# Patient Record
Sex: FEMALE | Race: WHITE | Employment: OTHER | URBAN - METROPOLITAN AREA
[De-identification: names, ages, dates, MRNs, and addresses within clinical notes are randomized per-mention and may not be internally consistent; named-entity substitution may affect disease eponyms.]

---

## 2021-01-07 ENCOUNTER — OFFICE VISIT (OUTPATIENT)
Dept: FAMILY MEDICINE CLINIC | Age: 66
End: 2021-01-07
Payer: MEDICARE

## 2021-01-07 VITALS
HEIGHT: 63 IN | HEART RATE: 95 BPM | RESPIRATION RATE: 16 BRPM | WEIGHT: 213 LBS | OXYGEN SATURATION: 96 % | SYSTOLIC BLOOD PRESSURE: 118 MMHG | TEMPERATURE: 96.8 F | DIASTOLIC BLOOD PRESSURE: 86 MMHG | BODY MASS INDEX: 37.74 KG/M2

## 2021-01-07 DIAGNOSIS — Z00.00 MEDICARE ANNUAL WELLNESS VISIT, SUBSEQUENT: Primary | ICD-10-CM

## 2021-01-07 DIAGNOSIS — Z11.59 NEED FOR HEPATITIS C SCREENING TEST: ICD-10-CM

## 2021-01-07 DIAGNOSIS — Z13.1 SCREENING FOR DIABETES MELLITUS: ICD-10-CM

## 2021-01-07 DIAGNOSIS — F41.9 ANXIETY: ICD-10-CM

## 2021-01-07 DIAGNOSIS — Z13.220 SCREENING FOR HYPERLIPIDEMIA: ICD-10-CM

## 2021-01-07 DIAGNOSIS — I10 ESSENTIAL HYPERTENSION: ICD-10-CM

## 2021-01-07 DIAGNOSIS — F32.1 CURRENT MODERATE EPISODE OF MAJOR DEPRESSIVE DISORDER, UNSPECIFIED WHETHER RECURRENT (HCC): ICD-10-CM

## 2021-01-07 DIAGNOSIS — Z71.89 ADVANCED DIRECTIVES, COUNSELING/DISCUSSION: ICD-10-CM

## 2021-01-07 PROCEDURE — G8754 DIAS BP LESS 90: HCPCS | Performed by: FAMILY MEDICINE

## 2021-01-07 PROCEDURE — G8427 DOCREV CUR MEDS BY ELIG CLIN: HCPCS | Performed by: FAMILY MEDICINE

## 2021-01-07 PROCEDURE — G8752 SYS BP LESS 140: HCPCS | Performed by: FAMILY MEDICINE

## 2021-01-07 PROCEDURE — G0439 PPPS, SUBSEQ VISIT: HCPCS | Performed by: FAMILY MEDICINE

## 2021-01-07 PROCEDURE — 99213 OFFICE O/P EST LOW 20 MIN: CPT | Performed by: FAMILY MEDICINE

## 2021-01-07 PROCEDURE — G9395 INI PHQ9 >9 NO REMISS >=5: HCPCS | Performed by: FAMILY MEDICINE

## 2021-01-07 PROCEDURE — G8417 CALC BMI ABV UP PARAM F/U: HCPCS | Performed by: FAMILY MEDICINE

## 2021-01-07 PROCEDURE — 3017F COLORECTAL CA SCREEN DOC REV: CPT | Performed by: FAMILY MEDICINE

## 2021-01-07 PROCEDURE — G8432 DEP SCR NOT DOC, RNG: HCPCS | Performed by: FAMILY MEDICINE

## 2021-01-07 RX ORDER — ARIPIPRAZOLE 2 MG/1
TABLET ORAL
Qty: 90 TAB | Refills: 1 | Status: SHIPPED | OUTPATIENT
Start: 2021-01-07 | End: 2022-04-11

## 2021-01-07 RX ORDER — ALPRAZOLAM 1 MG/1
TABLET ORAL
COMMUNITY
Start: 2020-11-15 | End: 2021-01-07 | Stop reason: ALTCHOICE

## 2021-01-07 RX ORDER — FLUOXETINE HYDROCHLORIDE 40 MG/1
CAPSULE ORAL
COMMUNITY
Start: 2020-11-16 | End: 2021-01-07 | Stop reason: SDUPTHER

## 2021-01-07 RX ORDER — ARIPIPRAZOLE 2 MG/1
TABLET ORAL
COMMUNITY
Start: 2020-12-02 | End: 2021-01-07 | Stop reason: SDUPTHER

## 2021-01-07 RX ORDER — FLUOXETINE HYDROCHLORIDE 40 MG/1
CAPSULE ORAL
Qty: 90 CAP | Refills: 1 | Status: SHIPPED | OUTPATIENT
Start: 2021-01-07 | End: 2021-10-11

## 2021-01-07 RX ORDER — HYDROXYZINE HYDROCHLORIDE 10 MG/1
10 TABLET, FILM COATED ORAL 2 TIMES DAILY
Qty: 120 TAB | Refills: 3 | Status: SHIPPED | OUTPATIENT
Start: 2021-01-07 | End: 2021-03-08

## 2021-01-07 RX ORDER — AMLODIPINE AND BENAZEPRIL HYDROCHLORIDE 5; 20 MG/1; MG/1
CAPSULE ORAL
COMMUNITY
Start: 2020-11-15 | End: 2021-01-07 | Stop reason: SDUPTHER

## 2021-01-07 RX ORDER — AMLODIPINE AND BENAZEPRIL HYDROCHLORIDE 5; 20 MG/1; MG/1
CAPSULE ORAL
Qty: 90 CAP | Refills: 1 | Status: SHIPPED | OUTPATIENT
Start: 2021-01-07 | End: 2021-10-11

## 2021-01-07 NOTE — ACP (ADVANCE CARE PLANNING)
Advance Care Planning     General Advance Care Planning (ACP) Conversation      Date of Conversation: 1/7/2021  Conducted with: Patient with Decision Making Capacity    Healthcare Decision Maker:   Marquita tanner son  Click here to complete [address] including selection of the Healthcare Decision Maker Relationship (ie \"Primary\")  Today we documented Decision Maker(s) consistent with Legal Next of Kin hierarchy. Content/Action Overview:    Has ACP document(s) on file - reflects the patient's care preferences  Reviewed DNR/DNI and patient elects DNR order - referred to ACP Clinical Specialist & placed order  Topics discussed: treatment goals and benefit/burden of treatment options  Additional Comments: none     Length of Voluntary ACP Conversation in minutes:  16 minutes    Abilio Quinn MD

## 2021-01-07 NOTE — PROGRESS NOTES
Chief Complaint   Patient presents with    New Patient    Establish Care    Discuss Medications     she is a 60y.o. year old female who presents for evalution. She is here to establish care and to get her medcare wellness visit done  She also has chronic conditions to address    She took  xanax in the past and two antidepressants , was getting from pcp. Has not see psychiatry recently  She has not been taking the xanax for a month  She says  She has felt nervous at times and she just went to bed  Has done ok without it overall  Needs care , needs a psychiatrist and needs a therapist to help behaviorally      Reviewed PmHx, RxHx, FmHx, SocHx, AllgHx and updated and dated in the chart. Aspirin yes ____   No____ N/A____    There are no active problems to display for this patient. Nurse notes were reviewed and copied and are correct  Review of Systems - negative except as listed above in the HPI    Objective:     Vitals:    01/07/21 1408   BP: 118/86   Pulse: 95   Resp: 16   Temp: 96.8 °F (36 °C)   TempSrc: Skin   SpO2: 96%   Weight: 213 lb (96.6 kg)   Height: 5' 3\" (1.6 m)     Physical Examination: General appearance - alert, well appearing, and in no distress  Mental status - alert, oriented to person, place, and time  Eyes - pupils equal and reactive, extraocular eye movements intact  Neck - supple, no significant adenopathy  Chest - clear to auscultation, no wheezes, rales or rhonchi, symmetric air entry  Heart - normal rate, regular rhythm, normal S1, S2, no murmurs, rubs, clicks or gallops  Abdomen - soft, nontender, nondistended, no masses or organomegaly  Neurological - alert, oriented, normal speech, no focal findings or movement disorder noted  Musculoskeletal - no joint tenderness, deformity or swelling  Extremities - peripheral pulses normal, no pedal edema, no clubbing or cyanosis         Assessment/ Plan:   Diagnoses and all orders for this visit:    1.  Anxiety  -     hydrOXYzine HCL (ATARAX) 10 mg tablet; Take 1 Tab by mouth two (2) times a day for 60 days. Trying a nonbenzo Rx for anxiety  She agrees to try behavioral support to give nonchemical ways to manage anxiety  2. Current moderate episode of major depressive disorder, unspecified whether recurrent (HCC)  -     ARIPiprazole (ABILIFY) 2 mg tablet; TK 1 T PO AT NIGHT TO AUGMENT DEPRESSION TREATMENT  -     FLUoxetine (PROzac) 40 mg capsule; TAKE 1 CAPSULE BY MOUTH EVERY MORNING  -     [place] INI PHQ9 >9 NO REMISS >=5    3. Essential hypertension  -     amLODIPine-benazepril (LOTREL) 5-20 mg per capsule; TAKE 1 CAPSULE BY MOUTH EVERY MORNING  -     METABOLIC PANEL, COMPREHENSIVE; Future  Cont same meds. Check labs today  4. Screening for hyperlipidemia  -     LIPID PANEL; Future    5. Need for hepatitis C screening test  -     HEPATITIS C AB; Future    6. Screening for diabetes mellitus  -     HEMOGLOBIN A1C WITH EAG; Future           ICD-10-CM ICD-9-CM    1. Medicare annual wellness visit, subsequent  Z00.00 V70.0    2. Advanced directives, counseling/discussion  Z71.89 V65.49    3. Anxiety  F41.9 300.00 hydrOXYzine HCL (ATARAX) 10 mg tablet   4. Current moderate episode of major depressive disorder, unspecified whether recurrent (HCC)  F32.1 296.22 ARIPiprazole (ABILIFY) 2 mg tablet      FLUoxetine (PROzac) 40 mg capsule      HI INI PHQ9 >9 NO REMISS >=5   5. Essential hypertension  I10 401.9 amLODIPine-benazepril (LOTREL) 5-20 mg per capsule      METABOLIC PANEL, COMPREHENSIVE      METABOLIC PANEL, COMPREHENSIVE   6. Screening for hyperlipidemia  Z13.220 V77.91 LIPID PANEL      LIPID PANEL   7. Need for hepatitis C screening test  Z11.59 V73.89 HEPATITIS C AB      HEPATITIS C AB   8. Screening for diabetes mellitus  Z13.1 V77.1 HEMOGLOBIN A1C WITH EAG      HEMOGLOBIN A1C WITH EAG       I have discussed the diagnosis with the patient and the intended plan as seen in the above orders.   The patient has received an after-visit summary and questions were answered concerning future plans. Patient Instructions       Medicare Wellness Visit, Female     The best way to live healthy is to have a lifestyle where you eat a well-balanced diet, exercise regularly, limit alcohol use, and quit all forms of tobacco/nicotine, if applicable. Regular preventive services are another way to keep healthy. Preventive services (vaccines, screening tests, monitoring & exams) can help personalize your care plan, which helps you manage your own care. Screening tests can find health problems at the earliest stages, when they are easiest to treat. [facility] follows the current, evidence-based guidelines published by the [facility]adim Kang ([facility]) when recommending preventive services for our patients. Because we follow these guidelines, sometimes recommendations change over time as research supports it. (For example, mammograms used to be recommended annually. Even though Medicare will still pay for an annual mammogram, the newer guidelines recommend a mammogram every two years for women of average risk). Of course, you and your doctor may decide to screen more often for some diseases, based on your risk and your co-morbidities (chronic disease you are already diagnosed with). Preventive services for you include:  - Medicare offers their members a free annual wellness visit, which is time for you and your primary care provider to discuss and plan for your preventive service needs. Take advantage of this benefit every year!  -All adults over the age of 72 should receive the recommended pneumonia vaccines. Current USPSTF guidelines recommend a series of two vaccines for the best pneumonia protection.   -All adults should have a flu vaccine yearly and a tetanus vaccine every 10 years.   -All adults age 48 and older should receive the shingles vaccines (series of two vaccines).       -All adults age 38-68 who are overweight should have a diabetes screening test once every three years.   -All adults born between 80 and 1965 should be screened once for Hepatitis C.  -Other screening tests and preventive services for persons with diabetes include: an eye exam to screen for diabetic retinopathy, a kidney function test, a foot exam, and stricter control over your cholesterol.   -Cardiovascular screening for adults with routine risk involves an electrocardiogram (ECG) at intervals determined by your doctor.   -Colorectal cancer screenings should be done for adults age 54-65 with no increased risk factors for colorectal cancer. There are a number of acceptable methods of screening for this type of cancer. Each test has its own benefits and drawbacks. Discuss with your doctor what is most appropriate for you during your annual wellness visit. The different tests include: colonoscopy (considered the best screening method), a fecal occult blood test, a fecal DNA test, and sigmoidoscopy.    -A bone mass density test is recommended when a woman turns 65 to screen for osteoporosis. This test is only recommended one time, as a screening. Some providers will use this same test as a disease monitoring tool if you already have osteoporosis. -Breast cancer screenings are recommended every other year for women of normal risk, age 54-69.  -Cervical cancer screenings for women over age 72 are only recommended with certain risk factors.      Here is a list of your current Health Maintenance items (your personalized list of preventive services) with a due date:  Health Maintenance Due   Topic Date Due    Hepatitis C Test  1955    Pneumococcal Vaccine (1 of 1 - PPSV23) 01/31/1962    DTaP/Tdap/Td  (1 - Tdap) 01/31/1977    Cholesterol Test   01/31/1996    Shingles Vaccine (1 of 2) 01/31/2006    Colorectal Screening  01/31/2006    Mammogram  01/31/2006    Bone Mineral Density   01/31/2021         Medicare Wellness Visit, Female     The best way to live healthy is to have a lifestyle where you eat a well-balanced diet, exercise regularly, limit alcohol use, and quit all forms of tobacco/nicotine, if applicable. Regular preventive services are another way to keep healthy. Preventive services (vaccines, screening tests, monitoring & exams) can help personalize your care plan, which helps you manage your own care. Screening tests can find health problems at the earliest stages, when they are easiest to treat. [facility] follows the current, evidence-based guidelines published by the [facility] Vadim Kapadia ([facility]) when recommending preventive services for our patients. Because we follow these guidelines, sometimes recommendations change over time as research supports it. (For example, mammograms used to be recommended annually. Even though Medicare will still pay for an annual mammogram, the newer guidelines recommend a mammogram every two years for women of average risk). Of course, you and your doctor may decide to screen more often for some diseases, based on your risk and your co-morbidities (chronic disease you are already diagnosed with). Preventive services for you include:  - Medicare offers their members a free annual wellness visit, which is time for you and your primary care provider to discuss and plan for your preventive service needs. Take advantage of this benefit every year!  -All adults over the age of 72 should receive the recommended pneumonia vaccines. Current USPSTF guidelines recommend a series of two vaccines for the best pneumonia protection.   -All adults should have a flu vaccine yearly and a tetanus vaccine every 10 years.   -All adults age 48 and older should receive the shingles vaccines (series of two vaccines).       -All adults age 38-68 who are overweight should have a diabetes screening test once every three years.   -All adults born between 80 and 1965 should be screened once for Hepatitis C.  -Other screening tests and preventive services for persons with diabetes include: an eye exam to screen for diabetic retinopathy, a kidney function test, a foot exam, and stricter control over your cholesterol.   -Cardiovascular screening for adults with routine risk involves an electrocardiogram (ECG) at intervals determined by your doctor.   -Colorectal cancer screenings should be done for adults age 54-65 with no increased risk factors for colorectal cancer. There are a number of acceptable methods of screening for this type of cancer. Each test has its own benefits and drawbacks. Discuss with your doctor what is most appropriate for you during your annual wellness visit. The different tests include: colonoscopy (considered the best screening method), a fecal occult blood test, a fecal DNA test, and sigmoidoscopy.    -A bone mass density test is recommended when a woman turns 65 to screen for osteoporosis. This test is only recommended one time, as a screening. Some providers will use this same test as a disease monitoring tool if you already have osteoporosis. -Breast cancer screenings are recommended every other year for women of normal risk, age 54-69.  -Cervical cancer screenings for women over age 72 are only recommended with certain risk factors.      Here is a list of your current Health Maintenance items (your personalized list of preventive services) with a due date:  Health Maintenance Due   Topic Date Due    Hepatitis C Test  1955    Pneumococcal Vaccine (1 of 1 - PPSV23) 01/31/1962    DTaP/Tdap/Td  (1 - Tdap) 01/31/1977    Cholesterol Test   01/31/1996    Shingles Vaccine (1 of 2) 01/31/2006    Colorectal Screening  01/31/2006    Mammogram  01/31/2006    Bone Mineral Density   01/31/2021               This is the Subsequent Medicare Annual Wellness Exam, performed 12 months or more after the Initial AWV or the last Subsequent AWV    I have reviewed the patient's medical history in detail and updated the computerized patient record. Depression Risk Factor Screening:     3 most recent PHQ Screens 1/7/2021   Little interest or pleasure in doing things Nearly every day   Feeling down, depressed, irritable, or hopeless Nearly every day   Total Score PHQ 2 6   Trouble falling or staying asleep, or sleeping too much Not at all   Feeling tired or having little energy Several days   Poor appetite, weight loss, or overeating Not at all   Feeling bad about yourself - or that you are a failure or have let yourself or your family down Not at all   Trouble concentrating on things such as school, work, reading, or watching TV More than half the days   Moving or speaking so slowly that other people could have noticed; or the opposite being so fidgety that others notice Not at all   Thoughts of being better off dead, or hurting yourself in some way Not at all   PHQ 9 Score 9   How difficult have these problems made it for you to do your work, take care of your home and get along with others Somewhat difficult       Alcohol Risk Screen    Do you average more than 1 drink per night or more than 7 drinks a week:  No    On any one occasion in the past three months have you have had more than 3 drinks containing alcohol:  No        Functional Ability and Level of Safety:    Hearing: Hearing is good. Activities of Daily Living: The home contains: no safety equipment. Patient does total self care      Ambulation: with no difficulty     Fall Risk:  No flowsheet data found. Abuse Screen:  Patient is not abused       Cognitive Screening    Has your family/caregiver stated any concerns about your memory: no     Cognitive Screening: Normal - Clock Drawing Test    Assessment/Plan   Education and counseling provided:  Are appropriate based on today's review and evaluation  End-of-Life planning (with patient's consent)    Diagnoses and all orders for this visit:    1.  Medicare annual wellness visit, subsequent    2. Advanced directives, counseling/discussion                  Health Maintenance Due     Health Maintenance Due   Topic Date Due    Hepatitis C Screening  1955    Pneumococcal 0-64 years (1 of 1 - PPSV23) 01/31/1962    DTaP/Tdap/Td series (1 - Tdap) 01/31/1977    Lipid Screen  01/31/1996    Shingrix Vaccine Age 50> (1 of 2) 01/31/2006    Colorectal Cancer Screening Combo  01/31/2006    Breast Cancer Screen Mammogram  01/31/2006    Bone Densitometry (Dexa) Screening  01/31/2021       Patient Care Team   Patient Care Team:  Genevieve Zamora MD as PCP - General (Family Medicine)  Genevieve Zamora MD as PCP - [facility]    History   There is no problem list on file for this patient. History reviewed. No pertinent past medical history. History reviewed. No pertinent surgical history. Current Outpatient Medications   Medication Sig Dispense Refill    hydrOXYzine HCL (ATARAX) 10 mg tablet Take 1 Tab by mouth two (2) times a day for 60 days. 120 Tab 3    amLODIPine-benazepril (LOTREL) 5-20 mg per capsule TAKE 1 CAPSULE BY MOUTH EVERY MORNING 90 Cap 1    ARIPiprazole (ABILIFY) 2 mg tablet TK 1 T PO AT NIGHT TO AUGMENT DEPRESSION TREATMENT 90 Tab 1    FLUoxetine (PROzac) 40 mg capsule TAKE 1 CAPSULE BY MOUTH EVERY MORNING 90 Cap 1     No Known Allergies    No family history on file. Social History     Tobacco Use    Smoking status: Current Every Day Smoker     Types: Cigarettes    Smokeless tobacco: Never Used   Substance Use Topics    Alcohol use: Not Currently     This is the Subsequent Medicare Annual Wellness Exam, performed 12 months or more after the Initial AWV or the last Subsequent AWV    I have reviewed the patient's medical history in detail and updated the computerized patient record.      Depression Risk Factor Screening:     3 most recent PHQ Screens 1/7/2021   Little interest or pleasure in doing things Nearly every day   Feeling down, depressed, irritable, or hopeless Nearly every day   Total Score PHQ 2 6   Trouble falling or staying asleep, or sleeping too much Not at all   Feeling tired or having little energy Several days   Poor appetite, weight loss, or overeating Not at all   Feeling bad about yourself - or that you are a failure or have let yourself or your family down Not at all   Trouble concentrating on things such as school, work, reading, or watching TV More than half the days   Moving or speaking so slowly that other people could have noticed; or the opposite being so fidgety that others notice Not at all   Thoughts of being better off dead, or hurting yourself in some way Not at all   PHQ 9 Score 9   How difficult have these problems made it for you to do your work, take care of your home and get along with others Somewhat difficult       Alcohol Risk Screen    Do you average more than 1 drink per night or more than 7 drinks a week:  No    On any one occasion in the past three months have you have had more than 3 drinks containing alcohol:  No        Functional Ability and Level of Safety:    Hearing: Hearing is good. Activities of Daily Living: The home contains: no safety equipment. Patient does total self care      Ambulation: with no difficulty     Fall Risk:  No flowsheet data found. Abuse Screen:  Patient is not abused       Cognitive Screening    Has your family/caregiver stated any concerns about your memory: no     Cognitive Screening: Normal - Clock Drawing Test    Assessment/Plan   Education and counseling provided:  Are appropriate based on today's review and evaluation  End-of-Life planning (with patient's consent)    Diagnoses and all orders for this visit:    1. Medicare annual wellness visit, subsequent    2. Advanced directives, counseling/discussion    3. Anxiety  -     hydrOXYzine HCL (ATARAX) 10 mg tablet; Take 1 Tab by mouth two (2) times a day for 60 days.     4. Current moderate episode of major depressive disorder, unspecified whether recurrent (HCC)  -     ARIPiprazole (ABILIFY) 2 mg tablet; TK 1 T PO AT NIGHT TO AUGMENT DEPRESSION TREATMENT  -     FLUoxetine (PROzac) 40 mg capsule; TAKE 1 CAPSULE BY MOUTH EVERY MORNING  -     [place] INI PHQ9 >9 NO REMISS >=5    5. Essential hypertension  -     amLODIPine-benazepril (LOTREL) 5-20 mg per capsule; TAKE 1 CAPSULE BY MOUTH EVERY MORNING  -     METABOLIC PANEL, COMPREHENSIVE; Future    6. Screening for hyperlipidemia  -     LIPID PANEL; Future    7. Need for hepatitis C screening test  -     HEPATITIS C AB; Future    8. Screening for diabetes mellitus  -     HEMOGLOBIN A1C WITH EAG; Future        Health Maintenance Due     Health Maintenance Due   Topic Date Due    Hepatitis C Screening  1955    Pneumococcal 0-64 years (1 of 1 - PPSV23) 01/31/1962    DTaP/Tdap/Td series (1 - Tdap) 01/31/1977    Lipid Screen  01/31/1996    Shingrix Vaccine Age 50> (1 of 2) 01/31/2006    Colorectal Cancer Screening Combo  01/31/2006    Breast Cancer Screen Mammogram  01/31/2006    Bone Densitometry (Dexa) Screening  01/31/2021       Patient Care Team   Patient Care Team:  Melida Littlejohn MD as PCP - General (Family Medicine)  Melida Littlejohn MD as PCP - [address] [place] [address] Dr Fournier Provider    History   There is no problem list on file for this patient. History reviewed. No pertinent past medical history. History reviewed. No pertinent surgical history. Current Outpatient Medications   Medication Sig Dispense Refill    hydrOXYzine HCL (ATARAX) 10 mg tablet Take 1 Tab by mouth two (2) times a day for 60 days. 120 Tab 3    amLODIPine-benazepril (LOTREL) 5-20 mg per capsule TAKE 1 CAPSULE BY MOUTH EVERY MORNING 90 Cap 1    ARIPiprazole (ABILIFY) 2 mg tablet TK 1 T PO AT NIGHT TO AUGMENT DEPRESSION TREATMENT 90 Tab 1    FLUoxetine (PROzac) 40 mg capsule TAKE 1 CAPSULE BY MOUTH EVERY MORNING 90 Cap 1     No Known Allergies    No family history on file.   Social History     Tobacco Use    Smoking status: Current Every Day Smoker     Types: Cigarettes    Smokeless tobacco: Never Used   Substance Use Topics    Alcohol use: Not Currently

## 2021-01-07 NOTE — PATIENT INSTRUCTIONS
Medicare Wellness Visit, Female The best way to live healthy is to have a lifestyle where you eat a well-balanced diet, exercise regularly, limit alcohol use, and quit all forms of tobacco/nicotine, if applicable. Regular preventive services are another way to keep healthy. Preventive services (vaccines, screening tests, monitoring & exams) can help personalize your care plan, which helps you manage your own care. Screening tests can find health problems at the earliest stages, when they are easiest to treat. [facility] follows the current, evidence-based guidelines published by the [facility] ([facility]) when recommending preventive services for our patients. Because we follow these guidelines, sometimes recommendations change over time as research supports it. (For example, mammograms used to be recommended annually. Even though Medicare will still pay for an annual mammogram, the newer guidelines recommend a mammogram every two years for women of average risk). Of course, you and your doctor may decide to screen more often for some diseases, based on your risk and your co-morbidities (chronic disease you are already diagnosed with). Preventive services for you include: - Medicare offers their members a free annual wellness visit, which is time for you and your primary care provider to discuss and plan for your preventive service needs. Take advantage of this benefit every year! 
-All adults over the age of 72 should receive the recommended pneumonia vaccines. Current USPSTF guidelines recommend a series of two vaccines for the best pneumonia protection.  
-All adults should have a flu vaccine yearly and a tetanus vaccine every 10 years.  
-All adults age 48 and older should receive the shingles vaccines (series of two vaccines). -All adults age 38-68 who are overweight should have a diabetes screening test once every three years. -All adults born between 80 and 1965 should be screened once for Hepatitis C. 
-Other screening tests and preventive services for persons with diabetes include: an eye exam to screen for diabetic retinopathy, a kidney function test, a foot exam, and stricter control over your cholesterol.  
-Cardiovascular screening for adults with routine risk involves an electrocardiogram (ECG) at intervals determined by your doctor.  
-Colorectal cancer screenings should be done for adults age 54-65 with no increased risk factors for colorectal cancer. There are a number of acceptable methods of screening for this type of cancer. Each test has its own benefits and drawbacks. Discuss with your doctor what is most appropriate for you during your annual wellness visit. The different tests include: colonoscopy (considered the best screening method), a fecal occult blood test, a fecal DNA test, and sigmoidoscopy. 
 
-A bone mass density test is recommended when a woman turns 65 to screen for osteoporosis. This test is only recommended one time, as a screening. Some providers will use this same test as a disease monitoring tool if you already have osteoporosis. -Breast cancer screenings are recommended every other year for women of normal risk, age 54-69. 
-Cervical cancer screenings for women over age 72 are only recommended with certain risk factors. Here is a list of your current Health Maintenance items (your personalized list of preventive services) with a due date: 
Health Maintenance Due Topic Date Due  
 Hepatitis C Test  1955  Pneumococcal Vaccine (1 of 1 - PPSV23) 01/31/1962  DTaP/Tdap/Td  (1 - Tdap) 01/31/1977  Cholesterol Test   01/31/1996  Shingles Vaccine (1 of 2) 01/31/2006  Colorectal Screening  01/31/2006  Mammogram  01/31/2006  Bone Mineral Density   01/31/2021 Medicare Wellness Visit, Female The best way to live healthy is to have a lifestyle where you eat a well-balanced diet, exercise regularly, limit alcohol use, and quit all forms of tobacco/nicotine, if applicable. Regular preventive services are another way to keep healthy. Preventive services (vaccines, screening tests, monitoring & exams) can help personalize your care plan, which helps you manage your own care. Screening tests can find health problems at the earliest stages, when they are easiest to treat. [facility] follows the current, evidence-based guidelines published by the [facility] ([facility]) when recommending preventive services for our patients. Because we follow these guidelines, sometimes recommendations change over time as research supports it. (For example, mammograms used to be recommended annually. Even though Medicare will still pay for an annual mammogram, the newer guidelines recommend a mammogram every two years for women of average risk). Of course, you and your doctor may decide to screen more often for some diseases, based on your risk and your co-morbidities (chronic disease you are already diagnosed with). Preventive services for you include: - Medicare offers their members a free annual wellness visit, which is time for you and your primary care provider to discuss and plan for your preventive service needs. Take advantage of this benefit every year! 
-All adults over the age of 72 should receive the recommended pneumonia vaccines. Current USPSTF guidelines recommend a series of two vaccines for the best pneumonia protection.  
-All adults should have a flu vaccine yearly and a tetanus vaccine every 10 years.  
-All adults age 48 and older should receive the shingles vaccines (series of two vaccines). -All adults age 38-68 who are overweight should have a diabetes screening test once every three years. -All adults born between 80 and 1965 should be screened once for Hepatitis C. 
-Other screening tests and preventive services for persons with diabetes include: an eye exam to screen for diabetic retinopathy, a kidney function test, a foot exam, and stricter control over your cholesterol.  
-Cardiovascular screening for adults with routine risk involves an electrocardiogram (ECG) at intervals determined by your doctor.  
-Colorectal cancer screenings should be done for adults age 54-65 with no increased risk factors for colorectal cancer. There are a number of acceptable methods of screening for this type of cancer. Each test has its own benefits and drawbacks. Discuss with your doctor what is most appropriate for you during your annual wellness visit. The different tests include: colonoscopy (considered the best screening method), a fecal occult blood test, a fecal DNA test, and sigmoidoscopy. 
 
-A bone mass density test is recommended when a woman turns 65 to screen for osteoporosis. This test is only recommended one time, as a screening. Some providers will use this same test as a disease monitoring tool if you already have osteoporosis. -Breast cancer screenings are recommended every other year for women of normal risk, age 54-69. 
-Cervical cancer screenings for women over age 72 are only recommended with certain risk factors. Here is a list of your current Health Maintenance items (your personalized list of preventive services) with a due date: 
Health Maintenance Due Topic Date Due  
 Hepatitis C Test  1955  Pneumococcal Vaccine (1 of 1 - PPSV23) 01/31/1962  DTaP/Tdap/Td  (1 - Tdap) 01/31/1977  Cholesterol Test   01/31/1996  Shingles Vaccine (1 of 2) 01/31/2006  Colorectal Screening  01/31/2006  Mammogram  01/31/2006  Bone Mineral Density   01/31/2021

## 2021-01-07 NOTE — PROGRESS NOTES
1. Have you been to the ER, urgent care clinic since your last visit? Hospitalized since your last visit? No    2. Have you seen or consulted any other health care providers outside of the [address] [place] [address] since your last visit? Include any pap smears or colon screening. No     Last PCP: Nidia Pedraza md (Retired)  Phone: [phone]    Chief Complaint   Patient presents with   [address] Patient    Establish Care    Discuss Medications     Visit Vitals  /86 (BP 1 Location: Left arm, BP Patient Position: Sitting)   Pulse 95   Temp 96.8 °F (36 °C) (Skin)   Resp 16   Ht 5' 3\" (1.6 m)   Wt 213 lb (96.6 kg)   SpO2 96%   BMI 37.73 kg/m²           Pharmacy VERIFIED.    [facility] [address] AT [address]    3 most recent [facility] Screens 1/7/2021   Little interest or pleasure in doing things Nearly every day   Feeling down, depressed, irritable, or hopeless Nearly every day   Total Score PHQ 2 6   Trouble falling or staying asleep, or sleeping too much Not at all   Feeling tired or having little energy Several days   Poor appetite, weight loss, or overeating Not at all   Feeling bad about yourself - or that you are a failure or have let yourself or your family down Not at all   Trouble concentrating on things such as school, work, reading, or watching TV More than half the days   Moving or speaking so slowly that other people could have noticed; or the opposite being so fidgety that others notice Not at all   Thoughts of being better off dead, or hurting yourself in some way Not at all   PHQ 9 Score 9   How difficult have these problems made it for you to do your work, take care of your home and get along with others Somewhat difficult     Health Maintenance Due   Topic Date Due    Hepatitis C Screening  1955    DTaP/Tdap/Td series (1 - Tdap) 01/31/1977    PAP AKA CERVICAL CYTOLOGY  01/31/1977    Lipid Screen  01/31/1996    Shingrix Vaccine Age 50> (1 of 2) 01/31/2006    Colorectal Cancer Screening Combo  01/31/2006    Breast Cancer Screen Mammogram  01/31/2006    Flu Vaccine (1) 09/01/2020    Medicare Yearly Exam  01/06/2021    Bone Densitometry (Dexa) Screening  01/31/2021

## 2021-10-10 DIAGNOSIS — F32.1 CURRENT MODERATE EPISODE OF MAJOR DEPRESSIVE DISORDER, UNSPECIFIED WHETHER RECURRENT (HCC): ICD-10-CM

## 2021-10-10 DIAGNOSIS — I10 ESSENTIAL HYPERTENSION: ICD-10-CM

## 2021-10-11 RX ORDER — AMLODIPINE AND BENAZEPRIL HYDROCHLORIDE 5; 20 MG/1; MG/1
CAPSULE ORAL
Qty: 90 CAPSULE | Refills: 1 | Status: SHIPPED | OUTPATIENT
Start: 2021-10-11

## 2021-10-11 RX ORDER — FLUOXETINE HYDROCHLORIDE 40 MG/1
CAPSULE ORAL
Qty: 90 CAPSULE | Refills: 1 | Status: SHIPPED | OUTPATIENT
Start: 2021-10-11

## 2021-11-05 ENCOUNTER — VIRTUAL VISIT (OUTPATIENT)
Dept: FAMILY MEDICINE CLINIC | Age: 66
End: 2021-11-05
Payer: MEDICARE

## 2021-11-05 ENCOUNTER — HOSPITAL ENCOUNTER (OUTPATIENT)
Dept: GENERAL RADIOLOGY | Age: 66
Discharge: HOME | End: 2021-11-05
Payer: MEDICARE

## 2021-11-05 DIAGNOSIS — R73.9 ELEVATED BLOOD SUGAR: ICD-10-CM

## 2021-11-05 DIAGNOSIS — Z13.220 SCREENING FOR HYPERLIPIDEMIA: ICD-10-CM

## 2021-11-05 DIAGNOSIS — M79.674 TOE PAIN, BILATERAL: ICD-10-CM

## 2021-11-05 DIAGNOSIS — M26.629 TMJ SYNDROME: ICD-10-CM

## 2021-11-05 DIAGNOSIS — M25.559 HIP PAIN: ICD-10-CM

## 2021-11-05 DIAGNOSIS — M25.559 HIP PAIN: Primary | ICD-10-CM

## 2021-11-05 DIAGNOSIS — Z11.59 NEED FOR HEPATITIS C SCREENING TEST: ICD-10-CM

## 2021-11-05 DIAGNOSIS — M79.675 TOE PAIN, BILATERAL: ICD-10-CM

## 2021-11-05 LAB
COMMENT, HOLDF: NORMAL
SAMPLES BEING HELD,HOLD: NORMAL
URATE SERPL-MCNC: 5 MG/DL (ref 2.6–6)

## 2021-11-05 PROCEDURE — 1090F PRES/ABSN URINE INCON ASSESS: CPT | Performed by: FAMILY MEDICINE

## 2021-11-05 PROCEDURE — G8756 NO BP MEASURE DOC: HCPCS | Performed by: FAMILY MEDICINE

## 2021-11-05 PROCEDURE — G8510 SCR DEP NEG, NO PLAN REQD: HCPCS | Performed by: FAMILY MEDICINE

## 2021-11-05 PROCEDURE — 73502 X-RAY EXAM HIP UNI 2-3 VIEWS: CPT

## 2021-11-05 PROCEDURE — G8427 DOCREV CUR MEDS BY ELIG CLIN: HCPCS | Performed by: FAMILY MEDICINE

## 2021-11-05 PROCEDURE — G8417 CALC BMI ABV UP PARAM F/U: HCPCS | Performed by: FAMILY MEDICINE

## 2021-11-05 PROCEDURE — G8400 PT W/DXA NO RESULTS DOC: HCPCS | Performed by: FAMILY MEDICINE

## 2021-11-05 PROCEDURE — 1101F PT FALLS ASSESS-DOCD LE1/YR: CPT | Performed by: FAMILY MEDICINE

## 2021-11-05 PROCEDURE — 99214 OFFICE O/P EST MOD 30 MIN: CPT | Performed by: FAMILY MEDICINE

## 2021-11-05 PROCEDURE — 3017F COLORECTAL CA SCREEN DOC REV: CPT | Performed by: FAMILY MEDICINE

## 2021-11-05 PROCEDURE — G8536 NO DOC ELDER MAL SCRN: HCPCS | Performed by: FAMILY MEDICINE

## 2021-11-05 RX ORDER — PREDNISONE 10 MG/1
TABLET ORAL
Qty: 20 TABLET | Refills: 0 | Status: SHIPPED | OUTPATIENT
Start: 2021-11-05 | End: 2022-03-03

## 2021-11-05 NOTE — PROGRESS NOTES
1. Have you been to the ER, urgent care clinic since your last visit? Hospitalized since your last visit? [facility] 8/2021    2. Have you seen or consulted any other health care providers outside of the [address] [place] [address] since your last visit? Include any pap smears or colon screening. No  Health Maintenance Due   Topic Date Due    Hepatitis C Screening  Never done    COVID-19 Vaccine (1) Never done    DTaP/Tdap/Td series (1 - Tdap) Never done    Cervical cancer screen  Never done    Lipid Screen  Never done    Colorectal Cancer Screening Combo  Never done    Shingrix Vaccine Age 50> (1 of 2) Never done    Breast Cancer Screen Mammogram  Never done    Bone Densitometry (Dexa) Screening  Never done    Pneumococcal 65+ years (1 of 1 - PPSV23) Never done    Flu Vaccine (1) 09/01/2021     Chief Complaint   Patient presents with    Toe Pain     Patient state on going for couple months. Patient states otc not helping.      Jaw Pain    Hip Pain     Health Maintenance Due   Topic Date Due    Hepatitis C Screening  Never done    COVID-19 Vaccine (1) Never done    DTaP/Tdap/Td series (1 - Tdap) Never done    Cervical cancer screen  Never done    Lipid Screen  Never done    Colorectal Cancer Screening Combo  Never done    Shingrix Vaccine Age 50> (1 of 2) Never done    Breast Cancer Screen Mammogram  Never done    Bone Densitometry (Dexa) Screening  Never done    Pneumococcal 65+ years (1 of 1 - PPSV23) Never done    Flu Vaccine (1) 09/01/2021     3 most recent PHQ Screens 11/5/2021   Little interest or pleasure in doing things Not at all   Feeling down, depressed, irritable, or hopeless Not at all   Total Score PHQ 2 0   Trouble falling or staying asleep, or sleeping too much -   Feeling tired or having little energy -   Poor appetite, weight loss, or overeating -   Feeling bad about yourself - or that you are a failure or have let yourself or your family down -   Trouble concentrating on things such as school, work, reading, or watching TV -   Moving or speaking so slowly that other people could have noticed; or the opposite being so fidgety that others notice -   Thoughts of being better off dead, or hurting yourself in some way -   PHQ 9 Score -   How difficult have these problems made it for you to do your work, take care of your home and get along with others -     Abuse Screening Questionnaire 11/5/2021   Do you ever feel afraid of your partner? N   Are you in a relationship with someone who physically or mentally threatens you? N   Is it safe for you to go home? Y     Patient-Reported Vitals 11/5/2021   Patient-Reported Weight 200lb      Fall Risk Assessment, last 12 mths 11/5/2021   Able to walk? Yes   Fall in past 12 months? 0   Do you feel unsteady?  0   Are you worried about falling 0

## 2021-11-05 NOTE — PROGRESS NOTES
Ranjit Fink is a 73 y.o. female who was seen by synchronous (real-time) audio-video technology on 11/5/2021 for Toe Pain (Patient state on going for couple months. Patient states otc not helping. ), Jaw Pain, and Hip Pain    She has pain in both 1st toes. Started a few months ago  Sometimes she has trouble walking  She says there is swelling on the toes the left more than right 8/10 right now. Pain is constant  Also has pain on right jaw. She has TMJ syndrome  This has been happening for years  She has a night tooth guard but not using  She also has pain in right hip. This started a few months ago  6/10 right. The pain is mostly when walking    Assessment & Plan:   Diagnoses and all orders for this visit:    1. Hip pain  -     XR HIP LT W OR WO PELV 2-3 VWS; Future  -     XR HIP RT W OR WO PELV 2-3 VWS; Future  -     MAY, DIRECT, W/REFLEX; Future  -     URIC ACID; Future  -     RHEUMATOID FACTOR, QL  -     predniSONE (DELTASONE) 10 mg tablet; Take 4 tab ea day for 2 days, then 3 tab ea day for 2 days , then 2 tab ea day for 2 days, then 1 tab ea day for 2 days    2. Toe pain, bilateral  -     MAY, DIRECT, W/REFLEX; Future  -     URIC ACID; Future  -     RHEUMATOID FACTOR, QL  -     predniSONE (DELTASONE) 10 mg tablet; Take 4 tab ea day for 2 days, then 3 tab ea day for 2 days , then 2 tab ea day for 2 days, then 1 tab ea day for 2 days    3. TMJ syndrome  -     predniSONE (DELTASONE) 10 mg tablet; Take 4 tab ea day for 2 days, then 3 tab ea day for 2 days , then 2 tab ea day for 2 days, then 1 tab ea day for 2 days    4. Elevated blood sugar  -     METABOLIC PANEL, COMPREHENSIVE; Future  -     HEMOGLOBIN A1C WITH EAG; Future    5. Need for hepatitis C screening test  -     HEPATITIS C AB; Future    6. Screening for hyperlipidemia  -     LIPID PANEL; Future    Other orders  -     SAMPLES BEING HELD            Subjective:       Prior to Admission medications    Medication Sig Start Date End Date Taking?  Authorizing Provider   predniSONE (DELTASONE) 10 mg tablet Take 4 tab ea day for 2 days, then 3 tab ea day for 2 days , then 2 tab ea day for 2 days, then 1 tab ea day for 2 days 11/5/21  Yes Ozzie Ellis MD   FLUoxetine (PROzac) 40 mg capsule TAKE 1 CAPSULE BY MOUTH EVERY MORNING 10/11/21  Yes Ozzie Ellis MD   amLODIPine-benazepril (LOTREL) 5-20 mg per capsule TAKE 1 CAPSULE BY MOUTH EVERY MORNING 10/11/21  Yes Ozzie Ellis MD   ARIPiprazole (ABILIFY) 2 mg tablet TK 1 T PO AT NIGHT TO AUGMENT DEPRESSION TREATMENT  Patient not taking: Reported on 11/5/2021 1/7/21   Ozzie Ellis MD     There are no problems to display for this patient.     Current Outpatient Medications   Medication Sig Dispense Refill    predniSONE (DELTASONE) 10 mg tablet Take 4 tab ea day for 2 days, then 3 tab ea day for 2 days , then 2 tab ea day for 2 days, then 1 tab ea day for 2 days 20 Tablet 0    FLUoxetine (PROzac) 40 mg capsule TAKE 1 CAPSULE BY MOUTH EVERY MORNING 90 Capsule 1    amLODIPine-benazepril (LOTREL) 5-20 mg per capsule TAKE 1 CAPSULE BY MOUTH EVERY MORNING 90 Capsule 1    ARIPiprazole (ABILIFY) 2 mg tablet TK 1 T PO AT NIGHT TO AUGMENT DEPRESSION TREATMENT (Patient not taking: Reported on 11/5/2021) 90 Tab 1       ROS    Objective:     Patient-Reported Vitals 11/5/2021   Patient-Reported Weight 200lb        [INSTRUCTIONS:  \"[x]\" Indicates a positive item  \"[]\" Indicates a negative item  -- DELETE ALL ITEMS NOT EXAMINED]    Constitutional: [x] Appears well-developed and well-nourished [x] No apparent distress      [] Abnormal -     Mental status: [x] Alert and awake  [x] Oriented to person/place/time [x] Able to follow commands    [] Abnormal -     Eyes:   EOM    [x]  Normal    [] Abnormal -   Sclera  [x]  Normal    [] Abnormal -          Discharge [x]  None visible   [] Abnormal -     HENT: [x] Normocephalic, atraumatic  [] Abnormal -   [x] Mouth/Throat: Mucous membranes are moist    External Ears [x] Normal  [] Abnormal -    Neck: [x] No visualized mass [] Abnormal -     Pulmonary/Chest: [x] Respiratory effort normal   [x] No visualized signs of difficulty breathing or respiratory distress        [] Abnormal -      Musculoskeletal:   [x] Normal gait with no signs of ataxia         [x] Normal range of motion of neck        [] Abnormal -     Neurological:        [x] No Facial Asymmetry (Cranial nerve 7 motor function) (limited exam due to video visit)          [x] No gaze palsy        [] Abnormal -          Skin:        [x] No significant exanthematous lesions or discoloration noted on facial skin         [] Abnormal -            Psychiatric:       [x] Normal Affect [] Abnormal -        [x] No Hallucinations    Other pertinent observable physical exam findings:-        We discussed the expected course, resolution and complications of the diagnosis(es) in detail. Medication risks, benefits, costs, interactions, and alternatives were discussed as indicated. I advised her to contact the office if her condition worsens, changes or fails to improve as anticipated. She expressed understanding with the diagnosis(es) and plan. Srikanth Camarillo, was evaluated through a synchronous (real-time) audio-video encounter. The patient (or guardian if applicable) is aware that this is a billable service. Verbal consent to proceed has been obtained within the past 12 months. The visit was conducted pursuant to the emergency declaration under the [address] [place] [address] authority and the [employer] General Act. Patient identification was verified, and a caregiver was present when appropriate. The patient was located in a state where the provider was credentialed to provide care.       Sera Duron MD

## 2021-11-06 LAB — RHEUMATOID FACT SERPL-ACNC: <10 IU/ML (ref 0–13.9)

## 2021-11-08 LAB — ANA SER QL: NEGATIVE

## 2021-11-10 ENCOUNTER — TELEPHONE (OUTPATIENT)
Dept: FAMILY MEDICINE CLINIC | Age: 66
End: 2021-11-10

## 2021-11-10 NOTE — TELEPHONE ENCOUNTER
----- Message from Jensen Barcenas sent at 11/9/2021  3:31 PM EST -----  Subject: Message to Provider    QUESTIONS  Information for Provider? pt called wants a callback from nurse or PCP ,   said her pain is back. Had appt with PCP 11/5/21.   ---------------------------------------------------------------------------  --------------  CALL BACK INFO  What is the best way for the office to contact you? OK to leave message on   voicemail  Preferred Call Back Phone Number? [phone]  ---------------------------------------------------------------------------  --------------  SCRIPT ANSWERS  Relationship to Patient?  Self

## 2021-11-12 NOTE — TELEPHONE ENCOUNTER
Called patient. No Two patient Identification confirmed. Was informed patient will call office back. 1st attempt.

## 2022-01-01 ENCOUNTER — HOSPITAL ENCOUNTER (INPATIENT)
Age: 67
LOS: 15 days | DRG: 177 | End: 2022-10-26
Attending: EMERGENCY MEDICINE | Admitting: FAMILY MEDICINE
Payer: MEDICARE

## 2022-01-01 ENCOUNTER — APPOINTMENT (OUTPATIENT)
Dept: GENERAL RADIOLOGY | Age: 67
DRG: 177 | End: 2022-01-01
Attending: INTERNAL MEDICINE
Payer: MEDICARE

## 2022-01-01 ENCOUNTER — APPOINTMENT (OUTPATIENT)
Dept: CT IMAGING | Age: 67
DRG: 177 | End: 2022-01-01
Attending: STUDENT IN AN ORGANIZED HEALTH CARE EDUCATION/TRAINING PROGRAM
Payer: MEDICARE

## 2022-01-01 ENCOUNTER — APPOINTMENT (OUTPATIENT)
Dept: GENERAL RADIOLOGY | Age: 67
DRG: 177 | End: 2022-01-01
Attending: STUDENT IN AN ORGANIZED HEALTH CARE EDUCATION/TRAINING PROGRAM
Payer: MEDICARE

## 2022-01-01 ENCOUNTER — APPOINTMENT (OUTPATIENT)
Dept: NON INVASIVE DIAGNOSTICS | Age: 67
DRG: 177 | End: 2022-01-01
Attending: STUDENT IN AN ORGANIZED HEALTH CARE EDUCATION/TRAINING PROGRAM
Payer: MEDICARE

## 2022-01-01 ENCOUNTER — APPOINTMENT (OUTPATIENT)
Dept: GENERAL RADIOLOGY | Age: 67
DRG: 177 | End: 2022-01-01
Attending: EMERGENCY MEDICINE
Payer: MEDICARE

## 2022-01-01 ENCOUNTER — TELEPHONE (OUTPATIENT)
Dept: PALLATIVE CARE | Age: 67
End: 2022-01-01

## 2022-01-01 ENCOUNTER — HOSPICE ADMISSION (OUTPATIENT)
Dept: HOSPICE | Facility: HOSPICE | Age: 67
End: 2022-01-01

## 2022-01-01 VITALS
HEIGHT: 63 IN | WEIGHT: 184.7 LBS | SYSTOLIC BLOOD PRESSURE: 126 MMHG | OXYGEN SATURATION: 59 % | RESPIRATION RATE: 11 BRPM | TEMPERATURE: 97.5 F | BODY MASS INDEX: 32.73 KG/M2 | DIASTOLIC BLOOD PRESSURE: 69 MMHG | HEART RATE: 82 BPM

## 2022-01-01 DIAGNOSIS — J96.01 ACUTE HYPOXEMIC RESPIRATORY FAILURE DUE TO COVID-19 (HCC): ICD-10-CM

## 2022-01-01 DIAGNOSIS — C85.90 LYMPHOMA, UNSPECIFIED BODY REGION, UNSPECIFIED LYMPHOMA TYPE (HCC): ICD-10-CM

## 2022-01-01 DIAGNOSIS — R53.1 WEAKNESS GENERALIZED: ICD-10-CM

## 2022-01-01 DIAGNOSIS — T45.1X5A CHEMOTHERAPY INDUCED NEUTROPENIA (HCC): ICD-10-CM

## 2022-01-01 DIAGNOSIS — U07.1 ACUTE HYPOXEMIC RESPIRATORY FAILURE DUE TO COVID-19 (HCC): ICD-10-CM

## 2022-01-01 DIAGNOSIS — C83.31 DIFFUSE LARGE B-CELL LYMPHOMA OF LYMPH NODES OF NECK (HCC): ICD-10-CM

## 2022-01-01 DIAGNOSIS — J96.01 ACUTE HYPOXEMIC RESPIRATORY FAILURE (HCC): ICD-10-CM

## 2022-01-01 DIAGNOSIS — R53.81 PHYSICAL DEBILITY: ICD-10-CM

## 2022-01-01 DIAGNOSIS — Z71.89 GOALS OF CARE, COUNSELING/DISCUSSION: ICD-10-CM

## 2022-01-01 DIAGNOSIS — Z51.5 PALLIATIVE CARE BY SPECIALIST: ICD-10-CM

## 2022-01-01 DIAGNOSIS — R19.7 DIARRHEA, UNSPECIFIED TYPE: ICD-10-CM

## 2022-01-01 DIAGNOSIS — Z79.899 HIGH RISK MEDICATION USE: ICD-10-CM

## 2022-01-01 DIAGNOSIS — C79.51 METASTATIC CANCER TO BONE (HCC): ICD-10-CM

## 2022-01-01 DIAGNOSIS — C83.30 DIFFUSE LARGE B-CELL LYMPHOMA, UNSPECIFIED BODY REGION (HCC): ICD-10-CM

## 2022-01-01 DIAGNOSIS — F41.9 ANXIETY: ICD-10-CM

## 2022-01-01 DIAGNOSIS — R06.02 SHORTNESS OF BREATH: ICD-10-CM

## 2022-01-01 DIAGNOSIS — U07.1 PNEUMONIA DUE TO COVID-19 VIRUS: ICD-10-CM

## 2022-01-01 DIAGNOSIS — U07.1 COVID: Primary | ICD-10-CM

## 2022-01-01 DIAGNOSIS — G89.3 CHRONIC PAIN DUE TO NEOPLASM: ICD-10-CM

## 2022-01-01 DIAGNOSIS — B37.0 THRUSH: ICD-10-CM

## 2022-01-01 DIAGNOSIS — R53.83 FATIGUE, UNSPECIFIED TYPE: ICD-10-CM

## 2022-01-01 DIAGNOSIS — D69.6 THROMBOCYTOPENIA (HCC): ICD-10-CM

## 2022-01-01 DIAGNOSIS — D70.1 CHEMOTHERAPY INDUCED NEUTROPENIA (HCC): ICD-10-CM

## 2022-01-01 DIAGNOSIS — D61.818 PANCYTOPENIA (HCC): ICD-10-CM

## 2022-01-01 DIAGNOSIS — R52 GENERALIZED PAIN: ICD-10-CM

## 2022-01-01 DIAGNOSIS — J12.82 PNEUMONIA DUE TO COVID-19 VIRUS: ICD-10-CM

## 2022-01-01 LAB
ALBUMIN SERPL-MCNC: 2.1 G/DL (ref 3.5–5)
ALBUMIN SERPL-MCNC: 2.2 G/DL (ref 3.5–5)
ALBUMIN SERPL-MCNC: 2.3 G/DL (ref 3.5–5)
ALBUMIN SERPL-MCNC: 2.4 G/DL (ref 3.5–5)
ALBUMIN SERPL-MCNC: 2.5 G/DL (ref 3.5–5)
ALBUMIN SERPL-MCNC: 2.5 G/DL (ref 3.5–5)
ALBUMIN SERPL-MCNC: 2.6 G/DL (ref 3.5–5)
ALBUMIN SERPL-MCNC: 2.7 G/DL (ref 3.5–5)
ALBUMIN SERPL-MCNC: 2.8 G/DL (ref 3.5–5)
ALBUMIN/GLOB SERPL: 0.8 {RATIO} (ref 1.1–2.2)
ALBUMIN/GLOB SERPL: 0.8 {RATIO} (ref 1.1–2.2)
ALBUMIN/GLOB SERPL: 0.9 {RATIO} (ref 1.1–2.2)
ALBUMIN/GLOB SERPL: 1 {RATIO} (ref 1.1–2.2)
ALBUMIN/GLOB SERPL: 1.1 {RATIO} (ref 1.1–2.2)
ALBUMIN/GLOB SERPL: 1.2 {RATIO} (ref 1.1–2.2)
ALP SERPL-CCNC: 116 U/L (ref 45–117)
ALP SERPL-CCNC: 120 U/L (ref 45–117)
ALP SERPL-CCNC: 132 U/L (ref 45–117)
ALP SERPL-CCNC: 39 U/L (ref 45–117)
ALP SERPL-CCNC: 40 U/L (ref 45–117)
ALP SERPL-CCNC: 41 U/L (ref 45–117)
ALP SERPL-CCNC: 41 U/L (ref 45–117)
ALP SERPL-CCNC: 44 U/L (ref 45–117)
ALP SERPL-CCNC: 45 U/L (ref 45–117)
ALP SERPL-CCNC: 46 U/L (ref 45–117)
ALP SERPL-CCNC: 56 U/L (ref 45–117)
ALP SERPL-CCNC: 74 U/L (ref 45–117)
ALP SERPL-CCNC: 86 U/L (ref 45–117)
ALP SERPL-CCNC: 91 U/L (ref 45–117)
ALP SERPL-CCNC: 96 U/L (ref 45–117)
ALP SERPL-CCNC: 97 U/L (ref 45–117)
ALT SERPL-CCNC: 122 U/L (ref 12–78)
ALT SERPL-CCNC: 128 U/L (ref 12–78)
ALT SERPL-CCNC: 130 U/L (ref 12–78)
ALT SERPL-CCNC: 141 U/L (ref 12–78)
ALT SERPL-CCNC: 163 U/L (ref 12–78)
ALT SERPL-CCNC: 184 U/L (ref 12–78)
ALT SERPL-CCNC: 202 U/L (ref 12–78)
ALT SERPL-CCNC: 215 U/L (ref 12–78)
ALT SERPL-CCNC: 218 U/L (ref 12–78)
ALT SERPL-CCNC: 228 U/L (ref 12–78)
ALT SERPL-CCNC: 242 U/L (ref 12–78)
ALT SERPL-CCNC: 309 U/L (ref 12–78)
ALT SERPL-CCNC: 312 U/L (ref 12–78)
ALT SERPL-CCNC: 343 U/L (ref 12–78)
ALT SERPL-CCNC: 429 U/L (ref 12–78)
ALT SERPL-CCNC: 577 U/L (ref 12–78)
ANION GAP SERPL CALC-SCNC: 5 MMOL/L (ref 5–15)
ANION GAP SERPL CALC-SCNC: 6 MMOL/L (ref 5–15)
ANION GAP SERPL CALC-SCNC: 7 MMOL/L (ref 5–15)
ANION GAP SERPL CALC-SCNC: 8 MMOL/L (ref 5–15)
ARTERIAL PATENCY WRIST A: YES
AST SERPL-CCNC: 104 U/L (ref 15–37)
AST SERPL-CCNC: 148 U/L (ref 15–37)
AST SERPL-CCNC: 155 U/L (ref 15–37)
AST SERPL-CCNC: 161 U/L (ref 15–37)
AST SERPL-CCNC: 41 U/L (ref 15–37)
AST SERPL-CCNC: 43 U/L (ref 15–37)
AST SERPL-CCNC: 48 U/L (ref 15–37)
AST SERPL-CCNC: 50 U/L (ref 15–37)
AST SERPL-CCNC: 55 U/L (ref 15–37)
AST SERPL-CCNC: 62 U/L (ref 15–37)
AST SERPL-CCNC: 66 U/L (ref 15–37)
AST SERPL-CCNC: 75 U/L (ref 15–37)
AST SERPL-CCNC: 76 U/L (ref 15–37)
AST SERPL-CCNC: 82 U/L (ref 15–37)
AST SERPL-CCNC: 88 U/L (ref 15–37)
AST SERPL-CCNC: 97 U/L (ref 15–37)
BACTERIA SPEC CULT: NORMAL
BASE DEFICIT BLDA-SCNC: 0.7 MMOL/L
BASE DEFICIT BLDV-SCNC: 1.8 MMOL/L
BASE DEFICIT BLDV-SCNC: 2.5 MMOL/L
BASOPHILS # BLD: 0 K/UL (ref 0–0.1)
BASOPHILS NFR BLD: 0 % (ref 0–1)
BASOPHILS NFR BLD: 3 % (ref 0–1)
BASOPHILS NFR BLD: 4 % (ref 0–1)
BDY SITE: ABNORMAL
BILIRUB SERPL-MCNC: 0.3 MG/DL (ref 0.2–1)
BILIRUB SERPL-MCNC: 0.4 MG/DL (ref 0.2–1)
BILIRUB SERPL-MCNC: 0.5 MG/DL (ref 0.2–1)
BILIRUB SERPL-MCNC: 0.5 MG/DL (ref 0.2–1)
BILIRUB SERPL-MCNC: 0.6 MG/DL (ref 0.2–1)
BILIRUB SERPL-MCNC: 0.6 MG/DL (ref 0.2–1)
BILIRUB SERPL-MCNC: 0.7 MG/DL (ref 0.2–1)
BNP SERPL-MCNC: 2422 PG/ML
BUN SERPL-MCNC: 11 MG/DL (ref 6–20)
BUN SERPL-MCNC: 12 MG/DL (ref 6–20)
BUN SERPL-MCNC: 16 MG/DL (ref 6–20)
BUN SERPL-MCNC: 19 MG/DL (ref 6–20)
BUN SERPL-MCNC: 19 MG/DL (ref 6–20)
BUN SERPL-MCNC: 21 MG/DL (ref 6–20)
BUN SERPL-MCNC: 21 MG/DL (ref 6–20)
BUN SERPL-MCNC: 22 MG/DL (ref 6–20)
BUN SERPL-MCNC: 24 MG/DL (ref 6–20)
BUN SERPL-MCNC: 24 MG/DL (ref 6–20)
BUN SERPL-MCNC: 26 MG/DL (ref 6–20)
BUN SERPL-MCNC: 28 MG/DL (ref 6–20)
BUN SERPL-MCNC: 31 MG/DL (ref 6–20)
BUN SERPL-MCNC: 35 MG/DL (ref 6–20)
BUN SERPL-MCNC: 9 MG/DL (ref 6–20)
BUN SERPL-MCNC: 9 MG/DL (ref 6–20)
BUN/CREAT SERPL: 12 (ref 12–20)
BUN/CREAT SERPL: 13 (ref 12–20)
BUN/CREAT SERPL: 17 (ref 12–20)
BUN/CREAT SERPL: 18 (ref 12–20)
BUN/CREAT SERPL: 18 (ref 12–20)
BUN/CREAT SERPL: 19 (ref 12–20)
BUN/CREAT SERPL: 20 (ref 12–20)
BUN/CREAT SERPL: 21 (ref 12–20)
BUN/CREAT SERPL: 21 (ref 12–20)
BUN/CREAT SERPL: 23 (ref 12–20)
BUN/CREAT SERPL: 23 (ref 12–20)
BUN/CREAT SERPL: 24 (ref 12–20)
BUN/CREAT SERPL: 24 (ref 12–20)
BUN/CREAT SERPL: 25 (ref 12–20)
BUN/CREAT SERPL: 28 (ref 12–20)
BUN/CREAT SERPL: 33 (ref 12–20)
C DIFF GDH STL QL: NEGATIVE
C DIFF TOX A+B STL QL IA: NEGATIVE
CALCIUM SERPL-MCNC: 8 MG/DL (ref 8.5–10.1)
CALCIUM SERPL-MCNC: 8 MG/DL (ref 8.5–10.1)
CALCIUM SERPL-MCNC: 8.1 MG/DL (ref 8.5–10.1)
CALCIUM SERPL-MCNC: 8.1 MG/DL (ref 8.5–10.1)
CALCIUM SERPL-MCNC: 8.2 MG/DL (ref 8.5–10.1)
CALCIUM SERPL-MCNC: 8.3 MG/DL (ref 8.5–10.1)
CALCIUM SERPL-MCNC: 8.3 MG/DL (ref 8.5–10.1)
CALCIUM SERPL-MCNC: 8.4 MG/DL (ref 8.5–10.1)
CALCIUM SERPL-MCNC: 8.5 MG/DL (ref 8.5–10.1)
CALCIUM SERPL-MCNC: 8.6 MG/DL (ref 8.5–10.1)
CALCIUM SERPL-MCNC: 8.7 MG/DL (ref 8.5–10.1)
CALCIUM SERPL-MCNC: 8.7 MG/DL (ref 8.5–10.1)
CALCIUM SERPL-MCNC: 8.9 MG/DL (ref 8.5–10.1)
CHLORIDE SERPL-SCNC: 100 MMOL/L (ref 97–108)
CHLORIDE SERPL-SCNC: 101 MMOL/L (ref 97–108)
CHLORIDE SERPL-SCNC: 103 MMOL/L (ref 97–108)
CHLORIDE SERPL-SCNC: 104 MMOL/L (ref 97–108)
CHLORIDE SERPL-SCNC: 105 MMOL/L (ref 97–108)
CHLORIDE SERPL-SCNC: 106 MMOL/L (ref 97–108)
CHLORIDE SERPL-SCNC: 107 MMOL/L (ref 97–108)
CHLORIDE SERPL-SCNC: 108 MMOL/L (ref 97–108)
CHLORIDE SERPL-SCNC: 109 MMOL/L (ref 97–108)
CHLORIDE SERPL-SCNC: 109 MMOL/L (ref 97–108)
CHLORIDE SERPL-SCNC: 110 MMOL/L (ref 97–108)
CHLORIDE SERPL-SCNC: 97 MMOL/L (ref 97–108)
CO2 SERPL-SCNC: 25 MMOL/L (ref 21–32)
CO2 SERPL-SCNC: 26 MMOL/L (ref 21–32)
CO2 SERPL-SCNC: 27 MMOL/L (ref 21–32)
CO2 SERPL-SCNC: 28 MMOL/L (ref 21–32)
CO2 SERPL-SCNC: 31 MMOL/L (ref 21–32)
COMMENT, HOLDF: NORMAL
CREAT SERPL-MCNC: 0.37 MG/DL (ref 0.55–1.02)
CREAT SERPL-MCNC: 0.39 MG/DL (ref 0.55–1.02)
CREAT SERPL-MCNC: 0.44 MG/DL (ref 0.55–1.02)
CREAT SERPL-MCNC: 0.95 MG/DL (ref 0.55–1.02)
CREAT SERPL-MCNC: 1.04 MG/DL (ref 0.55–1.02)
CREAT SERPL-MCNC: 1.05 MG/DL (ref 0.55–1.02)
CREAT SERPL-MCNC: 1.08 MG/DL (ref 0.55–1.02)
CREAT SERPL-MCNC: 1.09 MG/DL (ref 0.55–1.02)
CREAT SERPL-MCNC: 1.11 MG/DL (ref 0.55–1.02)
CREAT SERPL-MCNC: 1.12 MG/DL (ref 0.55–1.02)
CREAT SERPL-MCNC: 1.15 MG/DL (ref 0.55–1.02)
CREAT SERPL-MCNC: 1.15 MG/DL (ref 0.55–1.02)
CREAT SERPL-MCNC: 1.16 MG/DL (ref 0.55–1.02)
CREAT SERPL-MCNC: 1.18 MG/DL (ref 0.55–1.02)
CREAT SERPL-MCNC: 1.19 MG/DL (ref 0.55–1.02)
CREAT SERPL-MCNC: 1.33 MG/DL (ref 0.55–1.02)
CRP SERPL-MCNC: 0.38 MG/DL (ref 0–0.6)
CRP SERPL-MCNC: 0.39 MG/DL (ref 0–0.6)
CRP SERPL-MCNC: 0.4 MG/DL (ref 0–0.6)
CRP SERPL-MCNC: 0.63 MG/DL (ref 0–0.6)
CRP SERPL-MCNC: 1.01 MG/DL (ref 0–0.6)
CRP SERPL-MCNC: 1.9 MG/DL (ref 0–0.6)
CRP SERPL-MCNC: 14 MG/DL (ref 0–0.6)
CRP SERPL-MCNC: 3.07 MG/DL (ref 0–0.6)
CRP SERPL-MCNC: 3.78 MG/DL (ref 0–0.6)
CRP SERPL-MCNC: 3.91 MG/DL (ref 0–0.6)
CRP SERPL-MCNC: 6.36 MG/DL (ref 0–0.6)
CRP SERPL-MCNC: 6.85 MG/DL (ref 0–0.6)
CRP SERPL-MCNC: 7.73 MG/DL (ref 0–0.6)
CRP SERPL-MCNC: 9.86 MG/DL (ref 0–0.6)
CRP SERPL-MCNC: 9.92 MG/DL (ref 0–0.6)
CRP SERPL-MCNC: <0.29 MG/DL (ref 0–0.6)
D DIMER PPP FEU-MCNC: 0.85 MG/L FEU (ref 0–0.65)
D DIMER PPP FEU-MCNC: 1.01 MG/L FEU (ref 0–0.65)
D DIMER PPP FEU-MCNC: 1.01 MG/L FEU (ref 0–0.65)
D DIMER PPP FEU-MCNC: 1.21 MG/L FEU (ref 0–0.65)
D DIMER PPP FEU-MCNC: 1.22 MG/L FEU (ref 0–0.65)
D DIMER PPP FEU-MCNC: 1.24 MG/L FEU (ref 0–0.65)
D DIMER PPP FEU-MCNC: 1.3 MG/L FEU (ref 0–0.65)
D DIMER PPP FEU-MCNC: 1.3 MG/L FEU (ref 0–0.65)
D DIMER PPP FEU-MCNC: 1.31 MG/L FEU (ref 0–0.65)
D DIMER PPP FEU-MCNC: 1.36 MG/L FEU (ref 0–0.65)
D DIMER PPP FEU-MCNC: 1.45 MG/L FEU (ref 0–0.65)
D DIMER PPP FEU-MCNC: 1.47 MG/L FEU (ref 0–0.65)
D DIMER PPP FEU-MCNC: 1.5 MG/L FEU (ref 0–0.65)
D DIMER PPP FEU-MCNC: 1.52 MG/L FEU (ref 0–0.65)
D DIMER PPP FEU-MCNC: 1.85 MG/L FEU (ref 0–0.65)
DIFFERENTIAL METHOD BLD: ABNORMAL
ECHO LV EDV A2C: 82 ML
ECHO LV EDV A4C: 129 ML
ECHO LV EDV BP: 103 ML (ref 56–104)
ECHO LV EDV INDEX A4C: 69 ML/M2
ECHO LV EDV INDEX BP: 55 ML/M2
ECHO LV EDV NDEX A2C: 44 ML/M2
ECHO LV EJECTION FRACTION A2C: 41 %
ECHO LV EJECTION FRACTION A4C: 48 %
ECHO LV EJECTION FRACTION BIPLANE: 45 % (ref 55–100)
ECHO LV ESV A2C: 48 ML
ECHO LV ESV A4C: 67 ML
ECHO LV ESV BP: 57 ML (ref 19–49)
ECHO LV ESV INDEX A2C: 26 ML/M2
ECHO LV ESV INDEX A4C: 36 ML/M2
ECHO LV ESV INDEX BP: 31 ML/M2
ECHO LV FRACTIONAL SHORTENING: 26 % (ref 28–44)
ECHO LV INTERNAL DIMENSION DIASTOLE INDEX: 2.85 CM/M2
ECHO LV INTERNAL DIMENSION DIASTOLIC: 5.3 CM (ref 3.9–5.3)
ECHO LV INTERNAL DIMENSION SYSTOLIC INDEX: 2.1 CM/M2
ECHO LV INTERNAL DIMENSION SYSTOLIC: 3.9 CM
ECHO LV IVSD: 1 CM (ref 0.6–0.9)
ECHO LV MASS 2D: 200.4 G (ref 67–162)
ECHO LV MASS INDEX 2D: 107.7 G/M2 (ref 43–95)
ECHO LV POSTERIOR WALL DIASTOLIC: 1 CM (ref 0.6–0.9)
ECHO LV RELATIVE WALL THICKNESS RATIO: 0.38
ECHO TV REGURGITANT MAX VELOCITY: 1.44 M/S
ECHO TV REGURGITANT PEAK GRADIENT: 8 MMHG
EOSINOPHIL # BLD: 0 K/UL (ref 0–0.4)
EOSINOPHIL NFR BLD: 0 % (ref 0–7)
EOSINOPHIL NFR BLD: 1 % (ref 0–7)
EOSINOPHIL NFR BLD: 4 % (ref 0–7)
EOSINOPHIL NFR BLD: 4 % (ref 0–7)
ERYTHROCYTE [DISTWIDTH] IN BLOOD BY AUTOMATED COUNT: 15.1 % (ref 11.5–14.5)
ERYTHROCYTE [DISTWIDTH] IN BLOOD BY AUTOMATED COUNT: 15.2 % (ref 11.5–14.5)
ERYTHROCYTE [DISTWIDTH] IN BLOOD BY AUTOMATED COUNT: 15.6 % (ref 11.5–14.5)
ERYTHROCYTE [DISTWIDTH] IN BLOOD BY AUTOMATED COUNT: 15.7 % (ref 11.5–14.5)
ERYTHROCYTE [DISTWIDTH] IN BLOOD BY AUTOMATED COUNT: 15.8 % (ref 11.5–14.5)
ERYTHROCYTE [DISTWIDTH] IN BLOOD BY AUTOMATED COUNT: 15.8 % (ref 11.5–14.5)
ERYTHROCYTE [DISTWIDTH] IN BLOOD BY AUTOMATED COUNT: 15.9 % (ref 11.5–14.5)
ERYTHROCYTE [DISTWIDTH] IN BLOOD BY AUTOMATED COUNT: 16 % (ref 11.5–14.5)
ERYTHROCYTE [DISTWIDTH] IN BLOOD BY AUTOMATED COUNT: 16.1 % (ref 11.5–14.5)
ERYTHROCYTE [DISTWIDTH] IN BLOOD BY AUTOMATED COUNT: 16.2 % (ref 11.5–14.5)
ERYTHROCYTE [DISTWIDTH] IN BLOOD BY AUTOMATED COUNT: 16.2 % (ref 11.5–14.5)
ERYTHROCYTE [DISTWIDTH] IN BLOOD BY AUTOMATED COUNT: 16.3 % (ref 11.5–14.5)
FERRITIN SERPL-MCNC: 1050 NG/ML (ref 26–388)
FERRITIN SERPL-MCNC: 1209 NG/ML (ref 26–388)
FERRITIN SERPL-MCNC: 1302 NG/ML (ref 8–252)
FERRITIN SERPL-MCNC: 1303 NG/ML (ref 8–252)
FERRITIN SERPL-MCNC: 1460 NG/ML (ref 26–388)
FERRITIN SERPL-MCNC: 1618 NG/ML (ref 26–388)
FERRITIN SERPL-MCNC: 1798 NG/ML (ref 8–252)
FERRITIN SERPL-MCNC: 2077 NG/ML (ref 26–388)
FERRITIN SERPL-MCNC: 2380 NG/ML (ref 26–388)
FERRITIN SERPL-MCNC: 2591 NG/ML (ref 8–252)
FERRITIN SERPL-MCNC: 612 NG/ML (ref 8–252)
FERRITIN SERPL-MCNC: 788 NG/ML (ref 8–252)
FERRITIN SERPL-MCNC: 834 NG/ML (ref 26–388)
FERRITIN SERPL-MCNC: 926 NG/ML (ref 26–388)
FERRITIN SERPL-MCNC: 976 NG/ML (ref 8–252)
FERRITIN SERPL-MCNC: 992 NG/ML (ref 8–252)
FIO2 ON VENT: 46 %
FIO2 ON VENT: 75 %
GAS FLOW.O2 O2 DELIVERY SYS: 15 L/MIN
GAS FLOW.O2 O2 DELIVERY SYS: 55 L/MIN
GAS FLOW.O2 O2 DELIVERY SYS: 6 L/MIN
GLOBULIN SER CALC-MCNC: 2 G/DL (ref 2–4)
GLOBULIN SER CALC-MCNC: 2.1 G/DL (ref 2–4)
GLOBULIN SER CALC-MCNC: 2.3 G/DL (ref 2–4)
GLOBULIN SER CALC-MCNC: 2.3 G/DL (ref 2–4)
GLOBULIN SER CALC-MCNC: 2.4 G/DL (ref 2–4)
GLOBULIN SER CALC-MCNC: 2.5 G/DL (ref 2–4)
GLOBULIN SER CALC-MCNC: 2.7 G/DL (ref 2–4)
GLOBULIN SER CALC-MCNC: 2.8 G/DL (ref 2–4)
GLUCOSE SERPL-MCNC: 110 MG/DL (ref 65–100)
GLUCOSE SERPL-MCNC: 111 MG/DL (ref 65–100)
GLUCOSE SERPL-MCNC: 122 MG/DL (ref 65–100)
GLUCOSE SERPL-MCNC: 122 MG/DL (ref 65–100)
GLUCOSE SERPL-MCNC: 124 MG/DL (ref 65–100)
GLUCOSE SERPL-MCNC: 127 MG/DL (ref 65–100)
GLUCOSE SERPL-MCNC: 131 MG/DL (ref 65–100)
GLUCOSE SERPL-MCNC: 132 MG/DL (ref 65–100)
GLUCOSE SERPL-MCNC: 134 MG/DL (ref 65–100)
GLUCOSE SERPL-MCNC: 135 MG/DL (ref 65–100)
GLUCOSE SERPL-MCNC: 146 MG/DL (ref 65–100)
GLUCOSE SERPL-MCNC: 161 MG/DL (ref 65–100)
GLUCOSE SERPL-MCNC: 94 MG/DL (ref 65–100)
GLUCOSE SERPL-MCNC: 96 MG/DL (ref 65–100)
HCO3 BLDA-SCNC: 22 MMOL/L (ref 22–26)
HCO3 BLDV-SCNC: 23 MMOL/L (ref 23–28)
HCO3 BLDV-SCNC: 23 MMOL/L (ref 23–28)
HCT VFR BLD AUTO: 23.1 % (ref 35–47)
HCT VFR BLD AUTO: 23.6 % (ref 35–47)
HCT VFR BLD AUTO: 23.8 % (ref 35–47)
HCT VFR BLD AUTO: 23.8 % (ref 35–47)
HCT VFR BLD AUTO: 23.9 % (ref 35–47)
HCT VFR BLD AUTO: 24.3 % (ref 35–47)
HCT VFR BLD AUTO: 24.9 % (ref 35–47)
HCT VFR BLD AUTO: 24.9 % (ref 35–47)
HCT VFR BLD AUTO: 25.2 % (ref 35–47)
HCT VFR BLD AUTO: 25.7 % (ref 35–47)
HCT VFR BLD AUTO: 25.8 % (ref 35–47)
HCT VFR BLD AUTO: 25.8 % (ref 35–47)
HCT VFR BLD AUTO: 26.5 % (ref 35–47)
HCT VFR BLD AUTO: 27.3 % (ref 35–47)
HCT VFR BLD AUTO: 30.3 % (ref 35–47)
HCT VFR BLD AUTO: 30.9 % (ref 35–47)
HCT VFR BLD AUTO: 31.1 % (ref 35–47)
HGB BLD-MCNC: 10.3 G/DL (ref 11.5–16)
HGB BLD-MCNC: 10.4 G/DL (ref 11.5–16)
HGB BLD-MCNC: 10.5 G/DL (ref 11.5–16)
HGB BLD-MCNC: 7.4 G/DL (ref 11.5–16)
HGB BLD-MCNC: 7.7 G/DL (ref 11.5–16)
HGB BLD-MCNC: 7.7 G/DL (ref 11.5–16)
HGB BLD-MCNC: 7.9 G/DL (ref 11.5–16)
HGB BLD-MCNC: 7.9 G/DL (ref 11.5–16)
HGB BLD-MCNC: 8.1 G/DL (ref 11.5–16)
HGB BLD-MCNC: 8.1 G/DL (ref 11.5–16)
HGB BLD-MCNC: 8.2 G/DL (ref 11.5–16)
HGB BLD-MCNC: 8.2 G/DL (ref 11.5–16)
HGB BLD-MCNC: 8.3 G/DL (ref 11.5–16)
HGB BLD-MCNC: 8.3 G/DL (ref 11.5–16)
HGB BLD-MCNC: 8.7 G/DL (ref 11.5–16)
HGB BLD-MCNC: 8.8 G/DL (ref 11.5–16)
HGB BLD-MCNC: 9 G/DL (ref 11.5–16)
IMM GRANULOCYTES # BLD AUTO: 0 K/UL
IMM GRANULOCYTES # BLD AUTO: 0.1 K/UL (ref 0–0.04)
IMM GRANULOCYTES NFR BLD AUTO: 0 %
IMM GRANULOCYTES NFR BLD AUTO: 2 % (ref 0–0.5)
INTERPRETATION: NORMAL
LACTATE SERPL-SCNC: 0.5 MMOL/L (ref 0.4–2)
LDH SERPL L TO P-CCNC: 172 U/L (ref 81–246)
LDH SERPL L TO P-CCNC: 193 U/L (ref 81–246)
LDH SERPL L TO P-CCNC: 201 U/L (ref 81–246)
LDH SERPL L TO P-CCNC: 254 U/L (ref 81–246)
LDH SERPL L TO P-CCNC: 261 U/L (ref 81–246)
LDH SERPL L TO P-CCNC: 281 U/L (ref 81–246)
LDH SERPL L TO P-CCNC: 318 U/L (ref 81–246)
LDH SERPL L TO P-CCNC: 399 U/L (ref 81–246)
LDH SERPL L TO P-CCNC: 477 U/L (ref 81–246)
LDH SERPL L TO P-CCNC: 531 U/L (ref 81–246)
LDH SERPL L TO P-CCNC: 590 U/L (ref 81–246)
LDH SERPL L TO P-CCNC: 623 U/L (ref 81–246)
LDH SERPL L TO P-CCNC: 691 U/L (ref 81–246)
LDH SERPL L TO P-CCNC: 743 U/L (ref 81–246)
LDH SERPL L TO P-CCNC: 778 U/L (ref 81–246)
LDH SERPL L TO P-CCNC: 806 U/L (ref 81–246)
LYMPHOCYTES # BLD: 0 K/UL (ref 0.8–3.5)
LYMPHOCYTES # BLD: 0.1 K/UL (ref 0.8–3.5)
LYMPHOCYTES # BLD: 0.2 K/UL (ref 0.8–3.5)
LYMPHOCYTES # BLD: 0.3 K/UL (ref 0.8–3.5)
LYMPHOCYTES # BLD: 0.5 K/UL (ref 0.8–3.5)
LYMPHOCYTES NFR BLD: 0 % (ref 12–49)
LYMPHOCYTES NFR BLD: 1 % (ref 12–49)
LYMPHOCYTES NFR BLD: 2 % (ref 12–49)
LYMPHOCYTES NFR BLD: 3 % (ref 12–49)
LYMPHOCYTES NFR BLD: 5 % (ref 12–49)
LYMPHOCYTES NFR BLD: 5 % (ref 12–49)
LYMPHOCYTES NFR BLD: 61 % (ref 12–49)
LYMPHOCYTES NFR BLD: 8 % (ref 12–49)
LYMPHOCYTES NFR BLD: 8 % (ref 12–49)
MAGNESIUM SERPL-MCNC: 1.9 MG/DL (ref 1.6–2.4)
MAGNESIUM SERPL-MCNC: 2.1 MG/DL (ref 1.6–2.4)
MAGNESIUM SERPL-MCNC: 2.2 MG/DL (ref 1.6–2.4)
MCH RBC QN AUTO: 29.9 PG (ref 26–34)
MCH RBC QN AUTO: 30.1 PG (ref 26–34)
MCH RBC QN AUTO: 30.2 PG (ref 26–34)
MCH RBC QN AUTO: 30.3 PG (ref 26–34)
MCH RBC QN AUTO: 30.5 PG (ref 26–34)
MCH RBC QN AUTO: 30.5 PG (ref 26–34)
MCH RBC QN AUTO: 30.7 PG (ref 26–34)
MCH RBC QN AUTO: 30.7 PG (ref 26–34)
MCH RBC QN AUTO: 30.8 PG (ref 26–34)
MCH RBC QN AUTO: 30.9 PG (ref 26–34)
MCH RBC QN AUTO: 31.1 PG (ref 26–34)
MCH RBC QN AUTO: 31.4 PG (ref 26–34)
MCH RBC QN AUTO: 31.4 PG (ref 26–34)
MCHC RBC AUTO-ENTMCNC: 31.9 G/DL (ref 30–36.5)
MCHC RBC AUTO-ENTMCNC: 32 G/DL (ref 30–36.5)
MCHC RBC AUTO-ENTMCNC: 32.2 G/DL (ref 30–36.5)
MCHC RBC AUTO-ENTMCNC: 32.4 G/DL (ref 30–36.5)
MCHC RBC AUTO-ENTMCNC: 32.4 G/DL (ref 30–36.5)
MCHC RBC AUTO-ENTMCNC: 32.5 G/DL (ref 30–36.5)
MCHC RBC AUTO-ENTMCNC: 32.8 G/DL (ref 30–36.5)
MCHC RBC AUTO-ENTMCNC: 33 G/DL (ref 30–36.5)
MCHC RBC AUTO-ENTMCNC: 33.3 G/DL (ref 30–36.5)
MCHC RBC AUTO-ENTMCNC: 33.5 G/DL (ref 30–36.5)
MCHC RBC AUTO-ENTMCNC: 33.7 G/DL (ref 30–36.5)
MCHC RBC AUTO-ENTMCNC: 33.8 G/DL (ref 30–36.5)
MCHC RBC AUTO-ENTMCNC: 33.9 G/DL (ref 30–36.5)
MCHC RBC AUTO-ENTMCNC: 34 G/DL (ref 30–36.5)
MCHC RBC AUTO-ENTMCNC: 34.1 G/DL (ref 30–36.5)
MCV RBC AUTO: 90.2 FL (ref 80–99)
MCV RBC AUTO: 91.2 FL (ref 80–99)
MCV RBC AUTO: 91.5 FL (ref 80–99)
MCV RBC AUTO: 91.5 FL (ref 80–99)
MCV RBC AUTO: 91.8 FL (ref 80–99)
MCV RBC AUTO: 92 FL (ref 80–99)
MCV RBC AUTO: 92.5 FL (ref 80–99)
MCV RBC AUTO: 93.1 FL (ref 80–99)
MCV RBC AUTO: 93.3 FL (ref 80–99)
MCV RBC AUTO: 93.5 FL (ref 80–99)
MCV RBC AUTO: 93.7 FL (ref 80–99)
MCV RBC AUTO: 94.3 FL (ref 80–99)
MCV RBC AUTO: 94.6 FL (ref 80–99)
MCV RBC AUTO: 95.1 FL (ref 80–99)
MCV RBC AUTO: 96.3 FL (ref 80–99)
METAMYELOCYTES NFR BLD MANUAL: 1 %
METAMYELOCYTES NFR BLD MANUAL: 2 %
METAMYELOCYTES NFR BLD MANUAL: 3 %
METAMYELOCYTES NFR BLD MANUAL: 4 %
MONOCYTES # BLD: 0 K/UL (ref 0–1)
MONOCYTES # BLD: 0.2 K/UL (ref 0–1)
MONOCYTES # BLD: 0.2 K/UL (ref 0–1)
MONOCYTES # BLD: 0.3 K/UL (ref 0–1)
MONOCYTES # BLD: 0.3 K/UL (ref 0–1)
MONOCYTES # BLD: 0.4 K/UL (ref 0–1)
MONOCYTES # BLD: 0.7 K/UL (ref 0–1)
MONOCYTES # BLD: 1.2 K/UL (ref 0–1)
MONOCYTES NFR BLD: 0 % (ref 5–13)
MONOCYTES NFR BLD: 1 % (ref 5–13)
MONOCYTES NFR BLD: 11 % (ref 5–13)
MONOCYTES NFR BLD: 11 % (ref 5–13)
MONOCYTES NFR BLD: 15 % (ref 5–13)
MONOCYTES NFR BLD: 20 % (ref 5–13)
MONOCYTES NFR BLD: 3 % (ref 5–13)
MONOCYTES NFR BLD: 3 % (ref 5–13)
MONOCYTES NFR BLD: 4 % (ref 5–13)
MONOCYTES NFR BLD: 5 % (ref 5–13)
MONOCYTES NFR BLD: 5 % (ref 5–13)
MONOCYTES NFR BLD: 7 % (ref 5–13)
MYELOCYTES NFR BLD MANUAL: 1 %
MYELOCYTES NFR BLD MANUAL: 2 %
MYELOCYTES NFR BLD MANUAL: 2 %
MYELOCYTES NFR BLD MANUAL: 4 %
MYELOCYTES NFR BLD MANUAL: 4 %
NEUTS BAND NFR BLD MANUAL: 2 % (ref 0–6)
NEUTS BAND NFR BLD MANUAL: 23 % (ref 0–6)
NEUTS BAND NFR BLD MANUAL: 26 % (ref 0–6)
NEUTS BAND NFR BLD MANUAL: 3 % (ref 0–6)
NEUTS BAND NFR BLD MANUAL: 4 % (ref 0–6)
NEUTS BAND NFR BLD MANUAL: 4 % (ref 0–6)
NEUTS BAND NFR BLD MANUAL: 7 % (ref 0–6)
NEUTS SEG # BLD: 0 K/UL (ref 1.8–8)
NEUTS SEG # BLD: 0 K/UL (ref 1.8–8)
NEUTS SEG # BLD: 0.6 K/UL (ref 1.8–8)
NEUTS SEG # BLD: 0.7 K/UL (ref 1.8–8)
NEUTS SEG # BLD: 12.1 K/UL (ref 1.8–8)
NEUTS SEG # BLD: 13.2 K/UL (ref 1.8–8)
NEUTS SEG # BLD: 4.3 K/UL (ref 1.8–8)
NEUTS SEG # BLD: 4.3 K/UL (ref 1.8–8)
NEUTS SEG # BLD: 5.4 K/UL (ref 1.8–8)
NEUTS SEG # BLD: 5.5 K/UL (ref 1.8–8)
NEUTS SEG # BLD: 5.6 K/UL (ref 1.8–8)
NEUTS SEG # BLD: 5.8 K/UL (ref 1.8–8)
NEUTS SEG # BLD: 6.9 K/UL (ref 1.8–8)
NEUTS SEG # BLD: 7.2 K/UL (ref 1.8–8)
NEUTS SEG # BLD: 7.7 K/UL (ref 1.8–8)
NEUTS SEG # BLD: 8.5 K/UL (ref 1.8–8)
NEUTS SEG # BLD: 9.1 K/UL (ref 1.8–8)
NEUTS SEG NFR BLD: 0 % (ref 32–75)
NEUTS SEG NFR BLD: 14 % (ref 32–75)
NEUTS SEG NFR BLD: 42 % (ref 32–75)
NEUTS SEG NFR BLD: 70 % (ref 32–75)
NEUTS SEG NFR BLD: 73 % (ref 32–75)
NEUTS SEG NFR BLD: 77 % (ref 32–75)
NEUTS SEG NFR BLD: 80 % (ref 32–75)
NEUTS SEG NFR BLD: 85 % (ref 32–75)
NEUTS SEG NFR BLD: 85 % (ref 32–75)
NEUTS SEG NFR BLD: 86 % (ref 32–75)
NEUTS SEG NFR BLD: 88 % (ref 32–75)
NEUTS SEG NFR BLD: 93 % (ref 32–75)
NEUTS SEG NFR BLD: 95 % (ref 32–75)
NEUTS SEG NFR BLD: 97 % (ref 32–75)
NEUTS SEG NFR BLD: 97 % (ref 32–75)
NRBC # BLD: 0 K/UL (ref 0–0.01)
NRBC # BLD: 0.02 K/UL (ref 0–0.01)
NRBC # BLD: 0.03 K/UL (ref 0–0.01)
NRBC BLD-RTO: 0 PER 100 WBC
NRBC BLD-RTO: 0.2 PER 100 WBC
NRBC BLD-RTO: 0.3 PER 100 WBC
NRBC BLD-RTO: 0.5 PER 100 WBC
PATH REV BLD -IMP: NORMAL
PCO2 BLDA: 30 MMHG (ref 35–45)
PCO2 BLDV: 38.8 MMHG (ref 41–51)
PCO2 BLDV: 42.6 MMHG (ref 41–51)
PH BLDA: 7.48 [PH] (ref 7.35–7.45)
PH BLDV: 7.35 [PH] (ref 7.32–7.42)
PH BLDV: 7.39 [PH] (ref 7.32–7.42)
PHOSPHATE SERPL-MCNC: 3 MG/DL (ref 2.6–4.7)
PHOSPHATE SERPL-MCNC: 3.5 MG/DL (ref 2.6–4.7)
PHOSPHATE SERPL-MCNC: 3.8 MG/DL (ref 2.6–4.7)
PLATELET # BLD AUTO: 127 K/UL (ref 150–400)
PLATELET # BLD AUTO: 136 K/UL (ref 150–400)
PLATELET # BLD AUTO: 144 K/UL (ref 150–400)
PLATELET # BLD AUTO: 15 K/UL (ref 150–400)
PLATELET # BLD AUTO: 161 K/UL (ref 150–400)
PLATELET # BLD AUTO: 187 K/UL (ref 150–400)
PLATELET # BLD AUTO: 19 K/UL (ref 150–400)
PLATELET # BLD AUTO: 20 K/UL (ref 150–400)
PLATELET # BLD AUTO: 33 K/UL (ref 150–400)
PLATELET # BLD AUTO: 34 K/UL (ref 150–400)
PLATELET # BLD AUTO: 40 K/UL (ref 150–400)
PLATELET # BLD AUTO: 51 K/UL (ref 150–400)
PLATELET # BLD AUTO: 58 K/UL (ref 150–400)
PLATELET # BLD AUTO: 62 K/UL (ref 150–400)
PLATELET # BLD AUTO: 72 K/UL (ref 150–400)
PLATELET # BLD AUTO: 92 K/UL (ref 150–400)
PLATELET # BLD AUTO: 96 K/UL (ref 150–400)
PMV BLD AUTO: 10.9 FL (ref 8.9–12.9)
PMV BLD AUTO: 10.9 FL (ref 8.9–12.9)
PMV BLD AUTO: 11 FL (ref 8.9–12.9)
PMV BLD AUTO: 11.2 FL (ref 8.9–12.9)
PMV BLD AUTO: 11.2 FL (ref 8.9–12.9)
PMV BLD AUTO: 11.4 FL (ref 8.9–12.9)
PMV BLD AUTO: 11.4 FL (ref 8.9–12.9)
PMV BLD AUTO: 11.5 FL (ref 8.9–12.9)
PMV BLD AUTO: 11.7 FL (ref 8.9–12.9)
PMV BLD AUTO: 11.9 FL (ref 8.9–12.9)
PMV BLD AUTO: 12 FL (ref 8.9–12.9)
PMV BLD AUTO: 12.1 FL (ref 8.9–12.9)
PMV BLD AUTO: 12.2 FL (ref 8.9–12.9)
PMV BLD AUTO: 12.6 FL (ref 8.9–12.9)
PMV BLD AUTO: 12.7 FL (ref 8.9–12.9)
PMV BLD AUTO: 12.8 FL (ref 8.9–12.9)
PO2 BLDA: 49 MMHG (ref 80–100)
PO2 BLDV: 33 MMHG (ref 25–40)
PO2 BLDV: 77 MMHG (ref 25–40)
POTASSIUM SERPL-SCNC: 2.9 MMOL/L (ref 3.5–5.1)
POTASSIUM SERPL-SCNC: 3.1 MMOL/L (ref 3.5–5.1)
POTASSIUM SERPL-SCNC: 3.3 MMOL/L (ref 3.5–5.1)
POTASSIUM SERPL-SCNC: 3.5 MMOL/L (ref 3.5–5.1)
POTASSIUM SERPL-SCNC: 3.8 MMOL/L (ref 3.5–5.1)
POTASSIUM SERPL-SCNC: 4 MMOL/L (ref 3.5–5.1)
POTASSIUM SERPL-SCNC: 4.1 MMOL/L (ref 3.5–5.1)
POTASSIUM SERPL-SCNC: 4.1 MMOL/L (ref 3.5–5.1)
POTASSIUM SERPL-SCNC: 4.4 MMOL/L (ref 3.5–5.1)
POTASSIUM SERPL-SCNC: 4.4 MMOL/L (ref 3.5–5.1)
POTASSIUM SERPL-SCNC: 4.7 MMOL/L (ref 3.5–5.1)
PROCALCITONIN SERPL-MCNC: <0.05 NG/ML
PROMYELOCYTES NFR BLD MANUAL: 1 %
PROT SERPL-MCNC: 4.2 G/DL (ref 6.4–8.2)
PROT SERPL-MCNC: 4.3 G/DL (ref 6.4–8.2)
PROT SERPL-MCNC: 4.4 G/DL (ref 6.4–8.2)
PROT SERPL-MCNC: 4.5 G/DL (ref 6.4–8.2)
PROT SERPL-MCNC: 4.5 G/DL (ref 6.4–8.2)
PROT SERPL-MCNC: 4.6 G/DL (ref 6.4–8.2)
PROT SERPL-MCNC: 4.9 G/DL (ref 6.4–8.2)
PROT SERPL-MCNC: 5 G/DL (ref 6.4–8.2)
PROT SERPL-MCNC: 5.2 G/DL (ref 6.4–8.2)
PROT SERPL-MCNC: 5.3 G/DL (ref 6.4–8.2)
PROT SERPL-MCNC: 5.4 G/DL (ref 6.4–8.2)
RBC # BLD AUTO: 2.45 M/UL (ref 3.8–5.2)
RBC # BLD AUTO: 2.54 M/UL (ref 3.8–5.2)
RBC # BLD AUTO: 2.54 M/UL (ref 3.8–5.2)
RBC # BLD AUTO: 2.57 M/UL (ref 3.8–5.2)
RBC # BLD AUTO: 2.62 M/UL (ref 3.8–5.2)
RBC # BLD AUTO: 2.64 M/UL (ref 3.8–5.2)
RBC # BLD AUTO: 2.67 M/UL (ref 3.8–5.2)
RBC # BLD AUTO: 2.67 M/UL (ref 3.8–5.2)
RBC # BLD AUTO: 2.69 M/UL (ref 3.8–5.2)
RBC # BLD AUTO: 2.72 M/UL (ref 3.8–5.2)
RBC # BLD AUTO: 2.76 M/UL (ref 3.8–5.2)
RBC # BLD AUTO: 2.8 M/UL (ref 3.8–5.2)
RBC # BLD AUTO: 2.86 M/UL (ref 3.8–5.2)
RBC # BLD AUTO: 2.87 M/UL (ref 3.8–5.2)
RBC # BLD AUTO: 3.31 M/UL (ref 3.8–5.2)
RBC # BLD AUTO: 3.34 M/UL (ref 3.8–5.2)
RBC # BLD AUTO: 3.34 M/UL (ref 3.8–5.2)
RBC MORPH BLD: ABNORMAL
SAMPLES BEING HELD,HOLD: NORMAL
SAO2 % BLD: 88 % (ref 92–97)
SAO2 % BLDV: 59 % (ref 65–88)
SAO2 % BLDV: 95 % (ref 65–88)
SAO2% DEVICE SAO2% SENSOR NAME: ABNORMAL
SERVICE CMNT-IMP: ABNORMAL
SERVICE CMNT-IMP: ABNORMAL
SERVICE CMNT-IMP: NORMAL
SODIUM SERPL-SCNC: 131 MMOL/L (ref 136–145)
SODIUM SERPL-SCNC: 134 MMOL/L (ref 136–145)
SODIUM SERPL-SCNC: 137 MMOL/L (ref 136–145)
SODIUM SERPL-SCNC: 137 MMOL/L (ref 136–145)
SODIUM SERPL-SCNC: 138 MMOL/L (ref 136–145)
SODIUM SERPL-SCNC: 138 MMOL/L (ref 136–145)
SODIUM SERPL-SCNC: 139 MMOL/L (ref 136–145)
SODIUM SERPL-SCNC: 140 MMOL/L (ref 136–145)
SODIUM SERPL-SCNC: 141 MMOL/L (ref 136–145)
SODIUM SERPL-SCNC: 144 MMOL/L (ref 136–145)
SPECIMEN SITE: ABNORMAL
TOTAL CELLS COUNTED SPEC: 0
TOTAL CELLS COUNTED SPEC: 25
VANCOMYCIN SERPL-MCNC: 17.7 UG/ML
VANCOMYCIN SERPL-MCNC: 21.5 UG/ML
VANCOMYCIN SERPL-MCNC: 23.8 UG/ML
VANCOMYCIN SERPL-MCNC: 27 UG/ML
WBC # BLD AUTO: 0.2 K/UL (ref 3.6–11)
WBC # BLD AUTO: 0.2 K/UL (ref 3.6–11)
WBC # BLD AUTO: 0.6 K/UL (ref 3.6–11)
WBC # BLD AUTO: 1 K/UL (ref 3.6–11)
WBC # BLD AUTO: 10.6 K/UL (ref 3.6–11)
WBC # BLD AUTO: 12.5 K/UL (ref 3.6–11)
WBC # BLD AUTO: 13.3 K/UL (ref 3.6–11)
WBC # BLD AUTO: 4.3 K/UL (ref 3.6–11)
WBC # BLD AUTO: 4.5 K/UL (ref 3.6–11)
WBC # BLD AUTO: 6.2 K/UL (ref 3.6–11)
WBC # BLD AUTO: 6.2 K/UL (ref 3.6–11)
WBC # BLD AUTO: 6.3 K/UL (ref 3.6–11)
WBC # BLD AUTO: 6.7 K/UL (ref 3.6–11)
WBC # BLD AUTO: 7.4 K/UL (ref 3.6–11)
WBC # BLD AUTO: 7.8 K/UL (ref 3.6–11)
WBC # BLD AUTO: 8.5 K/UL (ref 3.6–11)
WBC # BLD AUTO: 9.6 K/UL (ref 3.6–11)
WBC MORPH BLD: ABNORMAL

## 2022-01-01 PROCEDURE — 80053 COMPREHEN METABOLIC PANEL: CPT

## 2022-01-01 PROCEDURE — 87324 CLOSTRIDIUM AG IA: CPT

## 2022-01-01 PROCEDURE — 71275 CT ANGIOGRAPHY CHEST: CPT

## 2022-01-01 PROCEDURE — 85025 COMPLETE CBC W/AUTO DIFF WBC: CPT

## 2022-01-01 PROCEDURE — 74011250637 HC RX REV CODE- 250/637: Performed by: STUDENT IN AN ORGANIZED HEALTH CARE EDUCATION/TRAINING PROGRAM

## 2022-01-01 PROCEDURE — 74011000250 HC RX REV CODE- 250: Performed by: STUDENT IN AN ORGANIZED HEALTH CARE EDUCATION/TRAINING PROGRAM

## 2022-01-01 PROCEDURE — 94640 AIRWAY INHALATION TREATMENT: CPT

## 2022-01-01 PROCEDURE — 99233 SBSQ HOSP IP/OBS HIGH 50: CPT | Performed by: INTERNAL MEDICINE

## 2022-01-01 PROCEDURE — 74011000258 HC RX REV CODE- 258: Performed by: STUDENT IN AN ORGANIZED HEALTH CARE EDUCATION/TRAINING PROGRAM

## 2022-01-01 PROCEDURE — 74011250636 HC RX REV CODE- 250/636: Performed by: FAMILY MEDICINE

## 2022-01-01 PROCEDURE — 99232 SBSQ HOSP IP/OBS MODERATE 35: CPT | Performed by: FAMILY MEDICINE

## 2022-01-01 PROCEDURE — 74011250636 HC RX REV CODE- 250/636: Performed by: NURSE PRACTITIONER

## 2022-01-01 PROCEDURE — 82728 ASSAY OF FERRITIN: CPT

## 2022-01-01 PROCEDURE — 83615 LACTATE (LD) (LDH) ENZYME: CPT

## 2022-01-01 PROCEDURE — 84100 ASSAY OF PHOSPHORUS: CPT

## 2022-01-01 PROCEDURE — 74011250636 HC RX REV CODE- 250/636: Performed by: STUDENT IN AN ORGANIZED HEALTH CARE EDUCATION/TRAINING PROGRAM

## 2022-01-01 PROCEDURE — 97165 OT EVAL LOW COMPLEX 30 MIN: CPT

## 2022-01-01 PROCEDURE — 3E0336Z INTRODUCTION OF NUTRITIONAL SUBSTANCE INTO PERIPHERAL VEIN, PERCUTANEOUS APPROACH: ICD-10-PCS | Performed by: STUDENT IN AN ORGANIZED HEALTH CARE EDUCATION/TRAINING PROGRAM

## 2022-01-01 PROCEDURE — 77010033711 HC HIGH FLOW OXYGEN

## 2022-01-01 PROCEDURE — 86140 C-REACTIVE PROTEIN: CPT

## 2022-01-01 PROCEDURE — 99222 1ST HOSP IP/OBS MODERATE 55: CPT | Performed by: INTERNAL MEDICINE

## 2022-01-01 PROCEDURE — 85379 FIBRIN DEGRADATION QUANT: CPT

## 2022-01-01 PROCEDURE — 93325 DOPPLER ECHO COLOR FLOW MAPG: CPT | Performed by: STUDENT IN AN ORGANIZED HEALTH CARE EDUCATION/TRAINING PROGRAM

## 2022-01-01 PROCEDURE — 65270000046 HC RM TELEMETRY

## 2022-01-01 PROCEDURE — 74011250637 HC RX REV CODE- 250/637

## 2022-01-01 PROCEDURE — 99233 SBSQ HOSP IP/OBS HIGH 50: CPT | Performed by: FAMILY MEDICINE

## 2022-01-01 PROCEDURE — 74011000258 HC RX REV CODE- 258: Performed by: INTERNAL MEDICINE

## 2022-01-01 PROCEDURE — 77010033678 HC OXYGEN DAILY

## 2022-01-01 PROCEDURE — 74011250637 HC RX REV CODE- 250/637: Performed by: NURSE PRACTITIONER

## 2022-01-01 PROCEDURE — 93308 TTE F-UP OR LMTD: CPT | Performed by: STUDENT IN AN ORGANIZED HEALTH CARE EDUCATION/TRAINING PROGRAM

## 2022-01-01 PROCEDURE — 36415 COLL VENOUS BLD VENIPUNCTURE: CPT

## 2022-01-01 PROCEDURE — 80202 ASSAY OF VANCOMYCIN: CPT

## 2022-01-01 PROCEDURE — C9113 INJ PANTOPRAZOLE SODIUM, VIA: HCPCS | Performed by: STUDENT IN AN ORGANIZED HEALTH CARE EDUCATION/TRAINING PROGRAM

## 2022-01-01 PROCEDURE — 93321 DOPPLER ECHO F-UP/LMTD STD: CPT | Performed by: STUDENT IN AN ORGANIZED HEALTH CARE EDUCATION/TRAINING PROGRAM

## 2022-01-01 PROCEDURE — 51798 US URINE CAPACITY MEASURE: CPT

## 2022-01-01 PROCEDURE — 83735 ASSAY OF MAGNESIUM: CPT

## 2022-01-01 PROCEDURE — 93308 TTE F-UP OR LMTD: CPT

## 2022-01-01 PROCEDURE — 77030027138 HC INCENT SPIROMETER -A

## 2022-01-01 PROCEDURE — 94761 N-INVAS EAR/PLS OXIMETRY MLT: CPT

## 2022-01-01 PROCEDURE — 74011250636 HC RX REV CODE- 250/636: Performed by: INTERNAL MEDICINE

## 2022-01-01 PROCEDURE — 74011250637 HC RX REV CODE- 250/637: Performed by: INTERNAL MEDICINE

## 2022-01-01 PROCEDURE — 2709999900 HC NON-CHARGEABLE SUPPLY

## 2022-01-01 PROCEDURE — 99223 1ST HOSP IP/OBS HIGH 75: CPT | Performed by: FAMILY MEDICINE

## 2022-01-01 PROCEDURE — 74011250637 HC RX REV CODE- 250/637: Performed by: PHYSICIAN ASSISTANT

## 2022-01-01 PROCEDURE — 74011000636 HC RX REV CODE- 636: Performed by: FAMILY MEDICINE

## 2022-01-01 PROCEDURE — 74011250636 HC RX REV CODE- 250/636

## 2022-01-01 PROCEDURE — 94762 N-INVAS EAR/PLS OXIMTRY CONT: CPT

## 2022-01-01 PROCEDURE — 5A0955A ASSISTANCE WITH RESPIRATORY VENTILATION, GREATER THAN 96 CONSECUTIVE HOURS, HIGH NASAL FLOW/VELOCITY: ICD-10-PCS | Performed by: INTERNAL MEDICINE

## 2022-01-01 PROCEDURE — 74011000636 HC RX REV CODE- 636: Performed by: RADIOLOGY

## 2022-01-01 PROCEDURE — G0378 HOSPITAL OBSERVATION PER HR: HCPCS

## 2022-01-01 PROCEDURE — 99232 SBSQ HOSP IP/OBS MODERATE 35: CPT | Performed by: INTERNAL MEDICINE

## 2022-01-01 PROCEDURE — 71045 X-RAY EXAM CHEST 1 VIEW: CPT

## 2022-01-01 PROCEDURE — 94664 DEMO&/EVAL PT USE INHALER: CPT

## 2022-01-01 PROCEDURE — 74011250637 HC RX REV CODE- 250/637: Performed by: FAMILY MEDICINE

## 2022-01-01 PROCEDURE — 74011000250 HC RX REV CODE- 250

## 2022-01-01 PROCEDURE — 82803 BLOOD GASES ANY COMBINATION: CPT

## 2022-01-01 PROCEDURE — 77030003560 HC NDL HUBR BARD -A

## 2022-01-01 PROCEDURE — 87040 BLOOD CULTURE FOR BACTERIA: CPT

## 2022-01-01 PROCEDURE — 36600 WITHDRAWAL OF ARTERIAL BLOOD: CPT

## 2022-01-01 PROCEDURE — 97535 SELF CARE MNGMENT TRAINING: CPT

## 2022-01-01 PROCEDURE — 83605 ASSAY OF LACTIC ACID: CPT

## 2022-01-01 PROCEDURE — 97162 PT EVAL MOD COMPLEX 30 MIN: CPT

## 2022-01-01 PROCEDURE — 97530 THERAPEUTIC ACTIVITIES: CPT

## 2022-01-01 PROCEDURE — 94618 PULMONARY STRESS TESTING: CPT

## 2022-01-01 PROCEDURE — 96374 THER/PROPH/DIAG INJ IV PUSH: CPT

## 2022-01-01 PROCEDURE — 96372 THER/PROPH/DIAG INJ SC/IM: CPT

## 2022-01-01 PROCEDURE — 99497 ADVNCD CARE PLAN 30 MIN: CPT | Performed by: INTERNAL MEDICINE

## 2022-01-01 PROCEDURE — 99285 EMERGENCY DEPT VISIT HI MDM: CPT

## 2022-01-01 PROCEDURE — 84145 PROCALCITONIN (PCT): CPT

## 2022-01-01 PROCEDURE — 96375 TX/PRO/DX INJ NEW DRUG ADDON: CPT

## 2022-01-01 PROCEDURE — 74011250636 HC RX REV CODE- 250/636: Performed by: EMERGENCY MEDICINE

## 2022-01-01 PROCEDURE — 83880 ASSAY OF NATRIURETIC PEPTIDE: CPT

## 2022-01-01 PROCEDURE — 96376 TX/PRO/DX INJ SAME DRUG ADON: CPT

## 2022-01-01 RX ORDER — LORAZEPAM 2 MG/ML
1 INJECTION, SOLUTION INTRAMUSCULAR; INTRAVENOUS
Status: DISCONTINUED | OUTPATIENT
Start: 2022-01-01 | End: 2022-01-01

## 2022-01-01 RX ORDER — ONDANSETRON 4 MG/1
8 TABLET, ORALLY DISINTEGRATING ORAL
Status: DISCONTINUED | OUTPATIENT
Start: 2022-01-01 | End: 2022-01-01 | Stop reason: HOSPADM

## 2022-01-01 RX ORDER — AMOXICILLIN AND CLAVULANATE POTASSIUM 875; 125 MG/1; MG/1
1 TABLET, FILM COATED ORAL 2 TIMES DAILY WITH MEALS
Status: DISCONTINUED | OUTPATIENT
Start: 2022-01-01 | End: 2022-01-01

## 2022-01-01 RX ORDER — ACETAMINOPHEN 325 MG/1
650 TABLET ORAL
Status: DISCONTINUED | OUTPATIENT
Start: 2022-01-01 | End: 2022-01-01 | Stop reason: HOSPADM

## 2022-01-01 RX ORDER — FLUOXETINE HYDROCHLORIDE 20 MG/1
40 CAPSULE ORAL DAILY
Status: DISCONTINUED | OUTPATIENT
Start: 2022-01-01 | End: 2022-01-01

## 2022-01-01 RX ORDER — HEPARIN 100 UNIT/ML
500 SYRINGE INTRAVENOUS AS NEEDED
Status: DISCONTINUED | OUTPATIENT
Start: 2022-01-01 | End: 2022-01-01 | Stop reason: HOSPADM

## 2022-01-01 RX ORDER — ACETAMINOPHEN 650 MG/1
650 SUPPOSITORY RECTAL
Status: DISCONTINUED | OUTPATIENT
Start: 2022-01-01 | End: 2022-01-01 | Stop reason: HOSPADM

## 2022-01-01 RX ORDER — LOPERAMIDE HYDROCHLORIDE 2 MG/1
2 CAPSULE ORAL 2 TIMES DAILY
Status: DISCONTINUED | OUTPATIENT
Start: 2022-01-01 | End: 2022-01-01

## 2022-01-01 RX ORDER — MORPHINE SULFATE 2 MG/ML
6 INJECTION, SOLUTION INTRAMUSCULAR; INTRAVENOUS
Status: DISCONTINUED | OUTPATIENT
Start: 2022-01-01 | End: 2022-01-01 | Stop reason: HOSPADM

## 2022-01-01 RX ORDER — ONDANSETRON 2 MG/ML
4 INJECTION INTRAMUSCULAR; INTRAVENOUS ONCE
Status: COMPLETED | OUTPATIENT
Start: 2022-01-01 | End: 2022-01-01

## 2022-01-01 RX ORDER — MIDAZOLAM HYDROCHLORIDE 1 MG/ML
1 INJECTION, SOLUTION INTRAMUSCULAR; INTRAVENOUS
Status: DISCONTINUED | OUTPATIENT
Start: 2022-01-01 | End: 2022-01-01 | Stop reason: HOSPADM

## 2022-01-01 RX ORDER — LOPERAMIDE HYDROCHLORIDE 2 MG/1
2 CAPSULE ORAL EVERY 6 HOURS
Status: DISCONTINUED | OUTPATIENT
Start: 2022-01-01 | End: 2022-01-01

## 2022-01-01 RX ORDER — ONDANSETRON 4 MG/1
4 TABLET, ORALLY DISINTEGRATING ORAL
Status: DISCONTINUED | OUTPATIENT
Start: 2022-01-01 | End: 2022-01-01 | Stop reason: SDUPTHER

## 2022-01-01 RX ORDER — FLUCONAZOLE 100 MG/1
100 TABLET ORAL
Status: DISCONTINUED | OUTPATIENT
Start: 2022-01-01 | End: 2022-01-01

## 2022-01-01 RX ORDER — SULFAMETHOXAZOLE AND TRIMETHOPRIM 800; 160 MG/1; MG/1
1 TABLET ORAL
Status: DISCONTINUED | OUTPATIENT
Start: 2022-01-01 | End: 2022-01-01

## 2022-01-01 RX ORDER — AMLODIPINE BESYLATE 5 MG/1
5 TABLET ORAL DAILY
Refills: 1 | Status: DISCONTINUED | OUTPATIENT
Start: 2022-01-01 | End: 2022-01-01

## 2022-01-01 RX ORDER — MORPHINE SULFATE 2 MG/ML
6 INJECTION, SOLUTION INTRAMUSCULAR; INTRAVENOUS
Status: DISCONTINUED | OUTPATIENT
Start: 2022-01-01 | End: 2022-01-01

## 2022-01-01 RX ORDER — PROCHLORPERAZINE MALEATE 5 MG
10 TABLET ORAL
Status: DISCONTINUED | OUTPATIENT
Start: 2022-01-01 | End: 2022-01-01 | Stop reason: HOSPADM

## 2022-01-01 RX ORDER — LORAZEPAM 2 MG/ML
0.5 INJECTION INTRAMUSCULAR
Status: DISCONTINUED | OUTPATIENT
Start: 2022-01-01 | End: 2022-01-01

## 2022-01-01 RX ORDER — PANTOPRAZOLE SODIUM 40 MG/1
40 TABLET, DELAYED RELEASE ORAL
Status: DISCONTINUED | OUTPATIENT
Start: 2022-01-01 | End: 2022-01-01

## 2022-01-01 RX ORDER — IPRATROPIUM BROMIDE AND ALBUTEROL SULFATE 2.5; .5 MG/3ML; MG/3ML
3 SOLUTION RESPIRATORY (INHALATION)
Status: DISCONTINUED | OUTPATIENT
Start: 2022-01-01 | End: 2022-01-01

## 2022-01-01 RX ORDER — MORPHINE SULFATE 15 MG/1
15 TABLET ORAL
Status: DISCONTINUED | OUTPATIENT
Start: 2022-01-01 | End: 2022-01-01

## 2022-01-01 RX ORDER — IBUPROFEN 200 MG
1 TABLET ORAL DAILY
Status: DISCONTINUED | OUTPATIENT
Start: 2022-01-01 | End: 2022-01-01

## 2022-01-01 RX ORDER — ALPRAZOLAM 0.25 MG/1
0.25 TABLET ORAL 2 TIMES DAILY
Status: DISCONTINUED | OUTPATIENT
Start: 2022-01-01 | End: 2022-01-01

## 2022-01-01 RX ORDER — MORPHINE SULFATE 2 MG/ML
2 INJECTION, SOLUTION INTRAMUSCULAR; INTRAVENOUS EVERY 4 HOURS
Status: DISCONTINUED | OUTPATIENT
Start: 2022-01-01 | End: 2022-01-01

## 2022-01-01 RX ORDER — DEXAMETHASONE SODIUM PHOSPHATE 10 MG/ML
6 INJECTION INTRAMUSCULAR; INTRAVENOUS EVERY 24 HOURS
Status: DISCONTINUED | OUTPATIENT
Start: 2022-01-01 | End: 2022-01-01

## 2022-01-01 RX ORDER — ARFORMOTEROL TARTRATE 15 UG/2ML
15 SOLUTION RESPIRATORY (INHALATION)
Status: DISCONTINUED | OUTPATIENT
Start: 2022-01-01 | End: 2022-01-01

## 2022-01-01 RX ORDER — LORAZEPAM 2 MG/ML
1 INJECTION, SOLUTION INTRAMUSCULAR; INTRAVENOUS EVERY 4 HOURS
Status: DISCONTINUED | OUTPATIENT
Start: 2022-01-01 | End: 2022-01-01

## 2022-01-01 RX ORDER — BUDESONIDE 0.5 MG/2ML
500 INHALANT ORAL
Status: DISCONTINUED | OUTPATIENT
Start: 2022-01-01 | End: 2022-01-01

## 2022-01-01 RX ORDER — SODIUM CHLORIDE 0.9 % (FLUSH) 0.9 %
5-40 SYRINGE (ML) INJECTION AS NEEDED
Status: DISCONTINUED | OUTPATIENT
Start: 2022-01-01 | End: 2022-01-01 | Stop reason: HOSPADM

## 2022-01-01 RX ORDER — FLUOXETINE HYDROCHLORIDE 20 MG/1
60 CAPSULE ORAL DAILY
Status: DISCONTINUED | OUTPATIENT
Start: 2022-01-01 | End: 2022-01-01

## 2022-01-01 RX ORDER — MORPHINE SULFATE 20 MG/ML
5 SOLUTION ORAL EVERY 6 HOURS
Status: DISCONTINUED | OUTPATIENT
Start: 2022-01-01 | End: 2022-01-01

## 2022-01-01 RX ORDER — LORAZEPAM 2 MG/ML
0.5 CONCENTRATE ORAL
Status: DISCONTINUED | OUTPATIENT
Start: 2022-01-01 | End: 2022-01-01

## 2022-01-01 RX ORDER — LANOLIN ALCOHOL/MO/W.PET/CERES
6 CREAM (GRAM) TOPICAL
Status: DISPENSED | OUTPATIENT
Start: 2022-01-01 | End: 2022-01-01

## 2022-01-01 RX ORDER — IBUPROFEN 200 MG
1 TABLET ORAL EVERY 24 HOURS
Status: DISCONTINUED | OUTPATIENT
Start: 2022-01-01 | End: 2022-01-01 | Stop reason: SDUPTHER

## 2022-01-01 RX ORDER — MORPHINE SULFATE 2 MG/ML
2 INJECTION, SOLUTION INTRAMUSCULAR; INTRAVENOUS
Status: DISCONTINUED | OUTPATIENT
Start: 2022-01-01 | End: 2022-01-01

## 2022-01-01 RX ORDER — LOPERAMIDE HYDROCHLORIDE 2 MG/1
2 CAPSULE ORAL
Status: DISCONTINUED | OUTPATIENT
Start: 2022-01-01 | End: 2022-01-01 | Stop reason: HOSPADM

## 2022-01-01 RX ORDER — OXYCODONE HYDROCHLORIDE 5 MG/1
5 TABLET ORAL
Status: DISCONTINUED | OUTPATIENT
Start: 2022-01-01 | End: 2022-01-01

## 2022-01-01 RX ORDER — POTASSIUM CHLORIDE 750 MG/1
20 TABLET, FILM COATED, EXTENDED RELEASE ORAL
Status: DISCONTINUED | OUTPATIENT
Start: 2022-01-01 | End: 2022-01-01

## 2022-01-01 RX ORDER — ALPRAZOLAM 0.5 MG/1
1 TABLET ORAL
Status: DISCONTINUED | OUTPATIENT
Start: 2022-01-01 | End: 2022-01-01

## 2022-01-01 RX ORDER — ONDANSETRON 2 MG/ML
4 INJECTION INTRAMUSCULAR; INTRAVENOUS
Status: DISCONTINUED | OUTPATIENT
Start: 2022-01-01 | End: 2022-01-01 | Stop reason: HOSPADM

## 2022-01-01 RX ORDER — VANCOMYCIN 2 GRAM/500 ML IN 0.9 % SODIUM CHLORIDE INTRAVENOUS
2000
Status: COMPLETED | OUTPATIENT
Start: 2022-01-01 | End: 2022-01-01

## 2022-01-01 RX ORDER — POTASSIUM CHLORIDE 7.45 MG/ML
10 INJECTION INTRAVENOUS
Status: COMPLETED | OUTPATIENT
Start: 2022-01-01 | End: 2022-01-01

## 2022-01-01 RX ORDER — DEXAMETHASONE SODIUM PHOSPHATE 4 MG/ML
6 INJECTION, SOLUTION INTRA-ARTICULAR; INTRALESIONAL; INTRAMUSCULAR; INTRAVENOUS; SOFT TISSUE EVERY 24 HOURS
Status: DISCONTINUED | OUTPATIENT
Start: 2022-01-01 | End: 2022-01-01

## 2022-01-01 RX ORDER — MORPHINE SULFATE 4 MG/ML
4 INJECTION INTRAVENOUS
Status: DISCONTINUED | OUTPATIENT
Start: 2022-01-01 | End: 2022-01-01

## 2022-01-01 RX ORDER — POTASSIUM CHLORIDE 7.45 MG/ML
10 INJECTION INTRAVENOUS
Status: DISPENSED | OUTPATIENT
Start: 2022-01-01 | End: 2022-01-01

## 2022-01-01 RX ORDER — LIDOCAINE AND PRILOCAINE 25; 25 MG/G; MG/G
CREAM TOPICAL AS NEEDED
Status: DISCONTINUED | OUTPATIENT
Start: 2022-01-01 | End: 2022-01-01 | Stop reason: HOSPADM

## 2022-01-01 RX ORDER — NYSTATIN 100000 [USP'U]/ML
500000 SUSPENSION ORAL 4 TIMES DAILY
Status: DISCONTINUED | OUTPATIENT
Start: 2022-01-01 | End: 2022-01-01

## 2022-01-01 RX ORDER — FLUCONAZOLE 100 MG/1
100 TABLET ORAL DAILY
Status: DISCONTINUED | OUTPATIENT
Start: 2022-01-01 | End: 2022-01-01

## 2022-01-01 RX ORDER — LORAZEPAM 0.5 MG/1
0.5 TABLET ORAL
Status: DISCONTINUED | OUTPATIENT
Start: 2022-01-01 | End: 2022-01-01

## 2022-01-01 RX ORDER — MORPHINE SULFATE 4 MG/ML
4 INJECTION INTRAVENOUS EVERY 4 HOURS
Status: DISCONTINUED | OUTPATIENT
Start: 2022-01-01 | End: 2022-01-01

## 2022-01-01 RX ORDER — ENOXAPARIN SODIUM 100 MG/ML
40 INJECTION SUBCUTANEOUS EVERY 24 HOURS
Status: DISCONTINUED | OUTPATIENT
Start: 2022-01-01 | End: 2022-01-01

## 2022-01-01 RX ORDER — SODIUM CHLORIDE 0.9 % (FLUSH) 0.9 %
5-40 SYRINGE (ML) INJECTION EVERY 8 HOURS
Status: DISCONTINUED | OUTPATIENT
Start: 2022-01-01 | End: 2022-01-01

## 2022-01-01 RX ORDER — LORAZEPAM 2 MG/ML
0.5 INJECTION INTRAMUSCULAR EVERY 8 HOURS
Status: DISCONTINUED | OUTPATIENT
Start: 2022-01-01 | End: 2022-01-01

## 2022-01-01 RX ORDER — LISINOPRIL 20 MG/1
20 TABLET ORAL DAILY
Status: DISCONTINUED | OUTPATIENT
Start: 2022-01-01 | End: 2022-01-01

## 2022-01-01 RX ORDER — LOPERAMIDE HYDROCHLORIDE 2 MG/1
4 CAPSULE ORAL ONCE
Status: COMPLETED | OUTPATIENT
Start: 2022-01-01 | End: 2022-01-01

## 2022-01-01 RX ORDER — VANCOMYCIN 1.75 GRAM/500 ML IN 0.9 % SODIUM CHLORIDE INTRAVENOUS
1750 ONCE
Status: COMPLETED | OUTPATIENT
Start: 2022-01-01 | End: 2022-01-01

## 2022-01-01 RX ORDER — LOPERAMIDE HYDROCHLORIDE 2 MG/1
2 CAPSULE ORAL 3 TIMES DAILY
Status: COMPLETED | OUTPATIENT
Start: 2022-01-01 | End: 2022-01-01

## 2022-01-01 RX ADMIN — FLUOXETINE HYDROCHLORIDE 40 MG: 20 CAPSULE ORAL at 09:02

## 2022-01-01 RX ADMIN — IPRATROPIUM BROMIDE AND ALBUTEROL 1 PUFF: 20; 100 SPRAY, METERED RESPIRATORY (INHALATION) at 08:42

## 2022-01-01 RX ADMIN — METHYLPREDNISOLONE SODIUM SUCCINATE 60 MG: 40 INJECTION, POWDER, FOR SOLUTION INTRAMUSCULAR; INTRAVENOUS at 06:22

## 2022-01-01 RX ADMIN — METHYLPREDNISOLONE SODIUM SUCCINATE 60 MG: 40 INJECTION, POWDER, FOR SOLUTION INTRAMUSCULAR; INTRAVENOUS at 06:20

## 2022-01-01 RX ADMIN — ALPRAZOLAM 1 MG: 0.5 TABLET ORAL at 20:30

## 2022-01-01 RX ADMIN — LORAZEPAM 0.5 MG: 2 INJECTION INTRAMUSCULAR; INTRAVENOUS at 21:08

## 2022-01-01 RX ADMIN — Medication 10 ML: at 22:12

## 2022-01-01 RX ADMIN — METHYLPREDNISOLONE SODIUM SUCCINATE 60 MG: 40 INJECTION, POWDER, FOR SOLUTION INTRAMUSCULAR; INTRAVENOUS at 21:02

## 2022-01-01 RX ADMIN — VANCOMYCIN HYDROCHLORIDE 1250 MG: 1.25 INJECTION, POWDER, LYOPHILIZED, FOR SOLUTION INTRAVENOUS at 22:28

## 2022-01-01 RX ADMIN — SODIUM CHLORIDE, PRESERVATIVE FREE 10 ML: 5 INJECTION INTRAVENOUS at 04:42

## 2022-01-01 RX ADMIN — MORPHINE SULFATE 15 MG: 15 TABLET ORAL at 08:13

## 2022-01-01 RX ADMIN — IPRATROPIUM BROMIDE AND ALBUTEROL 1 PUFF: 20; 100 SPRAY, METERED RESPIRATORY (INHALATION) at 20:13

## 2022-01-01 RX ADMIN — VANCOMYCIN HYDROCHLORIDE 1250 MG: 1.25 INJECTION, POWDER, LYOPHILIZED, FOR SOLUTION INTRAVENOUS at 11:05

## 2022-01-01 RX ADMIN — IPRATROPIUM BROMIDE AND ALBUTEROL 1 PUFF: 20; 100 SPRAY, METERED RESPIRATORY (INHALATION) at 23:13

## 2022-01-01 RX ADMIN — VANCOMYCIN HYDROCHLORIDE 1750 MG: 10 INJECTION, POWDER, LYOPHILIZED, FOR SOLUTION INTRAVENOUS at 10:51

## 2022-01-01 RX ADMIN — LORAZEPAM 0.5 MG: 2 INJECTION INTRAMUSCULAR; INTRAVENOUS at 08:54

## 2022-01-01 RX ADMIN — IPRATROPIUM BROMIDE AND ALBUTEROL 1 PUFF: 20; 100 SPRAY, METERED RESPIRATORY (INHALATION) at 13:12

## 2022-01-01 RX ADMIN — MORPHINE SULFATE 15 MG: 15 TABLET ORAL at 01:42

## 2022-01-01 RX ADMIN — MORPHINE SULFATE 15 MG: 15 TABLET ORAL at 11:40

## 2022-01-01 RX ADMIN — Medication 10 ML: at 06:30

## 2022-01-01 RX ADMIN — Medication 10 ML: at 21:35

## 2022-01-01 RX ADMIN — MORPHINE SULFATE 2 MG: 2 INJECTION, SOLUTION INTRAMUSCULAR; INTRAVENOUS at 18:28

## 2022-01-01 RX ADMIN — MOMETASONE FUROATE AND FORMOTEROL FUMARATE DIHYDRATE 2 PUFF: 200; 5 AEROSOL RESPIRATORY (INHALATION) at 21:00

## 2022-01-01 RX ADMIN — IPRATROPIUM BROMIDE AND ALBUTEROL 1 PUFF: 20; 100 SPRAY, METERED RESPIRATORY (INHALATION) at 13:36

## 2022-01-01 RX ADMIN — MORPHINE SULFATE 15 MG: 15 TABLET ORAL at 21:40

## 2022-01-01 RX ADMIN — MORPHINE SULFATE 2 MG: 2 INJECTION, SOLUTION INTRAMUSCULAR; INTRAVENOUS at 20:19

## 2022-01-01 RX ADMIN — LOPERAMIDE HYDROCHLORIDE 2 MG: 2 CAPSULE ORAL at 06:50

## 2022-01-01 RX ADMIN — MORPHINE SULFATE 15 MG: 15 TABLET ORAL at 14:02

## 2022-01-01 RX ADMIN — Medication 10 ML: at 13:22

## 2022-01-01 RX ADMIN — MOMETASONE FUROATE AND FORMOTEROL FUMARATE DIHYDRATE 2 PUFF: 200; 5 AEROSOL RESPIRATORY (INHALATION) at 20:03

## 2022-01-01 RX ADMIN — METHYLPREDNISOLONE SODIUM SUCCINATE 60 MG: 40 INJECTION, POWDER, FOR SOLUTION INTRAMUSCULAR; INTRAVENOUS at 05:41

## 2022-01-01 RX ADMIN — MOMETASONE FUROATE AND FORMOTEROL FUMARATE DIHYDRATE 2 PUFF: 200; 5 AEROSOL RESPIRATORY (INHALATION) at 19:51

## 2022-01-01 RX ADMIN — PIPERACILLIN AND TAZOBACTAM 3.38 G: 3; .375 INJECTION, POWDER, LYOPHILIZED, FOR SOLUTION INTRAVENOUS at 12:00

## 2022-01-01 RX ADMIN — IPRATROPIUM BROMIDE AND ALBUTEROL 1 PUFF: 20; 100 SPRAY, METERED RESPIRATORY (INHALATION) at 14:21

## 2022-01-01 RX ADMIN — IPRATROPIUM BROMIDE AND ALBUTEROL 1 PUFF: 20; 100 SPRAY, METERED RESPIRATORY (INHALATION) at 12:55

## 2022-01-01 RX ADMIN — AMOXICILLIN AND CLAVULANATE POTASSIUM 1 TABLET: 875; 125 TABLET, FILM COATED ORAL at 09:00

## 2022-01-01 RX ADMIN — PANTOPRAZOLE SODIUM 40 MG: 40 TABLET, DELAYED RELEASE ORAL at 06:31

## 2022-01-01 RX ADMIN — PROCHLORPERAZINE MALEATE 10 MG: 5 TABLET ORAL at 22:18

## 2022-01-01 RX ADMIN — MORPHINE SULFATE 2 MG: 2 INJECTION, SOLUTION INTRAMUSCULAR; INTRAVENOUS at 14:24

## 2022-01-01 RX ADMIN — ALPRAZOLAM 1 MG: 0.5 TABLET ORAL at 06:39

## 2022-01-01 RX ADMIN — VANCOMYCIN HYDROCHLORIDE 1250 MG: 1.25 INJECTION, POWDER, LYOPHILIZED, FOR SOLUTION INTRAVENOUS at 22:13

## 2022-01-01 RX ADMIN — MOMETASONE FUROATE AND FORMOTEROL FUMARATE DIHYDRATE 2 PUFF: 200; 5 AEROSOL RESPIRATORY (INHALATION) at 20:05

## 2022-01-01 RX ADMIN — ENOXAPARIN SODIUM 40 MG: 100 INJECTION SUBCUTANEOUS at 09:07

## 2022-01-01 RX ADMIN — MORPHINE SULFATE 15 MG: 15 TABLET ORAL at 09:36

## 2022-01-01 RX ADMIN — IPRATROPIUM BROMIDE AND ALBUTEROL 1 PUFF: 20; 100 SPRAY, METERED RESPIRATORY (INHALATION) at 14:19

## 2022-01-01 RX ADMIN — IPRATROPIUM BROMIDE AND ALBUTEROL 1 PUFF: 20; 100 SPRAY, METERED RESPIRATORY (INHALATION) at 21:38

## 2022-01-01 RX ADMIN — AMLODIPINE BESYLATE 5 MG: 5 TABLET ORAL at 09:02

## 2022-01-01 RX ADMIN — MORPHINE SULFATE 15 MG: 15 TABLET ORAL at 16:07

## 2022-01-01 RX ADMIN — LOPERAMIDE HYDROCHLORIDE 2 MG: 2 CAPSULE ORAL at 22:26

## 2022-01-01 RX ADMIN — Medication 10 ML: at 14:25

## 2022-01-01 RX ADMIN — MORPHINE SULFATE 15 MG: 15 TABLET ORAL at 07:06

## 2022-01-01 RX ADMIN — IPRATROPIUM BROMIDE AND ALBUTEROL 1 PUFF: 20; 100 SPRAY, METERED RESPIRATORY (INHALATION) at 00:22

## 2022-01-01 RX ADMIN — MORPHINE SULFATE 6 MG: 2 INJECTION, SOLUTION INTRAMUSCULAR; INTRAVENOUS at 16:13

## 2022-01-01 RX ADMIN — FLUCONAZOLE 100 MG: 100 TABLET ORAL at 08:13

## 2022-01-01 RX ADMIN — METHYLPREDNISOLONE SODIUM SUCCINATE 60 MG: 40 INJECTION, POWDER, FOR SOLUTION INTRAMUSCULAR; INTRAVENOUS at 22:12

## 2022-01-01 RX ADMIN — METHYLPREDNISOLONE SODIUM SUCCINATE 60 MG: 40 INJECTION, POWDER, FOR SOLUTION INTRAMUSCULAR; INTRAVENOUS at 14:05

## 2022-01-01 RX ADMIN — FLUOXETINE HYDROCHLORIDE 60 MG: 20 CAPSULE ORAL at 09:08

## 2022-01-01 RX ADMIN — MORPHINE SULFATE 2 MG: 2 INJECTION, SOLUTION INTRAMUSCULAR; INTRAVENOUS at 21:45

## 2022-01-01 RX ADMIN — MORPHINE SULFATE 15 MG: 15 TABLET ORAL at 11:56

## 2022-01-01 RX ADMIN — MOMETASONE FUROATE AND FORMOTEROL FUMARATE DIHYDRATE 2 PUFF: 200; 5 AEROSOL RESPIRATORY (INHALATION) at 08:55

## 2022-01-01 RX ADMIN — MOMETASONE FUROATE AND FORMOTEROL FUMARATE DIHYDRATE 2 PUFF: 200; 5 AEROSOL RESPIRATORY (INHALATION) at 21:38

## 2022-01-01 RX ADMIN — PIPERACILLIN AND TAZOBACTAM 3.38 G: 3; .375 INJECTION, POWDER, LYOPHILIZED, FOR SOLUTION INTRAVENOUS at 18:14

## 2022-01-01 RX ADMIN — METHYLPREDNISOLONE SODIUM SUCCINATE 60 MG: 40 INJECTION, POWDER, FOR SOLUTION INTRAMUSCULAR; INTRAVENOUS at 05:37

## 2022-01-01 RX ADMIN — MOMETASONE FUROATE AND FORMOTEROL FUMARATE DIHYDRATE 2 PUFF: 200; 5 AEROSOL RESPIRATORY (INHALATION) at 08:14

## 2022-01-01 RX ADMIN — Medication 1 CAPSULE: at 09:13

## 2022-01-01 RX ADMIN — MORPHINE SULFATE 15 MG: 15 TABLET ORAL at 16:47

## 2022-01-01 RX ADMIN — MORPHINE SULFATE 15 MG: 15 TABLET ORAL at 18:23

## 2022-01-01 RX ADMIN — CEFEPIME 2 G: 20 INJECTION, POWDER, FOR SOLUTION INTRAVENOUS at 21:55

## 2022-01-01 RX ADMIN — IPRATROPIUM BROMIDE AND ALBUTEROL 1 PUFF: 20; 100 SPRAY, METERED RESPIRATORY (INHALATION) at 20:16

## 2022-01-01 RX ADMIN — LORAZEPAM 0.5 MG: 2 INJECTION INTRAMUSCULAR; INTRAVENOUS at 21:12

## 2022-01-01 RX ADMIN — Medication 10 ML: at 13:29

## 2022-01-01 RX ADMIN — AMOXICILLIN AND CLAVULANATE POTASSIUM 1 TABLET: 875; 125 TABLET, FILM COATED ORAL at 17:03

## 2022-01-01 RX ADMIN — MORPHINE SULFATE 15 MG: 15 TABLET ORAL at 20:55

## 2022-01-01 RX ADMIN — METHYLPREDNISOLONE SODIUM SUCCINATE 60 MG: 40 INJECTION, POWDER, FOR SOLUTION INTRAMUSCULAR; INTRAVENOUS at 13:17

## 2022-01-01 RX ADMIN — LISINOPRIL 20 MG: 20 TABLET ORAL at 09:07

## 2022-01-01 RX ADMIN — Medication 10 ML: at 21:21

## 2022-01-01 RX ADMIN — LOPERAMIDE HYDROCHLORIDE 2 MG: 2 CAPSULE ORAL at 18:03

## 2022-01-01 RX ADMIN — CEFEPIME 2 G: 2 INJECTION, POWDER, FOR SOLUTION INTRAVENOUS at 08:34

## 2022-01-01 RX ADMIN — LORAZEPAM 1 MG: 2 INJECTION, SOLUTION INTRAMUSCULAR; INTRAVENOUS at 14:37

## 2022-01-01 RX ADMIN — IPRATROPIUM BROMIDE AND ALBUTEROL 1 PUFF: 20; 100 SPRAY, METERED RESPIRATORY (INHALATION) at 01:19

## 2022-01-01 RX ADMIN — DOXYCYCLINE 100 MG: 100 INJECTION, POWDER, LYOPHILIZED, FOR SOLUTION INTRAVENOUS at 12:16

## 2022-01-01 RX ADMIN — Medication 10 ML: at 12:14

## 2022-01-01 RX ADMIN — MIDAZOLAM 1 MG: 1 INJECTION INTRAMUSCULAR; INTRAVENOUS at 18:40

## 2022-01-01 RX ADMIN — Medication 1 CAPSULE: at 09:36

## 2022-01-01 RX ADMIN — METHYLPREDNISOLONE SODIUM SUCCINATE 60 MG: 40 INJECTION, POWDER, FOR SOLUTION INTRAMUSCULAR; INTRAVENOUS at 23:02

## 2022-01-01 RX ADMIN — AMOXICILLIN AND CLAVULANATE POTASSIUM 1 TABLET: 875; 125 TABLET, FILM COATED ORAL at 09:29

## 2022-01-01 RX ADMIN — IPRATROPIUM BROMIDE AND ALBUTEROL 1 PUFF: 20; 100 SPRAY, METERED RESPIRATORY (INHALATION) at 04:11

## 2022-01-01 RX ADMIN — METHYLPREDNISOLONE SODIUM SUCCINATE 60 MG: 40 INJECTION, POWDER, FOR SOLUTION INTRAMUSCULAR; INTRAVENOUS at 21:08

## 2022-01-01 RX ADMIN — MORPHINE SULFATE 2 MG: 2 INJECTION, SOLUTION INTRAMUSCULAR; INTRAVENOUS at 12:15

## 2022-01-01 RX ADMIN — MORPHINE SULFATE 15 MG: 15 TABLET ORAL at 13:03

## 2022-01-01 RX ADMIN — DEXAMETHASONE SODIUM PHOSPHATE 6 MG: 10 INJECTION, SOLUTION INTRAMUSCULAR; INTRAVENOUS at 17:03

## 2022-01-01 RX ADMIN — LOPERAMIDE HYDROCHLORIDE 2 MG: 2 CAPSULE ORAL at 21:55

## 2022-01-01 RX ADMIN — Medication 10 ML: at 06:20

## 2022-01-01 RX ADMIN — LORAZEPAM 0.5 MG: 0.5 TABLET ORAL at 20:55

## 2022-01-01 RX ADMIN — METHYLPREDNISOLONE SODIUM SUCCINATE 60 MG: 40 INJECTION, POWDER, FOR SOLUTION INTRAMUSCULAR; INTRAVENOUS at 06:45

## 2022-01-01 RX ADMIN — Medication 10 ML: at 14:19

## 2022-01-01 RX ADMIN — MORPHINE SULFATE 15 MG: 15 TABLET ORAL at 03:21

## 2022-01-01 RX ADMIN — Medication 10 ML: at 05:37

## 2022-01-01 RX ADMIN — MORPHINE SULFATE 15 MG: 15 TABLET ORAL at 10:59

## 2022-01-01 RX ADMIN — SULFAMETHOXAZOLE AND TRIMETHOPRIM 1 TABLET: 800; 160 TABLET ORAL at 21:08

## 2022-01-01 RX ADMIN — ENOXAPARIN SODIUM 40 MG: 100 INJECTION SUBCUTANEOUS at 10:31

## 2022-01-01 RX ADMIN — FLUOXETINE HYDROCHLORIDE 40 MG: 20 CAPSULE ORAL at 09:29

## 2022-01-01 RX ADMIN — IPRATROPIUM BROMIDE AND ALBUTEROL 1 PUFF: 20; 100 SPRAY, METERED RESPIRATORY (INHALATION) at 08:36

## 2022-01-01 RX ADMIN — IPRATROPIUM BROMIDE AND ALBUTEROL 1 PUFF: 20; 100 SPRAY, METERED RESPIRATORY (INHALATION) at 08:03

## 2022-01-01 RX ADMIN — SODIUM CHLORIDE, PRESERVATIVE FREE 10 ML: 5 INJECTION INTRAVENOUS at 04:55

## 2022-01-01 RX ADMIN — IPRATROPIUM BROMIDE AND ALBUTEROL 1 PUFF: 20; 100 SPRAY, METERED RESPIRATORY (INHALATION) at 04:27

## 2022-01-01 RX ADMIN — IPRATROPIUM BROMIDE AND ALBUTEROL 1 PUFF: 20; 100 SPRAY, METERED RESPIRATORY (INHALATION) at 07:50

## 2022-01-01 RX ADMIN — ONDANSETRON 4 MG: 2 INJECTION INTRAMUSCULAR; INTRAVENOUS at 22:17

## 2022-01-01 RX ADMIN — FILGRASTIM-AAFI 420 MCG: 480 INJECTION, SOLUTION SUBCUTANEOUS at 17:17

## 2022-01-01 RX ADMIN — MORPHINE SULFATE 5 MG: 100 SOLUTION ORAL at 11:43

## 2022-01-01 RX ADMIN — METHYLPREDNISOLONE SODIUM SUCCINATE 60 MG: 40 INJECTION, POWDER, FOR SOLUTION INTRAMUSCULAR; INTRAVENOUS at 13:13

## 2022-01-01 RX ADMIN — FLUCONAZOLE 100 MG: 100 TABLET ORAL at 08:34

## 2022-01-01 RX ADMIN — LORAZEPAM 0.5 MG: 0.5 TABLET ORAL at 00:01

## 2022-01-01 RX ADMIN — PANTOPRAZOLE SODIUM 40 MG: 40 TABLET, DELAYED RELEASE ORAL at 08:34

## 2022-01-01 RX ADMIN — ONDANSETRON 4 MG: 2 INJECTION INTRAMUSCULAR; INTRAVENOUS at 21:49

## 2022-01-01 RX ADMIN — IPRATROPIUM BROMIDE AND ALBUTEROL 1 PUFF: 20; 100 SPRAY, METERED RESPIRATORY (INHALATION) at 07:59

## 2022-01-01 RX ADMIN — MOMETASONE FUROATE AND FORMOTEROL FUMARATE DIHYDRATE 2 PUFF: 200; 5 AEROSOL RESPIRATORY (INHALATION) at 21:04

## 2022-01-01 RX ADMIN — Medication 10 ML: at 06:13

## 2022-01-01 RX ADMIN — SODIUM CHLORIDE 40 MG: 9 INJECTION INTRAMUSCULAR; INTRAVENOUS; SUBCUTANEOUS at 09:38

## 2022-01-01 RX ADMIN — CEFEPIME 2 G: 20 INJECTION, POWDER, FOR SOLUTION INTRAVENOUS at 10:00

## 2022-01-01 RX ADMIN — Medication 10 ML: at 21:22

## 2022-01-01 RX ADMIN — IPRATROPIUM BROMIDE AND ALBUTEROL 1 PUFF: 20; 100 SPRAY, METERED RESPIRATORY (INHALATION) at 19:51

## 2022-01-01 RX ADMIN — FLUOXETINE HYDROCHLORIDE 40 MG: 20 CAPSULE ORAL at 09:08

## 2022-01-01 RX ADMIN — FLUCONAZOLE 100 MG: 100 TABLET ORAL at 09:19

## 2022-01-01 RX ADMIN — PIPERACILLIN AND TAZOBACTAM 3.38 G: 3; .375 INJECTION, POWDER, LYOPHILIZED, FOR SOLUTION INTRAVENOUS at 01:42

## 2022-01-01 RX ADMIN — FLUCONAZOLE 100 MG: 100 TABLET ORAL at 09:08

## 2022-01-01 RX ADMIN — FILGRASTIM-AAFI 420 MCG: 480 INJECTION, SOLUTION SUBCUTANEOUS at 17:15

## 2022-01-01 RX ADMIN — ALPRAZOLAM 0.25 MG: 0.25 TABLET ORAL at 09:19

## 2022-01-01 RX ADMIN — DEXAMETHASONE SODIUM PHOSPHATE 6 MG: 10 INJECTION, SOLUTION INTRAMUSCULAR; INTRAVENOUS at 17:13

## 2022-01-01 RX ADMIN — ENOXAPARIN SODIUM 40 MG: 100 INJECTION SUBCUTANEOUS at 11:06

## 2022-01-01 RX ADMIN — Medication 1 CAPSULE: at 09:19

## 2022-01-01 RX ADMIN — MORPHINE SULFATE 2 MG: 2 INJECTION, SOLUTION INTRAMUSCULAR; INTRAVENOUS at 19:53

## 2022-01-01 RX ADMIN — LOPERAMIDE HYDROCHLORIDE 2 MG: 2 CAPSULE ORAL at 09:00

## 2022-01-01 RX ADMIN — METHYLPREDNISOLONE SODIUM SUCCINATE 60 MG: 40 INJECTION, POWDER, FOR SOLUTION INTRAMUSCULAR; INTRAVENOUS at 21:12

## 2022-01-01 RX ADMIN — ALPRAZOLAM 1 MG: 0.5 TABLET ORAL at 09:55

## 2022-01-01 RX ADMIN — MORPHINE SULFATE 15 MG: 15 TABLET ORAL at 20:03

## 2022-01-01 RX ADMIN — ONDANSETRON 4 MG: 2 INJECTION INTRAMUSCULAR; INTRAVENOUS at 00:24

## 2022-01-01 RX ADMIN — LOPERAMIDE HYDROCHLORIDE 2 MG: 2 CAPSULE ORAL at 18:37

## 2022-01-01 RX ADMIN — MORPHINE SULFATE 15 MG: 15 TABLET ORAL at 05:18

## 2022-01-01 RX ADMIN — DOXYCYCLINE 100 MG: 100 INJECTION, POWDER, LYOPHILIZED, FOR SOLUTION INTRAVENOUS at 09:31

## 2022-01-01 RX ADMIN — Medication 0.5 MG: at 22:20

## 2022-01-01 RX ADMIN — ONDANSETRON 4 MG: 2 INJECTION INTRAMUSCULAR; INTRAVENOUS at 14:40

## 2022-01-01 RX ADMIN — MORPHINE SULFATE 15 MG: 15 TABLET ORAL at 17:01

## 2022-01-01 RX ADMIN — MORPHINE SULFATE 15 MG: 15 TABLET ORAL at 21:22

## 2022-01-01 RX ADMIN — DOXYCYCLINE 100 MG: 100 INJECTION, POWDER, LYOPHILIZED, FOR SOLUTION INTRAVENOUS at 17:25

## 2022-01-01 RX ADMIN — VANCOMYCIN HYDROCHLORIDE 1250 MG: 1.25 INJECTION, POWDER, LYOPHILIZED, FOR SOLUTION INTRAVENOUS at 13:11

## 2022-01-01 RX ADMIN — Medication 10 ML: at 22:04

## 2022-01-01 RX ADMIN — LORAZEPAM 0.5 MG: 2 INJECTION INTRAMUSCULAR; INTRAVENOUS at 18:29

## 2022-01-01 RX ADMIN — METHYLPREDNISOLONE SODIUM SUCCINATE 60 MG: 40 INJECTION, POWDER, FOR SOLUTION INTRAMUSCULAR; INTRAVENOUS at 21:22

## 2022-01-01 RX ADMIN — ENOXAPARIN SODIUM 40 MG: 100 INJECTION SUBCUTANEOUS at 09:06

## 2022-01-01 RX ADMIN — Medication 10 ML: at 07:07

## 2022-01-01 RX ADMIN — Medication 0.5 MG: at 03:03

## 2022-01-01 RX ADMIN — PANTOPRAZOLE SODIUM 40 MG: 40 TABLET, DELAYED RELEASE ORAL at 06:23

## 2022-01-01 RX ADMIN — SULFAMETHOXAZOLE AND TRIMETHOPRIM 1 TABLET: 800; 160 TABLET ORAL at 20:19

## 2022-01-01 RX ADMIN — IPRATROPIUM BROMIDE AND ALBUTEROL 1 PUFF: 20; 100 SPRAY, METERED RESPIRATORY (INHALATION) at 14:17

## 2022-01-01 RX ADMIN — CEFEPIME 2 G: 20 INJECTION, POWDER, FOR SOLUTION INTRAVENOUS at 09:04

## 2022-01-01 RX ADMIN — SODIUM CHLORIDE 1000 ML: 9 INJECTION, SOLUTION INTRAVENOUS at 14:39

## 2022-01-01 RX ADMIN — ALPRAZOLAM 0.25 MG: 0.25 TABLET ORAL at 09:09

## 2022-01-01 RX ADMIN — IPRATROPIUM BROMIDE AND ALBUTEROL 1 PUFF: 20; 100 SPRAY, METERED RESPIRATORY (INHALATION) at 03:15

## 2022-01-01 RX ADMIN — LOPERAMIDE HYDROCHLORIDE 4 MG: 2 CAPSULE ORAL at 12:15

## 2022-01-01 RX ADMIN — IPRATROPIUM BROMIDE AND ALBUTEROL 1 PUFF: 20; 100 SPRAY, METERED RESPIRATORY (INHALATION) at 11:10

## 2022-01-01 RX ADMIN — LORAZEPAM 0.5 MG: 2 INJECTION INTRAMUSCULAR; INTRAVENOUS at 14:24

## 2022-01-01 RX ADMIN — ALPRAZOLAM 1 MG: 0.5 TABLET ORAL at 21:52

## 2022-01-01 RX ADMIN — MORPHINE SULFATE 15 MG: 15 TABLET ORAL at 23:18

## 2022-01-01 RX ADMIN — LORAZEPAM 0.5 MG: 0.5 TABLET ORAL at 21:08

## 2022-01-01 RX ADMIN — ENOXAPARIN SODIUM 40 MG: 100 INJECTION SUBCUTANEOUS at 10:04

## 2022-01-01 RX ADMIN — ENOXAPARIN SODIUM 40 MG: 100 INJECTION SUBCUTANEOUS at 09:08

## 2022-01-01 RX ADMIN — IPRATROPIUM BROMIDE AND ALBUTEROL 1 PUFF: 20; 100 SPRAY, METERED RESPIRATORY (INHALATION) at 15:54

## 2022-01-01 RX ADMIN — FLUOXETINE HYDROCHLORIDE 40 MG: 20 CAPSULE ORAL at 08:34

## 2022-01-01 RX ADMIN — ONDANSETRON 4 MG: 2 INJECTION INTRAMUSCULAR; INTRAVENOUS at 22:03

## 2022-01-01 RX ADMIN — MOMETASONE FUROATE AND FORMOTEROL FUMARATE DIHYDRATE 2 PUFF: 200; 5 AEROSOL RESPIRATORY (INHALATION) at 08:42

## 2022-01-01 RX ADMIN — FILGRASTIM-AAFI 420 MCG: 480 INJECTION, SOLUTION SUBCUTANEOUS at 17:33

## 2022-01-01 RX ADMIN — VANCOMYCIN HYDROCHLORIDE 2000 MG: 10 INJECTION, POWDER, LYOPHILIZED, FOR SOLUTION INTRAVENOUS at 11:53

## 2022-01-01 RX ADMIN — IPRATROPIUM BROMIDE AND ALBUTEROL 1 PUFF: 20; 100 SPRAY, METERED RESPIRATORY (INHALATION) at 01:49

## 2022-01-01 RX ADMIN — DEXAMETHASONE SODIUM PHOSPHATE 6 MG: 10 INJECTION, SOLUTION INTRAMUSCULAR; INTRAVENOUS at 18:12

## 2022-01-01 RX ADMIN — FLUOXETINE HYDROCHLORIDE 40 MG: 20 CAPSULE ORAL at 09:36

## 2022-01-01 RX ADMIN — IPRATROPIUM BROMIDE AND ALBUTEROL 1 PUFF: 20; 100 SPRAY, METERED RESPIRATORY (INHALATION) at 08:13

## 2022-01-01 RX ADMIN — MORPHINE SULFATE 2 MG: 2 INJECTION, SOLUTION INTRAMUSCULAR; INTRAVENOUS at 01:40

## 2022-01-01 RX ADMIN — VANCOMYCIN HYDROCHLORIDE 1250 MG: 1.25 INJECTION, POWDER, LYOPHILIZED, FOR SOLUTION INTRAVENOUS at 12:52

## 2022-01-01 RX ADMIN — IPRATROPIUM BROMIDE AND ALBUTEROL 1 PUFF: 20; 100 SPRAY, METERED RESPIRATORY (INHALATION) at 14:25

## 2022-01-01 RX ADMIN — ALPRAZOLAM 0.25 MG: 0.25 TABLET ORAL at 17:22

## 2022-01-01 RX ADMIN — FLUOXETINE HYDROCHLORIDE 60 MG: 20 CAPSULE ORAL at 09:18

## 2022-01-01 RX ADMIN — MORPHINE SULFATE 6 MG: 2 INJECTION, SOLUTION INTRAMUSCULAR; INTRAVENOUS at 01:41

## 2022-01-01 RX ADMIN — IPRATROPIUM BROMIDE AND ALBUTEROL 1 PUFF: 20; 100 SPRAY, METERED RESPIRATORY (INHALATION) at 13:21

## 2022-01-01 RX ADMIN — Medication 0.5 MG: at 06:45

## 2022-01-01 RX ADMIN — MORPHINE SULFATE 2 MG: 2 INJECTION, SOLUTION INTRAMUSCULAR; INTRAVENOUS at 09:16

## 2022-01-01 RX ADMIN — IPRATROPIUM BROMIDE AND ALBUTEROL 1 PUFF: 20; 100 SPRAY, METERED RESPIRATORY (INHALATION) at 13:32

## 2022-01-01 RX ADMIN — MORPHINE SULFATE 2 MG: 2 INJECTION, SOLUTION INTRAMUSCULAR; INTRAVENOUS at 01:15

## 2022-01-01 RX ADMIN — IOPAMIDOL 80 ML: 755 INJECTION, SOLUTION INTRAVENOUS at 01:56

## 2022-01-01 RX ADMIN — LOPERAMIDE HYDROCHLORIDE 2 MG: 2 CAPSULE ORAL at 16:54

## 2022-01-01 RX ADMIN — MOMETASONE FUROATE AND FORMOTEROL FUMARATE DIHYDRATE 2 PUFF: 200; 5 AEROSOL RESPIRATORY (INHALATION) at 19:55

## 2022-01-01 RX ADMIN — SULFAMETHOXAZOLE AND TRIMETHOPRIM 1 TABLET: 800; 160 TABLET ORAL at 21:35

## 2022-01-01 RX ADMIN — IPRATROPIUM BROMIDE AND ALBUTEROL 1 PUFF: 20; 100 SPRAY, METERED RESPIRATORY (INHALATION) at 13:54

## 2022-01-01 RX ADMIN — SULFAMETHOXAZOLE AND TRIMETHOPRIM 1 TABLET: 800; 160 TABLET ORAL at 21:52

## 2022-01-01 RX ADMIN — Medication 10 ML: at 21:08

## 2022-01-01 RX ADMIN — MORPHINE SULFATE 6 MG: 2 INJECTION, SOLUTION INTRAMUSCULAR; INTRAVENOUS at 05:45

## 2022-01-01 RX ADMIN — IPRATROPIUM BROMIDE AND ALBUTEROL 1 PUFF: 20; 100 SPRAY, METERED RESPIRATORY (INHALATION) at 11:58

## 2022-01-01 RX ADMIN — FLUCONAZOLE 100 MG: 100 TABLET ORAL at 09:52

## 2022-01-01 RX ADMIN — PIPERACILLIN AND TAZOBACTAM 3.38 G: 3; .375 INJECTION, POWDER, LYOPHILIZED, FOR SOLUTION INTRAVENOUS at 01:22

## 2022-01-01 RX ADMIN — ENOXAPARIN SODIUM 40 MG: 100 INJECTION SUBCUTANEOUS at 09:04

## 2022-01-01 RX ADMIN — LORAZEPAM 0.5 MG: 0.5 TABLET ORAL at 03:45

## 2022-01-01 RX ADMIN — ONDANSETRON 4 MG: 2 INJECTION INTRAMUSCULAR; INTRAVENOUS at 23:49

## 2022-01-01 RX ADMIN — Medication 10 ML: at 13:53

## 2022-01-01 RX ADMIN — Medication 10 ML: at 14:10

## 2022-01-01 RX ADMIN — DOXYCYCLINE 100 MG: 100 INJECTION, POWDER, LYOPHILIZED, FOR SOLUTION INTRAVENOUS at 21:34

## 2022-01-01 RX ADMIN — FLUCONAZOLE 100 MG: 100 TABLET ORAL at 11:56

## 2022-01-01 RX ADMIN — MOMETASONE FUROATE AND FORMOTEROL FUMARATE DIHYDRATE 2 PUFF: 200; 5 AEROSOL RESPIRATORY (INHALATION) at 08:09

## 2022-01-01 RX ADMIN — ALPRAZOLAM 0.25 MG: 0.25 TABLET ORAL at 17:25

## 2022-01-01 RX ADMIN — LORAZEPAM 0.5 MG: 2 INJECTION INTRAMUSCULAR; INTRAVENOUS at 12:15

## 2022-01-01 RX ADMIN — Medication 10 ML: at 05:41

## 2022-01-01 RX ADMIN — MORPHINE SULFATE 4 MG: 4 INJECTION INTRAVENOUS at 12:13

## 2022-01-01 RX ADMIN — LORAZEPAM 0.5 MG: 0.5 TABLET ORAL at 04:58

## 2022-01-01 RX ADMIN — DOXYCYCLINE 100 MG: 100 INJECTION, POWDER, LYOPHILIZED, FOR SOLUTION INTRAVENOUS at 09:35

## 2022-01-01 RX ADMIN — MIDAZOLAM 1 MG: 1 INJECTION INTRAMUSCULAR; INTRAVENOUS at 15:29

## 2022-01-01 RX ADMIN — MORPHINE SULFATE 15 MG: 15 TABLET ORAL at 17:31

## 2022-01-01 RX ADMIN — FLUOXETINE HYDROCHLORIDE 40 MG: 20 CAPSULE ORAL at 09:12

## 2022-01-01 RX ADMIN — Medication 1 CAPSULE: at 08:13

## 2022-01-01 RX ADMIN — VANCOMYCIN HYDROCHLORIDE 1250 MG: 1.25 INJECTION, POWDER, LYOPHILIZED, FOR SOLUTION INTRAVENOUS at 13:29

## 2022-01-01 RX ADMIN — METHYLPREDNISOLONE SODIUM SUCCINATE 60 MG: 40 INJECTION, POWDER, FOR SOLUTION INTRAMUSCULAR; INTRAVENOUS at 14:24

## 2022-01-01 RX ADMIN — ALPRAZOLAM 1 MG: 0.5 TABLET ORAL at 20:03

## 2022-01-01 RX ADMIN — DOXYCYCLINE 100 MG: 100 INJECTION, POWDER, LYOPHILIZED, FOR SOLUTION INTRAVENOUS at 09:45

## 2022-01-01 RX ADMIN — CEFEPIME 2 G: 20 INJECTION, POWDER, FOR SOLUTION INTRAVENOUS at 09:46

## 2022-01-01 RX ADMIN — ALPRAZOLAM 1 MG: 0.5 TABLET ORAL at 12:59

## 2022-01-01 RX ADMIN — MORPHINE SULFATE 6 MG: 2 INJECTION, SOLUTION INTRAMUSCULAR; INTRAVENOUS at 17:12

## 2022-01-01 RX ADMIN — LORAZEPAM 1 MG: 2 INJECTION, SOLUTION INTRAMUSCULAR; INTRAVENOUS at 13:16

## 2022-01-01 RX ADMIN — MOMETASONE FUROATE AND FORMOTEROL FUMARATE DIHYDRATE 2 PUFF: 200; 5 AEROSOL RESPIRATORY (INHALATION) at 20:16

## 2022-01-01 RX ADMIN — POTASSIUM CHLORIDE 10 MEQ: 7.46 INJECTION, SOLUTION INTRAVENOUS at 11:40

## 2022-01-01 RX ADMIN — LISINOPRIL 20 MG: 20 TABLET ORAL at 09:37

## 2022-01-01 RX ADMIN — FILGRASTIM-AAFI 420 MCG: 480 INJECTION, SOLUTION SUBCUTANEOUS at 18:18

## 2022-01-01 RX ADMIN — MOMETASONE FUROATE AND FORMOTEROL FUMARATE DIHYDRATE 2 PUFF: 200; 5 AEROSOL RESPIRATORY (INHALATION) at 08:00

## 2022-01-01 RX ADMIN — LORAZEPAM 0.5 MG: 2 INJECTION INTRAMUSCULAR; INTRAVENOUS at 05:36

## 2022-01-01 RX ADMIN — MOMETASONE FUROATE AND FORMOTEROL FUMARATE DIHYDRATE 2 PUFF: 200; 5 AEROSOL RESPIRATORY (INHALATION) at 07:49

## 2022-01-01 RX ADMIN — MORPHINE SULFATE 6 MG: 2 INJECTION, SOLUTION INTRAMUSCULAR; INTRAVENOUS at 20:14

## 2022-01-01 RX ADMIN — MORPHINE SULFATE 15 MG: 15 TABLET ORAL at 18:19

## 2022-01-01 RX ADMIN — DEXAMETHASONE SODIUM PHOSPHATE 6 MG: 10 INJECTION, SOLUTION INTRAMUSCULAR; INTRAVENOUS at 17:50

## 2022-01-01 RX ADMIN — MOMETASONE FUROATE AND FORMOTEROL FUMARATE DIHYDRATE 2 PUFF: 200; 5 AEROSOL RESPIRATORY (INHALATION) at 21:13

## 2022-01-01 RX ADMIN — FILGRASTIM-AAFI 420 MCG: 480 INJECTION, SOLUTION SUBCUTANEOUS at 20:11

## 2022-01-01 RX ADMIN — METHYLPREDNISOLONE SODIUM SUCCINATE 60 MG: 40 INJECTION, POWDER, FOR SOLUTION INTRAMUSCULAR; INTRAVENOUS at 05:18

## 2022-01-01 RX ADMIN — CEFEPIME 2 G: 2 INJECTION, POWDER, FOR SOLUTION INTRAVENOUS at 21:12

## 2022-01-01 RX ADMIN — LOPERAMIDE HYDROCHLORIDE 2 MG: 2 CAPSULE ORAL at 17:50

## 2022-01-01 RX ADMIN — PIPERACILLIN AND TAZOBACTAM 4.5 G: 4; .5 INJECTION, POWDER, LYOPHILIZED, FOR SOLUTION INTRAVENOUS at 11:02

## 2022-01-01 RX ADMIN — IPRATROPIUM BROMIDE AND ALBUTEROL 1 PUFF: 20; 100 SPRAY, METERED RESPIRATORY (INHALATION) at 13:51

## 2022-01-01 RX ADMIN — NYSTATIN 500000 UNITS: 100000 SUSPENSION ORAL at 13:53

## 2022-01-01 RX ADMIN — Medication 10 ML: at 13:48

## 2022-01-01 RX ADMIN — DOXYCYCLINE 100 MG: 100 INJECTION, POWDER, LYOPHILIZED, FOR SOLUTION INTRAVENOUS at 06:01

## 2022-01-01 RX ADMIN — LISINOPRIL 20 MG: 20 TABLET ORAL at 09:30

## 2022-01-01 RX ADMIN — ALPRAZOLAM 1 MG: 0.5 TABLET ORAL at 21:57

## 2022-01-01 RX ADMIN — MORPHINE SULFATE 6 MG: 2 INJECTION, SOLUTION INTRAMUSCULAR; INTRAVENOUS at 13:16

## 2022-01-01 RX ADMIN — FLUOXETINE HYDROCHLORIDE 40 MG: 20 CAPSULE ORAL at 09:37

## 2022-01-01 RX ADMIN — FLUCONAZOLE 100 MG: 100 TABLET ORAL at 09:29

## 2022-01-01 RX ADMIN — ALPRAZOLAM 1 MG: 0.5 TABLET ORAL at 21:40

## 2022-01-01 RX ADMIN — Medication 10 ML: at 13:51

## 2022-01-01 RX ADMIN — ALUMINUM HYDROXIDE, MAGNESIUM HYDROXIDE, AND SIMETHICONE 40 ML: 200; 200; 20 SUSPENSION ORAL at 13:55

## 2022-01-01 RX ADMIN — Medication 1 CAPSULE: at 09:29

## 2022-01-01 RX ADMIN — ALPRAZOLAM 0.25 MG: 0.25 TABLET ORAL at 09:08

## 2022-01-01 RX ADMIN — LORAZEPAM 0.5 MG: 0.5 TABLET ORAL at 00:23

## 2022-01-01 RX ADMIN — MOMETASONE FUROATE AND FORMOTEROL FUMARATE DIHYDRATE 2 PUFF: 200; 5 AEROSOL RESPIRATORY (INHALATION) at 07:22

## 2022-01-01 RX ADMIN — MOMETASONE FUROATE AND FORMOTEROL FUMARATE DIHYDRATE 2 PUFF: 200; 5 AEROSOL RESPIRATORY (INHALATION) at 08:05

## 2022-01-01 RX ADMIN — IPRATROPIUM BROMIDE AND ALBUTEROL 1 PUFF: 20; 100 SPRAY, METERED RESPIRATORY (INHALATION) at 04:42

## 2022-01-01 RX ADMIN — SODIUM CHLORIDE, PRESERVATIVE FREE 10 ML: 5 INJECTION INTRAVENOUS at 09:37

## 2022-01-01 RX ADMIN — IPRATROPIUM BROMIDE AND ALBUTEROL 1 PUFF: 20; 100 SPRAY, METERED RESPIRATORY (INHALATION) at 20:31

## 2022-01-01 RX ADMIN — ALPRAZOLAM 1 MG: 0.5 TABLET ORAL at 08:13

## 2022-01-01 RX ADMIN — METHYLPREDNISOLONE SODIUM SUCCINATE 60 MG: 40 INJECTION, POWDER, FOR SOLUTION INTRAMUSCULAR; INTRAVENOUS at 13:30

## 2022-01-01 RX ADMIN — Medication 10 ML: at 21:20

## 2022-01-01 RX ADMIN — MOMETASONE FUROATE AND FORMOTEROL FUMARATE DIHYDRATE 2 PUFF: 200; 5 AEROSOL RESPIRATORY (INHALATION) at 20:51

## 2022-01-01 RX ADMIN — MIDAZOLAM 1 MG: 1 INJECTION INTRAMUSCULAR; INTRAVENOUS at 05:45

## 2022-01-01 RX ADMIN — Medication 1 CAPSULE: at 08:34

## 2022-01-01 RX ADMIN — LORAZEPAM 0.5 MG: 2 INJECTION INTRAMUSCULAR; INTRAVENOUS at 14:09

## 2022-01-01 RX ADMIN — DOXYCYCLINE 100 MG: 100 INJECTION, POWDER, LYOPHILIZED, FOR SOLUTION INTRAVENOUS at 21:48

## 2022-01-01 RX ADMIN — FILGRASTIM-AAFI 420 MCG: 480 INJECTION, SOLUTION SUBCUTANEOUS at 18:17

## 2022-01-01 RX ADMIN — Medication 10 ML: at 14:02

## 2022-01-01 RX ADMIN — IPRATROPIUM BROMIDE AND ALBUTEROL 1 PUFF: 20; 100 SPRAY, METERED RESPIRATORY (INHALATION) at 04:38

## 2022-01-01 RX ADMIN — IPRATROPIUM BROMIDE AND ALBUTEROL 1 PUFF: 20; 100 SPRAY, METERED RESPIRATORY (INHALATION) at 02:12

## 2022-01-01 RX ADMIN — POTASSIUM CHLORIDE 10 MEQ: 7.45 INJECTION INTRAVENOUS at 10:57

## 2022-01-01 RX ADMIN — ENOXAPARIN SODIUM 40 MG: 100 INJECTION SUBCUTANEOUS at 10:05

## 2022-01-01 RX ADMIN — IPRATROPIUM BROMIDE AND ALBUTEROL 1 PUFF: 20; 100 SPRAY, METERED RESPIRATORY (INHALATION) at 04:18

## 2022-01-01 RX ADMIN — CEFEPIME 2 G: 20 INJECTION, POWDER, FOR SOLUTION INTRAVENOUS at 10:41

## 2022-01-01 RX ADMIN — ONDANSETRON 8 MG: 4 TABLET, ORALLY DISINTEGRATING ORAL at 15:34

## 2022-01-01 RX ADMIN — IPRATROPIUM BROMIDE AND ALBUTEROL 1 PUFF: 20; 100 SPRAY, METERED RESPIRATORY (INHALATION) at 20:01

## 2022-01-01 RX ADMIN — CEFEPIME 2 G: 20 INJECTION, POWDER, FOR SOLUTION INTRAVENOUS at 22:04

## 2022-01-01 RX ADMIN — IPRATROPIUM BROMIDE AND ALBUTEROL 1 PUFF: 20; 100 SPRAY, METERED RESPIRATORY (INHALATION) at 07:44

## 2022-01-01 RX ADMIN — MORPHINE SULFATE 15 MG: 15 TABLET ORAL at 22:26

## 2022-01-01 RX ADMIN — CEFEPIME 2 G: 20 INJECTION, POWDER, FOR SOLUTION INTRAVENOUS at 09:38

## 2022-01-01 RX ADMIN — MORPHINE SULFATE 15 MG: 15 TABLET ORAL at 13:22

## 2022-01-01 RX ADMIN — LORAZEPAM 0.5 MG: 2 INJECTION INTRAMUSCULAR; INTRAVENOUS at 11:35

## 2022-01-01 RX ADMIN — MOMETASONE FUROATE AND FORMOTEROL FUMARATE DIHYDRATE 2 PUFF: 200; 5 AEROSOL RESPIRATORY (INHALATION) at 20:34

## 2022-01-01 RX ADMIN — ALPRAZOLAM 1 MG: 0.5 TABLET ORAL at 10:56

## 2022-01-01 RX ADMIN — IPRATROPIUM BROMIDE AND ALBUTEROL 1 PUFF: 20; 100 SPRAY, METERED RESPIRATORY (INHALATION) at 07:34

## 2022-01-01 RX ADMIN — ASCORBIC ACID, VITAMIN A PALMITATE, CHOLECALCIFEROL, THIAMINE HYDROCHLORIDE, RIBOFLAVIN-5 PHOSPHATE SODIUM, PYRIDOXINE HYDROCHLORIDE, NIACINAMIDE, DEXPANTHENOL, ALPHA-TOCOPHEROL ACETATE, VITAMIN K1, FOLIC ACID, BIOTIN, CYANOCOBALAMIN: 200; 3300; 200; 6; 3.6; 6; 40; 15; 10; 150; 600; 60; 5 INJECTION, SOLUTION INTRAVENOUS at 17:26

## 2022-01-01 RX ADMIN — Medication 10 ML: at 17:15

## 2022-01-01 RX ADMIN — Medication 10 ML: at 21:02

## 2022-01-01 RX ADMIN — MORPHINE SULFATE 6 MG: 2 INJECTION, SOLUTION INTRAMUSCULAR; INTRAVENOUS at 18:48

## 2022-01-01 RX ADMIN — Medication 30 ML: at 05:18

## 2022-01-01 RX ADMIN — VANCOMYCIN HYDROCHLORIDE 1250 MG: 1.25 INJECTION, POWDER, LYOPHILIZED, FOR SOLUTION INTRAVENOUS at 11:45

## 2022-01-01 RX ADMIN — Medication 10 ML: at 09:03

## 2022-01-01 RX ADMIN — IPRATROPIUM BROMIDE AND ALBUTEROL 1 PUFF: 20; 100 SPRAY, METERED RESPIRATORY (INHALATION) at 21:12

## 2022-01-01 RX ADMIN — LORAZEPAM 0.5 MG: 0.5 TABLET ORAL at 14:19

## 2022-01-01 RX ADMIN — IPRATROPIUM BROMIDE AND ALBUTEROL 1 PUFF: 20; 100 SPRAY, METERED RESPIRATORY (INHALATION) at 22:34

## 2022-01-01 RX ADMIN — IPRATROPIUM BROMIDE AND ALBUTEROL 1 PUFF: 20; 100 SPRAY, METERED RESPIRATORY (INHALATION) at 08:40

## 2022-01-01 RX ADMIN — FLUCONAZOLE 100 MG: 100 TABLET ORAL at 09:13

## 2022-01-01 RX ADMIN — SODIUM CHLORIDE 40 MG: 9 INJECTION INTRAMUSCULAR; INTRAVENOUS; SUBCUTANEOUS at 13:57

## 2022-01-01 RX ADMIN — Medication 1 CAPSULE: at 09:08

## 2022-01-01 RX ADMIN — DOXYCYCLINE 100 MG: 100 INJECTION, POWDER, LYOPHILIZED, FOR SOLUTION INTRAVENOUS at 22:18

## 2022-01-01 RX ADMIN — ALPRAZOLAM 1 MG: 0.5 TABLET ORAL at 22:26

## 2022-01-01 RX ADMIN — LORAZEPAM 0.5 MG: 0.5 TABLET ORAL at 11:16

## 2022-01-01 RX ADMIN — VANCOMYCIN HYDROCHLORIDE 1250 MG: 1.25 INJECTION, POWDER, LYOPHILIZED, FOR SOLUTION INTRAVENOUS at 14:09

## 2022-01-01 RX ADMIN — Medication 10 ML: at 23:03

## 2022-01-01 RX ADMIN — VANCOMYCIN HYDROCHLORIDE 1250 MG: 1.25 INJECTION, POWDER, LYOPHILIZED, FOR SOLUTION INTRAVENOUS at 09:41

## 2022-01-01 RX ADMIN — IPRATROPIUM BROMIDE AND ALBUTEROL 1 PUFF: 20; 100 SPRAY, METERED RESPIRATORY (INHALATION) at 20:56

## 2022-01-01 RX ADMIN — IPRATROPIUM BROMIDE AND ALBUTEROL 1 PUFF: 20; 100 SPRAY, METERED RESPIRATORY (INHALATION) at 08:50

## 2022-01-01 RX ADMIN — MORPHINE SULFATE 2 MG: 2 INJECTION, SOLUTION INTRAMUSCULAR; INTRAVENOUS at 16:25

## 2022-01-01 RX ADMIN — MORPHINE SULFATE 15 MG: 15 TABLET ORAL at 12:59

## 2022-01-01 RX ADMIN — MORPHINE SULFATE 15 MG: 15 TABLET ORAL at 06:20

## 2022-01-01 RX ADMIN — POTASSIUM CHLORIDE 10 MEQ: 7.45 INJECTION INTRAVENOUS at 09:34

## 2022-01-01 RX ADMIN — CEFEPIME 2 G: 20 INJECTION, POWDER, FOR SOLUTION INTRAVENOUS at 22:26

## 2022-01-01 RX ADMIN — LOPERAMIDE HYDROCHLORIDE 2 MG: 2 CAPSULE ORAL at 08:13

## 2022-01-01 RX ADMIN — CEFEPIME 2 G: 2 INJECTION, POWDER, FOR SOLUTION INTRAVENOUS at 20:19

## 2022-01-01 RX ADMIN — IPRATROPIUM BROMIDE AND ALBUTEROL 1 PUFF: 20; 100 SPRAY, METERED RESPIRATORY (INHALATION) at 14:08

## 2022-01-01 RX ADMIN — ALPRAZOLAM 0.25 MG: 0.25 TABLET ORAL at 17:20

## 2022-01-01 RX ADMIN — PANTOPRAZOLE SODIUM 40 MG: 40 TABLET, DELAYED RELEASE ORAL at 06:14

## 2022-01-01 RX ADMIN — LIDOCAINE AND PRILOCAINE: 25; 25 CREAM TOPICAL at 03:10

## 2022-01-01 RX ADMIN — MORPHINE SULFATE 2 MG: 2 INJECTION, SOLUTION INTRAMUSCULAR; INTRAVENOUS at 19:56

## 2022-01-01 RX ADMIN — Medication 1 CAPSULE: at 09:37

## 2022-01-01 RX ADMIN — POTASSIUM CHLORIDE 10 MEQ: 7.46 INJECTION, SOLUTION INTRAVENOUS at 12:40

## 2022-01-01 RX ADMIN — ALPRAZOLAM 1 MG: 0.5 TABLET ORAL at 21:49

## 2022-01-01 RX ADMIN — LORAZEPAM 1 MG: 2 INJECTION, SOLUTION INTRAMUSCULAR; INTRAVENOUS at 10:22

## 2022-01-01 RX ADMIN — ONDANSETRON 4 MG: 2 INJECTION INTRAMUSCULAR; INTRAVENOUS at 09:18

## 2022-01-01 RX ADMIN — Medication 10 ML: at 21:50

## 2022-01-01 RX ADMIN — LORAZEPAM 0.5 MG: 2 INJECTION INTRAMUSCULAR; INTRAVENOUS at 23:58

## 2022-01-01 RX ADMIN — PIPERACILLIN AND TAZOBACTAM 3.38 G: 3; .375 INJECTION, POWDER, LYOPHILIZED, FOR SOLUTION INTRAVENOUS at 17:30

## 2022-01-01 RX ADMIN — MORPHINE SULFATE 2 MG: 2 INJECTION, SOLUTION INTRAMUSCULAR; INTRAVENOUS at 08:55

## 2022-01-01 RX ADMIN — ENOXAPARIN SODIUM 40 MG: 100 INJECTION SUBCUTANEOUS at 09:20

## 2022-01-01 RX ADMIN — Medication 10 ML: at 06:22

## 2022-01-01 RX ADMIN — MOMETASONE FUROATE AND FORMOTEROL FUMARATE DIHYDRATE 2 PUFF: 200; 5 AEROSOL RESPIRATORY (INHALATION) at 07:44

## 2022-01-01 RX ADMIN — MORPHINE SULFATE 15 MG: 15 TABLET ORAL at 09:55

## 2022-01-01 RX ADMIN — IPRATROPIUM BROMIDE AND ALBUTEROL 1 PUFF: 20; 100 SPRAY, METERED RESPIRATORY (INHALATION) at 20:50

## 2022-01-01 RX ADMIN — LORAZEPAM 0.5 MG: 2 INJECTION INTRAMUSCULAR; INTRAVENOUS at 03:08

## 2022-01-01 RX ADMIN — MIDAZOLAM 1 MG: 1 INJECTION INTRAMUSCULAR; INTRAVENOUS at 17:12

## 2022-01-01 RX ADMIN — METHYLPREDNISOLONE SODIUM SUCCINATE 60 MG: 40 INJECTION, POWDER, FOR SOLUTION INTRAMUSCULAR; INTRAVENOUS at 14:19

## 2022-01-01 RX ADMIN — MORPHINE SULFATE 15 MG: 15 TABLET ORAL at 05:38

## 2022-01-01 RX ADMIN — LORAZEPAM 0.5 MG: 2 INJECTION INTRAMUSCULAR; INTRAVENOUS at 03:05

## 2022-01-01 RX ADMIN — FLUCONAZOLE 100 MG: 100 TABLET ORAL at 09:02

## 2022-01-01 RX ADMIN — MOMETASONE FUROATE AND FORMOTEROL FUMARATE DIHYDRATE 2 PUFF: 200; 5 AEROSOL RESPIRATORY (INHALATION) at 20:31

## 2022-01-01 RX ADMIN — MORPHINE SULFATE 15 MG: 15 TABLET ORAL at 17:20

## 2022-01-01 RX ADMIN — POTASSIUM CHLORIDE 10 MEQ: 7.45 INJECTION INTRAVENOUS at 07:06

## 2022-01-01 RX ADMIN — MOMETASONE FUROATE AND FORMOTEROL FUMARATE DIHYDRATE 2 PUFF: 200; 5 AEROSOL RESPIRATORY (INHALATION) at 08:52

## 2022-01-01 RX ADMIN — Medication 10 ML: at 05:44

## 2022-01-01 RX ADMIN — MORPHINE SULFATE 15 MG: 15 TABLET ORAL at 01:32

## 2022-01-01 RX ADMIN — DEXAMETHASONE SODIUM PHOSPHATE 6 MG: 10 INJECTION, SOLUTION INTRAMUSCULAR; INTRAVENOUS at 17:30

## 2022-01-01 RX ADMIN — SULFAMETHOXAZOLE AND TRIMETHOPRIM 1 TABLET: 800; 160 TABLET ORAL at 22:18

## 2022-01-01 RX ADMIN — METHYLPREDNISOLONE SODIUM SUCCINATE 60 MG: 40 INJECTION, POWDER, FOR SOLUTION INTRAMUSCULAR; INTRAVENOUS at 05:36

## 2022-01-01 RX ADMIN — IPRATROPIUM BROMIDE AND ALBUTEROL 1 PUFF: 20; 100 SPRAY, METERED RESPIRATORY (INHALATION) at 20:00

## 2022-01-01 RX ADMIN — Medication 0.5 MG: at 18:21

## 2022-01-01 RX ADMIN — ASCORBIC ACID, VITAMIN A PALMITATE, CHOLECALCIFEROL, THIAMINE HYDROCHLORIDE, RIBOFLAVIN-5 PHOSPHATE SODIUM, PYRIDOXINE HYDROCHLORIDE, NIACINAMIDE, DEXPANTHENOL, ALPHA-TOCOPHEROL ACETATE, VITAMIN K1, FOLIC ACID, BIOTIN, CYANOCOBALAMIN: 200; 3300; 200; 6; 3.6; 6; 40; 15; 10; 150; 600; 60; 5 INJECTION, SOLUTION INTRAVENOUS at 17:31

## 2022-01-01 RX ADMIN — MORPHINE SULFATE 6 MG: 2 INJECTION, SOLUTION INTRAMUSCULAR; INTRAVENOUS at 23:52

## 2022-01-01 RX ADMIN — MORPHINE SULFATE 15 MG: 15 TABLET ORAL at 06:39

## 2022-01-01 RX ADMIN — Medication 1 CAPSULE: at 09:02

## 2022-01-01 RX ADMIN — CEFEPIME 2 G: 20 INJECTION, POWDER, FOR SOLUTION INTRAVENOUS at 21:22

## 2022-01-01 RX ADMIN — MIDAZOLAM 1 MG: 1 INJECTION INTRAMUSCULAR; INTRAVENOUS at 21:00

## 2022-01-01 RX ADMIN — IOPAMIDOL 75 ML: 755 INJECTION, SOLUTION INTRAVENOUS at 15:38

## 2022-01-01 RX ADMIN — ALPRAZOLAM 0.25 MG: 0.25 TABLET ORAL at 11:43

## 2022-01-01 RX ADMIN — Medication 1 CAPSULE: at 08:40

## 2022-01-01 RX ADMIN — MORPHINE SULFATE 2 MG: 2 INJECTION, SOLUTION INTRAMUSCULAR; INTRAVENOUS at 16:07

## 2022-01-01 RX ADMIN — IPRATROPIUM BROMIDE AND ALBUTEROL 1 PUFF: 20; 100 SPRAY, METERED RESPIRATORY (INHALATION) at 01:40

## 2022-01-01 RX ADMIN — MOMETASONE FUROATE AND FORMOTEROL FUMARATE DIHYDRATE 2 PUFF: 200; 5 AEROSOL RESPIRATORY (INHALATION) at 20:17

## 2022-01-01 RX ADMIN — PROCHLORPERAZINE MALEATE 10 MG: 5 TABLET ORAL at 22:26

## 2022-01-01 RX ADMIN — MOMETASONE FUROATE AND FORMOTEROL FUMARATE DIHYDRATE 2 PUFF: 200; 5 AEROSOL RESPIRATORY (INHALATION) at 20:57

## 2022-01-01 RX ADMIN — Medication 10 ML: at 14:00

## 2022-01-01 RX ADMIN — SODIUM CHLORIDE 40 MG: 9 INJECTION INTRAMUSCULAR; INTRAVENOUS; SUBCUTANEOUS at 08:12

## 2022-01-01 RX ADMIN — PANTOPRAZOLE SODIUM 40 MG: 40 TABLET, DELAYED RELEASE ORAL at 05:38

## 2022-01-01 RX ADMIN — FLUOXETINE HYDROCHLORIDE 40 MG: 20 CAPSULE ORAL at 08:39

## 2022-01-01 RX ADMIN — METHYLPREDNISOLONE SODIUM SUCCINATE 60 MG: 40 INJECTION, POWDER, FOR SOLUTION INTRAMUSCULAR; INTRAVENOUS at 14:09

## 2022-01-01 RX ADMIN — METHYLPREDNISOLONE SODIUM SUCCINATE 60 MG: 40 INJECTION, POWDER, FOR SOLUTION INTRAMUSCULAR; INTRAVENOUS at 21:07

## 2022-01-01 RX ADMIN — LORAZEPAM 0.5 MG: 0.5 TABLET ORAL at 13:48

## 2022-01-01 RX ADMIN — Medication 10 ML: at 04:55

## 2022-01-01 RX ADMIN — IPRATROPIUM BROMIDE AND ALBUTEROL 1 PUFF: 20; 100 SPRAY, METERED RESPIRATORY (INHALATION) at 16:21

## 2022-01-01 RX ADMIN — Medication 10 ML: at 22:27

## 2022-01-01 RX ADMIN — MORPHINE SULFATE 6 MG: 2 INJECTION, SOLUTION INTRAMUSCULAR; INTRAVENOUS at 17:46

## 2022-01-01 RX ADMIN — MORPHINE SULFATE 2 MG: 2 INJECTION, SOLUTION INTRAMUSCULAR; INTRAVENOUS at 00:40

## 2022-01-01 RX ADMIN — CEFEPIME 2 G: 2 INJECTION, POWDER, FOR SOLUTION INTRAVENOUS at 09:02

## 2022-01-01 RX ADMIN — ALPRAZOLAM 1 MG: 0.5 TABLET ORAL at 20:19

## 2022-01-01 RX ADMIN — IPRATROPIUM BROMIDE AND ALBUTEROL 1 PUFF: 20; 100 SPRAY, METERED RESPIRATORY (INHALATION) at 00:13

## 2022-01-01 RX ADMIN — DIPHENHYDRAMINE HYDROCHLORIDE AND LIDOCAINE HYDROCHLORIDE AND ALUMINUM HYDROXIDE AND MAGNESIUM HYDRO 5 ML: KIT at 11:58

## 2022-01-01 RX ADMIN — IPRATROPIUM BROMIDE AND ALBUTEROL SULFATE 3 ML: .5; 3 SOLUTION RESPIRATORY (INHALATION) at 17:28

## 2022-01-01 RX ADMIN — Medication 10 ML: at 04:42

## 2022-01-01 RX ADMIN — Medication 10 ML: at 17:13

## 2022-01-01 RX ADMIN — MORPHINE SULFATE 2 MG: 2 INJECTION, SOLUTION INTRAMUSCULAR; INTRAVENOUS at 07:05

## 2022-01-01 RX ADMIN — MIDAZOLAM 1 MG: 1 INJECTION INTRAMUSCULAR; INTRAVENOUS at 03:26

## 2022-01-01 RX ADMIN — VANCOMYCIN HYDROCHLORIDE 1250 MG: 1.25 INJECTION, POWDER, LYOPHILIZED, FOR SOLUTION INTRAVENOUS at 14:24

## 2022-01-01 RX ADMIN — MORPHINE SULFATE 2 MG: 2 INJECTION, SOLUTION INTRAMUSCULAR; INTRAVENOUS at 23:25

## 2022-01-01 RX ADMIN — IPRATROPIUM BROMIDE AND ALBUTEROL 1 PUFF: 20; 100 SPRAY, METERED RESPIRATORY (INHALATION) at 07:22

## 2022-01-01 RX ADMIN — MORPHINE SULFATE 15 MG: 15 TABLET ORAL at 15:38

## 2022-01-01 RX ADMIN — MORPHINE SULFATE 6 MG: 2 INJECTION, SOLUTION INTRAMUSCULAR; INTRAVENOUS at 21:03

## 2022-01-01 RX ADMIN — METHYLPREDNISOLONE SODIUM SUCCINATE 60 MG: 40 INJECTION, POWDER, FOR SOLUTION INTRAMUSCULAR; INTRAVENOUS at 13:48

## 2022-01-01 RX ADMIN — Medication 10 ML: at 21:10

## 2022-01-01 RX ADMIN — LOPERAMIDE HYDROCHLORIDE 2 MG: 2 CAPSULE ORAL at 01:42

## 2022-01-01 RX ADMIN — ALPRAZOLAM 1 MG: 0.5 TABLET ORAL at 12:40

## 2022-01-01 RX ADMIN — IPRATROPIUM BROMIDE AND ALBUTEROL 1 PUFF: 20; 100 SPRAY, METERED RESPIRATORY (INHALATION) at 07:15

## 2022-01-01 RX ADMIN — FLUCONAZOLE 100 MG: 100 TABLET ORAL at 12:40

## 2022-01-01 RX ADMIN — LOPERAMIDE HYDROCHLORIDE 2 MG: 2 CAPSULE ORAL at 09:55

## 2022-01-01 RX ADMIN — DOXYCYCLINE 100 MG: 100 INJECTION, POWDER, LYOPHILIZED, FOR SOLUTION INTRAVENOUS at 20:11

## 2022-01-01 RX ADMIN — CEFEPIME 2 G: 2 INJECTION, POWDER, FOR SOLUTION INTRAVENOUS at 21:07

## 2022-01-01 RX ADMIN — MORPHINE SULFATE 6 MG: 2 INJECTION, SOLUTION INTRAMUSCULAR; INTRAVENOUS at 03:26

## 2022-01-01 RX ADMIN — METHYLPREDNISOLONE SODIUM SUCCINATE 60 MG: 40 INJECTION, POWDER, FOR SOLUTION INTRAMUSCULAR; INTRAVENOUS at 14:02

## 2022-01-01 RX ADMIN — SODIUM CHLORIDE 40 MG: 9 INJECTION INTRAMUSCULAR; INTRAVENOUS; SUBCUTANEOUS at 08:40

## 2022-01-01 RX ADMIN — LISINOPRIL 20 MG: 20 TABLET ORAL at 09:08

## 2022-01-01 RX ADMIN — CEFEPIME 2 G: 20 INJECTION, POWDER, FOR SOLUTION INTRAVENOUS at 21:34

## 2022-01-01 RX ADMIN — IPRATROPIUM BROMIDE AND ALBUTEROL 1 PUFF: 20; 100 SPRAY, METERED RESPIRATORY (INHALATION) at 08:00

## 2022-01-01 RX ADMIN — PANTOPRAZOLE SODIUM 40 MG: 40 TABLET, DELAYED RELEASE ORAL at 05:41

## 2022-01-01 RX ADMIN — Medication 10 ML: at 21:55

## 2022-01-01 RX ADMIN — IPRATROPIUM BROMIDE AND ALBUTEROL 1 PUFF: 20; 100 SPRAY, METERED RESPIRATORY (INHALATION) at 07:35

## 2022-01-01 RX ADMIN — DEXAMETHASONE SODIUM PHOSPHATE 6 MG: 10 INJECTION, SOLUTION INTRAMUSCULAR; INTRAVENOUS at 17:16

## 2022-01-01 RX ADMIN — ALPRAZOLAM 1 MG: 0.5 TABLET ORAL at 13:22

## 2022-01-01 RX ADMIN — IPRATROPIUM BROMIDE AND ALBUTEROL 1 PUFF: 20; 100 SPRAY, METERED RESPIRATORY (INHALATION) at 20:55

## 2022-01-01 RX ADMIN — MOMETASONE FUROATE AND FORMOTEROL FUMARATE DIHYDRATE 2 PUFF: 200; 5 AEROSOL RESPIRATORY (INHALATION) at 07:36

## 2022-01-01 RX ADMIN — FLUCONAZOLE 100 MG: 100 TABLET ORAL at 09:37

## 2022-01-01 RX ADMIN — FILGRASTIM-AAFI 420 MCG: 480 INJECTION, SOLUTION SUBCUTANEOUS at 18:54

## 2022-01-01 RX ADMIN — MOMETASONE FUROATE AND FORMOTEROL FUMARATE DIHYDRATE 2 PUFF: 200; 5 AEROSOL RESPIRATORY (INHALATION) at 19:53

## 2022-01-01 RX ADMIN — MIDAZOLAM 1 MG: 1 INJECTION INTRAMUSCULAR; INTRAVENOUS at 23:52

## 2022-01-01 RX ADMIN — METHYLPREDNISOLONE SODIUM SUCCINATE 60 MG: 40 INJECTION, POWDER, FOR SOLUTION INTRAMUSCULAR; INTRAVENOUS at 06:13

## 2022-01-01 RX ADMIN — LOPERAMIDE HYDROCHLORIDE 2 MG: 2 CAPSULE ORAL at 11:06

## 2022-01-01 RX ADMIN — AMLODIPINE BESYLATE 5 MG: 5 TABLET ORAL at 09:08

## 2022-01-01 RX ADMIN — MOMETASONE FUROATE AND FORMOTEROL FUMARATE DIHYDRATE 2 PUFF: 200; 5 AEROSOL RESPIRATORY (INHALATION) at 07:34

## 2022-01-01 RX ADMIN — CEFEPIME 2 G: 2 INJECTION, POWDER, FOR SOLUTION INTRAVENOUS at 09:12

## 2022-01-01 RX ADMIN — Medication 10 ML: at 14:05

## 2022-01-01 RX ADMIN — FLUCONAZOLE 100 MG: 100 TABLET ORAL at 08:37

## 2022-01-01 RX ADMIN — MOMETASONE FUROATE AND FORMOTEROL FUMARATE DIHYDRATE 2 PUFF: 200; 5 AEROSOL RESPIRATORY (INHALATION) at 08:36

## 2022-01-01 RX ADMIN — IPRATROPIUM BROMIDE AND ALBUTEROL 1 PUFF: 20; 100 SPRAY, METERED RESPIRATORY (INHALATION) at 07:55

## 2022-01-01 RX ADMIN — MORPHINE SULFATE 15 MG: 15 TABLET ORAL at 19:07

## 2022-01-01 RX ADMIN — METHYLPREDNISOLONE SODIUM SUCCINATE 60 MG: 40 INJECTION, POWDER, FOR SOLUTION INTRAMUSCULAR; INTRAVENOUS at 21:55

## 2022-01-01 RX ADMIN — POTASSIUM CHLORIDE 10 MEQ: 7.45 INJECTION INTRAVENOUS at 10:00

## 2022-01-01 RX ADMIN — DEXAMETHASONE SODIUM PHOSPHATE 6 MG: 10 INJECTION, SOLUTION INTRAMUSCULAR; INTRAVENOUS at 17:21

## 2022-01-01 RX ADMIN — MORPHINE SULFATE 15 MG: 15 TABLET ORAL at 09:14

## 2022-01-01 RX ADMIN — AMLODIPINE BESYLATE 5 MG: 5 TABLET ORAL at 09:37

## 2022-01-01 RX ADMIN — Medication 10 ML: at 22:24

## 2022-01-01 RX ADMIN — IPRATROPIUM BROMIDE AND ALBUTEROL 1 PUFF: 20; 100 SPRAY, METERED RESPIRATORY (INHALATION) at 19:54

## 2022-01-01 RX ADMIN — MORPHINE SULFATE 15 MG: 15 TABLET ORAL at 20:10

## 2022-01-01 RX ADMIN — ASCORBIC ACID, VITAMIN A PALMITATE, CHOLECALCIFEROL, THIAMINE HYDROCHLORIDE, RIBOFLAVIN-5 PHOSPHATE SODIUM, PYRIDOXINE HYDROCHLORIDE, NIACINAMIDE, DEXPANTHENOL, ALPHA-TOCOPHEROL ACETATE, VITAMIN K1, FOLIC ACID, BIOTIN, CYANOCOBALAMIN: 200; 3300; 200; 6; 3.6; 6; 40; 15; 10; 150; 600; 60; 5 INJECTION, SOLUTION INTRAVENOUS at 17:24

## 2022-01-01 RX ADMIN — IPRATROPIUM BROMIDE AND ALBUTEROL 1 PUFF: 20; 100 SPRAY, METERED RESPIRATORY (INHALATION) at 19:55

## 2022-03-03 ENCOUNTER — OFFICE VISIT (OUTPATIENT)
Dept: FAMILY MEDICINE CLINIC | Age: 67
End: 2022-03-03
Payer: MEDICARE

## 2022-03-03 VITALS
OXYGEN SATURATION: 97 % | HEIGHT: 63 IN | HEART RATE: 82 BPM | BODY MASS INDEX: 36.25 KG/M2 | RESPIRATION RATE: 20 BRPM | WEIGHT: 204.6 LBS | TEMPERATURE: 97.2 F | SYSTOLIC BLOOD PRESSURE: 141 MMHG | DIASTOLIC BLOOD PRESSURE: 79 MMHG

## 2022-03-03 DIAGNOSIS — D72.821 MONOCYTOSIS: ICD-10-CM

## 2022-03-03 DIAGNOSIS — R59.0 CERVICAL LYMPHADENOPATHY: Primary | ICD-10-CM

## 2022-03-03 DIAGNOSIS — R53.83 FATIGUE, UNSPECIFIED TYPE: ICD-10-CM

## 2022-03-03 DIAGNOSIS — J40 BRONCHITIS: ICD-10-CM

## 2022-03-03 PROCEDURE — 99214 OFFICE O/P EST MOD 30 MIN: CPT | Performed by: FAMILY MEDICINE

## 2022-03-03 PROCEDURE — G8536 NO DOC ELDER MAL SCRN: HCPCS | Performed by: FAMILY MEDICINE

## 2022-03-03 PROCEDURE — 3017F COLORECTAL CA SCREEN DOC REV: CPT | Performed by: FAMILY MEDICINE

## 2022-03-03 PROCEDURE — G8400 PT W/DXA NO RESULTS DOC: HCPCS | Performed by: FAMILY MEDICINE

## 2022-03-03 PROCEDURE — G8417 CALC BMI ABV UP PARAM F/U: HCPCS | Performed by: FAMILY MEDICINE

## 2022-03-03 PROCEDURE — 1090F PRES/ABSN URINE INCON ASSESS: CPT | Performed by: FAMILY MEDICINE

## 2022-03-03 PROCEDURE — G8432 DEP SCR NOT DOC, RNG: HCPCS | Performed by: FAMILY MEDICINE

## 2022-03-03 PROCEDURE — 1101F PT FALLS ASSESS-DOCD LE1/YR: CPT | Performed by: FAMILY MEDICINE

## 2022-03-03 PROCEDURE — G8427 DOCREV CUR MEDS BY ELIG CLIN: HCPCS | Performed by: FAMILY MEDICINE

## 2022-03-03 RX ORDER — ALBUTEROL SULFATE 90 UG/1
1 AEROSOL, METERED RESPIRATORY (INHALATION)
Qty: 18 G | Refills: 0 | Status: SHIPPED | OUTPATIENT
Start: 2022-03-03 | End: 2022-04-11

## 2022-03-03 RX ORDER — METHYLPREDNISOLONE 4 MG/1
TABLET ORAL
Qty: 1 DOSE PACK | Refills: 0 | Status: SHIPPED | OUTPATIENT
Start: 2022-03-03 | End: 2022-04-11

## 2022-03-03 NOTE — PROGRESS NOTES
3/3/2022   Ant Rahman (: 1955) is a 78 y.o. female, established patient, here for evaluation of the following chief complaint(s):  Follow-up (PT 1st follow up   2 days ago    Swollen Lymph Nodes    )     ASSESSMENT/PLAN:  Below is the assessment and plan developed based on review of pertinent history, physical exam, labs, studies, and medications. 1. Cervical lymphadenopathy  -     MONONUCLEOSIS SCREEN; Future  -     CBC WITH AUTOMATED DIFF; Future  -     METABOLIC PANEL, COMPREHENSIVE; Future  -     XR CHEST PA LAT; Future  -     THYROID CASCADE PROFILE; Future  -     REFERRAL TO HEMATOLOGY ONCOLOGY  2. Monocytosis  -     MONONUCLEOSIS SCREEN; Future  -     CBC WITH AUTOMATED DIFF; Future  -     THYROID CASCADE PROFILE; Future  3. Bronchitis  -     XR CHEST PA LAT; Future  -     methylPREDNISolone (MEDROL DOSEPACK) 4 mg tablet; Take as directed on the pack, Normal, Disp-1 Dose Pack, R-0  -     albuterol (PROVENTIL HFA, VENTOLIN HFA, PROAIR HFA) 90 mcg/actuation inhaler; Take 1 Puff by inhalation every four (4) hours as needed for Wheezing., Normal, Disp-18 g, R-0  4. Fatigue, unspecified type  -     MONONUCLEOSIS SCREEN; Future  -     CBC WITH AUTOMATED DIFF; Future  -     METABOLIC PANEL, COMPREHENSIVE; Future  -     THYROID CASCADE PROFILE; Future    Return in about 2 weeks (around 3/17/2022) for follow up. SUBJECTIVE/OBJECTIVE:  HPI   1. Cervical lymphadenopathy  Patient reports she noticed lymph node enlargement in her neck on the left side 3 weeks ago. Associated symptoms include headaches, sore throat, body aches, night sweats. She denies any weight loss. Patient went to urgent care and got tested for COVID-19 which was negative. Also flu test and strep test were negative. X-ray was not done. Labs were within normal limits except for monocytosis. Patient is here for follow-up. Patient went to urgent care on 2/15/2022      2. Monocytosis  Monospot test was not done at urgent care.   Some symptoms point towards diagnosis of mononucleosis. I will check Monospot. 3. Bronchitis  Patient is coughing. Reports shortness of breath. Smokes cigarettes daily. Reports yellow sputum. Denies chills or fever. I will check chest x-ray. I will start albuterol inhaler and prednisone. 4. Fatigue, unspecified type  Patient reports symptoms of fatigue and being irritable. I will check labs. I have also discussed with patient if Monospot test is negative patient to make appointment with hematologist for further evaluation. Patient understands and agrees. Referral given to the patient today. No results found for any visits on 03/03/22. Review of Systems   Constitutional: Positive for malaise/fatigue. HENT: Negative. Eyes: Negative. Respiratory: Negative. Cardiovascular: Negative. Gastrointestinal: Negative. Genitourinary: Negative. Musculoskeletal: Positive for myalgias. Skin: Negative. Neurological: Positive for headaches. Endo/Heme/Allergies: Negative. Psychiatric/Behavioral: Negative. Physical Exam  Vitals and nursing note reviewed. HENT:      Head: Normocephalic and atraumatic. Right Ear: External ear normal.      Left Ear: External ear normal.      Nose: Nose normal.      Mouth/Throat:      Pharynx: Posterior oropharyngeal erythema present. Eyes:      Conjunctiva/sclera: Conjunctivae normal.   Cardiovascular:      Rate and Rhythm: Normal rate and regular rhythm. Pulmonary:      Effort: Pulmonary effort is normal.      Breath sounds: Examination of the right-lower field reveals rhonchi. Examination of the left-lower field reveals rhonchi. Rhonchi present. Abdominal:      General: Bowel sounds are normal. There is no distension. Palpations: Abdomen is soft. Tenderness: There is no abdominal tenderness. Musculoskeletal:         General: Normal range of motion. Cervical back: Normal range of motion and neck supple. Lymphadenopathy:      Cervical: Cervical adenopathy present. Left cervical: Superficial cervical adenopathy present. Skin:     General: Skin is warm and dry. Neurological:      Mental Status: She is alert. BP (!) 141/79 (BP 1 Location: Left upper arm, BP Patient Position: Sitting, BP Cuff Size: Adult)   Pulse 82   Temp 97.2 °F (36.2 °C) (Temporal)   Resp 20   Ht 5' 3\" (1.6 m)   Wt 204 lb 9.6 oz (92.8 kg)   SpO2 97%   BMI 36.24 kg/m²     No Known Allergies    Current Outpatient Medications   Medication Sig    methylPREDNISolone (MEDROL DOSEPACK) 4 mg tablet Take as directed on the pack    albuterol (PROVENTIL HFA, VENTOLIN HFA, PROAIR HFA) 90 mcg/actuation inhaler Take 1 Puff by inhalation every four (4) hours as needed for Wheezing.  FLUoxetine (PROzac) 40 mg capsule TAKE 1 CAPSULE BY MOUTH EVERY MORNING    amLODIPine-benazepril (LOTREL) 5-20 mg per capsule TAKE 1 CAPSULE BY MOUTH EVERY MORNING    ARIPiprazole (ABILIFY) 2 mg tablet TK 1 T PO AT NIGHT TO AUGMENT DEPRESSION TREATMENT (Patient not taking: Reported on 11/5/2021)     No current facility-administered medications for this visit. History reviewed. No pertinent past medical history. History reviewed. No pertinent surgical history. Social History:  reports that she has been smoking cigarettes. She has never used smokeless tobacco. She reports previous alcohol use. She reports that she does not use drugs. Patient Care Team:  Anaya Camarena MD as PCP - General (Family Medicine)  Anaya Camarena MD as PCP - [facility]    Problem List  Date Reviewed: 3/3/2022    None               I ADVISED PATIENT TO GO TO ER IF SYMPTOMS WORSEN , CHANGE OR FAILS TO IMPROVE. I have discussed the diagnosis with the patient and the intended plan as seen in the above orders. The patient has received an after-visit summary and questions were answered concerning future plans.   I have discussed medication side effects and warnings with the patient as well. The patient agrees and understands above plan. An electronic signature was used to authenticate this note.   -- Federico Peterson MD

## 2022-03-03 NOTE — PROGRESS NOTES
Patient stated name &     Chief Complaint   Patient presents with    Follow-up     PT 1st follow up   2 days ago    Swollen Lymph Nodes            Health Maintenance Due   Topic    Hepatitis C Screening     COVID-19 Vaccine (1)    DTaP/Tdap/Td series (1 - Tdap)    Lipid Screen     Colorectal Cancer Screening Combo     Shingrix Vaccine Age 50> (1 of 2)    Breast Cancer Screen Mammogram     Bone Densitometry (Dexa) Screening     Pneumococcal 65+ years (1 of 1 - PPSV23)    Flu Vaccine (1)    Medicare Yearly Exam        Wt Readings from Last 3 Encounters:   22 204 lb 9.6 oz (92.8 kg)   21 213 lb (96.6 kg)     Temp Readings from Last 3 Encounters:   22 97.2 °F (36.2 °C) (Temporal)   21 96.8 °F (36 °C) (Skin)     BP Readings from Last 3 Encounters:   22 (!) 141/79   21 118/86     Pulse Readings from Last 3 Encounters:   22 82   21 95         Learning Assessment:  :     No flowsheet data found. Depression Screening:  :     3 most recent PHQ Screens 2021   Little interest or pleasure in doing things Not at all   Feeling down, depressed, irritable, or hopeless Not at all   Total Score PHQ 2 0   Trouble falling or staying asleep, or sleeping too much -   Feeling tired or having little energy -   Poor appetite, weight loss, or overeating -   Feeling bad about yourself - or that you are a failure or have let yourself or your family down -   Trouble concentrating on things such as school, work, reading, or watching TV -   Moving or speaking so slowly that other people could have noticed; or the opposite being so fidgety that others notice -   Thoughts of being better off dead, or hurting yourself in some way -   PHQ 9 Score -   How difficult have these problems made it for you to do your work, take care of your home and get along with others -       Fall Risk Assessment:  :     Fall Risk Assessment, last 12 mths 2021   Able to walk?  Yes   Fall in past 15 months? 0   Do you feel unsteady? 0   Are you worried about falling 0       Abuse Screening:  :     Abuse Screening Questionnaire 11/5/2021   Do you ever feel afraid of your partner? N   Are you in a relationship with someone who physically or mentally threatens you? N   Is it safe for you to go home? Y       Coordination of Care Questionnaire:  :     1) Have you been to an emergency room, urgent care clinic since your last visit? yes PT 1st 2 days ago   Swollen Lymph Nodes  Negative - covid, strep, mono, flu  Hospitalized since your last visit? no             2) Have you seen or consulted any other health care providers outside of [address] [place] [address] since your last visit? no  (Include any pap smears or colon screenings in this section.)    3) Do you have an Advance Directive on file? no  Are you interested in receiving information about Advance Directives? no    Patient is accompanied by self I have received verbal consent from Micah Norris to discuss any/all medical information while they are present in the room.

## 2022-03-04 LAB
ALBUMIN SERPL-MCNC: 4.2 G/DL (ref 3.5–5)
ALBUMIN/GLOB SERPL: 1.4 {RATIO} (ref 1.1–2.2)
ALP SERPL-CCNC: 72 U/L (ref 45–117)
ALT SERPL-CCNC: 25 U/L (ref 12–78)
ANION GAP SERPL CALC-SCNC: 5 MMOL/L (ref 5–15)
AST SERPL-CCNC: 16 U/L (ref 15–37)
BASOPHILS # BLD: 0.1 K/UL (ref 0–0.1)
BASOPHILS NFR BLD: 1 % (ref 0–1)
BILIRUB SERPL-MCNC: 0.5 MG/DL (ref 0.2–1)
BUN SERPL-MCNC: 18 MG/DL (ref 6–20)
BUN/CREAT SERPL: 23 (ref 12–20)
CALCIUM SERPL-MCNC: 9.2 MG/DL (ref 8.5–10.1)
CHLORIDE SERPL-SCNC: 108 MMOL/L (ref 97–108)
CO2 SERPL-SCNC: 25 MMOL/L (ref 21–32)
CREAT SERPL-MCNC: 0.77 MG/DL (ref 0.55–1.02)
DIFFERENTIAL METHOD BLD: NORMAL
EOSINOPHIL # BLD: 0.4 K/UL (ref 0–0.4)
EOSINOPHIL NFR BLD: 6 % (ref 0–7)
ERYTHROCYTE [DISTWIDTH] IN BLOOD BY AUTOMATED COUNT: 13.7 % (ref 11.5–14.5)
GLOBULIN SER CALC-MCNC: 3 G/DL (ref 2–4)
GLUCOSE SERPL-MCNC: 91 MG/DL (ref 65–100)
HCT VFR BLD AUTO: 46.2 % (ref 35–47)
HETEROPH AB BLD QL IA: NEGATIVE
HGB BLD-MCNC: 14.6 G/DL (ref 11.5–16)
IMM GRANULOCYTES # BLD AUTO: 0 K/UL (ref 0–0.04)
IMM GRANULOCYTES NFR BLD AUTO: 0 % (ref 0–0.5)
LYMPHOCYTES # BLD: 1.4 K/UL (ref 0.8–3.5)
LYMPHOCYTES NFR BLD: 22 % (ref 12–49)
MCH RBC QN AUTO: 31.1 PG (ref 26–34)
MCHC RBC AUTO-ENTMCNC: 31.6 G/DL (ref 30–36.5)
MCV RBC AUTO: 98.5 FL (ref 80–99)
MONOCYTES # BLD: 0.8 K/UL (ref 0–1)
MONOCYTES NFR BLD: 12 % (ref 5–13)
NEUTS SEG # BLD: 3.8 K/UL (ref 1.8–8)
NEUTS SEG NFR BLD: 59 % (ref 32–75)
NRBC # BLD: 0 K/UL (ref 0–0.01)
NRBC BLD-RTO: 0 PER 100 WBC
PLATELET # BLD AUTO: 286 K/UL (ref 150–400)
PMV BLD AUTO: 10.5 FL (ref 8.9–12.9)
POTASSIUM SERPL-SCNC: 4.6 MMOL/L (ref 3.5–5.1)
PROT SERPL-MCNC: 7.2 G/DL (ref 6.4–8.2)
RBC # BLD AUTO: 4.69 M/UL (ref 3.8–5.2)
SODIUM SERPL-SCNC: 138 MMOL/L (ref 136–145)
WBC # BLD AUTO: 6.3 K/UL (ref 3.6–11)

## 2022-03-05 LAB — TSH SERPL-ACNC: 2.95 UIU/ML (ref 0.45–4.5)

## 2022-03-07 ENCOUNTER — HOSPITAL ENCOUNTER (OUTPATIENT)
Dept: GENERAL RADIOLOGY | Age: 67
Discharge: HOME | End: 2022-03-07
Attending: FAMILY MEDICINE
Payer: MEDICARE

## 2022-03-07 ENCOUNTER — TELEPHONE (OUTPATIENT)
Dept: FAMILY MEDICINE CLINIC | Age: 67
End: 2022-03-07

## 2022-03-07 DIAGNOSIS — Z12.31 ENCOUNTER FOR SCREENING MAMMOGRAM FOR MALIGNANT NEOPLASM OF BREAST: Primary | ICD-10-CM

## 2022-03-07 DIAGNOSIS — R59.0 CERVICAL LYMPHADENOPATHY: ICD-10-CM

## 2022-03-07 DIAGNOSIS — J40 BRONCHITIS: ICD-10-CM

## 2022-03-07 PROCEDURE — 71046 X-RAY EXAM CHEST 2 VIEWS: CPT

## 2022-03-07 RX ORDER — DOXYCYCLINE 100 MG/1
100 CAPSULE ORAL 2 TIMES DAILY
Qty: 20 CAPSULE | Refills: 0 | Status: SHIPPED | OUTPATIENT
Start: 2022-03-07 | End: 2022-03-17

## 2022-03-07 NOTE — TELEPHONE ENCOUNTER
Telephone conversation with the patient. Discussed lab results in detail with the patient today. Patient has already started Medrol Dosepak. Advised her to continue and complete Medrol, I will also start doxycycline to empirically treat any infectious causes of lymphadenopathy. Advised patient to get chest x-ray and mammogram.  Also advised patient to make appointment with heme-onc ASAP to get biopsy done. Patient understands and agrees.

## 2022-03-17 ENCOUNTER — OFFICE VISIT (OUTPATIENT)
Dept: ONCOLOGY | Age: 67
End: 2022-03-17
Payer: MEDICARE

## 2022-03-17 VITALS
RESPIRATION RATE: 18 BRPM | TEMPERATURE: 98.2 F | DIASTOLIC BLOOD PRESSURE: 83 MMHG | HEART RATE: 86 BPM | HEIGHT: 63 IN | OXYGEN SATURATION: 95 % | WEIGHT: 208 LBS | SYSTOLIC BLOOD PRESSURE: 159 MMHG | BODY MASS INDEX: 36.86 KG/M2

## 2022-03-17 DIAGNOSIS — F17.200 TOBACCO DEPENDENCE: ICD-10-CM

## 2022-03-17 DIAGNOSIS — R59.0 LYMPHADENOPATHY OF LEFT CERVICAL REGION: ICD-10-CM

## 2022-03-17 DIAGNOSIS — R59.0 LYMPHADENOPATHY OF LEFT CERVICAL REGION: Primary | ICD-10-CM

## 2022-03-17 PROCEDURE — G8427 DOCREV CUR MEDS BY ELIG CLIN: HCPCS | Performed by: INTERNAL MEDICINE

## 2022-03-17 PROCEDURE — 1090F PRES/ABSN URINE INCON ASSESS: CPT | Performed by: INTERNAL MEDICINE

## 2022-03-17 PROCEDURE — G8400 PT W/DXA NO RESULTS DOC: HCPCS | Performed by: INTERNAL MEDICINE

## 2022-03-17 PROCEDURE — 99213 OFFICE O/P EST LOW 20 MIN: CPT | Performed by: INTERNAL MEDICINE

## 2022-03-17 PROCEDURE — G8417 CALC BMI ABV UP PARAM F/U: HCPCS | Performed by: INTERNAL MEDICINE

## 2022-03-17 PROCEDURE — 3017F COLORECTAL CA SCREEN DOC REV: CPT | Performed by: INTERNAL MEDICINE

## 2022-03-17 PROCEDURE — G8536 NO DOC ELDER MAL SCRN: HCPCS | Performed by: INTERNAL MEDICINE

## 2022-03-17 PROCEDURE — 1101F PT FALLS ASSESS-DOCD LE1/YR: CPT | Performed by: INTERNAL MEDICINE

## 2022-03-17 PROCEDURE — G8510 SCR DEP NEG, NO PLAN REQD: HCPCS | Performed by: INTERNAL MEDICINE

## 2022-03-17 PROCEDURE — G9899 SCRN MAM PERF RSLTS DOC: HCPCS | Performed by: INTERNAL MEDICINE

## 2022-03-17 NOTE — LETTER
3/21/2022    Patient: Fernando Myers   YOB: 1955   Date of Visit: 3/17/2022     William James MD  [place] [address]  [place]    Dear William James MD,      Thank you for referring Ms. Fernando Myers to [facility] for evaluation. My notes for this consultation are attached. If you have questions, please do not hesitate to call me. I look forward to following your patient along with you.       Sincerely,    Shimon Moise MD

## 2022-03-17 NOTE — PROGRESS NOTES
1. Have you been to the ER, urgent care clinic since your last visit? Hospitalized since your last visit? New patient    2. Have you seen or consulted any other health care providers outside of the [address] [place] [address] since your last visit? Include any pap smears or colon screening. New patient        Vitals:    03/17/22 1423   BP: (!) 159/83   Pulse: 86   Resp: 18   Temp: 98.2 °F (36.8 °C)   SpO2: 95%   Weight: 208 lb (94.3 kg)   Height: 5' 3\" (1.6 m)           Chief Complaint   Patient presents with    New Patient     New patient here for cervical lymphadenopathy. She reports throat discomfort/soreness, 2/10.

## 2022-03-17 NOTE — PROGRESS NOTES
[facility] at [address], [facility] [address]  W: [phone]  F: [phone] Patient ID  Name: Ariel Velasco  YOB: 1955  MRN: [id number]  Referring Provider:   Raúl Gates MD  [facility],  [address]  Primary Care Provider:   Raúl Gates MD       HEMATOLOGY/MEDICAL ONCOLOGY  NOTE   Date of Visit: 03/17/22  Reason for Evaluation:     Chief Complaint   Patient presents with    New Patient     New patient here for cervical lymphadenopathy. She reports throat discomfort/soreness, 2/10. Subjective:     History of Present Illness:     Ariel Velasco is a 78 y.o. F who presents for a follow-up evaluation for general malaise and neck adenopathy. This is a new problem. Problem has occurred for 4 weeks. Problem has remained stable. Patient reports dealing with \"feeling sick. \" She states that she tried prednisone. She also has had a rx for doxycycline.     -She reports a history of salivary gland surgery in her 30's. -She reports needing a mammogram this year; she reports a history of breast cysts. -She reports that she saw her dentist. She reports that she had panoramic xray's and was told everything was fine.  -She has not had any recent COVID boosters; originally had the 2-dose mRNA vaccine Pfizer in July and August 2021. History reviewed. No pertinent past medical history. Past Surgical History:   Procedure Laterality Date    HX HYSTERECTOMY  2019    HX TONSILLECTOMY     History of Salivary Gland Surgery in 30's for evaluation for dry mouth.      Social History     Tobacco Use    Smoking status: Current Every Day Smoker     Packs/day: 0.50     Types: Cigarettes    Smokeless tobacco: Never Used    Tobacco comment: 0.5-1 pack per day   Substance Use Topics    Alcohol use: Not Currently      Family History   Problem Relation Age of Onset    Heart Disease Mother      Current Outpatient Medications Medication Sig    doxycycline (VIBRAMYCIN) 100 mg capsule Take 1 Capsule by mouth two (2) times a day for 10 days.  methylPREDNISolone (MEDROL DOSEPACK) 4 mg tablet Take as directed on the pack    FLUoxetine (PROzac) 40 mg capsule TAKE 1 CAPSULE BY MOUTH EVERY MORNING    amLODIPine-benazepril (LOTREL) 5-20 mg per capsule TAKE 1 CAPSULE BY MOUTH EVERY MORNING    albuterol (PROVENTIL HFA, VENTOLIN HFA, PROAIR HFA) 90 mcg/actuation inhaler Take 1 Puff by inhalation every four (4) hours as needed for Wheezing. (Patient not taking: Reported on 3/17/2022)    ARIPiprazole (ABILIFY) 2 mg tablet TK 1 T PO AT NIGHT TO AUGMENT DEPRESSION TREATMENT (Patient not taking: Reported on 11/5/2021)     No current facility-administered medications for this visit. No Known Allergies   -  Review of Systems provided by:patient  General: denies fever, denies fatigue, denies appetite loss, reports fever, reports chills, reports night sweats and reports weight gain. Eyes: denies any acute vision loss and denies any eye pain  HEENT: denies epistaxis, denies trouble swallowing and denies oral mucositis  Cardio: denies any chest pain and denies any leg swelling  Resp: denies any hemoptysis. reports exertional shortness of breath. reports cough. reports brownish sputum production. Abdomen: denies any abdominal pain, denies any vomiting, denies any diarrhea, denies any constipation, denies any bloody stools, denies any melena and reports some nausea though relates to antibiotic use. MSK: reports myalgias and reports arthralgias  Skin: denies any rash and denies any itching  Lymph: reports lymph node enlargement nd reports lymph node tenderses. Two left anterior cervical lymph nodes enlarged. Neuro: denies any tremor and reports a history of headaches  : Denies any dysuria. Denies any hematuria. Psych: denies anxiety and reports depression. Denies any suicidla or homicidal ideations.     Objective:     Visit Vitals  BP (!) 159/83   Pulse 86   Temp 98.2 °F (36.8 °C)   Resp 18   Ht 5' 3\" (1.6 m)   Wt 208 lb (94.3 kg)   SpO2 95%   BMI 36.85 kg/m²     ECOG PS: 1- Restricted in physically strenuous activity but ambulatory and able to carry out work of a light or sedentary nature, e.g., light house work, office work. Physical Exam  Constitutional: No acute distress. , Non-toxic appearance. and Non-diaphoretic. HENT: Normocephalic and atraumatic head.  , Wearing a mask during COVID-19 precautions. and with mask removed no visible oral masses. Patient examined with me wearing my N95 mask. Eyes: Normal Conjunctivae. Anicteric sclerae. Cardiovascular: S1,S2 auscultated. No pitting edema. No friction rub. No gallops auscultated. Pulmonary: Normal Respiratory Effort. No wheezing. No rhonchi. No rales. Abdominal: Normal bowel sounds. Soft Abdomen to palpation. No abdominal tenderness. Skin: No jaundice. No rash. No petechiae. Musculoskeletal: No muscle pain on palpation. No temporal muscle wasting on inspection. Lymph: Positive for Left Cervical Adenopathy of two separate nodes that palpate as mofre so fixed about 2 cm lymph nodes without thor pain on palpation. Negative for  right cervical lymphadenopathy. Negative for bilateral supraclavicular lymphadenopathy. Negative for bilateral axillary lymphadenopathy. Negative for bilateral inguinal lymphadenopathy. Neurological: Alert and oriented. No tremor on inspection. Normal Gait. Psychiatric: mood normal. normal speech rate normal affect      Results:     I personally reviewed Epic EHR labs/results below:   Lab Results   Component Value Date/Time    WBC 6.3 03/03/2022 12:47 PM    HGB 14.6 03/03/2022 12:47 PM    HCT 46.2 03/03/2022 12:47 PM    PLATELET 654 04/17/2736 12:47 PM    MCV 98.5 03/03/2022 12:47 PM    ABS.  NEUTROPHILS 3.8 03/03/2022 12:47 PM     Lab Results   Component Value Date/Time    Sodium 138 03/03/2022 12:47 PM    Potassium 4.6 03/03/2022 12:47 PM    Chloride 108 03/03/2022 12:47 PM    CO2 25 03/03/2022 12:47 PM    Glucose 91 03/03/2022 12:47 PM    BUN 18 03/03/2022 12:47 PM    Creatinine 0.77 03/03/2022 12:47 PM    GFR est AA >60 03/03/2022 12:47 PM    GFR est non-AA >60 03/03/2022 12:47 PM    Calcium 9.2 03/03/2022 12:47 PM     Lab Results   Component Value Date/Time    Bilirubin, total 0.5 03/03/2022 12:47 PM    ALT (SGPT) 25 03/03/2022 12:47 PM    Alk. phosphatase 72 03/03/2022 12:47 PM    Protein, total 7.2 03/03/2022 12:47 PM    Albumin 4.2 03/03/2022 12:47 PM    Globulin 3.0 03/03/2022 12:47 PM       I personally reviewed pertinen    Assessment and Recommendations:     1. Lymphadenopathy of left cervical region  Will refer patient to ENT for possible excisional lymph node biopsy. Will order ct chest/abd/ pelvis. Will assess for any tumor lysis with ldh , uric acid.   - LD; Future  - URIC ACID; Future  - PERIPHERAL SMEAR; Future  - CT CHEST ABD PELV W CONT; Future  - REFERRAL TO ENT-OTOLARYNGOLOGY    2. Tobacco dependence  Strongly recommend tobacco cessation. Follow-up and Dispositions    · Return in about 2 weeks (around 3/31/2022).            Oralia Stone MD  Hematology/Medical Oncology Provider  [facility]  P: [phone]    Signed By:   Jessi Padilla MD

## 2022-03-21 PROBLEM — F17.200 TOBACCO DEPENDENCE: Status: ACTIVE | Noted: 2022-03-21

## 2022-03-21 PROBLEM — R59.0 LYMPHADENOPATHY OF LEFT CERVICAL REGION: Status: ACTIVE | Noted: 2022-01-01

## 2022-03-22 ENCOUNTER — TELEPHONE (OUTPATIENT)
Dept: ONCOLOGY | Age: 67
End: 2022-03-22

## 2022-03-22 LAB
LDH SERPL L TO P-CCNC: 165 U/L (ref 81–246)
PERIPHERAL SMEAR,PSM: NORMAL
URATE SERPL-MCNC: 4.4 MG/DL (ref 2.6–6)

## 2022-03-22 NOTE — TELEPHONE ENCOUNTER
Patient called and stated that she got an email stating that her lab results were back but she is unable to see the results. Patient would like a call back to discuss these results.  Please advise     CB# [phone]

## 2022-03-22 NOTE — PROGRESS NOTES
Hi, your labs resulted as normal.Hi, your labs resulted as normal. Have you heard back about the ENT appointment.  Thanks, Dr. Saul Alejandra

## 2022-03-24 ENCOUNTER — HOSPITAL ENCOUNTER (OUTPATIENT)
Dept: CT IMAGING | Age: 67
Discharge: HOME | End: 2022-03-24
Attending: INTERNAL MEDICINE
Payer: MEDICARE

## 2022-03-24 DIAGNOSIS — R59.0 LYMPHADENOPATHY OF LEFT CERVICAL REGION: ICD-10-CM

## 2022-03-24 PROCEDURE — 74011000636 HC RX REV CODE- 636: Performed by: INTERNAL MEDICINE

## 2022-03-24 PROCEDURE — 74177 CT ABD & PELVIS W/CONTRAST: CPT

## 2022-03-24 RX ADMIN — IOPAMIDOL 100 ML: 755 INJECTION, SOLUTION INTRAVENOUS at 17:34

## 2022-03-25 ENCOUNTER — TELEPHONE (OUTPATIENT)
Dept: ONCOLOGY | Age: 67
End: 2022-03-25

## 2022-03-25 NOTE — TELEPHONE ENCOUNTER
[facility] [place]  [phone]    03/25/22 2:43 PM - Called and spoke with the patient. Patient's ID verified x 2. Provided the telephone number to Dr. Ana Bills office, so the patient can call and schedule her ENT appointment. Provided their address information as well. The patient voiced understanding. She inquired about a result note regarding the CT she had done yesterday. She is anxious about what the impression means, so she would like to know before the end of the day, if possible. Advised this nurse would inform Dr. Elbert Fontenot of above and she would receive a result note ASAP. The patient voiced understanding and gratitude for the call. No further questions or concerns.

## 2022-03-25 NOTE — TELEPHONE ENCOUNTER
Patient called and left a voicemail stating she would like a CB in regards to her CT scan. Please advise.    CB#[phone]

## 2022-03-26 NOTE — PROGRESS NOTES
Patient with lymphadenopathy. No severe bulky disease but needs ENT to obtain an excisional lymph biopsy of left neck lymph node. Our office called to follow-up on the referral as the ENT office had yet to schedule it. To my understanding, patient will have an appointment soon so that we can attain a diagnosis to determine what type of treatment may be indicated.

## 2022-03-28 DIAGNOSIS — R59.0 INGUINAL LYMPHADENOPATHY: Primary | ICD-10-CM

## 2022-03-28 DIAGNOSIS — K43.9 VENTRAL HERNIA WITHOUT OBSTRUCTION OR GANGRENE: ICD-10-CM

## 2022-04-07 ENCOUNTER — OFFICE VISIT (OUTPATIENT)
Dept: SURGERY | Age: 67
End: 2022-04-07
Payer: MEDICARE

## 2022-04-07 VITALS
TEMPERATURE: 98 F | DIASTOLIC BLOOD PRESSURE: 80 MMHG | BODY MASS INDEX: 36.25 KG/M2 | HEART RATE: 83 BPM | OXYGEN SATURATION: 95 % | RESPIRATION RATE: 18 BRPM | HEIGHT: 63 IN | SYSTOLIC BLOOD PRESSURE: 133 MMHG | WEIGHT: 204.6 LBS

## 2022-04-07 DIAGNOSIS — R59.1 LYMPHADENOPATHY: Primary | ICD-10-CM

## 2022-04-07 PROCEDURE — G8536 NO DOC ELDER MAL SCRN: HCPCS | Performed by: SURGERY

## 2022-04-07 PROCEDURE — G8400 PT W/DXA NO RESULTS DOC: HCPCS | Performed by: SURGERY

## 2022-04-07 PROCEDURE — G9899 SCRN MAM PERF RSLTS DOC: HCPCS | Performed by: SURGERY

## 2022-04-07 PROCEDURE — 1090F PRES/ABSN URINE INCON ASSESS: CPT | Performed by: SURGERY

## 2022-04-07 PROCEDURE — 99203 OFFICE O/P NEW LOW 30 MIN: CPT | Performed by: SURGERY

## 2022-04-07 PROCEDURE — 3017F COLORECTAL CA SCREEN DOC REV: CPT | Performed by: SURGERY

## 2022-04-07 PROCEDURE — G8427 DOCREV CUR MEDS BY ELIG CLIN: HCPCS | Performed by: SURGERY

## 2022-04-07 PROCEDURE — G8417 CALC BMI ABV UP PARAM F/U: HCPCS | Performed by: SURGERY

## 2022-04-07 PROCEDURE — G8510 SCR DEP NEG, NO PLAN REQD: HCPCS | Performed by: SURGERY

## 2022-04-07 PROCEDURE — 1101F PT FALLS ASSESS-DOCD LE1/YR: CPT | Performed by: SURGERY

## 2022-04-07 NOTE — LETTER
4/7/2022    Patient: Leyla Maloney   YOB: 1955   Date of Visit: 4/7/2022     Gudelia Sanchez MD  [address] [address]  [place], [address] Jamari Martins [address]  [facility]  [place] [place]    Dear MD Ruy Romero MD,      Thank you for referring Ms. Leyla Maloney to [facility] [address] for evaluation. My notes for this consultation are attached. If you have questions, please do not hesitate to call me. I look forward to following your patient along with you.       Sincerely,    Polo Gómez MD

## 2022-04-07 NOTE — PROGRESS NOTES
1. Have you been to the ER, urgent care clinic since your last visit? Hospitalized since your last visit? No    2. Have you seen or consulted any other health care providers outside of the [address] [place] [address] since your last visit? Include any pap smears or colon screening.  No

## 2022-04-07 NOTE — PROGRESS NOTES
[facility] History and Physical    Chief Complaint: Lymphadenopathy    History of Present Illness:      Mayelin Souza is a 78 y.o. female who was kindly referred by Dr. Sharif Santos for lymph node biopsy. The patient states that she developed swollen, enlarged lymph nodes in her neck a couple of months ago. She has had a sore throat and some viruslike symptoms but testing has been negative. She does report fatigue and night sweats. She had imaging including a CT chest abdomen pelvis that shows diffuse areas of abnormal, enlarged lymph nodes worrisome for lymphoma. She is referred for lymph node biopsy for diagnosis. Of note the patient does have a large incisional ventral hernia containing small bowel. This is asymptomatic for her. She is a current 1 pack/day smoker.     Past Medical History:   Diagnosis Date    Anxiety     COPD (chronic obstructive pulmonary disease) ([facility] [address] 2022    emphysema    Depression     Hernia, femoral     since childhood    Hypertension     Joint pain     TMJ arthritis        Past Surgical History:   Procedure Laterality Date    HX CERVICAL FUSION  2012    C5-C6    HX HYSTERECTOMY  2019    HX TONSILLECTOMY         Social History     Socioeconomic History    Marital status:      Spouse name: Not on file    Number of children: Not on file    Years of education: Not on file    Highest education level: Not on file   Occupational History    Not on file   Tobacco Use    Smoking status: Current Every Day Smoker     Packs/day: 1.00     Years: 40.00     Pack years: 40.00     Types: Cigarettes    Smokeless tobacco: Never Used    Tobacco comment: 0.5-1 pack per day   Vaping Use    Vaping Use: Never used   Substance and Sexual Activity    Alcohol use: Not Currently    Drug use: Never    Sexual activity: Not on file   Other Topics Concern    Not on file   Social History Narrative    Not on file     Social Determinants of Health     Financial Resource Strain:     Difficulty of Paying Living Expenses: Not on file   Food Insecurity:     Worried About Running Out of Food in the Last Year: Not on file    Judy of Food in the Last Year: Not on file   Transportation Needs:     Lack of Transportation (Medical): Not on file    Lack of Transportation (Non-Medical): Not on file   Physical Activity:     Days of Exercise per Week: Not on file    Minutes of Exercise per Session: Not on file   Stress:     Feeling of Stress : Not on file   Social Connections:     Frequency of Communication with Friends and Family: Not on file    Frequency of Social Gatherings with Friends and Family: Not on file    Attends [employer] Services: Not on file    Active Member of [address] [place] [address] [employer] or Organizations: Not on file    Attends Club or Organization Meetings: Not on file    Marital Status: Not on file   Intimate Partner Violence:     Fear of Current or Ex-Partner: Not on file    Emotionally Abused: Not on file    Physically Abused: Not on file    Sexually Abused: Not on file   Housing Stability:     Unable to Pay for Housing in the Last Year: Not on file    Number of [place] in the Last Year: Not on file    Unstable Housing in the Last Year: Not on file       Family History   Problem Relation Age of Onset    Heart Disease Mother 46    Heart Surgery Mother     Sudden Death Brother 46         Current Outpatient Medications:     FLUoxetine (PROzac) 40 mg capsule, TAKE 1 CAPSULE BY MOUTH EVERY MORNING, Disp: 90 Capsule, Rfl: 1    amLODIPine-benazepril (LOTREL) 5-20 mg per capsule, TAKE 1 CAPSULE BY MOUTH EVERY MORNING, Disp: 90 Capsule, Rfl: 1    methylPREDNISolone (MEDROL DOSEPACK) 4 mg tablet, Take as directed on the pack, Disp: 1 Dose Pack, Rfl: 0    albuterol (PROVENTIL HFA, VENTOLIN HFA, PROAIR HFA) 90 mcg/actuation inhaler, Take 1 Puff by inhalation every four (4) hours as needed for Wheezing.  (Patient not taking: Reported on 3/17/2022), Disp: 18 g, Rfl: 0    ARIPiprazole (ABILIFY) 2 mg tablet, TK 1 T PO AT NIGHT TO AUGMENT DEPRESSION TREATMENT (Patient not taking: Reported on 11/5/2021), Disp: 90 Tab, Rfl: 1    No Known Allergies    ROS   Constitutional: Fatigue, night sweats  Ears, Nose, Mouth, Throat, and Face: Negative  Respiratory: Negative  Cardiovascular: Negative  Gastrointestinal: Negative  Genitourinary: Negative  Integument/Breast: Negative  Hematologic/Lymphatic: Negative  Behavioral/Psychiatric: Negative  Allergic/Immunologic: Negative      Physical Exam:     Visit Vitals  /80 (BP 1 Location: Left upper arm, BP Patient Position: Sitting, BP Cuff Size: Adult long)   Pulse 83   Temp 98 °F (36.7 °C) (Oral)   Resp 18   Ht 5' 3\" (1.6 m)   Wt 204 lb 9.6 oz (92.8 kg)   SpO2 95%   BMI 36.24 kg/m²       General - alert and oriented, no apparent distress  HEENT - NC/AT. No scleral icterus. Multiple enlarged and firm cervical nodes on the left, predominantly in the posterior triangle of the neck. Pulm - CTAB, normal inspiratory effort  CV - RRR, no M/R/G  Abd - soft, NT, ND. Lower midline incision with large ventral hernia. Ext - warm, well perfused, no edema  Lymphatics -multiple large, firm cervical lymph nodes on the left in the posterior neck. No obvious supraclavicular nodes. No palpable axillary nodes bilaterally. Faintly palpable left inguinal lymph node. No right inguinal lymphadenopathy.   Skin - supple, no rashes  Psychiatric - normal affect, good mood    Labs  Lab Results   Component Value Date/Time    WBC 6.3 03/03/2022 12:47 PM    HGB 14.6 03/03/2022 12:47 PM    HCT 46.2 03/03/2022 12:47 PM    PLATELET 590 76/99/5963 12:47 PM    MCV 98.5 03/03/2022 12:47 PM     Lab Results   Component Value Date/Time    Sodium 138 03/03/2022 12:47 PM    Potassium 4.6 03/03/2022 12:47 PM    Chloride 108 03/03/2022 12:47 PM    CO2 25 03/03/2022 12:47 PM    Anion gap 5 03/03/2022 12:47 PM    Glucose 91 03/03/2022 12:47 PM    BUN 18 03/03/2022 12:47 PM Creatinine 0.77 03/03/2022 12:47 PM    BUN/Creatinine ratio 23 (H) 03/03/2022 12:47 PM    GFR est AA >60 03/03/2022 12:47 PM    GFR est non-AA >60 03/03/2022 12:47 PM    Calcium 9.2 03/03/2022 12:47 PM    Bilirubin, total 0.5 03/03/2022 12:47 PM    Alk. phosphatase 72 03/03/2022 12:47 PM    Protein, total 7.2 03/03/2022 12:47 PM    Albumin 4.2 03/03/2022 12:47 PM    Globulin 3.0 03/03/2022 12:47 PM    A-G Ratio 1.4 03/03/2022 12:47 PM    ALT (SGPT) 25 03/03/2022 12:47 PM    AST (SGOT) 16 03/03/2022 12:47 PM         Imaging  CT Results (most recent):  Results from Hospital Encounter encounter on 03/24/22    CT CHEST ABD PELV W CONT    Narrative  EXAM: CT CHEST ABD PELV W CONT    INDICATION: Lymphadenopathy    COMPARISON: No comparisons    IV CONTRAST: 100 mL of Isovue-370. ORAL CONTRAST: Oral contrast was administered to better evaluate the bowel. TECHNIQUE:  Following the uneventful intravenous administration of contrast, thin axial  images were obtained through the chest, abdomen and pelvis. Coronal and sagittal  reformats were generated. CT dose reduction was achieved through use of a  standardized protocol tailored for this examination and automatic exposure  control for dose modulation. FINDINGS:    CHEST WALL: No mass or axillary lymphadenopathy. THYROID: No nodule. MEDIASTINUM: No mass or lymphadenopathy. ELIZABETH: No mass or lymphadenopathy. THORACIC AORTA: No dissection or aneurysm. MAIN PULMONARY ARTERY: Normal in caliber. TRACHEA/BRONCHI: Patent. ESOPHAGUS: No wall thickening or dilatation. HEART: Normal in size. PLEURA: No effusion or pneumothorax. LUNGS: Small groundglass opacity along the left major fissure left lower lobe  series 3 image 21. LIVER: No mass. BILIARY TREE: Cholecystectomy CBD is not dilated. SPLEEN: within normal limits. PANCREAS: No mass or ductal dilatation. ADRENALS: Unremarkable.   KIDNEYS: 2.1 x 2.5 cm possibly enhancing mass within the central portion of the  right kidney with mild right-sided hydronephrosis. This may be within the right  renal pelvis. There is a second possibly enhancing lesion in the right kidney  measuring 2.4 x 1.6 cm posteriorly. STOMACH: Unremarkable. SMALL BOWEL: Right-sided ventral hernia containing loops of small bowel without  evidence of obstruction  COLON: No dilatation or wall thickening. APPENDIX: Not visualized  PERITONEUM: Multiple prominent mesenteric lymph nodes. The largest measures 22 x  10 mm. RETROPERITONEUM: Enlarged gastrohepatic ligament lymph node measuring 4.2 x 2.5  cm. Prominent retroperitoneal lymph nodes the largest of which measures 1.4 x  0.9 cm posterior to the IVC. 2.2 x 0.9 cm asymmetric left inguinal lymph node. REPRODUCTIVE ORGANS: Unremarkable. Hysterectomy  URINARY BLADDER: No mass or calculus. BONES: No destructive bone lesion. ABDOMINAL WALL: Ventral hernia as described. Otherwise unremarkable  ADDITIONAL COMMENTS: N/A    Impression  1. Large gastrohepatic ligament lymph node measuring 4.2 x 2.5 cm. Multiple  mildly enlarged mesenteric lymph nodes. Borderline to mildly enlarged  retroperitoneal lymph nodes. Lymphoma is the primary differential possibility. These are not accessible by percutaneous biopsy but may be accessible by  endoscopic ultrasound. 2.  Central right renal mass with second posterior right renal mass. Mild  right-sided hydronephrosis. Although these may represent uroepithelial or renal  cell carcinoma is, lymphoma should be considered as well. 3.  Borderline size left inguinal lymph node. This may be accessible for  percutaneous ultrasound-guided biopsy although is borderline size for pathology  may be reactive. 4.  Ground glass opacity superior segment left lower lobe. Attention on  follow-up imaging.     5.  Ventral hernia containing loops of small bowel without evidence of  obstruction        I have reviewed and agree with all of the pertinent images    Assessment: Semaj High is a 78 y.o. female with diffuse lymphadenopathy suspicious for lymphoma. She also has a large ventral incisional hernia. Recommendations:     1. I recommended we proceed with excisional lymph node biopsy of one of the left cervical neck nodes. This would have a lower infection risk than the left inguinal node which is also difficult to palpate and borderline abnormal on imaging. I discussed the ventral hernia with the patient but would recommend this be addressed after her lymphadenopathy is fully addressed. Her hernia is beyond the scope of my practice and I would recommend she see one of my partners who specializes in complex hernia repairs. She will likely need to stop smoking before considering definitive hernia repair due to increased risks of infection, recurrence and other complications. I will set her up for excisional lymph node biopsy in the near future. 40 mins of time was spent with the patient of which > 50% of the time involved face-to-face counseling of the patient regarding the proposed treatment plan.       Savanna Schmidt MD  4/7/2022    CC: Sedalia Kempf, MD Dr. Ellard Lefort

## 2022-04-11 ENCOUNTER — HOSPITAL ENCOUNTER (OUTPATIENT)
Dept: PREADMISSION TESTING | Age: 67
Discharge: HOME | End: 2022-04-11

## 2022-04-11 VITALS
BODY MASS INDEX: 35.66 KG/M2 | HEART RATE: 80 BPM | WEIGHT: 201.28 LBS | DIASTOLIC BLOOD PRESSURE: 81 MMHG | TEMPERATURE: 97.9 F | HEIGHT: 63 IN | SYSTOLIC BLOOD PRESSURE: 138 MMHG

## 2022-04-11 NOTE — PERIOP NOTES
[address] INSTRUCTIONS    Surgery Date:   04/22/2022    [facility] staff will call you between 4 PM- 8 PM the day before surgery with your arrival time. If your surgery is on a Monday, we will call you the preceding Friday. Please call [phone] after 8 PM if you did not receive your arrival time. 1. Please report to [facility] Patient Access/Admitting on the 1st floor. Bring your insurance card, photo identification, and any copayment ( if applicable). 2. If you are going home the same day of your surgery, you must have a responsible adult to drive you home. You need to have a responsible adult to stay with you the first 24 hours after surgery and you should not drive a car for 24 hours following your surgery. 3. Do NOT eat any solid foods after midnight the night before surgery including candy, mint or gum. You may drink clear liquids from midnight until 1 hour prior to your arrival. You may drink up to 12 ounces at one time every 4 hours. Please note special instructions, if applicable, below for medications. 4. Do NOT drink alcohol or smoke 24 hours before surgery. STOP smoking for 14 days prior as it helps with breathing and healing after surgery. 5. If you are being admitted to the hospital, please leave personal belongings/luggage in your car until you have an assigned hospital room number. 6. Please wear comfortable clothes. Wear your glasses instead of contacts. We ask that all money, jewelry and valuables be left at home. Wear no make up, particularly mascara, the day of surgery. 7.  All body piercings, rings, and jewelry need to be removed and left at home. Please remove any nail polish or artifical nails from your fingernails. Please wear your hair loose or down. Please no pony-tails, buns, or any metal hair accessories. If you shower the morning of surgery, please do not apply any lotions or powders afterwards. You may wear deodorant, unless having breast surgery.   Do not shave any body area within 24 hours of your surgery. 8. Please follow all instructions to avoid any potential surgical cancellation. 9. Should your physical condition change, (i.e. fever, cold, flu, etc.) please notify your surgeon as soon as possible. 10. It is important to be on time. If a situation occurs where you may be delayed, please call:  [phone] / [phone] on the day of surgery. 11. The Preadmission Testing staff can be reached at [phone]. 12. Special instructions: N/A      Current Outpatient Medications   Medication Sig    FLUoxetine (PROzac) 40 mg capsule TAKE 1 CAPSULE BY MOUTH EVERY MORNING (Patient taking differently: Take 40 mg by mouth every morning. TAKE 1 CAPSULE BY MOUTH EVERY MORNING)    amLODIPine-benazepril (LOTREL) 5-20 mg per capsule TAKE 1 CAPSULE BY MOUTH EVERY MORNING (Patient taking differently: Take 1 Capsule by mouth every morning. TAKE 1 CAPSULE BY MOUTH EVERY MORNING)     No current facility-administered medications for this encounter. 1. YOU MUST ONLY TAKE THESE MEDICATIONS THE MORNING OF SURGERY WITH A SIP OF WATER: FLUOXETINE  2. MEDICATIONS TO TAKE THE MORNING OF SURGERY ONLY IF NEEDED: N/A  3. HOLD these prescription medications BEFORE Surgery: N/A  4. Ask your surgeon/prescribing physician about when/if to STOP taking these medications: N/A  5. Stop all vitamins, herbal medicines and Aspirin containing products 7 days prior to surgery. Stop any non-steroidal anti-inflammatory drugs (i.e. Ibuprofen, Naproxen, Advil, Aleve) 3 days before surgery. You may take Tylenol. 6. If you are currently taking Plavix, Coumadin,or any other blood-thinning/anticoagulant medication contact your prescribing physician for instructions. Eating and Drinking Before Surgery     You may eat a regular dinner at the usual time on the day before your surgery.    Do NOT eat any solid foods after midnight unless your arrival time at the hospital is 3pm or later.  Sonali Dick may drink clear liquids only from 12 midnight until 1 hours prior to your arrival time at the hospital on the day of your surgery. Do NOT drink alcohol.  Clear liquids include:  o Water  o Fruit juices without pulp( i.e. apple juice)  o Carbonated beverages  o Black coffee (no cream/milk)  o Tea (no cream/milk)  o Gatorade   You may drink up to 12-16 ounces at one time every 4 hours between the hours of midnight and 1 hour before your arrival time at the hospital. Example- if your arrival time at the hospital is 6am, you may drink 12-16 ounces of clear liquids no later than 5am.   If your arrival time at the hospital is 3pm or later, you may eat a light breakfast before 8am.   A light breakfast includes:  o Toast or bagel (no butter)  o Black coffee (no cream/milk)  o Tea (no cream/milk)  o Fruit juices without pulp ( i.e. apple juice)  o Do NOT eat meat, eggs, vegetables or fruit   If you have any questions, please contact your surgeon's office. Preventing Infections Before and After - Your Surgery    IMPORTANT INSTRUCTIONS    You play an important role in your health and preparation for surgery. To reduce the germs on your skin you will need to shower with CHG soap (Chorhexidine gluconate 4%) two times before surgery. CHG soap (Hibiclens, Hex-A-Clens or store brand)   CHG soap will be provided at your Preadmission Testing (PAT) appointment.  If you do not have a PAT appointment before surgery, you may arrange to  CHG soap from our office or purchase CHG soap at a pharmacy, grocery or department store.  You need to purchase TWO 4 ounce bottles to use for your 2 showers. Steps to follow:  1. Wash your hair with your normal shampoo and your body with regular soap and rinse well to remove shampoo and soap from your skin. 2. Wet a clean washcloth and turn off the shower. 3. Put CHG soap on washcloth and apply to your entire body from the neck down.  Do not use on your head, face or private parts(genitals). Do not use CHG soap on open sores, wounds or areas of skin irritation. 4. Wash you body gently for 5 minutes. Do not wash your skin too hard. This soap does not create lather. Pay special attention to your underarms and from your belly button to your feet. 5. Turn the shower back on and rinse well to get CHG soap off your body. 6. Pat your skin dry with a clean, dry towel. Do not apply lotions or moisturizer. 7. Put on clean clothes and sleep on fresh bed sheets and do not allow pets to sleep with you. Shower with CHG soap 2 times before your surgery   The evening before your surgery   The morning of your surgery      Tips to help prevent infections after your surgery:  1. Protect your surgical wound from germs:  ? Hand washing is the most important thing you and your caregivers can do to prevent infections. ? Keep your bandage clean and dry! ? Do not touch your surgical wound. 2. Use clean, freshly washed towels and washcloths every time you shower; do not share bath linens with others. 3. Until your surgical wound is healed, wear clothing and sleep on bed linens each day that are clean and freshly washed. 4. Do not allow pets to sleep in your bed with you or touch your surgical wound. 5. Do not smoke - smoking delays wound healing. This may be a good time to stop smoking. 6. If you have diabetes, it is important for you to manage your blood sugar levels properly before your surgery as well as after your surgery. Poorly managed blood sugar levels slow down wound healing and prevent you from healing completely. Patient Information Regarding COVID Restrictions      Day of Procedure     Please park in the parking deck or any designated visitor parking lot.    Enter the facility through the [facility] of the [facility].   On the day of surgery, please provide the cell phone number of the person who will be waiting for you to the Patient Access representative at the time of registration.  Please wear a mask on the day of your procedure.  We are now allowing two designated visitors per stay. Pediatric patients may have 2 designated visitors. These two people may come in with you on the day of your procedure.  No visitors under the age of 13.  The designated visitor must also wear a mask.  Once your procedure and the immediate recovery period is completed, a nurse in the recovery area will contact your designated visitor to inform them of your room number or to otherwise review other pertinent information regarding your care.  Social distancing practices are to be adhered to in waiting areas and the cafeteria. The patient was contacted  in person. She verbalized understanding of all instructions does not  need reinforcement.

## 2022-04-12 ENCOUNTER — TELEPHONE (OUTPATIENT)
Dept: SURGERY | Age: 67
End: 2022-04-12

## 2022-04-12 NOTE — TELEPHONE ENCOUNTER
Mikel Newton from anesthesia called requesting to speak with Dr. Vonnie Aquino nurse.  States she received orders from Dr. Tatiana Morillo after business hours and after the pt has already been seen, she wants to know if the order that Dr. Tatiana Morillo sent can wait until the day of surgery of if the pt has to come back in . requesting a call back at [phone]

## 2022-04-13 ENCOUNTER — VIRTUAL VISIT (OUTPATIENT)
Dept: FAMILY MEDICINE CLINIC | Age: 67
End: 2022-04-13
Payer: MEDICARE

## 2022-04-13 DIAGNOSIS — M79.671 PAIN IN BOTH FEET: Primary | ICD-10-CM

## 2022-04-13 DIAGNOSIS — M79.672 PAIN IN BOTH FEET: Primary | ICD-10-CM

## 2022-04-13 PROCEDURE — 1101F PT FALLS ASSESS-DOCD LE1/YR: CPT | Performed by: FAMILY MEDICINE

## 2022-04-13 PROCEDURE — 99213 OFFICE O/P EST LOW 20 MIN: CPT | Performed by: FAMILY MEDICINE

## 2022-04-13 PROCEDURE — 1090F PRES/ABSN URINE INCON ASSESS: CPT | Performed by: FAMILY MEDICINE

## 2022-04-13 PROCEDURE — G9899 SCRN MAM PERF RSLTS DOC: HCPCS | Performed by: FAMILY MEDICINE

## 2022-04-13 PROCEDURE — G8400 PT W/DXA NO RESULTS DOC: HCPCS | Performed by: FAMILY MEDICINE

## 2022-04-13 PROCEDURE — G8427 DOCREV CUR MEDS BY ELIG CLIN: HCPCS | Performed by: FAMILY MEDICINE

## 2022-04-13 PROCEDURE — G8432 DEP SCR NOT DOC, RNG: HCPCS | Performed by: FAMILY MEDICINE

## 2022-04-13 PROCEDURE — 3017F COLORECTAL CA SCREEN DOC REV: CPT | Performed by: FAMILY MEDICINE

## 2022-04-13 RX ORDER — GABAPENTIN 300 MG/1
300 CAPSULE ORAL
Qty: 20 CAPSULE | Refills: 0 | Status: ON HOLD | OUTPATIENT
Start: 2022-04-13 | End: 2022-08-18

## 2022-04-13 RX ORDER — DICLOFENAC SODIUM 50 MG/1
50 TABLET, DELAYED RELEASE ORAL
Qty: 60 TABLET | Refills: 0 | Status: SHIPPED
Start: 2022-04-13 | End: 2022-08-22

## 2022-04-13 NOTE — PROGRESS NOTES
Patient stated name &     Chief Complaint   Patient presents with    Joint Pain     Bilateral feet pain     History of this     History of gout    Tried Ibuprofen        Health Maintenance Due   Topic    Hepatitis C Screening     Pneumococcal 65+ years (1 - PCV)    DTaP/Tdap/Td series (1 - Tdap)    Lipid Screen     Colorectal Cancer Screening Combo     Shingrix Vaccine Age 50> (1 of 2)    Low dose CT lung screening     Breast Cancer Screen Mammogram     Bone Densitometry (Dexa) Screening     Medicare Yearly Exam     COVID-19 Vaccine (3 - Booster for Pfizer series)       Wt Readings from Last 3 Encounters:   22 201 lb 4.5 oz (91.3 kg)   22 204 lb 9.6 oz (92.8 kg)   22 208 lb (94.3 kg)     Temp Readings from Last 3 Encounters:   22 97.9 °F (36.6 °C)   22 98 °F (36.7 °C) (Oral)   22 98.2 °F (36.8 °C)     BP Readings from Last 3 Encounters:   22 138/81   22 133/80   22 (!) 159/83     Pulse Readings from Last 3 Encounters:   22 80   22 83   22 86         Learning Assessment:  :     Learning Assessment 2022   PRIMARY LEARNER Patient   HIGHEST LEVEL OF EDUCATION - PRIMARY LEARNER  GRADUATED HIGH SCHOOL OR GED   BARRIERS PRIMARY LEARNER NONE   PRIMARY LANGUAGE ENGLISH   LEARNER PREFERENCE PRIMARY LISTENING   ANSWERED BY Patient   RELATIONSHIP SELF       Depression Screening:  :     3 most recent PHQ Screens 2022   Little interest or pleasure in doing things Several days   Feeling down, depressed, irritable, or hopeless Several days   Total Score PHQ 2 2   Trouble falling or staying asleep, or sleeping too much -   Feeling tired or having little energy -   Poor appetite, weight loss, or overeating -   Feeling bad about yourself - or that you are a failure or have let yourself or your family down -   Trouble concentrating on things such as school, work, reading, or watching TV -   Moving or speaking so slowly that other people could have noticed; or the opposite being so fidgety that others notice -   Thoughts of being better off dead, or hurting yourself in some way -   PHQ 9 Score -   How difficult have these problems made it for you to do your work, take care of your home and get along with others -       Fall Risk Assessment:  :     Fall Risk Assessment, last 12 mths 4/7/2022   Able to walk? Yes   Fall in past 12 months? 0   Do you feel unsteady? 0   Are you worried about falling 0   Is TUG test greater than 12 seconds? -   Is the gait abnormal? -       Abuse Screening:  :     Abuse Screening Questionnaire 11/5/2021   Do you ever feel afraid of your partner? N   Are you in a relationship with someone who physically or mentally threatens you? N   Is it safe for you to go home? Y       Coordination of Care Questionnaire:  :     1) Have you been to an emergency room, urgent care clinic since your last visit? no   Hospitalized since your last visit? no             2) Have you seen or consulted any other health care providers outside of [address] [place] [address] since your last visit? no  (Include any pap smears or colon screenings in this section.)    3) Do you have an Advance Directive on file? no  Are you interested in receiving information about Advance Directives? no    Patient is accompanied by self I have received verbal consent from Severo Raven to discuss any/all medical information while they are present in the room.

## 2022-04-13 NOTE — PROGRESS NOTES
Consent: Jacky Gaytan, who was seen by synchronous (real-time) audio-video technology, and/or her healthcare decision maker, is aware that this patient-initiated, Telehealth encounter on 4/13/2022 is a billable service, with coverage as determined by her insurance carrier. She is aware that she may receive a bill and has provided verbal consent to proceed: Yes. Assessment & Plan:   Diagnoses and all orders for this visit:    1. Pain in both feet  -     XR FOOT RT MIN 3 V; Future  -     XR FOOT LT MIN 3 V; Future  -     gabapentin (NEURONTIN) 300 mg capsule; Take 1 Capsule by mouth daily as needed for Pain.  -     diclofenac EC (VOLTAREN) 50 mg EC tablet; Take 1 Tablet by mouth two (2) times daily as needed for Pain. Follow-up and Dispositions    · Return if symptoms worsen or fail to improve. I ADVISED PATIENT TO GO TO ER IF SYMPTOMS WORSEN , CHANGE OR FAILS TO IMPROVE. I spent at least 15 minutes with this established patient, and >50% of the time was spent counseling and/or coordinating care regarding SEE BELOW  712  Subjective:   Jacky Gaytan is a 78 y.o. 1955 female  established patient, who was seen for Joint Pain (Bilateral feet pain     History of this     History of gout    Tried Ibuprofen)          1. Pain in both feet  Patient reports pain in the dorsal surface of bilateral feet. He denies any trauma fall or injury. This started a few days ago. She denies any swelling or redness. I discussed we will check x-rays. Most likely osteoarthritis. I will start NSAIDs. Will give a few gabapentin for pain control.  reviewed today. Prior to Admission medications    Medication Sig Start Date End Date Taking? Authorizing Provider   gabapentin (NEURONTIN) 300 mg capsule Take 1 Capsule by mouth daily as needed for Pain. 4/13/22  Yes Chloé Lagunas MD   diclofenac EC (VOLTAREN) 50 mg EC tablet Take 1 Tablet by mouth two (2) times daily as needed for Pain.  4/13/22  Yes Radha Stearns MD   FLUoxetine (PROzac) 40 mg capsule TAKE 1 CAPSULE BY MOUTH EVERY MORNING  Patient taking differently: Take 40 mg by mouth every morning. TAKE 1 CAPSULE BY MOUTH EVERY MORNING 10/11/21  Yes Hunter Mayer MD   amLODIPine-benazepril (LOTREL) 5-20 mg per capsule TAKE 1 [id number]  Patient taking differently: Take 1 Capsule by mouth every morning. TAKE 1 CAPSULE BY MOUTH EVERY MORNING 10/11/21  Yes Hunter Mayer MD     No Known Allergies    Patient Active Problem List   Diagnosis Code    Lymphadenopathy of left cervical region R59.0    Tobacco dependence F17.200     Patient Active Problem List    Diagnosis Date Noted    Lymphadenopathy of left cervical region 03/21/2022    Tobacco dependence 03/21/2022     Current Outpatient Medications   Medication Sig Dispense Refill    gabapentin (NEURONTIN) 300 mg capsule Take 1 Capsule by mouth daily as needed for Pain. 20 Capsule 0    diclofenac EC (VOLTAREN) 50 mg EC tablet Take 1 Tablet by mouth two (2) times daily as needed for Pain. 60 Tablet 0    FLUoxetine (PROzac) 40 mg capsule TAKE 1 CAPSULE BY MOUTH EVERY MORNING (Patient taking differently: Take 40 mg by mouth every morning. TAKE 1 CAPSULE BY MOUTH EVERY MORNING) 90 Capsule 1    amLODIPine-benazepril (LOTREL) 5-20 mg per capsule TAKE 1 CAPSULE BY MOUTH EVERY MORNING (Patient taking differently: Take 1 Capsule by mouth every morning.  TAKE 1 CAPSULE BY MOUTH EVERY MORNING) 90 Capsule 1     No Known Allergies  Past Medical History:   Diagnosis Date    Adverse effect of anesthesia     PHLEGM IN THROAT POST OP & NEEDED X2 BREATHING TREATMENTS    Anxiety     [facility]    emphysema    Depression     GERD (gastroesophageal reflux disease)     Hernia, femoral     since childhood    Hypertension     Joint pain     Nausea & vomiting     TMJ arthritis      Past Surgical History:   Procedure Laterality Date    HX CERVICAL FUSION 2012    C5-C6    HX CHOLECYSTECTOMY  2020    HX COLONOSCOPY      HX ENDOSCOPY      HX HYSTERECTOMY  2019    HX TONSILLECTOMY      AS A CHILD    HX WISDOM TEETH EXTRACTION      TEENAGER     Family History   Problem Relation Age of Onset    Heart Disease Mother 46    Heart Surgery Mother         HEART TRANSPLANT    Elevated Lipids Mother     Hypertension Mother     COPD Father     Emphysema Father     Sudden Death Brother 46    Other Maternal Grandmother         BRAIN TUMOR    No Known Problems Son     No Known Problems Daughter      Social History     Tobacco Use    Smoking status: Current Every Day Smoker     Packs/day: 1.00     Years: 40.00     Pack years: 40.00     Types: Cigarettes    Smokeless tobacco: Never Used    Tobacco comment: 0.5-1 pack per day   Substance Use Topics    Alcohol use: Not Currently     Alcohol/week: 0.0 standard drinks       Review of Systems   Constitutional: Negative. HENT: Negative. Eyes: Negative. Respiratory: Negative. Cardiovascular: Negative. Gastrointestinal: Negative. Genitourinary: Negative. Musculoskeletal: Positive for joint pain. Skin: Negative. Neurological: Negative. Endo/Heme/Allergies: Negative. Psychiatric/Behavioral: Negative.             Objective:     Patient-Reported Vitals 4/13/2022   Patient-Reported Weight 205   Patient-Reported Height 5'3        [INSTRUCTIONS:  \"[x]\" Indicates a positive item  \"[]\" Indicates a negative item  -- DELETE ALL ITEMS NOT EXAMINED]    Constitutional: [x] Appears well-developed and well-nourished [x] No apparent distress      [] Abnormal -     Mental status: [x] Alert and awake  [x] Oriented to person/place/time [x] Able to follow commands    [] Abnormal -     Eyes:   EOM    [x]  Normal    [] Abnormal -   Sclera  [x]  Normal    [] Abnormal -          Discharge [x]  None visible   [] Abnormal -     HENT: [x] Normocephalic, atraumatic  [] Abnormal -   [x] Mouth/Throat: Mucous membranes are moist    External Ears [x] Normal  [] Abnormal -    Neck: [x] No visualized mass [] Abnormal -     Pulmonary/Chest: [x] Respiratory effort normal   [x] No visualized signs of difficulty breathing or respiratory distress        [] Abnormal -      Musculoskeletal:   [x] Normal gait with no signs of ataxia         [x] Normal range of motion of neck        [] Abnormal -     Neurological:        [x] No Facial Asymmetry (Cranial nerve 7 motor function) (limited exam due to video visit)          [x] No gaze palsy        [] Abnormal -          Skin:        [x] No significant exanthematous lesions or discoloration noted on facial skin         [] Abnormal -            Psychiatric:       [x] Normal Affect [] Abnormal -        [x] No Hallucinations    Other pertinent observable physical exam findings:-    We discussed the expected course, resolution and complications of the diagnosis(es) in detail. Medication risks, benefits, costs, interactions, and alternatives were discussed as indicated. I advised her to contact the office if her condition worsens, changes or fails to improve as anticipated. She expressed understanding with the diagnosis(es) and plan. Verdis Olszewski is a 78 y.o. female  is being evaluated by a Virtual Visit (video visit) encounter to address concerns as mentioned above. A caregiver was present when appropriate. Due to this being a TeleHealth encounter (During KBPXY-15 public health emergency), evaluation of the following organ systems was limited: Vitals/Constitutional/EENT/Resp/CV/GI//MS/Neuro/Skin/Heme-Lymph-Imm. Pursuant to the emergency declaration under the [address] [place] [address] and the [employer] Act, this Virtual Visit was conducted with patient's (and/or legal guardian's) consent, to reduce the patient's risk of exposure to COVID-19 and provide necessary medical care.   The patient (and/or legal guardian) has also been advised to contact this office for worsening conditions or problems, and seek emergency medical treatment and/or call 911 if deemed necessary. Patient identification was verified at the start of the visit: YES    Services were provided through a video synchronous discussion virtually to substitute for in-person clinic visit. Patient was located at their homes. Provider located in Office    An electronic signature was used to authenticate this note.       Perla Deleon MD

## 2022-04-22 ENCOUNTER — HOSPITAL ENCOUNTER (OUTPATIENT)
Age: 67
Setting detail: OUTPATIENT SURGERY
Discharge: HOME | End: 2022-04-22
Attending: SURGERY | Admitting: SURGERY
Payer: MEDICARE

## 2022-04-22 ENCOUNTER — ANESTHESIA EVENT (OUTPATIENT)
Dept: SURGERY | Age: 67
End: 2022-04-22
Payer: MEDICARE

## 2022-04-22 ENCOUNTER — ANESTHESIA (OUTPATIENT)
Dept: SURGERY | Age: 67
End: 2022-04-22
Payer: MEDICARE

## 2022-04-22 VITALS
DIASTOLIC BLOOD PRESSURE: 67 MMHG | SYSTOLIC BLOOD PRESSURE: 126 MMHG | RESPIRATION RATE: 16 BRPM | WEIGHT: 201 LBS | OXYGEN SATURATION: 92 % | HEART RATE: 67 BPM | TEMPERATURE: 98.5 F | BODY MASS INDEX: 35.61 KG/M2

## 2022-04-22 DIAGNOSIS — R59.0 LYMPHADENOPATHY OF LEFT CERVICAL REGION: Primary | ICD-10-CM

## 2022-04-22 PROCEDURE — 74011000250 HC RX REV CODE- 250: Performed by: SURGERY

## 2022-04-22 PROCEDURE — 76210000063 HC OR PH I REC FIRST 0.5 HR: Performed by: SURGERY

## 2022-04-22 PROCEDURE — 74011250636 HC RX REV CODE- 250/636: Performed by: NURSE ANESTHETIST, CERTIFIED REGISTERED

## 2022-04-22 PROCEDURE — 88333 PATH CONSLTJ SURG CYTO XM 1: CPT

## 2022-04-22 PROCEDURE — 88374 M/PHMTRC ALYS ISHQUANT/SEMIQ: CPT

## 2022-04-22 PROCEDURE — 77030026438 HC STYL ET INTUB CARD -A: Performed by: ANESTHESIOLOGY

## 2022-04-22 PROCEDURE — 77030034626 HC LIGASURE SM JAW SEAL OPN SURG COVD -E: Performed by: SURGERY

## 2022-04-22 PROCEDURE — 74011250637 HC RX REV CODE- 250/637: Performed by: ANESTHESIOLOGY

## 2022-04-22 PROCEDURE — 77030031139 HC SUT VCRL2 J&J -A: Performed by: SURGERY

## 2022-04-22 PROCEDURE — 88341 IMHCHEM/IMCYTCHM EA ADD ANTB: CPT

## 2022-04-22 PROCEDURE — 88360 TUMOR IMMUNOHISTOCHEM/MANUAL: CPT

## 2022-04-22 PROCEDURE — 76060000033 HC ANESTHESIA 1 TO 1.5 HR: Performed by: SURGERY

## 2022-04-22 PROCEDURE — 88305 TISSUE EXAM BY PATHOLOGIST: CPT

## 2022-04-22 PROCEDURE — 74011000250 HC RX REV CODE- 250: Performed by: NURSE ANESTHETIST, CERTIFIED REGISTERED

## 2022-04-22 PROCEDURE — 88365 INSITU HYBRIDIZATION (FISH): CPT

## 2022-04-22 PROCEDURE — 88342 IMHCHEM/IMCYTCHM 1ST ANTB: CPT

## 2022-04-22 PROCEDURE — 77030002933 HC SUT MCRYL J&J -A: Performed by: SURGERY

## 2022-04-22 PROCEDURE — 74011250636 HC RX REV CODE- 250/636: Performed by: ANESTHESIOLOGY

## 2022-04-22 PROCEDURE — 2709999900 HC NON-CHARGEABLE SUPPLY: Performed by: SURGERY

## 2022-04-22 PROCEDURE — 77030008684 HC TU ET CUF COVD -B: Performed by: ANESTHESIOLOGY

## 2022-04-22 PROCEDURE — 38510 BIOPSY/REMOVAL LYMPH NODES: CPT | Performed by: SURGERY

## 2022-04-22 PROCEDURE — 88185 FLOWCYTOMETRY/TC ADD-ON: CPT

## 2022-04-22 PROCEDURE — 76010000149 HC OR TIME 1 TO 1.5 HR: Performed by: SURGERY

## 2022-04-22 PROCEDURE — 76210000021 HC REC RM PH II 0.5 TO 1 HR: Performed by: SURGERY

## 2022-04-22 PROCEDURE — 88184 FLOWCYTOMETRY/ TC 1 MARKER: CPT

## 2022-04-22 PROCEDURE — 74011250636 HC RX REV CODE- 250/636: Performed by: SURGERY

## 2022-04-22 PROCEDURE — 77030010507 HC ADH SKN DERMBND J&J -B: Performed by: SURGERY

## 2022-04-22 RX ORDER — BUPIVACAINE HYDROCHLORIDE AND EPINEPHRINE 2.5; 5 MG/ML; UG/ML
INJECTION, SOLUTION EPIDURAL; INFILTRATION; INTRACAUDAL; PERINEURAL AS NEEDED
Status: DISCONTINUED | OUTPATIENT
Start: 2022-04-22 | End: 2022-04-22 | Stop reason: HOSPADM

## 2022-04-22 RX ORDER — SODIUM CHLORIDE 0.9 % (FLUSH) 0.9 %
5-40 SYRINGE (ML) INJECTION EVERY 8 HOURS
Status: DISCONTINUED | OUTPATIENT
Start: 2022-04-22 | End: 2022-04-22 | Stop reason: HOSPADM

## 2022-04-22 RX ORDER — SODIUM CHLORIDE 0.9 % (FLUSH) 0.9 %
5-40 SYRINGE (ML) INJECTION AS NEEDED
Status: DISCONTINUED | OUTPATIENT
Start: 2022-04-22 | End: 2022-04-22 | Stop reason: HOSPADM

## 2022-04-22 RX ORDER — TRAMADOL HYDROCHLORIDE 50 MG/1
50 TABLET ORAL
Qty: 12 TABLET | Refills: 0 | Status: SHIPPED | OUTPATIENT
Start: 2022-04-22 | End: 2022-04-29

## 2022-04-22 RX ORDER — DEXMEDETOMIDINE HYDROCHLORIDE 100 UG/ML
INJECTION, SOLUTION INTRAVENOUS AS NEEDED
Status: DISCONTINUED | OUTPATIENT
Start: 2022-04-22 | End: 2022-04-22 | Stop reason: HOSPADM

## 2022-04-22 RX ORDER — NEOSTIGMINE METHYLSULFATE 1 MG/ML
INJECTION, SOLUTION INTRAVENOUS AS NEEDED
Status: DISCONTINUED | OUTPATIENT
Start: 2022-04-22 | End: 2022-04-22 | Stop reason: HOSPADM

## 2022-04-22 RX ORDER — SODIUM CHLORIDE 9 MG/ML
50 INJECTION, SOLUTION INTRAVENOUS CONTINUOUS
Status: DISCONTINUED | OUTPATIENT
Start: 2022-04-22 | End: 2022-04-22 | Stop reason: HOSPADM

## 2022-04-22 RX ORDER — HYDROMORPHONE HYDROCHLORIDE 1 MG/ML
0.2 INJECTION, SOLUTION INTRAMUSCULAR; INTRAVENOUS; SUBCUTANEOUS
Status: DISCONTINUED | OUTPATIENT
Start: 2022-04-22 | End: 2022-04-22 | Stop reason: HOSPADM

## 2022-04-22 RX ORDER — MIDAZOLAM HYDROCHLORIDE 1 MG/ML
1 INJECTION, SOLUTION INTRAMUSCULAR; INTRAVENOUS AS NEEDED
Status: DISCONTINUED | OUTPATIENT
Start: 2022-04-22 | End: 2022-04-22 | Stop reason: HOSPADM

## 2022-04-22 RX ORDER — MORPHINE SULFATE 2 MG/ML
2 INJECTION, SOLUTION INTRAMUSCULAR; INTRAVENOUS
Status: DISCONTINUED | OUTPATIENT
Start: 2022-04-22 | End: 2022-04-22 | Stop reason: HOSPADM

## 2022-04-22 RX ORDER — DEXAMETHASONE SODIUM PHOSPHATE 4 MG/ML
INJECTION, SOLUTION INTRA-ARTICULAR; INTRALESIONAL; INTRAMUSCULAR; INTRAVENOUS; SOFT TISSUE AS NEEDED
Status: DISCONTINUED | OUTPATIENT
Start: 2022-04-22 | End: 2022-04-22 | Stop reason: HOSPADM

## 2022-04-22 RX ORDER — SODIUM CHLORIDE, SODIUM LACTATE, POTASSIUM CHLORIDE, CALCIUM CHLORIDE 600; 310; 30; 20 MG/100ML; MG/100ML; MG/100ML; MG/100ML
125 INJECTION, SOLUTION INTRAVENOUS CONTINUOUS
Status: DISCONTINUED | OUTPATIENT
Start: 2022-04-22 | End: 2022-04-22 | Stop reason: HOSPADM

## 2022-04-22 RX ORDER — MIDAZOLAM HYDROCHLORIDE 1 MG/ML
INJECTION, SOLUTION INTRAMUSCULAR; INTRAVENOUS AS NEEDED
Status: DISCONTINUED | OUTPATIENT
Start: 2022-04-22 | End: 2022-04-22 | Stop reason: HOSPADM

## 2022-04-22 RX ORDER — FENTANYL CITRATE 50 UG/ML
50 INJECTION, SOLUTION INTRAMUSCULAR; INTRAVENOUS AS NEEDED
Status: DISCONTINUED | OUTPATIENT
Start: 2022-04-22 | End: 2022-04-22 | Stop reason: HOSPADM

## 2022-04-22 RX ORDER — DIPHENHYDRAMINE HYDROCHLORIDE 50 MG/ML
12.5 INJECTION, SOLUTION INTRAMUSCULAR; INTRAVENOUS AS NEEDED
Status: DISCONTINUED | OUTPATIENT
Start: 2022-04-22 | End: 2022-04-22 | Stop reason: HOSPADM

## 2022-04-22 RX ORDER — SODIUM CHLORIDE 9 MG/ML
1000 INJECTION, SOLUTION INTRAVENOUS CONTINUOUS
Status: DISCONTINUED | OUTPATIENT
Start: 2022-04-22 | End: 2022-04-22 | Stop reason: HOSPADM

## 2022-04-22 RX ORDER — KETOROLAC TROMETHAMINE 30 MG/ML
INJECTION, SOLUTION INTRAMUSCULAR; INTRAVENOUS AS NEEDED
Status: DISCONTINUED | OUTPATIENT
Start: 2022-04-22 | End: 2022-04-22 | Stop reason: HOSPADM

## 2022-04-22 RX ORDER — ONDANSETRON 2 MG/ML
4 INJECTION INTRAMUSCULAR; INTRAVENOUS AS NEEDED
Status: DISCONTINUED | OUTPATIENT
Start: 2022-04-22 | End: 2022-04-22 | Stop reason: HOSPADM

## 2022-04-22 RX ORDER — LIDOCAINE HYDROCHLORIDE 20 MG/ML
INJECTION, SOLUTION EPIDURAL; INFILTRATION; INTRACAUDAL; PERINEURAL AS NEEDED
Status: DISCONTINUED | OUTPATIENT
Start: 2022-04-22 | End: 2022-04-22 | Stop reason: HOSPADM

## 2022-04-22 RX ORDER — MIDAZOLAM HYDROCHLORIDE 1 MG/ML
0.5 INJECTION, SOLUTION INTRAMUSCULAR; INTRAVENOUS
Status: DISCONTINUED | OUTPATIENT
Start: 2022-04-22 | End: 2022-04-22 | Stop reason: HOSPADM

## 2022-04-22 RX ORDER — LIDOCAINE HYDROCHLORIDE 10 MG/ML
0.1 INJECTION, SOLUTION EPIDURAL; INFILTRATION; INTRACAUDAL; PERINEURAL AS NEEDED
Status: DISCONTINUED | OUTPATIENT
Start: 2022-04-22 | End: 2022-04-22 | Stop reason: HOSPADM

## 2022-04-22 RX ORDER — GLYCOPYRROLATE 0.2 MG/ML
INJECTION INTRAMUSCULAR; INTRAVENOUS AS NEEDED
Status: DISCONTINUED | OUTPATIENT
Start: 2022-04-22 | End: 2022-04-22 | Stop reason: HOSPADM

## 2022-04-22 RX ORDER — EPHEDRINE SULFATE/0.9% NACL/PF 50 MG/5 ML
5 SYRINGE (ML) INTRAVENOUS AS NEEDED
Status: DISCONTINUED | OUTPATIENT
Start: 2022-04-22 | End: 2022-04-22 | Stop reason: HOSPADM

## 2022-04-22 RX ORDER — FENTANYL CITRATE 50 UG/ML
25 INJECTION, SOLUTION INTRAMUSCULAR; INTRAVENOUS
Status: DISCONTINUED | OUTPATIENT
Start: 2022-04-22 | End: 2022-04-22 | Stop reason: HOSPADM

## 2022-04-22 RX ORDER — ONDANSETRON 2 MG/ML
INJECTION INTRAMUSCULAR; INTRAVENOUS AS NEEDED
Status: DISCONTINUED | OUTPATIENT
Start: 2022-04-22 | End: 2022-04-22 | Stop reason: HOSPADM

## 2022-04-22 RX ORDER — FENTANYL CITRATE 50 UG/ML
INJECTION, SOLUTION INTRAMUSCULAR; INTRAVENOUS AS NEEDED
Status: DISCONTINUED | OUTPATIENT
Start: 2022-04-22 | End: 2022-04-22 | Stop reason: HOSPADM

## 2022-04-22 RX ORDER — PHENYLEPHRINE HCL IN 0.9% NACL 0.4MG/10ML
SYRINGE (ML) INTRAVENOUS AS NEEDED
Status: DISCONTINUED | OUTPATIENT
Start: 2022-04-22 | End: 2022-04-22 | Stop reason: HOSPADM

## 2022-04-22 RX ORDER — OXYCODONE AND ACETAMINOPHEN 5; 325 MG/1; MG/1
1 TABLET ORAL AS NEEDED
Status: DISCONTINUED | OUTPATIENT
Start: 2022-04-22 | End: 2022-04-22 | Stop reason: HOSPADM

## 2022-04-22 RX ORDER — ROCURONIUM BROMIDE 10 MG/ML
INJECTION, SOLUTION INTRAVENOUS AS NEEDED
Status: DISCONTINUED | OUTPATIENT
Start: 2022-04-22 | End: 2022-04-22 | Stop reason: HOSPADM

## 2022-04-22 RX ORDER — PROPOFOL 10 MG/ML
INJECTION, EMULSION INTRAVENOUS AS NEEDED
Status: DISCONTINUED | OUTPATIENT
Start: 2022-04-22 | End: 2022-04-22 | Stop reason: HOSPADM

## 2022-04-22 RX ORDER — ACETAMINOPHEN 325 MG/1
650 TABLET ORAL ONCE
Status: COMPLETED | OUTPATIENT
Start: 2022-04-22 | End: 2022-04-22

## 2022-04-22 RX ADMIN — ROCURONIUM BROMIDE 40 MG: 10 SOLUTION INTRAVENOUS at 10:52

## 2022-04-22 RX ADMIN — ACETAMINOPHEN 650 MG: 325 TABLET ORAL at 09:15

## 2022-04-22 RX ADMIN — KETOROLAC TROMETHAMINE 30 MG: 30 INJECTION, SOLUTION INTRAMUSCULAR; INTRAVENOUS at 11:46

## 2022-04-22 RX ADMIN — SODIUM CHLORIDE, POTASSIUM CHLORIDE, SODIUM LACTATE AND CALCIUM CHLORIDE 125 ML/HR: 600; 310; 30; 20 INJECTION, SOLUTION INTRAVENOUS at 09:13

## 2022-04-22 RX ADMIN — DEXAMETHASONE SODIUM PHOSPHATE 8 MG: 4 INJECTION, SOLUTION INTRAMUSCULAR; INTRAVENOUS at 11:05

## 2022-04-22 RX ADMIN — DEXMEDETOMIDINE HYDROCHLORIDE 10 MCG: 100 INJECTION, SOLUTION, CONCENTRATE INTRAVENOUS at 11:18

## 2022-04-22 RX ADMIN — MIDAZOLAM 2 MG: 1 INJECTION INTRAMUSCULAR; INTRAVENOUS at 10:45

## 2022-04-22 RX ADMIN — ONDANSETRON HYDROCHLORIDE 4 MG: 2 INJECTION, SOLUTION INTRAMUSCULAR; INTRAVENOUS at 11:37

## 2022-04-22 RX ADMIN — LIDOCAINE HYDROCHLORIDE 100 MG: 20 INJECTION, SOLUTION EPIDURAL; INFILTRATION; INTRACAUDAL; PERINEURAL at 10:51

## 2022-04-22 RX ADMIN — FENTANYL CITRATE 25 MCG: 0.05 INJECTION, SOLUTION INTRAMUSCULAR; INTRAVENOUS at 12:23

## 2022-04-22 RX ADMIN — GLYCOPYRROLATE 0.4 MG: 0.2 INJECTION, SOLUTION INTRAMUSCULAR; INTRAVENOUS at 11:43

## 2022-04-22 RX ADMIN — FENTANYL CITRATE 50 MCG: 50 INJECTION, SOLUTION INTRAMUSCULAR; INTRAVENOUS at 10:51

## 2022-04-22 RX ADMIN — ONDANSETRON 4 MG: 2 INJECTION INTRAMUSCULAR; INTRAVENOUS at 12:07

## 2022-04-22 RX ADMIN — DEXMEDETOMIDINE HYDROCHLORIDE 10 MCG: 100 INJECTION, SOLUTION, CONCENTRATE INTRAVENOUS at 11:38

## 2022-04-22 RX ADMIN — WATER 2 G: 1 INJECTION INTRAMUSCULAR; INTRAVENOUS; SUBCUTANEOUS at 11:03

## 2022-04-22 RX ADMIN — FENTANYL CITRATE 50 MCG: 50 INJECTION, SOLUTION INTRAMUSCULAR; INTRAVENOUS at 11:18

## 2022-04-22 RX ADMIN — Medication 3 MG: at 11:43

## 2022-04-22 RX ADMIN — Medication 120 MCG: at 11:29

## 2022-04-22 RX ADMIN — PROPOFOL 130 MG: 10 INJECTION, EMULSION INTRAVENOUS at 10:51

## 2022-04-22 RX ADMIN — Medication 120 MCG: at 11:36

## 2022-04-22 NOTE — PERIOP NOTES
I have reviewed discharge instructions with the patient. The patient verbalized understanding. Signs, symptoms, and side effects reviewed. Opportunity for questions and clarification allowed. Patient discharging home via private vehicle. Hard-copy of discharge instructions sent with patient.

## 2022-04-22 NOTE — ANESTHESIA PREPROCEDURE EVALUATION
Relevant Problems   No relevant active problems       Anesthetic History     PONV          Review of Systems / Medical History  Patient summary reviewed, nursing notes reviewed and pertinent labs reviewed    Pulmonary    COPD: mild               Neuro/Psych   Within defined limits           Cardiovascular    Hypertension: well controlled                   GI/Hepatic/Renal     GERD: well controlled           Endo/Other        Obesity and arthritis     Other Findings              Physical Exam    Airway  Mallampati: II  TM Distance: > 6 cm  Neck ROM: normal range of motion   Mouth opening: Normal     Cardiovascular  Regular rate and rhythm,  S1 and S2 normal,  no murmur, click, rub, or gallop             Dental  No notable dental hx       Pulmonary  Breath sounds clear to auscultation               Abdominal  GI exam deferred       Other Findings            Anesthetic Plan    ASA: 2  Anesthesia type: general          Induction: Intravenous  Anesthetic plan and risks discussed with: Patient

## 2022-04-22 NOTE — DISCHARGE INSTRUCTIONS
Patient Discharge Instructions    Ellin Shone / [id number] : 1955    Admitted 2022 Discharged: 2022       · It is important that you take the medication exactly as they are prescribed. · Keep your medication in the bottles provided by the pharmacist and keep a list of the medication names, dosages, and times to be taken in your wallet. · Do not take other medications without consulting your doctor. What to do at Home    Recommended diet: Resume previous diet    Recommended activity: No strenuous activity for 7 days to allow your incision to heal.      No Driving While Taking narcotic pain medications. May Take Shower when you go home. Do not submerge your incision under water (pool, bath, hot tub) for 7 days. If you experience any of the following symptoms Fevers, Chills, Redness or Drainage at Surgical Site(s) or Any Other Questions or Concerns Please Call -  [phone]. Follow-up with Dr. Lupis Sibley in ~14 days. Information obtained by :  I understand that if any problems occur once I am at home I am to contact my physician. I understand and acknowledge receipt of the instructions indicated above.                                                                                                                                            Physician's or R.N.'s Signature                                                                  Date/Time                                                                                                                                              Patient or Representative Signature                                                          Date/Time        ______________________________________________________________________    Anesthesia Discharge Instructions    After general anesthesia or intervenous sedation, for 24 hours or while taking prescription Narcotics:  · Limit your activities  · Do not drive or operate hazardous machinery  · If you have not urinated within 8 hours after discharge, please contact your surgeon on call. · Do not make important personal or business decisions  · Do not drink alcoholic beverages    Report the following to your surgeon:  · Excessive pain, swelling, redness or odor of or around the surgical area  · Temperature over 100.5 degrees  · Nausea and vomiting lasting longer than 4 hours or if unable to take medication  · Any signs of decreased circulation or nerve impairment to extremity:  Change in color, persistent numbness, tingling, coldness or increased pain.   · Any questions

## 2022-04-22 NOTE — H&P
Date of Surgery Update:  Dia Montoya was seen and examined. History and physical has been reviewed. The patient has been examined. There have been no significant clinical changes since the completion of the originally dated History and Physical.  Patient with multiple enlarged lymph nodes concerning for lymphoma. She has a palpable node in left posterior cervical neck that is amenable to excisional biopsy. Plan for excisional biopsy. A complete discussion of the risks, benefits and alternatives to surgery were discussed with the patient who was keen to proceed. The patient was counseled at length about the risks of reta Covid-19 during their perioperative period and any recovery window from their procedure. The patient was made aware that reta Covid-19  may worsen their prognosis for recovering from their procedure and lend to a higher morbidity and/or mortality risk. All material risks, benefits, and reasonable alternatives including postponing the procedure were discussed. The patient does wish to proceed with the procedure at this time.           Signed By: Guerline Thompson MD     April 22, 2022 10:38 AM         Please note from the office and include the additional information below:    Past Medical History  Past Medical History:   Diagnosis Date    Adverse effect of anesthesia     PHLEGM IN THROAT POST OP & NEEDED X2 BREATHING TREATMENTS    Anxiety     COPD [facility]chronic obstructive [facility] disease) ([facility] 2022    emphysema    Depression     GERD (gastroesophageal reflux disease)     Hernia, femoral     since childhood    Hypertension     Joint pain     Nausea & vomiting     TMJ arthritis         Past Surgical History  Past Surgical History:   Procedure Laterality Date    HX CERVICAL FUSION  2012    C5-C6    HX CHOLECYSTECTOMY  2020    HX COLONOSCOPY      HX ENDOSCOPY      HX HYSTERECTOMY  2019    HX TONSILLECTOMY      AS A CHILD    HX WISDOM TEETH EXTRACTION      TEENAGER Social History  The patient Steven Roth  reports that she has been smoking cigarettes. She has a 40.00 pack-year smoking history. She has never used smokeless tobacco. She reports previous alcohol use. She reports that she does not use drugs.      Family History  Family History   Problem Relation Age of Onset    Heart Disease Mother 46    Heart Surgery Mother         HEART TRANSPLANT    Elevated Lipids Mother     Hypertension Mother     COPD Father     Emphysema Father     Sudden Death Brother 46    Other Maternal Grandmother         BRAIN TUMOR    No Known Problems Son     No Known Problems Daughter           Patti Becker MD

## 2022-04-22 NOTE — BRIEF OP NOTE
Brief Postoperative Note    Patient: Michelle Mathur  YOB: 1955  MRN: [id number]    Date of Procedure: 4/22/2022     Pre-Op Diagnosis: Lymphadenopathy, cervical [R59.0]    Post-Op Diagnosis: Same as preoperative diagnosis. Procedure(s):  EXCISIONAL BIOPSY OF LEFT CERVICAL LYMPH NODE    Surgeon(s): Leonidas Nogueira MD    Surgical Assistant: Physician Assistant: ROBIN Martinez    Anesthesia: General     Estimated Blood Loss (mL): 5 mL    Complications: None    Specimens:   ID Type Source Tests Collected by Time Destination   1 : LEFT POSTERIOR CERVICAL LYMPH NODE Fresh Lymph Node  Leonidas Nogueira MD 4/22/2022 1126 Pathology        Implants: * No implants in log *    Drains: * No LDAs found *    Findings: Abnormal posterior left cervical lymph node excised for pathology.       Electronically Signed by Michael Fu MD on 4/22/2022 at 11:43 AM

## 2022-04-22 NOTE — ANESTHESIA POSTPROCEDURE EVALUATION
Post-Anesthesia Evaluation and Assessment    Patient: Oriana Hansen MRN: [id number]  SSN: [id number]    YOB: 1955  Age: 78 y.o. Sex: female      I have evaluated the patient and they are stable and ready for discharge from the PACU. Cardiovascular Function/Vital Signs  Visit Vitals  /66   Pulse 70   Temp 36.9 °C (98.5 °F)   Resp 15   Wt 91.2 kg (201 lb)   SpO2 96%   BMI 35.61 kg/m²       Patient is status post General anesthesia for Procedure(s):  EXCISIONAL BIOPSY OF LEFT CERVICAL LYMPH NODE. Nausea/Vomiting: None    Postoperative hydration reviewed and adequate. Pain:  Pain Scale 1: Numeric (0 - 10) (04/22/22 0856)  Pain Intensity 1: 0 (04/22/22 0856)   Managed    Neurological Status:   Neuro (WDL): Within Defined Limits (04/22/22 0902)   At baseline    Mental Status, Level of Consciousness: Alert and  oriented to person, place, and time    Pulmonary Status:   O2 Device: Nasal cannula (04/22/22 1153)   Adequate oxygenation and airway patent    Complications related to anesthesia: None    Post-anesthesia assessment completed. No concerns    Signed By: Janay Cui MD     April 22, 2022              Procedure(s):  EXCISIONAL BIOPSY OF LEFT CERVICAL LYMPH NODE.    general    <BSHSIANPOST>    INITIAL Post-op Vital signs:   Vitals Value Taken Time   /66 04/22/22 1153   Temp 36.9 °C (98.5 °F) 04/22/22 1153   Pulse 68 04/22/22 1202   Resp 15 04/22/22 1202   SpO2 94 % 04/22/22 1202   Vitals shown include unvalidated device data.

## 2022-04-23 NOTE — OP NOTES
[facility]  OPERATIVE REPORT    Name:  Ernst Stoner  MR#:  [id number]  :  1955  ACCOUNT #:  [de-identified]  DATE OF SERVICE:  2022    PREOPERATIVE DIAGNOSIS:  Cervical lymphadenopathy. POSTOPERATIVE DIAGNOSIS:  Cervical lymphadenopathy. PROCEDURE PERFORMED:  Excisional biopsy of left cervical lymph node. SURGEON:  Jn Edmondson MD    ASSISTANT:  ROBIN Waters.  Deborah Wyman assistance was required secondary to the complex nature of this operation. She assisted throughout with retraction, exposure, and closure. ANESTHESIA:  General.    COMPLICATIONS:  None. SPECIMENS REMOVED:  Left posterior cervical lymph node. Disposition of specimen to Pathology. IMPLANTS:  None. ESTIMATED BLOOD LOSS:  5 mL. DISPOSITION:  PACU. FINDINGS:  The patient has had an abnormal hardened and pathologic-appearing lymph nodes in the posterior left surgical region. This was excised for pathology. INDICATIONS:  The patient is a 72-year-old female who had palpably abnormal lymph nodes in the left cervical neck. She underwent further workup and imaging, which showed diffuse lymphadenopathy concerning for lymphoma. She was subsequently referred for excisional biopsy of one of these lymph nodes. A complete discussion of risks, benefits, and alternatives of surgery were had with the patient. She was in agreement to proceed. Informed consent was obtained. PROCEDURE:  After informed consent was obtained, the patient was brought back to the operating room. She was placed under general endotracheal anesthesia in the supine position on the operating room table. A bump was placed under the left shoulder to help rotate her to the right to better expose the abnormal lymph nodes along the posterior cervical neck. This side had been marked preoperatively and this was confirmed at the time of the time-out. The neck was prepped and draped in the usual sterile fashion.   A proper time-out was performed. The operative site was identified and confirmed. With this completed, we injected local anesthetic into the skin and subcutaneous tissue over this palpable lymph node. This was just posterior to the main body of the sternocleidomastoid muscle and was approximately 1.5-2 cm in diameter. A transverse incision was made over this palpable lymph node and we dissected down through the subcutaneous tissue and encountered the fascia of the sternocleidomastoid muscle. This was  and just deep to this, we encountered the lymph node. We dissected around the lymph node and as we were doing this, the lymph node fragmented and ultimately it was removed in more of a piecemeal-type fashion as it had no integrity to be removed as a solid structure. The tissue was white and clearly abnormal, but did not appear to be consistent with an infected, necrotic type node. This was sent to Pathology for permanent analysis. There was good hemostasis maintained throughout. Once the node had been removed, we then prepared for closure. The deep dermis and subcutaneous tissue were reapproximated using an interrupted 3-0 Vicryl suture. The entire incision length was approximately 2.5 cm. The skin was reapproximated using a 4-0 Monocryl subcuticular stitch followed by Dermabond skin adhesive. The patient was then awakened, extubated, and taken to the postanesthesia care unit in stable condition. All needle and instrument counts were correct at the completion of the case. I was present and scrubbed throughout the entirety of the case. There were no immediate complications.       Tgean Carrington MD      [id number]  D:  04/22/2022 19:30  T:  04/22/2022 22:34  JOB #:  [id number]

## 2022-04-25 ENCOUNTER — TELEPHONE (OUTPATIENT)
Dept: ONCOLOGY | Age: 67
End: 2022-04-25

## 2022-04-25 NOTE — TELEPHONE ENCOUNTER
Patient called and stated she just got her biopsy results back and needs to make an appointment with Dr. Cheryl Elder. Please advise.      # [phone]

## 2022-04-26 DIAGNOSIS — Z79.899 HIGH RISK MEDICATION USE: Primary | ICD-10-CM

## 2022-04-26 DIAGNOSIS — C83.31 DIFFUSE LARGE B-CELL LYMPHOMA OF LYMPH NODES OF NECK (HCC): ICD-10-CM

## 2022-04-26 NOTE — PROGRESS NOTES
[facility] [place] at [address], [address]  Azul Ledger: [phone]  F: [phone] Patient ID  Name: Jacky Gaytan  YOB: 1955  MRN: [id number]  Referring Provider:   No referring provider defined for this encounter. Primary Care Provider:   Chloé Lagunas MD       HEMATOLOGY/MEDICAL ONCOLOGY  NOTE   Date of Visit: 04/27/22  Reason for Evaluation:     Chief Complaint   Patient presents with    Follow-up     Lymphadenopathy of left cervical region       Hematology/Oncology Summary:  Please review original records for clinical decision making. This summary highlights focused aspects of patient's ongoing care and may have a recurring section in notes with either updates or remain unchanged as a longitudinal care summary. --------------------------------------------------------------------------------------------------------------------------------------------------------------------------------------------------------------------------  DIAGNOSIS:   Diffuse Large B-Cell Lymphoma    ORIGINAL STAGING:   n/a    SITES OF DISEASE:   Left Cervical Lymph Node    CURRENT TREATMENT:   Discussion for Systemic Therapy. PRIOR TREATMENT:   n/a    GOALS OF CARE:   Curative Intent    PATHOLOGY:   Specimen #:[id number] Collect: 4/22/2022      ==========================================================================                    * * *SURGICAL PATHOLOGY REPORT* * *   ==========================================================================     * * *PROCEDURES/ADDENDA* * *     ADDENDUM   Date Ordered: 4/26/2022     Status: Signed Out   Date Complete: 4/26/2022     By: Alexi Garvin.  Nicole Marley MD   Date Reported: 4/26/2022       Addendum Diagnosis   Lymph node, left posterior cervical lymph node, excision:   In situ hybridization for Jesús-Barr viral RNA: Negative     CPT: 07043       Addendum Comment      * * *CLINICAL HISTORY* * *     Cervical lymphadenopathy, rule out lymphoma   ==========================================================================   * * *FINAL PATHOLOGIC DIAGNOSIS* * *        Lymph node, left posterior cervical lymph node, excision:        Large B cell lymphoma. See comment. * * *Comment* * *   The histologic section has fragments of lymphoid tissue with diffuse and   focal follicular architecture. Diffuse areas are comprised of large,   atypical lymphocytes with centroblastic morphology and increased mitotic   activity. Immunohistochemical stains confirm the malignant cells are CD20   positive B cells that coexpress CD10 and BCL6 without MUM1. CD23   highlights rare follicular dendritic networks associated with lymphoid   follicles with germinal centers and express CD10 and BCL6 without BCL2. CyclinD1 and CD56 stains are negative. Concurrent flow cytometric analysis   confirms a clonal CD10 positive B-cell population comprised of medium to   large cells. The Ki-67 proliferation index is 30%. The overall findings favor the diagnosis of diffuse large B-cell lymphoma,   germinal center subtype. Molecular genetic studies are pending for further   characterization will be reported in an addendum. Flow cytometry:   Consistent with CD10-positive B-cell lymphoproliferative disorder. Comments: Flow cytometry shows monoclonal B-cells (36% of total cells)   with CD10 expression without CD5, consistent with a CD10-positive B-cell   lymphoproliferative disorder. The main differential diagnosis includes   follicular lymphoma, Burkitt lymphoma and large B-cell lymphoma. Please   correlate the result with morphologic findings and clinical information. Flow Differential (%) and Population Analysis:   Lymphocytes: 93.7%   T-cells (39% of lymphoid cells) show a CD4/CD8 ratio of 3.3 without overt   phenotypic abnormality. NK-cells (1% of lymphoid cells) are unremarkable.    Mature B-cells (56% of lymphoid cells) include large forms based on   forward scattered pattern and show: CD5 neg, CD10+, CD11c+dim, CD19+,   CD20+ with surface lambda light chain restriction. Markers Performed: CD2, CD3, CD4, CD5, CD7, CD8, CD10, CD11c, CD19, CD20,   CD23, CD34, CD38, CD45, CD56, Kappa, Lambda (17 Markers)     The Technical Component Processing of this test was completed at   [facility], [address] [place] [address], [place], [place] / [address] /   [phone] / Avelino Deal # [id number].  Interpretation by Lawson Gutierrez M.D. The   complete scanned report is available in Epic. CPT: W9805062, C2247522, P672090, Q9990649, [id number], [id number]       [facility]2/2022   *Electronically Signed Out By Aroldo Barboza. Levar Martin MD*     ==========================================================================   * * *Gross Description* * *     Specimen #1, received fresh labeled with the patient's name,  and left   posterior cervical lymph node, consists of two paper towels containing a   1.7 x 1.5 x 0.5 cm aggregate of pale pink-tan, fleshy soft tissue   fragments. The specimen is handled according to the flow cytometry   protocol as follows:   7          Two air-dried touch prep slides - one Diff Quik stained, one   rapid-fixed in 95% alcohol   7          Representative sections in B plus fixative (1A)   7          One piece held in RPMI for possible flow cytometry analysis   7          Remaining tissue fixed in formalin for permanents (1B)          Dr. Levar Martin will determine if flow cytometry is necessary. AMM 2022 02:06 PM     PERTINENT CARE EVENTS   n/a      Subjective:     History of Present Illness:     David Orozco is a 78 y.o. F who presents for a follow-up evaluation for newly diagnosed diffuse large b-cell lymphoma. She has constitutional symptoms. She has a sore throat since her adenopathy. She notes that she has had joint pains. She is dealing with osteoarthritis. She is on gabapentin for this complaint. Patient overall reports feeling worse.        Fatigue:Grade 3  Nausea:Grade 2  Hot Flashes:Grade 2  Insomnia:Grade 3  Shortness of Breath:Grade 2  Headache:Grade 1    Past Medical History:   Diagnosis Date    Adverse effect of anesthesia     PHLEGM IN THROAT POST OP & NEEDED X2 BREATHING TREATMENTS    Anxiety     COPD (chronic obstructive pulmonary disease) ([facility] [address] 2022    emphysema    Depression     GERD (gastroesophageal reflux disease)     Hernia, femoral     since childhood    Hypertension     Joint pain     Nausea & vomiting     TMJ arthritis       Past Surgical History:   Procedure Laterality Date    HX HYSTERECTOMY  2019    HX TONSILLECTOMY     History of Salivary Gland Surgery in 30's for evaluation for dry mouth. Social History     Tobacco Use    Smoking status: Current Every Day Smoker     Packs/day: 1.00     Years: 40.00     Pack years: 40.00     Types: Cigarettes    Smokeless tobacco: Never Used    Tobacco comment: 0.5-1 pack per day   Substance Use Topics    Alcohol use: Not Currently     Alcohol/week: 0.0 standard drinks      Family History   Problem Relation Age of Onset    Heart Disease Mother 46    Heart Surgery Mother         HEART TRANSPLANT    Elevated Lipids Mother     Hypertension Mother     COPD Father     Emphysema Father     Sudden Death Brother 46    Other Maternal Grandmother         BRAIN TUMOR    No Known Problems Son     No Known Problems Daughter      Current Outpatient Medications   Medication Sig    traMADoL (ULTRAM) 50 mg tablet Take 1 Tablet by mouth every six (6) hours as needed for Pain for up to 7 days. Max Daily Amount: 200 mg.    gabapentin (NEURONTIN) 300 mg capsule Take 1 Capsule by mouth daily as needed for Pain.  diclofenac EC (VOLTAREN) 50 mg EC tablet Take 1 Tablet by mouth two (2) times daily as needed for Pain.  FLUoxetine (PROzac) 40 mg capsule TAKE 1 CAPSULE BY MOUTH EVERY MORNING (Patient taking differently: Take 40 mg by mouth every morning.  TAKE 1 CAPSULE BY MOUTH EVERY MORNING)    amLODIPine-benazepril (LOTREL) 5-20 mg per capsule TAKE 1 CAPSULE BY MOUTH EVERY MORNING (Patient taking differently: Take 1 Capsule by mouth every morning. TAKE 1 CAPSULE BY MOUTH EVERY MORNING)     No current facility-administered medications for this visit. No Known Allergies   -  Review of Systems Provided by:  Patient  Review of Systems: A complete review of systems was obtained, reviewed. Pertinent findings reviewed above. Objective:     Visit Vitals  BP (!) 140/81 (BP 1 Location: Left upper arm, BP Patient Position: Sitting)   Pulse 81   Temp 97.9 °F (36.6 °C) (Oral)   Resp 18   Ht 5' 3\" (1.6 m)   Wt 207 lb 6.4 oz (94.1 kg)   SpO2 95%   BMI 36.74 kg/m²     ECOG PS: 1- Restricted in physically strenuous activity but ambulatory and able to carry out work of a light or sedentary nature, e.g., light house work, office work. Physical Exam  Constitutional: No acute distress. , Non-toxic appearance. and Non-diaphoretic. HENT: Normocephalic and atraumatic head. and Wearing a mask during COVID-19 precautions. Eyes: Normal Conjunctivae. Anicteric sclerae. Cardiovascular: S1,S2 auscultated. No pitting edema. Pulmonary: Normal Respiratory Effort. No wheezing. No rhonchi. No rales. Abdominal: Normal bowel sounds. Soft Abdomen to palpation. No abdominal tenderness. No guarding. Skin: No jaundice. No rash. No petechiae. Left Frontal Scalp Lesion. Musculoskeletal: No muscle pain on palpation. No temporal muscle wasting on inspection. Lymph: No cervical, supraclavicular,axillary, or inguinal lymph node enlargement or tenderness. Neurological: Alert and oriented. No tremor on inspection. Normal Gait.   Psychiatric: mood normal. normal speech rate normal affect    Results:     I personally reviewed Epic EHR labs/results below:   Lab Results   Component Value Date/Time    WBC 6.3 03/03/2022 12:47 PM    HGB 14.6 03/03/2022 12:47 PM    HCT 46.2 03/03/2022 12:47 PM    PLATELET 629 90/66/6700 12:47 PM    MCV 98.5 03/03/2022 12:47 PM    ABS. NEUTROPHILS 3.8 03/03/2022 12:47 PM     Lab Results   Component Value Date/Time    Sodium 138 03/03/2022 12:47 PM    Potassium 4.6 03/03/2022 12:47 PM    Chloride 108 03/03/2022 12:47 PM    CO2 25 03/03/2022 12:47 PM    Glucose 91 03/03/2022 12:47 PM    BUN 18 03/03/2022 12:47 PM    Creatinine 0.77 03/03/2022 12:47 PM    GFR est AA >60 03/03/2022 12:47 PM    GFR est non-AA >60 03/03/2022 12:47 PM    Calcium 9.2 03/03/2022 12:47 PM     Lab Results   Component Value Date/Time    Bilirubin, total 0.5 03/03/2022 12:47 PM    ALT (SGPT) 25 03/03/2022 12:47 PM    Alk. phosphatase 72 03/03/2022 12:47 PM    Protein, total 7.2 03/03/2022 12:47 PM    Albumin 4.2 03/03/2022 12:47 PM    Globulin 3.0 03/03/2022 12:47 PM       Assessment and Recommendations:     1. Diffuse large B-cell lymphoma of lymph nodes of neck (HCC)  Needs PET/CT, ECHO, Port-A-Cath, Tumor Lysis labs testing. 2. Encounter for antineoplastic chemotherapy  Today, I provided a thorough discussion regarding the risks and benefits of systemic chemotherapy. Patient has provided both oral and written informed consent (see separate signed consent form). Our discussion initially focused on the generalities of chemotherapy and why side effects can occur from cytotoxic medication. We discussed the hope that the cancer cells take up systemic chemotherapy and that healthy cells are spared toxicity. However, we discussed that faster growing/dividing cells in our body can especially be prone to cytotoxic effects and tissue destruction. We discussed that the repercussions of this normal cellular death may not clinically develop for days to weeks. We discussed that mucosal tissue damage can cause oral mucositis, nausea/vomiting, gastric upset, diarrhea. We discussed that tissue death can cause hair loss, skin changes/rashes, nail loss. We discussed that the bone marrow is susceptible to damage causing low blood counts.  We discussed that low white blood counts can put the patient's health at risk for life-threatening infection and that a fever during a period of low neutrophils may be the only indicator of this serious problem. We discussed the management of neutropenic fever and reviewed other alarm symptoms and when to seek emergent care by calling 9-1-1(shortness of breath,chest pain for examples). We discussed that low hemoglobin places patient at risk for ischemia/infarction. We discussed that low platelets place patient at risk for spontaneous bleeding and increased bleeding risks. We discussed when these symptoms are not monitored or patient does not inform us of developing/onset of symptoms that the risk of death can be markedly increased. We discussed risk for secondary malignancy. We discussed risk of anaphylaxis. We discussed the risk of infusion reactions, the risk of chemotherapy infiltration/extravasation and potentially tissue necrosis. · For Rituximab, we discussed, infusion/anaphylactic reaction risk that can be fatal, rare risk of PML from BLAS virus and that it may not be reversible. We discussed risk of fever/chills,weakness,nausea,headache,cough,rhinorrhea,pharyngitis,arrhythmias,risk of TLS even in no spontaneous TLS. · For Cyclophosphamide, we discussed risk of low blood counts, hair loss, nausea/vomiting,poor appetite, skin/nails discoloration. Rare risk of hemorrhagic cystitis. Risk of secondary cancer such as leukemia in particular. · For Doxorubicin, we discussed risk of pain at site of administration. Risk of vesicant injury which will be mitigated by Port-A-Cath placement. We discussed risk of alopecia, watery eyes, mucositis. Risk of dark/red urine. Small risk of doxorubicin-induced cardiomyopathy. Risk of leukemia years after administration. Risk of not treating lymphoma is likely higher with possible progressive disease towards death. Discussed need for ECHO prior to treatment start.   · For Vincristine, risk of hair loss, constipation, low blood counts, nausea/vomiting,diarrhea, oral mucositis, dysgeusia, neuropathy. · For Prednisone, risk of weakness, glucose metabolism disturbance. Risk of cataract formation. Risk of tumor lysis syndrome. Risk of infection. · For Neulasta, risk of splenic rupture/hemorrhage though less likely. Risk of bone pain (can take claritin). We discussed about biosimilars In how they are felt to be equally safe and effective per the FDA although technically different medications. Will check for tumor lysis labs;   LDH   URIC ACID   Will start her on pre-phase steroids to reduce the risk of TLS but also treat her symptoms while we await her PET/CT ECHO and Port prior to starting tx. Estimated start date: 5/9/2022 for R-CHOP every 3 weeks times 6 cycles most likely pending PET/CT. We discussed that it remains unclear what her scalp lesion may be and that we will await her PET/CT scan. 3. Tobacco dependence  She is interested in trying to quit smoking. Will forward note to Yancy Best, our , to connect her with quit resources. 4. Encounter for screening for other viral diseases  Orders Placed This Encounter    METABOLIC PANEL, COMPREHENSIVE    CBC WITH AUTOMATED DIFF    HEP B SURFACE AG    HEP B SURFACE AB    HEP B CORE AB, TOT    HEPATITIS C AB   Explained the risk of hepatitis reactivation and the need to screen prior to chemotherapy. 5. Renal lesion  We discussed that her lymphoma is taking priority now. I cannot exclude metastatic lymphoma to kidney or a primary renal lesion. We will empirically treat. She will need to see Urology if a primary renal cancer.   Malka Gordon MD  Hematology/Medical Oncology Provider  Manan Hodges  P: [phone]    Signed By:   Natali Brown MD

## 2022-04-27 ENCOUNTER — NURSE NAVIGATOR (OUTPATIENT)
Dept: CT IMAGING | Age: 67
End: 2022-04-27

## 2022-04-27 ENCOUNTER — OFFICE VISIT (OUTPATIENT)
Dept: ONCOLOGY | Age: 67
End: 2022-04-27
Payer: MEDICARE

## 2022-04-27 ENCOUNTER — TELEPHONE (OUTPATIENT)
Dept: ONCOLOGY | Age: 67
End: 2022-04-27

## 2022-04-27 ENCOUNTER — DOCUMENTATION ONLY (OUTPATIENT)
Dept: ONCOLOGY | Age: 67
End: 2022-04-27

## 2022-04-27 VITALS
TEMPERATURE: 97.9 F | RESPIRATION RATE: 18 BRPM | HEIGHT: 63 IN | BODY MASS INDEX: 36.75 KG/M2 | HEART RATE: 81 BPM | SYSTOLIC BLOOD PRESSURE: 140 MMHG | WEIGHT: 207.4 LBS | OXYGEN SATURATION: 95 % | DIASTOLIC BLOOD PRESSURE: 81 MMHG

## 2022-04-27 DIAGNOSIS — C83.31 DIFFUSE LARGE B-CELL LYMPHOMA OF LYMPH NODES OF NECK (HCC): ICD-10-CM

## 2022-04-27 DIAGNOSIS — C83.31 DIFFUSE LARGE B-CELL LYMPHOMA OF LYMPH NODES OF NECK (HCC): Primary | ICD-10-CM

## 2022-04-27 DIAGNOSIS — N28.9 RENAL LESION: ICD-10-CM

## 2022-04-27 DIAGNOSIS — Z11.59 ENCOUNTER FOR SCREENING FOR OTHER VIRAL DISEASES: ICD-10-CM

## 2022-04-27 DIAGNOSIS — Z51.11 ENCOUNTER FOR ANTINEOPLASTIC CHEMOTHERAPY: ICD-10-CM

## 2022-04-27 DIAGNOSIS — Z72.0 TOBACCO ABUSE: Primary | ICD-10-CM

## 2022-04-27 DIAGNOSIS — F17.200 TOBACCO DEPENDENCE: ICD-10-CM

## 2022-04-27 LAB
ALBUMIN SERPL-MCNC: 4.5 G/DL (ref 3.5–5)
ALBUMIN/GLOB SERPL: 1.6 {RATIO} (ref 1.1–2.2)
ALP SERPL-CCNC: 79 U/L (ref 45–117)
ALT SERPL-CCNC: 29 U/L (ref 12–78)
ANION GAP SERPL CALC-SCNC: 7 MMOL/L (ref 5–15)
AST SERPL-CCNC: 16 U/L (ref 15–37)
BASOPHILS # BLD: 0.1 K/UL (ref 0–0.1)
BASOPHILS NFR BLD: 1 % (ref 0–1)
BILIRUB SERPL-MCNC: 0.4 MG/DL (ref 0.2–1)
BUN SERPL-MCNC: 13 MG/DL (ref 6–20)
BUN/CREAT SERPL: 14 (ref 12–20)
CALCIUM SERPL-MCNC: 9.7 MG/DL (ref 8.5–10.1)
CHLORIDE SERPL-SCNC: 106 MMOL/L (ref 97–108)
CO2 SERPL-SCNC: 25 MMOL/L (ref 21–32)
CREAT SERPL-MCNC: 0.92 MG/DL (ref 0.55–1.02)
DIFFERENTIAL METHOD BLD: ABNORMAL
EOSINOPHIL # BLD: 0.4 K/UL (ref 0–0.4)
EOSINOPHIL NFR BLD: 4 % (ref 0–7)
ERYTHROCYTE [DISTWIDTH] IN BLOOD BY AUTOMATED COUNT: 13.3 % (ref 11.5–14.5)
GLOBULIN SER CALC-MCNC: 2.8 G/DL (ref 2–4)
GLUCOSE SERPL-MCNC: 99 MG/DL (ref 65–100)
HBV SURFACE AB SER QL: NONREACTIVE
HBV SURFACE AB SER-ACNC: 3.66 MIU/ML
HBV SURFACE AG SER QL: <0.1 INDEX
HBV SURFACE AG SER QL: NEGATIVE
HCT VFR BLD AUTO: 46.2 % (ref 35–47)
HCV AB SERPL QL IA: NONREACTIVE
HGB BLD-MCNC: 14.6 G/DL (ref 11.5–16)
IMM GRANULOCYTES # BLD AUTO: 0 K/UL (ref 0–0.04)
IMM GRANULOCYTES NFR BLD AUTO: 1 % (ref 0–0.5)
LDH SERPL L TO P-CCNC: 182 U/L (ref 81–246)
LYMPHOCYTES # BLD: 1.6 K/UL (ref 0.8–3.5)
LYMPHOCYTES NFR BLD: 19 % (ref 12–49)
MCH RBC QN AUTO: 30.9 PG (ref 26–34)
MCHC RBC AUTO-ENTMCNC: 31.6 G/DL (ref 30–36.5)
MCV RBC AUTO: 97.9 FL (ref 80–99)
MONOCYTES # BLD: 0.8 K/UL (ref 0–1)
MONOCYTES NFR BLD: 9 % (ref 5–13)
NEUTS SEG # BLD: 5.3 K/UL (ref 1.8–8)
NEUTS SEG NFR BLD: 66 % (ref 32–75)
NRBC # BLD: 0 K/UL (ref 0–0.01)
NRBC BLD-RTO: 0 PER 100 WBC
PLATELET # BLD AUTO: 318 K/UL (ref 150–400)
PMV BLD AUTO: 10.8 FL (ref 8.9–12.9)
POTASSIUM SERPL-SCNC: 4.5 MMOL/L (ref 3.5–5.1)
PROT SERPL-MCNC: 7.3 G/DL (ref 6.4–8.2)
RBC # BLD AUTO: 4.72 M/UL (ref 3.8–5.2)
SODIUM SERPL-SCNC: 138 MMOL/L (ref 136–145)
URATE SERPL-MCNC: 4.2 MG/DL (ref 2.6–6)
WBC # BLD AUTO: 8.1 K/UL (ref 3.6–11)

## 2022-04-27 PROCEDURE — G8400 PT W/DXA NO RESULTS DOC: HCPCS | Performed by: INTERNAL MEDICINE

## 2022-04-27 PROCEDURE — 3017F COLORECTAL CA SCREEN DOC REV: CPT | Performed by: INTERNAL MEDICINE

## 2022-04-27 PROCEDURE — 1090F PRES/ABSN URINE INCON ASSESS: CPT | Performed by: INTERNAL MEDICINE

## 2022-04-27 PROCEDURE — G8417 CALC BMI ABV UP PARAM F/U: HCPCS | Performed by: INTERNAL MEDICINE

## 2022-04-27 PROCEDURE — G9899 SCRN MAM PERF RSLTS DOC: HCPCS | Performed by: INTERNAL MEDICINE

## 2022-04-27 PROCEDURE — G8510 SCR DEP NEG, NO PLAN REQD: HCPCS | Performed by: INTERNAL MEDICINE

## 2022-04-27 PROCEDURE — G8536 NO DOC ELDER MAL SCRN: HCPCS | Performed by: INTERNAL MEDICINE

## 2022-04-27 PROCEDURE — G8427 DOCREV CUR MEDS BY ELIG CLIN: HCPCS | Performed by: INTERNAL MEDICINE

## 2022-04-27 PROCEDURE — 99215 OFFICE O/P EST HI 40 MIN: CPT | Performed by: INTERNAL MEDICINE

## 2022-04-27 PROCEDURE — 1101F PT FALLS ASSESS-DOCD LE1/YR: CPT | Performed by: INTERNAL MEDICINE

## 2022-04-27 NOTE — LETTER
4/27/2022    Patient: David Orozco   YOB: 1955   Date of Visit: 4/27/2022     Kai Reyes MD  [place] [address]  Via In Basket    Dear Kai Reyes MD,      Thank you for referring Ms. David Orozco to [facility] for evaluation. My notes for this consultation are attached. If you have questions, please do not hesitate to call me. I look forward to following your patient along with you.       Sincerely,    Maggie Petersen MD

## 2022-04-27 NOTE — PROGRESS NOTES
[employer]  Oncology Social Work  Encounter     Patient Name:  Nataly Yoder    Medical History: diffuse large b-cell lymphoma     Advance Directives: none on file    Narrative:Social work referral received from Dr. Josiah Oliveira regarding smoking cessation programs. Jenny Kendrick NP leads our smoking cessation programs and advised she would contact the patient to connect to supports.      Thank you,   Suyapa Pedroza LCSW

## 2022-04-27 NOTE — PROGRESS NOTES
Hawk Reagan is a 78 y.o. female  Chief Complaint   Patient presents with    Follow-up     Lymphadenopathy of left cervical region     1. Have you been to the ER, urgent care clinic since your last visit? Hospitalized since your last visit? [facility] 4/22/22    2. Have you seen or consulted any other health care providers outside of the [address] [place] [address] since your last visit? Include any pap smears or colon screening.   No

## 2022-04-27 NOTE — TELEPHONE ENCOUNTER
[address] at [facility]  [phone]    04/27/22 1:03 PM - Attempted to call the patient. There was no answer. Left a voicemail for the patient to call back at their earliest convenience along with the phone number to our office.

## 2022-04-27 NOTE — Clinical Note
Hi, pt interested in quit resources for smoking cessation. Would you mind to reach out to her and let her know?      Thanks,    Dr. Erin Griffin

## 2022-04-28 ENCOUNTER — TELEPHONE (OUTPATIENT)
Dept: ONCOLOGY | Age: 67
End: 2022-04-28

## 2022-04-28 RX ORDER — PREDNISONE 20 MG/1
60 TABLET ORAL DAILY
Qty: 30 TABLET | Refills: 0 | Status: SHIPPED | OUTPATIENT
Start: 2022-04-28 | End: 2022-05-31

## 2022-04-28 NOTE — TELEPHONE ENCOUNTER
[address] [place]  [phone]    04/28/22 11:36 AM - Called and spoke with the patient. Advised her of MD's recommendations. Advised this nurse will call her back once the prescription is signed by Dr. Jamshid Bolton. The patient voiced understanding and gratitude for the call. No further questions or concerns. [address] at [place]  [phone]    04/28/22 12:10 PM - Called and spoke with the patient. Advised the prescription has been sent to the [place] off of [address]. The patient voiced understanding and gratitude for the call. No further questions or concerns.

## 2022-04-28 NOTE — PROGRESS NOTES
Pharmacy Note- Chemotherapy Education    Kristen Arciniega is a  78 y. o.female  diagnosed with diffuse large B-cell lymphoma here today for Cycle 1, Day 1 chemotherapy counseling. Ms. Shannan Amaral is being treated with [facility]. Provided education on side effects, mitigation strategies and premedications - acetaminophen/diphenhydramine (infusion reactions) and anti-emetics both during and after treatment. Side effects of chemotherapy reviewed included s/s infection, anemia, appetite changes, thrombocytopenia, fatigue, hair loss/alopecia, bone pain, skin and nail changes, diarrhea/constipation, flu-like symptoms, blood pressure changes and monitoring, doxorubicin-related body fluid changes, mouth sores, peripheral neuropathy, importance of good hydration and prednisone-related side effects (including jitteriness and insomnia, especially if taken later in the day). Patient given ways to manage these side effects and when to contact office. [employer] Handout of medications provided to patient. Ms. Shannan Amaral verbalized understanding of the information presented and all of the patient's questions were answered.     Roland Light, PharmD, BCPS

## 2022-04-28 NOTE — TELEPHONE ENCOUNTER
[address] at [place]  [phone]    04/28/22 8:35 AM - Called and spoke with the patient. Patient's ID verified x 2. Advised patient of port placement appointment on Friday, 4/29. Patient to arrive at [facility] at 7:00 AM, procedure at 8:00 AM.  Patient is to have a  and be NPO after midnight. Advised patient of PET scheduled for Wednesday, 5/4 at [facility]. Advised of 1:30 PM check-in and 2:00 PM procedure time. Provided address and pre-procedure instructions. Advised patient of ECHO appointment that has been moved to Friday, 5/6. Arrival time of 10:00 AM with procedure time of 10:30 AM.  Advised this will be done at [facility] and provided address and check-in location. She inquired about her treatment schedule. Advised, per informed consent sheet, she is to receive R-CHOP every 21 days for 6 cycles. The patient was confused and thought it was weekly but now understands. Also advised patient she is scheduled for neulasta the day after her chemotherapy treatments, which is a bone marrow stimulant. She then inquired how long it may take for her to start feeling better after beginning treatment. Advised this can vary from person to person, especially when taking side effects into account. The patient voiced understanding and gratitude for the call. No further questions or concerns.

## 2022-04-28 NOTE — TELEPHONE ENCOUNTER
Patient called and stated that she was told that the team was calling in a medication for her to take before her treatment and patient went to her pharmacy to  this medication and no medication had been called in.  Please advise       CB# [phone]

## 2022-04-29 ENCOUNTER — HOSPITAL ENCOUNTER (OUTPATIENT)
Dept: INTERVENTIONAL RADIOLOGY/VASCULAR | Age: 67
Discharge: HOME | End: 2022-04-29
Attending: INTERNAL MEDICINE | Admitting: INTERNAL MEDICINE
Payer: MEDICARE

## 2022-04-29 VITALS
SYSTOLIC BLOOD PRESSURE: 138 MMHG | WEIGHT: 199.4 LBS | TEMPERATURE: 98 F | DIASTOLIC BLOOD PRESSURE: 63 MMHG | HEIGHT: 63 IN | OXYGEN SATURATION: 97 % | BODY MASS INDEX: 35.33 KG/M2 | RESPIRATION RATE: 20 BRPM | HEART RATE: 70 BPM

## 2022-04-29 DIAGNOSIS — C83.31 DIFFUSE LARGE B-CELL LYMPHOMA OF LYMPH NODES OF NECK (HCC): ICD-10-CM

## 2022-04-29 PROCEDURE — 99152 MOD SED SAME PHYS/QHP 5/>YRS: CPT

## 2022-04-29 PROCEDURE — 74011000250 HC RX REV CODE- 250: Performed by: RADIOLOGY

## 2022-04-29 PROCEDURE — 2709999900 HC NON-CHARGEABLE SUPPLY

## 2022-04-29 PROCEDURE — C1788 PORT, INDWELLING, IMP: HCPCS

## 2022-04-29 PROCEDURE — C1894 INTRO/SHEATH, NON-LASER: HCPCS

## 2022-04-29 PROCEDURE — 77030031139 HC SUT VCRL2 J&J -A

## 2022-04-29 PROCEDURE — 36561 INSERT TUNNELED CV CATH: CPT

## 2022-04-29 PROCEDURE — 74011250636 HC RX REV CODE- 250/636: Performed by: RADIOLOGY

## 2022-04-29 PROCEDURE — 99153 MOD SED SAME PHYS/QHP EA: CPT

## 2022-04-29 PROCEDURE — 77030010507 HC ADH SKN DERMBND J&J -B

## 2022-04-29 RX ORDER — CEFAZOLIN SODIUM 1 G/3ML
INJECTION, POWDER, FOR SOLUTION INTRAMUSCULAR; INTRAVENOUS AS NEEDED
Status: DISCONTINUED | OUTPATIENT
Start: 2022-04-29 | End: 2022-04-29 | Stop reason: HOSPADM

## 2022-04-29 RX ORDER — HEPARIN SODIUM 1000 [USP'U]/ML
INJECTION, SOLUTION INTRAVENOUS; SUBCUTANEOUS AS NEEDED
Status: DISCONTINUED | OUTPATIENT
Start: 2022-04-29 | End: 2022-04-29 | Stop reason: HOSPADM

## 2022-04-29 RX ORDER — MIDAZOLAM HYDROCHLORIDE 1 MG/ML
INJECTION, SOLUTION INTRAMUSCULAR; INTRAVENOUS AS NEEDED
Status: DISCONTINUED | OUTPATIENT
Start: 2022-04-29 | End: 2022-04-29 | Stop reason: HOSPADM

## 2022-04-29 RX ORDER — FENTANYL CITRATE 50 UG/ML
INJECTION, SOLUTION INTRAMUSCULAR; INTRAVENOUS AS NEEDED
Status: DISCONTINUED | OUTPATIENT
Start: 2022-04-29 | End: 2022-04-29 | Stop reason: HOSPADM

## 2022-04-29 RX ORDER — LIDOCAINE HYDROCHLORIDE AND EPINEPHRINE 10; 10 MG/ML; UG/ML
INJECTION, SOLUTION INFILTRATION; PERINEURAL AS NEEDED
Status: DISCONTINUED | OUTPATIENT
Start: 2022-04-29 | End: 2022-04-29 | Stop reason: HOSPADM

## 2022-04-29 RX ORDER — LIDOCAINE HYDROCHLORIDE 10 MG/ML
INJECTION INFILTRATION; PERINEURAL AS NEEDED
Status: DISCONTINUED | OUTPATIENT
Start: 2022-04-29 | End: 2022-04-29 | Stop reason: HOSPADM

## 2022-04-29 RX ADMIN — LIDOCAINE HYDROCHLORIDE,EPINEPHRINE BITARTRATE 6 ML: 10; .01 INJECTION, SOLUTION INFILTRATION; PERINEURAL at 10:01

## 2022-04-29 RX ADMIN — HEPARIN SODIUM 500 UNITS: 1000 INJECTION INTRAVENOUS; SUBCUTANEOUS at 10:16

## 2022-04-29 RX ADMIN — CEFAZOLIN SODIUM 2 G: 1 POWDER, FOR SOLUTION INTRAMUSCULAR; INTRAVENOUS at 09:28

## 2022-04-29 RX ADMIN — MIDAZOLAM 2 MG: 1 INJECTION, SOLUTION INTRAMUSCULAR; INTRAVENOUS at 09:37

## 2022-04-29 RX ADMIN — MIDAZOLAM 1 MG: 1 INJECTION, SOLUTION INTRAMUSCULAR; INTRAVENOUS at 09:48

## 2022-04-29 RX ADMIN — FENTANYL CITRATE 50 MCG: 50 INJECTION, SOLUTION INTRAMUSCULAR; INTRAVENOUS at 09:36

## 2022-04-29 RX ADMIN — FENTANYL CITRATE 50 MCG: 50 INJECTION, SOLUTION INTRAMUSCULAR; INTRAVENOUS at 09:30

## 2022-04-29 RX ADMIN — MIDAZOLAM 1 MG: 1 INJECTION, SOLUTION INTRAMUSCULAR; INTRAVENOUS at 09:54

## 2022-04-29 RX ADMIN — LIDOCAINE HYDROCHLORIDE 5 ML: 10 INJECTION, SOLUTION INFILTRATION; PERINEURAL at 09:52

## 2022-04-29 RX ADMIN — MIDAZOLAM 1 MG: 1 INJECTION, SOLUTION INTRAMUSCULAR; INTRAVENOUS at 09:43

## 2022-04-29 RX ADMIN — LIDOCAINE HYDROCHLORIDE 10 ML: 10 INJECTION, SOLUTION INFILTRATION; PERINEURAL at 10:00

## 2022-04-29 RX ADMIN — MIDAZOLAM 1 MG: 1 INJECTION, SOLUTION INTRAMUSCULAR; INTRAVENOUS at 09:40

## 2022-04-29 NOTE — DISCHARGE INSTRUCTIONS
Implanted Port: What to Expect at [facility] [address] 37. had a procedure to implant a port. A port is a device placed, in most cases, under the skin of your chest below your collarbone. It is made of plastic, stainless steel, or titanium. It's about the size of a quarter, but thicker. It looks like a small bump under your skin. A thin, flexible tube called a catheter runs under the skin from the port into a large vein. With the port, you will be able to get medicines (such as chemotherapy) with more comfort. You also can get blood, nutrients, or other fluids. Blood can be taken through the port for tests. You will probably have some discomfort and bruising at the port site. This will go away in a few days. The port can be used right away. You may have the port for weeks, months, or longer. Your port will need to be flushed out regularly to keep it open. A nurse or other health professional will do this for you. This care sheet gives you a general idea about how long it will take for you to recover. But each person recovers at a different pace. Follow the steps below to feel better as quickly as possible. How can you care for yourself at home? Activity    · Avoid arm and upper body movements that may pull on the catheter for the first few days. These movements include heavy weight lifting and vigorous use of your arms.     · You will probably need to take 1 day off from work and will be able to return to normal activities shortly after. This depends on the type of work you do, why you have the catheter, and how you feel.     · You probably will be able to take baths and swim. But you may need to avoid some activities. Talk to your doctor about any limits on your activity.     · Ask your doctor when you can drive again. Be careful when you pull your seat belt across your chest so it doesn't pull out the catheter. It's okay if the seat belt lays over the catheter.    Medicines    · Your doctor will tell you if and when you can restart your medicines. He or she will also give you instructions about taking any new medicines.     · If you take aspirin or some other blood thinner, ask your doctor if and when to start taking it again. Make sure that you understand exactly what your doctor wants you to do.     · Be safe with medicines. Read and follow all instructions on the label. ? If the doctor gave you a prescription medicine for pain, take it as prescribed. ? If you are not taking a prescription pain medicine, ask your doctor if you can take an over-the-counter medicine. Incision care    · If you have a bandage, your doctor will tell you when you can remove it. After you remove the bandage, you may shower. Wash the area with soap and water and pat it dry. Don't use hydrogen peroxide or alcohol, which can slow healing. You may cover the area with a gauze bandage if it weeps or rubs against clothing. Change the bandage every day.     · If you have strips of tape on the cut (incision) the doctor made, leave the tape on for a week or until it falls off. Other instructions    · Always carry the medical alert card that your doctor gives you. It contains information about your port. It will tell health care workers that you have a port in case you need emergency care.     · When you get dressed, be careful not to rub the port. Do not wear a bra or suspenders that irritate your skin near the port. Follow-up care is a key part of your treatment and safety. Be sure to make and go to all appointments, and call your doctor if you are having problems. It's also a good idea to know your test results and keep a list of the medicines you take. When should you call for help? Call your doctor now or seek immediate medical care if:    · You have signs of infection, such as:  ? Increased pain, swelling, warmth, or redness. ? Red streaks leading from the area. ? Pus draining from the area.   ? A fever.     · You have pain or swelling in your neck or arm.     · You have trouble breathing. Watch closely for changes in your health, and be sure to contact your doctor if:    · You have any problems with your line or port. Where can you learn more? Go to http://www.gray.com/  Enter M256 in the search box to learn more about \"Implanted Port: What to Expect at Home. \"  Current as of: July 1, 2021               Content Version: 13.2  © 2006-2022 Healthwise, [employer]. Care instructions adapted under license by [employer] (which disclaims liability or warranty for this information). If you have questions about a medical condition or this instruction, always ask your healthcare professional. [address] any warranty or liability for your use of this information.

## 2022-04-29 NOTE — ROUTINE PROCESS
7:59 AM  Patient arrived. ID and allergies verified verbally with patient. Pt voices understanding of procedure to be performed. Consent obtained. Pt prepped for procedure. 9:21 AM  TRANSFER - OUT REPORT:    Verbal report given to Tino(name) on Elise Bruner  being transferred to IR(unit) for ordered procedure       Report consisted of patients Situation, Background, Assessment and   Recommendations(SBAR). Information from the following report(s) SBAR was reviewed with the receiving nurse. Lines:   Peripheral IV 04/29/22 Anterior; Left Forearm (Active)   Site Assessment Clean, dry, & intact 04/29/22 0829   Phlebitis Assessment 0 04/29/22 0829   Infiltration Assessment 0 04/29/22 0829   Dressing Status Clean, dry, & intact 04/29/22 0829   Dressing Type Transparent 04/29/22 0829   Hub Color/Line Status Blue 04/29/22 2292        Opportunity for questions and clarification was provided. Patient transported with:   Tech    10:25 AM  TRANSFER - IN REPORT:    Verbal report received from [address] [place] [address] rn(name) on Elise Bruner  being received from IR(unit) for routine post - op      Report consisted of patients Situation, Background, Assessment and   Recommendations(SBAR). Information from the following report(s) SBAR and Procedure Summary was reviewed with the receiving nurse. Opportunity for questions and clarification was provided. Assessment completed upon patients arrival to unit and care assumed. 10:40 AM  Discharge instructions reviewed with patient and family. Voiced understanding. Patient given copy of discharge instructions to take home. 10:55 AM  Patient ambulates in hallway or on unit. 11:00 AM  Pt discharged via wheelchair with friend. Personal belongings with patient upon discharge.

## 2022-04-29 NOTE — PROGRESS NOTES
TRANSFER - OUT REPORT:    Verbal report given to marshal(name) on [facility]       Report consisted of patient's Situation, Background, Assessment and   Recommendations(SBAR). Information from the following report(s) SBAR was reviewed with the receiving nurse. Opportunity for questions and clarification was provided.       Patient transported with:   Registered Nurse

## 2022-04-29 NOTE — H&P
Interventional Radiology History and Physical      Patient: Leyla Maloney 78 y.o. female  [id number]    Consult Requested by: João Guzman MD    Chief Complaint: B cell lymphoma    History of Present Illness: 77 yo female with recently diagnosed B cell lyphoma referred to Interventional Radiology for portacath placement.     History:  Past Medical History:   Diagnosis Date    Adverse effect of anesthesia     PHLEGM IN THROAT POST OP & NEEDED X2 BREATHING TREATMENTS    Anxiety     COPD (chronic obstructive pulmonary disease) ([address] 2022    emphysema    Depression     GERD (gastroesophageal reflux disease)     Hernia, femoral     since childhood    Hypertension     Joint pain     Nausea & vomiting     TMJ arthritis      Family History   Problem Relation Age of Onset    Heart Disease Mother 46    Heart Surgery Mother         HEART TRANSPLANT    Elevated Lipids Mother     Hypertension Mother     COPD Father     Emphysema Father     Sudden Death Brother 46    Other Maternal Grandmother         BRAIN TUMOR    No Known Problems Son     No Known Problems Daughter      Social History     Socioeconomic History    Marital status:      Spouse name: Not on file    Number of children: Not on file    Years of education: Not on file    Highest education level: Not on file   Occupational History    Not on file   Tobacco Use    Smoking status: Current Every Day Smoker     Packs/day: 1.00     Years: 40.00     Pack years: 40.00     Types: Cigarettes    Smokeless tobacco: Never Used    Tobacco comment: 0.5-1 pack per day   Vaping Use    Vaping Use: Never used   Substance and Sexual Activity    Alcohol use: Not Currently     Alcohol/week: 0.0 standard drinks    Drug use: Never    Sexual activity: Not Currently     Partners: Male   Other Topics Concern    Not on file   Social History Narrative    Not on file     Social Determinants of Health     Financial Resource Strain:     Difficulty of Paying Living Expenses: Not on file   Food Insecurity:     Worried About Running Out of Food in the Last Year: Not on file    Ran Out of Food in the Last Year: Not on file   Transportation Needs:     Lack of Transportation (Medical): Not on file    Lack of Transportation (Non-Medical): Not on file   Physical Activity:     Days of Exercise per Week: Not on file    Minutes of Exercise per Session: Not on file   Stress:     Feeling of Stress : Not on file   Social Connections:     Frequency of Communication with Friends and Family: Not on file    Frequency of Social Gatherings with Friends and Family: Not on file    Attends Denominational Services: Not on file    Active Member of [address] [place] [address] [employer] or Organizations: Not on file    Attends Club or Organization Meetings: Not on file    Marital Status: Not on file   Intimate Partner Violence:     Fear of Current or Ex-Partner: Not on file    Emotionally Abused: Not on file    Physically Abused: Not on file    Sexually Abused: Not on file   Housing Stability:     Unable to Pay for Housing in the Last Year: Not on file    Number of [place] in the Last Year: Not on file    Unstable Housing in the Last Year: Not on file       Allergies: No Known Allergies    Current Medications:  No current facility-administered medications for this encounter. Review of Systems:  Patient reports some dyspnea, nausea without vomiting, and abdominal discomfort. Patient denies fever, chills, cough, headache, vision changes, difficulty swallowing, chest pain, vomiting, changes in bladder or bowel habits, extremity weakness, numbness, tingling or swelling. The remainder of the review of systems is negative for any additional contributing elements. Physical Exam:  Blood pressure (!) 152/64, pulse 66, temperature 98 °F (36.7 °C), resp. rate 13, height 5' 3\" (1.6 m), weight 90.4 kg (199 lb 6.4 oz), SpO2 97 %, not currently breastfeeding.   GENERAL: alert, cooperative, no distress, appears stated age,   LUNG: clear to auscultation bilaterally,   HEART: regular rate and rhythm,   ABDOMEN: soft, non-tender. Bowel sounds normal.   EXTREMITIES:  extremities normal, atraumatic, no cyanosis or edema,   NEUROLOGIC: AOx3. Cranial nerves 2-12 and sensation grossly intact. Laboratory:      Recent Labs     04/27/22  1223   HGB 14.6   HCT 46.2   WBC 8.1      BUN 13   CREA 0.92   K 4.5       Imaging:  No results found. Impression/Plan:  Patient has been evaluated and deemed an appropriate candidate for intravenous sedation. Based on history and presentation she is a candidate for image-guided portacath placement. The above procedure was explained to the patient/consenting party. Benefits, risks and alternative therapies reviewed and all questions answered to her satisfaction. At this time she wishes to proceed. Please note that Dr. Marylu Oconnor participated in the provision of these services. We appreciate the kind consultation and the opportunity to participate in [facility]- PSYCHIATRY & ADDICTIVE MED care.         Gene Champion PA-C  Interventional Radiology  [facility].  [address] [place] [address]  [phone]  [id number] [place] [facility] [phone]      CC: Stephany Burdick MD

## 2022-05-02 RX ORDER — EPINEPHRINE 1 MG/ML
0.3 INJECTION, SOLUTION, CONCENTRATE INTRAVENOUS AS NEEDED
Status: CANCELLED | OUTPATIENT
Start: 2022-05-09

## 2022-05-02 RX ORDER — SODIUM CHLORIDE 9 MG/ML
100 INJECTION, SOLUTION INTRAVENOUS CONTINUOUS
Status: CANCELLED | OUTPATIENT
Start: 2022-05-09

## 2022-05-02 RX ORDER — HEPARIN 100 UNIT/ML
300-500 SYRINGE INTRAVENOUS AS NEEDED
Status: CANCELLED
Start: 2022-05-09

## 2022-05-02 RX ORDER — DIPHENHYDRAMINE HYDROCHLORIDE 50 MG/ML
25 INJECTION, SOLUTION INTRAMUSCULAR; INTRAVENOUS AS NEEDED
Status: CANCELLED
Start: 2022-05-09

## 2022-05-02 RX ORDER — DOXORUBICIN HYDROCHLORIDE 2 MG/ML
50 INJECTION, SOLUTION INTRAVENOUS ONCE
Status: CANCELLED
Start: 2022-05-09 | End: 2022-05-09

## 2022-05-02 RX ORDER — HYDROCORTISONE SODIUM SUCCINATE 100 MG/2ML
100 INJECTION, POWDER, FOR SOLUTION INTRAMUSCULAR; INTRAVENOUS AS NEEDED
Status: CANCELLED | OUTPATIENT
Start: 2022-05-09

## 2022-05-02 RX ORDER — DIPHENHYDRAMINE HYDROCHLORIDE 50 MG/ML
50 INJECTION, SOLUTION INTRAMUSCULAR; INTRAVENOUS ONCE
Status: CANCELLED
Start: 2022-05-09 | End: 2022-05-09

## 2022-05-02 RX ORDER — ACETAMINOPHEN 325 MG/1
650 TABLET ORAL AS NEEDED
Status: CANCELLED
Start: 2022-05-09

## 2022-05-02 RX ORDER — ONDANSETRON 2 MG/ML
8 INJECTION INTRAMUSCULAR; INTRAVENOUS AS NEEDED
Status: CANCELLED | OUTPATIENT
Start: 2022-05-09

## 2022-05-02 RX ORDER — SODIUM CHLORIDE 0.9 % (FLUSH) 0.9 %
10 SYRINGE (ML) INJECTION AS NEEDED
Status: CANCELLED | OUTPATIENT
Start: 2022-05-09

## 2022-05-02 RX ORDER — ACETAMINOPHEN 325 MG/1
650 TABLET ORAL ONCE
Status: CANCELLED
Start: 2022-05-09 | End: 2022-05-09

## 2022-05-02 RX ORDER — PALONOSETRON 0.05 MG/ML
0.25 INJECTION, SOLUTION INTRAVENOUS ONCE
Status: CANCELLED | OUTPATIENT
Start: 2022-05-09 | End: 2022-05-09

## 2022-05-02 RX ORDER — ALBUTEROL SULFATE 0.83 MG/ML
2.5 SOLUTION RESPIRATORY (INHALATION) AS NEEDED
Status: CANCELLED
Start: 2022-05-09

## 2022-05-02 RX ORDER — SODIUM CHLORIDE 9 MG/ML
10 INJECTION INTRAMUSCULAR; INTRAVENOUS; SUBCUTANEOUS AS NEEDED
Status: CANCELLED | OUTPATIENT
Start: 2022-05-09

## 2022-05-02 RX ORDER — DIPHENHYDRAMINE HYDROCHLORIDE 50 MG/ML
50 INJECTION, SOLUTION INTRAMUSCULAR; INTRAVENOUS AS NEEDED
Status: CANCELLED
Start: 2022-05-09

## 2022-05-02 RX ORDER — SODIUM CHLORIDE 9 MG/ML
25 INJECTION, SOLUTION INTRAVENOUS CONTINUOUS
Status: CANCELLED | OUTPATIENT
Start: 2022-05-09

## 2022-05-04 ENCOUNTER — HOSPITAL ENCOUNTER (OUTPATIENT)
Dept: PET IMAGING | Age: 67
Discharge: HOME | End: 2022-05-04
Attending: INTERNAL MEDICINE
Payer: MEDICARE

## 2022-05-04 VITALS — BODY MASS INDEX: 35.26 KG/M2 | WEIGHT: 199 LBS | HEIGHT: 63 IN

## 2022-05-04 DIAGNOSIS — C83.31 DIFFUSE LARGE B-CELL LYMPHOMA OF LYMPH NODES OF NECK (HCC): ICD-10-CM

## 2022-05-04 LAB
GLUCOSE BLD STRIP.AUTO-MCNC: 98 MG/DL (ref 65–117)
SERVICE CMNT-IMP: NORMAL

## 2022-05-04 PROCEDURE — A9552 F18 FDG: HCPCS

## 2022-05-04 RX ORDER — FLUDEOXYGLUCOSE F-18 200 MCI/ML
10 INJECTION INTRAVENOUS ONCE
Status: COMPLETED | OUTPATIENT
Start: 2022-05-04 | End: 2022-05-04

## 2022-05-04 RX ADMIN — FLUDEOXYGLUCOSE F-18 10.88 MILLICURIE: 200 INJECTION INTRAVENOUS at 13:43

## 2022-05-06 ENCOUNTER — TELEPHONE (OUTPATIENT)
Dept: ONCOLOGY | Age: 67
End: 2022-05-06

## 2022-05-06 ENCOUNTER — HOSPITAL ENCOUNTER (OUTPATIENT)
Dept: NON INVASIVE DIAGNOSTICS | Age: 67
Discharge: HOME | End: 2022-05-06
Attending: INTERNAL MEDICINE
Payer: MEDICARE

## 2022-05-06 DIAGNOSIS — Z79.899 HIGH RISK MEDICATION USE: ICD-10-CM

## 2022-05-06 LAB
ECHO AV PEAK GRADIENT: 11 MMHG
ECHO AV PEAK VELOCITY: 1.7 M/S
ECHO AV VELOCITY RATIO: 0.65
ECHO LA VOL 4C: 39 ML (ref 22–52)
ECHO LVOT PEAK GRADIENT: 5 MMHG
ECHO LVOT PEAK VELOCITY: 1.1 M/S
ECHO MV A VELOCITY: 0.92 M/S
ECHO MV E VELOCITY: 0.85 M/S
ECHO MV E/A RATIO: 0.92
ECHO RV INTERNAL DIMENSION: 3.1 CM
ECHO RV TAPSE: 2 CM (ref 1.7–?)

## 2022-05-06 PROCEDURE — 93306 TTE W/DOPPLER COMPLETE: CPT | Performed by: INTERNAL MEDICINE

## 2022-05-06 PROCEDURE — 93306 TTE W/DOPPLER COMPLETE: CPT

## 2022-05-06 PROCEDURE — 93356 MYOCRD STRAIN IMG SPCKL TRCK: CPT | Performed by: INTERNAL MEDICINE

## 2022-05-09 ENCOUNTER — OFFICE VISIT (OUTPATIENT)
Dept: ONCOLOGY | Age: 67
End: 2022-05-09
Payer: MEDICARE

## 2022-05-09 VITALS
HEART RATE: 77 BPM | WEIGHT: 209 LBS | HEIGHT: 63 IN | SYSTOLIC BLOOD PRESSURE: 172 MMHG | TEMPERATURE: 96.4 F | OXYGEN SATURATION: 97 % | RESPIRATION RATE: 20 BRPM | BODY MASS INDEX: 37.03 KG/M2 | DIASTOLIC BLOOD PRESSURE: 77 MMHG

## 2022-05-09 DIAGNOSIS — C79.51 METASTATIC CANCER TO BONE (HCC): ICD-10-CM

## 2022-05-09 DIAGNOSIS — N28.9 RENAL LESION: ICD-10-CM

## 2022-05-09 DIAGNOSIS — C83.31 DIFFUSE LARGE B-CELL LYMPHOMA OF LYMPH NODES OF NECK (HCC): Primary | ICD-10-CM

## 2022-05-09 DIAGNOSIS — Z95.828 PORT-A-CATH IN PLACE: ICD-10-CM

## 2022-05-09 DIAGNOSIS — G89.3 CANCER RELATED PAIN: ICD-10-CM

## 2022-05-09 DIAGNOSIS — Z51.11 ENCOUNTER FOR ANTINEOPLASTIC CHEMOTHERAPY: ICD-10-CM

## 2022-05-09 PROCEDURE — G9899 SCRN MAM PERF RSLTS DOC: HCPCS | Performed by: INTERNAL MEDICINE

## 2022-05-09 PROCEDURE — 3017F COLORECTAL CA SCREEN DOC REV: CPT | Performed by: INTERNAL MEDICINE

## 2022-05-09 PROCEDURE — G8427 DOCREV CUR MEDS BY ELIG CLIN: HCPCS | Performed by: INTERNAL MEDICINE

## 2022-05-09 PROCEDURE — G8400 PT W/DXA NO RESULTS DOC: HCPCS | Performed by: INTERNAL MEDICINE

## 2022-05-09 PROCEDURE — G8417 CALC BMI ABV UP PARAM F/U: HCPCS | Performed by: INTERNAL MEDICINE

## 2022-05-09 PROCEDURE — 99215 OFFICE O/P EST HI 40 MIN: CPT | Performed by: INTERNAL MEDICINE

## 2022-05-09 PROCEDURE — 1090F PRES/ABSN URINE INCON ASSESS: CPT | Performed by: INTERNAL MEDICINE

## 2022-05-09 PROCEDURE — G8536 NO DOC ELDER MAL SCRN: HCPCS | Performed by: INTERNAL MEDICINE

## 2022-05-09 PROCEDURE — G8432 DEP SCR NOT DOC, RNG: HCPCS | Performed by: INTERNAL MEDICINE

## 2022-05-09 PROCEDURE — 1101F PT FALLS ASSESS-DOCD LE1/YR: CPT | Performed by: INTERNAL MEDICINE

## 2022-05-09 RX ORDER — PROCHLORPERAZINE MALEATE 10 MG
10 TABLET ORAL
Qty: 50 TABLET | Refills: 1 | Status: SHIPPED | OUTPATIENT
Start: 2022-05-09 | End: 2022-06-14 | Stop reason: SDUPTHER

## 2022-05-09 RX ORDER — ONDANSETRON 8 MG/1
8 TABLET, ORALLY DISINTEGRATING ORAL
Qty: 50 TABLET | Refills: 1 | Status: SHIPPED | OUTPATIENT
Start: 2022-05-09 | End: 2022-07-19 | Stop reason: SDUPTHER

## 2022-05-09 RX ORDER — LIDOCAINE AND PRILOCAINE 25; 25 MG/G; MG/G
CREAM TOPICAL AS NEEDED
Qty: 30 G | Refills: 0 | Status: SHIPPED
Start: 2022-05-09 | End: 2022-05-15

## 2022-05-09 RX ORDER — POLYETHYLENE GLYCOL 3350 17 G/17G
17 POWDER, FOR SOLUTION ORAL DAILY
Qty: 90 EACH | Refills: 1 | Status: ON HOLD | OUTPATIENT
Start: 2022-05-09 | End: 2022-08-18

## 2022-05-09 RX ORDER — DOCUSATE SODIUM 100 MG/1
100 CAPSULE, LIQUID FILLED ORAL 2 TIMES DAILY
Qty: 60 CAPSULE | Refills: 2 | Status: SHIPPED | OUTPATIENT
Start: 2022-05-09 | End: 2022-08-07

## 2022-05-09 RX ORDER — OXYCODONE HYDROCHLORIDE 5 MG/1
5 TABLET ORAL
Qty: 50 TABLET | Refills: 0 | Status: SHIPPED
Start: 2022-05-09 | End: 2022-05-15

## 2022-05-09 NOTE — PROGRESS NOTES
Nereida Moe is a 78 y.o. female follow up for lymphoma. 1. Have you been to the ER, urgent care clinic since your last visit? Hospitalized since your last visit?no     2. Have you seen or consulted any other health care providers outside of the [address] [place] [address] since your last visit? Include any pap smears or colon screening.  no

## 2022-05-09 NOTE — PROGRESS NOTES
[facility] [place] [address]  [address] [facility], [facility] [place]: [phone]  F: [phone] Patient ID  Name: Barbara Fenton  YOB: 1955  MRN: [id number]  Referring Provider:   No referring provider defined for this encounter. Primary Care Provider:   King Bansal MD       HEMATOLOGY/MEDICAL ONCOLOGY  NOTE   Date of Visit: 05/09/22  Reason for Evaluation:     Chief Complaint   Patient presents with    Lymphoma     Hematology/Oncology Summary:  Please review original records for clinical decision making. This summary highlights focused aspects of patient's ongoing care and may have a recurring section in notes with either updates or remain unchanged as a longitudinal care summary. -------------------------------------------------------------------------------------------------------------------------------------------------------------------------------------------------------  DIAGNOSIS:   Diffuse Large B-Cell Lymphoma    ORIGINAL STAGING:   Stage IV    SITES OF DISEASE:   Left Cervical Lymph Node,Bone Disease, Stage IV    CURRENT TREATMENT:   Plan for DA-R-EPOCH    PRIOR TREATMENT:   n/a    GOALS OF CARE:   Curative Intent    PATHOLOGY:   Specimen #:[id number] Collect: 4/22/2022      ==========================================================================                    * * *SURGICAL PATHOLOGY REPORT* * *   ==========================================================================   * * *PROCEDURES/ADDENDA* * *   ADDENDUM   Date Ordered: 4/26/2022     Status: Signed Out   Date Complete: 4/26/2022     By: Samreen Gomez MD   Date Reported: 4/26/2022   Addendum Diagnosis   Lymph node, left posterior cervical lymph node, excision:   In situ hybridization for Jesús-Barr viral RNA: Negative   CPT: 43974  Addendum Comment   * * *CLINICAL HISTORY* * *   Cervical lymphadenopathy, rule out lymphoma ==========================================================================   * * *FINAL PATHOLOGIC DIAGNOSIS* * *        Lymph node, left posterior cervical lymph node, excision:        Large B cell lymphoma. See comment. * * *Comment* * *   The histologic section has fragments of lymphoid tissue with diffuse and   focal follicular architecture. Diffuse areas are comprised of large,   atypical lymphocytes with centroblastic morphology and increased mitotic   activity. Immunohistochemical stains confirm the malignant cells are CD20   positive B cells that coexpress CD10 and BCL6 without MUM1. CD23   highlights rare follicular dendritic networks associated with lymphoid   follicles with germinal centers and express CD10 and BCL6 without BCL2. CyclinD1 and CD56 stains are negative. Concurrent flow cytometric analysis   confirms a clonal CD10 positive B-cell population comprised of medium to   large cells. The Ki-67 proliferation index is 30%. The overall findings favor the diagnosis of diffuse large B-cell lymphoma,   germinal center subtype. Molecular genetic studies are pending for further   characterization will be reported in an addendum. Flow cytometry:   Consistent with CD10-positive B-cell lymphoproliferative disorder. Comments: Flow cytometry shows monoclonal B-cells (36% of total cells)   with CD10 expression without CD5, consistent with a CD10-positive B-cell   lymphoproliferative disorder. The main differential diagnosis includes   follicular lymphoma, Burkitt lymphoma and large B-cell lymphoma. Please   correlate the result with morphologic findings and clinical information. Flow Differential (%) and Population Analysis:   Lymphocytes: 93.7%   T-cells (39% of lymphoid cells) show a CD4/CD8 ratio of 3.3 without overt   phenotypic abnormality. NK-cells (1% of lymphoid cells) are unremarkable.    Mature B-cells (56% of lymphoid cells) include large forms based on   forward scattered pattern and show: CD5 neg, CD10+, CD11c+dim, CD19+,   CD20+ with surface lambda light chain restriction. Markers Performed: CD2, CD3, CD4, CD5, CD7, CD8, CD10, CD11c, CD19, CD20,   CD23, CD34, CD38, CD45, CD56, Kappa, Lambda (17 Markers)   The Technical Component Processing of this test was completed at   [facility], [address] [place] [address], [place], [place] / [id number] /   [phone] / Carley Rodriguez # [id number].  Interpretation by Delgado San M.D. The   complete scanned report is available in Epic. CPT: N4394478, C1569380, A2707245, [id number], [id number], [id number]   [facility]2/2022   *Electronically Signed Out By Faith Albarado. Leander Dominguez MD*   ==========================================================================   * * *Gross Description* * *   Specimen #1, received fresh labeled with the patient's name,  and left   posterior cervical lymph node, consists of two paper towels containing a   1.7 x 1.5 x 0.5 cm aggregate of pale pink-tan, fleshy soft tissue   fragments. The specimen is handled according to the flow cytometry   protocol as follows:   7          Two air-dried touch prep slides - one Diff Quik stained, one   rapid-fixed in 95% alcohol   7          Representative sections in B plus fixative (1A)   7          One piece held in RPMI for possible flow cytometry analysis   7          Remaining tissue fixed in formalin for permanents (1B)   Dr. Leander Dominguez will determine if flow cytometry is necessary. 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 F who presents for a follow-up evaluation for Diffuse Large B-Cell Lymphoma. 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 \"  Patient overall reports feeling worse. Having pain in legs. Still ambulatory. -  Fatigue:Grade 2  Nausea:Grade 1  Hot Flashes:Grade 1  Insomnia:Grade 3  Cognition:Grade 1  Anxiety:Grade 1  Pain:hip right foot left 5 of 10.    Mouth Sores:Grade 1  Cough:Grade 1  Shortness of Breath:Grade 1  Itching:Grade 1  Headache:Grade 1  -    Past Medical History:   Diagnosis Date    Adverse effect of anesthesia     PHLEGM IN THROAT POST OP & NEEDED X2 BREATHING TREATMENTS    Anxiety     COPD (chronic obstructive pulmonary disease) ([facility] [address] 2022    emphysema    Depression     GERD (gastroesophageal reflux disease)     Hernia, femoral     since childhood    Hypertension     Joint pain     Nausea & vomiting     TMJ arthritis       Past Surgical History:   Procedure Laterality Date    HX HYSTERECTOMY  2019    HX TONSILLECTOMY     History of Salivary Gland Surgery in 30's for evaluation for dry mouth. Social History     Tobacco Use    Smoking status: Current Every Day Smoker     Packs/day: 1.00     Years: 40.00     Pack years: 40.00     Types: Cigarettes    Smokeless tobacco: Never Used    Tobacco comment: 0.5-1 pack per day   Substance Use Topics    Alcohol use: Not Currently     Alcohol/week: 0.0 standard drinks      Family History   Problem Relation Age of Onset    Heart Disease Mother 46    Heart Surgery Mother         HEART TRANSPLANT    Elevated Lipids Mother     Hypertension Mother     COPD Father     Emphysema Father     Sudden Death Brother 46    Other Maternal Grandmother         BRAIN TUMOR    No Known Problems Son     No Known Problems Daughter      Current Outpatient Medications   Medication Sig    predniSONE (DELTASONE) 20 mg tablet Take 60 mg by mouth daily.  gabapentin (NEURONTIN) 300 mg capsule Take 1 Capsule by mouth daily as needed for Pain.  diclofenac EC (VOLTAREN) 50 mg EC tablet Take 1 Tablet by mouth two (2) times daily as needed for Pain.  FLUoxetine (PROzac) 40 mg capsule TAKE 1 CAPSULE BY MOUTH EVERY MORNING (Patient taking differently: Take 40 mg by mouth every morning.  TAKE 1 CAPSULE BY MOUTH EVERY MORNING)    amLODIPine-benazepril (LOTREL) 5-20 mg per capsule TAKE 1 CAPSULE BY MOUTH EVERY MORNING (Patient taking differently: Take 1 Capsule by mouth every morning. TAKE 1 CAPSULE BY MOUTH EVERY MORNING)     No current facility-administered medications for this visit. No Known Allergies   -  Review of Systems Provided by:  Patient  Review of Systems: A complete review of systems was obtained, reviewed. Pertinent findings reviewed above. Objective:     Visit Vitals  BP (!) 172/77 (BP 1 Location: Right arm, BP Patient Position: Sitting, BP Cuff Size: Large adult)   Pulse 77   Temp (!) 96.4 °F (35.8 °C) (Oral)   Resp 20   Ht 5' 3\" (1.6 m)   Wt 209 lb (94.8 kg)   SpO2 97%   BMI 37.02 kg/m²     ECOG PS: 1- Restricted in physically strenuous activity but ambulatory and able to carry out work of a light or sedentary nature, e.g., light house work, office work. Physical Exam  Constitutional: No acute distress. and Non-toxic appearance. HENT: Normocephalic and atraumatic head. and Wearing a mask during COVID-19 precautions. Eyes: Normal Conjunctivae. Anicteric sclerae. Cardiovascular: S1,S2 auscultated. No pitting edema. Pulmonary: Normal Respiratory Effort. No wheezing. No rhonchi. No rales. Abdominal: Normal bowel sounds. Soft Abdomen to palpation. No abdominal tenderness. No guarding. No rebound tenderness. Skin: No jaundice. No petechiae. Left scalp lesion. No pus discharge. Right chest port without redness or swelling. Musculoskeletal: No muscle pain on palpation. No temporal muscle wasting on inspection. Neurological: Alert and oriented. No tremor on inspection. Normal Gait. Psychiatric: mood normal. normal speech rate normal affect    Results:     I personally reviewed Epic EHR labs/results below:   Lab Results   Component Value Date/Time    WBC 8.1 04/27/2022 12:23 PM    HGB 14.6 04/27/2022 12:23 PM    HCT 46.2 04/27/2022 12:23 PM    PLATELET 839 69/82/1267 12:23 PM    MCV 97.9 04/27/2022 12:23 PM    ABS.  NEUTROPHILS 5.3 04/27/2022 12:23 PM     Lab Results   Component Value Date/Time    Sodium 138 04/27/2022 12:23 PM    Potassium 4.5 04/27/2022 12:23 PM    Chloride 106 04/27/2022 12:23 PM    CO2 25 04/27/2022 12:23 PM    Glucose 99 04/27/2022 12:23 PM    BUN 13 04/27/2022 12:23 PM    Creatinine 0.92 04/27/2022 12:23 PM    GFR est AA >60 04/27/2022 12:23 PM    GFR est non-AA >60 04/27/2022 12:23 PM    Calcium 9.7 04/27/2022 12:23 PM    Glucose (POC) 98 05/04/2022 01:37 PM     Lab Results   Component Value Date/Time    Bilirubin, total 0.4 04/27/2022 12:23 PM    ALT (SGPT) 29 04/27/2022 12:23 PM    Alk. phosphatase 79 04/27/2022 12:23 PM    Protein, total 7.3 04/27/2022 12:23 PM    Albumin 4.5 04/27/2022 12:23 PM    Globulin 2.8 04/27/2022 12:23 PM     Lab Results   Component Value Date/Time    Hepatitis B surface Ag <0.10 04/27/2022 12:23 PM    Hepatitis B surface Ab 3.66 04/27/2022 12:23 PM         Assessment and Recommendations:     1. Diffuse large B-cell lymphoma of lymph nodes of neck (HCC)  -Proceed this week for inpatient hospitalization for Dose-Adjusted R-EPOCH (DA-R-EPOCH) infusional therapy.  -We discussed that we will arrange for inpatient hospitalization in order to enact curative intent treatment that will require 24 hour infusions.  -She understood that she will proceed with treatment tentatively planned now for Wednesday. Will plan for:    Prescribed the following:  - oxyCODONE IR (ROXICODONE) 5 mg immediate release tablet; Take 1 Tablet by mouth every six (6) hours as needed for Pain (Do not take if groggy. Call if no stoolings within 48 hours. take stool softener/laxative) for up to 14 days. Max Daily Amount: 20 mg. Dispense: 50 Tablet; Refill: 0  - ondansetron (ZOFRAN ODT) 8 mg disintegrating tablet; Take 1 Tablet by mouth every eight (8) hours as needed for Nausea or Vomiting. Dispense: 50 Tablet; Refill: 1  - prochlorperazine (COMPAZINE) 10 mg tablet; Take 1 Tablet by mouth every six (6) hours as needed for Nausea (do not take if groggy; take if nausea despite zofran).   Dispense: 50 Tablet; Refill: 1  - lidocaine-prilocaine (EMLA) topical cream; Apply  to affected area as needed for Pain. Dispense: 30 g; Refill: 0    I spoke with [facility] Lymph[facility] specialist who agreed with DA-R-EPOCH. We discussed that 2x/weekly labs will be needed to determine clara counts to guide future cycles dosing of DA-R-EPOCH. We discussed the hope that her disease will respond since her PET/CT still had lower than expected avidity and path Ki67 that would be expected for aggressive triple hit lymphoma. We reviewed at some point refractory disease or relapse within 12 months may warrant CAR T-Cell therapy as this is considered a High Grade B-Cell Lymphoma. 2. Encounter for antineoplastic chemotherapy    Today, we have consented her for DA-R-EPOCH which will involve treamtent administered in the hospital.   riTUXimab-pvvr (RUXIENCE) 769 mg in 0.9% sodium chloride 769 mL infusion       predniSONE (DELTASONE) tablet 123 mg       etoposide (VEPESID) 102.6 mg in 0.9% sodium chloride 500 mL chemo infusion       vinCRIStine (VINCASAR PFS) 0.8 mg,       DOXOrubicin (ADRIAMYCIN) 20.6 mg in 0.9% sodium chloride 250 mL chemo infusion       cyclophosphamide (CYTOXAN) 1,538 mg in 0.9% sodium chloride 250 mL chemo infusion    Provided discussion about etoposide and provided handout from [employer]. "[employer] for etoposide. We discussed that etoposide can place patients at risk for hair loss, mouth sores,nausea/vomiting, low blood pressure with faster infusions,diarrhea, anorexia,taste changes, neuropathy. We discussed the rare risk of leukemia and rare risk of anaphylactic shock. We discussed the risk of end-organ damage with etoposide. She has been previously consent for the remained of R-EPOCH with prior R-CHOP consent discussion. 3. Renal lesion  Discussed that scalp,bone lesions,and renal lesions seem more consistent with her triple hit lymphoma disease.     4. Metastatic cancer to bone [facility])  Have prescribed her opioid pain medication. She is having hip pain. We discussed upfront treatment with chemotherapy but ultimately if she has difficulty then may need an orthopedics consult for her femur lesions. 5. Cancer related pain  - oxyCODONE IR (ROXICODONE) 5 mg immediate release tablet; Take 1 Tablet by mouth every six (6) hours as needed for Pain (Do not take if groggy. Call if no stoolings within 48 hours. take stool softener/laxative) for up to 14 days. Max Daily Amount: 20 mg. Dispense: 50 Tablet; Refill: 0  - docusate sodium (COLACE) 100 mg capsule; Take 1 Capsule by mouth two (2) times a day for 90 days. Dispense: 60 Capsule; Refill: 2  - polyethylene glycol (MIRALAX) 17 gram packet; Take 1 Packet by mouth daily. Dispense: 90 Each; Refill: 1  - REFERRAL TO PALLIATIVE MEDICINE  Counseled about the risks of opioids (rspriatory arrest,bowel perforation). She signed pain contract. Will refer to palliative care as well in the event her pain remains uncontrolled. 6. Port-A-Cath in place  Port site without any redness or swelling.  - lidocaine-prilocaine (EMLA) topical cream; Apply  to affected area as needed for Pain. Dispense: 30 g; Refill: 0  Reviewed radiology report: \"IMPRESSION  Technically successful placement of a single lumen port with the  catheter tip in the right atrium. Catheter is ready for immediate use. \"    FOLLOW-UP:  I let patient know this afternoon that we have a bed for admission on Wednesday.   Janie Akhtar MD  Hematology/Medical Oncology Provider  [facility]  P: [phone]    Signed By:   Nichelle Petty MD

## 2022-05-09 NOTE — LETTER
5/9/2022    Patient: Dia Montoya   YOB: 1955   Date of Visit: 5/9/2022     Gilma Isidro MD  [address]    Dear Gilma Isidro MD,      Thank you for referring Ms. Dia Montoya to [address] [place] [address] for evaluation. My notes for this consultation are attached. If you have questions, please do not hesitate to call me. I look forward to following your patient along with you.       Sincerely,    Dani Stewart MD

## 2022-05-10 ENCOUNTER — TELEPHONE (OUTPATIENT)
Dept: PALLATIVE CARE | Age: 67
End: 2022-05-10

## 2022-05-10 DIAGNOSIS — C83.31 DIFFUSE LARGE B-CELL LYMPHOMA OF LYMPH NODES OF NECK (HCC): ICD-10-CM

## 2022-05-10 DIAGNOSIS — G89.3 CANCER RELATED PAIN: Primary | ICD-10-CM

## 2022-05-10 RX ORDER — DIPHENHYDRAMINE HYDROCHLORIDE 50 MG/ML
50 INJECTION, SOLUTION INTRAMUSCULAR; INTRAVENOUS ONCE
Status: CANCELLED
Start: 2022-05-11 | End: 2022-05-11

## 2022-05-10 RX ORDER — LORAZEPAM 2 MG/ML
0.5 INJECTION INTRAMUSCULAR
Status: CANCELLED
Start: 2022-05-12

## 2022-05-10 RX ORDER — EPINEPHRINE 1 MG/ML
0.3 INJECTION, SOLUTION, CONCENTRATE INTRAVENOUS AS NEEDED
Status: CANCELLED | OUTPATIENT
Start: 2022-05-11

## 2022-05-10 RX ORDER — ALBUTEROL SULFATE 0.83 MG/ML
2.5 SOLUTION RESPIRATORY (INHALATION) AS NEEDED
Status: CANCELLED
Start: 2022-05-11

## 2022-05-10 RX ORDER — HYDROCORTISONE SODIUM SUCCINATE 100 MG/2ML
100 INJECTION, POWDER, FOR SOLUTION INTRAMUSCULAR; INTRAVENOUS AS NEEDED
Status: CANCELLED | OUTPATIENT
Start: 2022-05-11

## 2022-05-10 RX ORDER — DIPHENHYDRAMINE HYDROCHLORIDE 50 MG/ML
25 INJECTION, SOLUTION INTRAMUSCULAR; INTRAVENOUS AS NEEDED
Status: CANCELLED
Start: 2022-05-11

## 2022-05-10 RX ORDER — SULFAMETHOXAZOLE AND TRIMETHOPRIM 800; 160 MG/1; MG/1
1 TABLET ORAL
Status: CANCELLED
Start: 2022-05-11

## 2022-05-10 RX ORDER — OLANZAPINE 2.5 MG/1
5 TABLET ORAL EVERY EVENING
Status: CANCELLED | OUTPATIENT
Start: 2022-05-11

## 2022-05-10 RX ORDER — ACETAMINOPHEN 325 MG/1
650 TABLET ORAL AS NEEDED
Status: CANCELLED
Start: 2022-05-11

## 2022-05-10 RX ORDER — PANTOPRAZOLE SODIUM 40 MG/1
40 TABLET, DELAYED RELEASE ORAL
Status: CANCELLED
Start: 2022-05-11

## 2022-05-10 RX ORDER — ACETAMINOPHEN 325 MG/1
650 TABLET ORAL ONCE
Status: CANCELLED
Start: 2022-05-11 | End: 2022-05-11

## 2022-05-10 RX ORDER — SODIUM CHLORIDE 0.9 % (FLUSH) 0.9 %
10 SYRINGE (ML) INJECTION AS NEEDED
Status: CANCELLED | OUTPATIENT
Start: 2022-05-11

## 2022-05-10 RX ORDER — DIPHENHYDRAMINE HYDROCHLORIDE 50 MG/ML
50 INJECTION, SOLUTION INTRAMUSCULAR; INTRAVENOUS AS NEEDED
Status: CANCELLED
Start: 2022-05-11

## 2022-05-10 RX ORDER — ONDANSETRON 2 MG/ML
8 INJECTION INTRAMUSCULAR; INTRAVENOUS EVERY 8 HOURS
Status: CANCELLED | OUTPATIENT
Start: 2022-05-12

## 2022-05-10 RX ORDER — PREDNISONE 1 MG/1
60 TABLET ORAL 2 TIMES DAILY WITH MEALS
Status: CANCELLED
Start: 2022-05-12

## 2022-05-10 RX ORDER — HEPARIN SODIUM (PORCINE) LOCK FLUSH IV SOLN 100 UNIT/ML 100 UNIT/ML
300-500 SOLUTION INTRAVENOUS AS NEEDED
Status: CANCELLED
Start: 2022-05-11

## 2022-05-10 RX ORDER — ONDANSETRON 2 MG/ML
8 INJECTION INTRAMUSCULAR; INTRAVENOUS AS NEEDED
Status: CANCELLED | OUTPATIENT
Start: 2022-05-11

## 2022-05-11 ENCOUNTER — HOSPITAL ENCOUNTER (INPATIENT)
Age: 67
LOS: 4 days | Discharge: HOME | DRG: 847 | End: 2022-05-15
Attending: INTERNAL MEDICINE | Admitting: INTERNAL MEDICINE
Payer: MEDICARE

## 2022-05-11 DIAGNOSIS — I15.8 OTHER SECONDARY HYPERTENSION: ICD-10-CM

## 2022-05-11 DIAGNOSIS — G89.3 CANCER RELATED PAIN: ICD-10-CM

## 2022-05-11 DIAGNOSIS — C83.31 DIFFUSE LARGE B-CELL LYMPHOMA OF LYMPH NODES OF NECK (HCC): Primary | ICD-10-CM

## 2022-05-11 DIAGNOSIS — G47.01 INSOMNIA DUE TO MEDICAL CONDITION: ICD-10-CM

## 2022-05-11 DIAGNOSIS — F17.200 TOBACCO DEPENDENCE: ICD-10-CM

## 2022-05-11 DIAGNOSIS — Z51.11 ENCOUNTER FOR ANTINEOPLASTIC CHEMOTHERAPY: ICD-10-CM

## 2022-05-11 DIAGNOSIS — C83.38 DIFFUSE LARGE B-CELL LYMPHOMA OF LYMPH NODES OF MULTIPLE REGIONS (HCC): ICD-10-CM

## 2022-05-11 PROBLEM — C83.30 DIFFUSE LARGE B CELL LYMPHOMA (HCC): Status: ACTIVE | Noted: 2022-01-01

## 2022-05-11 LAB
ALBUMIN SERPL-MCNC: 3.7 G/DL (ref 3.5–5)
ALBUMIN/GLOB SERPL: 1.2 {RATIO} (ref 1.1–2.2)
ALP SERPL-CCNC: 58 U/L (ref 45–117)
ALT SERPL-CCNC: 21 U/L (ref 12–78)
ANION GAP SERPL CALC-SCNC: 9 MMOL/L (ref 5–15)
AST SERPL-CCNC: 10 U/L (ref 15–37)
BASOPHILS # BLD: 0.1 K/UL (ref 0–0.1)
BASOPHILS NFR BLD: 1 % (ref 0–1)
BILIRUB SERPL-MCNC: 0.4 MG/DL (ref 0.2–1)
BUN SERPL-MCNC: 24 MG/DL (ref 6–20)
BUN/CREAT SERPL: 27 (ref 12–20)
CALCIUM SERPL-MCNC: 8.7 MG/DL (ref 8.5–10.1)
CHLORIDE SERPL-SCNC: 107 MMOL/L (ref 97–108)
CO2 SERPL-SCNC: 23 MMOL/L (ref 21–32)
COMMENT, HOLDF: NORMAL
CREAT SERPL-MCNC: 0.89 MG/DL (ref 0.55–1.02)
DIFFERENTIAL METHOD BLD: ABNORMAL
EOSINOPHIL # BLD: 0.1 K/UL (ref 0–0.4)
EOSINOPHIL NFR BLD: 2 % (ref 0–7)
ERYTHROCYTE [DISTWIDTH] IN BLOOD BY AUTOMATED COUNT: 13.6 % (ref 11.5–14.5)
GLOBULIN SER CALC-MCNC: 3 G/DL (ref 2–4)
GLUCOSE SERPL-MCNC: 106 MG/DL (ref 65–100)
HCT VFR BLD AUTO: 41 % (ref 35–47)
HGB BLD-MCNC: 13.4 G/DL (ref 11.5–16)
IMM GRANULOCYTES # BLD AUTO: 0.1 K/UL (ref 0–0.04)
IMM GRANULOCYTES NFR BLD AUTO: 1 % (ref 0–0.5)
LDH SERPL L TO P-CCNC: 163 U/L (ref 81–246)
LYMPHOCYTES # BLD: 2.8 K/UL (ref 0.8–3.5)
LYMPHOCYTES NFR BLD: 35 % (ref 12–49)
MCH RBC QN AUTO: 30.7 PG (ref 26–34)
MCHC RBC AUTO-ENTMCNC: 32.7 G/DL (ref 30–36.5)
MCV RBC AUTO: 93.8 FL (ref 80–99)
MONOCYTES # BLD: 1 K/UL (ref 0–1)
MONOCYTES NFR BLD: 12 % (ref 5–13)
NEUTS SEG # BLD: 4 K/UL (ref 1.8–8)
NEUTS SEG NFR BLD: 49 % (ref 32–75)
NRBC # BLD: 0 K/UL (ref 0–0.01)
NRBC BLD-RTO: 0 PER 100 WBC
PHOSPHATE SERPL-MCNC: 3.6 MG/DL (ref 2.6–4.7)
PLATELET # BLD AUTO: 267 K/UL (ref 150–400)
PMV BLD AUTO: 9.5 FL (ref 8.9–12.9)
POTASSIUM SERPL-SCNC: 3.8 MMOL/L (ref 3.5–5.1)
PROT SERPL-MCNC: 6.7 G/DL (ref 6.4–8.2)
RBC # BLD AUTO: 4.37 M/UL (ref 3.8–5.2)
SAMPLES BEING HELD,HOLD: NORMAL
SODIUM SERPL-SCNC: 139 MMOL/L (ref 136–145)
URATE SERPL-MCNC: 5.1 MG/DL (ref 2.6–6)
WBC # BLD AUTO: 8 K/UL (ref 3.6–11)

## 2022-05-11 PROCEDURE — 3E06305 INTRODUCTION OF OTHER ANTINEOPLASTIC INTO CENTRAL ARTERY, PERCUTANEOUS APPROACH: ICD-10-PCS | Performed by: INTERNAL MEDICINE

## 2022-05-11 PROCEDURE — 84550 ASSAY OF BLOOD/URIC ACID: CPT

## 2022-05-11 PROCEDURE — 83615 LACTATE (LD) (LDH) ENZYME: CPT

## 2022-05-11 PROCEDURE — 74011250636 HC RX REV CODE- 250/636: Performed by: INTERNAL MEDICINE

## 2022-05-11 PROCEDURE — 74011250636 HC RX REV CODE- 250/636: Performed by: NURSE PRACTITIONER

## 2022-05-11 PROCEDURE — 84100 ASSAY OF PHOSPHORUS: CPT

## 2022-05-11 PROCEDURE — 65270000029 HC RM PRIVATE

## 2022-05-11 PROCEDURE — 74011000250 HC RX REV CODE- 250: Performed by: NURSE PRACTITIONER

## 2022-05-11 PROCEDURE — 74011250637 HC RX REV CODE- 250/637: Performed by: NURSE PRACTITIONER

## 2022-05-11 PROCEDURE — 74011636637 HC RX REV CODE- 636/637: Performed by: INTERNAL MEDICINE

## 2022-05-11 PROCEDURE — 85025 COMPLETE CBC W/AUTO DIFF WBC: CPT

## 2022-05-11 PROCEDURE — 74011250637 HC RX REV CODE- 250/637: Performed by: INTERNAL MEDICINE

## 2022-05-11 PROCEDURE — 99223 1ST HOSP IP/OBS HIGH 75: CPT | Performed by: INTERNAL MEDICINE

## 2022-05-11 PROCEDURE — 80053 COMPREHEN METABOLIC PANEL: CPT

## 2022-05-11 RX ORDER — SODIUM CHLORIDE 0.9 % (FLUSH) 0.9 %
5-40 SYRINGE (ML) INJECTION EVERY 8 HOURS
Status: DISCONTINUED | OUTPATIENT
Start: 2022-05-11 | End: 2022-05-15 | Stop reason: HOSPADM

## 2022-05-11 RX ORDER — ALBUTEROL SULFATE 0.83 MG/ML
2.5 SOLUTION RESPIRATORY (INHALATION) AS NEEDED
Status: DISCONTINUED | OUTPATIENT
Start: 2022-05-11 | End: 2022-05-15 | Stop reason: HOSPADM

## 2022-05-11 RX ORDER — SODIUM CHLORIDE 0.9 % (FLUSH) 0.9 %
5-40 SYRINGE (ML) INJECTION AS NEEDED
Status: DISCONTINUED | OUTPATIENT
Start: 2022-05-11 | End: 2022-05-15 | Stop reason: HOSPADM

## 2022-05-11 RX ORDER — DIPHENHYDRAMINE HYDROCHLORIDE 50 MG/ML
50 INJECTION, SOLUTION INTRAMUSCULAR; INTRAVENOUS ONCE
Status: COMPLETED | OUTPATIENT
Start: 2022-05-11 | End: 2022-05-11

## 2022-05-11 RX ORDER — POLYETHYLENE GLYCOL 3350 17 G/17G
17 POWDER, FOR SOLUTION ORAL DAILY
Status: DISCONTINUED | OUTPATIENT
Start: 2022-05-11 | End: 2022-05-11 | Stop reason: SDUPTHER

## 2022-05-11 RX ORDER — ACETAMINOPHEN 325 MG/1
650 TABLET ORAL
Status: DISCONTINUED | OUTPATIENT
Start: 2022-05-11 | End: 2022-05-15 | Stop reason: HOSPADM

## 2022-05-11 RX ORDER — HYDROCODONE BITARTRATE AND ACETAMINOPHEN 5; 325 MG/1; MG/1
1 TABLET ORAL
Status: DISCONTINUED | OUTPATIENT
Start: 2022-05-11 | End: 2022-05-11

## 2022-05-11 RX ORDER — DOCUSATE SODIUM 100 MG/1
100 CAPSULE, LIQUID FILLED ORAL 2 TIMES DAILY
Status: DISCONTINUED | OUTPATIENT
Start: 2022-05-11 | End: 2022-05-15 | Stop reason: HOSPADM

## 2022-05-11 RX ORDER — SODIUM CHLORIDE 9 MG/ML
45 INJECTION, SOLUTION INTRAVENOUS CONTINUOUS
Status: DISCONTINUED | OUTPATIENT
Start: 2022-05-11 | End: 2022-05-15 | Stop reason: HOSPADM

## 2022-05-11 RX ORDER — ALLOPURINOL 100 MG/1
300 TABLET ORAL DAILY
Status: DISCONTINUED | OUTPATIENT
Start: 2022-05-11 | End: 2022-05-15 | Stop reason: HOSPADM

## 2022-05-11 RX ORDER — FAMOTIDINE 20 MG/1
20 TABLET, FILM COATED ORAL 2 TIMES DAILY
Status: DISCONTINUED | OUTPATIENT
Start: 2022-05-11 | End: 2022-05-12

## 2022-05-11 RX ORDER — HYDROCODONE BITARTRATE AND ACETAMINOPHEN 5; 325 MG/1; MG/1
2 TABLET ORAL
Status: DISCONTINUED | OUTPATIENT
Start: 2022-05-11 | End: 2022-05-11

## 2022-05-11 RX ORDER — ONDANSETRON 2 MG/ML
8 INJECTION INTRAMUSCULAR; INTRAVENOUS AS NEEDED
Status: DISCONTINUED | OUTPATIENT
Start: 2022-05-11 | End: 2022-05-15 | Stop reason: HOSPADM

## 2022-05-11 RX ORDER — OLANZAPINE 5 MG/1
5 TABLET ORAL EVERY EVENING
Status: COMPLETED | OUTPATIENT
Start: 2022-05-11 | End: 2022-05-14

## 2022-05-11 RX ORDER — MORPHINE SULFATE 2 MG/ML
2 INJECTION, SOLUTION INTRAMUSCULAR; INTRAVENOUS
Status: DISCONTINUED | OUTPATIENT
Start: 2022-05-11 | End: 2022-05-15 | Stop reason: HOSPADM

## 2022-05-11 RX ORDER — HEPARIN 100 UNIT/ML
300-500 SYRINGE INTRAVENOUS AS NEEDED
Status: DISCONTINUED | OUTPATIENT
Start: 2022-05-11 | End: 2022-05-15 | Stop reason: HOSPADM

## 2022-05-11 RX ORDER — OXYCODONE HYDROCHLORIDE 5 MG/1
5 TABLET ORAL
Status: DISCONTINUED | OUTPATIENT
Start: 2022-05-11 | End: 2022-05-11

## 2022-05-11 RX ORDER — POLYETHYLENE GLYCOL 3350 17 G/17G
17 POWDER, FOR SOLUTION ORAL DAILY
Status: DISCONTINUED | OUTPATIENT
Start: 2022-05-11 | End: 2022-05-15 | Stop reason: HOSPADM

## 2022-05-11 RX ORDER — ACETAMINOPHEN 325 MG/1
650 TABLET ORAL ONCE
Status: COMPLETED | OUTPATIENT
Start: 2022-05-11 | End: 2022-05-11

## 2022-05-11 RX ORDER — LIDOCAINE AND PRILOCAINE 25; 25 MG/G; MG/G
CREAM TOPICAL AS NEEDED
Status: DISCONTINUED | OUTPATIENT
Start: 2022-05-11 | End: 2022-05-15 | Stop reason: HOSPADM

## 2022-05-11 RX ORDER — MORPHINE SULFATE 15 MG/1
15 TABLET ORAL
Status: DISCONTINUED | OUTPATIENT
Start: 2022-05-11 | End: 2022-05-15 | Stop reason: HOSPADM

## 2022-05-11 RX ORDER — LORAZEPAM 2 MG/ML
0.5 INJECTION INTRAMUSCULAR
Status: DISCONTINUED | OUTPATIENT
Start: 2022-05-11 | End: 2022-05-15 | Stop reason: HOSPADM

## 2022-05-11 RX ORDER — DIPHENHYDRAMINE HYDROCHLORIDE 50 MG/ML
50 INJECTION, SOLUTION INTRAMUSCULAR; INTRAVENOUS AS NEEDED
Status: DISCONTINUED | OUTPATIENT
Start: 2022-05-11 | End: 2022-05-15 | Stop reason: HOSPADM

## 2022-05-11 RX ORDER — SULFAMETHOXAZOLE AND TRIMETHOPRIM 800; 160 MG/1; MG/1
1 TABLET ORAL
Status: DISCONTINUED | OUTPATIENT
Start: 2022-05-11 | End: 2022-05-15 | Stop reason: HOSPADM

## 2022-05-11 RX ORDER — ACYCLOVIR 200 MG/1
400 CAPSULE ORAL 2 TIMES DAILY
Status: DISCONTINUED | OUTPATIENT
Start: 2022-05-11 | End: 2022-05-15 | Stop reason: HOSPADM

## 2022-05-11 RX ORDER — FLUOXETINE HYDROCHLORIDE 20 MG/1
40 CAPSULE ORAL
Status: DISCONTINUED | OUTPATIENT
Start: 2022-05-11 | End: 2022-05-15 | Stop reason: HOSPADM

## 2022-05-11 RX ORDER — ENOXAPARIN SODIUM 100 MG/ML
40 INJECTION SUBCUTANEOUS DAILY
Status: DISCONTINUED | OUTPATIENT
Start: 2022-05-11 | End: 2022-05-15 | Stop reason: HOSPADM

## 2022-05-11 RX ORDER — LORAZEPAM 2 MG/ML
0.5 INJECTION INTRAMUSCULAR
Status: DISCONTINUED | OUTPATIENT
Start: 2022-05-12 | End: 2022-05-11

## 2022-05-11 RX ORDER — SODIUM CHLORIDE 0.9 % (FLUSH) 0.9 %
10 SYRINGE (ML) INJECTION AS NEEDED
Status: DISCONTINUED | OUTPATIENT
Start: 2022-05-11 | End: 2022-05-15 | Stop reason: HOSPADM

## 2022-05-11 RX ORDER — EPINEPHRINE 1 MG/ML
0.3 INJECTION, SOLUTION, CONCENTRATE INTRAVENOUS AS NEEDED
Status: ACTIVE | OUTPATIENT
Start: 2022-05-11 | End: 2022-05-11

## 2022-05-11 RX ORDER — IBUPROFEN 200 MG
1 TABLET ORAL DAILY
Status: DISCONTINUED | OUTPATIENT
Start: 2022-05-11 | End: 2022-05-15 | Stop reason: HOSPADM

## 2022-05-11 RX ORDER — ACETAMINOPHEN 325 MG/1
650 TABLET ORAL
Status: DISCONTINUED | OUTPATIENT
Start: 2022-05-11 | End: 2022-05-11

## 2022-05-11 RX ORDER — HYDROCORTISONE SODIUM SUCCINATE 100 MG/2ML
100 INJECTION, POWDER, FOR SOLUTION INTRAMUSCULAR; INTRAVENOUS AS NEEDED
Status: ACTIVE | OUTPATIENT
Start: 2022-05-11 | End: 2022-05-11

## 2022-05-11 RX ORDER — ONDANSETRON 2 MG/ML
8 INJECTION INTRAMUSCULAR; INTRAVENOUS EVERY 8 HOURS
Status: COMPLETED | OUTPATIENT
Start: 2022-05-11 | End: 2022-05-15

## 2022-05-11 RX ORDER — TRAZODONE HYDROCHLORIDE 50 MG/1
25 TABLET ORAL
Status: DISCONTINUED | OUTPATIENT
Start: 2022-05-11 | End: 2022-05-12

## 2022-05-11 RX ORDER — ACETAMINOPHEN 650 MG/1
650 SUPPOSITORY RECTAL
Status: DISCONTINUED | OUTPATIENT
Start: 2022-05-11 | End: 2022-05-11

## 2022-05-11 RX ORDER — AMLODIPINE AND BENAZEPRIL HYDROCHLORIDE 5; 20 MG/1; MG/1
1 CAPSULE ORAL
Status: DISCONTINUED | OUTPATIENT
Start: 2022-05-11 | End: 2022-05-11

## 2022-05-11 RX ORDER — DIPHENHYDRAMINE HYDROCHLORIDE 50 MG/ML
25 INJECTION, SOLUTION INTRAMUSCULAR; INTRAVENOUS AS NEEDED
Status: DISCONTINUED | OUTPATIENT
Start: 2022-05-11 | End: 2022-05-15 | Stop reason: HOSPADM

## 2022-05-11 RX ORDER — ACETAMINOPHEN 325 MG/1
650 TABLET ORAL AS NEEDED
Status: DISCONTINUED | OUTPATIENT
Start: 2022-05-11 | End: 2022-05-15 | Stop reason: HOSPADM

## 2022-05-11 RX ORDER — HEPARIN SODIUM (PORCINE) LOCK FLUSH IV SOLN 100 UNIT/ML 100 UNIT/ML
300-500 SOLUTION INTRAVENOUS AS NEEDED
Status: DISCONTINUED | OUTPATIENT
Start: 2022-05-11 | End: 2022-05-11 | Stop reason: SDUPTHER

## 2022-05-11 RX ORDER — AMLODIPINE BESYLATE 5 MG/1
5 TABLET ORAL DAILY
Status: DISCONTINUED | OUTPATIENT
Start: 2022-05-11 | End: 2022-05-15 | Stop reason: HOSPADM

## 2022-05-11 RX ORDER — LISINOPRIL 20 MG/1
20 TABLET ORAL DAILY
Status: DISCONTINUED | OUTPATIENT
Start: 2022-05-11 | End: 2022-05-15 | Stop reason: HOSPADM

## 2022-05-11 RX ADMIN — SULFAMETHOXAZOLE AND TRIMETHOPRIM 1 TABLET: 800; 160 TABLET ORAL at 21:20

## 2022-05-11 RX ADMIN — ENOXAPARIN SODIUM 40 MG: 100 INJECTION SUBCUTANEOUS at 09:20

## 2022-05-11 RX ADMIN — ACETAMINOPHEN 650 MG: 325 TABLET ORAL at 09:20

## 2022-05-11 RX ADMIN — LORAZEPAM 0.5 MG: 2 INJECTION INTRAMUSCULAR; INTRAVENOUS at 21:27

## 2022-05-11 RX ADMIN — FAMOTIDINE 20 MG: 20 TABLET, FILM COATED ORAL at 17:43

## 2022-05-11 RX ADMIN — SODIUM CHLORIDE, PRESERVATIVE FREE 10 ML: 5 INJECTION INTRAVENOUS at 09:25

## 2022-05-11 RX ADMIN — POLYETHYLENE GLYCOL 3350 17 G: 17 POWDER, FOR SOLUTION ORAL at 09:20

## 2022-05-11 RX ADMIN — DOCUSATE SODIUM 100 MG: 100 CAPSULE, LIQUID FILLED ORAL at 09:20

## 2022-05-11 RX ADMIN — SODIUM CHLORIDE 45 ML/HR: 9 INJECTION, SOLUTION INTRAVENOUS at 15:10

## 2022-05-11 RX ADMIN — ALLOPURINOL 300 MG: 100 TABLET ORAL at 09:20

## 2022-05-11 RX ADMIN — SODIUM CHLORIDE, PRESERVATIVE FREE 10 ML: 5 INJECTION INTRAVENOUS at 14:00

## 2022-05-11 RX ADMIN — OLANZAPINE 5 MG: 5 TABLET, FILM COATED ORAL at 17:43

## 2022-05-11 RX ADMIN — FAMOTIDINE 20 MG: 20 TABLET, FILM COATED ORAL at 09:20

## 2022-05-11 RX ADMIN — FLUOXETINE 40 MG: 20 CAPSULE ORAL at 09:20

## 2022-05-11 RX ADMIN — ETOPOSIDE 102.6 MG: 20 INJECTION, SOLUTION, CONCENTRATE INTRAVENOUS at 15:48

## 2022-05-11 RX ADMIN — LORAZEPAM 0.5 MG: 2 INJECTION INTRAMUSCULAR; INTRAVENOUS at 15:17

## 2022-05-11 RX ADMIN — LORAZEPAM 0.5 MG: 2 INJECTION INTRAMUSCULAR; INTRAVENOUS at 09:20

## 2022-05-11 RX ADMIN — AMLODIPINE BESYLATE 5 MG: 5 TABLET ORAL at 09:20

## 2022-05-11 RX ADMIN — ONDANSETRON 8 MG: 2 INJECTION INTRAMUSCULAR; INTRAVENOUS at 17:43

## 2022-05-11 RX ADMIN — PREDNISONE 123 MG: 20 TABLET ORAL at 09:20

## 2022-05-11 RX ADMIN — SODIUM CHLORIDE, PRESERVATIVE FREE 10 ML: 5 INJECTION INTRAVENOUS at 21:24

## 2022-05-11 RX ADMIN — TRAZODONE HYDROCHLORIDE 25 MG: 50 TABLET ORAL at 21:20

## 2022-05-11 RX ADMIN — ACYCLOVIR 400 MG: 200 CAPSULE ORAL at 17:43

## 2022-05-11 RX ADMIN — DOCUSATE SODIUM 100 MG: 100 CAPSULE, LIQUID FILLED ORAL at 17:43

## 2022-05-11 RX ADMIN — SODIUM CHLORIDE 769 MG: 9 INJECTION, SOLUTION INTRAVENOUS at 11:01

## 2022-05-11 RX ADMIN — ONDANSETRON 8 MG: 2 INJECTION INTRAMUSCULAR; INTRAVENOUS at 09:20

## 2022-05-11 RX ADMIN — VINCRISTINE SULFATE: 1 INJECTION, SOLUTION INTRAVENOUS at 15:48

## 2022-05-11 RX ADMIN — PREDNISONE 123 MG: 20 TABLET ORAL at 17:43

## 2022-05-11 RX ADMIN — ACYCLOVIR 400 MG: 200 CAPSULE ORAL at 09:20

## 2022-05-11 RX ADMIN — DIPHENHYDRAMINE HYDROCHLORIDE 50 MG: 50 INJECTION, SOLUTION INTRAMUSCULAR; INTRAVENOUS at 09:20

## 2022-05-11 RX ADMIN — MORPHINE SULFATE 15 MG: 15 TABLET ORAL at 13:10

## 2022-05-11 RX ADMIN — SODIUM CHLORIDE 500 ML: 9 INJECTION, SOLUTION INTRAVENOUS at 08:45

## 2022-05-11 RX ADMIN — SODIUM CHLORIDE 45 ML/HR: 9 INJECTION, SOLUTION INTRAVENOUS at 11:00

## 2022-05-11 RX ADMIN — LISINOPRIL 20 MG: 20 TABLET ORAL at 09:20

## 2022-05-11 RX ADMIN — MORPHINE SULFATE 15 MG: 15 TABLET ORAL at 19:23

## 2022-05-11 NOTE — H&P
[facility] at [address], [address] [place] [address] [address] [address]  [place]: [phone]  F: [phone] Patient ID  Name: Hawk Reagan  YOB: 1955  MRN: [id number]  Referring Provider:   No referring provider defined for this encounter. 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 This summary highlights focused aspects of patient's ongoing care and may have a recurring section in notes with either updates or remain unchanged as a longitudinal care summary. 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\"    ORIGINAL STAGING:   Stage IV    SITES OF DISEASE:   Left Cervical Lymph Node,Bone Disease, Stage IV    CURRENT TREATMENT:   Plan for DA-R-EPOCH    PRIOR TREATMENT:   n/a    GOALS OF CARE:   Curative Intent    PATHOLOGY:   Specimen #:[id number] Collect: 4/22/2022      ==========================================================================                    * * *SURGICAL PATHOLOGY REPORT* * *   ==========================================================================   * * *PROCEDURES/ADDENDA* * *   ADDENDUM   Date Ordered: 4/26/2022     Status: Signed Out   Date Complete: 4/26/2022     By: Yesenia Matthews.  Rozina Moya MD   Date Reported: 4/26/2022   Addendum Diagnosis   Lymph node, left posterior cervical lymph node, excision:   In situ hybridization for Jesús-Barr viral RNA: Negative   CPT: 84544  Addendum Comment   * * *CLINICAL HISTORY* * *   Cervical lymphadenopathy, rule out lymphoma   ==========================================================================   * * *FINAL PATHOLOGIC DIAGNOSIS* * *        Lymph node, left posterior cervical lymph node, excision:        Large B cell lymphoma. See comment. * * *Comment* * *   The histologic section has fragments of lymphoid tissue with diffuse and   focal follicular architecture. Diffuse areas are comprised of large,   atypical lymphocytes with centroblastic morphology and increased mitotic   activity. Immunohistochemical stains confirm the malignant cells are CD20   positive B cells that coexpress CD10 and BCL6 without MUM1. CD23   highlights rare follicular dendritic networks associated with lymphoid   follicles with germinal centers and express CD10 and BCL6 without BCL2. CyclinD1 and CD56 stains are negative. Concurrent flow cytometric analysis   confirms a clonal CD10 positive B-cell population comprised of medium to   large cells. The Ki-67 proliferation index is 30%. The overall findings favor the diagnosis of diffuse large B-cell lymphoma,   germinal center subtype. Molecular genetic studies are pending for further   characterization will be reported in an addendum. Flow cytometry:   Consistent with CD10-positive B-cell lymphoproliferative disorder. Comments: Flow cytometry shows monoclonal B-cells (36% of total cells)   with CD10 expression without CD5, consistent with a CD10-positive B-cell   lymphoproliferative disorder. The main differential diagnosis includes   follicular lymphoma, Burkitt lymphoma and large B-cell lymphoma. Please   correlate the result with morphologic findings and clinical information. Flow Differential (%) and Population Analysis:   Lymphocytes: 93.7%   T-cells (39% of lymphoid cells) show a CD4/CD8 ratio of 3.3 without overt   phenotypic abnormality. NK-cells (1% of lymphoid cells) are unremarkable.    Mature B-cells (56% of lymphoid cells) include large forms based on   forward scattered pattern and show: CD5 neg, CD10+, CD11c+dim, CD19+,   CD20+ with surface lambda light chain restriction. Markers Performed: CD2, CD3, CD4, CD5, CD7, CD8, CD10, CD11c, CD19, CD20,   CD23, CD34, CD38, CD45, CD56, Kappa, Lambda (17 Markers)   The Technical Component Processing of this test was completed at   [facility], [address] [place] [address], [facility] / [id number] /   [phone] / [facility] Session # [id number].  Interpretation by Sindhu Brown M.D. The   complete scanned report is available in Epic. CPT: D7586683, E6565785, P4272420, Q3829300, [id number], [id number]   j2/2022   *Electronically Signed Out By Leopoldo Drummer. Yudith Hernandez MD*   ==========================================================================   * * *Gross Description* * *   Specimen #1, received fresh labeled with the patient's name,  and left   posterior cervical lymph node, consists of two paper towels containing a   1.7 x 1.5 x 0.5 cm aggregate of pale pink-tan, fleshy soft tissue   fragments. The specimen is handled according to the flow cytometry   protocol as follows:   7          Two air-dried touch prep slides - one Diff Quik stained, one   rapid-fixed in 95% alcohol   7          Representative sections in B plus fixative (1A)   7          One piece held in RPMI for possible flow cytometry analysis   7          Remaining tissue fixed in formalin for permanents (1B)   Dr. Yudith Hernandez will determine if flow cytometry is necessary. AMM 2022 02:06 PM     FISH: 22:  Triple Hit Lymphoma  PERTINENT CARE EVENTS   n/a    Subjective:     History of Present Illness:     Prema So is a 78 y.o. F who presents for hospitalization for inpatient chemotherapy for dose adjusted R-EPOCH for Triple Hit Lymphoma High Grade B-Cell Lymphoma. She presents this morning with ongoing bone pain in her legs and a left frontal scalp lesion. She notes that oxycodone has made her nausea and itchy at times. She states that she still is sleeping poorly.  She still has an appetite. She is still ambulating. Denies any fevers or chills. Has completed pre-phase steroids but has not found much relief with treatment. Patient denies any bowel or bladder problems. Patient reports joint pain. Patient denies any shortness of breath. Patient reports fatigue. Patient denies any gait disturbance. -She had a haircut in anticipation of chemotherapy.  -She is requesting a nicotine patch. Past Medical History:   Diagnosis Date    Adverse effect of anesthesia     PHLEGM IN THROAT POST OP & NEEDED X2 BREATHING TREATMENTS    Anxiety     COPD (chronic obstructive pulmonary disease) ([address] 2022    emphysema    Depression     GERD (gastroesophageal reflux disease)     Hernia, femoral     since childhood    Hypertension     Joint pain     Nausea & vomiting     TMJ arthritis       Past Surgical History:   Procedure Laterality Date    HX HYSTERECTOMY  2019    HX TONSILLECTOMY     History of Salivary Gland Surgery in 30's for evaluation for dry mouth.      Social History     Tobacco Use    Smoking status: Current Every Day Smoker     Packs/day: 1.00     Years: 40.00     Pack years: 40.00     Types: Cigarettes    Smokeless tobacco: Never Used    Tobacco comment: 0.5-1 pack per day   Substance Use Topics    Alcohol use: Not Currently     Alcohol/week: 0.0 standard drinks      Family History   Problem Relation Age of Onset    Heart Disease Mother 46    Heart Surgery Mother         HEART TRANSPLANT    Elevated Lipids Mother     Hypertension Mother     COPD Father     Emphysema Father     Sudden Death Brother 46    Other Maternal Grandmother         BRAIN TUMOR    No Known Problems Son     No Known Problems Daughter      Current Facility-Administered Medications   Medication Dose Route Frequency    docusate sodium (COLACE) capsule 100 mg  100 mg Oral BID    FLUoxetine (PROzac) capsule 40 mg  40 mg Oral 7am    lidocaine-prilocaine (EMLA) 2.5-2.5 % cream   Topical PRN    oxyCODONE IR (ROXICODONE) tablet 5 mg  5 mg Oral Q6H PRN    polyethylene glycol (MIRALAX) packet 17 g  17 g Oral DAILY    sodium chloride (NS) flush 5-40 mL  5-40 mL IntraVENous Q8H    sodium chloride (NS) flush 5-40 mL  5-40 mL IntraVENous PRN    acetaminophen (TYLENOL) tablet 650 mg  650 mg Oral Q6H PRN    Or    acetaminophen (TYLENOL) suppository 650 mg  650 mg Rectal Q6H PRN    enoxaparin (LOVENOX) injection 40 mg  40 mg SubCUTAneous DAILY    polyethylene glycol (MIRALAX) packet 17 g  17 g Oral DAILY    nicotine (NICODERM CQ) 21 mg/24 hr patch 1 Patch  1 Patch TransDERmal DAILY    traZODone (DESYREL) tablet 25 mg  25 mg Oral QHS    amLODIPine (NORVASC) tablet 5 mg  5 mg Oral DAILY    And    lisinopriL (PRINIVIL, ZESTRIL) tablet 20 mg  20 mg Oral DAILY      No Known Allergies   -  Review of Systems provided by:patient  General: denies fever, denies chills and reports fatigue  Eyes: denies any acute vision loss and denies any eye pain  HEENT: denies epistaxis and denies trouble swallowing  Cardio: denies any chest pain and denies any leg swelling  Resp: denies any shortness of breath. denies any hemoptysis. Abdomen: denies any abdominal pain, denies any vomiting, denies any diarrhea and reports nausea  MSK: denies any myalgias and reports arthralgias  Skin: denies any rash and denies any itching  Lymph: denies any lymph node enlargement and denies any lymph node tenderness  Neuro: denies any tremor and reports headache  : Denies any dysuria. Denies any hematuria.   Psych: denies depression and reports anxiety and insomnia    Objective:     Visit Vitals  BP (!) 176/89 (BP 1 Location: Left upper arm, BP Patient Position: At rest)   Pulse 74   Temp 97.8 °F (36.6 °C)   Resp 18   Ht 5' 3\" (1.6 m)   Wt 205 lb 0.4 oz (93 kg)   SpO2 96%   BMI 36.32 kg/m²     ECOG PS: 1- Restricted in physically strenuous activity but ambulatory and able to carry out work of a light or sedentary nature, e.g., light house work, office work.  Physical Exam  Constitutional: No acute distress. , Non-toxic appearance. and Non-diaphoretic. HENT: Normocephalic and atraumatic head. and Oropharynx is clear. Eyes: Normal Conjunctivae. Anicteric sclerae. Cardiovascular: S1,S2 auscultated. No pitting edema. Pulmonary: Normal Respiratory Effort. No wheezing. No rhonchi. No rales. Abdominal: Normal bowel sounds. Soft Abdomen to palpation. No abdominal tenderness. Musculoskeletal: No muscle pain on palpation. No temporal muscle wasting on inspection. Skin: No jaundice. No rash. No petechiae. Left scalp lesion without pus discharge. Left Neck incisional wound without erythema. Neurological: Alert and oriented. No tremor on inspection. Psychiatric: mood normal. normal speech rate normal affect  Lymph: No cervical or supraclavicular lymph node enlargement or tenderness. Results:     I personally reviewed Epic EHR labs/results below:   Lab Results   Component Value Date/Time    WBC 8.1 04/27/2022 12:23 PM    HGB 14.6 04/27/2022 12:23 PM    HCT 46.2 04/27/2022 12:23 PM    PLATELET 100 04/34/2438 12:23 PM    MCV 97.9 04/27/2022 12:23 PM    ABS. NEUTROPHILS 5.3 04/27/2022 12:23 PM     Lab Results   Component Value Date/Time    Sodium 138 04/27/2022 12:23 PM    Potassium 4.5 04/27/2022 12:23 PM    Chloride 106 04/27/2022 12:23 PM    CO2 25 04/27/2022 12:23 PM    Glucose 99 04/27/2022 12:23 PM    BUN 13 04/27/2022 12:23 PM    Creatinine 0.92 04/27/2022 12:23 PM    GFR est AA >60 04/27/2022 12:23 PM    GFR est non-AA >60 04/27/2022 12:23 PM    Calcium 9.7 04/27/2022 12:23 PM    Glucose (POC) 98 05/04/2022 01:37 PM     Lab Results   Component Value Date/Time    Bilirubin, total 0.4 04/27/2022 12:23 PM    ALT (SGPT) 29 04/27/2022 12:23 PM    Alk.  phosphatase 79 04/27/2022 12:23 PM    Protein, total 7.3 04/27/2022 12:23 PM    Albumin 4.5 04/27/2022 12:23 PM    Globulin 2.8 04/27/2022 12:23 PM     Lab Results   Component Value Date/Time    Hepatitis B surface Ag <0.10 04/27/2022 12:23 PM    Hepatitis B surface Ab 3.66 04/27/2022 12:23 PM         Assessment and Recommendations:   CODE STATUS:  CPR, FULL CODE     Diagnosis    Diffuse large B cell lymphoma ([facility] [address]  -proceed with curative intent DA R-EPOCH Cycle 1,Day 1 on 5/11/2022  -she followed our discussion about the rationale for   -will start allopurinol prophylaxis against TLS. Monitor LDH, Uric Acid, Phosphorus.  -She understood that she will need 2x/weekly labs (3 days apart), PCP prophy with Bactrim.  -She understood that R-EPOCH is adjusted in future cycles by ANC and PLT clara from 2x/weekly labs.  Cancer related pain  -Will try IV morphine while hospitalized and treat her disease since oral oxycodone caused her nausea and mild itching.  Insomnia due to medical condition  -Will try trazodone.  -follow bp with home bp meds. Patient with anxiety about her diagnosis and treatment.  Metastatic cancer to bone [facility])  -start systemic therapy. We discussed that systemic therapy would take priority at this time point over any consideration of orthopedic intervention of her femur lesions.  -radiation therapy not typically given with DA R-EPOCH.      Encounter for antineoplastic chemotherapy  -Provided a thorough discussion regarding the risks and benefits of systemic chemotherapy. Patient has provided both oral and written informed consent (see separate signed consent form). Our discussion initially focused on the generalities of chemotherapy and why side effects can occur from cytotoxic medication. We discussed the hope that the cancer cells take up systemic chemotherapy and that healthy cells are spared toxicity. However, we discussed that faster growing/dividing cells in our body can especially be prone to cytotoxic effects and tissue destruction. We discussed that the repercussions of this normal cellular death may not clinically develop for days to weeks.  We discussed that mucosal tissue damage can cause oral mucositis, nausea/vomiting, gastric upset, diarrhea. We discussed that tissue death can cause hair loss, skin changes/rashes, nail loss. We discussed that the bone marrow is susceptible to damage causing low blood counts. We discussed that low white blood counts can put the patient's health at risk for life-threatening infection and that a fever during a period of low neutrophils may be the only indicator of this serious problem. We discussed the management of neutropenic fever and reviewed other alarm symptoms and when to seek emergent care by calling 9-1-1(shortness of breath,chest pain for examples). We discussed that low hemoglobin places patient at risk for ischemia/infarction. We discussed that low platelets place patient at risk for spontaneous bleeding and increased bleeding risks. We discussed when these symptoms are not monitored or patient does not inform us of developing/onset of symptoms that the risk of death can be markedly increased. We discussed risk for secondary malignancy. We discussed risk of anaphylaxis. We discussed the risk of infusion reactions, the risk of chemotherapy infiltration/extravasation and potentially tissue necrosis. Provided brief discussion again about R-EPOCH. Previously provided [employer]. com handouts on each drug of Rituximab Etoposide, Prednisone, Oncovin, Cyclophosphamide,Doxorubicin. Discussed about Neulasta      From previous consent discussions in the clinic, we discussed the risks in general related to DA R-EPOCH:    · For Rituximab, we discussed, infusion/anaphylactic reaction risk that can be fatal, rare risk of PML from BLAS virus and that it may not be reversible. We discussed risk of fever/chills,weakness,nausea,headache,cough,rhinorrhea,pharyngitis,arrhythmias,risk of TLS even in no spontaneous TLS.    -We discussed that etoposide can place patients at risk for hair loss, mouth sores,nausea/vomiting, low blood pressure with faster infusions,diarrhea, anorexia,taste changes, neuropathy. We discussed the rare risk of leukemia and rare risk of anaphylactic shock. We discussed the risk of end-organ damage with etoposide. · For Cyclophosphamide, we discussed risk of low blood counts, hair loss, nausea/vomiting,poor appetite, skin/nails discoloration. Rare risk of hemorrhagic cystitis. Risk of secondary cancer such as leukemia in particular. · For Doxorubicin, we discussed risk of pain at site of administration. Risk of vesicant injury which will be mitigated by Port-A-Cath placement. We discussed risk of alopecia, watery eyes, mucositis. Risk of dark/red urine. Small risk of doxorubicin-induced cardiomyopathy. Risk of leukemia years after administration. Risk of not treating lymphoma is likely higher with possible progressive disease towards death. Discussed need for ECHO prior to treatment start. · For Vincristine, risk of hair loss, constipation, low blood counts, nausea/vomiting,diarrhea, oral mucositis, dysgeusia, neuropathy. · For Prednisone, risk of weakness, glucose metabolism disturbance. Risk of cataract formation. Risk of tumor lysis syndrome. Risk of infection. · For Neulasta, risk of splenic rupture/hemorrhage though less likely. Risk of bone pain (can take claritin). We had discussed biosimilars In how they are felt to be equally safe and effective per the FDA although technically different medications.        Upcoming Treatment Dates - IP DOSE ADJUSTED R-EPOCH  Days with orders from any treatment category:  5/11/2022       REGIMEN NOTES/COMMUNICATIONS         acetaminophen (TYLENOL) tablet 650 mg       diphenhydrAMINE (BENADRYL) injection 50 mg       ondansetron (ZOFRAN) injection 8 mg       sodium chloride 0.9 % bolus infusion 500 mL       0.9% sodium chloride infusion       famotidine (PEPCID) tablet 20 mg         riTUXimab-pvvr (RUXIENCE) 769 mg in 0.9% sodium chloride 769 mL infusion       predniSONE (DELTASONE) tablet 123 mg       etoposide (VEPESID) 102.6 mg in 0.9% sodium chloride 450 mL chemo infusion       vinCRIStine (VINCASAR PFS) 0.8 mg, DOXOrubicin (ADRIAMYCIN) 20.6 mg in 0.9% sodium chloride 250 mL, overfill volume 25 mL chemo infusion       cyclophosphamide (CYTOXAN) 1,537.6 mg in 0.9% sodium chloride 250 mL, overfill volume 25 mL chemo infusion         OLANZapine (ZyPREXA) tablet 5 mg       sodium chloride (NS) flush 10 mL       heparin (porcine) 100 unit/mL injection 300-500 Units       trimethoprim-sulfamethoxazole (BACTRIM DS, SEPTRA DS) 160-800 mg per tablet 1 Tablet         Tobacco dependence  -Recommend smoking cessation.  -Patient asks for a nicotine patch. FOLLOW-UP:  Follow daily.     Uzma Powell MD  Hematology/Medical Oncology Provider  [facility]  P: [phone]    Signed By:   Natali Brown MD

## 2022-05-11 NOTE — PROGRESS NOTES
RRT RN called to access patient's port. Patient's port accessed using sterile technique. Positive blood return. Labs drawn. Port flushed and alcohol cap placed.

## 2022-05-11 NOTE — PROGRESS NOTES
Problem: Falls - Risk of  Goal: *Absence of Falls  Description: Document Justina Queen Fall Risk and appropriate interventions in the flowsheet.   Outcome: Progressing Towards Goal  Note: Fall Risk Interventions:            Medication Interventions: Teach patient to arise slowly           Problem: Patient Education: Go to Patient Education Activity  Goal: Patient/Family Education  Outcome: Progressing Towards Goal

## 2022-05-11 NOTE — PROGRESS NOTES
5/11/2022  10:45 AM  CM met w/ pt to complete assessment in person. Charted demographics verified, pt's son lives w/ her in pvt residence. At baseline pt is ambulatory, independent w/ all ADLs, drives, no fall history. Son or friend can assist if needed. Reason for Admission:  Diffuse Large B Cell Lymphoma, Chemotherapy  Pt is a 79 yo  female who presents to [facility] for scheduled chemotherapy treatment   PMHx; Adverse effect of anesthesia        PHLEGM IN THROAT POST OP & NEEDED X2 BREATHING TREATMENTS    Anxiety      COPD (chronic obstructive pulmonary disease) ([facility]) 2022     emphysema    Depression      GERD (gastroesophageal reflux disease)      Hernia, femoral       since childhood    Hypertension      Joint pain      Nausea & vomiting      TMJ arthritis                       RUR Score:       10 % Low Risk of Readmission/Green              Plan for utilizing home health:  NO history of [facility] or Rehab services  DME: None, NO Home O2      Rx: [employer] pt uses [facility] and is usually covered for her medications  COVID Vax Hx: pt had vaccinae in 2021 w/ 2 jabs, No booster  PCP: First and Last name:  Ravindra Castaneda MD     Name of Practice:    Are you a current patient: Yes/No: yes   Approximate date of last visit: 30 days   Can you participate in a virtual visit with your PCP: yes                     Current Advanced Directive/Advance Care Plan: Full Code  [facility] Daughter [address] (C) [phone]    Healthcare Decision Maker:   Click here to complete [address] including selection of the Healthcare Decision Maker Relationship (ie \"Primary\")                             Transition of Care Plan:                    RUR 10 % Low Risk of Readmission  1. Hospital admission for chemotherapy treatment  2. CM to follow through for treatment/response  3. AMD planning, pt declined to complete on this admission  4. DC when chemotherapy completed to home  5.  Outpatient f/u oncology, PCP  6. Family will transport     Care Management Interventions  PCP Verified by CM:  Yes (Nathan Osullivan MD, last visit 30 days)  Palliative Care Criteria Met (RRAT>21 & CHF Dx)?: No  Mode of Transport at Discharge: Self (Family)  Physical Therapy Consult: No  Occupational Therapy Consult: No  Support Systems: Child(bethany),Friend/Neighbor (pt's son lies w/ her in pvt rsidence, at baseline  pt is ambulatory, iADLS, drives)  Confirm Follow Up Transport: Self  Discharge Location  Patient Expects to be Discharged to[de-identified] Home with outpatient services  YAIMA Purcell

## 2022-05-11 NOTE — PROGRESS NOTES
Bedside and Verbal shift change report given to Leonard Kinney (oncoming nurse) by Fani IRVINGRN (offgoing nurse). Report included the following information SBAR.

## 2022-05-12 LAB
ALBUMIN SERPL-MCNC: 3.3 G/DL (ref 3.5–5)
ALBUMIN/GLOB SERPL: 1.1 {RATIO} (ref 1.1–2.2)
ALP SERPL-CCNC: 66 U/L (ref 45–117)
ALT SERPL-CCNC: 43 U/L (ref 12–78)
ANION GAP SERPL CALC-SCNC: 8 MMOL/L (ref 5–15)
AST SERPL-CCNC: 25 U/L (ref 15–37)
BASOPHILS # BLD: 0 K/UL (ref 0–0.1)
BASOPHILS NFR BLD: 0 % (ref 0–1)
BILIRUB SERPL-MCNC: 0.2 MG/DL (ref 0.2–1)
BUN SERPL-MCNC: 17 MG/DL (ref 6–20)
BUN/CREAT SERPL: 18 (ref 12–20)
CALCIUM SERPL-MCNC: 8.1 MG/DL (ref 8.5–10.1)
CHLORIDE SERPL-SCNC: 111 MMOL/L (ref 97–108)
CO2 SERPL-SCNC: 21 MMOL/L (ref 21–32)
CREAT SERPL-MCNC: 0.94 MG/DL (ref 0.55–1.02)
DIFFERENTIAL METHOD BLD: ABNORMAL
EOSINOPHIL # BLD: 0 K/UL (ref 0–0.4)
EOSINOPHIL NFR BLD: 0 % (ref 0–7)
ERYTHROCYTE [DISTWIDTH] IN BLOOD BY AUTOMATED COUNT: 13.7 % (ref 11.5–14.5)
GLOBULIN SER CALC-MCNC: 3 G/DL (ref 2–4)
GLUCOSE SERPL-MCNC: 218 MG/DL (ref 65–100)
HCT VFR BLD AUTO: 37.5 % (ref 35–47)
HGB BLD-MCNC: 12.2 G/DL (ref 11.5–16)
IMM GRANULOCYTES # BLD AUTO: 0.1 K/UL (ref 0–0.04)
IMM GRANULOCYTES NFR BLD AUTO: 1 % (ref 0–0.5)
LDH SERPL L TO P-CCNC: 208 U/L (ref 81–246)
LYMPHOCYTES # BLD: 0.2 K/UL (ref 0.8–3.5)
LYMPHOCYTES NFR BLD: 2 % (ref 12–49)
MCH RBC QN AUTO: 31.1 PG (ref 26–34)
MCHC RBC AUTO-ENTMCNC: 32.5 G/DL (ref 30–36.5)
MCV RBC AUTO: 95.7 FL (ref 80–99)
MONOCYTES # BLD: 0.2 K/UL (ref 0–1)
MONOCYTES NFR BLD: 2 % (ref 5–13)
NEUTS SEG # BLD: 11.9 K/UL (ref 1.8–8)
NEUTS SEG NFR BLD: 95 % (ref 32–75)
NRBC # BLD: 0 K/UL (ref 0–0.01)
NRBC BLD-RTO: 0 PER 100 WBC
PHOSPHATE SERPL-MCNC: 2.8 MG/DL (ref 2.6–4.7)
PLATELET # BLD AUTO: 251 K/UL (ref 150–400)
PMV BLD AUTO: 9.4 FL (ref 8.9–12.9)
POTASSIUM SERPL-SCNC: 4.3 MMOL/L (ref 3.5–5.1)
PROT SERPL-MCNC: 6.3 G/DL (ref 6.4–8.2)
RBC # BLD AUTO: 3.92 M/UL (ref 3.8–5.2)
RBC MORPH BLD: ABNORMAL
SODIUM SERPL-SCNC: 140 MMOL/L (ref 136–145)
URATE SERPL-MCNC: 3.8 MG/DL (ref 2.6–6)
WBC # BLD AUTO: 12.4 K/UL (ref 3.6–11)

## 2022-05-12 PROCEDURE — 84100 ASSAY OF PHOSPHORUS: CPT

## 2022-05-12 PROCEDURE — 74011250636 HC RX REV CODE- 250/636: Performed by: INTERNAL MEDICINE

## 2022-05-12 PROCEDURE — 83615 LACTATE (LD) (LDH) ENZYME: CPT

## 2022-05-12 PROCEDURE — 74011250636 HC RX REV CODE- 250/636: Performed by: NURSE PRACTITIONER

## 2022-05-12 PROCEDURE — 80053 COMPREHEN METABOLIC PANEL: CPT

## 2022-05-12 PROCEDURE — 99233 SBSQ HOSP IP/OBS HIGH 50: CPT | Performed by: INTERNAL MEDICINE

## 2022-05-12 PROCEDURE — 74011000250 HC RX REV CODE- 250: Performed by: NURSE PRACTITIONER

## 2022-05-12 PROCEDURE — 74011250637 HC RX REV CODE- 250/637: Performed by: NURSE PRACTITIONER

## 2022-05-12 PROCEDURE — 85025 COMPLETE CBC W/AUTO DIFF WBC: CPT

## 2022-05-12 PROCEDURE — 74011636637 HC RX REV CODE- 636/637: Performed by: INTERNAL MEDICINE

## 2022-05-12 PROCEDURE — 74011250637 HC RX REV CODE- 250/637: Performed by: INTERNAL MEDICINE

## 2022-05-12 PROCEDURE — 65270000029 HC RM PRIVATE

## 2022-05-12 PROCEDURE — 84550 ASSAY OF BLOOD/URIC ACID: CPT

## 2022-05-12 PROCEDURE — 36415 COLL VENOUS BLD VENIPUNCTURE: CPT

## 2022-05-12 RX ORDER — PANTOPRAZOLE SODIUM 40 MG/1
40 TABLET, DELAYED RELEASE ORAL
Status: DISCONTINUED | OUTPATIENT
Start: 2022-05-12 | End: 2022-05-15 | Stop reason: HOSPADM

## 2022-05-12 RX ORDER — ZOLPIDEM TARTRATE 5 MG/1
5 TABLET ORAL
Status: DISCONTINUED | OUTPATIENT
Start: 2022-05-12 | End: 2022-05-15 | Stop reason: HOSPADM

## 2022-05-12 RX ADMIN — DOCUSATE SODIUM 100 MG: 100 CAPSULE, LIQUID FILLED ORAL at 17:56

## 2022-05-12 RX ADMIN — MORPHINE SULFATE 2 MG: 2 INJECTION, SOLUTION INTRAMUSCULAR; INTRAVENOUS at 08:53

## 2022-05-12 RX ADMIN — POLYETHYLENE GLYCOL 3350 17 G: 17 POWDER, FOR SOLUTION ORAL at 08:44

## 2022-05-12 RX ADMIN — FAMOTIDINE 20 MG: 20 TABLET, FILM COATED ORAL at 08:48

## 2022-05-12 RX ADMIN — PANTOPRAZOLE SODIUM 40 MG: 40 TABLET, DELAYED RELEASE ORAL at 10:10

## 2022-05-12 RX ADMIN — MORPHINE SULFATE 2 MG: 2 INJECTION, SOLUTION INTRAMUSCULAR; INTRAVENOUS at 13:35

## 2022-05-12 RX ADMIN — LORAZEPAM 0.5 MG: 2 INJECTION INTRAMUSCULAR; INTRAVENOUS at 13:18

## 2022-05-12 RX ADMIN — MORPHINE SULFATE 2 MG: 2 INJECTION, SOLUTION INTRAMUSCULAR; INTRAVENOUS at 23:43

## 2022-05-12 RX ADMIN — PREDNISONE 123 MG: 20 TABLET ORAL at 08:47

## 2022-05-12 RX ADMIN — ONDANSETRON 8 MG: 2 INJECTION INTRAMUSCULAR; INTRAVENOUS at 08:48

## 2022-05-12 RX ADMIN — SODIUM CHLORIDE 45 ML/HR: 9 INJECTION, SOLUTION INTRAVENOUS at 09:18

## 2022-05-12 RX ADMIN — AMLODIPINE BESYLATE 5 MG: 5 TABLET ORAL at 08:48

## 2022-05-12 RX ADMIN — DOCUSATE SODIUM 100 MG: 100 CAPSULE, LIQUID FILLED ORAL at 08:48

## 2022-05-12 RX ADMIN — MORPHINE SULFATE 2 MG: 2 INJECTION, SOLUTION INTRAMUSCULAR; INTRAVENOUS at 18:01

## 2022-05-12 RX ADMIN — Medication 1 SPRAY: at 04:48

## 2022-05-12 RX ADMIN — ZOLPIDEM TARTRATE 5 MG: 5 TABLET ORAL at 22:50

## 2022-05-12 RX ADMIN — ETOPOSIDE 102.6 MG: 20 INJECTION, SOLUTION, CONCENTRATE INTRAVENOUS at 15:43

## 2022-05-12 RX ADMIN — SODIUM CHLORIDE, PRESERVATIVE FREE 10 ML: 5 INJECTION INTRAVENOUS at 06:15

## 2022-05-12 RX ADMIN — ONDANSETRON 8 MG: 2 INJECTION INTRAMUSCULAR; INTRAVENOUS at 17:56

## 2022-05-12 RX ADMIN — LISINOPRIL 20 MG: 20 TABLET ORAL at 08:48

## 2022-05-12 RX ADMIN — SODIUM CHLORIDE, PRESERVATIVE FREE 10 ML: 5 INJECTION INTRAVENOUS at 22:51

## 2022-05-12 RX ADMIN — ACYCLOVIR 400 MG: 200 CAPSULE ORAL at 17:56

## 2022-05-12 RX ADMIN — LORAZEPAM 0.5 MG: 2 INJECTION INTRAMUSCULAR; INTRAVENOUS at 06:41

## 2022-05-12 RX ADMIN — ACYCLOVIR 400 MG: 200 CAPSULE ORAL at 08:48

## 2022-05-12 RX ADMIN — ONDANSETRON 8 MG: 2 INJECTION INTRAMUSCULAR; INTRAVENOUS at 00:43

## 2022-05-12 RX ADMIN — FLUOXETINE 40 MG: 20 CAPSULE ORAL at 06:40

## 2022-05-12 RX ADMIN — SODIUM CHLORIDE, PRESERVATIVE FREE 10 ML: 5 INJECTION INTRAVENOUS at 15:48

## 2022-05-12 RX ADMIN — VINCRISTINE SULFATE: 1 INJECTION, SOLUTION INTRAVENOUS at 13:10

## 2022-05-12 RX ADMIN — LORAZEPAM 0.5 MG: 2 INJECTION INTRAMUSCULAR; INTRAVENOUS at 19:36

## 2022-05-12 RX ADMIN — ALLOPURINOL 300 MG: 100 TABLET ORAL at 08:47

## 2022-05-12 RX ADMIN — ENOXAPARIN SODIUM 40 MG: 100 INJECTION SUBCUTANEOUS at 08:46

## 2022-05-12 RX ADMIN — PREDNISONE 123 MG: 20 TABLET ORAL at 17:56

## 2022-05-12 RX ADMIN — OLANZAPINE 5 MG: 5 TABLET, FILM COATED ORAL at 17:56

## 2022-05-12 NOTE — PROGRESS NOTES
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 This summary highlights focused aspects of patient's ongoing care and may have a recurring section in notes with either updates or remain unchanged as a longitudinal care summary. 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\"    ORIGINAL STAGING:   Stage IV    SITES OF DISEASE:   Left Cervical Lymph Node,Bone Disease, Stage IV    CURRENT TREATMENT:   Plan for DA-R-EPOCH    PRIOR TREATMENT:   n/a    GOALS OF CARE:   Curative Intent    PATHOLOGY:   Specimen #:[id number] Collect: 4/22/2022      ==========================================================================                    * * *SURGICAL PATHOLOGY REPORT* * *   ==========================================================================   * * *PROCEDURES/ADDENDA* * *   ADDENDUM   Date Ordered: 4/26/2022     Status: Signed Out   Date Complete: 4/26/2022     By: Kristen Mariano.  Mahad Umanzor MD   Date Reported: 4/26/2022   Addendum Diagnosis   Lymph node, left posterior cervical lymph node, excision:   In situ hybridization for Jesús-Barr viral RNA: Negative   CPT: 84017  Addendum Comment   * * *CLINICAL HISTORY* * *   Cervical lymphadenopathy, rule out lymphoma   ==========================================================================   * * *FINAL PATHOLOGIC DIAGNOSIS* * *        Lymph node, left posterior cervical lymph node, excision:        Large B cell lymphoma. See comment. * * *Comment* * *   The histologic section has fragments of lymphoid tissue with diffuse and   focal follicular architecture. Diffuse areas are comprised of large,   atypical lymphocytes with centroblastic morphology and increased mitotic   activity. Immunohistochemical stains confirm the malignant cells are CD20   positive B cells that coexpress CD10 and BCL6 without MUM1. CD23   highlights rare follicular dendritic networks associated with lymphoid   follicles with germinal centers and express CD10 and BCL6 without BCL2. CyclinD1 and CD56 stains are negative. Concurrent flow cytometric analysis   confirms a clonal CD10 positive B-cell population comprised of medium to   large cells. The Ki-67 proliferation index is 30%. The overall findings favor the diagnosis of diffuse large B-cell lymphoma,   germinal center subtype. Molecular genetic studies are pending for further   characterization will be reported in an addendum. Flow cytometry:   Consistent with CD10-positive B-cell lymphoproliferative disorder. Comments: Flow cytometry shows monoclonal B-cells (36% of total cells)   with CD10 expression without CD5, consistent with a CD10-positive B-cell   lymphoproliferative disorder. The main differential diagnosis includes   follicular lymphoma, Burkitt lymphoma and large B-cell lymphoma. Please   correlate the result with morphologic findings and clinical information. Flow Differential (%) and Population Analysis:   Lymphocytes: 93.7%   T-cells (39% of lymphoid cells) show a CD4/CD8 ratio of 3.3 without overt   phenotypic abnormality. NK-cells (1% of lymphoid cells) are unremarkable.    Mature B-cells (56% of lymphoid cells) include large forms based on   forward scattered pattern and show: CD5 neg, CD10+, CD11c+dim, CD19+,   CD20+ with surface lambda light chain restriction. Markers Performed: CD2, CD3, CD4, CD5, CD7, CD8, CD10, CD11c, CD19, CD20,   CD23, CD34, CD38, CD45, CD56, Kappa, Lambda (17 Markers)   The Technical Component Processing of this test was completed at   [facility], [address] [place] [address], [place], [place] / [address] /   [phone] / Ori Gutierrez # [id number].  Interpretation by Andrei Jackson M.D. The   complete scanned report is available in Epic. CPT: H0336474, Y587200, Y9969568, [id number], [id number], [id number]   [facility]2/2022   *Electronically Signed Out By Yolanda Guzmán. Jose Alejandro Linn MD*   ==========================================================================   * * *Gross Description* * *   Specimen #1, received fresh labeled with the patient's name,  and left   posterior cervical lymph node, consists of two paper towels containing a   1.7 x 1.5 x 0.5 cm aggregate of pale pink-tan, fleshy soft tissue   fragments. The specimen is handled according to the flow cytometry   protocol as follows:   7          Two air-dried touch prep slides - one Diff Quik stained, one   rapid-fixed in 95% alcohol   7          Representative sections in B plus fixative (1A)   7          One piece held in RPMI for possible flow cytometry analysis   7          Remaining tissue fixed in formalin for permanents (1B)   Dr. Jose Alejandro Linn will determine if flow cytometry is necessary. AMM 2022 02:06 PM     FISH: 22:  Triple Hit Lymphoma  PERTINENT CARE EVENTS   n/a    Subjective:     History of Present Illness:     Lelia Silva is a 78 y.o. F who presents for hospitalization for inpatient chemotherapy for dose adjusted R-EPOCH for Triple Hit Lymphoma High Grade B-Cell Lymphoma. She presents this morning with ongoing bone pain in her legs and a left frontal scalp lesion. She notes that oxycodone has made her nausea and itchy at times.  She states that she still is sleeping poorly. She still has an appetite. She is still ambulating. Denies any fevers or chills. Has completed pre-phase steroids but has not found much relief with treatment. Patient denies any bowel or bladder problems. Patient reports joint pain. Patient denies any shortness of breath. Patient reports fatigue. Patient denies any gait disturbance. -She had a haircut in anticipation of chemotherapy.  -She is requesting a nicotine patch. Interval History:     Didn't sleep well last night; has dry mouth that was bothering her a lot last night; has pain to feet; rt leg and rt hip that is not new; controlled with current pain regimen. Had reflux. Had nausea when she first came in but none since admission. Has been up to the chair this am      Past Medical History:   Diagnosis Date    Adverse effect of anesthesia     PHLEGM IN THROAT POST OP & NEEDED X2 BREATHING TREATMENTS    Anxiety     COPD (chronic obstructive pulmonary disease) ([facility] [address] 2022    emphysema    Depression     GERD (gastroesophageal reflux disease)     Hernia, femoral     since childhood    Hypertension     Joint pain     Nausea & vomiting     TMJ arthritis       Past Surgical History:   Procedure Laterality Date    HX HYSTERECTOMY  2019    HX TONSILLECTOMY     History of Salivary Gland Surgery in 30's for evaluation for dry mouth.      Social History     Tobacco Use    Smoking status: Current Every Day Smoker     Packs/day: 1.00     Years: 40.00     Pack years: 40.00     Types: Cigarettes    Smokeless tobacco: Never Used    Tobacco comment: 0.5-1 pack per day   Substance Use Topics    Alcohol use: Not Currently     Alcohol/week: 0.0 standard drinks      Family History   Problem Relation Age of Onset    Heart Disease Mother 46    Heart Surgery Mother         HEART TRANSPLANT    Elevated Lipids Mother     Hypertension Mother     COPD Father     Emphysema Father     Sudden Death Brother 46    Other Maternal Grandmother         BRAIN TUMOR    No Known Problems Son     No Known Problems Daughter      Current Facility-Administered Medications   Medication Dose Route Frequency    artificial saliva (MOUTH KOTE) 1 Spray  1 Spray Oral PRN    pantoprazole (PROTONIX) tablet 40 mg  40 mg Oral ACB    zolpidem (AMBIEN) tablet 5 mg  5 mg Oral QHS    0.9% sodium chloride infusion  45 mL/hr IntraVENous CONTINUOUS    acyclovir (ZOVIRAX) capsule 400 mg  400 mg Oral BID    meperidine (DEMEROL) injection 25 mg  25 mg IntraVENous Q20MIN PRN    [START ON 5/13/2022] meperidine (DEMEROL) injection 25 mg  25 mg IntraVENous Q20MIN PRN    predniSONE (DELTASONE) tablet 123 mg  60 mg/m2 (Treatment Plan Recorded) Oral BID WITH MEALS    docusate sodium (COLACE) capsule 100 mg  100 mg Oral BID    FLUoxetine (PROzac) capsule 40 mg  40 mg Oral 7am    lidocaine-prilocaine (EMLA) 2.5-2.5 % cream   Topical PRN    polyethylene glycol (MIRALAX) packet 17 g  17 g Oral DAILY    sodium chloride (NS) flush 5-40 mL  5-40 mL IntraVENous Q8H    sodium chloride (NS) flush 5-40 mL  5-40 mL IntraVENous PRN    enoxaparin (LOVENOX) injection 40 mg  40 mg SubCUTAneous DAILY    nicotine (NICODERM CQ) 21 mg/24 hr patch 1 Patch  1 Patch TransDERmal DAILY    amLODIPine (NORVASC) tablet 5 mg  5 mg Oral DAILY    And    lisinopriL (PRINIVIL, ZESTRIL) tablet 20 mg  20 mg Oral DAILY    allopurinoL (ZYLOPRIM) tablet 300 mg  300 mg Oral DAILY    morphine injection 2 mg  2 mg IntraVENous Q4H PRN    acetaminophen (TYLENOL) tablet 650 mg  650 mg Oral Q6H PRN    morphine IR (MS IR) tablet 15 mg  15 mg Oral Q6H PRN    ondansetron (ZOFRAN) injection 8 mg  8 mg IntraVENous Q8H    etoposide (VEPESID) 102.6 mg in 0.9% sodium chloride 450 mL chemo infusion  50 mg/m2 (Treatment Plan Recorded) IntraVENous Q24H    vinCRIStine (VINCASAR PFS) 0.8 mg, DOXOrubicin (ADRIAMYCIN) 20.6 mg in 0.9% sodium chloride 250 mL, overfill volume 25 mL chemo infusion   IntraVENous Q24H    [START ON 5/15/2022] cyclophosphamide (CYTOXAN) 1,537.6 mg in 0.9% sodium chloride 250 mL, overfill volume 25 mL chemo infusion  750 mg/m2 (Treatment Plan Recorded) IntraVENous ONCE    trimethoprim-sulfamethoxazole (BACTRIM DS, SEPTRA DS) 160-800 mg per tablet 1 Tablet  1 Tablet Oral Q MON, WED & FRI    OLANZapine (ZyPREXA) tablet 5 mg  5 mg Oral QPM    sodium chloride (NS) flush 10 mL  10 mL InterCATHeter PRN    diphenhydrAMINE (BENADRYL) injection 25 mg  25 mg IntraVENous PRN    acetaminophen (TYLENOL) tablet 650 mg  650 mg Oral PRN    ondansetron (ZOFRAN) injection 8 mg  8 mg IntraVENous PRN    diphenhydrAMINE (BENADRYL) injection 50 mg  50 mg IntraVENous PRN    albuterol (PROVENTIL VENTOLIN) nebulizer solution 2.5 mg  2.5 mg Nebulization PRN    LORazepam (ATIVAN) injection 0.5 mg  0.5 mg IntraVENous Q6H PRN    heparin (porcine) pf 300-500 Units  300-500 Units InterCATHeter PRN      Allergies   Allergen Reactions    Oxycodone Nausea Only      Objective:     Visit Vitals  /80 (BP 1 Location: Right upper arm, BP Patient Position: Sitting)   Pulse 84   Temp 98.3 °F (36.8 °C)   Resp 18   Ht 5' 3\" (1.6 m)   Wt 201 lb 15.1 oz (91.6 kg)   SpO2 98%   BMI 35.77 kg/m²     ECOG PS: 1  General: no distress  Eyes: anicteric sclerae  HENT: atraumatic  Neck: supple  Respiratory: CTAB; normal respiratory effort  CV: no peripheral edema  GI: soft, nontender, nondistended, no masses, no hepatomegaly, no splenomegaly  Skin: no rashes; no ecchymoses; no petechiae  Psych: alert, oriented, appropriate affect, normal judgment/insight      Results:     I personally reviewed Epic EHR labs/results below:   Lab Results   Component Value Date/Time    WBC 12.4 (H) 05/12/2022 03:26 AM    HGB 12.2 05/12/2022 03:26 AM    HCT 37.5 05/12/2022 03:26 AM    PLATELET 552 50/02/6123 03:26 AM    MCV 95.7 05/12/2022 03:26 AM    ABS.  NEUTROPHILS 11.9 (H) 05/12/2022 03:26 AM     Lab Results   Component Value Date/Time    Sodium 140 05/12/2022 03:26 AM Potassium 4.3 05/12/2022 03:26 AM    Chloride 111 (H) 05/12/2022 03:26 AM    CO2 21 05/12/2022 03:26 AM    Glucose 218 (H) 05/12/2022 03:26 AM    BUN 17 05/12/2022 03:26 AM    Creatinine 0.94 05/12/2022 03:26 AM    GFR est AA >60 05/12/2022 03:26 AM    GFR est non-AA 60 (L) 05/12/2022 03:26 AM    Calcium 8.1 (L) 05/12/2022 03:26 AM    Glucose (POC) 98 05/04/2022 01:37 PM     Lab Results   Component Value Date/Time    Bilirubin, total 0.2 05/12/2022 03:26 AM    ALT (SGPT) 43 05/12/2022 03:26 AM    Alk. phosphatase 66 05/12/2022 03:26 AM    Protein, total 6.3 (L) 05/12/2022 03:26 AM    Albumin 3.3 (L) 05/12/2022 03:26 AM    Globulin 3.0 05/12/2022 03:26 AM     Lab Results   Component Value Date/Time    Hepatitis B surface Ag <0.10 04/27/2022 12:23 PM    Hepatitis B surface Ab 3.66 04/27/2022 12:23 PM         Assessment and Recommendations:   CODE STATUS:  CPR, FULL CODE     Diagnosis    Diffuse large B cell lymphoma ([facility] [address]  -proceed with curative intent DA R-EPOCH Cycle 1,Day 2 on 5/12/2022;   -continue  allopurinol prophylaxis against TLS. Following LDH, Uric Acid, Phosphorus. -prophylaxis with  Bactrim and acyclovir        Cancer related pain  - IV morphine while hospitalized and treat her disease since oral oxycodone caused her nausea and mild itching.       Insomnia due to medical condition  - trazodone did not help last night; switching to Ambien per pt request  -continue to monitor      Metastatic cancer to bone ([facility] [address]  -continue prn pain medication      Reflux  -likely 2/2 steroids: Pepcid did not help yesterday; switched to Protonix  Continue to monitor       Activity  Encouraged pt to be up in chair most of day and ambulating in the hallway      Tobacco dependence  -Recommend smoking cessation.  -Patient requested  nicotine patch/ now in use       Plan reviewed with Dr Corey Palacios    Signed By:   Dorothy Lunsford NP

## 2022-05-12 NOTE — PROGRESS NOTES
Bedside and Verbal shift change report given to Claus Taveras RN (oncoming nurse) by Rosa Noriega RN (offgoing nurse). Report included the following information SBAR, Kardex and MAR.

## 2022-05-12 NOTE — TELEPHONE ENCOUNTER
[employer] Palliative Medicine Office  Nursing Note  [phone]  Fax [phone]      Name:  Stiven Taveras  YOB: 1955    Received outpatient Palliative Medicine referral from Miguelina Farooq MD to see patient for symptom management and supportive care. Chart  reviewed. Stiven Taveras is a 78 y.o. female with diffuse large B Cell lymphoma. Patient's most recent office visit with Dr. Lesvia Chou was on 5/9/22. See his note for complete HPI, treatment history, and plan. ACP:   No ACP documents on file    Nurse called patient and [facility] outpatient Palliative Medicine services.  Appt scheduled for 5/17/22 at 2:30pm    YAIMA SoniN, RN-BC, [facility]

## 2022-05-12 NOTE — PROGRESS NOTES
Problem: Falls - Risk of  Goal: *Absence of Falls  Description: Document Kelsie  Fall Risk and appropriate interventions in the flowsheet. Outcome: Progressing Towards Goal  Note: Fall Risk Interventions:            Medication Interventions: Assess postural VS orthostatic hypotension,Bed/chair exit alarm,Patient to call before getting OOB,Teach patient to arise slowly                   Problem: Falls - Risk of  Goal: *Absence of Falls  Description: Document Kelsie  Fall Risk and appropriate interventions in the flowsheet. Outcome: Progressing Towards Goal  Note: Fall Risk Interventions:            Medication Interventions: Assess postural VS orthostatic hypotension,Bed/chair exit alarm,Patient to call before getting OOB,Teach patient to arise slowly                   Problem: Patient Education: Go to Patient Education Activity  Goal: Patient/Family Education  Outcome: Progressing Towards Goal     Problem: Falls - Risk of  Goal: *Absence of Falls  Description: Document Kelsie  Fall Risk and appropriate interventions in the flowsheet.   Outcome: Progressing Towards Goal  Note: Fall Risk Interventions:            Medication Interventions: Assess postural VS orthostatic hypotension,Bed/chair exit alarm,Patient to call before getting OOB,Teach patient to arise slowly

## 2022-05-12 NOTE — PROGRESS NOTES
Spiritual Care Assessment/Progress Note  [address]      NAME: Severo Raven      MRN: [id number]  AGE: 78 y.o.  SEX: female  Latter-day Affiliation: No preference   Language: English     5/12/2022     Total Time (in minutes): 20     Spiritual Assessment begun in [facility] 5M1 MED SURG 1 through conversation with:         [x]Patient        [] Family    [x] Friend(s)        Reason for Consult: Initial/Spiritual assessment, patient floor     Spiritual beliefs: (Please include comment if needed)     [] Identifies with a chen tradition:         [] Supported by a chen community:            [] Claims no spiritual orientation:           [] Seeking spiritual identity:                [x] Adheres to an individual form of spirituality:           [] Not able to assess:                           Identified resources for coping:      [] Prayer                               [] Music                  [] Guided Imagery     [x] Family/friends                 [] Pet visits     [] Devotional reading                         [] Unknown     [] Other:                                              Interventions offered during this visit: (See comments for more details)    Patient Interventions: Affirmation of emotions/emotional suffering,Affirmation of chen,Catharsis/review of pertinent events in supportive environment,Coping skills reviewed/reinforced,Iconic (affirming the presence of God/Higher Power),Normalization of emotional/spiritual concerns,Prayer (assurance of)           Plan of Care:     [] Support spiritual and/or cultural needs    [] Support AMD and/or advance care planning process      [] Support grieving process   [] Coordinate Rites and/or Rituals    [] Coordination with community clergy   [] No spiritual needs identified at this time   [] Detailed Plan of Care below (See Comments)  [] Make referral to Music Therapy  [] Make referral to Pet Therapy     [] Make referral to Addiction services  [] Make referral to [facility]  [] Make referral to Spiritual Care Partner  [] No future visits requested        [x] Follow up visits as needed     Visited patient for initial spiritual assessment. Her chart was consulted. Her close friend Mackenzie Feliz was present. She has a daughter and a son. The son lives with her. She reports good family support and looks forward to returning home this weekend.     Chaplain Evert, MDiv, MS, [facility]

## 2022-05-12 NOTE — PROGRESS NOTES
5/12/2022  Case Management Progress Note    10:11 AM  Patient is 78year old female admitted 5/11 with diffuse large B cell lymphoma  Patient's RUR is 12% green/low risk for readmission  Covid test: none this admission   Chart reviewed  Patient is here for inpatient chemotherapy. She does not have any noted needs for discharge at this time, has family support at home and plan remains for her to return there at discharge. Will continue to follow and assist with discharge planning as needed. Transition of Care Plan   1. Continue medical management/treatment  2. Home with family when medically ready   3. Family will transport at discharge  4. Follow up outpatient as indicated  5.  CM will continue to follow     CHIVO Gaytan

## 2022-05-12 NOTE — PROGRESS NOTES
Bedside shift change report given to Sung Mascorro (oncoming nurse) by Dunia Santiago (offgoing nurse). Report included the following information SBAR, Kardex and Med Rec Status.

## 2022-05-13 PROBLEM — I15.8 OTHER SECONDARY HYPERTENSION: Status: ACTIVE | Noted: 2022-01-01

## 2022-05-13 LAB
ALBUMIN SERPL-MCNC: 3 G/DL (ref 3.5–5)
ALBUMIN/GLOB SERPL: 1.2 {RATIO} (ref 1.1–2.2)
ALP SERPL-CCNC: 48 U/L (ref 45–117)
ALT SERPL-CCNC: 33 U/L (ref 12–78)
ANION GAP SERPL CALC-SCNC: 3 MMOL/L (ref 5–15)
AST SERPL-CCNC: 15 U/L (ref 15–37)
BASOPHILS # BLD: 0 K/UL (ref 0–0.1)
BASOPHILS NFR BLD: 0 % (ref 0–1)
BILIRUB SERPL-MCNC: 0.2 MG/DL (ref 0.2–1)
BUN SERPL-MCNC: 16 MG/DL (ref 6–20)
BUN/CREAT SERPL: 21 (ref 12–20)
CALCIUM SERPL-MCNC: 8.2 MG/DL (ref 8.5–10.1)
CHLORIDE SERPL-SCNC: 113 MMOL/L (ref 97–108)
CO2 SERPL-SCNC: 27 MMOL/L (ref 21–32)
CREAT SERPL-MCNC: 0.77 MG/DL (ref 0.55–1.02)
DIFFERENTIAL METHOD BLD: ABNORMAL
EOSINOPHIL # BLD: 0 K/UL (ref 0–0.4)
EOSINOPHIL NFR BLD: 0 % (ref 0–7)
ERYTHROCYTE [DISTWIDTH] IN BLOOD BY AUTOMATED COUNT: 13.5 % (ref 11.5–14.5)
GLOBULIN SER CALC-MCNC: 2.5 G/DL (ref 2–4)
GLUCOSE SERPL-MCNC: 125 MG/DL (ref 65–100)
HCT VFR BLD AUTO: 34.9 % (ref 35–47)
HGB BLD-MCNC: 11.3 G/DL (ref 11.5–16)
IMM GRANULOCYTES # BLD AUTO: 0.1 K/UL (ref 0–0.04)
IMM GRANULOCYTES NFR BLD AUTO: 1 % (ref 0–0.5)
LDH SERPL L TO P-CCNC: 170 U/L (ref 81–246)
LYMPHOCYTES # BLD: 0.3 K/UL (ref 0.8–3.5)
LYMPHOCYTES NFR BLD: 3 % (ref 12–49)
MCH RBC QN AUTO: 31 PG (ref 26–34)
MCHC RBC AUTO-ENTMCNC: 32.4 G/DL (ref 30–36.5)
MCV RBC AUTO: 95.9 FL (ref 80–99)
MONOCYTES # BLD: 0.5 K/UL (ref 0–1)
MONOCYTES NFR BLD: 4 % (ref 5–13)
NEUTS SEG # BLD: 10.8 K/UL (ref 1.8–8)
NEUTS SEG NFR BLD: 92 % (ref 32–75)
NRBC # BLD: 0 K/UL (ref 0–0.01)
NRBC BLD-RTO: 0 PER 100 WBC
PHOSPHATE SERPL-MCNC: 2.8 MG/DL (ref 2.6–4.7)
PLATELET # BLD AUTO: 215 K/UL (ref 150–400)
PMV BLD AUTO: 9.9 FL (ref 8.9–12.9)
POTASSIUM SERPL-SCNC: 4.3 MMOL/L (ref 3.5–5.1)
PROT SERPL-MCNC: 5.5 G/DL (ref 6.4–8.2)
RBC # BLD AUTO: 3.64 M/UL (ref 3.8–5.2)
SODIUM SERPL-SCNC: 143 MMOL/L (ref 136–145)
URATE SERPL-MCNC: 3.5 MG/DL (ref 2.6–6)
WBC # BLD AUTO: 11.6 K/UL (ref 3.6–11)

## 2022-05-13 PROCEDURE — 83615 LACTATE (LD) (LDH) ENZYME: CPT

## 2022-05-13 PROCEDURE — 74011250637 HC RX REV CODE- 250/637: Performed by: NURSE PRACTITIONER

## 2022-05-13 PROCEDURE — 80053 COMPREHEN METABOLIC PANEL: CPT

## 2022-05-13 PROCEDURE — 74011250637 HC RX REV CODE- 250/637: Performed by: INTERNAL MEDICINE

## 2022-05-13 PROCEDURE — 84550 ASSAY OF BLOOD/URIC ACID: CPT

## 2022-05-13 PROCEDURE — 74011636637 HC RX REV CODE- 636/637: Performed by: NURSE PRACTITIONER

## 2022-05-13 PROCEDURE — 65270000029 HC RM PRIVATE

## 2022-05-13 PROCEDURE — 36415 COLL VENOUS BLD VENIPUNCTURE: CPT

## 2022-05-13 PROCEDURE — 84100 ASSAY OF PHOSPHORUS: CPT

## 2022-05-13 PROCEDURE — 74011250636 HC RX REV CODE- 250/636: Performed by: INTERNAL MEDICINE

## 2022-05-13 PROCEDURE — 74011000250 HC RX REV CODE- 250: Performed by: NURSE PRACTITIONER

## 2022-05-13 PROCEDURE — 74011636637 HC RX REV CODE- 636/637: Performed by: INTERNAL MEDICINE

## 2022-05-13 PROCEDURE — 74011250636 HC RX REV CODE- 250/636: Performed by: NURSE PRACTITIONER

## 2022-05-13 PROCEDURE — 99233 SBSQ HOSP IP/OBS HIGH 50: CPT | Performed by: INTERNAL MEDICINE

## 2022-05-13 PROCEDURE — 85025 COMPLETE CBC W/AUTO DIFF WBC: CPT

## 2022-05-13 RX ORDER — HYDRALAZINE HYDROCHLORIDE 20 MG/ML
10 INJECTION INTRAMUSCULAR; INTRAVENOUS
Status: DISCONTINUED | OUTPATIENT
Start: 2022-05-13 | End: 2022-05-15 | Stop reason: HOSPADM

## 2022-05-13 RX ORDER — HYDRALAZINE HYDROCHLORIDE 20 MG/ML
10 INJECTION INTRAMUSCULAR; INTRAVENOUS ONCE
Status: COMPLETED | OUTPATIENT
Start: 2022-05-13 | End: 2022-05-13

## 2022-05-13 RX ADMIN — ACYCLOVIR 400 MG: 200 CAPSULE ORAL at 08:51

## 2022-05-13 RX ADMIN — HYDRALAZINE HYDROCHLORIDE 10 MG: 20 INJECTION INTRAMUSCULAR; INTRAVENOUS at 11:24

## 2022-05-13 RX ADMIN — PREDNISONE 123 MG: 20 TABLET ORAL at 08:51

## 2022-05-13 RX ADMIN — ACYCLOVIR 400 MG: 200 CAPSULE ORAL at 17:44

## 2022-05-13 RX ADMIN — PANTOPRAZOLE SODIUM 40 MG: 40 TABLET, DELAYED RELEASE ORAL at 05:55

## 2022-05-13 RX ADMIN — LORAZEPAM 0.5 MG: 2 INJECTION INTRAMUSCULAR; INTRAVENOUS at 06:09

## 2022-05-13 RX ADMIN — OLANZAPINE 5 MG: 5 TABLET, FILM COATED ORAL at 17:44

## 2022-05-13 RX ADMIN — LORAZEPAM 0.5 MG: 2 INJECTION INTRAMUSCULAR; INTRAVENOUS at 18:35

## 2022-05-13 RX ADMIN — DOCUSATE SODIUM 100 MG: 100 CAPSULE, LIQUID FILLED ORAL at 17:44

## 2022-05-13 RX ADMIN — LORAZEPAM 0.5 MG: 2 INJECTION INTRAMUSCULAR; INTRAVENOUS at 12:23

## 2022-05-13 RX ADMIN — ONDANSETRON 8 MG: 2 INJECTION INTRAMUSCULAR; INTRAVENOUS at 01:21

## 2022-05-13 RX ADMIN — ZOLPIDEM TARTRATE 5 MG: 5 TABLET ORAL at 21:22

## 2022-05-13 RX ADMIN — ONDANSETRON 8 MG: 2 INJECTION INTRAMUSCULAR; INTRAVENOUS at 08:51

## 2022-05-13 RX ADMIN — VINCRISTINE SULFATE: 1 INJECTION, SOLUTION INTRAVENOUS at 13:27

## 2022-05-13 RX ADMIN — HYDRALAZINE HYDROCHLORIDE 10 MG: 20 INJECTION INTRAMUSCULAR; INTRAVENOUS at 18:58

## 2022-05-13 RX ADMIN — FLUOXETINE 40 MG: 20 CAPSULE ORAL at 05:55

## 2022-05-13 RX ADMIN — SULFAMETHOXAZOLE AND TRIMETHOPRIM 1 TABLET: 800; 160 TABLET ORAL at 21:22

## 2022-05-13 RX ADMIN — MORPHINE SULFATE 2 MG: 2 INJECTION, SOLUTION INTRAMUSCULAR; INTRAVENOUS at 15:26

## 2022-05-13 RX ADMIN — MORPHINE SULFATE 2 MG: 2 INJECTION, SOLUTION INTRAMUSCULAR; INTRAVENOUS at 05:54

## 2022-05-13 RX ADMIN — SODIUM CHLORIDE, PRESERVATIVE FREE 10 ML: 5 INJECTION INTRAVENOUS at 15:26

## 2022-05-13 RX ADMIN — ALLOPURINOL 300 MG: 100 TABLET ORAL at 08:51

## 2022-05-13 RX ADMIN — LISINOPRIL 20 MG: 20 TABLET ORAL at 08:51

## 2022-05-13 RX ADMIN — AMLODIPINE BESYLATE 5 MG: 5 TABLET ORAL at 08:51

## 2022-05-13 RX ADMIN — SODIUM CHLORIDE, PRESERVATIVE FREE 5 ML: 5 INJECTION INTRAVENOUS at 05:53

## 2022-05-13 RX ADMIN — ONDANSETRON 8 MG: 2 INJECTION INTRAMUSCULAR; INTRAVENOUS at 17:39

## 2022-05-13 RX ADMIN — ETOPOSIDE 102.6 MG: 20 INJECTION, SOLUTION, CONCENTRATE INTRAVENOUS at 14:37

## 2022-05-13 RX ADMIN — MORPHINE SULFATE 2 MG: 2 INJECTION, SOLUTION INTRAMUSCULAR; INTRAVENOUS at 23:48

## 2022-05-13 RX ADMIN — PREDNISONE 123 MG: 20 TABLET ORAL at 17:44

## 2022-05-13 RX ADMIN — POLYETHYLENE GLYCOL 3350 17 G: 17 POWDER, FOR SOLUTION ORAL at 08:48

## 2022-05-13 RX ADMIN — SODIUM CHLORIDE 45 ML/HR: 9 INJECTION, SOLUTION INTRAVENOUS at 07:18

## 2022-05-13 RX ADMIN — MORPHINE SULFATE 2 MG: 2 INJECTION, SOLUTION INTRAMUSCULAR; INTRAVENOUS at 19:49

## 2022-05-13 RX ADMIN — MORPHINE SULFATE 2 MG: 2 INJECTION, SOLUTION INTRAMUSCULAR; INTRAVENOUS at 10:11

## 2022-05-13 RX ADMIN — ONDANSETRON 8 MG: 2 INJECTION INTRAMUSCULAR; INTRAVENOUS at 05:53

## 2022-05-13 RX ADMIN — SODIUM CHLORIDE, PRESERVATIVE FREE 5 ML: 5 INJECTION INTRAVENOUS at 21:23

## 2022-05-13 RX ADMIN — ENOXAPARIN SODIUM 40 MG: 100 INJECTION SUBCUTANEOUS at 08:50

## 2022-05-13 RX ADMIN — DOCUSATE SODIUM 100 MG: 100 CAPSULE, LIQUID FILLED ORAL at 08:51

## 2022-05-13 NOTE — PROGRESS NOTES
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 This summary highlights focused aspects of patient's ongoing care and may have a recurring section in notes with either updates or remain unchanged as a longitudinal care summary. 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\"    ORIGINAL STAGING:   Stage IV    SITES OF DISEASE:   Left Cervical Lymph Node,Bone Disease, Stage IV    CURRENT TREATMENT:   Plan for DA-R-EPOCH    PRIOR TREATMENT:   n/a    GOALS OF CARE:   Curative Intent    PATHOLOGY:   Specimen #:[id number] Collect: 4/22/2022      ==========================================================================                    * * *SURGICAL PATHOLOGY REPORT* * *   ==========================================================================   * * *PROCEDURES/ADDENDA* * *   ADDENDUM   Date Ordered: 4/26/2022     Status: Signed Out   Date Complete: 4/26/2022     By: Faith Albarado.  Leander Dominguez MD   Date Reported: 4/26/2022   Addendum Diagnosis   Lymph node, left posterior cervical lymph node, excision:   In situ hybridization for Jesús-Barr viral RNA: Negative   CPT: 47533  Addendum Comment   * * *CLINICAL HISTORY* * *   Cervical lymphadenopathy, rule out lymphoma   ==========================================================================   * * *FINAL PATHOLOGIC DIAGNOSIS* * *        Lymph node, left posterior cervical lymph node, excision:        Large B cell lymphoma. See comment. * * *Comment* * *   The histologic section has fragments of lymphoid tissue with diffuse and   focal follicular architecture. Diffuse areas are comprised of large,   atypical lymphocytes with centroblastic morphology and increased mitotic   activity. Immunohistochemical stains confirm the malignant cells are CD20   positive B cells that coexpress CD10 and BCL6 without MUM1. CD23   highlights rare follicular dendritic networks associated with lymphoid   follicles with germinal centers and express CD10 and BCL6 without BCL2. CyclinD1 and CD56 stains are negative. Concurrent flow cytometric analysis   confirms a clonal CD10 positive B-cell population comprised of medium to   large cells. The Ki-67 proliferation index is 30%. The overall findings favor the diagnosis of diffuse large B-cell lymphoma,   germinal center subtype. Molecular genetic studies are pending for further   characterization will be reported in an addendum. Flow cytometry:   Consistent with CD10-positive B-cell lymphoproliferative disorder. Comments: Flow cytometry shows monoclonal B-cells (36% of total cells)   with CD10 expression without CD5, consistent with a CD10-positive B-cell   lymphoproliferative disorder. The main differential diagnosis includes   follicular lymphoma, Burkitt lymphoma and large B-cell lymphoma. Please   correlate the result with morphologic findings and clinical information. Flow Differential (%) and Population Analysis:   Lymphocytes: 93.7%   T-cells (39% of lymphoid cells) show a CD4/CD8 ratio of 3.3 without overt   phenotypic abnormality. NK-cells (1% of lymphoid cells) are unremarkable.    Mature B-cells (56% of lymphoid cells) include large forms based on   forward scattered pattern and show: CD5 neg, CD10+, CD11c+dim, CD19+,   CD20+ with surface lambda light chain restriction. Markers Performed: CD2, CD3, CD4, CD5, CD7, CD8, CD10, CD11c, CD19, CD20,   CD23, CD34, CD38, CD45, CD56, Kappa, Lambda (17 Markers)   The Technical Component Processing of this test was completed at   [facility], [address] [place] [address], [place], [place] / [address] /   [phone] / Mateo Clive # [id number].  Interpretation by Priya Thompson M.D. The   complete scanned report is available in Epic. CPT: O5897957, J0211619, T6314383, [id number], Q6854170, [id number]   [facility]2/2022   *Electronically Signed Out By Kary Duncan. Lon Frausto MD*   ==========================================================================   * * *Gross Description* * *   Specimen #1, received fresh labeled with the patient's name,  and left   posterior cervical lymph node, consists of two paper towels containing a   1.7 x 1.5 x 0.5 cm aggregate of pale pink-tan, fleshy soft tissue   fragments. The specimen is handled according to the flow cytometry   protocol as follows:   7          Two air-dried touch prep slides - one Diff Quik stained, one   rapid-fixed in 95% alcohol   7          Representative sections in B plus fixative (1A)   7          One piece held in RPMI for possible flow cytometry analysis   7          Remaining tissue fixed in formalin for permanents (1B)   Dr. Lon Frausto will determine if flow cytometry is necessary. AMM 2022 02:06 PM     FISH: 22:  Triple Hit Lymphoma  PERTINENT CARE EVENTS   n/a      History of Present Illness:     Bertha Hogan is a 78 y.o. F who presents for hospitalization for inpatient chemotherapy for dose adjusted R-EPOCH for Triple Hit Lymphoma High Grade B-Cell Lymphoma. She presents this morning with ongoing bone pain in her legs and a left frontal scalp lesion. She notes that oxycodone has made her nausea and itchy at times. She states that she still is sleeping poorly.  She still has an appetite. She is still ambulating. Denies any fevers or chills. Has completed pre-phase steroids but has not found much relief with treatment. Patient denies any bowel or bladder problems. Patient reports joint pain. Patient denies any shortness of breath. Patient reports fatigue. Patient denies any gait disturbance. -She had a haircut in anticipation of chemotherapy.  -She is requesting a nicotine patch. Interval History:     Slept well; pain is being controlled with current pain medication; reflux improved with Protonix. Nursing reporting elevated BP. Was up in the chair and walking in the hooper yesterday.        Current Facility-Administered Medications   Medication Dose Route Frequency    [START ON 5/15/2022] predniSONE (DELTASONE) tablet 123 mg  60 mg/m2 (Treatment Plan Recorded) Oral BID WITH MEALS    artificial saliva (MOUTH KOTE) 1 Spray  1 Spray Oral PRN    pantoprazole (PROTONIX) tablet 40 mg  40 mg Oral ACB    zolpidem (AMBIEN) tablet 5 mg  5 mg Oral QHS    0.9% sodium chloride infusion  45 mL/hr IntraVENous CONTINUOUS    acyclovir (ZOVIRAX) capsule 400 mg  400 mg Oral BID    meperidine (DEMEROL) injection 25 mg  25 mg IntraVENous Q20MIN PRN    predniSONE (DELTASONE) tablet 123 mg  60 mg/m2 (Treatment Plan Recorded) Oral BID WITH MEALS    docusate sodium (COLACE) capsule 100 mg  100 mg Oral BID    FLUoxetine (PROzac) capsule 40 mg  40 mg Oral 7am    lidocaine-prilocaine (EMLA) 2.5-2.5 % cream   Topical PRN    polyethylene glycol (MIRALAX) packet 17 g  17 g Oral DAILY    sodium chloride (NS) flush 5-40 mL  5-40 mL IntraVENous Q8H    sodium chloride (NS) flush 5-40 mL  5-40 mL IntraVENous PRN    enoxaparin (LOVENOX) injection 40 mg  40 mg SubCUTAneous DAILY    nicotine (NICODERM CQ) 21 mg/24 hr patch 1 Patch  1 Patch TransDERmal DAILY    amLODIPine (NORVASC) tablet 5 mg  5 mg Oral DAILY    And    lisinopriL (PRINIVIL, ZESTRIL) tablet 20 mg  20 mg Oral DAILY    allopurinoL (ZYLOPRIM) tablet 300 mg  300 mg Oral DAILY    morphine injection 2 mg  2 mg IntraVENous Q4H PRN    acetaminophen (TYLENOL) tablet 650 mg  650 mg Oral Q6H PRN    morphine IR (MS IR) tablet 15 mg  15 mg Oral Q6H PRN    ondansetron (ZOFRAN) injection 8 mg  8 mg IntraVENous Q8H    etoposide (VEPESID) 102.6 mg in 0.9% sodium chloride 450 mL chemo infusion  50 mg/m2 (Treatment Plan Recorded) IntraVENous Q24H    vinCRIStine (VINCASAR PFS) 0.8 mg, DOXOrubicin (ADRIAMYCIN) 20.6 mg in 0.9% sodium chloride 250 mL, overfill volume 25 mL chemo infusion   IntraVENous Q24H    [START ON 5/15/2022] cyclophosphamide (CYTOXAN) 1,537.6 mg in 0.9% sodium chloride 250 mL, overfill volume 25 mL chemo infusion  750 mg/m2 (Treatment Plan Recorded) IntraVENous ONCE    trimethoprim-sulfamethoxazole (BACTRIM DS, SEPTRA DS) 160-800 mg per tablet 1 Tablet  1 Tablet Oral Q MON, WED & FRI    OLANZapine (ZyPREXA) tablet 5 mg  5 mg Oral QPM    sodium chloride (NS) flush 10 mL  10 mL InterCATHeter PRN    diphenhydrAMINE (BENADRYL) injection 25 mg  25 mg IntraVENous PRN    acetaminophen (TYLENOL) tablet 650 mg  650 mg Oral PRN    ondansetron (ZOFRAN) injection 8 mg  8 mg IntraVENous PRN    diphenhydrAMINE (BENADRYL) injection 50 mg  50 mg IntraVENous PRN    albuterol (PROVENTIL VENTOLIN) nebulizer solution 2.5 mg  2.5 mg Nebulization PRN    LORazepam (ATIVAN) injection 0.5 mg  0.5 mg IntraVENous Q6H PRN    heparin (porcine) pf 300-500 Units  300-500 Units InterCATHeter PRN      Allergies   Allergen Reactions    Oxycodone Nausea Only      Objective:     Visit Vitals  BP (!) 186/80 (BP 1 Location: Right upper arm, BP Patient Position: Sitting) Comment: MD notified   Pulse 65   Temp 97.4 °F (36.3 °C)   Resp 18   Ht 5' 3\" (1.6 m)   Wt 215 lb 2.7 oz (97.6 kg)   SpO2 96%   BMI 38.12 kg/m²     ECOG PS: 1  General: no distress  Eyes: anicteric sclerae  HENT: atraumatic  Neck: supple  Respiratory: CTAB; normal respiratory effort  CV: slight BLE edema noted  GI: soft, nontender, nondistended, no masses, no hepatomegaly, no splenomegaly  Skin: no rashes; no ecchymoses; no petechiae  Psych: alert, oriented, appropriate affect, normal judgment/insight      Results:     I personally reviewed Epic EHR labs/results below:   Lab Results   Component Value Date/Time    WBC 11.6 (H) 05/13/2022 03:26 AM    HGB 11.3 (L) 05/13/2022 03:26 AM    HCT 34.9 (L) 05/13/2022 03:26 AM    PLATELET 784 76/80/6682 03:26 AM    MCV 95.9 05/13/2022 03:26 AM    ABS. NEUTROPHILS 10.8 (H) 05/13/2022 03:26 AM     Lab Results   Component Value Date/Time    Sodium 143 05/13/2022 03:26 AM    Potassium 4.3 05/13/2022 03:26 AM    Chloride 113 (H) 05/13/2022 03:26 AM    CO2 27 05/13/2022 03:26 AM    Glucose 125 (H) 05/13/2022 03:26 AM    BUN 16 05/13/2022 03:26 AM    Creatinine 0.77 05/13/2022 03:26 AM    GFR est AA >60 05/13/2022 03:26 AM    GFR est non-AA >60 05/13/2022 03:26 AM    Calcium 8.2 (L) 05/13/2022 03:26 AM    Glucose (POC) 98 05/04/2022 01:37 PM     Lab Results   Component Value Date/Time    Bilirubin, total 0.2 05/13/2022 03:26 AM    ALT (SGPT) 33 05/13/2022 03:26 AM    Alk. phosphatase 48 05/13/2022 03:26 AM    Protein, total 5.5 (L) 05/13/2022 03:26 AM    Albumin 3.0 (L) 05/13/2022 03:26 AM    Globulin 2.5 05/13/2022 03:26 AM     Lab Results   Component Value Date/Time    Hepatitis B surface Ag <0.10 04/27/2022 12:23 PM    Hepatitis B surface Ab 3.66 04/27/2022 12:23 PM         Assessment and Recommendations:   CODE STATUS:  CPR, FULL CODE     Diagnosis    Diffuse large B cell lymphoma ([facility] [address]  -proceed with curative intent DA R-EPOCH Cycle 1,Day 3 on 5/13/2022;   -continue  allopurinol prophylaxis against TLS. Following LDH, Uric Acid, Phosphorus.   -prophylaxis with  Bactrim and acyclovir  --follow up appt wit Dr Kane Valle on 5/16  -follow up appt made for neulasta for 5/17 and labs checks ; pt will need labs checked 2x per week  -reviewed with pt additional inpatient tentative scheduled appts;  C2 due on 6/6/22        Cancer related pain  - IV morphine while hospitalized and treat her disease since oral oxycodone caused her nausea and mild itching.  -controlled      Insomnia due to medical condition  - trazodone did not help ; switched to Ambien per pt request with improvement in sleep lasat night  -continue to monitor      Metastatic cancer to bone [facility])  -continue prn pain medication      Reflux  -likely 2/2 steroids: Pepcid did not help; switched to Protonix with improvement.    Continue to monitor       Activity  Encouraged pt to be up in chair most of day and ambulating in the hallway      HTN  Continue home med; added hydralazine 10 mg x 1 for elevated BP      Tobacco dependence  -Recommend smoking cessation.  -Patient requested  nicotine patch/ now in use       Plan reviewed with Dr La León    Signed By:   Kaela Mcfadden NP

## 2022-05-13 NOTE — PROGRESS NOTES
Bedside shift change report given to Morena (oncoming nurse) by Henok Go (offgoing nurse). Report included the following information SBAR, Kardex, Recent Results and Med Rec Status.

## 2022-05-13 NOTE — PROGRESS NOTES
Patient's BP is 167/81. MD notified. Waiting on new orders. 0813  No new orders at this time. 1008  Patient's BP is still at 190/83 after receiving morning blood pressure medication. It was 186/80 upon re-check. Patient has no complaints of chest  pain, no headache and no SOB. She does report chronic pain on her legs, feet and hips which she rates 7/10. PRN morphine given. MD notified. No new orders at this time. 1124  One time dose of hydralazine was ordered by NP,  given to patient. 1229  Patient's BP is now at 145/66. Patient states this is her normal.    2004  Bedside and Verbal shift change report given to Alvaro Rico RN (oncoming nurse) by Devika Morales RN (offgoing nurse). Report included the following information SBAR, Kardex, Intake/Output, MAR and Recent Results.

## 2022-05-13 NOTE — PROGRESS NOTES
Problem: Falls - Risk of  Goal: *Absence of Falls  Description: Document Arlington Pyo Fall Risk and appropriate interventions in the flowsheet. Outcome: Progressing Towards Goal  Note: Fall Risk Interventions:            Medication Interventions: Assess postural VS orthostatic hypotension,Bed/chair exit alarm,Patient to call before getting OOB,Teach patient to arise slowly                   Problem: Falls - Risk of  Goal: *Absence of Falls  Description: Document Arlington Pyo Fall Risk and appropriate interventions in the flowsheet.   Outcome: Progressing Towards Goal  Note: Fall Risk Interventions:            Medication Interventions: Assess postural VS orthostatic hypotension,Bed/chair exit alarm,Patient to call before getting OOB,Teach patient to arise slowly

## 2022-05-14 LAB
ALBUMIN SERPL-MCNC: 3.2 G/DL (ref 3.5–5)
ALBUMIN/GLOB SERPL: 1.1 {RATIO} (ref 1.1–2.2)
ALP SERPL-CCNC: 52 U/L (ref 45–117)
ALT SERPL-CCNC: 34 U/L (ref 12–78)
ANION GAP SERPL CALC-SCNC: 7 MMOL/L (ref 5–15)
AST SERPL-CCNC: 13 U/L (ref 15–37)
BASOPHILS # BLD: 0 K/UL (ref 0–0.1)
BASOPHILS NFR BLD: 0 % (ref 0–1)
BILIRUB SERPL-MCNC: 0.4 MG/DL (ref 0.2–1)
BUN SERPL-MCNC: 16 MG/DL (ref 6–20)
BUN/CREAT SERPL: 17 (ref 12–20)
CALCIUM SERPL-MCNC: 8.3 MG/DL (ref 8.5–10.1)
CHLORIDE SERPL-SCNC: 110 MMOL/L (ref 97–108)
CO2 SERPL-SCNC: 24 MMOL/L (ref 21–32)
CREAT SERPL-MCNC: 0.94 MG/DL (ref 0.55–1.02)
DIFFERENTIAL METHOD BLD: ABNORMAL
EOSINOPHIL # BLD: 0 K/UL (ref 0–0.4)
EOSINOPHIL NFR BLD: 0 % (ref 0–7)
ERYTHROCYTE [DISTWIDTH] IN BLOOD BY AUTOMATED COUNT: 13.8 % (ref 11.5–14.5)
GLOBULIN SER CALC-MCNC: 2.8 G/DL (ref 2–4)
GLUCOSE SERPL-MCNC: 210 MG/DL (ref 65–100)
HCT VFR BLD AUTO: 37.4 % (ref 35–47)
HGB BLD-MCNC: 12.1 G/DL (ref 11.5–16)
IMM GRANULOCYTES # BLD AUTO: 0.1 K/UL (ref 0–0.04)
IMM GRANULOCYTES NFR BLD AUTO: 1 % (ref 0–0.5)
LDH SERPL L TO P-CCNC: 200 U/L (ref 81–246)
LYMPHOCYTES # BLD: 0.3 K/UL (ref 0.8–3.5)
LYMPHOCYTES NFR BLD: 3 % (ref 12–49)
MCH RBC QN AUTO: 30.7 PG (ref 26–34)
MCHC RBC AUTO-ENTMCNC: 32.4 G/DL (ref 30–36.5)
MCV RBC AUTO: 94.9 FL (ref 80–99)
MONOCYTES # BLD: 0.5 K/UL (ref 0–1)
MONOCYTES NFR BLD: 4 % (ref 5–13)
NEUTS SEG # BLD: 10.4 K/UL (ref 1.8–8)
NEUTS SEG NFR BLD: 92 % (ref 32–75)
NRBC # BLD: 0 K/UL (ref 0–0.01)
NRBC BLD-RTO: 0 PER 100 WBC
PHOSPHATE SERPL-MCNC: 2.8 MG/DL (ref 2.6–4.7)
PLATELET # BLD AUTO: 254 K/UL (ref 150–400)
PMV BLD AUTO: 9.6 FL (ref 8.9–12.9)
POTASSIUM SERPL-SCNC: 4.2 MMOL/L (ref 3.5–5.1)
PROT SERPL-MCNC: 6 G/DL (ref 6.4–8.2)
RBC # BLD AUTO: 3.94 M/UL (ref 3.8–5.2)
SODIUM SERPL-SCNC: 141 MMOL/L (ref 136–145)
URATE SERPL-MCNC: 3.5 MG/DL (ref 2.6–6)
WBC # BLD AUTO: 11.3 K/UL (ref 3.6–11)

## 2022-05-14 PROCEDURE — 36415 COLL VENOUS BLD VENIPUNCTURE: CPT

## 2022-05-14 PROCEDURE — 74011000250 HC RX REV CODE- 250: Performed by: NURSE PRACTITIONER

## 2022-05-14 PROCEDURE — 65270000029 HC RM PRIVATE

## 2022-05-14 PROCEDURE — 84550 ASSAY OF BLOOD/URIC ACID: CPT

## 2022-05-14 PROCEDURE — 80053 COMPREHEN METABOLIC PANEL: CPT

## 2022-05-14 PROCEDURE — 74011250637 HC RX REV CODE- 250/637: Performed by: NURSE PRACTITIONER

## 2022-05-14 PROCEDURE — 74011250636 HC RX REV CODE- 250/636: Performed by: NURSE PRACTITIONER

## 2022-05-14 PROCEDURE — 85025 COMPLETE CBC W/AUTO DIFF WBC: CPT

## 2022-05-14 PROCEDURE — 99232 SBSQ HOSP IP/OBS MODERATE 35: CPT | Performed by: INTERNAL MEDICINE

## 2022-05-14 PROCEDURE — 74011250636 HC RX REV CODE- 250/636: Performed by: INTERNAL MEDICINE

## 2022-05-14 PROCEDURE — 74011636637 HC RX REV CODE- 636/637: Performed by: NURSE PRACTITIONER

## 2022-05-14 PROCEDURE — 83615 LACTATE (LD) (LDH) ENZYME: CPT

## 2022-05-14 PROCEDURE — 84100 ASSAY OF PHOSPHORUS: CPT

## 2022-05-14 PROCEDURE — 74011250637 HC RX REV CODE- 250/637: Performed by: INTERNAL MEDICINE

## 2022-05-14 RX ADMIN — LORAZEPAM 0.5 MG: 2 INJECTION INTRAMUSCULAR; INTRAVENOUS at 18:46

## 2022-05-14 RX ADMIN — ENOXAPARIN SODIUM 40 MG: 100 INJECTION SUBCUTANEOUS at 08:50

## 2022-05-14 RX ADMIN — DOCUSATE SODIUM 100 MG: 100 CAPSULE, LIQUID FILLED ORAL at 08:51

## 2022-05-14 RX ADMIN — PANTOPRAZOLE SODIUM 40 MG: 40 TABLET, DELAYED RELEASE ORAL at 06:37

## 2022-05-14 RX ADMIN — SODIUM CHLORIDE 45 ML/HR: 9 INJECTION, SOLUTION INTRAVENOUS at 05:18

## 2022-05-14 RX ADMIN — POLYETHYLENE GLYCOL 3350 17 G: 17 POWDER, FOR SOLUTION ORAL at 08:52

## 2022-05-14 RX ADMIN — HYDRALAZINE HYDROCHLORIDE 10 MG: 20 INJECTION INTRAMUSCULAR; INTRAVENOUS at 10:44

## 2022-05-14 RX ADMIN — VINCRISTINE SULFATE: 1 INJECTION, SOLUTION INTRAVENOUS at 17:35

## 2022-05-14 RX ADMIN — LISINOPRIL 20 MG: 20 TABLET ORAL at 08:53

## 2022-05-14 RX ADMIN — LORAZEPAM 0.5 MG: 2 INJECTION INTRAMUSCULAR; INTRAVENOUS at 00:36

## 2022-05-14 RX ADMIN — LORAZEPAM 0.5 MG: 2 INJECTION INTRAMUSCULAR; INTRAVENOUS at 12:42

## 2022-05-14 RX ADMIN — ONDANSETRON 8 MG: 2 INJECTION INTRAMUSCULAR; INTRAVENOUS at 00:36

## 2022-05-14 RX ADMIN — MORPHINE SULFATE 2 MG: 2 INJECTION, SOLUTION INTRAMUSCULAR; INTRAVENOUS at 05:13

## 2022-05-14 RX ADMIN — AMLODIPINE BESYLATE 5 MG: 5 TABLET ORAL at 08:52

## 2022-05-14 RX ADMIN — ONDANSETRON 8 MG: 2 INJECTION INTRAMUSCULAR; INTRAVENOUS at 06:37

## 2022-05-14 RX ADMIN — ALLOPURINOL 300 MG: 100 TABLET ORAL at 08:51

## 2022-05-14 RX ADMIN — MORPHINE SULFATE 2 MG: 2 INJECTION, SOLUTION INTRAMUSCULAR; INTRAVENOUS at 09:52

## 2022-05-14 RX ADMIN — ZOLPIDEM TARTRATE 5 MG: 5 TABLET ORAL at 20:41

## 2022-05-14 RX ADMIN — SODIUM CHLORIDE, PRESERVATIVE FREE 10 ML: 5 INJECTION INTRAVENOUS at 17:42

## 2022-05-14 RX ADMIN — ACYCLOVIR 400 MG: 200 CAPSULE ORAL at 17:52

## 2022-05-14 RX ADMIN — DOCUSATE SODIUM 100 MG: 100 CAPSULE, LIQUID FILLED ORAL at 17:53

## 2022-05-14 RX ADMIN — MORPHINE SULFATE 2 MG: 2 INJECTION, SOLUTION INTRAMUSCULAR; INTRAVENOUS at 18:08

## 2022-05-14 RX ADMIN — SODIUM CHLORIDE, PRESERVATIVE FREE 10 ML: 5 INJECTION INTRAVENOUS at 20:34

## 2022-05-14 RX ADMIN — ONDANSETRON 8 MG: 2 INJECTION INTRAMUSCULAR; INTRAVENOUS at 10:37

## 2022-05-14 RX ADMIN — OLANZAPINE 5 MG: 5 TABLET, FILM COATED ORAL at 17:53

## 2022-05-14 RX ADMIN — HYDRALAZINE HYDROCHLORIDE 10 MG: 20 INJECTION INTRAMUSCULAR; INTRAVENOUS at 20:34

## 2022-05-14 RX ADMIN — ACYCLOVIR 400 MG: 200 CAPSULE ORAL at 08:51

## 2022-05-14 RX ADMIN — ONDANSETRON 8 MG: 2 INJECTION INTRAMUSCULAR; INTRAVENOUS at 19:47

## 2022-05-14 RX ADMIN — FLUOXETINE 40 MG: 20 CAPSULE ORAL at 06:37

## 2022-05-14 RX ADMIN — MORPHINE SULFATE 2 MG: 2 INJECTION, SOLUTION INTRAMUSCULAR; INTRAVENOUS at 14:07

## 2022-05-14 RX ADMIN — SODIUM CHLORIDE, PRESERVATIVE FREE 10 ML: 5 INJECTION INTRAVENOUS at 06:37

## 2022-05-14 RX ADMIN — ETOPOSIDE 102.6 MG: 20 INJECTION, SOLUTION, CONCENTRATE INTRAVENOUS at 14:18

## 2022-05-14 RX ADMIN — PREDNISONE 123 MG: 20 TABLET ORAL at 17:52

## 2022-05-14 RX ADMIN — PREDNISONE 123 MG: 20 TABLET ORAL at 08:52

## 2022-05-14 RX ADMIN — LORAZEPAM 0.5 MG: 2 INJECTION INTRAMUSCULAR; INTRAVENOUS at 06:37

## 2022-05-14 RX ADMIN — ONDANSETRON 8 MG: 2 INJECTION INTRAMUSCULAR; INTRAVENOUS at 08:55

## 2022-05-14 RX ADMIN — ONDANSETRON 8 MG: 2 INJECTION INTRAMUSCULAR; INTRAVENOUS at 17:52

## 2022-05-14 NOTE — PROGRESS NOTES
Bedside and Verbal shift change report given to Chanel Menezes RN (oncoming nurse) by Fior Miles RN (offgoing nurse). Report included the following information SBAR, Kardex, Intake/Output, MAR and Recent Results.

## 2022-05-14 NOTE — PROGRESS NOTES
Problem: Falls - Risk of  Goal: *Absence of Falls  Description: Document Kiran Go Fall Risk and appropriate interventions in the flowsheet.   Outcome: Progressing Towards Goal  Note: Fall Risk Interventions:            Medication Interventions: Assess postural VS orthostatic hypotension,Bed/chair exit alarm,Patient to call before getting OOB,Teach patient to arise slowly

## 2022-05-14 NOTE — PROGRESS NOTES
Bedside shift change report given to Morena (oncoming nurse) by Maryjane Cruz (offgoing nurse). Report included the following information SBAR, Kardex, Recent Results and Med Rec Status.

## 2022-05-14 NOTE — PROGRESS NOTES
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 This summary highlights focused aspects of patient's ongoing care and may have a recurring section in notes with either updates or remain unchanged as a longitudinal care summary. 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\"    ORIGINAL STAGING:   Stage IV    SITES OF DISEASE:   Left Cervical Lymph Node,Bone Disease, Stage IV    CURRENT TREATMENT:   DA-R-EPOCH    PRIOR TREATMENT:   n/a    GOALS OF CARE:   Curative Intent    PATHOLOGY:   Specimen #:[id number] Collect: 4/22/2022      ==========================================================================                    * * *SURGICAL PATHOLOGY REPORT* * *   ==========================================================================   * * *PROCEDURES/ADDENDA* * *   ADDENDUM   Date Ordered: 4/26/2022     Status: Signed Out   Date Complete: 4/26/2022     By: Werner Goodson.  Jason Yadav MD   Date Reported: 4/26/2022   Addendum Diagnosis   Lymph node, left posterior cervical lymph node, excision:   In situ hybridization for Jesús-Barr viral RNA: Negative   CPT: 62016  Addendum Comment   * * *CLINICAL HISTORY* * * Cervical lymphadenopathy, rule out lymphoma   ==========================================================================   * * *FINAL PATHOLOGIC DIAGNOSIS* * *        Lymph node, left posterior cervical lymph node, excision:        Large B cell lymphoma. See comment. * * *Comment* * *   The histologic section has fragments of lymphoid tissue with diffuse and   focal follicular architecture. Diffuse areas are comprised of large,   atypical lymphocytes with centroblastic morphology and increased mitotic   activity. Immunohistochemical stains confirm the malignant cells are CD20   positive B cells that coexpress CD10 and BCL6 without MUM1. CD23   highlights rare follicular dendritic networks associated with lymphoid   follicles with germinal centers and express CD10 and BCL6 without BCL2. CyclinD1 and CD56 stains are negative. Concurrent flow cytometric analysis   confirms a clonal CD10 positive B-cell population comprised of medium to   large cells. The Ki-67 proliferation index is 30%. The overall findings favor the diagnosis of diffuse large B-cell lymphoma,   germinal center subtype. Molecular genetic studies are pending for further   characterization will be reported in an addendum. Flow cytometry:   Consistent with CD10-positive B-cell lymphoproliferative disorder. Comments: Flow cytometry shows monoclonal B-cells (36% of total cells)   with CD10 expression without CD5, consistent with a CD10-positive B-cell   lymphoproliferative disorder. The main differential diagnosis includes   follicular lymphoma, Burkitt lymphoma and large B-cell lymphoma. Please   correlate the result with morphologic findings and clinical information. Flow Differential (%) and Population Analysis:   Lymphocytes: 93.7%   T-cells (39% of lymphoid cells) show a CD4/CD8 ratio of 3.3 without overt   phenotypic abnormality. NK-cells (1% of lymphoid cells) are unremarkable.    Mature B-cells (56% of lymphoid cells) include large forms based on   forward scattered pattern and show: CD5 neg, CD10+, CD11c+dim, CD19+,   CD20+ with surface lambda light chain restriction. Markers Performed: CD2, CD3, CD4, CD5, CD7, CD8, CD10, CD11c, CD19, CD20,   CD23, CD34, CD38, CD45, CD56, Kappa, Lambda (17 Markers)   The Technical Component Processing of this test was completed at   [facility], [address] [place] [address], [place], [place] / [id number] /   [phone] / Indira End # [id number].  Interpretation by Marianna Yang M.D. The   complete scanned report is available in Epic. CPT: F6563537, M0058419, [id number], [id number], [id number], [id number]   [facility]2/2022   *Electronically Signed Out By Tristian Ford. Meg Johnson MD*   ==========================================================================   * * *Gross Description* * *   Specimen #1, received fresh labeled with the patient's name,  and left   posterior cervical lymph node, consists of two paper towels containing a   1.7 x 1.5 x 0.5 cm aggregate of pale pink-tan, fleshy soft tissue   fragments. The specimen is handled according to the flow cytometry   protocol as follows:   7          Two air-dried touch prep slides - one Diff Quik stained, one   rapid-fixed in 95% alcohol   7          Representative sections in B plus fixative (1A)   7          One piece held in RPMI for possible flow cytometry analysis   7          Remaining tissue fixed in formalin for permanents (1B)   Dr. Meg Johnson will determine if flow cytometry is necessary. AMM 2022 02:06 PM     FISH: 22:  Triple Hit Lymphoma  PERTINENT CARE EVENTS   n/a      History of Present Illness:     Shantel García is a 78 y.o. F is admitted for inpatient chemotherapy - dose adjusted R-EPOCH for Triple Hit Lymphoma High Grade B-Cell Lymphoma. Interval History:     She is doing well. Some pedal edema.        Current Facility-Administered Medications   Medication Dose Route Frequency    [START ON 5/15/2022] predniSONE (DELTASONE) tablet 123 mg  60 mg/m2 (Treatment Plan Recorded) Oral BID WITH MEALS    hydrALAZINE (APRESOLINE) 20 mg/mL injection 10 mg  10 mg IntraVENous Q6H PRN    artificial saliva (MOUTH KOTE) 1 Spray  1 Spray Oral PRN    pantoprazole (PROTONIX) tablet 40 mg  40 mg Oral ACB    zolpidem (AMBIEN) tablet 5 mg  5 mg Oral QHS    0.9% sodium chloride infusion  45 mL/hr IntraVENous CONTINUOUS    acyclovir (ZOVIRAX) capsule 400 mg  400 mg Oral BID    meperidine (DEMEROL) injection 25 mg  25 mg IntraVENous Q20MIN PRN    predniSONE (DELTASONE) tablet 123 mg  60 mg/m2 (Treatment Plan Recorded) Oral BID WITH MEALS    docusate sodium (COLACE) capsule 100 mg  100 mg Oral BID    FLUoxetine (PROzac) capsule 40 mg  40 mg Oral 7am    lidocaine-prilocaine (EMLA) 2.5-2.5 % cream   Topical PRN    polyethylene glycol (MIRALAX) packet 17 g  17 g Oral DAILY    sodium chloride (NS) flush 5-40 mL  5-40 mL IntraVENous Q8H    sodium chloride (NS) flush 5-40 mL  5-40 mL IntraVENous PRN    enoxaparin (LOVENOX) injection 40 mg  40 mg SubCUTAneous DAILY    nicotine (NICODERM CQ) 21 mg/24 hr patch 1 Patch  1 Patch TransDERmal DAILY    amLODIPine (NORVASC) tablet 5 mg  5 mg Oral DAILY    And    lisinopriL (PRINIVIL, ZESTRIL) tablet 20 mg  20 mg Oral DAILY    allopurinoL (ZYLOPRIM) tablet 300 mg  300 mg Oral DAILY    morphine injection 2 mg  2 mg IntraVENous Q4H PRN    acetaminophen (TYLENOL) tablet 650 mg  650 mg Oral Q6H PRN    morphine IR (MS IR) tablet 15 mg  15 mg Oral Q6H PRN    ondansetron (ZOFRAN) injection 8 mg  8 mg IntraVENous Q8H    etoposide (VEPESID) 102.6 mg in 0.9% sodium chloride 450 mL chemo infusion  50 mg/m2 (Treatment Plan Recorded) IntraVENous Q24H    vinCRIStine (VINCASAR PFS) 0.8 mg, DOXOrubicin (ADRIAMYCIN) 20.6 mg in 0.9% sodium chloride 250 mL, overfill volume 25 mL chemo infusion   IntraVENous Q24H    [START ON 5/15/2022] cyclophosphamide (CYTOXAN) 1,537.6 mg in 0.9% sodium chloride 250 mL, overfill volume 25 mL chemo infusion  750 mg/m2 (Treatment Plan Recorded) IntraVENous ONCE    trimethoprim-sulfamethoxazole (BACTRIM DS, SEPTRA DS) 160-800 mg per tablet 1 Tablet  1 Tablet Oral Q MON, WED & FRI    OLANZapine (ZyPREXA) tablet 5 mg  5 mg Oral QPM    sodium chloride (NS) flush 10 mL  10 mL InterCATHeter PRN    diphenhydrAMINE (BENADRYL) injection 25 mg  25 mg IntraVENous PRN    acetaminophen (TYLENOL) tablet 650 mg  650 mg Oral PRN    ondansetron (ZOFRAN) injection 8 mg  8 mg IntraVENous PRN    diphenhydrAMINE (BENADRYL) injection 50 mg  50 mg IntraVENous PRN    albuterol (PROVENTIL VENTOLIN) nebulizer solution 2.5 mg  2.5 mg Nebulization PRN    LORazepam (ATIVAN) injection 0.5 mg  0.5 mg IntraVENous Q6H PRN    heparin (porcine) pf 300-500 Units  300-500 Units InterCATHeter PRN      Allergies   Allergen Reactions    Oxycodone Nausea Only      Objective:     Visit Vitals  /82 (BP 1 Location: Right upper arm, BP Patient Position: At rest)   Pulse 87   Temp 97.4 °F (36.3 °C)   Resp 19   Ht 5' 3\" (1.6 m)   Wt 221 lb 5.5 oz (100.4 kg)   SpO2 95%   BMI 39.21 kg/m²     ECOG PS: 1  General: no distress  Eyes: anicteric sclerae  HENT: atraumatic  Neck: supple  Respiratory: CTAB; normal respiratory effort  CV: slight BLE edema noted  GI: soft, nontender, nondistended, no masses, no hepatomegaly, no splenomegaly  Skin: no rashes; no ecchymoses; no petechiae  Psych: alert, oriented, appropriate affect, normal judgment/insight      Results:     I personally reviewed Epic EHR labs/results below:   Lab Results   Component Value Date/Time    WBC 11.3 (H) 05/14/2022 02:06 AM    HGB 12.1 05/14/2022 02:06 AM    HCT 37.4 05/14/2022 02:06 AM    PLATELET 611 56/23/2454 02:06 AM    MCV 94.9 05/14/2022 02:06 AM    ABS.  NEUTROPHILS 10.4 (H) 05/14/2022 02:06 AM     Lab Results   Component Value Date/Time    Sodium 141 05/14/2022 02:06 AM    Potassium 4.2 05/14/2022 02:06 AM    Chloride 110 (H) 05/14/2022 02:06 AM    CO2 24 05/14/2022 02:06 AM    Glucose 210 (H) 05/14/2022 02:06 AM    BUN 16 05/14/2022 02:06 AM    Creatinine 0.94 05/14/2022 02:06 AM    GFR est AA >60 05/14/2022 02:06 AM    GFR est non-AA 60 (L) 05/14/2022 02:06 AM    Calcium 8.3 (L) 05/14/2022 02:06 AM    Glucose (POC) 98 05/04/2022 01:37 PM     Lab Results   Component Value Date/Time    Bilirubin, total 0.4 05/14/2022 02:06 AM    ALT (SGPT) 34 05/14/2022 02:06 AM    Alk. phosphatase 52 05/14/2022 02:06 AM    Protein, total 6.0 (L) 05/14/2022 02:06 AM    Albumin 3.2 (L) 05/14/2022 02:06 AM    Globulin 2.8 05/14/2022 02:06 AM     Lab Results   Component Value Date/Time    Hepatitis B surface Ag <0.10 04/27/2022 12:23 PM    Hepatitis B surface Ab 3.66 04/27/2022 12:23 PM         Assessment and Recommendations:   CODE STATUS:  CPR, FULL CODE     Diagnosis    Diffuse large B cell lymphoma ([facility] [address]  -curative intent DA R-EPOCH Cycle 1,Day 4 on 5/14/2022;   -continue  allopurinol prophylaxis against TLS. Following LDH, Uric Acid, Phosphorus. -prophylaxis with  Bactrim and acyclovir  --follow up appt wit Dr Bobby Covington on 5/16  -follow up appt made for neulasta for 5/17 and labs checks ; pt will need labs checked 2x per week  -reviewed with pt additional inpatient tentative scheduled appts;  C2 due on 6/6/22        Cancer related pain  - IV morphine while hospitalized and treat her disease since oral oxycodone caused her nausea and mild itching.  -controlled      Insomnia due to medical condition  - trazodone did not help ; switched to Ambien per pt request with improvement in sleep lasat night  -continue to monitor      Metastatic cancer to bone ([facility] [address]  -continue prn pain medication      Reflux  -likely 2/2 steroids: Pepcid did not help; switched to Protonix with improvement.    Continue to monitor       Activity  Encouraged pt to be up in chair most of day and ambulating in the hallway      HTN  Continue home med; added hydralazine 10 mg x 1 for elevated BP      Tobacco dependence  -Recommend smoking cessation.  -Patient requested  nicotine patch/ now in use         Signed by: Sidra Monterroso MD                     May 14, 2022

## 2022-05-15 VITALS
BODY MASS INDEX: 39.69 KG/M2 | TEMPERATURE: 98.7 F | DIASTOLIC BLOOD PRESSURE: 69 MMHG | SYSTOLIC BLOOD PRESSURE: 165 MMHG | RESPIRATION RATE: 17 BRPM | HEART RATE: 76 BPM | WEIGHT: 223.99 LBS | HEIGHT: 63 IN | OXYGEN SATURATION: 96 %

## 2022-05-15 LAB
ALBUMIN SERPL-MCNC: 2.8 G/DL (ref 3.5–5)
ALBUMIN/GLOB SERPL: 1.3 {RATIO} (ref 1.1–2.2)
ALP SERPL-CCNC: 45 U/L (ref 45–117)
ALT SERPL-CCNC: 30 U/L (ref 12–78)
ANION GAP SERPL CALC-SCNC: 8 MMOL/L (ref 5–15)
AST SERPL-CCNC: 16 U/L (ref 15–37)
BASOPHILS # BLD: 0 K/UL (ref 0–0.1)
BASOPHILS NFR BLD: 0 % (ref 0–1)
BILIRUB SERPL-MCNC: 0.2 MG/DL (ref 0.2–1)
BUN SERPL-MCNC: 18 MG/DL (ref 6–20)
BUN/CREAT SERPL: 18 (ref 12–20)
CALCIUM SERPL-MCNC: 8 MG/DL (ref 8.5–10.1)
CHLORIDE SERPL-SCNC: 110 MMOL/L (ref 97–108)
CO2 SERPL-SCNC: 23 MMOL/L (ref 21–32)
CREAT SERPL-MCNC: 1.01 MG/DL (ref 0.55–1.02)
DIFFERENTIAL METHOD BLD: ABNORMAL
EOSINOPHIL # BLD: 0 K/UL (ref 0–0.4)
EOSINOPHIL NFR BLD: 0 % (ref 0–7)
ERYTHROCYTE [DISTWIDTH] IN BLOOD BY AUTOMATED COUNT: 13.9 % (ref 11.5–14.5)
GLOBULIN SER CALC-MCNC: 2.2 G/DL (ref 2–4)
GLUCOSE SERPL-MCNC: 193 MG/DL (ref 65–100)
HCT VFR BLD AUTO: 33.5 % (ref 35–47)
HGB BLD-MCNC: 10.7 G/DL (ref 11.5–16)
IMM GRANULOCYTES # BLD AUTO: 0 K/UL (ref 0–0.04)
IMM GRANULOCYTES NFR BLD AUTO: 1 % (ref 0–0.5)
LDH SERPL L TO P-CCNC: 172 U/L (ref 81–246)
LYMPHOCYTES # BLD: 0.2 K/UL (ref 0.8–3.5)
LYMPHOCYTES NFR BLD: 3 % (ref 12–49)
MCH RBC QN AUTO: 31.2 PG (ref 26–34)
MCHC RBC AUTO-ENTMCNC: 31.9 G/DL (ref 30–36.5)
MCV RBC AUTO: 97.7 FL (ref 80–99)
MONOCYTES # BLD: 0.2 K/UL (ref 0–1)
MONOCYTES NFR BLD: 3 % (ref 5–13)
NEUTS SEG # BLD: 6.2 K/UL (ref 1.8–8)
NEUTS SEG NFR BLD: 94 % (ref 32–75)
NRBC # BLD: 0 K/UL (ref 0–0.01)
NRBC BLD-RTO: 0 PER 100 WBC
PHOSPHATE SERPL-MCNC: 2.6 MG/DL (ref 2.6–4.7)
PLATELET # BLD AUTO: 208 K/UL (ref 150–400)
PMV BLD AUTO: 9.8 FL (ref 8.9–12.9)
POTASSIUM SERPL-SCNC: 3.7 MMOL/L (ref 3.5–5.1)
PROT SERPL-MCNC: 5 G/DL (ref 6.4–8.2)
RBC # BLD AUTO: 3.43 M/UL (ref 3.8–5.2)
SODIUM SERPL-SCNC: 141 MMOL/L (ref 136–145)
URATE SERPL-MCNC: 3.6 MG/DL (ref 2.6–6)
WBC # BLD AUTO: 6.6 K/UL (ref 3.6–11)

## 2022-05-15 PROCEDURE — 74011000250 HC RX REV CODE- 250: Performed by: INTERNAL MEDICINE

## 2022-05-15 PROCEDURE — 83615 LACTATE (LD) (LDH) ENZYME: CPT

## 2022-05-15 PROCEDURE — 80053 COMPREHEN METABOLIC PANEL: CPT

## 2022-05-15 PROCEDURE — 74011250636 HC RX REV CODE- 250/636: Performed by: NURSE PRACTITIONER

## 2022-05-15 PROCEDURE — 84550 ASSAY OF BLOOD/URIC ACID: CPT

## 2022-05-15 PROCEDURE — 74011250636 HC RX REV CODE- 250/636: Performed by: INTERNAL MEDICINE

## 2022-05-15 PROCEDURE — 74011000250 HC RX REV CODE- 250: Performed by: NURSE PRACTITIONER

## 2022-05-15 PROCEDURE — 74011636637 HC RX REV CODE- 636/637: Performed by: NURSE PRACTITIONER

## 2022-05-15 PROCEDURE — 99239 HOSP IP/OBS DSCHRG MGMT >30: CPT | Performed by: INTERNAL MEDICINE

## 2022-05-15 PROCEDURE — 85025 COMPLETE CBC W/AUTO DIFF WBC: CPT

## 2022-05-15 PROCEDURE — 74011250637 HC RX REV CODE- 250/637: Performed by: NURSE PRACTITIONER

## 2022-05-15 PROCEDURE — 36415 COLL VENOUS BLD VENIPUNCTURE: CPT

## 2022-05-15 PROCEDURE — 84100 ASSAY OF PHOSPHORUS: CPT

## 2022-05-15 PROCEDURE — 74011250637 HC RX REV CODE- 250/637: Performed by: INTERNAL MEDICINE

## 2022-05-15 RX ORDER — LORAZEPAM 0.5 MG/1
0.5 TABLET ORAL
Qty: 30 TABLET | Refills: 0 | Status: SHIPPED
Start: 2022-05-15 | End: 2022-05-17 | Stop reason: DRUGHIGH

## 2022-05-15 RX ORDER — MORPHINE SULFATE 15 MG/1
15 TABLET ORAL
Qty: 30 TABLET | Refills: 0 | Status: SHIPPED | OUTPATIENT
Start: 2022-05-15 | End: 2022-05-17 | Stop reason: SDUPTHER

## 2022-05-15 RX ADMIN — PANTOPRAZOLE SODIUM 40 MG: 40 TABLET, DELAYED RELEASE ORAL at 05:55

## 2022-05-15 RX ADMIN — LISINOPRIL 20 MG: 20 TABLET ORAL at 08:12

## 2022-05-15 RX ADMIN — CYCLOPHOSPHAMIDE 1537.6 MG: 2 INJECTION, POWDER, FOR SOLUTION INTRAVENOUS; ORAL at 16:21

## 2022-05-15 RX ADMIN — SODIUM CHLORIDE, PRESERVATIVE FREE 10 ML: 5 INJECTION INTRAVENOUS at 03:21

## 2022-05-15 RX ADMIN — SODIUM CHLORIDE, PRESERVATIVE FREE 10 ML: 5 INJECTION INTRAVENOUS at 01:58

## 2022-05-15 RX ADMIN — FLUOXETINE 40 MG: 20 CAPSULE ORAL at 05:55

## 2022-05-15 RX ADMIN — ENOXAPARIN SODIUM 40 MG: 100 INJECTION SUBCUTANEOUS at 08:12

## 2022-05-15 RX ADMIN — PREDNISONE 123 MG: 20 TABLET ORAL at 05:54

## 2022-05-15 RX ADMIN — LORAZEPAM 0.5 MG: 2 INJECTION INTRAMUSCULAR; INTRAVENOUS at 17:05

## 2022-05-15 RX ADMIN — ONDANSETRON 8 MG: 2 INJECTION INTRAMUSCULAR; INTRAVENOUS at 03:20

## 2022-05-15 RX ADMIN — ONDANSETRON 8 MG: 2 INJECTION INTRAMUSCULAR; INTRAVENOUS at 17:06

## 2022-05-15 RX ADMIN — MORPHINE SULFATE 2 MG: 2 INJECTION, SOLUTION INTRAMUSCULAR; INTRAVENOUS at 01:57

## 2022-05-15 RX ADMIN — SODIUM CHLORIDE, PRESERVATIVE FREE 10 ML: 5 INJECTION INTRAVENOUS at 01:10

## 2022-05-15 RX ADMIN — SODIUM CHLORIDE, PRESERVATIVE FREE 10 ML: 5 INJECTION INTRAVENOUS at 05:56

## 2022-05-15 RX ADMIN — SODIUM CHLORIDE 45 ML/HR: 9 INJECTION, SOLUTION INTRAVENOUS at 03:56

## 2022-05-15 RX ADMIN — MORPHINE SULFATE 2 MG: 2 INJECTION, SOLUTION INTRAMUSCULAR; INTRAVENOUS at 05:55

## 2022-05-15 RX ADMIN — DOCUSATE SODIUM 100 MG: 100 CAPSULE, LIQUID FILLED ORAL at 08:12

## 2022-05-15 RX ADMIN — LORAZEPAM 0.5 MG: 2 INJECTION INTRAMUSCULAR; INTRAVENOUS at 01:57

## 2022-05-15 RX ADMIN — ONDANSETRON 8 MG: 2 INJECTION INTRAMUSCULAR; INTRAVENOUS at 08:11

## 2022-05-15 RX ADMIN — ONDANSETRON 8 MG: 2 INJECTION INTRAMUSCULAR; INTRAVENOUS at 01:09

## 2022-05-15 RX ADMIN — AMLODIPINE BESYLATE 5 MG: 5 TABLET ORAL at 08:12

## 2022-05-15 RX ADMIN — ALLOPURINOL 300 MG: 100 TABLET ORAL at 08:12

## 2022-05-15 RX ADMIN — LORAZEPAM 0.5 MG: 2 INJECTION INTRAMUSCULAR; INTRAVENOUS at 08:11

## 2022-05-15 RX ADMIN — MORPHINE SULFATE 2 MG: 2 INJECTION, SOLUTION INTRAMUSCULAR; INTRAVENOUS at 14:13

## 2022-05-15 RX ADMIN — PREDNISONE 123 MG: 20 TABLET ORAL at 14:13

## 2022-05-15 RX ADMIN — MORPHINE SULFATE 2 MG: 2 INJECTION, SOLUTION INTRAMUSCULAR; INTRAVENOUS at 10:06

## 2022-05-15 RX ADMIN — ACYCLOVIR 400 MG: 200 CAPSULE ORAL at 08:12

## 2022-05-15 NOTE — ROUTINE PROCESS
Bedside and Verbal shift change report given to Guerita Arguello (oncoming nurse) by Darien Orourke (offgoing nurse). Report included the following information SBAR and Kardex.

## 2022-05-15 NOTE — PROGRESS NOTES
Port deaccessed after flushing with 300mL heparin per protocol. Discharge instructions reviewed with pt and opportunity given to ask questions. Pt voiced understanding.

## 2022-05-15 NOTE — PROGRESS NOTES
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 This summary highlights focused aspects of patient's ongoing care and may have a recurring section in notes with either updates or remain unchanged as a longitudinal care summary. 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\"    ORIGINAL STAGING:   Stage IV    SITES OF DISEASE:   Left Cervical Lymph Node,Bone Disease, Stage IV    CURRENT TREATMENT:   DA-R-EPOCH    PRIOR TREATMENT:   n/a    GOALS OF CARE:   Curative Intent    PATHOLOGY:   Specimen #:[id number] Collect: 4/22/2022      ==========================================================================                    * * *SURGICAL PATHOLOGY REPORT* * *   ==========================================================================   * * *PROCEDURES/ADDENDA* * *   ADDENDUM   Date Ordered: 4/26/2022     Status: Signed Out   Date Complete: 4/26/2022     By: Annie Ramsay.  Abbie Hoover MD   Date Reported: 4/26/2022   Addendum Diagnosis   Lymph node, left posterior cervical lymph node, excision:   In situ hybridization for Jesús-Barr viral RNA: Negative   CPT: 01442  Addendum Comment   * * *CLINICAL HISTORY* * * Cervical lymphadenopathy, rule out lymphoma   ==========================================================================   * * *FINAL PATHOLOGIC DIAGNOSIS* * *        Lymph node, left posterior cervical lymph node, excision:        Large B cell lymphoma. See comment. * * *Comment* * *   The histologic section has fragments of lymphoid tissue with diffuse and   focal follicular architecture. Diffuse areas are comprised of large,   atypical lymphocytes with centroblastic morphology and increased mitotic   activity. Immunohistochemical stains confirm the malignant cells are CD20   positive B cells that coexpress CD10 and BCL6 without MUM1. CD23   highlights rare follicular dendritic networks associated with lymphoid   follicles with germinal centers and express CD10 and BCL6 without BCL2. CyclinD1 and CD56 stains are negative. Concurrent flow cytometric analysis   confirms a clonal CD10 positive B-cell population comprised of medium to   large cells. The Ki-67 proliferation index is 30%. The overall findings favor the diagnosis of diffuse large B-cell lymphoma,   germinal center subtype. Molecular genetic studies are pending for further   characterization will be reported in an addendum. Flow cytometry:   Consistent with CD10-positive B-cell lymphoproliferative disorder. Comments: Flow cytometry shows monoclonal B-cells (36% of total cells)   with CD10 expression without CD5, consistent with a CD10-positive B-cell   lymphoproliferative disorder. The main differential diagnosis includes   follicular lymphoma, Burkitt lymphoma and large B-cell lymphoma. Please   correlate the result with morphologic findings and clinical information. Flow Differential (%) and Population Analysis:   Lymphocytes: 93.7%   T-cells (39% of lymphoid cells) show a CD4/CD8 ratio of 3.3 without overt   phenotypic abnormality. NK-cells (1% of lymphoid cells) are unremarkable.    Mature B-cells (56% of lymphoid cells) include large forms based on   forward scattered pattern and show: CD5 neg, CD10+, CD11c+dim, CD19+,   CD20+ with surface lambda light chain restriction. Markers Performed: CD2, CD3, CD4, CD5, CD7, CD8, CD10, CD11c, CD19, CD20,   CD23, CD34, CD38, CD45, CD56, Kappa, Lambda (17 Markers)   The Technical Component Processing of this test was completed at   [facility], [address] [place] [address], [place], [place] / [id number] /   [phone] / Shavon Fish # [id number].  Interpretation by Krystle Lee M.D. The   complete scanned report is available in Epic. CPT: B3788786, Y7710139, P1450722, G3326760, [id number], [id number]   j2/2022   *Electronically Signed Out By Dennys Sampson. Lorie Galaviz MD*   ==========================================================================   * * *Gross Description* * *   Specimen #1, received fresh labeled with the patient's name,  and left   posterior cervical lymph node, consists of two paper towels containing a   1.7 x 1.5 x 0.5 cm aggregate of pale pink-tan, fleshy soft tissue   fragments. The specimen is handled according to the flow cytometry   protocol as follows:   7          Two air-dried touch prep slides - one Diff Quik stained, one   rapid-fixed in 95% alcohol   7          Representative sections in B plus fixative (1A)   7          One piece held in RPMI for possible flow cytometry analysis   7          Remaining tissue fixed in formalin for permanents (1B)   Dr. Lorie Galaviz will determine if flow cytometry is necessary. AMM 2022 02:06 PM     FISH: 22:  Triple Hit Lymphoma  PERTINENT CARE EVENTS   n/a      History of Present Illness:     Oriana Hansen is a 78 y.o. F is admitted for inpatient chemotherapy - dose adjusted R-EPOCH for Triple Hit Lymphoma High Grade B-Cell Lymphoma. Interval History:     She is doing well. Some pedal edema. She is receiving IV morphine for body aches and also is anxious.        Current Facility-Administered Medications   Medication Dose Route Frequency    predniSONE (DELTASONE) tablet 123 mg  60 mg/m2 (Treatment Plan Recorded) Oral BID WITH MEALS    hydrALAZINE (APRESOLINE) 20 mg/mL injection 10 mg  10 mg IntraVENous Q6H PRN    artificial saliva (MOUTH KOTE) 1 Spray  1 Spray Oral PRN    pantoprazole (PROTONIX) tablet 40 mg  40 mg Oral ACB    zolpidem (AMBIEN) tablet 5 mg  5 mg Oral QHS    0.9% sodium chloride infusion  45 mL/hr IntraVENous CONTINUOUS    acyclovir (ZOVIRAX) capsule 400 mg  400 mg Oral BID    meperidine (DEMEROL) injection 25 mg  25 mg IntraVENous Q20MIN PRN    docusate sodium (COLACE) capsule 100 mg  100 mg Oral BID    FLUoxetine (PROzac) capsule 40 mg  40 mg Oral 7am    lidocaine-prilocaine (EMLA) 2.5-2.5 % cream   Topical PRN    polyethylene glycol (MIRALAX) packet 17 g  17 g Oral DAILY    sodium chloride (NS) flush 5-40 mL  5-40 mL IntraVENous Q8H    sodium chloride (NS) flush 5-40 mL  5-40 mL IntraVENous PRN    enoxaparin (LOVENOX) injection 40 mg  40 mg SubCUTAneous DAILY    nicotine (NICODERM CQ) 21 mg/24 hr patch 1 Patch  1 Patch TransDERmal DAILY    amLODIPine (NORVASC) tablet 5 mg  5 mg Oral DAILY    And    lisinopriL (PRINIVIL, ZESTRIL) tablet 20 mg  20 mg Oral DAILY    allopurinoL (ZYLOPRIM) tablet 300 mg  300 mg Oral DAILY    morphine injection 2 mg  2 mg IntraVENous Q4H PRN    acetaminophen (TYLENOL) tablet 650 mg  650 mg Oral Q6H PRN    morphine IR (MS IR) tablet 15 mg  15 mg Oral Q6H PRN    ondansetron (ZOFRAN) injection 8 mg  8 mg IntraVENous Q8H    etoposide (VEPESID) 102.6 mg in 0.9% sodium chloride 450 mL chemo infusion  50 mg/m2 (Treatment Plan Recorded) IntraVENous Q24H    vinCRIStine (VINCASAR PFS) 0.8 mg, DOXOrubicin (ADRIAMYCIN) 20.6 mg in 0.9% sodium chloride 250 mL, overfill volume 25 mL chemo infusion   IntraVENous Q24H    cyclophosphamide (CYTOXAN) 1,537.6 mg in 0.9% sodium chloride 250 mL, overfill volume 25 mL chemo infusion  750 mg/m2 (Treatment Plan Recorded) IntraVENous ONCE    trimethoprim-sulfamethoxazole (BACTRIM DS, SEPTRA DS) 160-800 mg per tablet 1 Tablet  1 Tablet Oral Q MON, WED & FRI    sodium chloride (NS) flush 10 mL  10 mL InterCATHeter PRN    diphenhydrAMINE (BENADRYL) injection 25 mg  25 mg IntraVENous PRN    acetaminophen (TYLENOL) tablet 650 mg  650 mg Oral PRN    ondansetron (ZOFRAN) injection 8 mg  8 mg IntraVENous PRN    diphenhydrAMINE (BENADRYL) injection 50 mg  50 mg IntraVENous PRN    albuterol (PROVENTIL VENTOLIN) nebulizer solution 2.5 mg  2.5 mg Nebulization PRN    LORazepam (ATIVAN) injection 0.5 mg  0.5 mg IntraVENous Q6H PRN    heparin (porcine) pf 300-500 Units  300-500 Units InterCATHeter PRN      Allergies   Allergen Reactions    Oxycodone Nausea Only      Objective:     Visit Vitals  BP (!) 178/79 (BP 1 Location: Right upper arm, BP Patient Position: Sitting)   Pulse 80   Temp 97.8 °F (36.6 °C)   Resp 18   Ht 5' 3\" (1.6 m)   Wt 223 lb 15.8 oz (101.6 kg)   SpO2 94%   BMI 39.68 kg/m²     ECOG PS: 1  General: no distress  Eyes: anicteric sclerae  HENT: atraumatic  Neck: supple  Respiratory: CTAB; normal respiratory effort  CV: slight BLE edema noted  GI: soft, nontender, nondistended, no masses, no hepatomegaly, no splenomegaly  Skin: no rashes; no ecchymoses; no petechiae  Psych: alert, oriented, appropriate affect, normal judgment/insight      Results:     I personally reviewed Epic EHR labs/results below:   Lab Results   Component Value Date/Time    WBC 6.6 05/15/2022 01:19 AM    HGB 10.7 (L) 05/15/2022 01:19 AM    HCT 33.5 (L) 05/15/2022 01:19 AM    PLATELET 280 44/56/6132 01:19 AM    MCV 97.7 05/15/2022 01:19 AM    ABS.  NEUTROPHILS 6.2 05/15/2022 01:19 AM     Lab Results   Component Value Date/Time    Sodium 141 05/15/2022 01:19 AM    Potassium 3.7 05/15/2022 01:19 AM    Chloride 110 (H) 05/15/2022 01:19 AM    CO2 23 05/15/2022 01:19 AM    Glucose 193 (H) 05/15/2022 01:19 AM    BUN 18 05/15/2022 01:19 AM    Creatinine 1.01 05/15/2022 01:19 AM    GFR est AA >60 05/15/2022 01:19 AM GFR est non-AA 55 (L) 05/15/2022 01:19 AM    Calcium 8.0 (L) 05/15/2022 01:19 AM    Glucose (POC) 98 05/04/2022 01:37 PM     Lab Results   Component Value Date/Time    Bilirubin, total 0.2 05/15/2022 01:19 AM    ALT (SGPT) 30 05/15/2022 01:19 AM    Alk. phosphatase 45 05/15/2022 01:19 AM    Protein, total 5.0 (L) 05/15/2022 01:19 AM    Albumin 2.8 (L) 05/15/2022 01:19 AM    Globulin 2.2 05/15/2022 01:19 AM     Lab Results   Component Value Date/Time    Hepatitis B surface Ag <0.10 04/27/2022 12:23 PM    Hepatitis B surface Ab 3.66 04/27/2022 12:23 PM         Assessment and Recommendations:   CODE STATUS:  CPR, FULL CODE     Diagnosis    Diffuse large B cell lymphoma ([facility]  -curative intent DA R-EPOCH Cycle 1,Day 5 on 5/14/2022;   -continue  allopurinol prophylaxis against TLS. Following LDH, Uric Acid, Phosphorus. -prophylaxis with  Bactrim and acyclovir  --follow up appt wit Dr Lily Dee on 5/16  -follow up appt made for neulasta for 5/17 and labs checks ; pt will need labs checked 2x per week  -reviewed with pt additional inpatient tentative scheduled appts;  C2 due on 6/6/22        Cancer related pain  - IV morphine while hospitalized and treat her disease since oral oxycodone caused her nausea and mild itching.  -controlled      Insomnia due to medical condition  - trazodone did not help ; switched to Ambien per pt request with improvement in sleep lasat night  -continue to monitor      Metastatic cancer to bone ([facility]  -continue prn pain medication      Reflux  -likely 2/2 steroids: Pepcid did not help; switched to Protonix with improvement.    Continue to monitor       Activity  Encouraged pt to be up in chair most of day and ambulating in the hallway      HTN  Continue home med; added hydralazine 10 mg x 1 for elevated BP      Tobacco dependence  -Recommend smoking cessation.  -Patient requested  nicotine patch/ now in use         Signed by: Sony Choudhary MD                     May 15, 2022

## 2022-05-15 NOTE — PROGRESS NOTES
Problem: Falls - Risk of  Goal: *Absence of Falls  Outcome: Progressing Towards Goal  Note: Fall Risk Interventions:            Medication Interventions: Assess postural VS orthostatic hypotension,Bed/chair exit alarm,Patient to call before getting OOB,Teach patient to arise slowly                   Problem: Patient Education: Go to Patient Education Activity  Goal: Patient/Family Education  Outcome: Progressing Towards Goal     Problem: Pain  Goal: *Control of Pain  Outcome: Progressing Towards Goal  Goal: *PALLIATIVE CARE:  Alleviation of Pain  Outcome: Progressing Towards Goal     Problem: Patient Education: Go to Patient Education Activity  Goal: Patient/Family Education  Outcome: Progressing Towards Goal

## 2022-05-15 NOTE — DISCHARGE SUMMARY
Discharge Summary     Patient: Nayely Moore MRN: [id number]  SSN: [id number]    YOB: 1955  Age: 78 y.o. Sex: female       Admit Date: 5/11/2022    Discharge Date: 5/15/2022      Admission Diagnoses: Diffuse large B cell lymphoma ([facility] [C83.30]    Discharge Diagnoses: Diffuse large B cell lymphoma    Problem List as of 5/15/2022 Date Reviewed: 5/11/2022          Codes Class Noted - Resolved    Other secondary hypertension ICD-10-CM: I15.8  ICD-9-CM: 405.99  5/13/2022 - Present        * (Principal) Diffuse large B cell lymphoma ([facility] ICD-10-CM: C83.30  ICD-9-CM: 202.80  5/11/2022 - Present        Cancer related pain ICD-10-CM: G89.3  ICD-9-CM: 338.3  5/11/2022 - Present        Insomnia due to medical condition ICD-10-CM: G47.01  ICD-9-CM: 327.01  5/11/2022 - Present        Metastatic cancer to bone [facility]) ICD-10-CM: C79.51  ICD-9-CM: 198.5  5/9/2022 - Present        Encounter for antineoplastic chemotherapy ICD-10-CM: Z51.11  ICD-9-CM: V58.11  4/27/2022 - Present        Encounter for screening for other viral diseases ICD-10-CM: Z11.59  ICD-9-CM: V73.89  4/27/2022 - Present        Renal lesion ICD-10-CM: N28.9  ICD-9-CM: 593.9  4/27/2022 - Present        Diffuse large B-cell lymphoma of lymph nodes of neck ([facility] ICD-10-CM: C83.31  ICD-9-CM: 202.81  4/26/2022 - Present        High risk medication use ICD-10-CM: Z79.899  ICD-9-CM: V58.69  4/26/2022 - Present        Lymphadenopathy of left cervical region ICD-10-CM: R59.0  ICD-9-CM: 785.6  3/21/2022 - Present        Tobacco dependence ICD-10-CM: F17.200  ICD-9-CM: 305.1  3/21/2022 - Present               Discharge Condition: Good    Hospital Course: Patient with double hit DLBCL. She was admitted and receiving the first cycle of R-DA-EPOCH. She did well with systemic therapy.      Consults: None    Significant Diagnostic Studies: None    Disposition: home    Discharge Medications:   Current Discharge Medication List      START taking these medications Details   morphine IR (MS IR) 15 mg tablet Take 1 Tablet by mouth every four (4) hours as needed for Pain for up to 14 days. Max Daily Amount: 90 mg.  Qty: 30 Tablet, Refills: 0    Associated Diagnoses: Diffuse large B-cell lymphoma of lymph nodes of multiple regions (HCC)      LORazepam (ATIVAN) 0.5 mg tablet Take 1 Tablet by mouth every eight (8) hours as needed for Anxiety. Max Daily Amount: 1.5 mg.  Qty: 30 Tablet, Refills: 0    Associated Diagnoses: Diffuse large B-cell lymphoma of lymph nodes of multiple regions ([facility]         CONTINUE these medications which have NOT CHANGED    Details   ondansetron (ZOFRAN ODT) 8 mg disintegrating tablet Take 1 Tablet by mouth every eight (8) hours as needed for Nausea or Vomiting. Qty: 50 Tablet, Refills: 1    Associated Diagnoses: Diffuse large B-cell lymphoma of lymph nodes of neck (HCC)      prochlorperazine (COMPAZINE) 10 mg tablet Take 1 Tablet by mouth every six (6) hours as needed for Nausea (do not take if groggy; take if nausea despite zofran). Qty: 50 Tablet, Refills: 1    Associated Diagnoses: Diffuse large B-cell lymphoma of lymph nodes of neck (HCC)      docusate sodium (COLACE) 100 mg capsule Take 1 Capsule by mouth two (2) times a day for 90 days. Qty: 60 Capsule, Refills: 2    Associated Diagnoses: Cancer related pain      polyethylene glycol (MIRALAX) 17 gram packet Take 1 Packet by mouth daily. Qty: 90 Each, Refills: 1    Associated Diagnoses: Cancer related pain      predniSONE (DELTASONE) 20 mg tablet Take 60 mg by mouth daily. Qty: 30 Tablet, Refills: 0      gabapentin (NEURONTIN) 300 mg capsule Take 1 Capsule by mouth daily as needed for Pain. Qty: 20 Capsule, Refills: 0    Associated Diagnoses: Pain in both feet      diclofenac EC (VOLTAREN) 50 mg EC tablet Take 1 Tablet by mouth two (2) times daily as needed for Pain.   Qty: 60 Tablet, Refills: 0    Associated Diagnoses: Pain in both feet      FLUoxetine (PROzac) 40 mg capsule TAKE 1 CAPSULE BY MOUTH EVERY MORNING  Qty: 90 Capsule, Refills: 1    Associated Diagnoses: Current moderate episode of major depressive disorder, unspecified whether recurrent (HCC)      amLODIPine-benazepril (LOTREL) 5-20 mg per capsule TAKE 1 CAPSULE BY MOUTH EVERY MORNING  Qty: 90 Capsule, Refills: 1    Associated Diagnoses: Essential hypertension         STOP taking these medications       oxyCODONE IR (ROXICODONE) 5 mg immediate release tablet Comments:   Reason for Stopping:         lidocaine-prilocaine (EMLA) topical cream Comments:   Reason for Stopping:               Activity: Activity as tolerated  Diet: Regular Diet  Wound Care: None needed    Follow-up Appointments   Procedures    FOLLOW UP VISIT Appointment in: Other Anola Dawson) Patient seeing Dr. Christ Webb tomorrow     Patient seeing Dr. Christ Webb tomorrow     Standing Status:   Standing     Number of Occurrences:   1     Order Specific Question:   Appointment in     Answer: Other (Specify)     I spent about 35 minutes on the discharge. More than 50% of the time was used in counseling and co-ordination of care. Signed By: Fawad Carias MD     May 15, 2022     .

## 2022-05-16 ENCOUNTER — OFFICE VISIT (OUTPATIENT)
Dept: ONCOLOGY | Age: 67
End: 2022-05-16
Payer: MEDICARE

## 2022-05-16 VITALS
DIASTOLIC BLOOD PRESSURE: 74 MMHG | WEIGHT: 229 LBS | RESPIRATION RATE: 20 BRPM | HEIGHT: 63 IN | HEART RATE: 97 BPM | SYSTOLIC BLOOD PRESSURE: 174 MMHG | TEMPERATURE: 97.4 F | BODY MASS INDEX: 40.57 KG/M2 | OXYGEN SATURATION: 96 %

## 2022-05-16 DIAGNOSIS — R60.1 GENERALIZED EDEMA: ICD-10-CM

## 2022-05-16 DIAGNOSIS — G47.01 INSOMNIA DUE TO MEDICAL CONDITION: ICD-10-CM

## 2022-05-16 DIAGNOSIS — C83.31 DIFFUSE LARGE B-CELL LYMPHOMA OF LYMPH NODES OF NECK (HCC): Primary | ICD-10-CM

## 2022-05-16 PROCEDURE — 3017F COLORECTAL CA SCREEN DOC REV: CPT | Performed by: INTERNAL MEDICINE

## 2022-05-16 PROCEDURE — 1101F PT FALLS ASSESS-DOCD LE1/YR: CPT | Performed by: INTERNAL MEDICINE

## 2022-05-16 PROCEDURE — G8427 DOCREV CUR MEDS BY ELIG CLIN: HCPCS | Performed by: INTERNAL MEDICINE

## 2022-05-16 PROCEDURE — G8432 DEP SCR NOT DOC, RNG: HCPCS | Performed by: INTERNAL MEDICINE

## 2022-05-16 PROCEDURE — G8417 CALC BMI ABV UP PARAM F/U: HCPCS | Performed by: INTERNAL MEDICINE

## 2022-05-16 PROCEDURE — 99214 OFFICE O/P EST MOD 30 MIN: CPT | Performed by: INTERNAL MEDICINE

## 2022-05-16 PROCEDURE — 1111F DSCHRG MED/CURRENT MED MERGE: CPT | Performed by: INTERNAL MEDICINE

## 2022-05-16 PROCEDURE — 1090F PRES/ABSN URINE INCON ASSESS: CPT | Performed by: INTERNAL MEDICINE

## 2022-05-16 PROCEDURE — G8400 PT W/DXA NO RESULTS DOC: HCPCS | Performed by: INTERNAL MEDICINE

## 2022-05-16 PROCEDURE — G8536 NO DOC ELDER MAL SCRN: HCPCS | Performed by: INTERNAL MEDICINE

## 2022-05-16 PROCEDURE — G9899 SCRN MAM PERF RSLTS DOC: HCPCS | Performed by: INTERNAL MEDICINE

## 2022-05-16 RX ORDER — ZOLPIDEM TARTRATE 5 MG/1
5 TABLET ORAL
Qty: 7 TABLET | Refills: 0 | Status: SHIPPED | OUTPATIENT
Start: 2022-05-16 | End: 2022-05-19 | Stop reason: SDUPTHER

## 2022-05-16 RX ORDER — FUROSEMIDE 20 MG/1
TABLET ORAL
Qty: 7 TABLET | Refills: 1 | Status: SHIPPED
Start: 2022-05-16 | End: 2022-06-10

## 2022-05-16 NOTE — LETTER
5/16/2022    Patient: Bertha Hogan   YOB: 1955   Date of Visit: 5/16/2022     Katya Reynolds MD  [address]    Dear Katya Reynolds MD,      Thank you for referring Ms. Bertha Hogan to [facility] for evaluation. My notes for this consultation are attached. If you have questions, please do not hesitate to call me. I look forward to following your patient along with you.       Sincerely,    Fernando Morales MD

## 2022-05-16 NOTE — PROGRESS NOTES
[facility] at [address], [address] [facility]  [facility]: [phone]  F: [phone] Patient ID  Name: Kaushik Holbrook  YOB: 1955  MRN: [id number]  Referring Provider:   No referring provider defined for this encounter. Primary Care Provider:   Marivel Taylor MD       HEMATOLOGY/MEDICAL ONCOLOGY  NOTE   Date of Visit: 05/16/22  Reason for Evaluation:     Chief Complaint   Patient presents with    Lymphoma     Hematology/Oncology Summary:  Please review original records for clinical decision making. This summary highlights focused aspects of patient's ongoing care and may have a recurring section in notes with either updates or remain unchanged as a longitudinal care summary. -------------------------------------------------------------------------------------------------------------------------------------------------------------------------------------------------------  DIAGNOSIS:   Diffuse Large B-Cell Lymphoma    ORIGINAL STAGING:   Stage IV    SITES OF DISEASE:   Left Cervical Lymph Node,Bone Disease, Stage IV    CURRENT TREATMENT:   Plan for DA-R-EPOCH    PRIOR TREATMENT:   n/a    GOALS OF CARE:   Curative Intent    PATHOLOGY:   Specimen #:[id number] Collect: 4/22/2022      ==========================================================================                    * * *SURGICAL PATHOLOGY REPORT* * *   ==========================================================================   * * *PROCEDURES/ADDENDA* * *   ADDENDUM   Date Ordered: 4/26/2022     Status: Signed Out   Date Complete: 4/26/2022     By: Daisy Fine.  Minor Flores MD   Date Reported: 4/26/2022   Addendum Diagnosis   Lymph node, left posterior cervical lymph node, excision:   In situ hybridization for Jesús-Barr viral RNA: Negative   CPT: 01239  Addendum Comment   * * *CLINICAL HISTORY* * *   Cervical lymphadenopathy, rule out lymphoma ==========================================================================   * * *FINAL PATHOLOGIC DIAGNOSIS* * *        Lymph node, left posterior cervical lymph node, excision:        Large B cell lymphoma. See comment. * * *Comment* * *   The histologic section has fragments of lymphoid tissue with diffuse and   focal follicular architecture. Diffuse areas are comprised of large,   atypical lymphocytes with centroblastic morphology and increased mitotic   activity. Immunohistochemical stains confirm the malignant cells are CD20   positive B cells that coexpress CD10 and BCL6 without MUM1. CD23   highlights rare follicular dendritic networks associated with lymphoid   follicles with germinal centers and express CD10 and BCL6 without BCL2. CyclinD1 and CD56 stains are negative. Concurrent flow cytometric analysis   confirms a clonal CD10 positive B-cell population comprised of medium to   large cells. The Ki-67 proliferation index is 30%. The overall findings favor the diagnosis of diffuse large B-cell lymphoma,   germinal center subtype. Molecular genetic studies are pending for further   characterization will be reported in an addendum. Flow cytometry:   Consistent with CD10-positive B-cell lymphoproliferative disorder. Comments: Flow cytometry shows monoclonal B-cells (36% of total cells)   with CD10 expression without CD5, consistent with a CD10-positive B-cell   lymphoproliferative disorder. The main differential diagnosis includes   follicular lymphoma, Burkitt lymphoma and large B-cell lymphoma. Please   correlate the result with morphologic findings and clinical information. Flow Differential (%) and Population Analysis:   Lymphocytes: 93.7%   T-cells (39% of lymphoid cells) show a CD4/CD8 ratio of 3.3 without overt   phenotypic abnormality. NK-cells (1% of lymphoid cells) are unremarkable.    Mature B-cells (56% of lymphoid cells) include large forms based on   forward scattered pattern and show: CD5 neg, CD10+, CD11c+dim, CD19+,   CD20+ with surface lambda light chain restriction. Markers Performed: CD2, CD3, CD4, CD5, CD7, CD8, CD10, CD11c, CD19, CD20,   CD23, CD34, CD38, CD45, CD56, Kappa, Lambda (17 Markers)   The Technical Component Processing of this test was completed at   [facility], [address] [place] [address], Evita [address] / [id number] /   [phone] / Carley Rodriguez # [id number].  Interpretation by Delgado San M.D. The   complete scanned report is available in Epic. CPT: Q2231398, Z2203004, [id number], [id number], [id number], [id number]   [facility]2/2022   *Electronically Signed Out By Faith Albarado. Leander Dominguez MD*   ==========================================================================   * * *Gross Description* * *   Specimen #1, received fresh labeled with the patient's name,  and left   posterior cervical lymph node, consists of two paper towels containing a   1.7 x 1.5 x 0.5 cm aggregate of pale pink-tan, fleshy soft tissue   fragments. The specimen is handled according to the flow cytometry   protocol as follows:   7          Two air-dried touch prep slides - one Diff Quik stained, one   rapid-fixed in 95% alcohol   7          Representative sections in B plus fixative (1A)   7          One piece held in RPMI for possible flow cytometry analysis   7          Remaining tissue fixed in formalin for permanents (1B)   Dr. Leander Dominguez will determine if flow cytometry is necessary. 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 F who presents for a follow-up evaluation for triple hit lymphoma. Patient overall reports feeling worse. Has generalized edema.    -  Constipation:Grade 1  Fatigue:Grade 1  Nausea:Grade 1  Hot Flashes:Grade 1  Insomnia:Grade 2  Cognition:Grade 1  Concentration:Grade 1  Anxiety:Grade 1  Pain:7/10   Shortness of Breath:Grade 2  Swelling:Grade 3  Headache:Grade 2    -    Past Medical History:   Diagnosis Date    Adverse effect of anesthesia     PHLEGM IN THROAT POST OP & NEEDED X2 BREATHING TREATMENTS    Anxiety     COPD (chronic obstructive pulmonary disease) ([facility] [address] 2022    emphysema    Depression     GERD (gastroesophageal reflux disease)     Hernia, femoral     since childhood    Hypertension     Joint pain     Nausea & vomiting     TMJ arthritis       Past Surgical History:   Procedure Laterality Date    HX HYSTERECTOMY  2019    HX TONSILLECTOMY     History of Salivary Gland Surgery in 30's for evaluation for dry mouth. Social History     Tobacco Use    Smoking status: Current Every Day Smoker     Packs/day: 1.00     Years: 40.00     Pack years: 40.00     Types: Cigarettes    Smokeless tobacco: Never Used    Tobacco comment: 0.5-1 pack per day   Substance Use Topics    Alcohol use: Not Currently     Alcohol/week: 0.0 standard drinks      Family History   Problem Relation Age of Onset    Heart Disease Mother 46    Heart Surgery Mother         HEART TRANSPLANT    Elevated Lipids Mother     Hypertension Mother     COPD Father     Emphysema Father     Sudden Death Brother 46    Other Maternal Grandmother         BRAIN TUMOR    No Known Problems Son     No Known Problems Daughter      Current Outpatient Medications   Medication Sig    morphine IR (MS IR) 15 mg tablet Take 1 Tablet by mouth every four (4) hours as needed for Pain for up to 14 days. Max Daily Amount: 90 mg.  LORazepam (ATIVAN) 0.5 mg tablet Take 1 Tablet by mouth every eight (8) hours as needed for Anxiety. Max Daily Amount: 1.5 mg.    ondansetron (ZOFRAN ODT) 8 mg disintegrating tablet Take 1 Tablet by mouth every eight (8) hours as needed for Nausea or Vomiting.  prochlorperazine (COMPAZINE) 10 mg tablet Take 1 Tablet by mouth every six (6) hours as needed for Nausea (do not take if groggy; take if nausea despite zofran).     docusate sodium (COLACE) 100 mg capsule Take 1 Capsule by mouth two (2) times a day for 90 days.  polyethylene glycol (MIRALAX) 17 gram packet Take 1 Packet by mouth daily.  predniSONE (DELTASONE) 20 mg tablet Take 60 mg by mouth daily.  gabapentin (NEURONTIN) 300 mg capsule Take 1 Capsule by mouth daily as needed for Pain.  diclofenac EC (VOLTAREN) 50 mg EC tablet Take 1 Tablet by mouth two (2) times daily as needed for Pain.  FLUoxetine (PROzac) 40 mg capsule TAKE 1 CAPSULE BY MOUTH EVERY MORNING (Patient taking differently: Take 40 mg by mouth every morning. TAKE 1 CAPSULE BY MOUTH EVERY MORNING)    amLODIPine-benazepril (LOTREL) 5-20 mg per capsule TAKE 1 CAPSULE BY MOUTH EVERY MORNING (Patient taking differently: Take 1 Capsule by mouth every morning. TAKE 1 CAPSULE BY MOUTH EVERY MORNING)     No current facility-administered medications for this visit. Allergies   Allergen Reactions    Oxycodone Nausea Only      -  Review of Systems Provided by:  Patient  Review of Systems: A complete review of systems was obtained, reviewed. Pertinent findings reviewed above. Objective:     Visit Vitals  BP (!) 174/74 (BP 1 Location: Right arm, BP Patient Position: Sitting, BP Cuff Size: Large adult) Comment: . Pulse 97   Temp 97.4 °F (36.3 °C) (Temporal)   Resp 20   Ht 5' 3\" (1.6 m)   Wt 229 lb (103.9 kg)   SpO2 96%   BMI 40.57 kg/m²     ECOG PS: 1- Restricted in physically strenuous activity but ambulatory and able to carry out work of a light or sedentary nature, e.g., light house work, office work. Physical Exam  Constitutional: No acute distress. , Non-toxic appearance., Non-diaphoretic., Chronic ill appearance. and appears diffusely swollen but more in dependent areas. HENT: Normocephalic and atraumatic head. and Wearing a mask during COVID-19 precautions. Eyes: Normal Conjunctivae. Anicteric sclerae. Cardiovascular: S1,S2 auscultated. No pitting edema. Pulmonary: Normal Respiratory Effort. No wheezing. No rhonchi.  No rales/crackles in posterior bibasilar regions. Abdominal: Normal bowel sounds. Soft Abdomen to palpation. No abdominal tenderness. No guarding. Skin: No jaundice. No rash. No petechiae. Musculoskeletal: No muscle pain on palpation. No temporal muscle wasting on inspection. Lymph: No cervical or supraclavicular lymph node enlargement or tenderness. Neurological: Alert and oriented. No tremor on inspection. Normal Gait. Psychiatric: mood normal. normal speech rate normal affect      Results:     I personally reviewed Epic EHR labs/results below:   Lab Results   Component Value Date/Time    WBC 6.6 05/15/2022 01:19 AM    HGB 10.7 (L) 05/15/2022 01:19 AM    HCT 33.5 (L) 05/15/2022 01:19 AM    PLATELET 959 33/32/1851 01:19 AM    MCV 97.7 05/15/2022 01:19 AM    ABS. NEUTROPHILS 6.2 05/15/2022 01:19 AM     Lab Results   Component Value Date/Time    Sodium 141 05/15/2022 01:19 AM    Potassium 3.7 05/15/2022 01:19 AM    Chloride 110 (H) 05/15/2022 01:19 AM    CO2 23 05/15/2022 01:19 AM    Glucose 193 (H) 05/15/2022 01:19 AM    BUN 18 05/15/2022 01:19 AM    Creatinine 1.01 05/15/2022 01:19 AM    GFR est AA >60 05/15/2022 01:19 AM    GFR est non-AA 55 (L) 05/15/2022 01:19 AM    Calcium 8.0 (L) 05/15/2022 01:19 AM    Glucose (POC) 98 05/04/2022 01:37 PM     Lab Results   Component Value Date/Time    Bilirubin, total 0.2 05/15/2022 01:19 AM    ALT (SGPT) 30 05/15/2022 01:19 AM    Alk. phosphatase 45 05/15/2022 01:19 AM    Protein, total 5.0 (L) 05/15/2022 01:19 AM    Albumin 2.8 (L) 05/15/2022 01:19 AM    Globulin 2.2 05/15/2022 01:19 AM     Lab Results   Component Value Date/Time    Hepatitis B surface Ag <0.10 04/27/2022 12:23 PM    Hepatitis B surface Ab 3.66 04/27/2022 12:23 PM         Assessment and Recommendations:     1. Diffuse large B-cell lymphoma of lymph nodes of neck (HCC)  -Finished inpatient Cycle 1 DA R-EPOCH yesterday.  -Neulasta due tomorrow.  -She needs CBC/diff on 2x/week labs spaced out at least 3 days apart.     2. Insomnia due to medical condition  -Ambien has been the only thing to help her sleep. She reports long-term prior use of ambien with good tolerability. I have provided her with a week's worth. Encouraged her to further discuss with palliative care clinic who will take over controlled substances providing.  - zolpidem (AMBIEN) 5 mg tablet; Take 1 Tablet by mouth nightly as needed for Sleep. Max Daily Amount: 5 mg. Dispense: 7 Tablet; Refill: 0    3. Generalized edema  Will aim for gentle diuresis. TTE did not show any decreased left ventricular ejection fraction. .  - furosemide (LASIX) 20 mg tablet; Take lasix 20mg daily for 7 days after chemotherapy completed. Start day after chemotherapy. Hold if day to day weight loss is greater than 5lbs. Dispense: 7 Tablet; Refill: 1      Follow-Up:    [facility]  5/31/22  <><><>  LAB  2x/ weekly lab visits for the next few weeks:     5/17, 5/20,5/23, 5/27, 5/31, and 6/3    Bushra Ha MD  Hematology/Medical Oncology Provider  [facility]  P: [phone]    .     Bushra Ha MD  Hematology/Medical Oncology Provider  [facility]  P: [phone]    Signed By:   Ariel Galloway MD

## 2022-05-16 NOTE — PROGRESS NOTES
Graciela Daly is a 78 y.o. female follow up for lymphoma. 1. Have you been to the ER, urgent care clinic since your last visit? Hospitalized since your last visit?no     2. Have you seen or consulted any other health care providers outside of the [address] [place] [address] since your last visit? Include any pap smears or colon screening.  no

## 2022-05-16 NOTE — PROGRESS NOTES
Palliative Medicine Outpatient Services  Parul: [phone]    Patient Name: Oriana Hansen  YOB: 1955    Date of Current Visit: 05/17/22  Location of Current Visit:    [] [facility] Office  [x] [facility] Office  [] [id number] [place] [facility] Office  [] Home  []Synchronous real-time A/V virtual visit    Date of Initial Visit: 5/17/22   Referral from: Mathew Parisi MD  Primary Care Physician: Corey Trejo MD      SUMMARY:   Oriana Hansen is a 78y.o. year old with a  history of stage IV diffuse large b-cell lymphoma, who was referred to Palliative Medicine by Dr. Jamshid Bolton for symptom management and psychosocial support. She was diagnosed in 4/2022. She was hospitalized at [facility] 5/11-5/15/22 for initiation of R-EPOCH. The patients social history includes: she's . She has 2 adult children, her daughter, Fanny Nicole, who lives locally, and a son, Dina Bernstein, who lives in the home with her. 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 Palliative Medicine is addressing the following current patient/family concerns: symptoms related to lymphoma and treatment; support while undergoing treatment     Initial Referral Intake note reviewed   PALLIATIVE DIAGNOSES:       ICD-10-CM ICD-9-CM    1. Major depressive disorder with current active episode, unspecified depression episode severity, unspecified whether recurrent  F32.9 296.30    2. Nausea and vomiting, unspecified vomiting type  R11.2 787.01 LORazepam (ATIVAN) 1 mg tablet   3. Right hip pain  M25.551 719.45    4. Anxiety  F41.9 300.00 LORazepam (ATIVAN) 1 mg tablet   5. Bilateral foot pain  M79.671 729.5     M79.672     6. Poor appetite  R63.0 783.0    7. Fatigue, unspecified type  R53.83 780.79    8.  Diffuse large B-cell lymphoma of lymph nodes of multiple regions (HCC)  C83.38 202.88 LORazepam (ATIVAN) 1 mg tablet      morphine IR (MS IR) 15 mg tablet          PLAN:   Patient Instructions     Dear Oriana Hansen ,    It was a pleasure seeing you today in [address] [place] [address] Palliative Medicine Clinic. We will see you again in 3 weeks for an office visit. If labs or imaging tests have been ordered for you today, please call the office  at [phone] 48 hours after completion to obtain the results. Your described symptoms were: Fatigue: 9 Drowsiness: 6   Depression: 8 Pain: 8   Anxiety: 9 Nausea: 9   Anorexia: 10 Dyspnea: 8   Best Well-Being: 10 Constipation: No   Other Problem (Comment): 8 (Panic attacks)       This is the plan we talked about:    1. Depression and anxiety  -You've lived with depression, anxiety and panic attacks most of your life  -Your recent cancer diagnosis has exacerbated these  -Your goal to feel less on edge all the time  -Continue fluoxetine 40-mg daily  -Continue lorazepam 1-mg: take 1 tab three times daily as needed  -Today I gave you information about the [facility] and recommended you explore these resources once your nausea and vomiting have improved    2. Nausea and vomiting  -Start taking ondansetron 8-mg every 8 hours on a regular basis  -Continue prochlorperazine 10-mg every 6 hours as needed  -You may need to take both of these medications in order to control your nausea    3. Pain  -You have chronic right hip pain attributed to osteoarthritis  -You also have bilateral foot pain with neuropathic (nerve) features of unclear etiology (cause)  -Low dose gabapentin was ineffective for your pain, though you may not have been at a therapeutic dose or taken the medication long enough for effect  -I'm deferring restarting this medication now as you feel your pain is manageable with diclofenac and as-needed morphine  -Continue morphine immediate-release 15-mg every 4 hours as needed  -Continue diclofenac twice daily    4. Appetite  -You ate well prior to chemo    5. Fatigue/poor sleep  -Continue Ambien 5-mg at bedtime as needed for sleep  -Do not take lorazepam and Ambien at the same time    6.  Lymphoma  -You anticipate receiving a total of 6 cycles of chemotherapy with the goal of inducing remission        This is what you have shared with us about Advance Care Planning:      Primary Decision Maker: Benito Carias Daughter - [phone]  Advance Care Planning 5/17/2022   Patient's Healthcare Decision Maker is: Legal Next of Kin   Confirm Advance Directive None   Patient Would Like to Complete Advance Directive -   Does the patient have other document types -           The Palliative Medicine Team is here to support you and your family. Sincerely,      Imelda Mason MD and the Palliative Medicine Team       GOALS OF CARE / TREATMENT PREFERENCES:   [====Goals of Care====]  GOALS OF CARE:  Patient / health care proxy stated goals: See Patient Instructions / Summary    TREATMENT PREFERENCES:   Code Status:  [x] Attempt Resuscitation       [] Do Not Attempt Resuscitation    Advance Care Planning:  [x] The [facility] Interdisciplinary Team has updated the ACP Navigator with Decision Maker and Patient Capacity      Primary Decision Maker: Benito Huang - [phone]  Advance Care Planning 5/17/2022   Patient's Healthcare Decision Maker is: Legal Next of Kin   Confirm Advance Directive None   Patient Would Like to Complete Advance Directive -   Does the patient have other document types -       Other:  (If patient appropriate for POST, consider using PALLPOST smart phrase here)    The palliative care team has discussed with patient / health care proxy about goals of care / treatment preferences for patient.  [====Goals of Care====]     PRESCRIPTIONS GIVEN:     Medications Ordered Today   Medications    LORazepam (ATIVAN) 1 mg tablet     Sig: Take 1 Tablet by mouth every eight (8) hours as needed for Anxiety. Max Daily Amount: 3 mg. Indications: anxious     Dispense:  90 Tablet     Refill:  0    morphine IR (MS IR) 15 mg tablet     Sig: Take 1 Tablet by mouth every four (4) hours as needed for Pain for up to 14 days.  Max Daily Amount: 90 mg. Dispense:  30 Tablet     Refill:  0           FOLLOW UP:     Future Appointments   Date Time Provider Chandni Chaui   5/20/2022  9:00 AM SS INF7 CH1 LAB [facility]   5/23/2022 11:45 AM SS INF7 CH1 LAB [facility]   5/26/2022 11:30 AM SS INF7 CH1 LAB [facility]   5/31/2022 10:30 AM SS INF7 CH1 LAB [facility]   5/31/2022 10:45 AM Torey Zuleta MD [facility] AMB   6/3/2022 11:30 AM SS INF7 CH1 LAB [facility]   6/6/2022 11:30 AM SS INF7 CH1 LAB [facility]   6/13/2022  8:30 AM SS INF4 CH4 <1H [facility]   6/14/2022  8:30 AM SS INF4 CH1 >4H [facility]   6/27/2022 11:30 AM SS INF7 CH1 LAB [facility]   7/5/2022  8:30 AM SS INF4 CH1 >4H [facility]   7/26/2022  8:30 AM SS INF3 CH1 >4H [facility]   8/16/2022  8:30 AM SS INF3 CH1 >4H [facility]           PHYSICIANS INVOLVED IN CARE:   Patient Care Team:  Shania Hill MD as PCP - General (Family Medicine)  Shania Hill [facility]       HISTORY:   Reviewed patient-completed ESAS and advance care planning form. Reviewed patient record in prescription monitoring program.    CHIEF COMPLAINT:   Chief Complaint   Patient presents with    Nausea       HPI/SUBJECTIVE:    The patient is: [x] Verbal / [] Nonverbal     She was diagnosed with lymphoma last month. She started feeling sick about 2 months ago with body aches, sore throat and fever. She went to an urgent care clinic and tested negative for COVID. She took antibiotics and she didn't get better. Then she noticed swelling/enlarged lymph nodes in neck and went to see her PCP who referred her to Dr. Lesvia Chou. She was hospitalized last week to start chemo and was discharged 2 days ago. She was up all night last night with nausea and vomiting. She finally stopped the vomiting early this morning.     She has a long history of depression, anxiety and panic attacks and these are all worse since her diagnosis. She's tried many different types of anti-depressants in the past and has had counseling. She used to take xanax for her panic attacks but not recently. She's been taking lorazepam 0-5-mg every 6 hours and it isn't helping. Getting her cancer diagnosis was a big shock to her and to her family. She has pain in her right hip which she's had for a long time. She also has pain in her feet (see below). Her PCP prescribed gabapentin at bedtime which she stopped after a few days because it wasn't helping with the pain. She had morphine at home which she's been taking 3 to 4 times a day. She's had no appetite since coming home from the hospital but ate well before her chemo. She feels tired all the time. She's sleeping better since Dr. Maria Guadalupe Monroe prescribed Ambien. She's retained fluid since receiving chemo (with steroids). She's taking a fluid pill (furosemide) for this.           Clinical Pain Assessment (nonverbal scale for nonverbal patients):   [++++ Clinical Pain Assessment++++]  [++++Pain Severity++++]: Pain: 8  [++++Pain Character++++]: \"like an electrode sticking in my foot\"  [++++Pain Duration++++]: years  [++++Pain Effect++++]:  [++++Pain Factors++++]: worse when walking  [++++Pain Frequency++++]: constant with varying intensity  [++++Pain Location++++]: bilateral great toes and dorsum of both feet  [++++ Clinical Pain Assessment++++]    [++++ Clinical Pain Assessment++++]  [++++Pain Severity++++]: Pain: 8  [++++Pain Character++++]: \"like something's pressing down on something that it shouldn't\"  [++++Pain Duration++++]: years  [++++Pain Effect++++]:   [++++Pain Factors++++]: worse with walking  [++++Pain Frequency++++]: constant with varying intensity  [++++Pain Location++++]: right hip  [++++ Clinical Pain Assessment++++]     FUNCTIONAL ASSESSMENT:     Palliative Performance Scale (PPS):  PPS: 70       PSYCHOSOCIAL/SPIRITUAL SCREENING:     Any spiritual / Sabianist concerns:  [] Yes /  [x] No    Caregiver Burnout:  [] Yes /  [] No /  [x] No Caregiver Present      Anticipatory grief assessment:   [x] Normal  / [] Maladaptive       ESAS Anxiety: Anxiety: 9    ESAS Depression: Depression: 8       REVIEW OF SYSTEMS:     The following systems were [x] reviewed / [] unable to be reviewed  Systems: constitutional, ears/nose/mouth/throat, respiratory, gastrointestinal, genitourinary, musculoskeletal, integumentary, neurologic, psychiatric, endocrine. Positive findings noted below. Modified ESAS Completed by: provider   Fatigue: 9 Drowsiness: 6   Depression: 8 Pain: 8   Anxiety: 9 Nausea: 9   Anorexia: 10 Dyspnea: 8   Best Well-Being: 10 Constipation: No   Other Problem (Comment): 8 (Panic attacks)          PHYSICAL EXAM:     Wt Readings from Last 3 Encounters:   05/17/22 215 lb (97.5 kg)   05/16/22 229 lb (103.9 kg)   05/15/22 223 lb 15.8 oz (101.6 kg)     Blood pressure 115/65, pulse 84, temperature 99.1 °F (37.3 °C), temperature source Oral, resp. rate 20, height 5' 3\" (1.6 m), weight 215 lb (97.5 kg), SpO2 96 %.   Last bowel movement: See Nursing Note    Constitutional: sitting in chair, appears fatigued, occasional dry heaves  Eyes: pupils equal, anicteric  ENMT: no nasal discharge, moist mucous membranes  Cardiovascular: regular rhythm; bilateral lower extremity edema  Respiratory: breathing not labored, symmetric  Gastrointestinal: soft non-tender, +bowel sounds  Musculoskeletal: no deformity, no tenderness to palpation  Skin: warm, dry  Neurologic: following commands, moving all extremities  Psychiatric: full affect, no hallucinations  Other:       HISTORY:     Past Medical History:   Diagnosis Date    Adverse effect of anesthesia     PHLEGM IN THROAT POST OP & NEEDED X2 BREATHING TREATMENTS    Anxiety     COPD (chronic obstructive pulmonary disease) ([facility] [address] 2022    emphysema    Depression     GERD (gastroesophageal reflux disease)     Hernia, femoral     since childhood    Hypertension     Joint pain     Nausea & vomiting     TMJ arthritis       Past Surgical History:   Procedure Laterality Date    HX CERVICAL FUSION  2012    C5-C6    HX CHOLECYSTECTOMY  2020    HX COLONOSCOPY      HX ENDOSCOPY      HX HYSTERECTOMY  2019    HX TONSILLECTOMY      AS A CHILD    HX WISDOM TEETH EXTRACTION      TEENAGER    IR INSERT TUNL CVC W PORT OVER 5 YEARS  4/29/2022      Family History   Problem Relation Age of Onset    Heart Disease Mother 46    Heart Surgery Mother         HEART TRANSPLANT    Elevated Lipids Mother     Hypertension Mother     COPD Father     Emphysema Father     Sudden Death Brother 46    Other Maternal Grandmother         BRAIN TUMOR    No Known Problems Son     No Known Problems Daughter       History reviewed, no pertinent family history. Social History     Tobacco Use    Smoking status: Current Every Day Smoker     Packs/day: 1.00     Years: 40.00     Pack years: 40.00     Types: Cigarettes    Smokeless tobacco: Never Used    Tobacco comment: 0.5-1 pack per day   Substance Use Topics    Alcohol use: Not Currently     Alcohol/week: 0.0 standard drinks     Allergies   Allergen Reactions    Oxycodone Nausea Only      Current Outpatient Medications   Medication Sig    LORazepam (ATIVAN) 1 mg tablet Take 1 Tablet by mouth every eight (8) hours as needed for Anxiety. Max Daily Amount: 3 mg. Indications: anxious    morphine IR (MS IR) 15 mg tablet Take 1 Tablet by mouth every four (4) hours as needed for Pain for up to 14 days. Max Daily Amount: 90 mg.    zolpidem (AMBIEN) 5 mg tablet Take 1 Tablet by mouth nightly as needed for Sleep. Max Daily Amount: 5 mg.  furosemide (LASIX) 20 mg tablet Take lasix 20mg daily for 7 days after chemotherapy completed. Start day after chemotherapy. Hold if day to day weight loss is greater than 5lbs.     prochlorperazine (COMPAZINE) 10 mg tablet Take 1 Tablet by mouth every six (6) hours as needed for Nausea (do not take if groggy; take if nausea despite zofran).  docusate sodium (COLACE) 100 mg capsule Take 1 Capsule by mouth two (2) times a day for 90 days.  diclofenac EC (VOLTAREN) 50 mg EC tablet Take 1 Tablet by mouth two (2) times daily as needed for Pain.  FLUoxetine (PROzac) 40 mg capsule TAKE 1 CAPSULE BY MOUTH EVERY MORNING (Patient taking differently: Take 40 mg by mouth every morning. TAKE 1 CAPSULE BY MOUTH EVERY MORNING)    amLODIPine-benazepril (LOTREL) 5-20 mg per capsule TAKE 1 CAPSULE BY MOUTH EVERY MORNING (Patient taking differently: Take 1 Capsule by mouth every morning. TAKE 1 CAPSULE BY MOUTH EVERY MORNING)    ondansetron (ZOFRAN ODT) 8 mg disintegrating tablet Take 1 Tablet by mouth every eight (8) hours as needed for Nausea or Vomiting. (Patient not taking: Reported on 5/17/2022)    polyethylene glycol (MIRALAX) 17 gram packet Take 1 Packet by mouth daily. (Patient not taking: Reported on 5/17/2022)    predniSONE (DELTASONE) 20 mg tablet Take 60 mg by mouth daily. (Patient not taking: Reported on 5/17/2022)    gabapentin (NEURONTIN) 300 mg capsule Take 1 Capsule by mouth daily as needed for Pain. (Patient not taking: Reported on 5/17/2022)     No current facility-administered medications for this visit. LAB DATA REVIEWED:     Lab Results   Component Value Date/Time    WBC 8.7 05/17/2022 01:26 PM    HGB 12.1 05/17/2022 01:26 PM    PLATELET 739 43/82/0320 01:26 PM     Lab Results   Component Value Date/Time    Sodium 141 05/15/2022 01:19 AM    Potassium 3.7 05/15/2022 01:19 AM    Chloride 110 (H) 05/15/2022 01:19 AM    CO2 23 05/15/2022 01:19 AM    BUN 18 05/15/2022 01:19 AM    Creatinine 1.01 05/15/2022 01:19 AM    Calcium 8.0 (L) 05/15/2022 01:19 AM    Phosphorus 2.6 05/15/2022 01:19 AM      Lab Results   Component Value Date/Time    Alk.  phosphatase 45 05/15/2022 01:19 AM    Protein, total 5.0 (L) 05/15/2022 01:19 AM    Albumin 2.8 (L) 05/15/2022 01:19 AM Globulin 2.2 05/15/2022 01:19 AM     No results found for: INR, PTMR, PTP, PT1, PT2, APTT, INREXT, INREXT   No results found for: IRON, FE, TIBC, IBCT, PSAT, FERR     CT chest/abdomen/pelvis 3/24/22:  1. Large gastrohepatic ligament lymph node measuring 4.2 x 2.5 cm. Multiple  mildly enlarged mesenteric lymph nodes. Borderline to mildly enlarged  retroperitoneal lymph nodes. Lymphoma is the primary differential possibility. These are not accessible by percutaneous biopsy but may be accessible by  endoscopic ultrasound.     2. Central right renal mass with second posterior right renal mass. Mild  right-sided hydronephrosis. Although these may represent uroepithelial or renal  cell carcinoma is, lymphoma should be considered as well.     3.  Borderline size left inguinal lymph node. This may be accessible for  percutaneous ultrasound-guided biopsy although is borderline size for pathology  may be reactive.     4.  Ground glass opacity superior segment left lower lobe. Attention on  follow-up imaging.     5.  Ventral hernia containing loops of small bowel without evidence of  Obstruction    PET-CT 5/4/22:  CHEST: No foci of abnormal hypermetabolism. Low-grade activity in mediastinal  and hilar lymph nodes is nonspecific but may be reactive.     ABDOMEN/PELVIS: There is increased metabolic activity in a 2.6 cm hepatogastric  ligament lymph node with SUV of 5.7. There is increased metabolic activity in a 1.2 cm right retroperitoneal lymph  node with SUV of 6. There is increased metabolic activity in a left retroperitoneal lymph node with  SUV of 8   There is slight haziness in the mesentery with small lymph nodes with slight  increased metabolic activity of 3.6. There is small focus of increased metabolic activity anterior to the left sacrum  and anterior to the right sacrum with SUV of 7 suspicious for lymph nodes.   There is increased metabolic activity in the right renal mass posterior cortex  with SUV of 6.9.  There is increased metabolic activity in a left inguinal lymph node with SUV of  8  There is a right abdominal wall hernia     SKELETON: There is increased metabolic activity and left humeral head, right  iliac bone, femurs and proximal right tibia. . There is slight increased  metabolic activity right humeral head.     IMPRESSION  There is increased metabolic activity in 2 lymph nodes in the left  neck, in the hepatogastric ligament and retroperitoneal adenopathy, mesenteric  lymph nodes and left inguinal adenopathy suspicious for lymphoma. There is  increased metabolic activity in a mass posterior right renal cortex nonspecific  but may represent lymphoma. There is increased bony activity as described above  suspicious for lymphoma. .    CONTROLLED SUBSTANCES SAFETY ASSESSMENT (IF ON CONTROLLED SUBSTANCES):     Reviewed opioid safety handout:  [] Yes   [] No  24 hour opioid dose >150mg morphine equivalent/day:  [] Yes   [] No  Benzodiazepines:  [] Yes   [] No  Sleep apnea:  [] Yes   [] No  Urine Toxicology Testing within last 6 months:  [] Yes   [] No  History of or new aberrant medication taking behaviors:  [] Yes   [] No  Has Narcan been prescribed [] Yes   [] No          Total time:   Counseling / coordination time:   > 50% counseling / coordination?:

## 2022-05-17 ENCOUNTER — HOSPITAL ENCOUNTER (OUTPATIENT)
Dept: INFUSION THERAPY | Age: 67
Discharge: HOME | End: 2022-05-17
Payer: MEDICARE

## 2022-05-17 ENCOUNTER — OFFICE VISIT (OUTPATIENT)
Dept: PALLATIVE CARE | Age: 67
End: 2022-05-17
Payer: MEDICARE

## 2022-05-17 VITALS
RESPIRATION RATE: 20 BRPM | TEMPERATURE: 98.8 F | HEART RATE: 84 BPM | OXYGEN SATURATION: 96 % | DIASTOLIC BLOOD PRESSURE: 59 MMHG | SYSTOLIC BLOOD PRESSURE: 121 MMHG

## 2022-05-17 VITALS
RESPIRATION RATE: 20 BRPM | HEIGHT: 63 IN | BODY MASS INDEX: 38.09 KG/M2 | HEART RATE: 84 BPM | TEMPERATURE: 99.1 F | WEIGHT: 215 LBS | DIASTOLIC BLOOD PRESSURE: 65 MMHG | SYSTOLIC BLOOD PRESSURE: 115 MMHG | OXYGEN SATURATION: 96 %

## 2022-05-17 DIAGNOSIS — C83.31 DIFFUSE LARGE B-CELL LYMPHOMA OF LYMPH NODES OF NECK (HCC): Primary | ICD-10-CM

## 2022-05-17 DIAGNOSIS — M79.672 BILATERAL FOOT PAIN: ICD-10-CM

## 2022-05-17 DIAGNOSIS — M25.551 RIGHT HIP PAIN: ICD-10-CM

## 2022-05-17 DIAGNOSIS — R63.0 POOR APPETITE: ICD-10-CM

## 2022-05-17 DIAGNOSIS — F41.9 ANXIETY: ICD-10-CM

## 2022-05-17 DIAGNOSIS — R11.2 NAUSEA AND VOMITING, UNSPECIFIED VOMITING TYPE: ICD-10-CM

## 2022-05-17 DIAGNOSIS — M79.671 BILATERAL FOOT PAIN: ICD-10-CM

## 2022-05-17 DIAGNOSIS — F32.9 MAJOR DEPRESSIVE DISORDER WITH CURRENT ACTIVE EPISODE, UNSPECIFIED DEPRESSION EPISODE SEVERITY, UNSPECIFIED WHETHER RECURRENT: Primary | ICD-10-CM

## 2022-05-17 DIAGNOSIS — Z79.899 HIGH RISK MEDICATION USE: ICD-10-CM

## 2022-05-17 DIAGNOSIS — R53.83 FATIGUE, UNSPECIFIED TYPE: ICD-10-CM

## 2022-05-17 DIAGNOSIS — C83.38 DIFFUSE LARGE B-CELL LYMPHOMA OF LYMPH NODES OF MULTIPLE REGIONS (HCC): ICD-10-CM

## 2022-05-17 PROBLEM — N18.30 CHRONIC RENAL DISEASE, STAGE III (HCC): Status: ACTIVE | Noted: 2022-01-01

## 2022-05-17 PROBLEM — R59.0 LYMPHADENOPATHY OF LEFT CERVICAL REGION: Status: RESOLVED | Noted: 2022-01-01 | Resolved: 2022-01-01

## 2022-05-17 PROBLEM — F33.9 EPISODE OF RECURRENT MAJOR DEPRESSIVE DISORDER (HCC): Status: ACTIVE | Noted: 2022-01-01

## 2022-05-17 LAB
BASOPHILS # BLD: 0 K/UL (ref 0–0.1)
BASOPHILS NFR BLD: 0 % (ref 0–1)
DIFFERENTIAL METHOD BLD: ABNORMAL
EOSINOPHIL # BLD: 0.5 K/UL (ref 0–0.4)
EOSINOPHIL NFR BLD: 6 % (ref 0–7)
ERYTHROCYTE [DISTWIDTH] IN BLOOD BY AUTOMATED COUNT: 13.7 % (ref 11.5–14.5)
HCT VFR BLD AUTO: 36.1 % (ref 35–47)
HGB BLD-MCNC: 12.1 G/DL (ref 11.5–16)
IMM GRANULOCYTES # BLD AUTO: 0 K/UL
IMM GRANULOCYTES NFR BLD AUTO: 0 %
LYMPHOCYTES # BLD: 0.5 K/UL (ref 0.8–3.5)
LYMPHOCYTES NFR BLD: 6 % (ref 12–49)
MCH RBC QN AUTO: 31.1 PG (ref 26–34)
MCHC RBC AUTO-ENTMCNC: 33.5 G/DL (ref 30–36.5)
MCV RBC AUTO: 92.8 FL (ref 80–99)
MONOCYTES # BLD: 0 K/UL (ref 0–1)
MONOCYTES NFR BLD: 0 % (ref 5–13)
NEUTS SEG # BLD: 7.7 K/UL (ref 1.8–8)
NEUTS SEG NFR BLD: 88 % (ref 32–75)
NRBC # BLD: 0 K/UL (ref 0–0.01)
NRBC BLD-RTO: 0 PER 100 WBC
PLATELET # BLD AUTO: 224 K/UL (ref 150–400)
PMV BLD AUTO: 10.4 FL (ref 8.9–12.9)
RBC # BLD AUTO: 3.89 M/UL (ref 3.8–5.2)
RBC MORPH BLD: ABNORMAL
WBC # BLD AUTO: 8.7 K/UL (ref 3.6–11)

## 2022-05-17 PROCEDURE — G8427 DOCREV CUR MEDS BY ELIG CLIN: HCPCS | Performed by: INTERNAL MEDICINE

## 2022-05-17 PROCEDURE — G8417 CALC BMI ABV UP PARAM F/U: HCPCS | Performed by: INTERNAL MEDICINE

## 2022-05-17 PROCEDURE — 99204 OFFICE O/P NEW MOD 45 MIN: CPT | Performed by: INTERNAL MEDICINE

## 2022-05-17 PROCEDURE — 77030016057 HC NDL HUBR APOL -B

## 2022-05-17 PROCEDURE — 74011250636 HC RX REV CODE- 250/636: Performed by: INTERNAL MEDICINE

## 2022-05-17 PROCEDURE — 1111F DSCHRG MED/CURRENT MED MERGE: CPT | Performed by: INTERNAL MEDICINE

## 2022-05-17 PROCEDURE — 1101F PT FALLS ASSESS-DOCD LE1/YR: CPT | Performed by: INTERNAL MEDICINE

## 2022-05-17 PROCEDURE — G8432 DEP SCR NOT DOC, RNG: HCPCS | Performed by: INTERNAL MEDICINE

## 2022-05-17 PROCEDURE — G9899 SCRN MAM PERF RSLTS DOC: HCPCS | Performed by: INTERNAL MEDICINE

## 2022-05-17 PROCEDURE — 1090F PRES/ABSN URINE INCON ASSESS: CPT | Performed by: INTERNAL MEDICINE

## 2022-05-17 PROCEDURE — 3017F COLORECTAL CA SCREEN DOC REV: CPT | Performed by: INTERNAL MEDICINE

## 2022-05-17 PROCEDURE — 36415 COLL VENOUS BLD VENIPUNCTURE: CPT

## 2022-05-17 PROCEDURE — 85025 COMPLETE CBC W/AUTO DIFF WBC: CPT

## 2022-05-17 PROCEDURE — G8536 NO DOC ELDER MAL SCRN: HCPCS | Performed by: INTERNAL MEDICINE

## 2022-05-17 PROCEDURE — 96372 THER/PROPH/DIAG INJ SC/IM: CPT

## 2022-05-17 PROCEDURE — G8400 PT W/DXA NO RESULTS DOC: HCPCS | Performed by: INTERNAL MEDICINE

## 2022-05-17 RX ORDER — MORPHINE SULFATE 15 MG/1
15 TABLET ORAL
Qty: 30 TABLET | Refills: 0 | Status: SHIPPED | OUTPATIENT
Start: 2022-05-17 | End: 2022-06-10 | Stop reason: SDUPTHER

## 2022-05-17 RX ORDER — SODIUM CHLORIDE 0.9 % (FLUSH) 0.9 %
5-10 SYRINGE (ML) INJECTION AS NEEDED
Status: CANCELLED | OUTPATIENT
Start: 2022-05-17

## 2022-05-17 RX ORDER — HEPARIN 100 UNIT/ML
500 SYRINGE INTRAVENOUS AS NEEDED
Status: CANCELLED | OUTPATIENT
Start: 2022-05-17

## 2022-05-17 RX ORDER — SODIUM CHLORIDE 9 MG/ML
10 INJECTION INTRAMUSCULAR; INTRAVENOUS; SUBCUTANEOUS AS NEEDED
Status: CANCELLED | OUTPATIENT
Start: 2022-05-17

## 2022-05-17 RX ORDER — LORAZEPAM 1 MG/1
1 TABLET ORAL
Qty: 90 TABLET | Refills: 0 | Status: SHIPPED | OUTPATIENT
Start: 2022-05-17 | End: 2022-07-13 | Stop reason: SDUPTHER

## 2022-05-17 RX ADMIN — PEGFILGRASTIM-CBQV 6 MG: 6 INJECTION, SOLUTION SUBCUTANEOUS at 13:28

## 2022-05-17 NOTE — PROGRESS NOTES
Palliative Medicine Office Visit  Palliative Medicine Nurse Check In  [phone]    Patient Name: Graciela Daly  YOB: 1955      Date of Office Visit: 5/17/2022    Patient states: \"  \"    From Check In Sheet (scanned in Media):  Has a medical provider talked with you about cardiopulmonary resuscitation (CPR)? [x] Yes   [] No   [] Unable to obtain    Nurse reminder to complete or update ACP FlowSheet:    Is ACP on the Problem List?    [] Yes    [x] No  IF ACP Document is ON FILE; Nurse to place ACP on Problem List     Is there an ACP Note in Chart Review/Note? [x] Yes    [] No   If NO: ALERT PROVIDER          Primary Decision Maker: Janette Balderas - Sal - [phone]  Advance Care Planning 5/17/2022   Patient's Healthcare Decision Maker is: Legal Next of Kin   Confirm Advance Directive None   Patient Would Like to Complete Advance Directive -   Does the patient have other document types -       Is there anything that we should know about you as a person in order to provide you the best care possible? Have you been to the ER, urgent care clinic since your last visit? [] Yes   [] No   [] Unable to obtain    Have you been hospitalized since your last visit? [] Yes   [] No   [] Unable to obtain    Have you seen or consulted any other health care providers outside of the [address] [place] [address] since your last visit? [] Yes   [] No   [] Unable to obtain    Functional status (describe):         Last BM: 5/17/2022     accessed (date):      Bottle review (for opioid pain medication):  Medication 1:   Date filled:   Directions:   # filled:   # left:   # pills taking per day:  Last dose taken:    Medication 2:   Date filled:   Directions:   # filled:   # left:   # pills taking per day:  Last dose taken:    Medication 3:   Date filled:   Directions:   # filled:   # left:   # pills taking per day:  Last dose taken:    Medication 4:   Date filled:   Directions:   # filled:   # left:   # pills taking per day:  Last dose taken:

## 2022-05-17 NOTE — PATIENT INSTRUCTIONS
Dear Severo Raven ,    It was a pleasure seeing you today in [facility]. We will see you again in 3 weeks for an office visit. If labs or imaging tests have been ordered for you today, please call the office  at [phone] 48 hours after completion to obtain the results. Your described symptoms were: Fatigue: 9 Drowsiness: 6   Depression: 8 Pain: 8   Anxiety: 9 Nausea: 9   Anorexia: 10 Dyspnea: 8   Best Well-Being: 10 Constipation: No   Other Problem (Comment): 8 (Panic attacks)       This is the plan we talked about:    1. Nausea and vomiting  -Start taking ondansetron 8-mg every 8 hours on a regular basis  -Continue prochlorperazine 10-mg every 6 hours as needed  -You may need to take both of these medications in order to control your nausea    2. Depression and anxiety  -You've lived with depression, anxiety and panic attacks most of your life  -Your recent cancer diagnosis has exacerbated these  -Your goal to feel less on edge all the time  -Continue fluoxetine 40-mg daily  -Continue lorazepam 1-mg: take 1 tab three times daily as needed  -Today I gave you information about the [facility] and recommended you explore these resources once your nausea and vomiting have improved    3. Pain  -You have chronic right hip pain attributed to osteoarthritis  -You also have bilateral foot pain with neuropathic (nerve) features of unclear etiology (cause)  -Low dose gabapentin was ineffective for your pain, though you may not have been at a therapeutic dose or taken the medication long enough for effect  -I'm deferring restarting this medication now as you feel your pain is manageable with diclofenac and as-needed morphine  -Continue morphine immediate-release 15-mg every 4 hours as needed  -Continue diclofenac twice daily    4. Appetite  -You ate well prior to chemo    5.  Fatigue/poor sleep  -Continue Ambien 5-mg at bedtime as needed for sleep  -Do not take lorazepam and Ambien at the same time    6. Lymphoma  -You anticipate receiving a total of 6 cycles of chemotherapy with the goal of inducing remission        This is what you have shared with us about Advance Care Planning:      Primary Decision Maker: Gorge Renae - Daughter - [phone]  Advance Care Planning 5/17/2022   Patient's Healthcare Decision Maker is: Legal Next of Kin   Confirm Advance Directive None   Patient Would Like to Complete Advance Directive -   Does the patient have other document types -           The Palliative Medicine Team is here to support you and your family.        Sincerely,      Rd Howard MD and the Palliative Medicine Team

## 2022-05-17 NOTE — PROGRESS NOTES
[facility] Progress Note    Date: May 17, 2022    Name: Bertha Hogan    MRN: [id number]         : 1955    Ms. Sharee Momin Arrived ambulatory and in no distress for Udenyca Injection. Assessment was completed, patient reports nausea and vomiting today, she is proceeding to appointment with palliative care after [address] [place] [address] appointment. Port accessed, labs drawn, port flushed, heparinized and de accessed per protocol. Ms. Silvana Ambriz vitals were reviewed. Visit Vitals  BP (!) 121/59   Pulse 84   Temp 98.8 °F (37.1 °C)   Resp 20   SpO2 96%         Medications:  Medications Administered     pegfilgrastim-cbqv (UDENYCA) injection 6 mg     Admin Date  2022 Action  Given Dose  6 mg Route  SubCUTAneous Administered By  Joe Sapp [place]                Ms. Sharee Momin tolerated treatment well and was discharged from [place] [address] in stable condition. She is to return on May 20 at 0900 for her next appointment.     KARMEN Mariee  May 17, 2022

## 2022-05-18 ENCOUNTER — TELEPHONE (OUTPATIENT)
Dept: PALLATIVE CARE | Age: 67
End: 2022-05-18

## 2022-05-18 NOTE — TELEPHONE ENCOUNTER
Palliative Medicine  Nursing Note  [phone])  Fax [phone]      Telephone Call  Patient Name: Ellin Shone  YOB: [birth year]        Primary Decision Maker: Ludmila Rodriguez - Daughter - [phone]   Advance Care Planning 5/17/2022   Patient's Healthcare Decision Maker is: Legal Next of Kin   Confirm Advance Directive None   Patient Would Like to Complete Advance Directive -   Does the patient have other document types -       Call returned to Ms. Dulce Loja who shared that the nausea is much improved with the Zofran and Compazine. She said now the nausea may come occasionally as a quick wave, but then it's gone. She was pleased by this. She shared that she has bad mouth sores that are going towards her throat now. She has a \"peroxide\" mouth wash that she using which helps relieve some discomfort for a little bit, but the discomfort returns quickly. She said the soreness feels like thrush. She states that her tongue is white. Encouraged her to take a look and assess if the white areas come off easily or is more difficult to remove and the skin is raw or bleeding underneath. If this is the case, encouraged her to call either Oncology or Palliative tomorrow. She shared that the mouth sores and chemical taste or no taste to foods and liquids has limited her intake. She can only drink sweet tea because it has flavor she said and she is drinking well. Eating food is more troubling. Suggested experimenting with foods that have more seasoning or more sweet and tanginess to them as this sometimes breaks through some of that chemical taste or lack of taste. She didn't have any other concerns at present and was appreciative of the call.        Kori Haynes RN  Palliative Medicine

## 2022-05-19 ENCOUNTER — TELEPHONE (OUTPATIENT)
Dept: PALLATIVE CARE | Age: 67
End: 2022-05-19

## 2022-05-19 DIAGNOSIS — B37.0 THRUSH: Primary | ICD-10-CM

## 2022-05-19 DIAGNOSIS — G47.01 INSOMNIA DUE TO MEDICAL CONDITION: ICD-10-CM

## 2022-05-19 RX ORDER — ZOLPIDEM TARTRATE 5 MG/1
5 TABLET ORAL
Qty: 30 TABLET | Refills: 0 | Status: ON HOLD | OUTPATIENT
Start: 2022-05-19 | End: 2022-08-18

## 2022-05-19 RX ORDER — NYSTATIN 100000 [USP'U]/ML
500000 SUSPENSION ORAL 4 TIMES DAILY
Qty: 200 ML | Refills: 0 | Status: SHIPPED
Start: 2022-05-19 | End: 2022-06-10

## 2022-05-19 NOTE — TELEPHONE ENCOUNTER
Triage for Controlled Substance Refill Request    Pain Diagnosis: Manny Esqueda    Last Outpatient Visit: _5/17/2022    Next Outpatient Visit: _none    Reason for refill needed outside of office visit? Appointment not scheduled prior to need for scheduled refill       Pharmacy: [address] [place] [address]     Medication:Ambien 5 mg  Dose and directions:  Take 1 Tablet by mouth nightly as needed for Sleep  Number dispensed:7  Date filled ( or Pharmacy):5/16/2022  # left:       reviewed: _yes    Date of Urine Drug Screen:  _    Opioid Safety Handout given:  _yes    Appropriate for refill:  _yes    Action:  _ Medication pending

## 2022-05-19 NOTE — TELEPHONE ENCOUNTER
On Call Provide note-      Micah Riojas called after hours requesting medicine for thrush. She informed me that her tongue and back of her throat have what looks  like a thick white coating and tongue and throat burn. Micah Riojas reported having had thrush in the past.    Per her request, I sent Rx to her [address] [place] [address] [place] for Nystatin Oral suspension, swish and spit or swallow 4x per day x 10 days.

## 2022-05-20 ENCOUNTER — HOSPITAL ENCOUNTER (OUTPATIENT)
Dept: INFUSION THERAPY | Age: 67
Discharge: HOME | End: 2022-05-20
Payer: MEDICARE

## 2022-05-20 VITALS
BODY MASS INDEX: 37.18 KG/M2 | WEIGHT: 209.9 LBS | OXYGEN SATURATION: 94 % | HEART RATE: 75 BPM | DIASTOLIC BLOOD PRESSURE: 55 MMHG | TEMPERATURE: 97.5 F | RESPIRATION RATE: 18 BRPM | SYSTOLIC BLOOD PRESSURE: 112 MMHG

## 2022-05-20 DIAGNOSIS — C83.31 DIFFUSE LARGE B-CELL LYMPHOMA OF LYMPH NODES OF NECK (HCC): Primary | ICD-10-CM

## 2022-05-20 LAB
ANION GAP SERPL CALC-SCNC: 5 MMOL/L (ref 5–15)
BASOPHILS # BLD: 0 K/UL (ref 0–0.1)
BASOPHILS NFR BLD: 1 % (ref 0–1)
BUN SERPL-MCNC: 14 MG/DL (ref 6–20)
BUN/CREAT SERPL: 21 (ref 12–20)
CALCIUM SERPL-MCNC: 8.2 MG/DL (ref 8.5–10.1)
CHLORIDE SERPL-SCNC: 103 MMOL/L (ref 97–108)
CO2 SERPL-SCNC: 26 MMOL/L (ref 21–32)
CREAT SERPL-MCNC: 0.67 MG/DL (ref 0.55–1.02)
DIFFERENTIAL METHOD BLD: ABNORMAL
EOSINOPHIL # BLD: 0 K/UL (ref 0–0.4)
EOSINOPHIL NFR BLD: 1 % (ref 0–7)
ERYTHROCYTE [DISTWIDTH] IN BLOOD BY AUTOMATED COUNT: 13.5 % (ref 11.5–14.5)
GLUCOSE SERPL-MCNC: 119 MG/DL (ref 65–100)
HCT VFR BLD AUTO: 35 % (ref 35–47)
HGB BLD-MCNC: 11.5 G/DL (ref 11.5–16)
IMM GRANULOCYTES # BLD AUTO: 0 K/UL (ref 0–0.04)
IMM GRANULOCYTES NFR BLD AUTO: 0 % (ref 0–0.5)
LYMPHOCYTES # BLD: 0.4 K/UL (ref 0.8–3.5)
LYMPHOCYTES NFR BLD: 9 % (ref 12–49)
MCH RBC QN AUTO: 31.1 PG (ref 26–34)
MCHC RBC AUTO-ENTMCNC: 32.9 G/DL (ref 30–36.5)
MCV RBC AUTO: 94.6 FL (ref 80–99)
MONOCYTES # BLD: 0.2 K/UL (ref 0–1)
MONOCYTES NFR BLD: 5 % (ref 5–13)
NEUTS BAND NFR BLD MANUAL: 1 %
NEUTS SEG # BLD: 4.1 K/UL (ref 1.8–8)
NEUTS SEG NFR BLD: 83 % (ref 32–75)
NRBC # BLD: 0 K/UL (ref 0–0.01)
NRBC BLD-RTO: 0 PER 100 WBC
PLATELET # BLD AUTO: 155 K/UL (ref 150–400)
PMV BLD AUTO: 10.2 FL (ref 8.9–12.9)
POTASSIUM SERPL-SCNC: 4 MMOL/L (ref 3.5–5.1)
RBC # BLD AUTO: 3.7 M/UL (ref 3.8–5.2)
RBC MORPH BLD: ABNORMAL
SODIUM SERPL-SCNC: 134 MMOL/L (ref 136–145)
WBC # BLD AUTO: 4.7 K/UL (ref 3.6–11)
WBC MORPH BLD: ABNORMAL

## 2022-05-20 PROCEDURE — 36415 COLL VENOUS BLD VENIPUNCTURE: CPT

## 2022-05-20 PROCEDURE — 85025 COMPLETE CBC W/AUTO DIFF WBC: CPT

## 2022-05-20 PROCEDURE — 80048 BASIC METABOLIC PNL TOTAL CA: CPT

## 2022-05-20 RX ORDER — SODIUM CHLORIDE 0.9 % (FLUSH) 0.9 %
5-10 SYRINGE (ML) INJECTION AS NEEDED
Status: DISPENSED | OUTPATIENT
Start: 2022-05-20 | End: 2022-05-20

## 2022-05-20 RX ORDER — HEPARIN 100 UNIT/ML
500 SYRINGE INTRAVENOUS AS NEEDED
Status: CANCELLED | OUTPATIENT
Start: 2022-05-31

## 2022-05-20 RX ORDER — SODIUM CHLORIDE 0.9 % (FLUSH) 0.9 %
5-10 SYRINGE (ML) INJECTION AS NEEDED
Status: CANCELLED | OUTPATIENT
Start: 2022-05-23

## 2022-05-20 RX ORDER — SODIUM CHLORIDE 9 MG/ML
10 INJECTION INTRAMUSCULAR; INTRAVENOUS; SUBCUTANEOUS AS NEEDED
Status: CANCELLED | OUTPATIENT
Start: 2022-05-20

## 2022-05-20 RX ORDER — HEPARIN 100 UNIT/ML
500 SYRINGE INTRAVENOUS AS NEEDED
Status: ACTIVE | OUTPATIENT
Start: 2022-05-20 | End: 2022-05-20

## 2022-05-20 RX ORDER — SODIUM CHLORIDE 9 MG/ML
10 INJECTION INTRAMUSCULAR; INTRAVENOUS; SUBCUTANEOUS AS NEEDED
Status: CANCELLED | OUTPATIENT
Start: 2022-05-23

## 2022-05-20 RX ORDER — SODIUM CHLORIDE 0.9 % (FLUSH) 0.9 %
5-10 SYRINGE (ML) INJECTION AS NEEDED
Status: CANCELLED | OUTPATIENT
Start: 2022-05-31

## 2022-05-20 RX ORDER — SODIUM CHLORIDE 0.9 % (FLUSH) 0.9 %
5-10 SYRINGE (ML) INJECTION AS NEEDED
Status: CANCELLED | OUTPATIENT
Start: 2022-05-20

## 2022-05-20 RX ORDER — SODIUM CHLORIDE 9 MG/ML
10 INJECTION INTRAMUSCULAR; INTRAVENOUS; SUBCUTANEOUS AS NEEDED
Status: CANCELLED | OUTPATIENT
Start: 2022-05-31

## 2022-05-20 RX ORDER — SODIUM CHLORIDE 9 MG/ML
10 INJECTION INTRAMUSCULAR; INTRAVENOUS; SUBCUTANEOUS AS NEEDED
Status: CANCELLED | OUTPATIENT
Start: 2022-05-26

## 2022-05-20 RX ORDER — HEPARIN 100 UNIT/ML
500 SYRINGE INTRAVENOUS AS NEEDED
Status: CANCELLED | OUTPATIENT
Start: 2022-05-26

## 2022-05-20 RX ORDER — SODIUM CHLORIDE 9 MG/ML
10 INJECTION INTRAMUSCULAR; INTRAVENOUS; SUBCUTANEOUS AS NEEDED
Status: ACTIVE | OUTPATIENT
Start: 2022-05-20 | End: 2022-05-20

## 2022-05-20 RX ORDER — HEPARIN 100 UNIT/ML
500 SYRINGE INTRAVENOUS AS NEEDED
Status: CANCELLED | OUTPATIENT
Start: 2022-05-20

## 2022-05-20 RX ORDER — HEPARIN 100 UNIT/ML
500 SYRINGE INTRAVENOUS AS NEEDED
Status: CANCELLED | OUTPATIENT
Start: 2022-05-23

## 2022-05-20 RX ORDER — SODIUM CHLORIDE 0.9 % (FLUSH) 0.9 %
5-10 SYRINGE (ML) INJECTION AS NEEDED
Status: CANCELLED | OUTPATIENT
Start: 2022-05-26

## 2022-05-20 NOTE — PROGRESS NOTES
Please instruct Ms. Sharee Momin to stop her Lasix since her weight has returned to pre-treatment weight at 209 lbs.     Thanks,Dr. Gonzalo Meehans

## 2022-05-20 NOTE — PROGRESS NOTES
[facility] Lab Visit:    2756 Pt arrived ambulatory and in no distress, labs drawn 1 stick in left ac per KK  . Departed [facility] ambulatory and in no distress. Visit Vitals  BP (!) 112/55   Pulse 75   Temp 97.5 °F (36.4 °C)   Resp 18   Wt 95.2 kg (209 lb 14.4 oz)   SpO2 94%   BMI 37.18 kg/m²       Labs available in CC once resulted.

## 2022-05-20 NOTE — PROGRESS NOTES
[address] at [place]  [phone]    05/20/22 10:57 AM - Called and spoke with the patient. Patient's ID verified x 2. Advised patient of Dr. Koki Moctezuma recommendations. The patient voiced understanding. She states that she called palliative about thrush and they called her in a prescription for nystatin. She states that this has made a big difference. She is able to swallow now. The patient reports numbness in her fingertips on both hands, mostly in her thumbs. She denies numbness or tingling elsewhere. Advised this nurse would update Dr. Maria Guadalupe Monroe of above and give her a call back with his recommendations as soon as possible. The patient voiced understanding and gratitude for the call. No further questions or concerns.

## 2022-05-20 NOTE — PROGRESS NOTES
[address] at [place]  [phone]    05/20/22 11:45 AM - Called and spoke with the patient. Advised her of MD's recommendations. She states that it is not currently affecting her function, it is just a \"weird feeling. \"  The patient voiced understanding and gratitude for the call. No further questions or concerns.

## 2022-05-23 ENCOUNTER — HOSPITAL ENCOUNTER (OUTPATIENT)
Dept: INFUSION THERAPY | Age: 67
Discharge: HOME | End: 2022-05-23
Payer: MEDICARE

## 2022-05-23 VITALS
BODY MASS INDEX: 36.38 KG/M2 | DIASTOLIC BLOOD PRESSURE: 62 MMHG | WEIGHT: 205.4 LBS | HEART RATE: 85 BPM | OXYGEN SATURATION: 94 % | TEMPERATURE: 97.5 F | RESPIRATION RATE: 16 BRPM | SYSTOLIC BLOOD PRESSURE: 96 MMHG

## 2022-05-23 LAB
ANION GAP SERPL CALC-SCNC: 8 MMOL/L (ref 5–15)
BASOPHILS # BLD: 0 K/UL (ref 0–0.1)
BASOPHILS NFR BLD: 0 % (ref 0–1)
BUN SERPL-MCNC: 9 MG/DL (ref 6–20)
BUN/CREAT SERPL: 10 (ref 12–20)
CALCIUM SERPL-MCNC: 9 MG/DL (ref 8.5–10.1)
CHLORIDE SERPL-SCNC: 102 MMOL/L (ref 97–108)
CO2 SERPL-SCNC: 24 MMOL/L (ref 21–32)
CREAT SERPL-MCNC: 0.86 MG/DL (ref 0.55–1.02)
DIFFERENTIAL METHOD BLD: ABNORMAL
EOSINOPHIL # BLD: 0.3 K/UL (ref 0–0.4)
EOSINOPHIL NFR BLD: 3 % (ref 0–7)
ERYTHROCYTE [DISTWIDTH] IN BLOOD BY AUTOMATED COUNT: 14.1 % (ref 11.5–14.5)
GLUCOSE SERPL-MCNC: 117 MG/DL (ref 65–100)
HCT VFR BLD AUTO: 35.6 % (ref 35–47)
HGB BLD-MCNC: 11.8 G/DL (ref 11.5–16)
IMM GRANULOCYTES # BLD AUTO: 0 K/UL
IMM GRANULOCYTES NFR BLD AUTO: 0 %
LYMPHOCYTES # BLD: 0.9 K/UL (ref 0.8–3.5)
LYMPHOCYTES NFR BLD: 9 % (ref 12–49)
MCH RBC QN AUTO: 31.5 PG (ref 26–34)
MCHC RBC AUTO-ENTMCNC: 33.1 G/DL (ref 30–36.5)
MCV RBC AUTO: 94.9 FL (ref 80–99)
METAMYELOCYTES NFR BLD MANUAL: 8 %
MONOCYTES # BLD: 2 K/UL (ref 0–1)
MONOCYTES NFR BLD: 20 % (ref 5–13)
MYELOCYTES NFR BLD MANUAL: 1 %
NEUTS BAND NFR BLD MANUAL: 37 % (ref 0–6)
NEUTS SEG # BLD: 5.8 K/UL (ref 1.8–8)
NEUTS SEG NFR BLD: 22 % (ref 32–75)
NRBC # BLD: 0.12 K/UL (ref 0–0.01)
NRBC BLD-RTO: 1.2 PER 100 WBC
PLATELET # BLD AUTO: 169 K/UL (ref 150–400)
PMV BLD AUTO: 10.7 FL (ref 8.9–12.9)
POTASSIUM SERPL-SCNC: 3.9 MMOL/L (ref 3.5–5.1)
RBC # BLD AUTO: 3.75 M/UL (ref 3.8–5.2)
RBC MORPH BLD: ABNORMAL
SODIUM SERPL-SCNC: 134 MMOL/L (ref 136–145)
WBC # BLD AUTO: 9.8 K/UL (ref 3.6–11)
WBC MORPH BLD: ABNORMAL

## 2022-05-23 PROCEDURE — 80048 BASIC METABOLIC PNL TOTAL CA: CPT

## 2022-05-23 PROCEDURE — 36415 COLL VENOUS BLD VENIPUNCTURE: CPT

## 2022-05-23 PROCEDURE — 85025 COMPLETE CBC W/AUTO DIFF WBC: CPT

## 2022-05-23 NOTE — PROGRESS NOTES
1150. Notified by PCT that patient's BP low today- 96/62. Dr. Felipe García RN notified & provider recommending patient stay for fluids. Patient refusing to stay for hydration reporting she would like to go home and drink water instead. Encouraged patient to increase fluid intake and call MD if she becomes dizzy/lightheaded. Patient verbalized understanding.

## 2022-05-25 ENCOUNTER — TELEPHONE (OUTPATIENT)
Dept: PALLATIVE CARE | Age: 67
End: 2022-05-25

## 2022-05-25 NOTE — TELEPHONE ENCOUNTER
Palliative Medicine  Nursing Note  [phone])  Fax [phone]      Telephone Call  Patient Name: Nereida Moe  YOB: 1955/2022        Primary Decision Maker: Dia Flood - Daughter - [phone]   Advance Care Planning 5/17/2022   Patient's Healthcare Decision Maker is: Legal Next of Kin   Confirm Advance Directive None   Patient Would Like to Complete Advance Directive -   Does the patient have other document types -       Call returned to [facility] who wanted to make sure that the hospital would be able to provide her regular daily medications such as her morphine and ativan while she was receiving treatment. Disucssed that [facility] facilities are able to view all of her medications and subsequently order her daily medications while inpatient. She was appreciative. She made a follow up appt with Dr. Camron Bradshaw for 6/14/22 at 0830.     Delmis Colunga RN  Palliative Medicine

## 2022-05-26 ENCOUNTER — HOSPITAL ENCOUNTER (OUTPATIENT)
Dept: INFUSION THERAPY | Age: 67
Discharge: HOME | End: 2022-05-26
Payer: MEDICARE

## 2022-05-26 VITALS
TEMPERATURE: 97.3 F | RESPIRATION RATE: 18 BRPM | HEART RATE: 83 BPM | SYSTOLIC BLOOD PRESSURE: 144 MMHG | DIASTOLIC BLOOD PRESSURE: 66 MMHG | OXYGEN SATURATION: 94 %

## 2022-05-26 DIAGNOSIS — C83.31 DIFFUSE LARGE B-CELL LYMPHOMA OF LYMPH NODES OF NECK (HCC): Primary | ICD-10-CM

## 2022-05-26 LAB
ANION GAP SERPL CALC-SCNC: 7 MMOL/L (ref 5–15)
BASOPHILS # BLD: 0.1 K/UL (ref 0–0.1)
BASOPHILS NFR BLD: 1 % (ref 0–1)
BUN SERPL-MCNC: 8 MG/DL (ref 6–20)
BUN/CREAT SERPL: 11 (ref 12–20)
CALCIUM SERPL-MCNC: 9.3 MG/DL (ref 8.5–10.1)
CHLORIDE SERPL-SCNC: 102 MMOL/L (ref 97–108)
CO2 SERPL-SCNC: 26 MMOL/L (ref 21–32)
CREAT SERPL-MCNC: 0.74 MG/DL (ref 0.55–1.02)
DIFFERENTIAL METHOD BLD: ABNORMAL
EOSINOPHIL # BLD: 0.1 K/UL (ref 0–0.4)
EOSINOPHIL NFR BLD: 1 % (ref 0–7)
ERYTHROCYTE [DISTWIDTH] IN BLOOD BY AUTOMATED COUNT: 14.6 % (ref 11.5–14.5)
GLUCOSE SERPL-MCNC: 118 MG/DL (ref 65–100)
HCT VFR BLD AUTO: 38.3 % (ref 35–47)
HGB BLD-MCNC: 12.2 G/DL (ref 11.5–16)
IMM GRANULOCYTES # BLD AUTO: 0 K/UL
IMM GRANULOCYTES NFR BLD AUTO: 0 %
LYMPHOCYTES # BLD: 1.2 K/UL (ref 0.8–3.5)
LYMPHOCYTES NFR BLD: 12 % (ref 12–49)
MCH RBC QN AUTO: 30.4 PG (ref 26–34)
MCHC RBC AUTO-ENTMCNC: 31.9 G/DL (ref 30–36.5)
MCV RBC AUTO: 95.5 FL (ref 80–99)
METAMYELOCYTES NFR BLD MANUAL: 1 %
MONOCYTES # BLD: 1.7 K/UL (ref 0–1)
MONOCYTES NFR BLD: 16 % (ref 5–13)
MYELOCYTES NFR BLD MANUAL: 3 %
NEUTS BAND NFR BLD MANUAL: 7 % (ref 0–6)
NEUTS SEG # BLD: 6.9 K/UL (ref 1.8–8)
NEUTS SEG NFR BLD: 59 % (ref 32–75)
NRBC # BLD: 0.08 K/UL (ref 0–0.01)
NRBC BLD-RTO: 0.8 PER 100 WBC
PLATELET # BLD AUTO: 290 K/UL (ref 150–400)
PMV BLD AUTO: 10 FL (ref 8.9–12.9)
POTASSIUM SERPL-SCNC: 4.3 MMOL/L (ref 3.5–5.1)
RBC # BLD AUTO: 4.01 M/UL (ref 3.8–5.2)
RBC MORPH BLD: ABNORMAL
SODIUM SERPL-SCNC: 135 MMOL/L (ref 136–145)
WBC # BLD AUTO: 10.4 K/UL (ref 3.6–11)
WBC MORPH BLD: ABNORMAL

## 2022-05-26 PROCEDURE — 36415 COLL VENOUS BLD VENIPUNCTURE: CPT

## 2022-05-26 PROCEDURE — 80048 BASIC METABOLIC PNL TOTAL CA: CPT

## 2022-05-26 PROCEDURE — 85025 COMPLETE CBC W/AUTO DIFF WBC: CPT

## 2022-05-26 RX ORDER — HEPARIN 100 UNIT/ML
500 SYRINGE INTRAVENOUS AS NEEDED
Status: ACTIVE | OUTPATIENT
Start: 2022-05-26 | End: 2022-05-26

## 2022-05-26 RX ORDER — SODIUM CHLORIDE 9 MG/ML
10 INJECTION INTRAMUSCULAR; INTRAVENOUS; SUBCUTANEOUS AS NEEDED
Status: ACTIVE | OUTPATIENT
Start: 2022-05-26 | End: 2022-05-26

## 2022-05-26 RX ORDER — SODIUM CHLORIDE 0.9 % (FLUSH) 0.9 %
5-10 SYRINGE (ML) INJECTION AS NEEDED
Status: DISPENSED | OUTPATIENT
Start: 2022-05-26 | End: 2022-05-26

## 2022-05-26 NOTE — PROGRESS NOTES
[facility] Lab Visit:    4785 Pt arrived ambulatory and in no distress, labs drawn 1 stick in right hand per KK . Departed [facility] ambulatory and in no distress. Visit Vitals  BP (!) 144/66   Pulse 83   Temp 97.3 °F (36.3 °C)   Resp 18   SpO2 94%       Labs available in CC once resulted.

## 2022-05-31 ENCOUNTER — OFFICE VISIT (OUTPATIENT)
Dept: ONCOLOGY | Age: 67
End: 2022-05-31
Payer: MEDICARE

## 2022-05-31 ENCOUNTER — HOSPITAL ENCOUNTER (OUTPATIENT)
Dept: INFUSION THERAPY | Age: 67
Discharge: HOME | End: 2022-05-31
Payer: MEDICARE

## 2022-05-31 ENCOUNTER — APPOINTMENT (OUTPATIENT)
Dept: INFUSION THERAPY | Age: 67
End: 2022-05-31

## 2022-05-31 VITALS
HEIGHT: 63 IN | OXYGEN SATURATION: 94 % | SYSTOLIC BLOOD PRESSURE: 127 MMHG | BODY MASS INDEX: 35.51 KG/M2 | RESPIRATION RATE: 16 BRPM | WEIGHT: 200.4 LBS | HEART RATE: 90 BPM | DIASTOLIC BLOOD PRESSURE: 84 MMHG | TEMPERATURE: 98.3 F

## 2022-05-31 VITALS
HEART RATE: 91 BPM | RESPIRATION RATE: 18 BRPM | SYSTOLIC BLOOD PRESSURE: 133 MMHG | DIASTOLIC BLOOD PRESSURE: 59 MMHG | WEIGHT: 199.7 LBS | OXYGEN SATURATION: 95 % | TEMPERATURE: 97.2 F | BODY MASS INDEX: 35.38 KG/M2

## 2022-05-31 DIAGNOSIS — Z51.11 ENCOUNTER FOR ANTINEOPLASTIC CHEMOTHERAPY: ICD-10-CM

## 2022-05-31 DIAGNOSIS — C79.51 METASTATIC CANCER TO BONE (HCC): ICD-10-CM

## 2022-05-31 DIAGNOSIS — C83.31 DIFFUSE LARGE B-CELL LYMPHOMA OF LYMPH NODES OF NECK (HCC): Primary | ICD-10-CM

## 2022-05-31 DIAGNOSIS — T45.1X5A NEUROPATHY DUE TO CHEMOTHERAPEUTIC DRUG (HCC): Primary | ICD-10-CM

## 2022-05-31 DIAGNOSIS — G62.0 NEUROPATHY DUE TO CHEMOTHERAPEUTIC DRUG (HCC): Primary | ICD-10-CM

## 2022-05-31 DIAGNOSIS — C83.31 DIFFUSE LARGE B-CELL LYMPHOMA OF LYMPH NODES OF NECK (HCC): ICD-10-CM

## 2022-05-31 LAB
ANION GAP SERPL CALC-SCNC: 7 MMOL/L (ref 5–15)
BASOPHILS # BLD: 0.1 K/UL (ref 0–0.1)
BASOPHILS NFR BLD: 1 % (ref 0–1)
BUN SERPL-MCNC: 10 MG/DL (ref 6–20)
BUN/CREAT SERPL: 12 (ref 12–20)
CALCIUM SERPL-MCNC: 9 MG/DL (ref 8.5–10.1)
CHLORIDE SERPL-SCNC: 106 MMOL/L (ref 97–108)
CO2 SERPL-SCNC: 24 MMOL/L (ref 21–32)
CREAT SERPL-MCNC: 0.82 MG/DL (ref 0.55–1.02)
DIFFERENTIAL METHOD BLD: ABNORMAL
EOSINOPHIL # BLD: 0 K/UL (ref 0–0.4)
EOSINOPHIL NFR BLD: 0 % (ref 0–7)
ERYTHROCYTE [DISTWIDTH] IN BLOOD BY AUTOMATED COUNT: 15 % (ref 11.5–14.5)
GLUCOSE SERPL-MCNC: 118 MG/DL (ref 65–100)
HCT VFR BLD AUTO: 40.6 % (ref 35–47)
HGB BLD-MCNC: 13.4 G/DL (ref 11.5–16)
IMM GRANULOCYTES # BLD AUTO: 0.2 K/UL (ref 0–0.04)
IMM GRANULOCYTES NFR BLD AUTO: 3 % (ref 0–0.5)
LYMPHOCYTES # BLD: 1.3 K/UL (ref 0.8–3.5)
LYMPHOCYTES NFR BLD: 17 % (ref 12–49)
MCH RBC QN AUTO: 30.9 PG (ref 26–34)
MCHC RBC AUTO-ENTMCNC: 33 G/DL (ref 30–36.5)
MCV RBC AUTO: 93.8 FL (ref 80–99)
MONOCYTES # BLD: 1.3 K/UL (ref 0–1)
MONOCYTES NFR BLD: 17 % (ref 5–13)
NEUTS SEG # BLD: 4.8 K/UL (ref 1.8–8)
NEUTS SEG NFR BLD: 62 % (ref 32–75)
NRBC # BLD: 0 K/UL (ref 0–0.01)
NRBC BLD-RTO: 0 PER 100 WBC
PLATELET # BLD AUTO: 558 K/UL (ref 150–400)
PMV BLD AUTO: 9.3 FL (ref 8.9–12.9)
POTASSIUM SERPL-SCNC: 3.8 MMOL/L (ref 3.5–5.1)
RBC # BLD AUTO: 4.33 M/UL (ref 3.8–5.2)
RBC MORPH BLD: ABNORMAL
SODIUM SERPL-SCNC: 137 MMOL/L (ref 136–145)
WBC # BLD AUTO: 7.7 K/UL (ref 3.6–11)
WBC MORPH BLD: ABNORMAL

## 2022-05-31 PROCEDURE — 3017F COLORECTAL CA SCREEN DOC REV: CPT | Performed by: INTERNAL MEDICINE

## 2022-05-31 PROCEDURE — 80048 BASIC METABOLIC PNL TOTAL CA: CPT

## 2022-05-31 PROCEDURE — G9899 SCRN MAM PERF RSLTS DOC: HCPCS | Performed by: INTERNAL MEDICINE

## 2022-05-31 PROCEDURE — G8400 PT W/DXA NO RESULTS DOC: HCPCS | Performed by: INTERNAL MEDICINE

## 2022-05-31 PROCEDURE — 99215 OFFICE O/P EST HI 40 MIN: CPT | Performed by: INTERNAL MEDICINE

## 2022-05-31 PROCEDURE — 1090F PRES/ABSN URINE INCON ASSESS: CPT | Performed by: INTERNAL MEDICINE

## 2022-05-31 PROCEDURE — 1101F PT FALLS ASSESS-DOCD LE1/YR: CPT | Performed by: INTERNAL MEDICINE

## 2022-05-31 PROCEDURE — 85025 COMPLETE CBC W/AUTO DIFF WBC: CPT

## 2022-05-31 PROCEDURE — 36415 COLL VENOUS BLD VENIPUNCTURE: CPT

## 2022-05-31 PROCEDURE — G8536 NO DOC ELDER MAL SCRN: HCPCS | Performed by: INTERNAL MEDICINE

## 2022-05-31 PROCEDURE — G8417 CALC BMI ABV UP PARAM F/U: HCPCS | Performed by: INTERNAL MEDICINE

## 2022-05-31 PROCEDURE — 1111F DSCHRG MED/CURRENT MED MERGE: CPT | Performed by: INTERNAL MEDICINE

## 2022-05-31 PROCEDURE — G8427 DOCREV CUR MEDS BY ELIG CLIN: HCPCS | Performed by: INTERNAL MEDICINE

## 2022-05-31 PROCEDURE — 1123F ACP DISCUSS/DSCN MKR DOCD: CPT | Performed by: INTERNAL MEDICINE

## 2022-05-31 PROCEDURE — G9717 DOC PT DX DEP/BP F/U NT REQ: HCPCS | Performed by: INTERNAL MEDICINE

## 2022-05-31 NOTE — PROGRESS NOTES
[facility] Lab Visit:    0279 Pt arrived ambulatory and in no distress, labs drawn 2 sticks 1 in right hand 1 in left ac per KK  . Departed [facility] ambulatory and in no distress. Visit Vitals  BP (!) 133/59   Pulse 91   Temp 97.2 °F (36.2 °C)   Resp 18   Wt 90.6 kg (199 lb 11.2 oz)   SpO2 95%   BMI 35.38 kg/m²       Labs available in CC once resulted.

## 2022-05-31 NOTE — PROGRESS NOTES
[facility] [place] at [address], [address] [facility] [address] [facility]  Yandy Locos: [phone]  F: [phone] Patient ID  Name: Semaj High  YOB: 1955  MRN: [id number]  Referring Provider:   No referring provider defined for this encounter. Primary Care Provider:   Kate Woods MD       HEMATOLOGY/MEDICAL ONCOLOGY  NOTE   Date of Visit: 05/31/22  Reason for Evaluation:     Chief Complaint   Patient presents with    Follow-up     Patient presents as a follow-up for lymphoma. She reports pain in her right hip and foot, 7/10. She also is having numbness in her fingertips on both hands. Her nail bed feels sore as well. Hematology/Oncology Summary:  Please review original records for clinical decision making. This summary highlights focused aspects of patient's ongoing care and may have a recurring section in notes with either updates or remain unchanged as a longitudinal care summary. -------------------------------------------------------------------------------------------------------------------------------------------------------------------------------------------------------  DIAGNOSIS:   Diffuse Large B-Cell Lymphoma    ORIGINAL STAGING:   Stage IV    SITES OF DISEASE:   Left Cervical Lymph Node,Bone Disease, Stage IV    CURRENT TREATMENT:   C1,D1 on 5/11/22 DA-R-EPOCH    PRIOR TREATMENT:   n/a    GOALS OF CARE:   Curative Intent    PATHOLOGY:   Specimen #:[id number] Collect: 4/22/2022      ==========================================================================                    * * *SURGICAL PATHOLOGY REPORT* * *   ==========================================================================   * * *PROCEDURES/ADDENDA* * *   ADDENDUM   Date Ordered: 4/26/2022     Status: Signed Out   Date Complete: 4/26/2022     By: Dominick Munguia. Elise Alicea MD   Date Reported: 4/26/2022   Addendum Diagnosis   Lymph node, left posterior cervical lymph node, excision:    In situ hybridization for Jesús-Barr viral RNA: Negative   CPT: 77401  Addendum Comment   * * *CLINICAL HISTORY* * *   Cervical lymphadenopathy, rule out lymphoma   ==========================================================================   * * *FINAL PATHOLOGIC DIAGNOSIS* * *        Lymph node, left posterior cervical lymph node, excision:        Large B cell lymphoma. See comment. * * *Comment* * *   The histologic section has fragments of lymphoid tissue with diffuse and   focal follicular architecture. Diffuse areas are comprised of large,   atypical lymphocytes with centroblastic morphology and increased mitotic   activity. Immunohistochemical stains confirm the malignant cells are CD20   positive B cells that coexpress CD10 and BCL6 without MUM1. CD23   highlights rare follicular dendritic networks associated with lymphoid   follicles with germinal centers and express CD10 and BCL6 without BCL2. CyclinD1 and CD56 stains are negative. Concurrent flow cytometric analysis   confirms a clonal CD10 positive B-cell population comprised of medium to   large cells. The Ki-67 proliferation index is 30%. The overall findings favor the diagnosis of diffuse large B-cell lymphoma,   germinal center subtype. Molecular genetic studies are pending for further   characterization will be reported in an addendum. Flow cytometry:   Consistent with CD10-positive B-cell lymphoproliferative disorder. Comments: Flow cytometry shows monoclonal B-cells (36% of total cells)   with CD10 expression without CD5, consistent with a CD10-positive B-cell   lymphoproliferative disorder. The main differential diagnosis includes   follicular lymphoma, Burkitt lymphoma and large B-cell lymphoma. Please   correlate the result with morphologic findings and clinical information.    Flow Differential (%) and Population Analysis:   Lymphocytes: 93.7%   T-cells (39% of lymphoid cells) show a CD4/CD8 ratio of 3.3 without overt   phenotypic abnormality. NK-cells (1% of lymphoid cells) are unremarkable. Mature B-cells (56% of lymphoid cells) include large forms based on   forward scattered pattern and show: CD5 neg, CD10+, CD11c+dim, CD19+,   CD20+ with surface lambda light chain restriction. Markers Performed: CD2, CD3, CD4, CD5, CD7, CD8, CD10, CD11c, CD19, CD20,   CD23, CD34, CD38, CD45, CD56, Kappa, Lambda (17 Markers)   The Technical Component Processing of this test was completed at   [facility], [address] [place] [address], [facility] / [address] /   [phone] / Shavon Fish # [id number].  Interpretation by Krystle Lee M.D. The   complete scanned report is available in Epic. CPT: E3003727, F0590774, [id number], [id number], [id number], [id number]   j2/2022   *Electronically Signed Out By Dennys Sampson. Lorie Galaviz MD*   ==========================================================================   * * *Gross Description* * *   Specimen #1, received fresh labeled with the patient's name,  and left   posterior cervical lymph node, consists of two paper towels containing a   1.7 x 1.5 x 0.5 cm aggregate of pale pink-tan, fleshy soft tissue   fragments. The specimen is handled according to the flow cytometry   protocol as follows:   7          Two air-dried touch prep slides - one Diff Quik stained, one   rapid-fixed in 95% alcohol   7          Representative sections in B plus fixative (1A)   7          One piece held in RPMI for possible flow cytometry analysis   7          Remaining tissue fixed in formalin for permanents (1B)   Dr. Lorie Galaviz will determine if flow cytometry is necessary. 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 Patient overall reports feeling stable. She reports that eating small amounts some times help with her nausea.  She reports zofran and compazine are quickly helping her nausea. She also reports that she feels better after treatment for her thrush. She reports that she felt better after a few days of starting lasix. She still has bone pain but overall is improved since starting chemotherapy. Appetite:Grade 1  Diarrhea:Grade 1  Fatigue:Grade 2  Hair Loss:Grade 1  Nausea:Grade 1  Hot Flashes:Grade 2  Insomnia:Grade 1  Concentration:Grade 1  Anxiety:Grade 1  Pain:Moderate pain  Weight Changes: lost   Mouth Sores:Grade 1  Shortness of Breath:Grade 1  Itching:Grade 1  Numbness/Tingling:Grade 2 (Reports numbness in her fingers; denies any neuropathy in her toes)    She reports pain in her nailbeds. Past Medical History:   Diagnosis Date    Adverse effect of anesthesia     PHLEGM IN THROAT POST OP & NEEDED X2 BREATHING TREATMENTS    Anxiety     COPD (chronic obstructive pulmonary disease) ([facility] [address] 2022    emphysema    Depression     GERD (gastroesophageal reflux disease)     Hernia, femoral     since childhood    Hypertension     Joint pain     Nausea & vomiting     TMJ arthritis       Past Surgical History:   Procedure Laterality Date    HX HYSTERECTOMY  2019    HX TONSILLECTOMY     History of Salivary Gland Surgery in 30's for evaluation for dry mouth.      Social History     Tobacco Use    Smoking status: Current Every Day Smoker     Packs/day: 0.50     Years: 40.00     Pack years: 20.00     Types: Cigarettes    Smokeless tobacco: Never Used    Tobacco comment: 0.5-1 pack per day   Substance Use Topics    Alcohol use: Not Currently     Alcohol/week: 0.0 standard drinks      Family History   Problem Relation Age of Onset    Heart Disease Mother 46    Heart Surgery Mother         HEART TRANSPLANT    Elevated Lipids Mother     Hypertension Mother     COPD Father     Emphysema Father     Sudden Death Brother 46    Other Maternal Grandmother         BRAIN TUMOR    No Known Problems Son     No Known Problems Daughter      Current Outpatient Medications Medication Sig    zolpidem (AMBIEN) 5 mg tablet Take 1 Tablet by mouth nightly as needed for Sleep. Max Daily Amount: 5 mg.  nystatin (MYCOSTATIN) 100,000 unit/mL suspension Take 5 mL by mouth four (4) times daily. Swish and gargle,  then spit or swallow    LORazepam (ATIVAN) 1 mg tablet Take 1 Tablet by mouth every eight (8) hours as needed for Anxiety. Max Daily Amount: 3 mg. Indications: anxious    morphine IR (MS IR) 15 mg tablet Take 1 Tablet by mouth every four (4) hours as needed for Pain for up to 14 days. Max Daily Amount: 90 mg.    ondansetron (ZOFRAN ODT) 8 mg disintegrating tablet Take 1 Tablet by mouth every eight (8) hours as needed for Nausea or Vomiting.  prochlorperazine (COMPAZINE) 10 mg tablet Take 1 Tablet by mouth every six (6) hours as needed for Nausea (do not take if groggy; take if nausea despite zofran).  docusate sodium (COLACE) 100 mg capsule Take 1 Capsule by mouth two (2) times a day for 90 days.  diclofenac EC (VOLTAREN) 50 mg EC tablet Take 1 Tablet by mouth two (2) times daily as needed for Pain.  FLUoxetine (PROzac) 40 mg capsule TAKE 1 CAPSULE BY MOUTH EVERY MORNING (Patient taking differently: Take 40 mg by mouth every morning. TAKE 1 CAPSULE BY MOUTH EVERY MORNING)    amLODIPine-benazepril (LOTREL) 5-20 mg per capsule TAKE 1 CAPSULE BY MOUTH EVERY MORNING (Patient taking differently: Take 1 Capsule by mouth every morning. TAKE 1 CAPSULE BY MOUTH EVERY MORNING)    furosemide (LASIX) 20 mg tablet Take lasix 20mg daily for 7 days after chemotherapy completed. Start day after chemotherapy. Hold if day to day weight loss is greater than 5lbs. (Patient not taking: Reported on 5/31/2022)    polyethylene glycol (MIRALAX) 17 gram packet Take 1 Packet by mouth daily. (Patient not taking: Reported on 5/17/2022)    gabapentin (NEURONTIN) 300 mg capsule Take 1 Capsule by mouth daily as needed for Pain.  (Patient not taking: Reported on 5/17/2022)     No current facility-administered medications for this visit. Allergies   Allergen Reactions    Oxycodone Nausea Only      -  Review of Systems Provided by:  Patient  Review of Systems: A complete review of systems was obtained, reviewed. Pertinent findings reviewed above. Objective:     Visit Vitals  /84   Pulse 90   Temp 98.3 °F (36.8 °C)   Resp 16   Ht 5' 3\" (1.6 m)   Wt 200 lb 6.4 oz (90.9 kg)   SpO2 94%   BMI 35.50 kg/m²     ECOG PS: 1- Restricted in physically strenuous activity but ambulatory and able to carry out work of a light or sedentary nature, e.g., light house work, office work. Physical Exam  Constitutional: No acute distress. , Non-toxic appearance. and Non-diaphoretic. HENT: Normocephalic and atraumatic head.  , No gross mucositis present., No thrush present., Oropharynx is clear. and Wearing a mask during COVID-19 precautions. Left scalp with flat pink area but no palpable mass. Eyes: Normal Conjunctivae. Anicteric sclerae. Cardiovascular: S1,S2 auscultated. No pitting edema. Pulmonary: Normal Respiratory Effort. No wheezing. No rhonchi. No rales. Abdominal: Normal bowel sounds. Soft Abdomen to palpation. No abdominal tenderness. Skin: No jaundice. No rash. No petechiae. Musculoskeletal: No muscle pain on palpation. No temporal muscle wasting on inspection. Neurological: Alert and oriented. No tremor on inspection. Normal Gait. Psychiatric: mood normal. normal speech rate normal affect    Results:     I personally reviewed Epic EHR labs/results below:   Lab Results   Component Value Date/Time    WBC 7.7 05/31/2022 10:35 AM    HGB 13.4 05/31/2022 10:35 AM    HCT 40.6 05/31/2022 10:35 AM    PLATELET 049 (H) 60/24/8553 10:35 AM    MCV 93.8 05/31/2022 10:35 AM    ABS.  NEUTROPHILS PENDING 05/31/2022 10:35 AM     Lab Results   Component Value Date/Time    Sodium 135 (L) 05/26/2022 11:29 AM    Potassium 4.3 05/26/2022 11:29 AM    Chloride 102 05/26/2022 11:29 AM    CO2 26 05/26/2022 11:29 AM    Glucose 118 (H) 05/26/2022 11:29 AM    BUN 8 05/26/2022 11:29 AM    Creatinine 0.74 05/26/2022 11:29 AM    GFR est AA >60 05/26/2022 11:29 AM    GFR est non-AA >60 05/26/2022 11:29 AM    Calcium 9.3 05/26/2022 11:29 AM    Glucose (POC) 98 05/04/2022 01:37 PM     Lab Results   Component Value Date/Time    Bilirubin, total 0.2 05/15/2022 01:19 AM    ALT (SGPT) 30 05/15/2022 01:19 AM    Alk. phosphatase 45 05/15/2022 01:19 AM    Protein, total 5.0 (L) 05/15/2022 01:19 AM    Albumin 2.8 (L) 05/15/2022 01:19 AM    Globulin 2.2 05/15/2022 01:19 AM     Lab Results   Component Value Date/Time    Hepatitis B surface Ag <0.10 04/27/2022 12:23 PM    Hepatitis B surface Ab 3.66 04/27/2022 12:23 PM         Assessment and Recommendations:     1. Neuropathy due to chemotherapeutic drug (HCC)  Grade 2; not significantly affecting functioning. For now, she will continue to hold on anti-neuropathy agents. 2. Diffuse large B-cell lymphoma of lymph nodes of neck (HCC)  Proceed next Monday with Cycle 2 DA R-EPOCH. Plan for CT scans after cycle #2 and consideration of referral to Dr. 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 3. Metastatic cancer to bone ([facility] [address]  Seems to have continued clinical improvement in her pain. This seems encouraging. 4. Encounter for antineoplastic chemotherapy  Will need to dose adjust the following per 2002 Blood [place] protocol for DA-REPOCH;  Since ANC did not clara (or even drop), dose adjustment calls for 20% increase in  Cyclophosphamide,doxorubicin,etoposide. However, she did not receive about 20% of treatment on cycle 1,day 1 due to a nursing error. With her improvement already clinically and concern for significant toxicity, it may be a consideraton to only increase the etoposide and cyclophosphamide and hold off on increasing the doxorubicin dosing. I will make these adjustments accordingly.  Also, will add bactrim prophylaxis outside of her treatment week to further reduce the risk of PCP pneumonia. Follow-Up:  Follow-up and Dispositions    · Return in about 13 days (around 6/13/2022).        Syeda Cifuentes MD  Hematology/Medical Oncology Provider  [place] [address]  P: [phone]    Signed By:   Jamie Price MD

## 2022-05-31 NOTE — PROGRESS NOTES
1. Have you been to the ER, urgent care clinic since your last visit? Hospitalized since your last visit? No    2. Have you seen or consulted any other health care providers outside of the [address] [place] [address] since your last visit? Include any pap smears or colon screening. No        Vitals:    05/31/22 1059   BP: 127/84   Pulse: 90   Resp: 16   Temp: 98.3 °F (36.8 °C)   SpO2: 94%   Weight: 200 lb 6.4 oz (90.9 kg)   Height: 5' 3\" (1.6 m)           Chief Complaint   Patient presents with    Follow-up     Patient presents as a follow-up for lymphoma. She reports pain in her right hip and foot, 7/10. She also is having numbness in her fingertips on both hands. Her nail bed feels sore as well.

## 2022-05-31 NOTE — LETTER
6/2/2022    Patient: Ellin Shone   YOB: 1955   Date of Visit: 5/31/2022     Jyoti Lucas MD  [address]    Dear Jyoti Lucas MD,      Thank you for referring Ms. Ellin Shone to [address] [place] [address] for evaluation. My notes for this consultation are attached. If you have questions, please do not hesitate to call me. I look forward to following your patient along with you.       Sincerely,    Silvia Jiménez MD

## 2022-06-01 ENCOUNTER — APPOINTMENT (OUTPATIENT)
Dept: INFUSION THERAPY | Age: 67
End: 2022-06-01

## 2022-06-02 RX ORDER — OLANZAPINE 2.5 MG/1
5 TABLET ORAL EVERY EVENING
Status: CANCELLED | OUTPATIENT
Start: 2022-06-06

## 2022-06-02 RX ORDER — PANTOPRAZOLE SODIUM 40 MG/1
40 TABLET, DELAYED RELEASE ORAL
Status: CANCELLED
Start: 2022-06-06

## 2022-06-02 RX ORDER — DIPHENHYDRAMINE HYDROCHLORIDE 50 MG/ML
50 INJECTION, SOLUTION INTRAMUSCULAR; INTRAVENOUS ONCE
Status: CANCELLED
Start: 2022-06-06 | End: 2022-06-06

## 2022-06-02 RX ORDER — DIPHENHYDRAMINE HYDROCHLORIDE 50 MG/ML
50 INJECTION, SOLUTION INTRAMUSCULAR; INTRAVENOUS AS NEEDED
Status: CANCELLED
Start: 2022-06-06

## 2022-06-02 RX ORDER — EPINEPHRINE 1 MG/ML
0.3 INJECTION, SOLUTION, CONCENTRATE INTRAVENOUS AS NEEDED
Status: CANCELLED | OUTPATIENT
Start: 2022-06-06

## 2022-06-02 RX ORDER — PREDNISONE 1 MG/1
60 TABLET ORAL 2 TIMES DAILY WITH MEALS
Status: CANCELLED
Start: 2022-06-06

## 2022-06-02 RX ORDER — ALBUTEROL SULFATE 0.83 MG/ML
2.5 SOLUTION RESPIRATORY (INHALATION) AS NEEDED
Status: CANCELLED
Start: 2022-06-06

## 2022-06-02 RX ORDER — ACETAMINOPHEN 325 MG/1
650 TABLET ORAL ONCE
Status: CANCELLED
Start: 2022-06-06 | End: 2022-06-06

## 2022-06-02 RX ORDER — DIPHENHYDRAMINE HYDROCHLORIDE 50 MG/ML
25 INJECTION, SOLUTION INTRAMUSCULAR; INTRAVENOUS AS NEEDED
Status: CANCELLED
Start: 2022-06-06

## 2022-06-02 RX ORDER — LORAZEPAM 2 MG/ML
0.5 INJECTION INTRAMUSCULAR
Status: CANCELLED
Start: 2022-06-06

## 2022-06-02 RX ORDER — SODIUM CHLORIDE 0.9 % (FLUSH) 0.9 %
10 SYRINGE (ML) INJECTION AS NEEDED
Status: CANCELLED | OUTPATIENT
Start: 2022-06-06

## 2022-06-02 RX ORDER — SODIUM CHLORIDE 9 MG/ML
45 INJECTION, SOLUTION INTRAVENOUS CONTINUOUS
Status: CANCELLED
Start: 2022-06-06

## 2022-06-02 RX ORDER — HEPARIN SODIUM (PORCINE) LOCK FLUSH IV SOLN 100 UNIT/ML 100 UNIT/ML
300-500 SOLUTION INTRAVENOUS AS NEEDED
Status: CANCELLED
Start: 2022-06-06

## 2022-06-02 RX ORDER — ONDANSETRON 2 MG/ML
8 INJECTION INTRAMUSCULAR; INTRAVENOUS AS NEEDED
Status: CANCELLED | OUTPATIENT
Start: 2022-06-06

## 2022-06-02 RX ORDER — HYDROCORTISONE SODIUM SUCCINATE 100 MG/2ML
100 INJECTION, POWDER, FOR SOLUTION INTRAMUSCULAR; INTRAVENOUS AS NEEDED
Status: CANCELLED | OUTPATIENT
Start: 2022-06-06

## 2022-06-02 RX ORDER — ACETAMINOPHEN 325 MG/1
650 TABLET ORAL AS NEEDED
Status: CANCELLED
Start: 2022-06-06

## 2022-06-02 RX ORDER — ONDANSETRON 2 MG/ML
8 INJECTION INTRAMUSCULAR; INTRAVENOUS EVERY 8 HOURS
Status: CANCELLED | OUTPATIENT
Start: 2022-06-06

## 2022-06-02 RX ORDER — ACYCLOVIR 200 MG/1
400 CAPSULE ORAL 2 TIMES DAILY
Status: CANCELLED
Start: 2022-06-06

## 2022-06-02 RX ORDER — SULFAMETHOXAZOLE AND TRIMETHOPRIM 800; 160 MG/1; MG/1
1 TABLET ORAL
Status: CANCELLED
Start: 2022-06-06

## 2022-06-03 ENCOUNTER — HOSPITAL ENCOUNTER (OUTPATIENT)
Dept: INFUSION THERAPY | Age: 67
Discharge: HOME | End: 2022-06-03
Payer: MEDICARE

## 2022-06-03 VITALS
HEART RATE: 80 BPM | SYSTOLIC BLOOD PRESSURE: 163 MMHG | OXYGEN SATURATION: 95 % | RESPIRATION RATE: 18 BRPM | DIASTOLIC BLOOD PRESSURE: 72 MMHG

## 2022-06-03 DIAGNOSIS — C83.31 DIFFUSE LARGE B-CELL LYMPHOMA OF LYMPH NODES OF NECK (HCC): Primary | ICD-10-CM

## 2022-06-03 LAB
BASOPHILS # BLD: 0.2 K/UL (ref 0–0.1)
BASOPHILS NFR BLD: 2 % (ref 0–1)
DIFFERENTIAL METHOD BLD: ABNORMAL
EOSINOPHIL # BLD: 0 K/UL (ref 0–0.4)
EOSINOPHIL NFR BLD: 0 % (ref 0–7)
ERYTHROCYTE [DISTWIDTH] IN BLOOD BY AUTOMATED COUNT: 15.1 % (ref 11.5–14.5)
HCT VFR BLD AUTO: 39.8 % (ref 35–47)
HGB BLD-MCNC: 12.9 G/DL (ref 11.5–16)
IMM GRANULOCYTES # BLD AUTO: 0.1 K/UL (ref 0–0.04)
IMM GRANULOCYTES NFR BLD AUTO: 1 % (ref 0–0.5)
LYMPHOCYTES # BLD: 1.7 K/UL (ref 0.8–3.5)
LYMPHOCYTES NFR BLD: 19 % (ref 12–49)
MCH RBC QN AUTO: 31 PG (ref 26–34)
MCHC RBC AUTO-ENTMCNC: 32.4 G/DL (ref 30–36.5)
MCV RBC AUTO: 95.7 FL (ref 80–99)
MONOCYTES # BLD: 1 K/UL (ref 0–1)
MONOCYTES NFR BLD: 11 % (ref 5–13)
NEUTS SEG # BLD: 6.1 K/UL (ref 1.8–8)
NEUTS SEG NFR BLD: 67 % (ref 32–75)
NRBC # BLD: 0 K/UL (ref 0–0.01)
NRBC BLD-RTO: 0 PER 100 WBC
PLATELET # BLD AUTO: 537 K/UL (ref 150–400)
PMV BLD AUTO: 9.4 FL (ref 8.9–12.9)
RBC # BLD AUTO: 4.16 M/UL (ref 3.8–5.2)
WBC # BLD AUTO: 9.1 K/UL (ref 3.6–11)

## 2022-06-03 PROCEDURE — 85025 COMPLETE CBC W/AUTO DIFF WBC: CPT

## 2022-06-03 PROCEDURE — 36415 COLL VENOUS BLD VENIPUNCTURE: CPT

## 2022-06-03 RX ORDER — HEPARIN 100 UNIT/ML
500 SYRINGE INTRAVENOUS AS NEEDED
Status: CANCELLED | OUTPATIENT
Start: 2022-06-03

## 2022-06-03 RX ORDER — SODIUM CHLORIDE 9 MG/ML
10 INJECTION INTRAMUSCULAR; INTRAVENOUS; SUBCUTANEOUS AS NEEDED
Status: CANCELLED | OUTPATIENT
Start: 2022-06-03

## 2022-06-03 RX ORDER — SULFAMETHOXAZOLE AND TRIMETHOPRIM 800; 160 MG/1; MG/1
1 TABLET ORAL
Qty: 42 TABLET | Refills: 1 | Status: ON HOLD | OUTPATIENT
Start: 2022-06-03 | End: 2022-08-18

## 2022-06-03 RX ORDER — SODIUM CHLORIDE 0.9 % (FLUSH) 0.9 %
5-10 SYRINGE (ML) INJECTION AS NEEDED
Status: CANCELLED | OUTPATIENT
Start: 2022-06-03

## 2022-06-03 NOTE — PROGRESS NOTES
[facility] Lab Visit:    1419 Pt arrived ambulatory and in no distress, labs drawn 1 stick in left fore arm per KK  . Departed [facility] ambulatory and in no distress. Visit Vitals  BP (!) 163/72   Pulse 80   Resp 18   SpO2 95%       Labs available in CC once resulted.

## 2022-06-06 ENCOUNTER — HOSPITAL ENCOUNTER (INPATIENT)
Age: 67
LOS: 4 days | Discharge: HOME | DRG: 847 | End: 2022-06-10
Attending: INTERNAL MEDICINE | Admitting: INTERNAL MEDICINE
Payer: MEDICARE

## 2022-06-06 DIAGNOSIS — Z79.899 HIGH RISK MEDICATION USE: ICD-10-CM

## 2022-06-06 DIAGNOSIS — C83.31 DIFFUSE LARGE B-CELL LYMPHOMA OF LYMPH NODES OF NECK (HCC): Primary | ICD-10-CM

## 2022-06-06 DIAGNOSIS — Z11.59 ENCOUNTER FOR SCREENING FOR OTHER VIRAL DISEASES: ICD-10-CM

## 2022-06-06 DIAGNOSIS — F17.200 TOBACCO DEPENDENCE: ICD-10-CM

## 2022-06-06 DIAGNOSIS — G89.3 CANCER RELATED PAIN: ICD-10-CM

## 2022-06-06 LAB
ALBUMIN SERPL-MCNC: 3.7 G/DL (ref 3.5–5)
ALBUMIN/GLOB SERPL: 1.3 {RATIO} (ref 1.1–2.2)
ALP SERPL-CCNC: 84 U/L (ref 45–117)
ALT SERPL-CCNC: 24 U/L (ref 12–78)
ANION GAP SERPL CALC-SCNC: 8 MMOL/L (ref 5–15)
AST SERPL-CCNC: 12 U/L (ref 15–37)
BASOPHILS # BLD: 0.1 K/UL (ref 0–0.1)
BASOPHILS NFR BLD: 2 % (ref 0–1)
BILIRUB SERPL-MCNC: 0.4 MG/DL (ref 0.2–1)
BUN SERPL-MCNC: 13 MG/DL (ref 6–20)
BUN/CREAT SERPL: 15 (ref 12–20)
CALCIUM SERPL-MCNC: 8.9 MG/DL (ref 8.5–10.1)
CHLORIDE SERPL-SCNC: 110 MMOL/L (ref 97–108)
CO2 SERPL-SCNC: 22 MMOL/L (ref 21–32)
CREAT SERPL-MCNC: 0.89 MG/DL (ref 0.55–1.02)
DIFFERENTIAL METHOD BLD: ABNORMAL
EOSINOPHIL # BLD: 0 K/UL (ref 0–0.4)
EOSINOPHIL NFR BLD: 1 % (ref 0–7)
ERYTHROCYTE [DISTWIDTH] IN BLOOD BY AUTOMATED COUNT: 15.7 % (ref 11.5–14.5)
ERYTHROCYTE [DISTWIDTH] IN BLOOD BY AUTOMATED COUNT: 15.7 % (ref 11.5–14.5)
GLOBULIN SER CALC-MCNC: 2.9 G/DL (ref 2–4)
GLUCOSE SERPL-MCNC: 109 MG/DL (ref 65–100)
HCT VFR BLD AUTO: 38.5 % (ref 35–47)
HCT VFR BLD AUTO: 38.5 % (ref 35–47)
HGB BLD-MCNC: 12.6 G/DL (ref 11.5–16)
HGB BLD-MCNC: 12.7 G/DL (ref 11.5–16)
IMM GRANULOCYTES # BLD AUTO: 0.1 K/UL (ref 0–0.04)
IMM GRANULOCYTES NFR BLD AUTO: 1 % (ref 0–0.5)
LYMPHOCYTES # BLD: 1.8 K/UL (ref 0.8–3.5)
LYMPHOCYTES NFR BLD: 28 % (ref 12–49)
MCH RBC QN AUTO: 31.1 PG (ref 26–34)
MCH RBC QN AUTO: 31.4 PG (ref 26–34)
MCHC RBC AUTO-ENTMCNC: 32.7 G/DL (ref 30–36.5)
MCHC RBC AUTO-ENTMCNC: 33 G/DL (ref 30–36.5)
MCV RBC AUTO: 95.1 FL (ref 80–99)
MCV RBC AUTO: 95.1 FL (ref 80–99)
MONOCYTES # BLD: 1.1 K/UL (ref 0–1)
MONOCYTES NFR BLD: 17 % (ref 5–13)
NEUTS SEG # BLD: 3.4 K/UL (ref 1.8–8)
NEUTS SEG NFR BLD: 51 % (ref 32–75)
NRBC # BLD: 0 K/UL (ref 0–0.01)
NRBC # BLD: 0 K/UL (ref 0–0.01)
NRBC BLD-RTO: 0 PER 100 WBC
NRBC BLD-RTO: 0 PER 100 WBC
PHOSPHATE SERPL-MCNC: 4 MG/DL (ref 2.6–4.7)
PLATELET # BLD AUTO: 437 K/UL (ref 150–400)
PLATELET # BLD AUTO: 440 K/UL (ref 150–400)
PMV BLD AUTO: 9.6 FL (ref 8.9–12.9)
PMV BLD AUTO: 9.7 FL (ref 8.9–12.9)
POTASSIUM SERPL-SCNC: 3.9 MMOL/L (ref 3.5–5.1)
PROT SERPL-MCNC: 6.6 G/DL (ref 6.4–8.2)
RBC # BLD AUTO: 4.05 M/UL (ref 3.8–5.2)
RBC # BLD AUTO: 4.05 M/UL (ref 3.8–5.2)
SODIUM SERPL-SCNC: 140 MMOL/L (ref 136–145)
URATE SERPL-MCNC: 4.2 MG/DL (ref 2.6–6)
WBC # BLD AUTO: 6.4 K/UL (ref 3.6–11)
WBC # BLD AUTO: 6.6 K/UL (ref 3.6–11)

## 2022-06-06 PROCEDURE — 74011250637 HC RX REV CODE- 250/637: Performed by: INTERNAL MEDICINE

## 2022-06-06 PROCEDURE — 36415 COLL VENOUS BLD VENIPUNCTURE: CPT

## 2022-06-06 PROCEDURE — 74011250637 HC RX REV CODE- 250/637: Performed by: NURSE PRACTITIONER

## 2022-06-06 PROCEDURE — 80053 COMPREHEN METABOLIC PANEL: CPT

## 2022-06-06 PROCEDURE — 65270000029 HC RM PRIVATE

## 2022-06-06 PROCEDURE — 84550 ASSAY OF BLOOD/URIC ACID: CPT

## 2022-06-06 PROCEDURE — 99223 1ST HOSP IP/OBS HIGH 75: CPT | Performed by: INTERNAL MEDICINE

## 2022-06-06 PROCEDURE — 74011000258 HC RX REV CODE- 258: Performed by: INTERNAL MEDICINE

## 2022-06-06 PROCEDURE — 74011250636 HC RX REV CODE- 250/636: Performed by: NURSE PRACTITIONER

## 2022-06-06 PROCEDURE — 74011000250 HC RX REV CODE- 250: Performed by: NURSE PRACTITIONER

## 2022-06-06 PROCEDURE — 74011636637 HC RX REV CODE- 636/637: Performed by: INTERNAL MEDICINE

## 2022-06-06 PROCEDURE — 84100 ASSAY OF PHOSPHORUS: CPT

## 2022-06-06 PROCEDURE — 74011250636 HC RX REV CODE- 250/636: Performed by: INTERNAL MEDICINE

## 2022-06-06 PROCEDURE — 85027 COMPLETE CBC AUTOMATED: CPT

## 2022-06-06 PROCEDURE — 85025 COMPLETE CBC W/AUTO DIFF WBC: CPT

## 2022-06-06 RX ORDER — DIPHENHYDRAMINE HYDROCHLORIDE 50 MG/ML
50 INJECTION, SOLUTION INTRAMUSCULAR; INTRAVENOUS AS NEEDED
Status: DISCONTINUED | OUTPATIENT
Start: 2022-06-06 | End: 2022-06-10 | Stop reason: HOSPADM

## 2022-06-06 RX ORDER — DIPHENHYDRAMINE HYDROCHLORIDE 50 MG/ML
50 INJECTION, SOLUTION INTRAMUSCULAR; INTRAVENOUS ONCE
Status: COMPLETED | OUTPATIENT
Start: 2022-06-06 | End: 2022-06-06

## 2022-06-06 RX ORDER — AMLODIPINE AND BENAZEPRIL HYDROCHLORIDE 5; 20 MG/1; MG/1
1 CAPSULE ORAL
Status: DISCONTINUED | OUTPATIENT
Start: 2022-06-06 | End: 2022-06-06

## 2022-06-06 RX ORDER — ONDANSETRON 2 MG/ML
8 INJECTION INTRAMUSCULAR; INTRAVENOUS EVERY 8 HOURS
Status: DISCONTINUED | OUTPATIENT
Start: 2022-06-06 | End: 2022-06-10 | Stop reason: HOSPADM

## 2022-06-06 RX ORDER — OLANZAPINE 5 MG/1
5 TABLET ORAL EVERY EVENING
Status: COMPLETED | OUTPATIENT
Start: 2022-06-06 | End: 2022-06-09

## 2022-06-06 RX ORDER — EPINEPHRINE 1 MG/ML
0.3 INJECTION, SOLUTION, CONCENTRATE INTRAVENOUS AS NEEDED
Status: ACTIVE | OUTPATIENT
Start: 2022-06-06 | End: 2022-06-06

## 2022-06-06 RX ORDER — MORPHINE SULFATE 15 MG/1
15 TABLET ORAL
COMMUNITY
End: 2022-06-27 | Stop reason: SDUPTHER

## 2022-06-06 RX ORDER — HEPARIN 100 UNIT/ML
300-500 SYRINGE INTRAVENOUS AS NEEDED
Status: DISCONTINUED | OUTPATIENT
Start: 2022-06-06 | End: 2022-06-10 | Stop reason: HOSPADM

## 2022-06-06 RX ORDER — ENOXAPARIN SODIUM 100 MG/ML
40 INJECTION SUBCUTANEOUS DAILY
Status: DISCONTINUED | OUTPATIENT
Start: 2022-06-06 | End: 2022-06-10 | Stop reason: HOSPADM

## 2022-06-06 RX ORDER — ACETAMINOPHEN 325 MG/1
650 TABLET ORAL ONCE
Status: COMPLETED | OUTPATIENT
Start: 2022-06-06 | End: 2022-06-06

## 2022-06-06 RX ORDER — PANTOPRAZOLE SODIUM 40 MG/1
40 TABLET, DELAYED RELEASE ORAL
Status: COMPLETED | OUTPATIENT
Start: 2022-06-06 | End: 2022-06-10

## 2022-06-06 RX ORDER — LORAZEPAM 1 MG/1
1 TABLET ORAL
Status: DISCONTINUED | OUTPATIENT
Start: 2022-06-06 | End: 2022-06-10 | Stop reason: HOSPADM

## 2022-06-06 RX ORDER — ACETAMINOPHEN 650 MG/1
650 SUPPOSITORY RECTAL
Status: DISCONTINUED | OUTPATIENT
Start: 2022-06-06 | End: 2022-06-10 | Stop reason: HOSPADM

## 2022-06-06 RX ORDER — IBUPROFEN 200 MG
1 TABLET ORAL DAILY
Status: DISCONTINUED | OUTPATIENT
Start: 2022-06-06 | End: 2022-06-10 | Stop reason: HOSPADM

## 2022-06-06 RX ORDER — FLUOXETINE HYDROCHLORIDE 20 MG/1
40 CAPSULE ORAL
Status: DISCONTINUED | OUTPATIENT
Start: 2022-06-06 | End: 2022-06-10 | Stop reason: HOSPADM

## 2022-06-06 RX ORDER — POLYETHYLENE GLYCOL 3350 17 G/17G
17 POWDER, FOR SOLUTION ORAL DAILY PRN
Status: DISCONTINUED | OUTPATIENT
Start: 2022-06-06 | End: 2022-06-10 | Stop reason: HOSPADM

## 2022-06-06 RX ORDER — AMLODIPINE BESYLATE 5 MG/1
5 TABLET ORAL DAILY
Status: DISCONTINUED | OUTPATIENT
Start: 2022-06-06 | End: 2022-06-10 | Stop reason: HOSPADM

## 2022-06-06 RX ORDER — HYDROCORTISONE SODIUM SUCCINATE 100 MG/2ML
100 INJECTION, POWDER, FOR SOLUTION INTRAMUSCULAR; INTRAVENOUS AS NEEDED
Status: ACTIVE | OUTPATIENT
Start: 2022-06-06 | End: 2022-06-06

## 2022-06-06 RX ORDER — ONDANSETRON 2 MG/ML
4 INJECTION INTRAMUSCULAR; INTRAVENOUS
Status: DISCONTINUED | OUTPATIENT
Start: 2022-06-06 | End: 2022-06-10 | Stop reason: HOSPADM

## 2022-06-06 RX ORDER — SODIUM CHLORIDE 0.9 % (FLUSH) 0.9 %
5-40 SYRINGE (ML) INJECTION EVERY 8 HOURS
Status: DISCONTINUED | OUTPATIENT
Start: 2022-06-06 | End: 2022-06-10 | Stop reason: HOSPADM

## 2022-06-06 RX ORDER — MORPHINE SULFATE 15 MG/1
15 TABLET ORAL
Status: DISCONTINUED | OUTPATIENT
Start: 2022-06-06 | End: 2022-06-10 | Stop reason: HOSPADM

## 2022-06-06 RX ORDER — LORAZEPAM 2 MG/ML
0.5 INJECTION, SOLUTION INTRAMUSCULAR; INTRAVENOUS
Status: DISCONTINUED | OUTPATIENT
Start: 2022-06-06 | End: 2022-06-10 | Stop reason: HOSPADM

## 2022-06-06 RX ORDER — SODIUM CHLORIDE 0.9 % (FLUSH) 0.9 %
5-40 SYRINGE (ML) INJECTION AS NEEDED
Status: DISCONTINUED | OUTPATIENT
Start: 2022-06-06 | End: 2022-06-10 | Stop reason: HOSPADM

## 2022-06-06 RX ORDER — ACETAMINOPHEN 325 MG/1
650 TABLET ORAL
Status: DISCONTINUED | OUTPATIENT
Start: 2022-06-06 | End: 2022-06-10 | Stop reason: HOSPADM

## 2022-06-06 RX ORDER — LISINOPRIL 20 MG/1
20 TABLET ORAL DAILY
Status: DISCONTINUED | OUTPATIENT
Start: 2022-06-06 | End: 2022-06-10 | Stop reason: HOSPADM

## 2022-06-06 RX ORDER — ZOLPIDEM TARTRATE 5 MG/1
5 TABLET ORAL
Status: DISCONTINUED | OUTPATIENT
Start: 2022-06-06 | End: 2022-06-10 | Stop reason: HOSPADM

## 2022-06-06 RX ORDER — ONDANSETRON 4 MG/1
4 TABLET, ORALLY DISINTEGRATING ORAL
Status: DISCONTINUED | OUTPATIENT
Start: 2022-06-06 | End: 2022-06-10 | Stop reason: HOSPADM

## 2022-06-06 RX ORDER — ACYCLOVIR 200 MG/1
400 CAPSULE ORAL 2 TIMES DAILY
Status: DISCONTINUED | OUTPATIENT
Start: 2022-06-06 | End: 2022-06-10 | Stop reason: HOSPADM

## 2022-06-06 RX ORDER — DIPHENHYDRAMINE HYDROCHLORIDE 50 MG/ML
25 INJECTION, SOLUTION INTRAMUSCULAR; INTRAVENOUS AS NEEDED
Status: DISCONTINUED | OUTPATIENT
Start: 2022-06-06 | End: 2022-06-10 | Stop reason: HOSPADM

## 2022-06-06 RX ORDER — SULFAMETHOXAZOLE AND TRIMETHOPRIM 800; 160 MG/1; MG/1
1 TABLET ORAL
Status: DISCONTINUED | OUTPATIENT
Start: 2022-06-06 | End: 2022-06-10 | Stop reason: HOSPADM

## 2022-06-06 RX ORDER — IBUPROFEN 400 MG/1
400 TABLET ORAL 3 TIMES DAILY
Status: DISCONTINUED | OUTPATIENT
Start: 2022-06-06 | End: 2022-06-08

## 2022-06-06 RX ORDER — SODIUM CHLORIDE 0.9 % (FLUSH) 0.9 %
10 SYRINGE (ML) INJECTION AS NEEDED
Status: DISCONTINUED | OUTPATIENT
Start: 2022-06-06 | End: 2022-06-10 | Stop reason: HOSPADM

## 2022-06-06 RX ORDER — ONDANSETRON 2 MG/ML
8 INJECTION INTRAMUSCULAR; INTRAVENOUS
Status: DISCONTINUED | OUTPATIENT
Start: 2022-06-06 | End: 2022-06-10 | Stop reason: HOSPADM

## 2022-06-06 RX ORDER — SODIUM CHLORIDE 9 MG/ML
45 INJECTION, SOLUTION INTRAVENOUS CONTINUOUS
Status: DISCONTINUED | OUTPATIENT
Start: 2022-06-06 | End: 2022-06-10 | Stop reason: HOSPADM

## 2022-06-06 RX ORDER — ALBUTEROL SULFATE 0.83 MG/ML
2.5 SOLUTION RESPIRATORY (INHALATION) AS NEEDED
Status: DISCONTINUED | OUTPATIENT
Start: 2022-06-06 | End: 2022-06-10 | Stop reason: HOSPADM

## 2022-06-06 RX ORDER — SULFAMETHOXAZOLE AND TRIMETHOPRIM 800; 160 MG/1; MG/1
1 TABLET ORAL
Status: DISCONTINUED | OUTPATIENT
Start: 2022-06-06 | End: 2022-06-06

## 2022-06-06 RX ADMIN — SULFAMETHOXAZOLE AND TRIMETHOPRIM 1 TABLET: 800; 160 TABLET ORAL at 09:04

## 2022-06-06 RX ADMIN — LORAZEPAM 0.5 MG: 2 INJECTION, SOLUTION INTRAMUSCULAR; INTRAVENOUS at 15:06

## 2022-06-06 RX ADMIN — ACYCLOVIR 400 MG: 200 CAPSULE ORAL at 09:04

## 2022-06-06 RX ADMIN — Medication 10 ML: at 21:17

## 2022-06-06 RX ADMIN — LORAZEPAM 1 MG: 1 TABLET ORAL at 08:43

## 2022-06-06 RX ADMIN — MORPHINE SULFATE 15 MG: 15 TABLET ORAL at 21:14

## 2022-06-06 RX ADMIN — IBUPROFEN 400 MG: 400 TABLET ORAL at 15:06

## 2022-06-06 RX ADMIN — VINCRISTINE SULFATE: 1 INJECTION, SOLUTION INTRAVENOUS at 12:19

## 2022-06-06 RX ADMIN — FLUOXETINE 40 MG: 20 CAPSULE ORAL at 08:43

## 2022-06-06 RX ADMIN — PREDNISONE 120.5 MG: 20 TABLET ORAL at 09:04

## 2022-06-06 RX ADMIN — LISINOPRIL 20 MG: 20 TABLET ORAL at 08:43

## 2022-06-06 RX ADMIN — AMLODIPINE BESYLATE 5 MG: 5 TABLET ORAL at 08:43

## 2022-06-06 RX ADMIN — ZOLPIDEM TARTRATE 5 MG: 5 TABLET ORAL at 22:45

## 2022-06-06 RX ADMIN — PANTOPRAZOLE SODIUM 40 MG: 40 TABLET, DELAYED RELEASE ORAL at 08:43

## 2022-06-06 RX ADMIN — IBUPROFEN 400 MG: 400 TABLET ORAL at 11:10

## 2022-06-06 RX ADMIN — MORPHINE SULFATE 15 MG: 15 TABLET ORAL at 17:03

## 2022-06-06 RX ADMIN — ACYCLOVIR 400 MG: 200 CAPSULE ORAL at 17:03

## 2022-06-06 RX ADMIN — ENOXAPARIN SODIUM 40 MG: 100 INJECTION SUBCUTANEOUS at 08:43

## 2022-06-06 RX ADMIN — SODIUM CHLORIDE 500 ML: 9 INJECTION, SOLUTION INTRAVENOUS at 09:04

## 2022-06-06 RX ADMIN — MORPHINE SULFATE 15 MG: 15 TABLET ORAL at 09:29

## 2022-06-06 RX ADMIN — OLANZAPINE 5 MG: 5 TABLET, FILM COATED ORAL at 17:03

## 2022-06-06 RX ADMIN — PREDNISONE 120.5 MG: 20 TABLET ORAL at 17:03

## 2022-06-06 RX ADMIN — DIPHENHYDRAMINE HYDROCHLORIDE 50 MG: 50 INJECTION, SOLUTION INTRAMUSCULAR; INTRAVENOUS at 09:04

## 2022-06-06 RX ADMIN — MORPHINE SULFATE 15 MG: 15 TABLET ORAL at 13:26

## 2022-06-06 RX ADMIN — ONDANSETRON 8 MG: 2 INJECTION INTRAMUSCULAR; INTRAVENOUS at 17:03

## 2022-06-06 RX ADMIN — IBUPROFEN 400 MG: 400 TABLET ORAL at 21:14

## 2022-06-06 RX ADMIN — SODIUM CHLORIDE 45 ML/HR: 9 INJECTION, SOLUTION INTRAVENOUS at 10:15

## 2022-06-06 RX ADMIN — ETOPOSIDE 120.6 MG: 20 INJECTION, SOLUTION, CONCENTRATE INTRAVENOUS at 12:19

## 2022-06-06 RX ADMIN — SODIUM CHLORIDE 754 MG: 9 INJECTION, SOLUTION INTRAVENOUS at 10:25

## 2022-06-06 RX ADMIN — ACETAMINOPHEN 650 MG: 325 TABLET ORAL at 09:04

## 2022-06-06 RX ADMIN — Medication 10 ML: at 14:00

## 2022-06-06 RX ADMIN — LORAZEPAM 0.5 MG: 2 INJECTION, SOLUTION INTRAMUSCULAR; INTRAVENOUS at 21:14

## 2022-06-06 RX ADMIN — ONDANSETRON 8 MG: 2 INJECTION INTRAMUSCULAR; INTRAVENOUS at 09:04

## 2022-06-06 RX ADMIN — Medication 10 ML: at 08:45

## 2022-06-06 NOTE — PROGRESS NOTES
1837: pt blood pressure, 99/59, pt without complaints, rechecked at 1855pm, bp 114/61. Irving Moura NP notified, no new orders received, pt instructed to increase oral fluid intake.

## 2022-06-06 NOTE — PROGRESS NOTES
Reason for Readmission:  Diffuse large B-cell lymphoma of lymph nodes of neck (HCC)            RUR Score/Risk Level: 14% LOW- LEVEL 1 Readmission- planned readmission     PCP: First and Last name:  Dr. Kathi Kowalski    Name of Practice:    Are you a current patient: Yes/No: Yes    Approximate date of last visit: Within te last two months    Can you participate in a virtual visit with your PCP: Yes     Is a Care Conference indicated:  Not appropriate at this time. Did you attend your follow up appointment (s): If not, why not: Followed up with Oncologist        Resources/supports as identified by patient/family: Very strong family support system        Top Challenges facing patient (as identified by patient/family and CM): Finances/Medication cost?  Pt confirmed insurance- [employer] MCR    Transportation? No problems identified     Support system or lack thereof? Very strong family support system      Living arrangements? No problems identified           Self-care/ADLs/Cognition? AOX4- able to care for self         Current Advanced Directive/Advance Care Plan:  None on file- pt is interested in arranging them. CM notified Pastoral Care to assist.            Plan for utilizing home health: Per recommendation pending pt's progression during hospitalization stay- not anticipated at this time. Transition of Care Plan:    Based on readmission, the patient's previous Plan of Care   has been evaluated and/or modified. The current Transition of Care Plan is:         Pt is a 78year old,  female, admitted with a planned readmission for chemo. Pt was AOX4 when meeting with CM, confirming address, emergency contact and PCP. Pt states she lives with her son, has no DME and drives at baseline. Pt has not had HH or been to any SNF/IPR in the past. Pt states no problems affording or accessing medications. Pt's family will drive her home at time of discharge and as needed.      Care Management Interventions  PCP Verified by CM:  Yes  Mode of Transport at Discharge: Self  Transition of Care Consult (CM Consult): Discharge Planning  MyChart Signup: No  Discharge Durable Medical Equipment: No  Health Maintenance Reviewed: Yes  Physical Therapy Consult: No  Occupational Therapy Consult: No  Speech Therapy Consult: No  Support Systems: Spouse/Significant Other,Other Family Member(s)  Confirm Follow Up Transport: Family  The Patient and/or Patient Representative was Provided with a Choice of Provider and Agrees with the Discharge Plan?: Yes  Freedom of Choice List was Provided with Basic Dialogue that Supports the Patient's Individualized Plan of Care/Goals, Treatment Preferences and Shares the Quality Data Associated with the Providers?: Yes  Discharge Location  Patient Expects to be Discharged to[de-identified] Home with family assistance     Readmission Assessment  Number of days since last admission?: 8-30 days  Previous disposition: Home with Family  Who is being interviewed?: Patient  What was the patient's/caregiver's perception as to why they think they needed to return back to the hospital?: Other (Comment) (Planned readmission)  Did you visit your Primary Care Physician after you left the hospital, before you returned this time?: No  Why weren't you able to visit your PCP?: Other (Comment) (No need to)  Did you see a specialist, such as Cardiac, Pulmonary, Orthopedic Physician, etc. after you left the hospital?: Yes  Who advised the patient to return to the hospital?: Physician's Nurse/Office Staff (Planned readmission)  Does the patient report anything that got in the way of taking their medications?: No  In our efforts to provide the best possible care to you and others like you, can you think of anything that we could have done to help you after you left the hospital the first time, so that you might not have needed to return so soon?: Other (Comment) (None- planned readmission)    CHIVO Avendaño, CM   [facility] [address] [facility]

## 2022-06-06 NOTE — PROGRESS NOTES
Bedside shift change report given to [address] [place] [address] [address] (oncoming nurse) by Dia Chahal RN (offgoing nurse). Report included the following information SBAR, Kardex, MAR, Recent Results and Med Rec Status.

## 2022-06-06 NOTE — PROGRESS NOTES
[id number]  Patient arrived ambulatory as a direct admission. 9094  Informed Dr. Josiah Oliveira of admission. [address] w/ good blood return. Sent blood samples to lab for the ordered test.    Bedside shift change report given to Rober Bravo (oncoming nurse) by Brenna Salas (offgoing nurse).  Report included the following information [facility] was accessed, blood samples were sent)

## 2022-06-06 NOTE — H&P
[facility]  [address] [facility]  W: [phone]  F: [phone] Patient ID  Name: Juliet Ambriz  YOB: 1955  MRN: [id number]  Referring Provider:   Fransico Gomez MD  [address]  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 This summary highlights focused aspects of patient's ongoing care and may have a recurring section in notes with either updates or remain unchanged as a longitudinal care summary. -------------------------------------------------------------------------------------------------------------------------------------------------------------------------------------------------------  DIAGNOSIS:   Diffuse Large B-Cell Lymphoma    ORIGINAL STAGING:   Stage IV    SITES OF DISEASE:   Left Cervical Lymph Node,Bone Disease, Stage IV    CURRENT TREATMENT:   C1,D1 on 5/11/22 DA-R-EPOCH    PRIOR TREATMENT:   n/a    GOALS OF CARE:   Curative Intent    PATHOLOGY:   Specimen #:[id number] Collect: 4/22/2022      ==========================================================================                    * * *SURGICAL PATHOLOGY REPORT* * *   ==========================================================================   * * *PROCEDURES/ADDENDA* * *   ADDENDUM   Date Ordered: 4/26/2022     Status: Signed Out   Date Complete: 4/26/2022     By: Khadijah Mendez.  Faustino Dailey MD   Date Reported: 4/26/2022   Addendum Diagnosis   Lymph node, left posterior cervical lymph node, excision:   In situ hybridization for Jesús-Barr viral RNA: Negative   CPT: 08048  Addendum Comment   * * *CLINICAL HISTORY* * *   Cervical lymphadenopathy, rule out lymphoma ==========================================================================   * * *FINAL PATHOLOGIC DIAGNOSIS* * *        Lymph node, left posterior cervical lymph node, excision:        Large B cell lymphoma. See comment. * * *Comment* * *   The histologic section has fragments of lymphoid tissue with diffuse and   focal follicular architecture. Diffuse areas are comprised of large,   atypical lymphocytes with centroblastic morphology and increased mitotic   activity. Immunohistochemical stains confirm the malignant cells are CD20   positive B cells that coexpress CD10 and BCL6 without MUM1. CD23   highlights rare follicular dendritic networks associated with lymphoid   follicles with germinal centers and express CD10 and BCL6 without BCL2. CyclinD1 and CD56 stains are negative. Concurrent flow cytometric analysis   confirms a clonal CD10 positive B-cell population comprised of medium to   large cells. The Ki-67 proliferation index is 30%. The overall findings favor the diagnosis of diffuse large B-cell lymphoma,   germinal center subtype. Molecular genetic studies are pending for further   characterization will be reported in an addendum. Flow cytometry:   Consistent with CD10-positive B-cell lymphoproliferative disorder. Comments: Flow cytometry shows monoclonal B-cells (36% of total cells)   with CD10 expression without CD5, consistent with a CD10-positive B-cell   lymphoproliferative disorder. The main differential diagnosis includes   follicular lymphoma, Burkitt lymphoma and large B-cell lymphoma. Please   correlate the result with morphologic findings and clinical information. Flow Differential (%) and Population Analysis:   Lymphocytes: 93.7%   T-cells (39% of lymphoid cells) show a CD4/CD8 ratio of 3.3 without overt   phenotypic abnormality. NK-cells (1% of lymphoid cells) are unremarkable.    Mature B-cells (56% of lymphoid cells) include large forms based on   forward scattered pattern and show: CD5 neg, CD10+, CD11c+dim, CD19+,   CD20+ with surface lambda light chain restriction. Markers Performed: CD2, CD3, CD4, CD5, CD7, CD8, CD10, CD11c, CD19, CD20,   CD23, CD34, CD38, CD45, CD56, Kappa, Lambda (17 Markers)   The Technical Component Processing of this test was completed at   [facility], [address] [place] [address], [facility] / [id number] /   [phone] / [facility] # [id number].  Interpretation by Ilana Desai M.D. The   complete scanned report is available in Epic. CPT: N8947750, V1314127, R1564506, [id number], [id number], [id number]   [facility]2/2022   *Electronically Signed Out By Pa Roth MD*   ==========================================================================   * * *Gross Description* * *   Specimen #1, received fresh labeled with the patient's name,  and left   posterior cervical lymph node, consists of two paper towels containing a   1.7 x 1.5 x 0.5 cm aggregate of pale pink-tan, fleshy soft tissue   fragments. The specimen is handled according to the flow cytometry   protocol as follows:   7          Two air-dried touch prep slides - one Diff Quik stained, one   rapid-fixed in 95% alcohol   7          Representative sections in B plus fixative (1A)   7          One piece held in RPMI for possible flow cytometry analysis   7          Remaining tissue fixed in formalin for permanents (1B)   Dr. Kamilla Roth will determine if flow cytometry is necessary. AMM 2022 02:06 PM     FISH: 22:  Triple Hit Lymphoma  PERTINENT CARE EVENTS   n/a    Subjective:     History of Present Illness:     Sandra Barton is a 78 y.o. F who presents for Cycle 2 DA-R-EPOCH. She reports having pains again. Also tells me today that she has had nausea/vomiting for most days of cycle #1. She reports cutting back on her smoking. Still notes anxiety. Still eating and drinking. Pain is mainly in her joints. She notes that she still has not had any recurrent mass on her scalp that disappeared with Cycle #1.     Past Medical History:   Diagnosis Date    Adverse effect of anesthesia     PHLEGM IN THROAT POST OP & NEEDED X2 BREATHING TREATMENTS    Anxiety     COPD (chronic obstructive pulmonary disease) ([facility] [address] 2022    emphysema    Depression     GERD (gastroesophageal reflux disease)     Hernia, femoral     since childhood    Hypertension     Joint pain     Nausea & vomiting     TMJ arthritis       Past Surgical History:   Procedure Laterality Date    HX HYSTERECTOMY  2019    HX TONSILLECTOMY     History of Salivary Gland Surgery in 30's for evaluation for dry mouth.      Social History     Tobacco Use    Smoking status: Current Every Day Smoker     Packs/day: 0.50     Years: 40.00     Pack years: 20.00     Types: Cigarettes    Smokeless tobacco: Never Used    Tobacco comment: 0.5-1 pack per day   Substance Use Topics    Alcohol use: Not Currently     Alcohol/week: 0.0 standard drinks      Family History   Problem Relation Age of Onset    Heart Disease Mother 46    Heart Surgery Mother         HEART TRANSPLANT    Elevated Lipids Mother     Hypertension Mother     COPD Father     Emphysema Father     Sudden Death Brother 46    Other Maternal Grandmother         BRAIN TUMOR    No Known Problems Son     No Known Problems Daughter      Current Facility-Administered Medications   Medication Dose Route Frequency    FLUoxetine (PROzac) capsule 40 mg  40 mg Oral 7am    LORazepam (ATIVAN) tablet 1 mg  1 mg Oral Q8H PRN    zolpidem (AMBIEN) tablet 5 mg  5 mg Oral QHS PRN    sodium chloride (NS) flush 5-40 mL  5-40 mL IntraVENous Q8H    sodium chloride (NS) flush 5-40 mL  5-40 mL IntraVENous PRN    acetaminophen (TYLENOL) tablet 650 mg  650 mg Oral Q6H PRN    Or    acetaminophen (TYLENOL) suppository 650 mg  650 mg Rectal Q6H PRN    ondansetron (ZOFRAN ODT) tablet 4 mg  4 mg Oral Q8H PRN    Or    ondansetron (ZOFRAN) injection 4 mg  4 mg IntraVENous Q6H PRN    enoxaparin (LOVENOX) injection 40 mg  40 mg SubCUTAneous DAILY    polyethylene glycol (MIRALAX) packet 17 g  17 g Oral DAILY PRN    nicotine (NICODERM CQ) 14 mg/24 hr patch 1 Patch  1 Patch TransDERmal DAILY    lisinopriL (PRINIVIL, ZESTRIL) tablet 20 mg  20 mg Oral DAILY    And    amLODIPine (NORVASC) tablet 5 mg  5 mg Oral DAILY    riTUXimab-pvvr (RUXIENCE) 754 mg in 0.9% sodium chloride 250 mL RAPID infusion  375 mg/m2 (Treatment Plan Recorded) IntraVENous ONCE TITR    pantoprazole (PROTONIX) tablet 40 mg  40 mg Oral ACB    0.9% sodium chloride infusion  45 mL/hr IntraVENous CONTINUOUS    ondansetron (ZOFRAN) injection 8 mg  8 mg IntraVENous Q8H    LORazepam (ATIVAN) 2 mg/mL injection 0.5 mg  0.5 mg IntraVENous Q6H PRN    predniSONE (DELTASONE) tablet 120.5 mg  60 mg/m2 (Treatment Plan Recorded) Oral BID WITH MEALS    etoposide (VEPESID) 120.6 mg in 0.9% sodium chloride 500 mL, overfill volume 50 mL chemo infusion  60 mg/m2 (Treatment Plan Recorded) IntraVENous Q24H    vinCRIStine (VINCASAR PFS) 0.8 mg, DOXOrubicin (ADRIAMYCIN) 22.2 mg in 0.9% sodium chloride 250 mL, overfill volume 25 mL chemo infusion   IntraVENous Q24H    [START ON 6/10/2022] cyclophosphamide (CYTOXAN) 1,809 mg in 0.9% sodium chloride 250 mL, overfill volume 25 mL chemo infusion  900 mg/m2 (Treatment Plan Recorded) IntraVENous ONCE    acyclovir (ZOVIRAX) capsule 400 mg  400 mg Oral BID    trimethoprim-sulfamethoxazole (BACTRIM DS, SEPTRA DS) 160-800 mg per tablet 1 Tablet  1 Tablet Oral Q MON, WED & FRI    OLANZapine (ZyPREXA) tablet 5 mg  5 mg Oral QPM    sodium chloride (NS) flush 10 mL  10 mL InterCATHeter PRN    heparin (porcine) pf 300-500 Units  300-500 Units InterCATHeter PRN    meperidine (DEMEROL) injection 25 mg  25 mg IntraVENous ONCE PRN    sodium chloride 0.9 % bolus infusion 500 mL  500 mL IntraVENous ONCE PRN    diphenhydrAMINE (BENADRYL) injection 25 mg  25 mg IntraVENous PRN    acetaminophen (TYLENOL) tablet 650 mg  650 mg Oral Q4H PRN    meperidine (DEMEROL) injection 25 mg  25 mg IntraVENous ONCE PRN    ondansetron (ZOFRAN) injection 8 mg  8 mg IntraVENous Q4H PRN    sodium chloride 0.9 % bolus infusion 500 mL  500 mL IntraVENous ONCE PRN    EPINEPHrine HCl (PF) (ADRENALIN) 1 mg/mL (1 mL) injection 0.3 mg  0.3 mg SubCUTAneous PRN    hydrocortisone Sod Succ (PF) (SOLU-CORTEF) injection 100 mg  100 mg IntraVENous PRN    diphenhydrAMINE (BENADRYL) injection 50 mg  50 mg IntraVENous PRN    albuterol (PROVENTIL VENTOLIN) nebulizer solution 2.5 mg  2.5 mg Nebulization PRN    ibuprofen (MOTRIN) tablet 400 mg  400 mg Oral TID    morphine IR (MS IR) tablet 15 mg  15 mg Oral Q4H PRN      Allergies   Allergen Reactions    Oxycodone Nausea Only      -  Review of Systems provided by:patient  General: denies fever and denies chills  Eyes: denies any acute vision loss and denies any eye pain  HEENT: denies epistaxis, denies trouble swallowing and no scalp mass recurrence. Cardio: denies any chest pain and denies any leg swelling  Resp: denies any shortness of breath. denies any hemoptysis. Abdomen: denies any abdominal pain, denies any vomiting, denies any diarrhea and reports nausea  MSK: denies any myalgias and reports arthralgias  Skin: denies any rash and denies any itching  Lymph: denies any lymph node enlargement and denies any lymph node tenderness  Neuro: denies any headache and denies any tremor  : Denies any dysuria. Denies any hematuria. Psych: denies depression, reports anxiety and anxious about taking on fluid again. Objective:     Visit Vitals  /68 (BP 1 Location: Right upper arm, BP Patient Position: Sitting)   Pulse 66   Temp 98 °F (36.7 °C)   Resp 16   Ht 5' 3\" (1.6 m)   Wt 198 lb 13.7 oz (90.2 kg)   SpO2 96%   BMI 35.23 kg/m²     ECOG PS: 1- Restricted in physically strenuous activity but ambulatory and able to carry out work of a light or sedentary nature, e.g., light house work, office work.   Physical Exam  Constitutional: No acute distress. , Non-toxic appearance. Thresa Abed HENT: Normocephalic and atraumatic head. Eyes: Normal Conjunctivae. Anicteric sclerae. Cardiovascular: S1,S2 auscultated. No pitting edema. Pulmonary: Normal Respiratory Effort. No wheezing. No rhonchi. No rales. Abdominal: Normal bowel sounds. Soft Abdomen to palpation. No abdominal tenderness. Skin: No jaundice. No rash. No petechiae. Musculoskeletal: No muscle pain on palpation. No temporal muscle wasting on inspection. Neurological: Alert and oriented. No tremor on inspection. Psychiatric: normal affect, mood normal. normal speech rate    Results:     I personally reviewed [employer] labs/results below:   Lab Results   Component Value Date/Time    WBC 6.4 06/06/2022 06:59 AM    WBC 6.6 06/06/2022 06:59 AM    HGB 12.7 06/06/2022 06:59 AM    HGB 12.6 06/06/2022 06:59 AM    HCT 38.5 06/06/2022 06:59 AM    HCT 38.5 06/06/2022 06:59 AM    PLATELET 721 (H) 10/94/9900 06:59 AM    PLATELET 445 (H) 13/59/6385 06:59 AM    MCV 95.1 06/06/2022 06:59 AM    MCV 95.1 06/06/2022 06:59 AM    ABS. NEUTROPHILS 3.4 06/06/2022 06:59 AM     Lab Results   Component Value Date/Time    Sodium 140 06/06/2022 06:59 AM    Potassium 3.9 06/06/2022 06:59 AM    Chloride 110 (H) 06/06/2022 06:59 AM    CO2 22 06/06/2022 06:59 AM    Glucose 109 (H) 06/06/2022 06:59 AM    BUN 13 06/06/2022 06:59 AM    Creatinine 0.89 06/06/2022 06:59 AM    GFR est AA >60 06/06/2022 06:59 AM    GFR est non-AA >60 06/06/2022 06:59 AM    Calcium 8.9 06/06/2022 06:59 AM    Glucose (POC) 98 05/04/2022 01:37 PM     Lab Results   Component Value Date/Time    Bilirubin, total 0.4 06/06/2022 06:59 AM    ALT (SGPT) 24 06/06/2022 06:59 AM    Alk.  phosphatase 84 06/06/2022 06:59 AM    Protein, total 6.6 06/06/2022 06:59 AM    Albumin 3.7 06/06/2022 06:59 AM    Globulin 2.9 06/06/2022 06:59 AM     Lab Results   Component Value Date/Time    Hepatitis B surface Ag <0.10 04/27/2022 12:23 PM    Hepatitis B surface Ab 3.66 04/27/2022 12:23 PM         Assessment and Recommendations:      Diagnosis    Neuropathy due to chemotherapeutic drug [facility])  -Patient still with neuropathy. Since there has been no effect on her functioning we will hold her vincristine dosing without reduction. She has as needed Rx of gabapentin. 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  -Patient will proceed to curative intent cycle 2 DA-R-EPOCH. -We we will proceed with 20% dose increase since her clara did not drop below thresholds. Since one of her treatment days did not include about 20% of the dosing last admission due to an administration air with remaining chemo and her piggyback IV I have held her doxorubicin increased to 10%. Also, patient took on significant amount of fluid last admission. She will need Lasix upon discharge.  Cancer related pain  -She reports onset of her pain again. We will maintain her analgesics at her outpatient dosing. expect treatment to help. May need palliative care to assist in pain control if she does not seem to respond upfront.  Metastatic cancer to bone [facility])  -We discussed that structural damage from her lymphoma may still cause her pain and this does not absolutely indicate that she has progressive disease. We discussed that it seems to be a good sign that her scalp lesion has not recurred.  Tobacco dependence  -Patient okay with nicotine 14 mg patch as opposed to 21   mg patch as she notes she has been cutting back on her smoking.            Follow-Up:    Uzma Powell MD  Hematology/Medical Oncology Provider  Manan Hodges  P: [phone]    Signed By:   Natali Brown MD

## 2022-06-07 LAB
ALBUMIN SERPL-MCNC: 3.4 G/DL (ref 3.5–5)
ALBUMIN/GLOB SERPL: 1.3 {RATIO} (ref 1.1–2.2)
ALP SERPL-CCNC: 72 U/L (ref 45–117)
ALT SERPL-CCNC: 22 U/L (ref 12–78)
ANION GAP SERPL CALC-SCNC: 9 MMOL/L (ref 5–15)
AST SERPL-CCNC: 7 U/L (ref 15–37)
BASOPHILS # BLD: 0 K/UL (ref 0–0.1)
BASOPHILS NFR BLD: 0 % (ref 0–1)
BILIRUB SERPL-MCNC: 0.2 MG/DL (ref 0.2–1)
BUN SERPL-MCNC: 15 MG/DL (ref 6–20)
BUN/CREAT SERPL: 16 (ref 12–20)
CALCIUM SERPL-MCNC: 8.7 MG/DL (ref 8.5–10.1)
CHLORIDE SERPL-SCNC: 110 MMOL/L (ref 97–108)
CO2 SERPL-SCNC: 19 MMOL/L (ref 21–32)
CREAT SERPL-MCNC: 0.96 MG/DL (ref 0.55–1.02)
DIFFERENTIAL METHOD BLD: ABNORMAL
EOSINOPHIL # BLD: 0 K/UL (ref 0–0.4)
EOSINOPHIL NFR BLD: 0 % (ref 0–7)
ERYTHROCYTE [DISTWIDTH] IN BLOOD BY AUTOMATED COUNT: 15.4 % (ref 11.5–14.5)
GLOBULIN SER CALC-MCNC: 2.6 G/DL (ref 2–4)
GLUCOSE SERPL-MCNC: 186 MG/DL (ref 65–100)
HCT VFR BLD AUTO: 34 % (ref 35–47)
HGB BLD-MCNC: 11 G/DL (ref 11.5–16)
IMM GRANULOCYTES # BLD AUTO: 0 K/UL (ref 0–0.04)
IMM GRANULOCYTES NFR BLD AUTO: 0 % (ref 0–0.5)
LYMPHOCYTES # BLD: 0.4 K/UL (ref 0.8–3.5)
LYMPHOCYTES NFR BLD: 4 % (ref 12–49)
MCH RBC QN AUTO: 31.5 PG (ref 26–34)
MCHC RBC AUTO-ENTMCNC: 32.4 G/DL (ref 30–36.5)
MCV RBC AUTO: 97.4 FL (ref 80–99)
MONOCYTES # BLD: 0.2 K/UL (ref 0–1)
MONOCYTES NFR BLD: 2 % (ref 5–13)
NEUTS SEG # BLD: 9.3 K/UL (ref 1.8–8)
NEUTS SEG NFR BLD: 94 % (ref 32–75)
NRBC # BLD: 0 K/UL (ref 0–0.01)
NRBC BLD-RTO: 0 PER 100 WBC
PLATELET # BLD AUTO: 369 K/UL (ref 150–400)
PMV BLD AUTO: 9.7 FL (ref 8.9–12.9)
POTASSIUM SERPL-SCNC: 4.3 MMOL/L (ref 3.5–5.1)
PROT SERPL-MCNC: 6 G/DL (ref 6.4–8.2)
RBC # BLD AUTO: 3.49 M/UL (ref 3.8–5.2)
RBC MORPH BLD: ABNORMAL
SODIUM SERPL-SCNC: 138 MMOL/L (ref 136–145)
WBC # BLD AUTO: 9.9 K/UL (ref 3.6–11)

## 2022-06-07 PROCEDURE — 36415 COLL VENOUS BLD VENIPUNCTURE: CPT

## 2022-06-07 PROCEDURE — 85025 COMPLETE CBC W/AUTO DIFF WBC: CPT

## 2022-06-07 PROCEDURE — 74011250636 HC RX REV CODE- 250/636: Performed by: NURSE PRACTITIONER

## 2022-06-07 PROCEDURE — 74011250637 HC RX REV CODE- 250/637: Performed by: NURSE PRACTITIONER

## 2022-06-07 PROCEDURE — 80053 COMPREHEN METABOLIC PANEL: CPT

## 2022-06-07 PROCEDURE — 74011250637 HC RX REV CODE- 250/637: Performed by: INTERNAL MEDICINE

## 2022-06-07 PROCEDURE — 65270000029 HC RM PRIVATE

## 2022-06-07 PROCEDURE — 74011000250 HC RX REV CODE- 250: Performed by: INTERNAL MEDICINE

## 2022-06-07 PROCEDURE — 99233 SBSQ HOSP IP/OBS HIGH 50: CPT | Performed by: INTERNAL MEDICINE

## 2022-06-07 PROCEDURE — 74011000250 HC RX REV CODE- 250: Performed by: NURSE PRACTITIONER

## 2022-06-07 PROCEDURE — 74011250636 HC RX REV CODE- 250/636: Performed by: INTERNAL MEDICINE

## 2022-06-07 PROCEDURE — 74011636637 HC RX REV CODE- 636/637: Performed by: INTERNAL MEDICINE

## 2022-06-07 RX ORDER — FUROSEMIDE 20 MG/1
10 TABLET ORAL DAILY
Status: DISCONTINUED | OUTPATIENT
Start: 2022-06-08 | End: 2022-06-07

## 2022-06-07 RX ORDER — FUROSEMIDE 20 MG/1
10 TABLET ORAL DAILY
Status: DISCONTINUED | OUTPATIENT
Start: 2022-06-07 | End: 2022-06-08

## 2022-06-07 RX ADMIN — IBUPROFEN 400 MG: 400 TABLET ORAL at 15:07

## 2022-06-07 RX ADMIN — ONDANSETRON 8 MG: 2 INJECTION INTRAMUSCULAR; INTRAVENOUS at 08:45

## 2022-06-07 RX ADMIN — LORAZEPAM 0.5 MG: 2 INJECTION, SOLUTION INTRAMUSCULAR; INTRAVENOUS at 16:08

## 2022-06-07 RX ADMIN — OLANZAPINE 5 MG: 5 TABLET, FILM COATED ORAL at 17:37

## 2022-06-07 RX ADMIN — LISINOPRIL 20 MG: 20 TABLET ORAL at 08:45

## 2022-06-07 RX ADMIN — IBUPROFEN 400 MG: 400 TABLET ORAL at 21:58

## 2022-06-07 RX ADMIN — PANTOPRAZOLE SODIUM 40 MG: 40 TABLET, DELAYED RELEASE ORAL at 07:07

## 2022-06-07 RX ADMIN — MORPHINE SULFATE 15 MG: 15 TABLET ORAL at 22:56

## 2022-06-07 RX ADMIN — FLUOXETINE 40 MG: 20 CAPSULE ORAL at 07:07

## 2022-06-07 RX ADMIN — MORPHINE SULFATE 15 MG: 15 TABLET ORAL at 15:07

## 2022-06-07 RX ADMIN — Medication 10 ML: at 22:01

## 2022-06-07 RX ADMIN — ONDANSETRON 8 MG: 2 INJECTION INTRAMUSCULAR; INTRAVENOUS at 17:38

## 2022-06-07 RX ADMIN — LORAZEPAM 0.5 MG: 2 INJECTION, SOLUTION INTRAMUSCULAR; INTRAVENOUS at 22:57

## 2022-06-07 RX ADMIN — MORPHINE SULFATE 15 MG: 15 TABLET ORAL at 11:10

## 2022-06-07 RX ADMIN — ETOPOSIDE 120.6 MG: 20 INJECTION, SOLUTION, CONCENTRATE INTRAVENOUS at 14:41

## 2022-06-07 RX ADMIN — ACYCLOVIR 400 MG: 200 CAPSULE ORAL at 17:38

## 2022-06-07 RX ADMIN — MORPHINE SULFATE 15 MG: 15 TABLET ORAL at 07:07

## 2022-06-07 RX ADMIN — LORAZEPAM 0.5 MG: 2 INJECTION, SOLUTION INTRAMUSCULAR; INTRAVENOUS at 04:21

## 2022-06-07 RX ADMIN — PREDNISONE 120.5 MG: 20 TABLET ORAL at 17:37

## 2022-06-07 RX ADMIN — AMLODIPINE BESYLATE 5 MG: 5 TABLET ORAL at 08:45

## 2022-06-07 RX ADMIN — FUROSEMIDE 10 MG: 20 TABLET ORAL at 15:07

## 2022-06-07 RX ADMIN — SODIUM CHLORIDE 45 ML/HR: 9 INJECTION, SOLUTION INTRAVENOUS at 09:40

## 2022-06-07 RX ADMIN — ONDANSETRON 8 MG: 2 INJECTION INTRAMUSCULAR; INTRAVENOUS at 00:18

## 2022-06-07 RX ADMIN — Medication 10 ML: at 07:08

## 2022-06-07 RX ADMIN — VINCRISTINE SULFATE: 1 INJECTION, SOLUTION INTRAVENOUS at 14:42

## 2022-06-07 RX ADMIN — ENOXAPARIN SODIUM 40 MG: 100 INJECTION SUBCUTANEOUS at 08:45

## 2022-06-07 RX ADMIN — MORPHINE SULFATE 15 MG: 15 TABLET ORAL at 02:15

## 2022-06-07 RX ADMIN — ZOLPIDEM TARTRATE 5 MG: 5 TABLET ORAL at 22:01

## 2022-06-07 RX ADMIN — LORAZEPAM 0.5 MG: 2 INJECTION, SOLUTION INTRAMUSCULAR; INTRAVENOUS at 10:23

## 2022-06-07 RX ADMIN — ACYCLOVIR 400 MG: 200 CAPSULE ORAL at 08:45

## 2022-06-07 RX ADMIN — PREDNISONE 120.5 MG: 20 TABLET ORAL at 08:45

## 2022-06-07 RX ADMIN — SODIUM CHLORIDE, PRESERVATIVE FREE 10 ML: 5 INJECTION INTRAVENOUS at 23:01

## 2022-06-07 RX ADMIN — MORPHINE SULFATE 15 MG: 15 TABLET ORAL at 19:01

## 2022-06-07 RX ADMIN — IBUPROFEN 400 MG: 400 TABLET ORAL at 08:45

## 2022-06-07 NOTE — PROGRESS NOTES
[facility] [place] [address]  [address] [facility] [address] 100[address] [facility]  W: [phone]  F: [phone] Patient ID  Name: Shantel García  YOB: 1955  MRN: [id number]  Referring Provider:   Aiyana Otero MD  [address] [facility] [address]  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 This summary highlights focused aspects of patient's ongoing care and may have a recurring section in notes with either updates or remain unchanged as a longitudinal care summary. -------------------------------------------------------------------------------------------------------------------------------------------------------------------------------------------------------  DIAGNOSIS:   Diffuse Large B-Cell Lymphoma    ORIGINAL STAGING:   Stage IV    SITES OF DISEASE:   Left Cervical Lymph Node,Bone Disease, Stage IV    CURRENT TREATMENT:   C1,D1 on 5/11/22 DA-R-EPOCH    PRIOR TREATMENT:   n/a    GOALS OF CARE:   Curative Intent    PATHOLOGY:   Specimen #:[id number] Collect: 4/22/2022      ==========================================================================                    * * *SURGICAL PATHOLOGY REPORT* * *   ==========================================================================   * * *PROCEDURES/ADDENDA* * *   ADDENDUM   Date Ordered: 4/26/2022     Status: Signed Out   Date Complete: 4/26/2022     By: Tristian Ford.  Meg Jhonson MD   Date Reported: 4/26/2022   Addendum Diagnosis   Lymph node, left posterior cervical lymph node, excision:   In situ hybridization for Jesús-Barr viral RNA: Negative   CPT: 12412  Addendum Comment   * * *CLINICAL HISTORY* * *   Cervical lymphadenopathy, rule out lymphoma ==========================================================================   * * *FINAL PATHOLOGIC DIAGNOSIS* * *        Lymph node, left posterior cervical lymph node, excision:        Large B cell lymphoma. See comment. * * *Comment* * *   The histologic section has fragments of lymphoid tissue with diffuse and   focal follicular architecture. Diffuse areas are comprised of large,   atypical lymphocytes with centroblastic morphology and increased mitotic   activity. Immunohistochemical stains confirm the malignant cells are CD20   positive B cells that coexpress CD10 and BCL6 without MUM1. CD23   highlights rare follicular dendritic networks associated with lymphoid   follicles with germinal centers and express CD10 and BCL6 without BCL2. CyclinD1 and CD56 stains are negative. Concurrent flow cytometric analysis   confirms a clonal CD10 positive B-cell population comprised of medium to   large cells. The Ki-67 proliferation index is 30%. The overall findings favor the diagnosis of diffuse large B-cell lymphoma,   germinal center subtype. Molecular genetic studies are pending for further   characterization will be reported in an addendum. Flow cytometry:   Consistent with CD10-positive B-cell lymphoproliferative disorder. Comments: Flow cytometry shows monoclonal B-cells (36% of total cells)   with CD10 expression without CD5, consistent with a CD10-positive B-cell   lymphoproliferative disorder. The main differential diagnosis includes   follicular lymphoma, Burkitt lymphoma and large B-cell lymphoma. Please   correlate the result with morphologic findings and clinical information. Flow Differential (%) and Population Analysis:   Lymphocytes: 93.7%   T-cells (39% of lymphoid cells) show a CD4/CD8 ratio of 3.3 without overt   phenotypic abnormality. NK-cells (1% of lymphoid cells) are unremarkable.    Mature B-cells (56% of lymphoid cells) include large forms based on   forward scattered pattern and show: CD5 neg, CD10+, CD11c+dim, CD19+,   CD20+ with surface lambda light chain restriction. Markers Performed: CD2, CD3, CD4, CD5, CD7, CD8, CD10, CD11c, CD19, CD20,   CD23, CD34, CD38, CD45, CD56, Kappa, Lambda (17 Markers)   The Technical Component Processing of this test was completed at   [facility], [address] [place] [address], [place], [place] / [address] /   [phone] / [facility] # [id number].  Interpretation by Mela Rasmussen M.D. The   complete scanned report is available in Epic. CPT: U7795731, [id number], [id number], [id number], [id number], [id number]   [facility]2/2022   *Electronically Signed Out By Raya Oneil MD*   ==========================================================================   * * *Gross Description* * *   Specimen #1, received fresh labeled with the patient's name,  and left   posterior cervical lymph node, consists of two paper towels containing a   1.7 x 1.5 x 0.5 cm aggregate of pale pink-tan, fleshy soft tissue   fragments. The specimen is handled according to the flow cytometry   protocol as follows:   7          Two air-dried touch prep slides - one Diff Quik stained, one   rapid-fixed in 95% alcohol   7          Representative sections in B plus fixative (1A)   7          One piece held in RPMI for possible flow cytometry analysis   7          Remaining tissue fixed in formalin for permanents (1B)   Dr. Kaylee Oneil will determine if flow cytometry is necessary. 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 F who presents for Cycle 2 DA-R-EPOCH. She reports having pains again. Also tells me today that she has had nausea/vomiting for most days of cycle #1. She reports cutting back on her smoking. Still notes anxiety. Still eating and drinking. Pain is mainly in her joints. She notes that she still has not had any recurrent mass on her scalp that disappeared with Cycle #1.       Interval History:     Sleeping when entering; awakens easily. Pain is controlled.; does not feel as anxious as she did yesterday. Nausea improved. Having some swelling to legs. Past Medical History:   Diagnosis Date    Adverse effect of anesthesia     PHLEGM IN THROAT POST OP & NEEDED X2 BREATHING TREATMENTS    Anxiety     COPD (chronic obstructive pulmonary disease) ([facility] [address] 2022    emphysema    Depression     GERD (gastroesophageal reflux disease)     Hernia, femoral     since childhood    Hypertension     Joint pain     Nausea & vomiting     TMJ arthritis       Past Surgical History:   Procedure Laterality Date    HX HYSTERECTOMY  2019    HX TONSILLECTOMY     History of Salivary Gland Surgery in 30's for evaluation for dry mouth.      Social History     Tobacco Use    Smoking status: Current Every Day Smoker     Packs/day: 0.50     Years: 40.00     Pack years: 20.00     Types: Cigarettes    Smokeless tobacco: Never Used    Tobacco comment: 0.5-1 pack per day   Substance Use Topics    Alcohol use: Not Currently     Alcohol/week: 0.0 standard drinks      Family History   Problem Relation Age of Onset    Heart Disease Mother 46    Heart Surgery Mother         HEART TRANSPLANT    Elevated Lipids Mother     Hypertension Mother     COPD Father     Emphysema Father     Sudden Death Brother 46    Other Maternal Grandmother         BRAIN TUMOR    No Known Problems Son     No Known Problems Daughter      Current Facility-Administered Medications   Medication Dose Route Frequency    [START ON 6/8/2022] furosemide (LASIX) tablet 10 mg  10 mg Oral DAILY    FLUoxetine (PROzac) capsule 40 mg  40 mg Oral 7am    LORazepam (ATIVAN) tablet 1 mg  1 mg Oral Q8H PRN    zolpidem (AMBIEN) tablet 5 mg  5 mg Oral QHS PRN    sodium chloride (NS) flush 5-40 mL  5-40 mL IntraVENous Q8H    sodium chloride (NS) flush 5-40 mL  5-40 mL IntraVENous PRN    acetaminophen (TYLENOL) tablet 650 mg  650 mg Oral Q6H PRN    Or    acetaminophen (TYLENOL) suppository 650 mg  650 mg Rectal Q6H PRN    ondansetron (ZOFRAN ODT) tablet 4 mg  4 mg Oral Q8H PRN    Or    ondansetron (ZOFRAN) injection 4 mg  4 mg IntraVENous Q6H PRN    enoxaparin (LOVENOX) injection 40 mg  40 mg SubCUTAneous DAILY    polyethylene glycol (MIRALAX) packet 17 g  17 g Oral DAILY PRN    nicotine (NICODERM CQ) 14 mg/24 hr patch 1 Patch  1 Patch TransDERmal DAILY    lisinopriL (PRINIVIL, ZESTRIL) tablet 20 mg  20 mg Oral DAILY    And    amLODIPine (NORVASC) tablet 5 mg  5 mg Oral DAILY    pantoprazole (PROTONIX) tablet 40 mg  40 mg Oral ACB    0.9% sodium chloride infusion  45 mL/hr IntraVENous CONTINUOUS    ondansetron (ZOFRAN) injection 8 mg  8 mg IntraVENous Q8H    LORazepam (ATIVAN) 2 mg/mL injection 0.5 mg  0.5 mg IntraVENous Q6H PRN    predniSONE (DELTASONE) tablet 120.5 mg  60 mg/m2 (Treatment Plan Recorded) Oral BID WITH MEALS    etoposide (VEPESID) 120.6 mg in 0.9% sodium chloride 500 mL, overfill volume 50 mL chemo infusion  60 mg/m2 (Treatment Plan Recorded) IntraVENous Q24H    vinCRIStine (VINCASAR PFS) 0.8 mg, DOXOrubicin (ADRIAMYCIN) 22.2 mg in 0.9% sodium chloride 250 mL, overfill volume 25 mL chemo infusion   IntraVENous Q24H    [START ON 6/10/2022] cyclophosphamide (CYTOXAN) 1,809 mg in 0.9% sodium chloride 250 mL, overfill volume 25 mL chemo infusion  900 mg/m2 (Treatment Plan Recorded) IntraVENous ONCE    acyclovir (ZOVIRAX) capsule 400 mg  400 mg Oral BID    trimethoprim-sulfamethoxazole (BACTRIM DS, SEPTRA DS) 160-800 mg per tablet 1 Tablet  1 Tablet Oral Q MON, WED & FRI    OLANZapine (ZyPREXA) tablet 5 mg  5 mg Oral QPM    sodium chloride (NS) flush 10 mL  10 mL InterCATHeter PRN    heparin (porcine) pf 300-500 Units  300-500 Units InterCATHeter PRN    diphenhydrAMINE (BENADRYL) injection 25 mg  25 mg IntraVENous PRN    acetaminophen (TYLENOL) tablet 650 mg  650 mg Oral Q4H PRN    ondansetron (ZOFRAN) injection 8 mg  8 mg IntraVENous Q4H PRN    diphenhydrAMINE (BENADRYL) injection 50 mg  50 mg IntraVENous PRN    albuterol (PROVENTIL VENTOLIN) nebulizer solution 2.5 mg  2.5 mg Nebulization PRN    ibuprofen (MOTRIN) tablet 400 mg  400 mg Oral TID    morphine IR (MS IR) tablet 15 mg  15 mg Oral Q4H PRN      Allergies   Allergen Reactions    Oxycodone Nausea Only         Objective:     Visit Vitals  /82 (BP 1 Location: Right upper arm, BP Patient Position: At rest)   Pulse 72   Temp 98.3 °F (36.8 °C)   Resp 18   Ht 5' 3\" (1.6 m)   Wt 201 lb 8 oz (91.4 kg)   SpO2 96%   BMI 35.69 kg/m²     ECOG PS: 1- Restricted in physically strenuous activity but ambulatory and able to carry out work of a light or sedentary nature, e.g., light house work, office work. General: no distress  Eyes: anicteric sclerae  HENT: atraumatic  Neck: supple  Respiratory: normal respiratory effort  CV: slight edema noted to BLE edema. GI: soft, nontender, nondistended, no masses, no hepatomegaly, no splenomegaly  Skin: no rashes; no ecchymoses; no petechiae  Psych: alert, oriented, appropriate affect, normal judgment/insight    Results:     I personally reviewed Epic EHR labs/results below:   Lab Results   Component Value Date/Time    WBC 9.9 06/07/2022 04:34 AM    HGB 11.0 (L) 06/07/2022 04:34 AM    HCT 34.0 (L) 06/07/2022 04:34 AM    PLATELET 078 91/26/4751 04:34 AM    MCV 97.4 06/07/2022 04:34 AM    ABS.  NEUTROPHILS 9.3 (H) 06/07/2022 04:34 AM     Lab Results   Component Value Date/Time    Sodium 138 06/07/2022 04:34 AM    Potassium 4.3 06/07/2022 04:34 AM    Chloride 110 (H) 06/07/2022 04:34 AM    CO2 19 (L) 06/07/2022 04:34 AM    Glucose 186 (H) 06/07/2022 04:34 AM    BUN 15 06/07/2022 04:34 AM    Creatinine 0.96 06/07/2022 04:34 AM    GFR est AA >60 06/07/2022 04:34 AM    GFR est non-AA 58 (L) 06/07/2022 04:34 AM    Calcium 8.7 06/07/2022 04:34 AM    Glucose (POC) 98 05/04/2022 01:37 PM     Lab Results   Component Value Date/Time    Bilirubin, total 0.2 06/07/2022 04:34 AM    ALT (SGPT) 22 06/07/2022 04:34 AM    Alk. 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  -Patient will proceed to curative intent cycle 2 DA-R-EPOCH.  -C2 with  20% dose increase since her clara did not drop below thr doxorubicin increased to 10%. .-had significant edema following last chemo requiring lasix outpatient;  -prophylactic Bactrim MWF  -will add lasix 10 mg po daily while inpatient and continue outpatient      Cancer related pain  -Follows with palliative team outpatient  -have resumed home Morphine 15 mg po q 4 hours prn      Metastatic cancer to bone ([facility] [address]  -We discussed that structural damage from her lymphoma may still cause her pain and this does not absolutely indicate that she has progressive disease.    Felipe Mcgee Tobacco dependence  -on nicotine 14 mg patch         Anxiety: continue with Ativan every 8 hours prn         Plan reviewed with Dr Dee Dee Gorman By:   Hiral Lucio NP

## 2022-06-07 NOTE — PROGRESS NOTES
Problem: Falls - Risk of  Goal: *Absence of Falls  Description: Document Matt Day Fall Risk and appropriate interventions in the flowsheet.   Outcome: Progressing Towards Goal  Note: Fall Risk Interventions:            Medication Interventions: Bed/chair exit alarm,Patient to call before getting OOB                   Problem: Patient Education: Go to Patient Education Activity  Goal: Patient/Family Education  Outcome: Progressing Towards Goal     Problem: Pain  Goal: *Control of Pain  Outcome: Progressing Towards Goal  Goal: *PALLIATIVE CARE:  Alleviation of Pain  Outcome: Progressing Towards Goal     Problem: Patient Education: Go to Patient Education Activity  Goal: Patient/Family Education  Outcome: Progressing Towards Goal

## 2022-06-07 NOTE — PROGRESS NOTES
Bedside, Verbal and Written shift change report given to [address] [place] [address] (oncoming nurse) by Alicia De La Cruz RN (offgoing nurse). Report included the following information SBAR, Kardex, Procedure Summary, Intake/Output, MAR, Recent Results, Med Rec Status and Procedure Verification.

## 2022-06-07 NOTE — PROGRESS NOTES
Bedside shift change report given to JEFFREY RN (oncoming nurse) by Jacob Ocampo RN (offgoing nurse). Report included the following information SBAR, Kardex, MAR, Recent Results and Med Rec Status.

## 2022-06-08 ENCOUNTER — APPOINTMENT (OUTPATIENT)
Dept: VASCULAR SURGERY | Age: 67
DRG: 847 | End: 2022-06-08
Attending: NURSE PRACTITIONER
Payer: MEDICARE

## 2022-06-08 LAB
ALBUMIN SERPL-MCNC: 3.4 G/DL (ref 3.5–5)
ALBUMIN/GLOB SERPL: 1.3 {RATIO} (ref 1.1–2.2)
ALP SERPL-CCNC: 66 U/L (ref 45–117)
ALT SERPL-CCNC: 20 U/L (ref 12–78)
ANION GAP SERPL CALC-SCNC: 8 MMOL/L (ref 5–15)
AST SERPL-CCNC: 10 U/L (ref 15–37)
BASOPHILS # BLD: 0 K/UL (ref 0–0.1)
BASOPHILS NFR BLD: 0 % (ref 0–1)
BILIRUB SERPL-MCNC: 0.6 MG/DL (ref 0.2–1)
BUN SERPL-MCNC: 15 MG/DL (ref 6–20)
BUN/CREAT SERPL: 21 (ref 12–20)
CALCIUM SERPL-MCNC: 8.6 MG/DL (ref 8.5–10.1)
CHLORIDE SERPL-SCNC: 109 MMOL/L (ref 97–108)
CO2 SERPL-SCNC: 22 MMOL/L (ref 21–32)
CREAT SERPL-MCNC: 0.73 MG/DL (ref 0.55–1.02)
DIFFERENTIAL METHOD BLD: ABNORMAL
EOSINOPHIL # BLD: 0 K/UL (ref 0–0.4)
EOSINOPHIL NFR BLD: 0 % (ref 0–7)
ERYTHROCYTE [DISTWIDTH] IN BLOOD BY AUTOMATED COUNT: 15.5 % (ref 11.5–14.5)
GLOBULIN SER CALC-MCNC: 2.6 G/DL (ref 2–4)
GLUCOSE SERPL-MCNC: 127 MG/DL (ref 65–100)
HCT VFR BLD AUTO: 34.2 % (ref 35–47)
HGB BLD-MCNC: 11.1 G/DL (ref 11.5–16)
IMM GRANULOCYTES # BLD AUTO: 0.1 K/UL (ref 0–0.04)
IMM GRANULOCYTES NFR BLD AUTO: 1 % (ref 0–0.5)
LYMPHOCYTES # BLD: 0.4 K/UL (ref 0.8–3.5)
LYMPHOCYTES NFR BLD: 3 % (ref 12–49)
MCH RBC QN AUTO: 31.2 PG (ref 26–34)
MCHC RBC AUTO-ENTMCNC: 32.5 G/DL (ref 30–36.5)
MCV RBC AUTO: 96.1 FL (ref 80–99)
MONOCYTES # BLD: 0.8 K/UL (ref 0–1)
MONOCYTES NFR BLD: 6 % (ref 5–13)
NEUTS SEG # BLD: 11.5 K/UL (ref 1.8–8)
NEUTS SEG NFR BLD: 90 % (ref 32–75)
NRBC # BLD: 0 K/UL (ref 0–0.01)
NRBC BLD-RTO: 0 PER 100 WBC
PLATELET # BLD AUTO: 375 K/UL (ref 150–400)
PMV BLD AUTO: 9.6 FL (ref 8.9–12.9)
POTASSIUM SERPL-SCNC: 4.1 MMOL/L (ref 3.5–5.1)
PROT SERPL-MCNC: 6 G/DL (ref 6.4–8.2)
RBC # BLD AUTO: 3.56 M/UL (ref 3.8–5.2)
SODIUM SERPL-SCNC: 139 MMOL/L (ref 136–145)
WBC # BLD AUTO: 12.9 K/UL (ref 3.6–11)

## 2022-06-08 PROCEDURE — 36415 COLL VENOUS BLD VENIPUNCTURE: CPT

## 2022-06-08 PROCEDURE — 80053 COMPREHEN METABOLIC PANEL: CPT

## 2022-06-08 PROCEDURE — 86704 HEP B CORE ANTIBODY TOTAL: CPT

## 2022-06-08 PROCEDURE — 74011250636 HC RX REV CODE- 250/636: Performed by: NURSE PRACTITIONER

## 2022-06-08 PROCEDURE — 65270000029 HC RM PRIVATE

## 2022-06-08 PROCEDURE — 74011636637 HC RX REV CODE- 636/637: Performed by: INTERNAL MEDICINE

## 2022-06-08 PROCEDURE — 93971 EXTREMITY STUDY: CPT

## 2022-06-08 PROCEDURE — 74011250637 HC RX REV CODE- 250/637: Performed by: NURSE PRACTITIONER

## 2022-06-08 PROCEDURE — 74011000250 HC RX REV CODE- 250: Performed by: NURSE PRACTITIONER

## 2022-06-08 PROCEDURE — 85025 COMPLETE CBC W/AUTO DIFF WBC: CPT

## 2022-06-08 PROCEDURE — 99233 SBSQ HOSP IP/OBS HIGH 50: CPT | Performed by: INTERNAL MEDICINE

## 2022-06-08 PROCEDURE — 74011250636 HC RX REV CODE- 250/636: Performed by: INTERNAL MEDICINE

## 2022-06-08 PROCEDURE — 74011250637 HC RX REV CODE- 250/637: Performed by: INTERNAL MEDICINE

## 2022-06-08 RX ORDER — FUROSEMIDE 20 MG/1
20 TABLET ORAL DAILY
Status: DISCONTINUED | OUTPATIENT
Start: 2022-06-09 | End: 2022-06-10 | Stop reason: HOSPADM

## 2022-06-08 RX ORDER — FAMOTIDINE 20 MG/1
20 TABLET, FILM COATED ORAL EVERY 12 HOURS
Status: DISCONTINUED | OUTPATIENT
Start: 2022-06-08 | End: 2022-06-10 | Stop reason: HOSPADM

## 2022-06-08 RX ORDER — HYDRALAZINE HYDROCHLORIDE 20 MG/ML
10 INJECTION INTRAMUSCULAR; INTRAVENOUS
Status: DISCONTINUED | OUTPATIENT
Start: 2022-06-08 | End: 2022-06-10 | Stop reason: HOSPADM

## 2022-06-08 RX ADMIN — IBUPROFEN 400 MG: 400 TABLET ORAL at 17:46

## 2022-06-08 RX ADMIN — AMLODIPINE BESYLATE 5 MG: 5 TABLET ORAL at 09:01

## 2022-06-08 RX ADMIN — Medication 10 ML: at 15:56

## 2022-06-08 RX ADMIN — ONDANSETRON 8 MG: 2 INJECTION INTRAMUSCULAR; INTRAVENOUS at 01:15

## 2022-06-08 RX ADMIN — ONDANSETRON 8 MG: 2 INJECTION INTRAMUSCULAR; INTRAVENOUS at 14:56

## 2022-06-08 RX ADMIN — MORPHINE SULFATE 15 MG: 15 TABLET ORAL at 13:20

## 2022-06-08 RX ADMIN — FLUOXETINE 40 MG: 20 CAPSULE ORAL at 06:19

## 2022-06-08 RX ADMIN — MORPHINE SULFATE 15 MG: 15 TABLET ORAL at 08:52

## 2022-06-08 RX ADMIN — LORAZEPAM 0.5 MG: 2 INJECTION, SOLUTION INTRAMUSCULAR; INTRAVENOUS at 06:18

## 2022-06-08 RX ADMIN — MORPHINE SULFATE 15 MG: 15 TABLET ORAL at 04:22

## 2022-06-08 RX ADMIN — POLYETHYLENE GLYCOL 3350 17 G: 17 POWDER, FOR SOLUTION ORAL at 18:59

## 2022-06-08 RX ADMIN — FAMOTIDINE 20 MG: 20 TABLET, FILM COATED ORAL at 19:04

## 2022-06-08 RX ADMIN — OLANZAPINE 5 MG: 5 TABLET, FILM COATED ORAL at 17:45

## 2022-06-08 RX ADMIN — ENOXAPARIN SODIUM 40 MG: 100 INJECTION SUBCUTANEOUS at 08:52

## 2022-06-08 RX ADMIN — ACYCLOVIR 400 MG: 200 CAPSULE ORAL at 17:46

## 2022-06-08 RX ADMIN — IBUPROFEN 400 MG: 400 TABLET ORAL at 09:00

## 2022-06-08 RX ADMIN — PREDNISONE 120.5 MG: 20 TABLET ORAL at 09:00

## 2022-06-08 RX ADMIN — LORAZEPAM 1 MG: 1 TABLET ORAL at 20:22

## 2022-06-08 RX ADMIN — LISINOPRIL 20 MG: 20 TABLET ORAL at 09:00

## 2022-06-08 RX ADMIN — ACYCLOVIR 400 MG: 200 CAPSULE ORAL at 08:53

## 2022-06-08 RX ADMIN — FUROSEMIDE 10 MG: 20 TABLET ORAL at 09:01

## 2022-06-08 RX ADMIN — Medication 10 ML: at 06:19

## 2022-06-08 RX ADMIN — PANTOPRAZOLE SODIUM 40 MG: 40 TABLET, DELAYED RELEASE ORAL at 06:21

## 2022-06-08 RX ADMIN — SODIUM CHLORIDE 45 ML/HR: 9 INJECTION, SOLUTION INTRAVENOUS at 10:13

## 2022-06-08 RX ADMIN — MORPHINE SULFATE 15 MG: 15 TABLET ORAL at 17:43

## 2022-06-08 RX ADMIN — ZOLPIDEM TARTRATE 5 MG: 5 TABLET ORAL at 20:22

## 2022-06-08 RX ADMIN — PREDNISONE 120.5 MG: 20 TABLET ORAL at 17:44

## 2022-06-08 RX ADMIN — ETOPOSIDE 120.6 MG: 20 INJECTION, SOLUTION, CONCENTRATE INTRAVENOUS at 14:43

## 2022-06-08 RX ADMIN — ONDANSETRON 8 MG: 2 INJECTION INTRAMUSCULAR; INTRAVENOUS at 08:53

## 2022-06-08 RX ADMIN — SULFAMETHOXAZOLE AND TRIMETHOPRIM 1 TABLET: 800; 160 TABLET ORAL at 09:02

## 2022-06-08 RX ADMIN — VINCRISTINE SULFATE: 1 INJECTION, SOLUTION INTRAVENOUS at 15:49

## 2022-06-08 RX ADMIN — ONDANSETRON 8 MG: 2 INJECTION INTRAMUSCULAR; INTRAVENOUS at 17:46

## 2022-06-08 RX ADMIN — LORAZEPAM 1 MG: 1 TABLET ORAL at 12:15

## 2022-06-08 NOTE — PROGRESS NOTES
Problem: Falls - Risk of  Goal: *Absence of Falls  Description: Document Paloma Medina Fall Risk and appropriate interventions in the flowsheet.   Outcome: Progressing Towards Goal  Note: Fall Risk Interventions:            Medication Interventions: Bed/chair exit alarm,Patient to call before getting OOB                   Problem: Patient Education: Go to Patient Education Activity  Goal: Patient/Family Education  Outcome: Progressing Towards Goal     Problem: Pain  Goal: *Control of Pain  Outcome: Progressing Towards Goal  Goal: *PALLIATIVE CARE:  Alleviation of Pain  Outcome: Progressing Towards Goal     Problem: Patient Education: Go to Patient Education Activity  Goal: Patient/Family Education  Outcome: Progressing Towards Goal

## 2022-06-08 NOTE — PROGRESS NOTES
[facility]  [address] [facility]  W: [phone]  F: [phone] Patient ID  Name: Haja Vásquez  YOB: 1955  MRN: [id number]  Referring Provider:   Praneeth Stephens MD  [address]  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 This summary highlights focused aspects of patient's ongoing care and may have a recurring section in notes with either updates or remain unchanged as a longitudinal care summary. -------------------------------------------------------------------------------------------------------------------------------------------------------------------------------------------------------  DIAGNOSIS:   Diffuse Large B-Cell Lymphoma    ORIGINAL STAGING:   Stage IV    SITES OF DISEASE:   Left Cervical Lymph Node,Bone Disease, Stage IV    CURRENT TREATMENT:   C1,D1 on 5/11/22 DA-R-EPOCH    PRIOR TREATMENT:   n/a    GOALS OF CARE:   Curative Intent    PATHOLOGY:   Specimen #:[id number] Collect: 4/22/2022      ==========================================================================                    * * *SURGICAL PATHOLOGY REPORT* * *   ==========================================================================   * * *PROCEDURES/ADDENDA* * *   ADDENDUM   Date Ordered: 4/26/2022     Status: Signed Out   Date Complete: 4/26/2022     By: Annie Ramsay.  Abbie Hoover MD   Date Reported: 4/26/2022   Addendum Diagnosis   Lymph node, left posterior cervical lymph node, excision:   In situ hybridization for Jesús-Barr viral RNA: Negative   CPT: 20554  Addendum Comment   * * *CLINICAL HISTORY* * *   Cervical lymphadenopathy, rule out lymphoma ==========================================================================   * * *FINAL PATHOLOGIC DIAGNOSIS* * *        Lymph node, left posterior cervical lymph node, excision:        Large B cell lymphoma. See comment. * * *Comment* * *   The histologic section has fragments of lymphoid tissue with diffuse and   focal follicular architecture. Diffuse areas are comprised of large,   atypical lymphocytes with centroblastic morphology and increased mitotic   activity. Immunohistochemical stains confirm the malignant cells are CD20   positive B cells that coexpress CD10 and BCL6 without MUM1. CD23   highlights rare follicular dendritic networks associated with lymphoid   follicles with germinal centers and express CD10 and BCL6 without BCL2. CyclinD1 and CD56 stains are negative. Concurrent flow cytometric analysis   confirms a clonal CD10 positive B-cell population comprised of medium to   large cells. The Ki-67 proliferation index is 30%. The overall findings favor the diagnosis of diffuse large B-cell lymphoma,   germinal center subtype. Molecular genetic studies are pending for further   characterization will be reported in an addendum. Flow cytometry:   Consistent with CD10-positive B-cell lymphoproliferative disorder. Comments: Flow cytometry shows monoclonal B-cells (36% of total cells)   with CD10 expression without CD5, consistent with a CD10-positive B-cell   lymphoproliferative disorder. The main differential diagnosis includes   follicular lymphoma, Burkitt lymphoma and large B-cell lymphoma. Please   correlate the result with morphologic findings and clinical information. Flow Differential (%) and Population Analysis:   Lymphocytes: 93.7%   T-cells (39% of lymphoid cells) show a CD4/CD8 ratio of 3.3 without overt   phenotypic abnormality. NK-cells (1% of lymphoid cells) are unremarkable.    Mature B-cells (56% of lymphoid cells) include large forms based on   forward scattered pattern and show: CD5 neg, CD10+, CD11c+dim, CD19+,   CD20+ with surface lambda light chain restriction. Markers Performed: CD2, CD3, CD4, CD5, CD7, CD8, CD10, CD11c, CD19, CD20,   CD23, CD34, CD38, CD45, CD56, Kappa, Lambda (17 Markers)   The Technical Component Processing of this test was completed at   [facility], [address] [place] [address], [place], [place] / [address] /   [phone] / Jeannie Idler # [id number].  Interpretation by Larry Mora M.D. The   complete scanned report is available in Epic. CPT: T1103556, O9661416, L6135415, [id number], [id number], [id number]   [facility]2/2022   *Electronically Signed Out By Yesenia Moya MD*   ==========================================================================   * * *Gross Description* * *   Specimen #1, received fresh labeled with the patient's name,  and left   posterior cervical lymph node, consists of two paper towels containing a   1.7 x 1.5 x 0.5 cm aggregate of pale pink-tan, fleshy soft tissue   fragments. The specimen is handled according to the flow cytometry   protocol as follows:   7          Two air-dried touch prep slides - one Diff Quik stained, one   rapid-fixed in 95% alcohol   7          Representative sections in B plus fixative (1A)   7          One piece held in RPMI for possible flow cytometry analysis   7          Remaining tissue fixed in formalin for permanents (1B)   Dr. Rozina Moya will determine if flow cytometry is necessary. 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 F who presents for Cycle 2 DA-R-EPOCH. She reports having pains again. Also tells me today that she has had nausea/vomiting for most days of cycle #1. She reports cutting back on her smoking. Still notes anxiety. Still eating and drinking. Pain is mainly in her joints. She notes that she still has not had any recurrent mass on her scalp that disappeared with Cycle #1.       Interval History:     Having increase in swelling to LE. Pain is controlled and nausea improved. Has been up in the chair at bedside and ambulating in the room/hallway. Past Medical History:   Diagnosis Date    Adverse effect of anesthesia     PHLEGM IN THROAT POST OP & NEEDED X2 BREATHING TREATMENTS    Anxiety     COPD (chronic obstructive pulmonary disease) ([address] 2022    emphysema    Depression     GERD (gastroesophageal reflux disease)     Hernia, femoral     since childhood    Hypertension     Joint pain     Nausea & vomiting     TMJ arthritis       Past Surgical History:   Procedure Laterality Date    HX HYSTERECTOMY  2019    HX TONSILLECTOMY     History of Salivary Gland Surgery in 30's for evaluation for dry mouth.      Social History     Tobacco Use    Smoking status: Current Every Day Smoker     Packs/day: 0.50     Years: 40.00     Pack years: 20.00     Types: Cigarettes    Smokeless tobacco: Never Used    Tobacco comment: 0.5-1 pack per day   Substance Use Topics    Alcohol use: Not Currently     Alcohol/week: 0.0 standard drinks      Family History   Problem Relation Age of Onset    Heart Disease Mother 46    Heart Surgery Mother         HEART TRANSPLANT    Elevated Lipids Mother     Hypertension Mother     COPD Father     Emphysema Father     Sudden Death Brother 46    Other Maternal Grandmother         BRAIN TUMOR    No Known Problems Son     No Known Problems Daughter      Current Facility-Administered Medications   Medication Dose Route Frequency    furosemide (LASIX) tablet 10 mg  10 mg Oral DAILY    FLUoxetine (PROzac) capsule 40 mg  40 mg Oral 7am    LORazepam (ATIVAN) tablet 1 mg  1 mg Oral Q8H PRN    zolpidem (AMBIEN) tablet 5 mg  5 mg Oral QHS PRN    sodium chloride (NS) flush 5-40 mL  5-40 mL IntraVENous Q8H    sodium chloride (NS) flush 5-40 mL  5-40 mL IntraVENous PRN    acetaminophen (TYLENOL) tablet 650 mg  650 mg Oral Q6H PRN    Or    acetaminophen (TYLENOL) suppository 650 mg  650 mg Rectal Q6H PRN    ondansetron (ZOFRAN ODT) tablet 4 mg  4 mg Oral Q8H PRN    Or    ondansetron (ZOFRAN) injection 4 mg  4 mg IntraVENous Q6H PRN    enoxaparin (LOVENOX) injection 40 mg  40 mg SubCUTAneous DAILY    polyethylene glycol (MIRALAX) packet 17 g  17 g Oral DAILY PRN    nicotine (NICODERM CQ) 14 mg/24 hr patch 1 Patch  1 Patch TransDERmal DAILY    lisinopriL (PRINIVIL, ZESTRIL) tablet 20 mg  20 mg Oral DAILY    And    amLODIPine (NORVASC) tablet 5 mg  5 mg Oral DAILY    pantoprazole (PROTONIX) tablet 40 mg  40 mg Oral ACB    0.9% sodium chloride infusion  45 mL/hr IntraVENous CONTINUOUS    ondansetron (ZOFRAN) injection 8 mg  8 mg IntraVENous Q8H    LORazepam (ATIVAN) 2 mg/mL injection 0.5 mg  0.5 mg IntraVENous Q6H PRN    predniSONE (DELTASONE) tablet 120.5 mg  60 mg/m2 (Treatment Plan Recorded) Oral BID WITH MEALS    etoposide (VEPESID) 120.6 mg in 0.9% sodium chloride 500 mL, overfill volume 50 mL chemo infusion  60 mg/m2 (Treatment Plan Recorded) IntraVENous Q24H    vinCRIStine (VINCASAR PFS) 0.8 mg, DOXOrubicin (ADRIAMYCIN) 22.2 mg in 0.9% sodium chloride 250 mL, overfill volume 25 mL chemo infusion   IntraVENous Q24H    [START ON 6/10/2022] cyclophosphamide (CYTOXAN) 1,809 mg in 0.9% sodium chloride 250 mL, overfill volume 25 mL chemo infusion  900 mg/m2 (Treatment Plan Recorded) IntraVENous ONCE    acyclovir (ZOVIRAX) capsule 400 mg  400 mg Oral BID    trimethoprim-sulfamethoxazole (BACTRIM DS, SEPTRA DS) 160-800 mg per tablet 1 Tablet  1 Tablet Oral Q MON, WED & FRI    OLANZapine (ZyPREXA) tablet 5 mg  5 mg Oral QPM    sodium chloride (NS) flush 10 mL  10 mL InterCATHeter PRN    heparin (porcine) pf 300-500 Units  300-500 Units InterCATHeter PRN    diphenhydrAMINE (BENADRYL) injection 25 mg  25 mg IntraVENous PRN    acetaminophen (TYLENOL) tablet 650 mg  650 mg Oral Q4H PRN    ondansetron (ZOFRAN) injection 8 mg  8 mg IntraVENous Q4H PRN    diphenhydrAMINE (BENADRYL) injection 50 mg  50 mg IntraVENous PRN    albuterol (PROVENTIL VENTOLIN) nebulizer solution 2.5 mg  2.5 mg Nebulization PRN    ibuprofen (MOTRIN) tablet 400 mg  400 mg Oral TID    morphine IR (MS IR) tablet 15 mg  15 mg Oral Q4H PRN      Allergies   Allergen Reactions    Oxycodone Nausea Only         Objective:     Visit Vitals  BP (!) 161/83   Pulse 70   Temp 97.5 °F (36.4 °C)   Resp 18   Ht 5' 3\" (1.6 m)   Wt 201 lb 8 oz (91.4 kg)   SpO2 97%   BMI 35.69 kg/m²     ECOG PS: 1- Restricted in physically strenuous activity but ambulatory and able to carry out work of a light or sedentary nature, e.g., light house work, office work. General: no distress  Eyes: anicteric sclerae  HENT: atraumatic  Neck: supple  Respiratory: normal respiratory effort  CV: slight edema noted to BLE edema; R>L  GI: soft, nontender, nondistended,   Skin: no rashes; no ecchymoses; no petechiae  Psych: alert, oriented, appropriate affect, normal judgment/insight    Results:     Lab Results   Component Value Date/Time    WBC 12.9 (H) 06/08/2022 06:22 AM    HGB 11.1 (L) 06/08/2022 06:22 AM    HCT 34.2 (L) 06/08/2022 06:22 AM    PLATELET 084 98/51/5402 06:22 AM    MCV 96.1 06/08/2022 06:22 AM    ABS. NEUTROPHILS 11.5 (H) 06/08/2022 06:22 AM     Lab Results   Component Value Date/Time    Sodium 139 06/08/2022 06:22 AM    Potassium 4.1 06/08/2022 06:22 AM    Chloride 109 (H) 06/08/2022 06:22 AM    CO2 22 06/08/2022 06:22 AM    Glucose 127 (H) 06/08/2022 06:22 AM    BUN 15 06/08/2022 06:22 AM    Creatinine 0.73 06/08/2022 06:22 AM    GFR est AA >60 06/08/2022 06:22 AM    GFR est non-AA >60 06/08/2022 06:22 AM    Calcium 8.6 06/08/2022 06:22 AM    Glucose (POC) 98 05/04/2022 01:37 PM     Lab Results   Component Value Date/Time    Bilirubin, total 0.6 06/08/2022 06:22 AM    ALT (SGPT) 20 06/08/2022 06:22 AM    Alk.  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  -Patient will proceed to curative intent C2 d3 DA-R-EPOCH. -C2 ( 20% dose increase cyclosphosphamide /etoposide; 10% increase in doxorubicin)     . -had significant edema following last chemo requiring lasix outpatient;  -prophylactic Bactrim MWF  -will add lasix 10 mg po daily while inpatient and continue outpatient      Cancer related pain  -Follows with palliative team outpatient  -have resumed home Morphine 15 mg po q 4 hours prn      Metastatic cancer to bone ([address]  -structural damage from her lymphoma may still cause her pain and this does not absolutely indicate that she has progressive disease.    Kimberley Tadeo Tobacco dependence  -on nicotine 14 mg patch         Anxiety: continue with Ativan every 8 hours prn        LE edema: R>L   eval with doppler         Plan reviewed with Dr Dominic Araiza By:   Milind Hathaway, STU

## 2022-06-08 NOTE — PROGRESS NOTES
Bedside, Verbal and Written shift change report given to Morena (oncoming nurse) by Linnette Goss RN (offgoing nurse). Report included the following information SBAR, Kardex, Procedure Summary, Intake/Output, MAR, Recent Results, Med Rec Status and Quality Measures.

## 2022-06-08 NOTE — PROGRESS NOTES
Patient states she had some type of visual disturbance that lasted about 10 seconds. She described it as \"yellow/orange squiglies\" Her BP is 184/84 pulse is 70. Denies headache and chest pain. Nurse Practitioner notified. No new orders at this time. Recheck BP in 30 minutes per NP. Recheck: 152/82    1529 Patient's BP is 169/82. Patient is asymptomatic. NP notified. No  new orders at this time. Orders for PRN hydralazine for SBP over 180 on board. Bedside and Verbal shift change report given to Ronan Livingston RN (oncoming nurse) by Waleska Bonds RN (offgoing nurse). Report included the following information SBAR, Kardex and MAR.

## 2022-06-08 NOTE — PROGRESS NOTES
Spiritual Care Assessment/Progress Note  [facility]      NAME: Prema So      MRN: [id number]  AGE: 78 y.o. SEX: female  Moravian Affiliation: No preference   Language: English     6/8/2022     Total Time (in minutes): 46     Spiritual Assessment begun in [facility] 5M1 MED SURG 1 through conversation with:         [x]Patient        [] Family    [] Friend(s)        Reason for Consult: Advance medical directive consult     Spiritual beliefs: (Please include comment if needed)     [] Identifies with a chen tradition:         [] Supported by a chen community:            [] Claims no spiritual orientation:           [] Seeking spiritual identity:                [x] Adheres to an individual form of spirituality:           [] Not able to assess:                           Identified resources for coping:      [] Prayer                               [] Music                  [] Guided Imagery     [x] Family/friends                 [] Pet visits     [] Devotional reading                         [] Unknown     [] Other:                                               Interventions offered during this visit: (See comments for more details)    Patient Interventions: Advance medical directive consult,Affirmation of chen           Plan of Care:     [] Support spiritual and/or cultural needs    [] Support AMD and/or advance care planning process      [] Support grieving process   [] Coordinate Rites and/or Rituals    [] Coordination with community clergy   [] No spiritual needs identified at this time   [] Detailed Plan of Care below (See Comments)  [] Make referral to Music Therapy  [] Make referral to Pet Therapy     [] Make referral to Addiction services  [] Make referral to [facility]  [] Make referral to Spiritual Care Partner  [] No future visits requested        [x] Follow up visits as needed     Visited pt in response to an In-Basket request to assist with an Advance Medical Directive (AMD).  Explained each section of the document to patient, allowing opportunity for questions and discussion. Pt chose not to complete an AMD. She verbally stated that her legal NOK, daughter Mary Leos and son Katerine Hay are her CO MPOA.   Chaplain Harjit, MDiv, MS, [facility]

## 2022-06-08 NOTE — PROGRESS NOTES
6/8/2022  Case Management Progress Note    12:00 PM  Patient is 78year old female admitted 6/6 for diffuse large B cell lymphoma  Patient's RUR is 16% yellow/moderate risk for readmission; she is currently a planned readmit. Covid test: none this admission  Chart reviewed--patient discussed at IDR rounds  Patient is a planned admission for chemotherapy in the hospital. She has good support at home and plan remains for her to return home with family when she completes her cycle on Friday. Will continue to follow and assist with discharge planning as needed. Transition of Care Plan   1. Continue medical management/treatment  2. Home with family assist on Friday 6/10  3. Family will transport at discharge  4. Follow up outpatient as indicated  5.  CM will continue to follow    CHIVO Gaytan [facility] Staff

## 2022-06-08 NOTE — ACP (ADVANCE CARE PLANNING)
Advance Care Planning   Advance Care Planning Inpatient Note  [address] [facility]    Today's Date: 6/8/2022  Unit: [facility] 5M1 MED SURG 1    Received request from IDT member. Upon review of chart and communication with care team, patient's decision making abilities are not in question. Patient was/were present in the room during visit. Goals of ACP Conversation:  Discuss Advance Care planning documents    Health Care Decision Makers:      Primary Decision Maker: Kamran Abraham - Daughter - [phone]    Primary Decision Maker: Ann Lia - Son - [phone]      Summary:  Documented Next of Kin, per patient report    Advance Care Planning Documents (Patient Wishes) on file:  None     Assessment:    Visited pt in response to an In-Basket request to assist with an Advance Medical Directive (AMD). Explained each section of the document to patient, allowing opportunity for questions and discussion. Pt chose not to complete an AMD. She verbally stated that her legal NOK, daughter Hernando Lane and son Hayley Ragsdale are her CO MPOA.     Interventions:  Discussed and provided education on state decision maker hierarchy  Encouraged ongoing ACP conversation with future decision makers and loved ones  Reviewed but did not complete ACP document    Care Preferences Communicated:  No    Outcomes/Plan:  AMD not completed    Nubia PartidaHampshire Memorial Hospital on 6/8/2022 at 11:54 AM

## 2022-06-09 LAB
ALBUMIN SERPL-MCNC: 3.2 G/DL (ref 3.5–5)
ALBUMIN/GLOB SERPL: 1.2 {RATIO} (ref 1.1–2.2)
ALP SERPL-CCNC: 66 U/L (ref 45–117)
ALT SERPL-CCNC: 29 U/L (ref 12–78)
ANION GAP SERPL CALC-SCNC: 7 MMOL/L (ref 5–15)
AST SERPL-CCNC: 13 U/L (ref 15–37)
BASOPHILS # BLD: 0 K/UL (ref 0–0.1)
BASOPHILS NFR BLD: 0 % (ref 0–1)
BILIRUB SERPL-MCNC: 0.2 MG/DL (ref 0.2–1)
BUN SERPL-MCNC: 15 MG/DL (ref 6–20)
BUN/CREAT SERPL: 21 (ref 12–20)
CALCIUM SERPL-MCNC: 8.2 MG/DL (ref 8.5–10.1)
CHLORIDE SERPL-SCNC: 110 MMOL/L (ref 97–108)
CO2 SERPL-SCNC: 24 MMOL/L (ref 21–32)
CREAT SERPL-MCNC: 0.72 MG/DL (ref 0.55–1.02)
DIFFERENTIAL METHOD BLD: ABNORMAL
EOSINOPHIL # BLD: 0 K/UL (ref 0–0.4)
EOSINOPHIL NFR BLD: 0 % (ref 0–7)
ERYTHROCYTE [DISTWIDTH] IN BLOOD BY AUTOMATED COUNT: 15.8 % (ref 11.5–14.5)
GLOBULIN SER CALC-MCNC: 2.6 G/DL (ref 2–4)
GLUCOSE SERPL-MCNC: 145 MG/DL (ref 65–100)
HBV CORE AB SERPL QL IA: NEGATIVE
HCT VFR BLD AUTO: 33.4 % (ref 35–47)
HGB BLD-MCNC: 10.9 G/DL (ref 11.5–16)
IMM GRANULOCYTES # BLD AUTO: 0.1 K/UL (ref 0–0.04)
IMM GRANULOCYTES NFR BLD AUTO: 1 % (ref 0–0.5)
LYMPHOCYTES # BLD: 0.2 K/UL (ref 0.8–3.5)
LYMPHOCYTES NFR BLD: 2 % (ref 12–49)
MCH RBC QN AUTO: 31.3 PG (ref 26–34)
MCHC RBC AUTO-ENTMCNC: 32.6 G/DL (ref 30–36.5)
MCV RBC AUTO: 96 FL (ref 80–99)
MONOCYTES # BLD: 0.3 K/UL (ref 0–1)
MONOCYTES NFR BLD: 3 % (ref 5–13)
NEUTS SEG # BLD: 9.6 K/UL (ref 1.8–8)
NEUTS SEG NFR BLD: 94 % (ref 32–75)
NRBC # BLD: 0 K/UL (ref 0–0.01)
NRBC BLD-RTO: 0 PER 100 WBC
PLATELET # BLD AUTO: 338 K/UL (ref 150–400)
PMV BLD AUTO: 9.5 FL (ref 8.9–12.9)
POTASSIUM SERPL-SCNC: 4 MMOL/L (ref 3.5–5.1)
PROT SERPL-MCNC: 5.8 G/DL (ref 6.4–8.2)
RBC # BLD AUTO: 3.48 M/UL (ref 3.8–5.2)
SODIUM SERPL-SCNC: 141 MMOL/L (ref 136–145)
WBC # BLD AUTO: 10.2 K/UL (ref 3.6–11)

## 2022-06-09 PROCEDURE — 74011000250 HC RX REV CODE- 250: Performed by: NURSE PRACTITIONER

## 2022-06-09 PROCEDURE — 99233 SBSQ HOSP IP/OBS HIGH 50: CPT | Performed by: INTERNAL MEDICINE

## 2022-06-09 PROCEDURE — 65270000029 HC RM PRIVATE

## 2022-06-09 PROCEDURE — 36415 COLL VENOUS BLD VENIPUNCTURE: CPT

## 2022-06-09 PROCEDURE — 74011250637 HC RX REV CODE- 250/637: Performed by: INTERNAL MEDICINE

## 2022-06-09 PROCEDURE — 80053 COMPREHEN METABOLIC PANEL: CPT

## 2022-06-09 PROCEDURE — 74011250636 HC RX REV CODE- 250/636: Performed by: NURSE PRACTITIONER

## 2022-06-09 PROCEDURE — 74011250636 HC RX REV CODE- 250/636: Performed by: INTERNAL MEDICINE

## 2022-06-09 PROCEDURE — 74011250637 HC RX REV CODE- 250/637: Performed by: NURSE PRACTITIONER

## 2022-06-09 PROCEDURE — 74011000250 HC RX REV CODE- 250: Performed by: INTERNAL MEDICINE

## 2022-06-09 PROCEDURE — 74011636637 HC RX REV CODE- 636/637: Performed by: INTERNAL MEDICINE

## 2022-06-09 PROCEDURE — 85025 COMPLETE CBC W/AUTO DIFF WBC: CPT

## 2022-06-09 RX ORDER — POLYETHYLENE GLYCOL 3350 17 G/17G
17 POWDER, FOR SOLUTION ORAL ONCE
Status: COMPLETED | OUTPATIENT
Start: 2022-06-09 | End: 2022-06-09

## 2022-06-09 RX ORDER — POLYETHYLENE GLYCOL 3350 17 G/17G
17 POWDER, FOR SOLUTION ORAL DAILY PRN
Status: DISCONTINUED | OUTPATIENT
Start: 2022-06-09 | End: 2022-06-10 | Stop reason: HOSPADM

## 2022-06-09 RX ORDER — IBUPROFEN 400 MG/1
400 TABLET ORAL 2 TIMES DAILY
Status: DISCONTINUED | OUTPATIENT
Start: 2022-06-09 | End: 2022-06-10 | Stop reason: HOSPADM

## 2022-06-09 RX ORDER — AMOXICILLIN 250 MG
1 CAPSULE ORAL
Status: DISCONTINUED | OUTPATIENT
Start: 2022-06-09 | End: 2022-06-10 | Stop reason: HOSPADM

## 2022-06-09 RX ADMIN — FAMOTIDINE 20 MG: 20 TABLET, FILM COATED ORAL at 20:13

## 2022-06-09 RX ADMIN — FAMOTIDINE 20 MG: 20 TABLET, FILM COATED ORAL at 08:19

## 2022-06-09 RX ADMIN — MORPHINE SULFATE 15 MG: 15 TABLET ORAL at 03:49

## 2022-06-09 RX ADMIN — LORAZEPAM 1 MG: 1 TABLET ORAL at 11:34

## 2022-06-09 RX ADMIN — ONDANSETRON 8 MG: 2 INJECTION INTRAMUSCULAR; INTRAVENOUS at 00:08

## 2022-06-09 RX ADMIN — ZOLPIDEM TARTRATE 5 MG: 5 TABLET ORAL at 20:13

## 2022-06-09 RX ADMIN — LORAZEPAM 1 MG: 1 TABLET ORAL at 03:24

## 2022-06-09 RX ADMIN — OLANZAPINE 5 MG: 5 TABLET, FILM COATED ORAL at 17:08

## 2022-06-09 RX ADMIN — ENOXAPARIN SODIUM 40 MG: 100 INJECTION SUBCUTANEOUS at 08:20

## 2022-06-09 RX ADMIN — DOCUSATE SODIUM 50MG AND SENNOSIDES 8.6MG 1 TABLET: 8.6; 5 TABLET, FILM COATED ORAL at 20:13

## 2022-06-09 RX ADMIN — MORPHINE SULFATE 15 MG: 15 TABLET ORAL at 23:06

## 2022-06-09 RX ADMIN — ONDANSETRON 8 MG: 2 INJECTION INTRAMUSCULAR; INTRAVENOUS at 08:20

## 2022-06-09 RX ADMIN — Medication 10 ML: at 03:49

## 2022-06-09 RX ADMIN — FLUOXETINE 40 MG: 20 CAPSULE ORAL at 06:13

## 2022-06-09 RX ADMIN — PREDNISONE 120.5 MG: 20 TABLET ORAL at 08:17

## 2022-06-09 RX ADMIN — MORPHINE SULFATE 15 MG: 15 TABLET ORAL at 17:07

## 2022-06-09 RX ADMIN — POLYETHYLENE GLYCOL 3350 17 G: 17 POWDER, FOR SOLUTION ORAL at 11:36

## 2022-06-09 RX ADMIN — FUROSEMIDE 20 MG: 20 TABLET ORAL at 08:17

## 2022-06-09 RX ADMIN — ACYCLOVIR 400 MG: 200 CAPSULE ORAL at 17:07

## 2022-06-09 RX ADMIN — MORPHINE SULFATE 15 MG: 15 TABLET ORAL at 08:20

## 2022-06-09 RX ADMIN — SODIUM CHLORIDE 45 ML/HR: 9 INJECTION, SOLUTION INTRAVENOUS at 08:26

## 2022-06-09 RX ADMIN — PANTOPRAZOLE SODIUM 40 MG: 40 TABLET, DELAYED RELEASE ORAL at 06:12

## 2022-06-09 RX ADMIN — IBUPROFEN 400 MG: 400 TABLET ORAL at 17:08

## 2022-06-09 RX ADMIN — ACYCLOVIR 400 MG: 200 CAPSULE ORAL at 08:20

## 2022-06-09 RX ADMIN — VINCRISTINE SULFATE: 1 INJECTION, SOLUTION INTRAVENOUS at 16:24

## 2022-06-09 RX ADMIN — LORAZEPAM 1 MG: 1 TABLET ORAL at 20:13

## 2022-06-09 RX ADMIN — MORPHINE SULFATE 15 MG: 15 TABLET ORAL at 13:00

## 2022-06-09 RX ADMIN — ONDANSETRON 8 MG: 2 INJECTION INTRAMUSCULAR; INTRAVENOUS at 17:07

## 2022-06-09 RX ADMIN — AMLODIPINE BESYLATE 5 MG: 5 TABLET ORAL at 08:19

## 2022-06-09 RX ADMIN — ETOPOSIDE 120.6 MG: 20 INJECTION, SOLUTION, CONCENTRATE INTRAVENOUS at 16:24

## 2022-06-09 RX ADMIN — PREDNISONE 120.5 MG: 20 TABLET ORAL at 17:07

## 2022-06-09 RX ADMIN — LISINOPRIL 20 MG: 20 TABLET ORAL at 08:20

## 2022-06-09 RX ADMIN — SODIUM CHLORIDE, PRESERVATIVE FREE 10 ML: 5 INJECTION INTRAVENOUS at 00:12

## 2022-06-09 RX ADMIN — MORPHINE SULFATE 15 MG: 15 TABLET ORAL at 00:08

## 2022-06-09 NOTE — ADT AUTH CERT NOTES
Vomiting - Care Day 3 (6/8/2022) by Sahra Light       Review Status Review Entered   Completed 6/9/2022 15:02      Criteria Review      Care Day: 3 Care Date: 6/8/2022 Level of Care: Inpatient Floor    Guideline Day 2    Level Of Care    ( ) Floor to discharge    6/9/2022 15:02:01 EDT by Sahra Light      Medical bed    Clinical Status    (X) * Hemodynamic stability    ( ) * Vomiting absent or controlled    (X) * Electrolyte abnormalities absent or acceptable for next level of care    (X) * Life-threatening causes of vomiting excluded    (X) * Pain absent or managed    ( ) * Discharge plans and education understood    Activity    (X) * Ambulatory or acceptable for next level of care    6/9/2022 15:02:01 EDT by Sahra Light      up ad [place]    Routes    (X) * Oral hydration    (X) * Oral medications or regimen acceptable for next level of care    (X) * Oral diet or acceptable for next level of care    6/9/2022 15:02:01 EDT by Sahra Light      Adult reg diet    Interventions    (X) Electrolytes    6/9/2022 15:02:01 EDT by Sahra Light      Sodium: 139  Chloride: 109 (H)  CO2: 22  Anion gap: 8  Glucose: 127 (H)  BUN: 15  BUN/Creatinine ratio: 21 (H)  Calcium: 8.6  GFR est non-AA: >60  Protein, total: 6.0 (L)  Albumin: 3.4    Medications    (X) Possible antiemetics    6/9/2022 15:02:01 EDT by Sahra Light      Zofran 8 mg IV q4hr prn x4    * Milestone   Additional Notes    DATE: 6/8      Vitals:    97.8 °F (36.6 °C) 78 169/82 Abnormal  111 - Sitting 18 94 % ra 214 lb      Abnl/Pertinent Labs/Radiology/Diagnostic Studies:   WBC: 12.9 (H)   NRBC: 0.0   RBC: 3.56 (L)   HGB: 11.1 (L)   HCT: 34.2 (L)   RDW: 15.5 (H)   PLATELET: 348   NEUTROPHILS: 90 (H)      US LE:   Right Lower Venous   Technically difficult exam due to: tissue density.    No evidence of deep vein thrombosis in the common femoral, profunda femoral, femoral, popliteal, posterior tibial, and peroneal veins.  The veins were imaged in the transverse and longitudinal planes. The vessels showed normal color filling and compressibility. Doppler interrogation showed phasic and spontaneous flow. Left Lower Venous   For comparison purposes, the left common femoral vein was briefly interrogated. These vein demonstrates normal color filing and compressibility. Doppler flow was phasic and spontaneous. Physical Exam:   Constitutional: No acute distress. and Non-toxic appearance. HENT: Normocephalic and atraumatic head.     Eyes: Normal Conjunctivae. Anicteric sclerae. Cardiovascular: S1,S2 auscultated. No pitting  but trace edema present. Pulmonary: Normal Respiratory Effort. No wheezing. No rhonchi. No rales. Abdominal: Normal bowel sounds. Soft Abdomen to palpation. No abdominal tenderness. Musculoskeletal: No myalgias on palpation. Skin: No jaundice. No rash. Neurological: Alert and oriented. No tremor on inspection.     Psychiatric: mood normal. normal speech rate. anxious affect      Attending:   A/P:   Patient Active Problem List   Diagnosis   · Tobacco dependence   -seems stable today.       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       · High risk medication use   -proceed with treatment. Will increase lasix to 20mg po tomorrow since weight gain of about 14 lbs so far.    · Encounter for screening for other viral diseases   -total core ab still pending against Hep B.   · Cancer related pain         Medications:   NS 45 mL/hr IV drip, Acyclovir 400 mg PO bid, Lisinopril 20 mg PO daily, Norvasc 5 mg PO daily,  Lovenox 40 mg sc daily, Prozac 40 mg PO ac, Ativan 0.5 mg IV q6hr prn x4, Morphine 15 mg PO q4hr prn x6, Nicotine 14 mg/24hr patch td,  zyprexa 5 mg PO hs, Zofran 8 mg IV q8hr, Protonix 40 mg PO ac, prednisone 120.5 mg PO bid, ambien 5 mg PO hs, Lasix 10 mg PO x1        etoposide (VEPESID) 120.6 mg in 0.9% sodium chloride 500 mL, overfill volume 50 mL chemo infusion   Dose: 60 mg/m2   Weight Dosing Info: 90.9 kg (Treatment Plan Recorded)   Freq: EVERY 24 HOURS Route: IV       vinCRIStine (VINCASAR PFS) 0.8 mg, DOXOrubicin (ADRIAMYCIN) 22.2 mg in 0.9% sodium chloride 250 mL, overfill volume 25 mL chemo infusion   Freq: EVERY 24 HOURS Route: IV           6/7 additional clinical by Rachid Santos       Review Status Review Entered   In Primary 6/9/2022 14:48      Criteria Review    DATE: 6/7        Vitals:    97.6 °F (36.4 °C) 71 143/73 Abnormal  96 -- Sitting 18 96 %         Abnl/Pertinent Labs/Radiology/Diagnostic Studies:       Ref. Range 6/7/2022 04:34   RBC Latest Ref Range: 3.80 - 5.20 M/uL 3.49 (L)   HGB Latest Ref Range: 11.5 - 16.0 g/dL 11.0 (L)   HCT Latest Ref Range: 35.0 - 47.0 % 34.0 (L)   RDW Latest Ref Range: 11.5 - 14.5 % 15.4 (H)   PLATELET Latest Ref Range: 150 - 400 K/uL 369   NEUTROPHILS Latest Ref Range: 32 - 75 % 94 (H)   Results for Maynor Mcdaniel (MRN [id number]) as of 6/9/2022 14:50    Ref. Range 6/7/2022 04:34   Sodium Latest Ref Range: 136 - 145 mmol/L 138   Chloride Latest Ref Range: 97 - 108 mmol/L 110 (H)   CO2 Latest Ref Range: 21 - 32 mmol/L 19 (L)   Anion gap Latest Ref Range: 5 - 15 mmol/L 9   Glucose Latest Ref Range: 65 - 100 mg/dL 186 (H)   BUN Latest Ref Range: 6 - 20 MG/DL 15   BUN/Creatinine ratio Latest Ref Range: 12 - 20   16   Calcium Latest Ref Range: 8.5 - 10.1 MG/DL 8.7   GFR est non-AA Latest Ref Range: >60 ml/min/1.73m2 58 (L)   Protein, total Latest Ref Range: 6.4 - 8.2 g/dL 6.0 (L)   Albumin Latest Ref Range: 3.5 - 5.0 g/dL 3.4 (L)   AST Latest Ref Range: 15 - 37 U/L 7 (L)            Physical Exam  Constitutional: No acute distress. and Non-toxic appearance. HENT: Normocephalic and atraumatic head.    Eyes: Normal Conjunctivae. Anicteric sclerae. Cardiovascular: S1,S2 auscultated. No pitting edema. Pulmonary: Normal Respiratory Effort. No wheezing. No rhonchi. No rales. Abdominal: Normal bowel sounds.  Soft Abdomen to palpation. No abdominal tenderness. Musculoskeletal: No temporal muscle wasting on gross inspection. No myalgias on palpation. Skin: No jaundice. No rash. Neurological: Alert and oriented. No tremor on inspection.    Psychiatric: mood normal. normal speech rate. Slightly anxious affect        IM note:  A/P:        Patient Active Problem List   Diagnosis    Tobacco dependence  -[place] notes having a rough night and having more nicotine cravings on 14mg patch. However, we discussed trying to reduce her stressors rather than reflexively increasing her nicotine patch which may offset her progress in quitting smoking which would increase her chances of responding to chemotherapy.      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         High risk medication use  -will add low dose oral lasix to try to gingerly offset some of the fluid overload associated with necessary hydration for patient's treatment.       Encounter for screening for other viral diseases  -Hep B Surf Ag negative Hep B surf Ab positive. Hep B Total Core not collected.  Will order as inpatient but low risk likely of Hep B reactivation though would be indicated if total core is positive.       Cancer related pain               Medications:  NS 45 mL/hr IV drip, Acyclovir 400 mg PO bid, Lisinopril 20 mg PO daily, Norvasc 5 mg PO daily,  Lovenox 40 mg sc daily, Prozac 40 mg PO ac, Ativan 0.5 mg IV q6hr prn x4, Morphine 15 mg PO q4hr prn x6, Nicotine 14 mg/24hr patch td,  zyprexa 5 mg PO hs, Zofran 8 mg IV q8hr, Protonix 40 mg PO ac, prednisone 120.5 mg PO bid, ambien 5 mg PO hs, Lasix 10 mg PO x1      etoposide (VEPESID) 120.6 mg in 0.9% sodium chloride 500 mL, overfill volume 50 mL chemo infusion  Dose: 60 mg/m2  Weight Dosing Info: 90.9 kg (Treatment Plan Recorded)  Freq: EVERY 24 HOURS Route: IV     vinCRIStine (VINCASAR PFS) 0.8 mg, DOXOrubicin (ADRIAMYCIN) 22.2 mg in 0.9% sodium chloride 250 mL, overfill volume 25 mL chemo infusion  Freq: EVERY 24 HOURS Route: IV

## 2022-06-09 NOTE — PROGRESS NOTES
Problem: Falls - Risk of  Goal: *Absence of Falls  Outcome: Progressing Towards Goal  Note: Fall Risk Interventions:            Medication Interventions: Patient to call before getting OOB                   Problem: Patient Education: Go to Patient Education Activity  Goal: Patient/Family Education  Outcome: Progressing Towards Goal     Problem: Pain  Goal: *Control of Pain  Outcome: Progressing Towards Goal  Goal: *PALLIATIVE CARE:  Alleviation of Pain  Outcome: Progressing Towards Goal     Problem: Pain  Goal: *Control of Pain  Outcome: Progressing Towards Goal  Goal: *PALLIATIVE CARE:  Alleviation of Pain  Outcome: Progressing Towards Goal     Problem: Chemotherapy, Day 1  Goal: Off Pathway (Use only if patient is Off Pathway)  Outcome: Progressing Towards Goal  Goal: Activity/Safety  Outcome: Progressing Towards Goal  Goal: Consults, if ordered  Outcome: Progressing Towards Goal  Goal: Diagnostic Test/Procedures  Outcome: Progressing Towards Goal  Goal: Nutrition/Diet  Outcome: Progressing Towards Goal  Goal: Discharge Planning  Outcome: Progressing Towards Goal  Goal: Medications  Outcome: Progressing Towards Goal  Goal: Respiratory  Outcome: Progressing Towards Goal  Goal: Treatments/Interventions/Procedures  Outcome: Progressing Towards Goal  Goal: Psychosocial  Outcome: Progressing Towards Goal  Goal: *Optimal pain control at patient's stated goal  Outcome: Progressing Towards Goal  Goal: *Hemodynamically stable  Outcome: Progressing Towards Goal  Goal: *Adequate oxygenation  Outcome: Progressing Towards Goal  Goal: *Chemotherapy regimen is initiated  Outcome: Progressing Towards Goal  Goal: *Patient and family verbalize understanding of plan of care  Outcome: Progressing Towards Goal     Problem: Chemotherapy, Day 1  Goal: Off Pathway (Use only if patient is Off Pathway)  Outcome: Progressing Towards Goal  Goal: Activity/Safety  Outcome: Progressing Towards Goal  Goal: Consults, if ordered  Outcome: Progressing Towards Goal  Goal: Diagnostic Test/Procedures  Outcome: Progressing Towards Goal  Goal: Nutrition/Diet  Outcome: Progressing Towards Goal  Goal: Discharge Planning  Outcome: Progressing Towards Goal  Goal: Medications  Outcome: Progressing Towards Goal  Goal: Respiratory  Outcome: Progressing Towards Goal  Goal: Treatments/Interventions/Procedures  Outcome: Progressing Towards Goal  Goal: Psychosocial  Outcome: Progressing Towards Goal  Goal: *Optimal pain control at patient's stated goal  Outcome: Progressing Towards Goal  Goal: *Hemodynamically stable  Outcome: Progressing Towards Goal  Goal: *Adequate oxygenation  Outcome: Progressing Towards Goal  Goal: *Chemotherapy regimen is initiated  Outcome: Progressing Towards Goal  Goal: *Patient and family verbalize understanding of plan of care  Outcome: Progressing Towards Goal     Problem: Chemotherapy, Day 3 to Discharge  Goal: Off Pathway (Use only if patient is Off Pathway)  Outcome: Progressing Towards Goal  Goal: Activity/Safety  Outcome: Progressing Towards Goal  Goal: Consults, if ordered  Outcome: Progressing Towards Goal  Goal: Diagnostic Test/Procedures  Outcome: Progressing Towards Goal  Goal: Nutrition/Diet  Outcome: Progressing Towards Goal  Goal: Discharge Planning  Outcome: Progressing Towards Goal  Goal: Medications  Outcome: Progressing Towards Goal  Goal: Respiratory  Outcome: Progressing Towards Goal  Goal: Treatments/Interventions/Procedures  Outcome: Progressing Towards Goal  Goal: Psychosocial  Outcome: Progressing Towards Goal  Goal: *Optimal pain control at patient's stated goal  Outcome: Progressing Towards Goal  Goal: *Hemodynamically stable  Outcome: Progressing Towards Goal  Goal: *Adequate oxygenation  Outcome: Progressing Towards Goal

## 2022-06-09 NOTE — ROUTINE PROCESS
Bedside and Verbal shift change report given to Terri CUMMINGS RN (oncoming nurse) by Stephanie Aguilar (offgoing nurse). Report included the following information SBAR and Kardex.

## 2022-06-09 NOTE — PROGRESS NOTES
Cancer [place] at [facility]  [address] [facility]  W: [phone]  F: [phone] Patient ID  Name: Dia Montoya  YOB: 1955  MRN: [id number]  Referring Provider:   Kala De La Fuente MD  [address]  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 This summary highlights focused aspects of patient's ongoing care and may have a recurring section in notes with either updates or remain unchanged as a longitudinal care summary. -------------------------------------------------------------------------------------------------------------------------------------------------------------------------------------------------------  DIAGNOSIS:   Diffuse Large B-Cell Lymphoma    ORIGINAL STAGING:   Stage IV    SITES OF DISEASE:   Left Cervical Lymph Node,Bone Disease, Stage IV    CURRENT TREATMENT:   C1,D1 on 5/11/22 DA-R-EPOCH    PRIOR TREATMENT:   n/a    GOALS OF CARE:   Curative Intent    PATHOLOGY:   Specimen #:[id number] Collect: 4/22/2022      ==========================================================================                    * * *SURGICAL PATHOLOGY REPORT* * *   ==========================================================================   * * *PROCEDURES/ADDENDA* * *   ADDENDUM   Date Ordered: 4/26/2022     Status: Signed Out   Date Complete: 4/26/2022     By: Avril Dickson.  Brea Patiño MD   Date Reported: 4/26/2022   Addendum Diagnosis   Lymph node, left posterior cervical lymph node, excision:   In situ hybridization for Jesús-Barr viral RNA: Negative   CPT: 01440  Addendum Comment   * * *CLINICAL HISTORY* * *   Cervical lymphadenopathy, rule out lymphoma ==========================================================================   * * *FINAL PATHOLOGIC DIAGNOSIS* * *        Lymph node, left posterior cervical lymph node, excision:        Large B cell lymphoma. See comment. * * *Comment* * *   The histologic section has fragments of lymphoid tissue with diffuse and   focal follicular architecture. Diffuse areas are comprised of large,   atypical lymphocytes with centroblastic morphology and increased mitotic   activity. Immunohistochemical stains confirm the malignant cells are CD20   positive B cells that coexpress CD10 and BCL6 without MUM1. CD23   highlights rare follicular dendritic networks associated with lymphoid   follicles with germinal centers and express CD10 and BCL6 without BCL2. CyclinD1 and CD56 stains are negative. Concurrent flow cytometric analysis   confirms a clonal CD10 positive B-cell population comprised of medium to   large cells. The Ki-67 proliferation index is 30%. The overall findings favor the diagnosis of diffuse large B-cell lymphoma,   germinal center subtype. Molecular genetic studies are pending for further   characterization will be reported in an addendum. Flow cytometry:   Consistent with CD10-positive B-cell lymphoproliferative disorder. Comments: Flow cytometry shows monoclonal B-cells (36% of total cells)   with CD10 expression without CD5, consistent with a CD10-positive B-cell   lymphoproliferative disorder. The main differential diagnosis includes   follicular lymphoma, Burkitt lymphoma and large B-cell lymphoma. Please   correlate the result with morphologic findings and clinical information. Flow Differential (%) and Population Analysis:   Lymphocytes: 93.7%   T-cells (39% of lymphoid cells) show a CD4/CD8 ratio of 3.3 without overt   phenotypic abnormality. NK-cells (1% of lymphoid cells) are unremarkable.    Mature B-cells (56% of lymphoid cells) include large forms based on   forward scattered pattern and show: CD5 neg, CD10+, CD11c+dim, CD19+,   CD20+ with surface lambda light chain restriction. Markers Performed: CD2, CD3, CD4, CD5, CD7, CD8, CD10, CD11c, CD19, CD20,   CD23, CD34, CD38, CD45, CD56, Kappa, Lambda (17 Markers)   The Technical Component Processing of this test was completed at   [facility], [address] [place] [address], [place], [place] / [id number] /   [phone] / Carley Rodriguez # [id number].  Interpretation by Delgado San M.D. The   complete scanned report is available in Epic. CPT: R6308959, D8673485, C8642516, [id number], [id number], [id number]   [facility]2/2022   *Electronically Signed Out By Faith Albarado. Leander Dominguez MD*   ==========================================================================   * * *Gross Description* * *   Specimen #1, received fresh labeled with the patient's name,  and left   posterior cervical lymph node, consists of two paper towels containing a   1.7 x 1.5 x 0.5 cm aggregate of pale pink-tan, fleshy soft tissue   fragments. The specimen is handled according to the flow cytometry   protocol as follows:   7          Two air-dried touch prep slides - one Diff Quik stained, one   rapid-fixed in 95% alcohol   7          Representative sections in B plus fixative (1A)   7          One piece held in RPMI for possible flow cytometry analysis   7          Remaining tissue fixed in formalin for permanents (1B)   Dr. Leander Dominguez will determine if flow cytometry is necessary. 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 F who presents for Cycle 2 DA-R-EPOCH. She reports having pains again. Also tells me today that she has had nausea/vomiting for most days of cycle #1. She reports cutting back on her smoking. Still notes anxiety. Still eating and drinking. Pain is mainly in her joints. She notes that she still has not had any recurrent mass on her scalp that disappeared with Cycle #1.       Interval History:    Remains concerned with LE swelling. Nausea well controlled; rates pain 6-7/10 and with medication  down to 3-4/10. Reports has been up ambulating in the hallway. Past Medical History:   Diagnosis Date    Adverse effect of anesthesia     PHLEGM IN THROAT POST OP & NEEDED X2 BREATHING TREATMENTS    Anxiety     COPD (chronic obstructive pulmonary disease) ([facility] [address] 2022    emphysema    Depression     GERD (gastroesophageal reflux disease)     Hernia, femoral     since childhood    Hypertension     Joint pain     Nausea & vomiting     TMJ arthritis       Past Surgical History:   Procedure Laterality Date    HX HYSTERECTOMY  2019    HX TONSILLECTOMY     History of Salivary Gland Surgery in 30's for evaluation for dry mouth.        Current Facility-Administered Medications   Medication Dose Route Frequency    polyethylene glycol (MIRALAX) packet 17 g  17 g Oral ONCE    polyethylene glycol (MIRALAX) packet 17 g  17 g Oral DAILY PRN    senna-docusate (PERICOLACE) 8.6-50 mg per tablet 1 Tablet  1 Tablet Oral QHS    [START ON 6/10/2022] predniSONE (DELTASONE) tablet 120.5 mg  120.5 mg Oral BID    hydrALAZINE (APRESOLINE) 20 mg/mL injection 10 mg  10 mg IntraVENous Q6H PRN    famotidine (PEPCID) tablet 20 mg  20 mg Oral Q12H    furosemide (LASIX) tablet 20 mg  20 mg Oral DAILY    FLUoxetine (PROzac) capsule 40 mg  40 mg Oral 7am    LORazepam (ATIVAN) tablet 1 mg  1 mg Oral Q8H PRN    zolpidem (AMBIEN) tablet 5 mg  5 mg Oral QHS PRN    sodium chloride (NS) flush 5-40 mL  5-40 mL IntraVENous Q8H    sodium chloride (NS) flush 5-40 mL  5-40 mL IntraVENous PRN    acetaminophen (TYLENOL) tablet 650 mg  650 mg Oral Q6H PRN    Or    acetaminophen (TYLENOL) suppository 650 mg  650 mg Rectal Q6H PRN    ondansetron (ZOFRAN ODT) tablet 4 mg  4 mg Oral Q8H PRN    Or    ondansetron (ZOFRAN) injection 4 mg  4 mg IntraVENous Q6H PRN    enoxaparin (LOVENOX) injection 40 mg  40 mg SubCUTAneous DAILY    polyethylene glycol (MIRALAX) packet 17 g  17 g Oral DAILY PRN    nicotine (NICODERM CQ) 14 mg/24 hr patch 1 Patch  1 Patch TransDERmal DAILY    lisinopriL (PRINIVIL, ZESTRIL) tablet 20 mg  20 mg Oral DAILY    And    amLODIPine (NORVASC) tablet 5 mg  5 mg Oral DAILY    pantoprazole (PROTONIX) tablet 40 mg  40 mg Oral ACB    0.9% sodium chloride infusion  45 mL/hr IntraVENous CONTINUOUS    ondansetron (ZOFRAN) injection 8 mg  8 mg IntraVENous Q8H    LORazepam (ATIVAN) 2 mg/mL injection 0.5 mg  0.5 mg IntraVENous Q6H PRN    predniSONE (DELTASONE) tablet 120.5 mg  60 mg/m2 (Treatment Plan Recorded) Oral BID WITH MEALS    etoposide (VEPESID) 120.6 mg in 0.9% sodium chloride 500 mL, overfill volume 50 mL chemo infusion  60 mg/m2 (Treatment Plan Recorded) IntraVENous Q24H    vinCRIStine (VINCASAR PFS) 0.8 mg, DOXOrubicin (ADRIAMYCIN) 22.2 mg in 0.9% sodium chloride 250 mL, overfill volume 25 mL chemo infusion   IntraVENous Q24H    [START ON 6/10/2022] cyclophosphamide (CYTOXAN) 1,809 mg in 0.9% sodium chloride 250 mL, overfill volume 25 mL chemo infusion  900 mg/m2 (Treatment Plan Recorded) IntraVENous ONCE    acyclovir (ZOVIRAX) capsule 400 mg  400 mg Oral BID    trimethoprim-sulfamethoxazole (BACTRIM DS, SEPTRA DS) 160-800 mg per tablet 1 Tablet  1 Tablet Oral Q MON, WED & FRI    OLANZapine (ZyPREXA) tablet 5 mg  5 mg Oral QPM    sodium chloride (NS) flush 10 mL  10 mL InterCATHeter PRN    heparin (porcine) pf 300-500 Units  300-500 Units InterCATHeter PRN    diphenhydrAMINE (BENADRYL) injection 25 mg  25 mg IntraVENous PRN    acetaminophen (TYLENOL) tablet 650 mg  650 mg Oral Q4H PRN    ondansetron (ZOFRAN) injection 8 mg  8 mg IntraVENous Q4H PRN    diphenhydrAMINE (BENADRYL) injection 50 mg  50 mg IntraVENous PRN    albuterol (PROVENTIL VENTOLIN) nebulizer solution 2.5 mg  2.5 mg Nebulization PRN    morphine IR (MS IR) tablet 15 mg  15 mg Oral Q4H PRN      Allergies   Allergen Reactions    Oxycodone Nausea Only         Objective:     Visit Vitals  BP (!) 150/71 (BP 1 Location: Right upper arm, BP Patient Position: Sitting)   Pulse 71   Temp 97.4 °F (36.3 °C)   Resp 14   Ht 5' 3\" (1.6 m)   Wt 218 lb 4.1 oz (99 kg)   SpO2 97%   BMI 38.66 kg/m²     ECOG PS: 1- Restricted in physically strenuous activity but ambulatory and able to carry out work of a light or sedentary nature, e.g., light house work, office work. General: no distress  Eyes: anicteric sclerae  HENT: atraumatic  Neck: supple  Respiratory: CTAB; normal respiratory effort  CV: slight edema noted to BLE edema; R>L  GI: soft, nontender, nondistended,   Skin: no rashes; no ecchymoses; no petechiae  Psych: alert, oriented, appropriate affect, normal judgment/insight    Results:     Lab Results   Component Value Date/Time    WBC 10.2 06/09/2022 03:28 AM    HGB 10.9 (L) 06/09/2022 03:28 AM    HCT 33.4 (L) 06/09/2022 03:28 AM    PLATELET 845 65/35/0851 03:28 AM    MCV 96.0 06/09/2022 03:28 AM    ABS. NEUTROPHILS 9.6 (H) 06/09/2022 03:28 AM     Lab Results   Component Value Date/Time    Sodium 141 06/09/2022 03:28 AM    Potassium 4.0 06/09/2022 03:28 AM    Chloride 110 (H) 06/09/2022 03:28 AM    CO2 24 06/09/2022 03:28 AM    Glucose 145 (H) 06/09/2022 03:28 AM    BUN 15 06/09/2022 03:28 AM    Creatinine 0.72 06/09/2022 03:28 AM    GFR est AA >60 06/09/2022 03:28 AM    GFR est non-AA >60 06/09/2022 03:28 AM    Calcium 8.2 (L) 06/09/2022 03:28 AM    Glucose (POC) 98 05/04/2022 01:37 PM     Lab Results   Component Value Date/Time    Bilirubin, total 0.2 06/09/2022 03:28 AM    ALT (SGPT) 29 06/09/2022 03:28 AM    Alk.  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  -Patient will proceed to curative intent C2 d4 DA-R-EPOCH. -C2 ( 20% dose increase cyclosphosphamide /etoposide; 10% increase in doxorubicin)     . -had significant edema following last chemo requiring lasix outpatient;  -prophylactic Bactrim MWF  -will add lasix 10 mg po daily while inpatient and continue outpatient  -follow up for labs and neulasta injection established for 6/13; reviewed with pt and placed in discharge summary.  Cancer related pain  -Follows with palliative team outpatient  -have resumed home Morphine 15 mg po q 4 hours prn      Metastatic cancer to bone ([facility] [address]  -structural damage from her lymphoma may still cause her pain and this does not absolutely indicate that she has progressive disease.    Angie Ed Tobacco dependence  -on nicotine 14 mg patch         Anxiety: continue with Ativan every 8 hours prn        LE edema: R>L   6/8 doppler negative         Plan reviewed with Dr Maria Guadalupe Monroe    Signed By:   Daryle Bird, NP

## 2022-06-09 NOTE — PROGRESS NOTES
Bedside and Verbal shift change report given to Tiffanie HUERTA  (oncoming nurse) by Jacki Roper (offgoing nurse). Report included the following information SBAR, Kardex and MAR.

## 2022-06-09 NOTE — PROGRESS NOTES
6/9/2022  Case Management Progress Note    1:02 PM  Patient is 78year old female admitted 6/6 for diffuse large B cell lymphoma  Patient's RUR is 17% yellow/moderate risk for readmission; she is currently a planned readmit. Covid test: none this admission  Chart reviewed--patient discussed at [facility] rounds  Patient continues to receive planned hospital chemotherapy. Plan remains for her to return home with family assistance tomorrow once cycle completed. No needs from CM at this time. Will continue to follow and assist as needed. Transition of Care Plan   1. Continue medical management/chemotherapy   2. Home with family tomorrow   3. Family will transport at discharge  4. Follow up outpatient as indicated  5.  CM will follow    CHIVO Bowen

## 2022-06-09 NOTE — PROGRESS NOTES
Problem: Falls - Risk of  Goal: *Absence of Falls  Description: Document Grace Avilez Fall Risk and appropriate interventions in the flowsheet.   Outcome: Progressing Towards Goal  Note: Fall Risk Interventions:            Medication Interventions: Patient to call before getting OOB,Teach patient to arise slowly                   Problem: Patient Education: Go to Patient Education Activity  Goal: Patient/Family Education  Outcome: Progressing Towards Goal     Problem: Pain  Goal: *Control of Pain  Outcome: Progressing Towards Goal  Goal: *PALLIATIVE CARE:  Alleviation of Pain  Outcome: Progressing Towards Goal     Problem: Patient Education: Go to Patient Education Activity  Goal: Patient/Family Education  Outcome: Progressing Towards Goal     Problem: Patient Education: Go to Patient Education Activity  Goal: Patient/Family Education  Outcome: Progressing Towards Goal     Problem: Chemotherapy, Day 1  Goal: Off Pathway (Use only if patient is Off Pathway)  Outcome: Progressing Towards Goal  Goal: Activity/Safety  Outcome: Progressing Towards Goal  Goal: Consults, if ordered  Outcome: Progressing Towards Goal  Goal: Diagnostic Test/Procedures  Outcome: Progressing Towards Goal  Goal: Nutrition/Diet  Outcome: Progressing Towards Goal  Goal: Discharge Planning  Outcome: Progressing Towards Goal  Goal: Medications  Outcome: Progressing Towards Goal  Goal: Respiratory  Outcome: Progressing Towards Goal  Goal: Treatments/Interventions/Procedures  Outcome: Progressing Towards Goal  Goal: Psychosocial  Outcome: Progressing Towards Goal  Goal: *Optimal pain control at patient's stated goal  Outcome: Progressing Towards Goal  Goal: *Hemodynamically stable  Outcome: Progressing Towards Goal  Goal: *Adequate oxygenation  Outcome: Progressing Towards Goal  Goal: *Chemotherapy regimen is initiated  Outcome: Progressing Towards Goal  Goal: *Patient and family verbalize understanding of plan of care  Outcome: Progressing Towards Goal Problem: Chemotherapy, Day 2  Goal: Off Pathway (Use only if patient is Off Pathway)  Outcome: Progressing Towards Goal  Goal: Activity/Safety  Outcome: Progressing Towards Goal  Goal: Consults, if ordered  Outcome: Progressing Towards Goal  Goal: Diagnostic Test/Procedures  Outcome: Progressing Towards Goal  Goal: Nutrition/Diet  Outcome: Progressing Towards Goal  Goal: Discharge Planning  Outcome: Progressing Towards Goal  Goal: Medications  Outcome: Progressing Towards Goal  Goal: Respiratory  Outcome: Progressing Towards Goal  Goal: Treatments/Interventions/Procedures  Outcome: Progressing Towards Goal  Goal: Psychosocial  Outcome: Progressing Towards Goal  Goal: *Optimal pain control at patient's stated goal  Outcome: Progressing Towards Goal  Goal: *Hemodynamically stable  Outcome: Progressing Towards Goal  Goal: *Adequate oxygenation  Outcome: Progressing Towards Goal  Goal: *Chemotherapy regimen is initiated  Outcome: Progressing Towards Goal  Goal: *Effective side effect management  Outcome: Progressing Towards Goal     Problem: Chemotherapy, Day 3 to Discharge  Goal: Off Pathway (Use only if patient is Off Pathway)  Outcome: Progressing Towards Goal  Goal: Activity/Safety  Outcome: Progressing Towards Goal  Goal: Consults, if ordered  Outcome: Progressing Towards Goal  Goal: Diagnostic Test/Procedures  Outcome: Progressing Towards Goal  Goal: Nutrition/Diet  Outcome: Progressing Towards Goal  Goal: Discharge Planning  Outcome: Progressing Towards Goal  Goal: Medications  Outcome: Progressing Towards Goal  Goal: Respiratory  Outcome: Progressing Towards Goal  Goal: Treatments/Interventions/Procedures  Outcome: Progressing Towards Goal  Goal: Psychosocial  Outcome: Progressing Towards Goal  Goal: *Optimal pain control at patient's stated goal  Outcome: Progressing Towards Goal  Goal: *Hemodynamically stable  Outcome: Progressing Towards Goal  Goal: *Adequate oxygenation  Outcome: Progressing Towards Goal Problem: Chemotherapy Discharge Outcomes  Goal: *Verbalizes understanding and describes prescribed diet  Outcome: Progressing Towards Goal  Goal: *Describes follow-up/return visits to physicians  Outcome: Progressing Towards Goal  Goal: *Describes home care/support arrangements established based on need  Outcome: Progressing Towards Goal  Goal: *Describes available resources and support systems  Outcome: Progressing Towards Goal  Goal: *Anxiety reduced or absent  Outcome: Progressing Towards Goal  Goal: *Understands and describes signs and symptoms to report to providers(Stroke Metric)  Outcome: Progressing Towards Goal  Goal: *Hemodynamically stable  Outcome: Progressing Towards Goal  Goal: *Tolerating diet  Outcome: Progressing Towards Goal  Goal: *Verbalizes understanding and describes medication purposes and frequencies  Outcome: Progressing Towards Goal  Goal: *Venous access site with positive blood return and without signs and symptoms of infection  Outcome: Progressing Towards Goal  Goal: *Verbalizes understanding of bowel regimen  Outcome: Progressing Towards Goal

## 2022-06-10 ENCOUNTER — TELEPHONE (OUTPATIENT)
Dept: PALLATIVE CARE | Age: 67
End: 2022-06-10

## 2022-06-10 VITALS
TEMPERATURE: 98 F | HEART RATE: 74 BPM | WEIGHT: 220.24 LBS | BODY MASS INDEX: 39.02 KG/M2 | OXYGEN SATURATION: 94 % | SYSTOLIC BLOOD PRESSURE: 182 MMHG | RESPIRATION RATE: 18 BRPM | HEIGHT: 63 IN | DIASTOLIC BLOOD PRESSURE: 75 MMHG

## 2022-06-10 DIAGNOSIS — C83.38 DIFFUSE LARGE B-CELL LYMPHOMA OF LYMPH NODES OF MULTIPLE REGIONS (HCC): ICD-10-CM

## 2022-06-10 LAB
ALBUMIN SERPL-MCNC: 2.8 G/DL (ref 3.5–5)
ALBUMIN/GLOB SERPL: 1.2 {RATIO} (ref 1.1–2.2)
ALP SERPL-CCNC: 55 U/L (ref 45–117)
ALT SERPL-CCNC: 27 U/L (ref 12–78)
ANION GAP SERPL CALC-SCNC: 6 MMOL/L (ref 5–15)
AST SERPL-CCNC: 11 U/L (ref 15–37)
BASOPHILS # BLD: 0 K/UL (ref 0–0.1)
BASOPHILS NFR BLD: 0 % (ref 0–1)
BILIRUB SERPL-MCNC: 0.3 MG/DL (ref 0.2–1)
BUN SERPL-MCNC: 15 MG/DL (ref 6–20)
BUN/CREAT SERPL: 20 (ref 12–20)
CALCIUM SERPL-MCNC: 7.9 MG/DL (ref 8.5–10.1)
CHLORIDE SERPL-SCNC: 108 MMOL/L (ref 97–108)
CO2 SERPL-SCNC: 27 MMOL/L (ref 21–32)
CREAT SERPL-MCNC: 0.74 MG/DL (ref 0.55–1.02)
DIFFERENTIAL METHOD BLD: ABNORMAL
EOSINOPHIL # BLD: 0 K/UL (ref 0–0.4)
EOSINOPHIL NFR BLD: 0 % (ref 0–7)
ERYTHROCYTE [DISTWIDTH] IN BLOOD BY AUTOMATED COUNT: 14.9 % (ref 11.5–14.5)
GLOBULIN SER CALC-MCNC: 2.3 G/DL (ref 2–4)
GLUCOSE SERPL-MCNC: 159 MG/DL (ref 65–100)
HCT VFR BLD AUTO: 30.9 % (ref 35–47)
HGB BLD-MCNC: 10 G/DL (ref 11.5–16)
IMM GRANULOCYTES # BLD AUTO: 0.1 K/UL (ref 0–0.04)
IMM GRANULOCYTES NFR BLD AUTO: 1 % (ref 0–0.5)
LYMPHOCYTES # BLD: 0.2 K/UL (ref 0.8–3.5)
LYMPHOCYTES NFR BLD: 3 % (ref 12–49)
MCH RBC QN AUTO: 31.3 PG (ref 26–34)
MCHC RBC AUTO-ENTMCNC: 32.4 G/DL (ref 30–36.5)
MCV RBC AUTO: 96.9 FL (ref 80–99)
MONOCYTES # BLD: 0.1 K/UL (ref 0–1)
MONOCYTES NFR BLD: 2 % (ref 5–13)
NEUTS SEG # BLD: 5.5 K/UL (ref 1.8–8)
NEUTS SEG NFR BLD: 94 % (ref 32–75)
NRBC # BLD: 0 K/UL (ref 0–0.01)
NRBC BLD-RTO: 0 PER 100 WBC
PLATELET # BLD AUTO: 278 K/UL (ref 150–400)
PMV BLD AUTO: 9.9 FL (ref 8.9–12.9)
POTASSIUM SERPL-SCNC: 3.6 MMOL/L (ref 3.5–5.1)
PROT SERPL-MCNC: 5.1 G/DL (ref 6.4–8.2)
RBC # BLD AUTO: 3.19 M/UL (ref 3.8–5.2)
SODIUM SERPL-SCNC: 141 MMOL/L (ref 136–145)
WBC # BLD AUTO: 5.9 K/UL (ref 3.6–11)

## 2022-06-10 PROCEDURE — 74011250637 HC RX REV CODE- 250/637: Performed by: INTERNAL MEDICINE

## 2022-06-10 PROCEDURE — 74011250636 HC RX REV CODE- 250/636: Performed by: INTERNAL MEDICINE

## 2022-06-10 PROCEDURE — 74011636637 HC RX REV CODE- 636/637: Performed by: INTERNAL MEDICINE

## 2022-06-10 PROCEDURE — 74011000250 HC RX REV CODE- 250: Performed by: NURSE PRACTITIONER

## 2022-06-10 PROCEDURE — 77030003560 HC NDL HUBR BARD -A

## 2022-06-10 PROCEDURE — 36415 COLL VENOUS BLD VENIPUNCTURE: CPT

## 2022-06-10 PROCEDURE — 99233 SBSQ HOSP IP/OBS HIGH 50: CPT | Performed by: INTERNAL MEDICINE

## 2022-06-10 PROCEDURE — 74011000250 HC RX REV CODE- 250: Performed by: INTERNAL MEDICINE

## 2022-06-10 PROCEDURE — 74011250636 HC RX REV CODE- 250/636: Performed by: NURSE PRACTITIONER

## 2022-06-10 PROCEDURE — 80053 COMPREHEN METABOLIC PANEL: CPT

## 2022-06-10 PROCEDURE — 74011250637 HC RX REV CODE- 250/637: Performed by: NURSE PRACTITIONER

## 2022-06-10 PROCEDURE — 85025 COMPLETE CBC W/AUTO DIFF WBC: CPT

## 2022-06-10 RX ORDER — MORPHINE SULFATE 15 MG/1
15 TABLET ORAL
Qty: 30 TABLET | Refills: 0 | Status: SHIPPED
Start: 2022-06-10 | End: 2022-06-10

## 2022-06-10 RX ORDER — FUROSEMIDE 20 MG/1
20 TABLET ORAL DAILY
Qty: 3 TABLET | Refills: 0 | Status: SHIPPED | OUTPATIENT
Start: 2022-06-10 | End: 2022-06-13

## 2022-06-10 RX ORDER — HEPARIN SODIUM 200 [USP'U]/100ML
300 INJECTION, SOLUTION INTRAVENOUS CONTINUOUS
Status: DISCONTINUED | OUTPATIENT
Start: 2022-06-10 | End: 2022-06-10 | Stop reason: CLARIF

## 2022-06-10 RX ORDER — FAMOTIDINE 20 MG/1
20 TABLET, FILM COATED ORAL EVERY 12 HOURS
Qty: 14 TABLET | Refills: 0 | Status: SHIPPED | OUTPATIENT
Start: 2022-06-10 | End: 2022-06-17

## 2022-06-10 RX ORDER — HEPARIN 100 UNIT/ML
300 SYRINGE INTRAVENOUS ONCE
Status: DISCONTINUED | OUTPATIENT
Start: 2022-06-10 | End: 2022-06-10 | Stop reason: HOSPADM

## 2022-06-10 RX ADMIN — SODIUM CHLORIDE, PRESERVATIVE FREE 10 ML: 5 INJECTION INTRAVENOUS at 01:49

## 2022-06-10 RX ADMIN — ACYCLOVIR 400 MG: 200 CAPSULE ORAL at 17:42

## 2022-06-10 RX ADMIN — HEPARIN 300 UNITS: 100 SYRINGE at 17:49

## 2022-06-10 RX ADMIN — CYCLOPHOSPHAMIDE 1809 MG: 500 INJECTION, POWDER, FOR SOLUTION INTRAVENOUS; ORAL at 16:31

## 2022-06-10 RX ADMIN — PREDNISONE 120.5 MG: 1 TABLET ORAL at 06:12

## 2022-06-10 RX ADMIN — ONDANSETRON 8 MG: 2 INJECTION INTRAMUSCULAR; INTRAVENOUS at 08:41

## 2022-06-10 RX ADMIN — SODIUM CHLORIDE 45 ML/HR: 9 INJECTION, SOLUTION INTRAVENOUS at 04:20

## 2022-06-10 RX ADMIN — ONDANSETRON 8 MG: 2 INJECTION INTRAMUSCULAR; INTRAVENOUS at 16:40

## 2022-06-10 RX ADMIN — ONDANSETRON 8 MG: 2 INJECTION INTRAMUSCULAR; INTRAVENOUS at 01:45

## 2022-06-10 RX ADMIN — IBUPROFEN 400 MG: 400 TABLET ORAL at 17:43

## 2022-06-10 RX ADMIN — MORPHINE SULFATE 15 MG: 15 TABLET ORAL at 08:41

## 2022-06-10 RX ADMIN — PANTOPRAZOLE SODIUM 40 MG: 40 TABLET, DELAYED RELEASE ORAL at 06:13

## 2022-06-10 RX ADMIN — MORPHINE SULFATE 15 MG: 15 TABLET ORAL at 04:20

## 2022-06-10 RX ADMIN — FUROSEMIDE 20 MG: 20 TABLET ORAL at 08:41

## 2022-06-10 RX ADMIN — AMLODIPINE BESYLATE 5 MG: 5 TABLET ORAL at 08:41

## 2022-06-10 RX ADMIN — FLUOXETINE 40 MG: 20 CAPSULE ORAL at 06:12

## 2022-06-10 RX ADMIN — SULFAMETHOXAZOLE AND TRIMETHOPRIM 1 TABLET: 800; 160 TABLET ORAL at 08:41

## 2022-06-10 RX ADMIN — MORPHINE SULFATE 15 MG: 15 TABLET ORAL at 13:33

## 2022-06-10 RX ADMIN — LORAZEPAM 1 MG: 1 TABLET ORAL at 04:21

## 2022-06-10 RX ADMIN — IBUPROFEN 400 MG: 400 TABLET ORAL at 08:40

## 2022-06-10 RX ADMIN — ENOXAPARIN SODIUM 40 MG: 100 INJECTION SUBCUTANEOUS at 08:42

## 2022-06-10 RX ADMIN — FAMOTIDINE 20 MG: 20 TABLET, FILM COATED ORAL at 08:41

## 2022-06-10 RX ADMIN — Medication 10 ML: at 01:49

## 2022-06-10 RX ADMIN — LORAZEPAM 1 MG: 1 TABLET ORAL at 12:03

## 2022-06-10 RX ADMIN — PREDNISONE 120.5 MG: 1 TABLET ORAL at 13:33

## 2022-06-10 RX ADMIN — LISINOPRIL 20 MG: 20 TABLET ORAL at 08:41

## 2022-06-10 RX ADMIN — Medication 10 ML: at 14:00

## 2022-06-10 RX ADMIN — ACYCLOVIR 400 MG: 200 CAPSULE ORAL at 08:41

## 2022-06-10 NOTE — PROGRESS NOTES
Bedside and Verbal shift change report given to DEANNA Almonte (oncoming nurse) by DEANNA Dorsey (offgoing nurse). Report included the following information SBAR, Kardex, MAR and Accordion.

## 2022-06-10 NOTE — PROGRESS NOTES
6/10/2022  Case Management Progress Note    10:10 AM  Patient is 78year old female admitted 6/6 for diffuse large B cell lymphoma  Patient's RUR is 16% yellow/moderate risk for readmission; she is currently a planned readmit. Covid test: none this admission  Chart reviewed  Patient receives last dose of chemotherapy in the hospital today and should be able to discharge home later this afternoon. She does not have any needs for discharge and plan remains for her to return home with family assistance. OK for discharge today from CM standpoint. Transition of Care Plan   1. Continue medical management/treatment  2. Home with family later this afternoon  3. Family will transport at discharge  4. Follow up outpatient as indicated  5.  [facility]

## 2022-06-10 NOTE — PROGRESS NOTES
Problem: Falls - Risk of  Goal: *Absence of Falls  Outcome: Progressing Towards Goal  Note: Fall Risk Interventions:            Medication Interventions: Teach patient to arise slowly                   Problem: Patient Education: Go to Patient Education Activity  Goal: Patient/Family Education  Outcome: Progressing Towards Goal     Problem: Pain  Goal: *Control of Pain  Outcome: Progressing Towards Goal  Goal: *PALLIATIVE CARE:  Alleviation of Pain  Outcome: Progressing Towards Goal     Problem: Pain  Goal: *Control of Pain  Outcome: Progressing Towards Goal  Goal: *PALLIATIVE CARE:  Alleviation of Pain  Outcome: Progressing Towards Goal     Problem: Patient Education: Go to Patient Education Activity  Goal: Patient/Family Education  Outcome: Progressing Towards Goal     Problem: Patient Education: Go to Patient Education Activity  Goal: Patient/Family Education  Outcome: Progressing Towards Goal     Problem: Patient Education: Go to Patient Education Activity  Goal: Patient/Family Education  Outcome: Progressing Towards Goal     Problem: Patient Education: Go to Patient Education Activity  Goal: Patient/Family Education  Outcome: Progressing Towards Goal     Problem: Chemotherapy, Day 3 to Discharge  Goal: Off Pathway (Use only if patient is Off Pathway)  Outcome: Progressing Towards Goal  Goal: Activity/Safety  Outcome: Progressing Towards Goal  Goal: Consults, if ordered  Outcome: Progressing Towards Goal  Goal: Diagnostic Test/Procedures  Outcome: Progressing Towards Goal  Goal: Nutrition/Diet  Outcome: Progressing Towards Goal  Goal: Discharge Planning  Outcome: Progressing Towards Goal  Goal: Medications  Outcome: Progressing Towards Goal  Goal: Respiratory  Outcome: Progressing Towards Goal  Goal: Treatments/Interventions/Procedures  Outcome: Progressing Towards Goal  Goal: Psychosocial  Outcome: Progressing Towards Goal  Goal: *Optimal pain control at patient's stated goal  Outcome: Progressing Towards Goal  Goal: *Hemodynamically stable  Outcome: Progressing Towards Goal  Goal: *Adequate oxygenation  Outcome: Progressing Towards Goal

## 2022-06-10 NOTE — ROUTINE PROCESS
Bedside and Verbal shift change report given to [address] (oncoming nurse) by Prasanna Salazar (offgoing nurse). Report included the following information SBAR and Kardex.

## 2022-06-10 NOTE — DISCHARGE SUMMARY
Discharge Summary      Patient: Kristen Arciniega MRN: [id number]  SSN: [id number]    YOB: 1955  Age: 78 y.o. 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 She was admitted and receiving the second cycle of R-DA-EPOCH. She tolerated therapy well this [facility].      Consults: None     Significant Diagnostic Studies: none     Disposition: home  [facility] [place], operated by [facility] or Self Care  Discharge Medications:   Discharge Medication List as of 6/10/2022  5:54 PM      START taking these medications    Details   famotidine (PEPCID) 20 mg tablet Take 1 Tablet by mouth every twelve (12) hours for 7 days. , Normal, Disp-14 Tablet, R-0         CONTINUE these medications which have CHANGED    Details   furosemide (LASIX) 20 mg tablet Take 1 Tablet by mouth daily for 3 days. , Normal, Disp-3 Tablet, R-0         CONTINUE these medications which have NOT CHANGED    Details   morphine IR (MS IR) 15 mg tablet Take 15 mg by mouth every four (4) hours as needed for Pain., Historical Med      trimethoprim-sulfamethoxazole (BACTRIM DS, SEPTRA DS) 160-800 mg per tablet Take 1 Tablet by mouth BID Mon Wed & Fri., Normal, Disp-42 Tablet, R-1      zolpidem (AMBIEN) 5 mg tablet Take 1 Tablet by mouth nightly as needed for Sleep. Max Daily Amount: 5 mg., Normal, Disp-30 Tablet, R-0      LORazepam (ATIVAN) 1 mg tablet Take 1 Tablet by mouth every eight (8) hours as needed for Anxiety. Max Daily Amount: 3 mg.  Indications: anxious, Normal, Disp-90 Tablet, R-0      ondansetron (ZOFRAN ODT) 8 mg disintegrating tablet Take 1 Tablet by mouth every eight (8) hours as needed for Nausea or Vomiting., Normal, Disp-50 Tablet, R-1      prochlorperazine (COMPAZINE) 10 mg tablet Take 1 Tablet by mouth every six (6) hours as needed for Nausea (do not take if groggy; take if nausea despite zofran). , Normal, Disp-50 Tablet, R-1      docusate sodium (COLACE) 100 mg capsule Take 1 Capsule by mouth two (2) times a day for 90 days. , Normal, Disp-60 Capsule, R-2      polyethylene glycol (MIRALAX) 17 gram packet Take 1 Packet by mouth daily. , Normal, Disp-90 Each, R-1      gabapentin (NEURONTIN) 300 mg capsule Take 1 Capsule by mouth daily as needed for Pain., Normal, Disp-20 Capsule, R-0      diclofenac EC (VOLTAREN) 50 mg EC tablet Take 1 Tablet by mouth two (2) times daily as needed for Pain., Normal, Disp-60 Tablet, R-0      FLUoxetine (PROzac) 40 mg capsule TAKE 1 CAPSULE BY MOUTH EVERY MORNING, Normal, Disp-90 Capsule, R-1      amLODIPine-benazepril (LOTREL) 5-20 mg per capsule TAKE 1 CAPSULE BY MOUTH EVERY MORNING, Normal, Disp-90 Capsule, R-1         STOP taking these medications       nystatin (MYCOSTATIN) 100,000 unit/mL suspension Comments:   Reason for Stopping:               I spent about 15 minutes on the discharge.  More than 50% of the time was used in counseling and co-ordination of care.         Signed By: Natali Brown MD     Ines 10, 2022

## 2022-06-10 NOTE — PROGRESS NOTES
Cancer [place] [address]  [address] [facility]  W: [phone]  F: [phone] Patient ID  Name: Kaushik Holbrook  YOB: 1955  MRN: [id number]  Referring Provider:   Alejandra Rod MD  [address]  [facility] [facility]  [id number] [place] [address]  Primary Care Provider:   Marivel Taylor MD       HEMATOLOGY/MEDICAL ONCOLOGY  NOTE   Date of Visit: 06/10/22  Reason for Evaluation:     CC: Triple Hit Lymphoma, DLBCL  Hematology/Oncology Summary:  Please review original records for clinical decision making. This summary highlights focused aspects of patient's ongoing care and may have a recurring section in notes with either updates or remain unchanged as a longitudinal care summary. -------------------------------------------------------------------------------------------------------------------------------------------------------------------------------------------------------  DIAGNOSIS:  Diffuse Large B-Cell Lymphoma    ORIGINAL STAGING:  Stage IV    SITES OF DISEASE:  Left Cervical Lymph Node,Bone Disease, Stage IV    CURRENT TREATMENT:  C1,D1 on 5/11/22 DA-R-EPOCH    PRIOR TREATMENT:  n/a    GOALS OF CARE:  Curative Intent    PATHOLOGY:  Specimen #:[id number] Collect: 4/22/2022      ==========================================================================                    * * *SURGICAL PATHOLOGY REPORT* * *   ==========================================================================   * * *PROCEDURES/ADDENDA* * *   ADDENDUM   Date Ordered: 4/26/2022     Status: Signed Out   Date Complete: 4/26/2022     By: Daisy Fine.  Minor Flores MD   Date Reported: 4/26/2022   Addendum Diagnosis   Lymph node, left posterior cervical lymph node, excision:   In situ hybridization for Jesús-Barr viral RNA: Negative   CPT: 41707  Addendum Comment   * * *CLINICAL HISTORY* * *   Cervical lymphadenopathy, rule out lymphoma ==========================================================================   * * *FINAL PATHOLOGIC DIAGNOSIS* * *        Lymph node, left posterior cervical lymph node, excision:        Large B cell lymphoma. See comment. * * *Comment* * *   The histologic section has fragments of lymphoid tissue with diffuse and   focal follicular architecture. Diffuse areas are comprised of large,   atypical lymphocytes with centroblastic morphology and increased mitotic   activity. Immunohistochemical stains confirm the malignant cells are CD20   positive B cells that coexpress CD10 and BCL6 without MUM1. CD23   highlights rare follicular dendritic networks associated with lymphoid   follicles with germinal centers and express CD10 and BCL6 without BCL2. CyclinD1 and CD56 stains are negative. Concurrent flow cytometric analysis   confirms a clonal CD10 positive B-cell population comprised of medium to   large cells. The Ki-67 proliferation index is 30%. The overall findings favor the diagnosis of diffuse large B-cell lymphoma,   germinal center subtype. Molecular genetic studies are pending for further   characterization will be reported in an addendum. Flow cytometry:   Consistent with CD10-positive B-cell lymphoproliferative disorder. Comments: Flow cytometry shows monoclonal B-cells (36% of total cells)   with CD10 expression without CD5, consistent with a CD10-positive B-cell   lymphoproliferative disorder. The main differential diagnosis includes   follicular lymphoma, Burkitt lymphoma and large B-cell lymphoma. Please   correlate the result with morphologic findings and clinical information. Flow Differential (%) and Population Analysis:   Lymphocytes: 93.7%   T-cells (39% of lymphoid cells) show a CD4/CD8 ratio of 3.3 without overt   phenotypic abnormality. NK-cells (1% of lymphoid cells) are unremarkable.    Mature B-cells (56% of lymphoid cells) include large forms based on   forward scattered pattern and show: CD5 neg, CD10+, CD11c+dim, CD19+,   CD20+ with surface lambda light chain restriction. Markers Performed: CD2, CD3, CD4, CD5, CD7, CD8, CD10, CD11c, CD19, CD20,   CD23, CD34, CD38, CD45, CD56, Kappa, Lambda (17 Markers)   The Technical Component Processing of this test was completed at   [facility], [address] [place] [address], [place], [place] / [id number] /   [phone] / [facility] # [id number]. Interpretation by Bailey Palacios M.D. The   complete scanned report is available in Epic. CPT: W0103815, G639229, T3088941, [id number], [id number], [id number]   [facility]2/2022   *Electronically Signed Out By Dominick Munguia. Elise Alicea MD*   ==========================================================================   * * *Gross Description* * *   Specimen #1, received fresh labeled with the patient's name,  and left   posterior cervical lymph node, consists of two paper towels containing a   1.7 x 1.5 x 0.5 cm aggregate of pale pink-tan, fleshy soft tissue   fragments. The specimen is handled according to the flow cytometry   protocol as follows: 7          Two air-dried touch prep slides - one Diff Quik stained, one   rapid-fixed in 95% alcohol   7          Representative sections in B plus fixative (1A)   7          One piece held in RPMI for possible flow cytometry analysis   7          Remaining tissue fixed in formalin for permanents (1B)   Dr. Elise Alicea will determine if flow cytometry is necessary. AMM 2022 02:06 PM     FISH: 22:  Triple Hit Lymphoma  PERTINENT CARE EVENTS  n/a    Subjective:     History of Present Illness:   Doing better with fliud overload this time with use of lasix. Denies any fevers or chills.     Past Medical History:   Diagnosis Date    Adverse effect of anesthesia     PHLEGM IN THROAT POST OP & NEEDED X2 BREATHING TREATMENTS    Anxiety     [facility]     emphysema    Depression     GERD (gastroesophageal reflux disease)     Hernia, femoral     since childhood Hypertension     Joint pain     Nausea & vomiting     TMJ arthritis       Past Surgical History:   Procedure Laterality Date    HX HYSTERECTOMY  2019    HX TONSILLECTOMY     History of Salivary Gland Surgery in 30's for evaluation for dry mouth.      Social History     Tobacco Use    Smoking status: Current Every Day Smoker     Packs/day: 0.50     Years: 40.00     Pack years: 20.00     Types: Cigarettes    Smokeless tobacco: Never Used    Tobacco comment: 0.5-1 pack per day   Substance Use Topics    Alcohol use: Not Currently     Alcohol/week: 0.0 standard drinks      Family History   Problem Relation Age of Onset    Heart Disease Mother 46    Heart Surgery Mother         HEART TRANSPLANT    Elevated Lipids Mother     Hypertension Mother     COPD Father     Emphysema Father     Sudden Death Brother 46    Other Maternal Grandmother         BRAIN TUMOR    No Known Problems Son     No Known Problems Daughter      Current Facility-Administered Medications   Medication Dose Route Frequency    polyethylene glycol (MIRALAX) packet 17 g  17 g Oral DAILY PRN    senna-docusate (PERICOLACE) 8.6-50 mg per tablet 1 Tablet  1 Tablet Oral QHS    ibuprofen (MOTRIN) tablet 400 mg  400 mg Oral BID    hydrALAZINE (APRESOLINE) 20 mg/mL injection 10 mg  10 mg IntraVENous Q6H PRN    famotidine (PEPCID) tablet 20 mg  20 mg Oral Q12H    furosemide (LASIX) tablet 20 mg  20 mg Oral DAILY    FLUoxetine (PROzac) capsule 40 mg  40 mg Oral 7am    LORazepam (ATIVAN) tablet 1 mg  1 mg Oral Q8H PRN    zolpidem (AMBIEN) tablet 5 mg  5 mg Oral QHS PRN    sodium chloride (NS) flush 5-40 mL  5-40 mL IntraVENous Q8H    sodium chloride (NS) flush 5-40 mL  5-40 mL IntraVENous PRN    acetaminophen (TYLENOL) tablet 650 mg  650 mg Oral Q6H PRN    Or    acetaminophen (TYLENOL) suppository 650 mg  650 mg Rectal Q6H PRN    ondansetron (ZOFRAN ODT) tablet 4 mg  4 mg Oral Q8H PRN    Or    ondansetron (ZOFRAN) injection 4 mg  4 mg IntraVENous Q6H PRN    enoxaparin (LOVENOX) injection 40 mg  40 mg SubCUTAneous DAILY    polyethylene glycol (MIRALAX) packet 17 g  17 g Oral DAILY PRN    nicotine (NICODERM CQ) 14 mg/24 hr patch 1 Patch  1 Patch TransDERmal DAILY    lisinopriL (PRINIVIL, ZESTRIL) tablet 20 mg  20 mg Oral DAILY    And    amLODIPine (NORVASC) tablet 5 mg  5 mg Oral DAILY    0.9% sodium chloride infusion  45 mL/hr IntraVENous CONTINUOUS    ondansetron (ZOFRAN) injection 8 mg  8 mg IntraVENous Q8H    LORazepam (ATIVAN) 2 mg/mL injection 0.5 mg  0.5 mg IntraVENous Q6H PRN    cyclophosphamide (CYTOXAN) 1,809 mg in 0.9% sodium chloride 250 mL, overfill volume 25 mL chemo infusion  900 mg/m2 (Treatment Plan Recorded) IntraVENous ONCE    acyclovir (ZOVIRAX) capsule 400 mg  400 mg Oral BID    trimethoprim-sulfamethoxazole (BACTRIM DS, SEPTRA DS) 160-800 mg per tablet 1 Tablet  1 Tablet Oral Q MON, WED & FRI    sodium chloride (NS) flush 10 mL  10 mL InterCATHeter PRN    heparin (porcine) pf 300-500 Units  300-500 Units InterCATHeter PRN    diphenhydrAMINE (BENADRYL) injection 25 mg  25 mg IntraVENous PRN    acetaminophen (TYLENOL) tablet 650 mg  650 mg Oral Q4H PRN    ondansetron (ZOFRAN) injection 8 mg  8 mg IntraVENous Q4H PRN    diphenhydrAMINE (BENADRYL) injection 50 mg  50 mg IntraVENous PRN    albuterol (PROVENTIL VENTOLIN) nebulizer solution 2.5 mg  2.5 mg Nebulization PRN    morphine IR (MS IR) tablet 15 mg  15 mg Oral Q4H PRN      Allergies   Allergen Reactions    Oxycodone Nausea Only          Objective:     Visit Vitals  BP (!) 182/75 (BP 1 Location: Left upper arm, BP Patient Position: Sitting)   Pulse 74   Temp 98 °F (36.7 °C)   Resp 18   Ht 5' 3\" (1.6 m)   Wt 220 lb 3.8 oz (99.9 kg)   SpO2 94%   BMI 39.01 kg/m²     ECOG PS: 1- Restricted in physically strenuous activity but ambulatory and able to carry out work of a light or sedentary nature, e.g., light house work, office work. Physical Exam  Constitutional: No acute distress. and Non-toxic appearance.   HENT: Normocephalic and atraumatic head. Eyes: Normal Conjunctivae. Anicteric sclerae. Cardiovascular: S1,S2 auscultated. No pitting edema. Pulmonary: Normal Respiratory Effort. No wheezing. No rhonchi. Abdominal: Normal bowel sounds. Soft Abdomen to palpation. No abdominal tenderness. Skin: No jaundice. No rash. No petechiae. Musculoskeletal: No muscle pain on palpation. No temporal muscle wasting on inspection. Neurological: Alert and oriented. No tremor on inspection. Psychiatric: mood normal. normal speech rate normal affect      Results:     I personally reviewed Epic EHR labs/results below:   Lab Results   Component Value Date/Time    WBC 5.9 06/10/2022 01:50 AM    HGB 10.0 (L) 06/10/2022 01:50 AM    HCT 30.9 (L) 06/10/2022 01:50 AM    PLATELET 721 81/54/4929 01:50 AM    MCV 96.9 06/10/2022 01:50 AM    ABS. NEUTROPHILS 5.5 06/10/2022 01:50 AM     Lab Results   Component Value Date/Time    Sodium 141 06/10/2022 01:50 AM    Potassium 3.6 06/10/2022 01:50 AM    Chloride 108 06/10/2022 01:50 AM    CO2 27 06/10/2022 01:50 AM    Glucose 159 (H) 06/10/2022 01:50 AM    BUN 15 06/10/2022 01:50 AM    Creatinine 0.74 06/10/2022 01:50 AM    GFR est AA >60 06/10/2022 01:50 AM    GFR est non-AA >60 06/10/2022 01:50 AM    Calcium 7.9 (L) 06/10/2022 01:50 AM    Glucose (POC) 98 05/04/2022 01:37 PM     Lab Results   Component Value Date/Time    Bilirubin, total 0.3 06/10/2022 01:50 AM    ALT (SGPT) 27 06/10/2022 01:50 AM    Alk. phosphatase 55 06/10/2022 01:50 AM    Protein, total 5.1 (L) 06/10/2022 01:50 AM    Albumin 2.8 (L) 06/10/2022 01:50 AM    Globulin 2.3 06/10/2022 01:50 AM     Lab Results   Component Value Date/Time    Hepatitis B surface Ag <0.10 04/27/2022 12:23 PM    Hepatitis B surface Ab 3.66 04/27/2022 12:23 PM    Hep B Core Ab, total Negative 06/08/2022 06:22 AM         Assessment and Recommendations:      Diagnosis    Neuropathy due to chemotherapeutic drug ([facility]  -stable; has gabapentin prn. Diffuse large B cell lymphoma (HCC)  -triple Hit lymphoma  -understands to follow-up early Monday for Neulasta booster. Cancer related pain  -reports that she has her pain medications refills completed by Dr. Xi Schroeder. Metastatic cancer to bone [facility])  -advised her to take pepcid if she sparingly uses diclofenac. She understands that there is a risk of gastric /duodenal ulceration/perforation with steroid use,smoking, NSAID use. Tobacco dependence  -still trying to quit. Follow-Up: outpt follow-up on Monday.      Miguelina Farooq MD  Hematology/Medical Oncology Provider  [place] [address]  P: [phone]    Signed By:   Lamin Osorio MD

## 2022-06-10 NOTE — TELEPHONE ENCOUNTER
Triage for Controlled Substance Refill Request    Pain Diagnosis: _Diffuse large B-cell lymphoma of lymph nodes of multiple regions [facility]) (C83.38)    Last Outpatient Visit: _5/17/2022    Next Outpatient Visit: _ 6/14/2022    Reason for refill needed outside of office visit? Appointment not scheduled prior to need for scheduled refill      Pharmacy: [address] [place] [address]     Medication:morphine IR (MS IR) 15 mg tablet      Dose and directions: Take 1 Tablet by mouth every four (4) hours as needed for Pain for up to 14 days.   Number dispensed:30  Date filled ( or Pharmacy):5/19/2022  # left:         reviewed: _yes    Date of Urine Drug Screen:  _    Opioid Safety Handout given:  _yes    Appropriate for refill:  _yes    Action:  _ Medication pending

## 2022-06-10 NOTE — PROGRESS NOTES
Problem: Falls - Risk of  Goal: *Absence of Falls  Description: Document Carmelo Monson Fall Risk and appropriate interventions in the flowsheet.   Outcome: Progressing Towards Goal  Note: Fall Risk Interventions:  Mobility Interventions: Assess mobility with egress test         Medication Interventions: Bed/chair exit alarm    Elimination Interventions: Elevated toilet seat              Problem: Patient Education: Go to Patient Education Activity  Goal: Patient/Family Education  Outcome: Progressing Towards Goal     Problem: Pain  Goal: *Control of Pain  Outcome: Progressing Towards Goal  Goal: *PALLIATIVE CARE:  Alleviation of Pain  Outcome: Progressing Towards Goal     Problem: Patient Education: Go to Patient Education Activity  Goal: Patient/Family Education  Outcome: Progressing Towards Goal     Problem: Patient Education: Go to Patient Education Activity  Goal: Patient/Family Education  Outcome: Progressing Towards Goal     Problem: Chemotherapy, Day 1  Goal: Off Pathway (Use only if patient is Off Pathway)  Outcome: Progressing Towards Goal  Goal: Activity/Safety  Outcome: Progressing Towards Goal  Goal: Consults, if ordered  Outcome: Progressing Towards Goal  Goal: Diagnostic Test/Procedures  Outcome: Progressing Towards Goal  Goal: Nutrition/Diet  Outcome: Progressing Towards Goal  Goal: Discharge Planning  Outcome: Progressing Towards Goal  Goal: Medications  Outcome: Progressing Towards Goal  Goal: Respiratory  Outcome: Progressing Towards Goal  Goal: Treatments/Interventions/Procedures  Outcome: Progressing Towards Goal  Goal: Psychosocial  Outcome: Progressing Towards Goal  Goal: *Optimal pain control at patient's stated goal  Outcome: Progressing Towards Goal  Goal: *Hemodynamically stable  Outcome: Progressing Towards Goal  Goal: *Adequate oxygenation  Outcome: Progressing Towards Goal  Goal: *Chemotherapy regimen is initiated  Outcome: Progressing Towards Goal  Goal: *Patient and family verbalize understanding of plan of care  Outcome: Progressing Towards Goal

## 2022-06-13 ENCOUNTER — TELEPHONE (OUTPATIENT)
Dept: ONCOLOGY | Age: 67
End: 2022-06-13

## 2022-06-13 ENCOUNTER — HOSPITAL ENCOUNTER (OUTPATIENT)
Dept: INFUSION THERAPY | Age: 67
Discharge: HOME | End: 2022-06-13
Payer: MEDICARE

## 2022-06-13 ENCOUNTER — OFFICE VISIT (OUTPATIENT)
Dept: ONCOLOGY | Age: 67
End: 2022-06-13

## 2022-06-13 VITALS
SYSTOLIC BLOOD PRESSURE: 121 MMHG | RESPIRATION RATE: 18 BRPM | DIASTOLIC BLOOD PRESSURE: 57 MMHG | TEMPERATURE: 97.5 F | OXYGEN SATURATION: 94 % | HEART RATE: 86 BPM

## 2022-06-13 DIAGNOSIS — T45.1X5A CHEMOTHERAPY INDUCED NAUSEA AND VOMITING: ICD-10-CM

## 2022-06-13 DIAGNOSIS — Z79.899 HIGH RISK MEDICATION USE: ICD-10-CM

## 2022-06-13 DIAGNOSIS — T45.1X5A CHEMOTHERAPY INDUCED NAUSEA AND VOMITING: Primary | ICD-10-CM

## 2022-06-13 DIAGNOSIS — C83.31 DIFFUSE LARGE B-CELL LYMPHOMA OF LYMPH NODES OF NECK (HCC): Primary | ICD-10-CM

## 2022-06-13 DIAGNOSIS — C83.31 DIFFUSE LARGE B-CELL LYMPHOMA OF LYMPH NODES OF NECK (HCC): ICD-10-CM

## 2022-06-13 DIAGNOSIS — R11.2 CHEMOTHERAPY INDUCED NAUSEA AND VOMITING: Primary | ICD-10-CM

## 2022-06-13 DIAGNOSIS — Z95.828 PORT-A-CATH IN PLACE: ICD-10-CM

## 2022-06-13 DIAGNOSIS — R19.7 DIARRHEA OF PRESUMED INFECTIOUS ORIGIN: ICD-10-CM

## 2022-06-13 DIAGNOSIS — R11.2 CHEMOTHERAPY INDUCED NAUSEA AND VOMITING: ICD-10-CM

## 2022-06-13 LAB
ALBUMIN SERPL-MCNC: 3.1 G/DL (ref 3.5–5)
ALBUMIN/GLOB SERPL: 1.3 {RATIO} (ref 1.1–2.2)
ALP SERPL-CCNC: 60 U/L (ref 45–117)
ALT SERPL-CCNC: 27 U/L (ref 12–78)
ANION GAP SERPL CALC-SCNC: 8 MMOL/L (ref 5–15)
AST SERPL-CCNC: 12 U/L (ref 15–37)
BASOPHILS # BLD: 0 K/UL (ref 0–0.1)
BASOPHILS NFR BLD: 0 % (ref 0–1)
BILIRUB SERPL-MCNC: 1 MG/DL (ref 0.2–1)
BUN SERPL-MCNC: 12 MG/DL (ref 6–20)
BUN/CREAT SERPL: 20 (ref 12–20)
C DIFF TOX GENS STL QL NAA+PROBE: POSITIVE
CALCIUM SERPL-MCNC: 8.4 MG/DL (ref 8.5–10.1)
CHLORIDE SERPL-SCNC: 98 MMOL/L (ref 97–108)
CO2 SERPL-SCNC: 31 MMOL/L (ref 21–32)
CREAT SERPL-MCNC: 0.6 MG/DL (ref 0.55–1.02)
DIFFERENTIAL METHOD BLD: ABNORMAL
EOSINOPHIL # BLD: 0.2 K/UL (ref 0–0.4)
EOSINOPHIL NFR BLD: 2 % (ref 0–7)
ERYTHROCYTE [DISTWIDTH] IN BLOOD BY AUTOMATED COUNT: 14.6 % (ref 11.5–14.5)
GLOBULIN SER CALC-MCNC: 2.4 G/DL (ref 2–4)
GLUCOSE SERPL-MCNC: 112 MG/DL (ref 65–100)
HCT VFR BLD AUTO: 35.3 % (ref 35–47)
HGB BLD-MCNC: 11.6 G/DL (ref 11.5–16)
IMM GRANULOCYTES # BLD AUTO: 0.1 K/UL (ref 0–0.04)
IMM GRANULOCYTES NFR BLD AUTO: 1 % (ref 0–0.5)
INTERPRETATION: ABNORMAL
LYMPHOCYTES # BLD: 0.4 K/UL (ref 0.8–3.5)
LYMPHOCYTES NFR BLD: 4 % (ref 12–49)
MCH RBC QN AUTO: 31.2 PG (ref 26–34)
MCHC RBC AUTO-ENTMCNC: 32.9 G/DL (ref 30–36.5)
MCV RBC AUTO: 94.9 FL (ref 80–99)
MONOCYTES # BLD: 0 K/UL (ref 0–1)
MONOCYTES NFR BLD: 0 % (ref 5–13)
NEUTS SEG # BLD: 9.7 K/UL (ref 1.8–8)
NEUTS SEG NFR BLD: 93 % (ref 32–75)
NRBC # BLD: 0 K/UL (ref 0–0.01)
NRBC BLD-RTO: 0 PER 100 WBC
PCR REFLEX: ABNORMAL
PLATELET # BLD AUTO: 280 K/UL (ref 150–400)
PMV BLD AUTO: 10.4 FL (ref 8.9–12.9)
POTASSIUM SERPL-SCNC: 3.2 MMOL/L (ref 3.5–5.1)
PROT SERPL-MCNC: 5.5 G/DL (ref 6.4–8.2)
RBC # BLD AUTO: 3.72 M/UL (ref 3.8–5.2)
RBC MORPH BLD: ABNORMAL
SODIUM SERPL-SCNC: 137 MMOL/L (ref 136–145)
WBC # BLD AUTO: 10.4 K/UL (ref 3.6–11)

## 2022-06-13 PROCEDURE — G9899 SCRN MAM PERF RSLTS DOC: HCPCS | Performed by: INTERNAL MEDICINE

## 2022-06-13 PROCEDURE — 99214 OFFICE O/P EST MOD 30 MIN: CPT | Performed by: INTERNAL MEDICINE

## 2022-06-13 PROCEDURE — 1101F PT FALLS ASSESS-DOCD LE1/YR: CPT | Performed by: INTERNAL MEDICINE

## 2022-06-13 PROCEDURE — 80053 COMPREHEN METABOLIC PANEL: CPT

## 2022-06-13 PROCEDURE — 96372 THER/PROPH/DIAG INJ SC/IM: CPT

## 2022-06-13 PROCEDURE — 74011000250 HC RX REV CODE- 250: Performed by: INTERNAL MEDICINE

## 2022-06-13 PROCEDURE — 77030016057 HC NDL HUBR APOL -B

## 2022-06-13 PROCEDURE — 96360 HYDRATION IV INFUSION INIT: CPT

## 2022-06-13 PROCEDURE — 87324 CLOSTRIDIUM AG IA: CPT

## 2022-06-13 PROCEDURE — G8427 DOCREV CUR MEDS BY ELIG CLIN: HCPCS | Performed by: INTERNAL MEDICINE

## 2022-06-13 PROCEDURE — 85025 COMPLETE CBC W/AUTO DIFF WBC: CPT

## 2022-06-13 PROCEDURE — G8417 CALC BMI ABV UP PARAM F/U: HCPCS | Performed by: INTERNAL MEDICINE

## 2022-06-13 PROCEDURE — 87493 C DIFF AMPLIFIED PROBE: CPT

## 2022-06-13 PROCEDURE — 1111F DSCHRG MED/CURRENT MED MERGE: CPT | Performed by: INTERNAL MEDICINE

## 2022-06-13 PROCEDURE — 1123F ACP DISCUSS/DSCN MKR DOCD: CPT | Performed by: INTERNAL MEDICINE

## 2022-06-13 PROCEDURE — 74011250636 HC RX REV CODE- 250/636: Performed by: INTERNAL MEDICINE

## 2022-06-13 PROCEDURE — G9717 DOC PT DX DEP/BP F/U NT REQ: HCPCS | Performed by: INTERNAL MEDICINE

## 2022-06-13 PROCEDURE — 1090F PRES/ABSN URINE INCON ASSESS: CPT | Performed by: INTERNAL MEDICINE

## 2022-06-13 PROCEDURE — 3017F COLORECTAL CA SCREEN DOC REV: CPT | Performed by: INTERNAL MEDICINE

## 2022-06-13 PROCEDURE — G8536 NO DOC ELDER MAL SCRN: HCPCS | Performed by: INTERNAL MEDICINE

## 2022-06-13 PROCEDURE — G8400 PT W/DXA NO RESULTS DOC: HCPCS | Performed by: INTERNAL MEDICINE

## 2022-06-13 PROCEDURE — 36415 COLL VENOUS BLD VENIPUNCTURE: CPT

## 2022-06-13 RX ORDER — SODIUM CHLORIDE 9 MG/ML
5-40 INJECTION INTRAMUSCULAR; INTRAVENOUS; SUBCUTANEOUS AS NEEDED
Status: CANCELLED | OUTPATIENT
Start: 2022-06-13

## 2022-06-13 RX ORDER — SODIUM CHLORIDE 0.9 % (FLUSH) 0.9 %
5-40 SYRINGE (ML) INJECTION AS NEEDED
Status: CANCELLED | OUTPATIENT
Start: 2022-06-13

## 2022-06-13 RX ORDER — SODIUM CHLORIDE 9 MG/ML
5-250 INJECTION, SOLUTION INTRAVENOUS AS NEEDED
Status: CANCELLED | OUTPATIENT
Start: 2022-06-13

## 2022-06-13 RX ORDER — SODIUM CHLORIDE 0.9 % (FLUSH) 0.9 %
5-10 SYRINGE (ML) INJECTION AS NEEDED
Status: DISCONTINUED | OUTPATIENT
Start: 2022-06-13 | End: 2022-06-14 | Stop reason: HOSPADM

## 2022-06-13 RX ORDER — HEPARIN 100 UNIT/ML
500 SYRINGE INTRAVENOUS AS NEEDED
Status: CANCELLED
Start: 2022-06-13

## 2022-06-13 RX ORDER — HEPARIN 100 UNIT/ML
500 SYRINGE INTRAVENOUS AS NEEDED
Status: DISCONTINUED | OUTPATIENT
Start: 2022-06-13 | End: 2022-06-14 | Stop reason: HOSPADM

## 2022-06-13 RX ORDER — NYSTATIN 100000 [USP'U]/ML
500000 SUSPENSION ORAL 4 TIMES DAILY
Qty: 200 ML | Refills: 0 | Status: SHIPPED
Start: 2022-06-13 | End: 2022-08-22

## 2022-06-13 RX ADMIN — HEPARIN 500 UNITS: 100 SYRINGE at 13:00

## 2022-06-13 RX ADMIN — PEGFILGRASTIM-CBQV 6 MG: 6 INJECTION, SOLUTION SUBCUTANEOUS at 11:43

## 2022-06-13 RX ADMIN — Medication 10 ML: at 13:00

## 2022-06-13 RX ADMIN — SODIUM CHLORIDE 500 ML: 9 INJECTION, SOLUTION INTRAVENOUS at 11:40

## 2022-06-13 NOTE — PROGRESS NOTES
[facility] Progress Note    Date: 2022    Name: Severo Raven    MRN: [id number]         : 1955    Ms. Letha Longoria arrived ambulatory and fatigued for Hydration and Udenyca. Assessment was completed and right chest wall port accessed without difficulty, by Nithin Joe RN. Labs drawn & sent for processing. Dr. Jessica Bacon assess patient in the [facility]. Patient c/o diarrhea stools. Hydration and c-diff ordered for rule-out. Ms. Leia Hardin vitals were reviewed. Patient Vitals for the past 24 hrs:   Temp Pulse Resp BP SpO2   22 1304 -- 86 18 (!) 121/57 --   22 1127 97.5 °F (36.4 °C) 90 18 (!) 111/58 94 %       Lab results were obtained and reviewed. Recent Results (from the past 12 hour(s))   CBC WITH AUTOMATED DIFF    Collection Time: 22 11:10 AM   Result Value Ref Range    WBC 10.4 3.6 - 11.0 K/uL    RBC 3.72 (L) 3.80 - 5.20 M/uL    HGB 11.6 11.5 - 16.0 g/dL    HCT 35.3 35.0 - 47.0 %    MCV 94.9 80.0 - 99.0 FL    MCH 31.2 26.0 - 34.0 PG    MCHC 32.9 30.0 - 36.5 g/dL    RDW 14.6 (H) 11.5 - 14.5 %    PLATELET 810 748 - 622 K/uL    MPV 10.4 8.9 - 12.9 FL    NRBC 0.0 0  WBC    ABSOLUTE NRBC 0.00 0.00 - 0.01 K/uL    NEUTROPHILS 93 (H) 32 - 75 %    LYMPHOCYTES 4 (L) 12 - 49 %    MONOCYTES 0 (L) 5 - 13 %    EOSINOPHILS 2 0 - 7 %    BASOPHILS 0 0 - 1 %    IMMATURE GRANULOCYTES 1 (H) 0.0 - 0.5 %    ABS. NEUTROPHILS 9.7 (H) 1.8 - 8.0 K/UL    ABS. LYMPHOCYTES 0.4 (L) 0.8 - 3.5 K/UL    ABS. MONOCYTES 0.0 0.0 - 1.0 K/UL    ABS. EOSINOPHILS 0.2 0.0 - 0.4 K/UL    ABS. BASOPHILS 0.0 0.0 - 0.1 K/UL    ABS. IMM.  GRANS. 0.1 (H) 0.00 - 0.04 K/UL    DF SMEAR SCANNED      RBC COMMENTS NORMOCYTIC, NORMOCHROMIC     METABOLIC PANEL, COMPREHENSIVE    Collection Time: 22 11:10 AM   Result Value Ref Range    Sodium 137 136 - 145 mmol/L    Potassium 3.2 (L) 3.5 - 5.1 mmol/L    Chloride 98 97 - 108 mmol/L    CO2 31 21 - 32 mmol/L    Anion gap 8 5 - 15 mmol/L    Glucose 112 (H) 65 - 100 mg/dL    BUN 12 6 - 20 MG/DL Creatinine 0.60 0.55 - 1.02 MG/DL    BUN/Creatinine ratio 20 12 - 20      GFR est AA >60 >60 ml/min/1.73m2    GFR est non-AA >60 >60 ml/min/1.73m2    Calcium 8.4 (L) 8.5 - 10.1 MG/DL    Bilirubin, total 1.0 0.2 - 1.0 MG/DL    ALT (SGPT) 27 12 - 78 U/L    AST (SGOT) 12 (L) 15 - 37 U/L    Alk. phosphatase 60 45 - 117 U/L    Protein, total 5.5 (L) 6.4 - 8.2 g/dL    Albumin 3.1 (L) 3.5 - 5.0 g/dL    Globulin 2.4 2.0 - 4.0 g/dL    A-G Ratio 1.3 1.1 - 2.2         Medications:  Medications Administered     heparin (porcine) pf 500 Units     Admin Date  06/13/2022 Action  Given Dose  500 Units Route  IntraVENous Administered By  Mel Hernandes RN          pegfilgrastim[facility]) injection 6 mg     Admin Date  06/13/2022 Action  Given Dose  6 mg Route  SubCUTAneous Administered By  Mel Hernandes RN          sodium chloride (NS) flush 5-10 mL     Admin Date  06/13/2022 Action  Given Dose  10 mL Route  IntraVENous Administered By  Mel Hernandes RN          sodium chloride 0.9 % bolus infusion 500 mL     Admin Date  06/13/2022 Action  New Bag Dose  500 mL Rate  500 mL/hr Route  IntraVENous Administered By  Mel Hernandes RN                Patient declined second 500cc bolus. Ms. Paola Curiel tolerated treatment well and was discharged from [place] [address] in stable condition at 1310. Port de-accessed, flushed & heparinized per protocol. She is to return on June 27 at 1130 for her next appointment.     Shagufta Díaz RN  June 13, 2022

## 2022-06-13 NOTE — TELEPHONE ENCOUNTER
[address] at [facility]  [phone]    06/13/22 9:42 AM - Called and spoke with the patient. Patient's ID verified x 2. The patient states she felt too sick to make it in to her appointment this morning. The patient reports feeling nauseous since getting home on Saturday, 6/11. She reports no vomit but is dry heaving. She has been taking Zofran, which seems to help some. Her last dose was at 5:00 AM this morning. She also reports mouth sores since 6/11. This is making it difficult to eat or drink. She has been having stomach pain/cramping. The patient also reports diarrhea, which started on 6/11. She has had 2-3 diarrheal episodes this morning. They are a watery consistency. She denies fevers or blood in the stool. Advised this nurse would update Dr. Gonzalo Lopez of above and give her a call back as soon as possible. The patient voiced understanding and gratitude for the call. No further questions or concerns. [address] at [facility]  [phone]    06/13/22 9:5 AM - Called and spoke with the patient. Advised, per Dr. Gonzalo Lopez, he'd like her to come in today with a  if possible for Neulasta, IV fluids, and IV nausea medications. Advised there is an appointment available in the infusion center for 11:00 AM.  The patient voiced understanding and gratitude for the call. No further questions or concerns.

## 2022-06-14 ENCOUNTER — HOSPITAL ENCOUNTER (EMERGENCY)
Age: 67
Discharge: LEFT BEFORE DISCHARGE | End: 2022-06-14
Payer: MEDICARE

## 2022-06-14 ENCOUNTER — DOCUMENTATION ONLY (OUTPATIENT)
Dept: ONCOLOGY | Age: 67
End: 2022-06-14

## 2022-06-14 ENCOUNTER — TELEPHONE (OUTPATIENT)
Dept: ONCOLOGY | Age: 67
End: 2022-06-14

## 2022-06-14 ENCOUNTER — HOSPITAL ENCOUNTER (OUTPATIENT)
Dept: INFUSION THERAPY | Age: 67
End: 2022-06-14

## 2022-06-14 VITALS
HEIGHT: 63 IN | TEMPERATURE: 98 F | RESPIRATION RATE: 16 BRPM | DIASTOLIC BLOOD PRESSURE: 74 MMHG | WEIGHT: 200 LBS | HEART RATE: 89 BPM | BODY MASS INDEX: 35.44 KG/M2 | SYSTOLIC BLOOD PRESSURE: 114 MMHG | OXYGEN SATURATION: 97 %

## 2022-06-14 DIAGNOSIS — C83.31 DIFFUSE LARGE B-CELL LYMPHOMA OF LYMPH NODES OF NECK (HCC): ICD-10-CM

## 2022-06-14 DIAGNOSIS — A49.8 CLOSTRIDIOIDES DIFFICILE INFECTION: Primary | ICD-10-CM

## 2022-06-14 DIAGNOSIS — A04.72 C. DIFFICILE DIARRHEA: Primary | ICD-10-CM

## 2022-06-14 PROBLEM — Z95.828 PORT-A-CATH IN PLACE: Status: ACTIVE | Noted: 2022-06-14

## 2022-06-14 PROCEDURE — 75810000275 HC EMERGENCY DEPT VISIT NO LEVEL OF CARE

## 2022-06-14 RX ORDER — FIDAXOMICIN 200 MG/1
200 TABLET, FILM COATED ORAL 2 TIMES DAILY
Qty: 20 TABLET | Refills: 0 | Status: SHIPPED | OUTPATIENT
Start: 2022-06-14 | End: 2022-06-14

## 2022-06-14 RX ORDER — METRONIDAZOLE 500 MG/1
500 TABLET ORAL 3 TIMES DAILY
Qty: 42 TABLET | Refills: 0 | Status: ON HOLD | OUTPATIENT
Start: 2022-06-14 | End: 2022-08-18

## 2022-06-14 RX ORDER — SAME BUTANEDISULFONATE/BETAINE 400-600 MG
250 POWDER IN PACKET (EA) ORAL 2 TIMES DAILY
Qty: 28 CAPSULE | Refills: 1 | Status: SHIPPED | OUTPATIENT
Start: 2022-06-14 | End: 2022-06-28

## 2022-06-14 RX ORDER — ONDANSETRON 2 MG/ML
4 INJECTION INTRAMUSCULAR; INTRAVENOUS
Status: DISCONTINUED | OUTPATIENT
Start: 2022-06-14 | End: 2022-06-14 | Stop reason: HOSPADM

## 2022-06-14 RX ORDER — PROCHLORPERAZINE MALEATE 10 MG
10 TABLET ORAL
Qty: 50 TABLET | Refills: 1 | Status: SHIPPED | OUTPATIENT
Start: 2022-06-14 | End: 2022-07-19 | Stop reason: SDUPTHER

## 2022-06-14 NOTE — TELEPHONE ENCOUNTER
Patient left a voicemail stating she would like for Dr. Jamshid Bolton to give her a call. Please advise.     # [phone]

## 2022-06-14 NOTE — ED TRIAGE NOTES
Pt reports positive for c-dif after chemo. Being medicated but unable to keep anything down due to nausea. General weakness per pt.  Denies SOB and chest pain

## 2022-06-14 NOTE — PROGRESS NOTES
Princess Lamar,    Your stool test came back positive for C diff infection. I have sent a prescription for Dificid antibiotic to your pharmacy. It is twice a day for 10 days. This is likely related to being on chemotherapy and steroids. Please add Florastor to your daily medications as well. This is a probiotic. However, don't take this medication within 4 hours of your antibiotics.     Thanks,    Dr. Shannon Oconnor

## 2022-06-14 NOTE — ED NOTES
4:27 PM    Pt has stage IV B cell Lymphoma, states that she has worsening abdominal paim generalized weakness w/ N/V/D since this past Saturday in setting of C. DIff diagnosis. Unable to take treatment. Currently receiving chemotherapy. States C Diff Dx last week. Denies any fevers, cough, congestion, SOB, urinary symptoms. I have evaluated the patient as the Provider in Triage. I have reviewed Her vital signs and the triage nurse assessment. I have talked with the patient and any available family and advised that I am the provider in triage and have ordered the appropriate study to initiate their work up based on the clinical presentation during my assessment. I have advised that the patient will be accommodated in the Main ED as soon as possible. I have also requested to contact the triage nurse or myself immediately if the patient experiences any changes in their condition during this brief waiting period.   Senia Yan NP

## 2022-06-14 NOTE — TELEPHONE ENCOUNTER
[address] at [facility]  [phone]    06/14/22 9:34 AM - Please see most recent result note. Called and spoke to the patient. Advised, per charge nurse, \"As long as she is positive, we cannot see her here. CDiff patients have to have a private room and bathroom in this setting. We do not have that capacity here. [facility] is the only infusion center that can accommodate. \"  Advised they have an appointment available today at 2:30 PM or 2:00 PM tomorrow. Provided their address. The patient states she will think about it but would prefer not to go to [facility]. She states she began vomiting last night and has vomited 5 times this morning. She states it is a small amount but is unable to keep anything down. She is completely out of the Compazine but will go get the prescription. She states she is still having diarrhea as well. The patient reports her mouth sores are getting better, as the magic mouthwash is helping. Advised the Compazine, Dificid, and Florastor have all been sent in to her [employer]. Advised patient to please call back with her decision regarding the fluids. The patient voiced understanding and gratitude for the call. No further questions or concerns. [address] at [facility]  [phone]    06/14/22 10:44 AM - Called and spoke to the patient. Advised since the Dificid was going to be over $1,500, Dr. Ant Fink went ahead and placed a new prescription for Flagyl. Advised she is to take this three times per day and cannot drink any alcohol while on this medication. The patient voiced understanding and gratitude for the call. She states Jn mentioned an authorization for [employer]. Advised we have not received a fax regarding this yet but will update her if we do. No further questions or concerns.            [address] at [facility]  [phone]    06/14/22 3:27 PM - Called and spoke to the patient. She states she cannot keep anything down. She has tried to take the pills but is unable to keep them down. She states she is going to go to the [facility] ER. She inquired if she could wait until morning. Advised Dr. Mara Hobbs most likely wants her to go as soon as she has a  available, as she runs the risk of dehydrating due to the vomiting and diarrhea. The patient voiced understanding. Advised this nurse will let Dr. Mara Hobbs know she is on the way to the ER. No further questions or concerns.

## 2022-06-14 NOTE — PROGRESS NOTES
[address] at [facility]  [phone]    06/14/22 9:09 AM - Received call from the patient. Patient's ID verified x 2. Advised her of Dr. Toshia Hoang result note. The patient states she is also vomiting. She cannot keep any food or fluids down. She states that she needs a compazine refill. Advised this nurse will send that to Dr. Shannon Hill. The patient inquired how large the antibiotic pills are. Advised this nurse is not familiar with the size of the pills. The patient states she has been having a hard time getting larger pills down. Advised if she is unable to swallow the antibiotics to please call our office back. Inquired if patient would like to go to [facility] for IV fluids, as  they have a private room and bathroom. The patient declined this and states she would like to receive IV fluids at [facility] tomorrow if possible. Advised this nurse will check with charge to see what times are available. Advised this nurse will call her back once Dr. Shannon Hill signs her compazine refill. The patient voiced understanding and gratitude for the call. No further questions or concerns.

## 2022-06-14 NOTE — ED NOTES
Pt family to this writer, states pt does not want to wait in waiting room.      Advised to let staff know if they leave

## 2022-06-14 NOTE — PROGRESS NOTES
-called patient again and daughter, Farideh Apodaca, answered her phone. She stated that patient was sleeping. She noted that she more recently was able to keep the antinausea medication down but she is unsure if she will be able to keep anything down including the flagyl that we prescribed her.   -I let Farideh Apodaca know that the recommendation is for patient to go to the ER if she simply is not able to keep any oral intake without vomiting and especially if she cannot start FLAGYL for C diff which can cause a worsening diarrhea infection that can cause serious illness. Patient's daughter, Farideh Apodaca is agreeable to this plan. She understood that Earnest Day can call us if she has any questions. Please contact us if any new questions or concerns. Non-Urgent Matters: Call our clinic at [phone] during open clinic hours. Please note that [employer] Messages may not be immediately returned. Urgent Matters: Call hospital  ([address], or [facility]) at night or on weekends for questions that cannot wait until clinic opens. Emergency: Call 9-1-1.     Sean Stoll MD  Hematology/Medical Oncology Provider  [facility]  P: [phone]    CC:  Eduardo Mcbride MD

## 2022-06-14 NOTE — PROGRESS NOTES
[address] at [place]  [phone]    06/14/22 8:41 AM - Attempted to call the patient. There was no answer. Left a voicemail for the patient to call back at their earliest convenience along with the phone number to our office.

## 2022-06-14 NOTE — PROGRESS NOTES
[facility] [place] at [address], [address] [facility]  [place]: [phone]  F: [phone] Patient ID  Name: Michelle Mathur  YOB: 1955  MRN: [id number]  Referring Provider:   No referring provider defined for this encounter. 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 This summary highlights focused aspects of patient's ongoing care and may have a recurring section in notes with either updates or remain unchanged as a longitudinal care summary. -------------------------------------------------------------------------------------------------------------------------------------------------------------------------------------------------------  DIAGNOSIS:  · Diffuse Large B-Cell Lymphoma     ORIGINAL STAGING:  · Stage IV     SITES OF DISEASE:  · Left Cervical Lymph Node,Bone Disease, Stage IV    CURRENT TREATMENT:  · C1,D1 on 5/11/22 DA-R-EPOCH     PRIOR TREATMENT:  · n/a     GOALS OF CARE:  · Curative Intent     PATHOLOGY:  · Specimen #:[id number] Collect: 4/22/2022      ==========================================================================                    * * *SURGICAL PATHOLOGY REPORT* * *   ==========================================================================   * * *PROCEDURES/ADDENDA* * *   ADDENDUM   Date Ordered: 4/26/2022     Status: Signed Out   Date Complete: 4/26/2022     By: Jf Cooper.  Tina Ramirez MD   Date Reported: 4/26/2022   Addendum Diagnosis   Lymph node, left posterior cervical lymph node, excision:   In situ hybridization for Jesús-Barr viral RNA: Negative   CPT: 65374  Addendum Comment   * * *CLINICAL HISTORY* * *   Cervical lymphadenopathy, rule out lymphoma ==========================================================================   * * *FINAL PATHOLOGIC DIAGNOSIS* * *        Lymph node, left posterior cervical lymph node, excision:        Large B cell lymphoma. See comment. * * *Comment* * *   The histologic section has fragments of lymphoid tissue with diffuse and   focal follicular architecture. Diffuse areas are comprised of large,   atypical lymphocytes with centroblastic morphology and increased mitotic   activity. Immunohistochemical stains confirm the malignant cells are CD20   positive B cells that coexpress CD10 and BCL6 without MUM1. CD23   highlights rare follicular dendritic networks associated with lymphoid   follicles with germinal centers and express CD10 and BCL6 without BCL2. CyclinD1 and CD56 stains are negative. Concurrent flow cytometric analysis   confirms a clonal CD10 positive B-cell population comprised of medium to   large cells. The Ki-67 proliferation index is 30%. The overall findings favor the diagnosis of diffuse large B-cell lymphoma,   germinal center subtype. Molecular genetic studies are pending for further   characterization will be reported in an addendum. Flow cytometry:   Consistent with CD10-positive B-cell lymphoproliferative disorder. Comments: Flow cytometry shows monoclonal B-cells (36% of total cells)   with CD10 expression without CD5, consistent with a CD10-positive B-cell   lymphoproliferative disorder. The main differential diagnosis includes   follicular lymphoma, Burkitt lymphoma and large B-cell lymphoma. Please   correlate the result with morphologic findings and clinical information. Flow Differential (%) and Population Analysis:   Lymphocytes: 93.7%   T-cells (39% of lymphoid cells) show a CD4/CD8 ratio of 3.3 without overt   phenotypic abnormality. NK-cells (1% of lymphoid cells) are unremarkable.    Mature B-cells (56% of lymphoid cells) include large forms based on   forward scattered pattern and show: CD5 neg, CD10+, CD11c+dim, CD19+,   CD20+ with surface lambda light chain restriction. Markers Performed: CD2, CD3, CD4, CD5, CD7, CD8, CD10, CD11c, CD19, CD20,   CD23, CD34, CD38, CD45, CD56, Kappa, Lambda (17 Markers)   The Technical Component Processing of this test was completed at   [facility], [address] [place] [address], [place], [place] / [address] /   [phone] / Darío Lever # [id number].  Interpretation by Ronni Lopes M.D. The   complete scanned report is available in Epic. CPT: P7214910, I7659111, P8455853, [id number], [id number], 5353712   [facility]2/2022   *Electronically Signed Out By Avril Patiño MD*   ==========================================================================   * * *Gross Description* * *   Specimen #1, received fresh labeled with the patient's name,  and left   posterior cervical lymph node, consists of two paper towels containing a   1.7 x 1.5 x 0.5 cm aggregate of pale pink-tan, fleshy soft tissue   fragments. The specimen is handled according to the flow cytometry   protocol as follows:   7          Two air-dried touch prep slides - one Diff Quik stained, one   rapid-fixed in 95% alcohol   7          Representative sections in B plus fixative (1A)   7          One piece held in RPMI for possible flow cytometry analysis   7          Remaining tissue fixed in formalin for permanents (1B)   Dr. Brea Patiño will determine if flow cytometry is necessary. 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 She did not come on time for appointment and our office called her to come in for evaluation. She is here in the infusion center to start fluids after she complained of decreased hydration and diarrhea. Patient overall reports feeling worse since discharge.   Reports fluid status seems okay with not as much retention as last hospital discharge. .            Past Medical History:   Diagnosis Date    Adverse effect of anesthesia       PHLEGM IN THROAT POST OP & NEEDED X2 BREATHING TREATMENTS    Anxiety      COPD (chronic obstructive pulmonary disease) ([address] 2022     emphysema    Depression      GERD (gastroesophageal reflux disease)      Hernia, femoral       since childhood    Hypertension      Joint pain      Nausea & vomiting      TMJ arthritis              Past Surgical History:   Procedure Laterality Date    HX HYSTERECTOMY   2019    HX TONSILLECTOMY       History of Salivary Gland Surgery in 30's for evaluation for dry mouth. Social History            Tobacco Use    Smoking status: Current Every Day Smoker       Packs/day: 0.50       Years: 40.00       Pack years: 20.00       Types: Cigarettes    Smokeless tobacco: Never Used    Tobacco comment: 0.5-1 pack per day   Substance Use Topics    Alcohol use: Not Currently       Alcohol/week: 0.0 standard drinks            Family History   Problem Relation Age of Onset    Heart Disease Mother 46    Heart Surgery Mother           HEART TRANSPLANT    Elevated Lipids Mother      Hypertension Mother      COPD Father      Emphysema Father      Sudden Death Brother 46    Other Maternal Grandmother           BRAIN TUMOR    No Known Problems Son      No Known Problems Daughter             Current Outpatient Medications   Medication Sig    nystatin (MYCOSTATIN) 100,000 unit/mL suspension Take 5 mL by mouth four (4) times daily. Swish and gargle,  then spit or swallow    famotidine (PEPCID) 20 mg tablet Take 1 Tablet by mouth every twelve (12) hours for 7 days.  morphine IR (MS IR) 15 mg tablet Take 15 mg by mouth every four (4) hours as needed for Pain.  trimethoprim-sulfamethoxazole (BACTRIM DS, SEPTRA DS) 160-800 mg per tablet Take 1 Tablet by mouth BID Mon Wed & Fri.    zolpidem (AMBIEN) 5 mg tablet Take 1 Tablet by mouth nightly as needed for Sleep. Max Daily Amount: 5 mg.  LORazepam (ATIVAN) 1 mg tablet Take 1 Tablet by mouth every eight (8) hours as needed for Anxiety. Max Daily Amount: 3 mg. Indications: anxious    ondansetron (ZOFRAN ODT) 8 mg disintegrating tablet Take 1 Tablet by mouth every eight (8) hours as needed for Nausea or Vomiting.  prochlorperazine (COMPAZINE) 10 mg tablet Take 1 Tablet by mouth every six (6) hours as needed for Nausea (do not take if groggy; take if nausea despite zofran).  docusate sodium (COLACE) 100 mg capsule Take 1 Capsule by mouth two (2) times a day for 90 days.  polyethylene glycol (MIRALAX) 17 gram packet Take 1 Packet by mouth daily.  gabapentin (NEURONTIN) 300 mg capsule Take 1 Capsule by mouth daily as needed for Pain.  diclofenac EC (VOLTAREN) 50 mg EC tablet Take 1 Tablet by mouth two (2) times daily as needed for Pain.  FLUoxetine (PROzac) 40 mg capsule TAKE 1 CAPSULE BY MOUTH EVERY MORNING (Patient taking differently: Take 40 mg by mouth every morning. TAKE 1 CAPSULE BY MOUTH EVERY MORNING)    amLODIPine-benazepril (LOTREL) 5-20 mg per capsule TAKE 1 CAPSULE BY MOUTH EVERY MORNING (Patient taking differently: Take 1 Capsule by mouth every morning. TAKE 1 CAPSULE BY MOUTH EVERY MORNING)      No current facility-administered medications for this visit.             Facility-Administered Medications Ordered in Other Visits   Medication Dose Route Frequency    heparin (porcine) pf 500 Units  500 Units IntraVENous PRN    sodium chloride (NS) flush 5-10 mL  5-10 mL IntraVENous PRN           Allergies   Allergen Reactions    Oxycodone Nausea Only      -  Review of Systems Provided by:  Patient  Review of Systems: A complete review of systems was obtained, reviewed.   Pertinent findings reviewed above.        Objective:      Patient Vitals for the past 24 hrs:    Temp Pulse Resp BP SpO2   06/13/22 1304 -- 86 18 (!) 121/57 --   06/13/22 1127 97.5 °F (36.4 °C) 90 18 (!) 111/58 94 %   2- Ambulatory and capable of all selfcare but unable to carry out any work activities; up and about more than 50% of waking hours.     Physical Exam  Constitutional: No acute distress. , Non-toxic appearance., Non-diaphoretic. and Chronic ill appearance. HENT: Normocephalic and atraumatic head. and Mucositis present. Eyes: Normal Conjunctivae. Anicteric sclerae. Cardiovascular: S1,S2 auscultated. No pitting edema. Pulmonary: Normal Respiratory Effort. No wheezing. No rhonchi. No rales. Abdominal: Normal bowel sounds. Soft Abdomen to palpation. No abdominal tenderness. No guarding. Skin: No jaundice. No rash. No petechiae. Musculoskeletal: No temporal muscle wasting on gross inspection. No myalgias on palpation. Neurological: Alert and oriented. No tremor on inspection. Psychiatric: mood normal. normal speech rate normal affect     Results:      I personally reviewed Epic [employer] labs/results below:         Lab Results   Component Value Date/Time     WBC 10.4 06/13/2022 11:10 AM     HGB 11.6 06/13/2022 11:10 AM     HCT 35.3 06/13/2022 11:10 AM     PLATELET 581 88/67/7501 11:10 AM     MCV 94.9 06/13/2022 11:10 AM     ABS. NEUTROPHILS 9.7 (H) 06/13/2022 11:10 AM            Lab Results   Component Value Date/Time     Sodium 137 06/13/2022 11:10 AM     Potassium 3.2 (L) 06/13/2022 11:10 AM     Chloride 98 06/13/2022 11:10 AM     CO2 31 06/13/2022 11:10 AM     Glucose 112 (H) 06/13/2022 11:10 AM     BUN 12 06/13/2022 11:10 AM     Creatinine 0.60 06/13/2022 11:10 AM     GFR est AA >60 06/13/2022 11:10 AM     GFR est non-AA >60 06/13/2022 11:10 AM     Calcium 8.4 (L) 06/13/2022 11:10 AM     Glucose (POC) 98 05/04/2022 01:37 PM            Lab Results   Component Value Date/Time     Bilirubin, total 1.0 06/13/2022 11:10 AM     ALT (SGPT) 27 06/13/2022 11:10 AM     Alk.  phosphatase 60 06/13/2022 11:10 AM     Protein, total 5.5 (L) 06/13/2022 11:10 AM     Albumin 3.1 (L) 06/13/2022 11:10 AM     Globulin 2.4 06/13/2022 11:10 AM          Lab Results   Component Value Date/Time     Hepatitis B surface Ag <0.10 04/27/2022 12:23 PM     Hepatitis B surface Ab 3.66 04/27/2022 12:23 PM     Hep B Core Ab, total Negative 06/08/2022 06:22 AM            Assessment and Recommendations:      1. Chemotherapy induced nausea and vomiting  -nausea/vomiting currently controlled on at home meds (zofran and compazine)    2. Diarrhea of presumed infectious origin  -will check C diff.  -will give IVF NS 500cc and repeat if needed. 3. Diffuse large B-cell lymphoma of lymph nodes of neck ([facility] [address]  Patient confirmed that she just received her Neulasta shot as prescribed. 4. Port-A-Cath in place  No signs of infection. Use for IV fluids.      Follow-Up:  Labs 2x/week.     -within one week     Katelynn Pollack MD  Hematology/Medical Oncology Provider  [facility]  P: [phone]     Signed By:   Wm Eng MD

## 2022-06-14 NOTE — TELEPHONE ENCOUNTER
Patient called and stated that she is still feeling bad and has diarrhea. Please advise.     CB# [phone]

## 2022-06-14 NOTE — TELEPHONE ENCOUNTER
[address] at [facility]  [phone]    06/14/22 9:16 AM - Compazine refill request pended to MD for review.

## 2022-06-14 NOTE — PROGRESS NOTES
Have tried calling patient multiple times today. I left her a voicemail to follow through on our recommendations to seek care. I discussed earlier today with her daughter about the need for Jaqueline Shepherd to proceed with care at the ER since she is not able to take her anti-C. diff medication.

## 2022-06-15 ENCOUNTER — DOCUMENTATION ONLY (OUTPATIENT)
Dept: ONCOLOGY | Age: 67
End: 2022-06-15

## 2022-06-15 NOTE — PROGRESS NOTES
Called patient's number, her friend's number, and daughter's number. No answer at either line at multiple attempts. Left HIPAA-proof voicemail for daughter. Reviewed urgency on-call number and 911 for emergencies.     Mathew Parisi MD  Hematology/Medical Oncology Provider  [place] [address]  P: [phone]

## 2022-06-17 ENCOUNTER — TELEPHONE (OUTPATIENT)
Dept: ONCOLOGY | Age: 67
End: 2022-06-17

## 2022-06-17 DIAGNOSIS — C83.31 DIFFUSE LARGE B-CELL LYMPHOMA OF LYMPH NODES OF NECK (HCC): Primary | ICD-10-CM

## 2022-06-17 NOTE — TELEPHONE ENCOUNTER
[address] at [place]  [phone]    06/17/22 2:44 PM - Called and spoke to the patient. Advised her of the appointments scheduled on Monday (8:15 for labs and 8:30 for Dr. Rafael Adame). Advised this nurse is working on getting the referral sent to Dr. Eden Thomason. The patient voiced understanding and gratitude for the call. No further questions or concerns.

## 2022-06-17 NOTE — TELEPHONE ENCOUNTER
[address] [place]  [phone]    06/17/22 11:57 AM - Called and spoke with the patient. Patient's ID verified x 2. The patient states she is taking her pills and they are helping. Advised patient Dr. David Mae would like to speak with her if now is a good time. The patient was agreeable. The call was then transferred to Dr. David Mae. No further questions or concerns.
Yes

## 2022-06-17 NOTE — TELEPHONE ENCOUNTER
[address] at [facility]  [phone]    06/17/22 3:18 PM - Patient's referral, demographics, and records have been faxed to [facility] (Dr. Ana Cristina Sanchez) at fax #[phone].

## 2022-06-17 NOTE — TELEPHONE ENCOUNTER
[address] at [facility]  [phone]    06/17/22 12:00 PM - Please see most recent telephone encounter.

## 2022-06-20 ENCOUNTER — HOSPITAL ENCOUNTER (OUTPATIENT)
Dept: INFUSION THERAPY | Age: 67
Discharge: HOME | End: 2022-06-20
Payer: MEDICARE

## 2022-06-20 ENCOUNTER — OFFICE VISIT (OUTPATIENT)
Dept: ONCOLOGY | Age: 67
End: 2022-06-20
Payer: MEDICARE

## 2022-06-20 ENCOUNTER — APPOINTMENT (OUTPATIENT)
Dept: INFUSION THERAPY | Age: 67
End: 2022-06-20

## 2022-06-20 ENCOUNTER — TELEPHONE (OUTPATIENT)
Dept: PALLATIVE CARE | Age: 67
End: 2022-06-20

## 2022-06-20 VITALS
SYSTOLIC BLOOD PRESSURE: 120 MMHG | HEART RATE: 86 BPM | TEMPERATURE: 98.6 F | RESPIRATION RATE: 16 BRPM | OXYGEN SATURATION: 95 % | DIASTOLIC BLOOD PRESSURE: 78 MMHG

## 2022-06-20 VITALS
TEMPERATURE: 98.6 F | SYSTOLIC BLOOD PRESSURE: 117 MMHG | DIASTOLIC BLOOD PRESSURE: 66 MMHG | RESPIRATION RATE: 18 BRPM | HEART RATE: 95 BPM | OXYGEN SATURATION: 95 %

## 2022-06-20 DIAGNOSIS — R05.8 PRODUCTIVE COUGH: ICD-10-CM

## 2022-06-20 DIAGNOSIS — C79.51 METASTATIC CANCER TO BONE (HCC): ICD-10-CM

## 2022-06-20 DIAGNOSIS — R19.7 DIARRHEA OF PRESUMED INFECTIOUS ORIGIN: ICD-10-CM

## 2022-06-20 DIAGNOSIS — C83.31 DIFFUSE LARGE B-CELL LYMPHOMA OF LYMPH NODES OF NECK (HCC): ICD-10-CM

## 2022-06-20 DIAGNOSIS — C83.38 DIFFUSE LARGE B-CELL LYMPHOMA OF LYMPH NODES OF MULTIPLE REGIONS (HCC): ICD-10-CM

## 2022-06-20 DIAGNOSIS — T45.1X5A CHEMOTHERAPY INDUCED NAUSEA AND VOMITING: Primary | ICD-10-CM

## 2022-06-20 DIAGNOSIS — C83.31 DIFFUSE LARGE B-CELL LYMPHOMA OF LYMPH NODES OF NECK (HCC): Primary | ICD-10-CM

## 2022-06-20 DIAGNOSIS — Z95.828 PORT-A-CATH IN PLACE: ICD-10-CM

## 2022-06-20 DIAGNOSIS — R11.2 CHEMOTHERAPY INDUCED NAUSEA AND VOMITING: Primary | ICD-10-CM

## 2022-06-20 DIAGNOSIS — R53.83 OTHER FATIGUE: ICD-10-CM

## 2022-06-20 LAB
ALBUMIN SERPL-MCNC: 3.5 G/DL (ref 3.5–5)
ALBUMIN/GLOB SERPL: 1.2 {RATIO} (ref 1.1–2.2)
ALP SERPL-CCNC: 68 U/L (ref 45–117)
ALT SERPL-CCNC: 44 U/L (ref 12–78)
ANION GAP SERPL CALC-SCNC: 12 MMOL/L (ref 5–15)
AST SERPL-CCNC: 28 U/L (ref 15–37)
BASOPHILS # BLD: 0 K/UL (ref 0–0.1)
BASOPHILS NFR BLD: 0 % (ref 0–1)
BILIRUB SERPL-MCNC: 0.2 MG/DL (ref 0.2–1)
BUN SERPL-MCNC: 5 MG/DL (ref 6–20)
BUN/CREAT SERPL: 6 (ref 12–20)
CALCIUM SERPL-MCNC: 9.2 MG/DL (ref 8.5–10.1)
CHLORIDE SERPL-SCNC: 99 MMOL/L (ref 97–108)
CO2 SERPL-SCNC: 27 MMOL/L (ref 21–32)
CREAT SERPL-MCNC: 0.82 MG/DL (ref 0.55–1.02)
DIFFERENTIAL METHOD BLD: ABNORMAL
EOSINOPHIL # BLD: 0 K/UL (ref 0–0.4)
EOSINOPHIL NFR BLD: 0 % (ref 0–7)
ERYTHROCYTE [DISTWIDTH] IN BLOOD BY AUTOMATED COUNT: 15.3 % (ref 11.5–14.5)
GLOBULIN SER CALC-MCNC: 3 G/DL (ref 2–4)
GLUCOSE SERPL-MCNC: 131 MG/DL (ref 65–100)
HCT VFR BLD AUTO: 33.7 % (ref 35–47)
HGB BLD-MCNC: 11.1 G/DL (ref 11.5–16)
IMM GRANULOCYTES # BLD AUTO: 0 K/UL
IMM GRANULOCYTES NFR BLD AUTO: 0 %
LYMPHOCYTES # BLD: 0.8 K/UL (ref 0.8–3.5)
LYMPHOCYTES NFR BLD: 7 % (ref 12–49)
MCH RBC QN AUTO: 30.7 PG (ref 26–34)
MCHC RBC AUTO-ENTMCNC: 32.9 G/DL (ref 30–36.5)
MCV RBC AUTO: 93.1 FL (ref 80–99)
METAMYELOCYTES NFR BLD MANUAL: 5 %
MONOCYTES # BLD: 1.8 K/UL (ref 0–1)
MONOCYTES NFR BLD: 15 % (ref 5–13)
MYELOCYTES NFR BLD MANUAL: 3 %
NEUTS BAND NFR BLD MANUAL: 9 % (ref 0–6)
NEUTS SEG # BLD: 8.2 K/UL (ref 1.8–8)
NEUTS SEG NFR BLD: 61 % (ref 32–75)
NRBC # BLD: 0.13 K/UL (ref 0–0.01)
NRBC BLD-RTO: 1.1 PER 100 WBC
PLATELET # BLD AUTO: 154 K/UL (ref 150–400)
PMV BLD AUTO: 11.3 FL (ref 8.9–12.9)
POTASSIUM SERPL-SCNC: 3.2 MMOL/L (ref 3.5–5.1)
PROT SERPL-MCNC: 6.5 G/DL (ref 6.4–8.2)
RBC # BLD AUTO: 3.62 M/UL (ref 3.8–5.2)
RBC MORPH BLD: ABNORMAL
SODIUM SERPL-SCNC: 138 MMOL/L (ref 136–145)
WBC # BLD AUTO: 11.7 K/UL (ref 3.6–11)
WBC MORPH BLD: ABNORMAL

## 2022-06-20 PROCEDURE — 1123F ACP DISCUSS/DSCN MKR DOCD: CPT | Performed by: INTERNAL MEDICINE

## 2022-06-20 PROCEDURE — G8536 NO DOC ELDER MAL SCRN: HCPCS | Performed by: INTERNAL MEDICINE

## 2022-06-20 PROCEDURE — 96374 THER/PROPH/DIAG INJ IV PUSH: CPT

## 2022-06-20 PROCEDURE — 85025 COMPLETE CBC W/AUTO DIFF WBC: CPT

## 2022-06-20 PROCEDURE — 1111F DSCHRG MED/CURRENT MED MERGE: CPT | Performed by: INTERNAL MEDICINE

## 2022-06-20 PROCEDURE — 96375 TX/PRO/DX INJ NEW DRUG ADDON: CPT

## 2022-06-20 PROCEDURE — 80053 COMPREHEN METABOLIC PANEL: CPT

## 2022-06-20 PROCEDURE — 96360 HYDRATION IV INFUSION INIT: CPT

## 2022-06-20 PROCEDURE — G8417 CALC BMI ABV UP PARAM F/U: HCPCS | Performed by: INTERNAL MEDICINE

## 2022-06-20 PROCEDURE — G8400 PT W/DXA NO RESULTS DOC: HCPCS | Performed by: INTERNAL MEDICINE

## 2022-06-20 PROCEDURE — G9899 SCRN MAM PERF RSLTS DOC: HCPCS | Performed by: INTERNAL MEDICINE

## 2022-06-20 PROCEDURE — 96361 HYDRATE IV INFUSION ADD-ON: CPT

## 2022-06-20 PROCEDURE — 36415 COLL VENOUS BLD VENIPUNCTURE: CPT

## 2022-06-20 PROCEDURE — 3017F COLORECTAL CA SCREEN DOC REV: CPT | Performed by: INTERNAL MEDICINE

## 2022-06-20 PROCEDURE — G8427 DOCREV CUR MEDS BY ELIG CLIN: HCPCS | Performed by: INTERNAL MEDICINE

## 2022-06-20 PROCEDURE — 99214 OFFICE O/P EST MOD 30 MIN: CPT | Performed by: INTERNAL MEDICINE

## 2022-06-20 PROCEDURE — 74011250636 HC RX REV CODE- 250/636: Performed by: INTERNAL MEDICINE

## 2022-06-20 PROCEDURE — 1090F PRES/ABSN URINE INCON ASSESS: CPT | Performed by: INTERNAL MEDICINE

## 2022-06-20 PROCEDURE — 1101F PT FALLS ASSESS-DOCD LE1/YR: CPT | Performed by: INTERNAL MEDICINE

## 2022-06-20 PROCEDURE — G9717 DOC PT DX DEP/BP F/U NT REQ: HCPCS | Performed by: INTERNAL MEDICINE

## 2022-06-20 RX ORDER — MORPHINE SULFATE 15 MG/1
15 TABLET ORAL
Qty: 30 TABLET | Refills: 0 | Status: SHIPPED | OUTPATIENT
Start: 2022-06-20 | End: 2022-06-27 | Stop reason: SDUPTHER

## 2022-06-20 RX ORDER — HEPARIN 100 UNIT/ML
500 SYRINGE INTRAVENOUS AS NEEDED
Status: CANCELLED
Start: 2022-06-20

## 2022-06-20 RX ORDER — SODIUM CHLORIDE 9 MG/ML
5-40 INJECTION INTRAMUSCULAR; INTRAVENOUS; SUBCUTANEOUS AS NEEDED
Status: CANCELLED | OUTPATIENT
Start: 2022-06-20

## 2022-06-20 RX ORDER — PROCHLORPERAZINE MALEATE 5 MG
5 TABLET ORAL
Status: CANCELLED
Start: 2022-06-20

## 2022-06-20 RX ORDER — ONDANSETRON 2 MG/ML
8 INJECTION INTRAMUSCULAR; INTRAVENOUS
Status: DISCONTINUED | OUTPATIENT
Start: 2022-06-20 | End: 2022-06-22 | Stop reason: HOSPADM

## 2022-06-20 RX ORDER — HEPARIN 100 UNIT/ML
500 SYRINGE INTRAVENOUS AS NEEDED
Status: ACTIVE | OUTPATIENT
Start: 2022-06-20 | End: 2022-06-20

## 2022-06-20 RX ORDER — SODIUM CHLORIDE 9 MG/ML
5-250 INJECTION, SOLUTION INTRAVENOUS AS NEEDED
Status: DISPENSED | OUTPATIENT
Start: 2022-06-20 | End: 2022-06-20

## 2022-06-20 RX ORDER — PROCHLORPERAZINE MALEATE 5 MG
5 TABLET ORAL
Status: DISCONTINUED | OUTPATIENT
Start: 2022-06-20 | End: 2022-06-20

## 2022-06-20 RX ORDER — SODIUM CHLORIDE 0.9 % (FLUSH) 0.9 %
5-40 SYRINGE (ML) INJECTION AS NEEDED
Status: DISPENSED | OUTPATIENT
Start: 2022-06-20 | End: 2022-06-20

## 2022-06-20 RX ORDER — SODIUM CHLORIDE 9 MG/ML
5-250 INJECTION, SOLUTION INTRAVENOUS AS NEEDED
Status: CANCELLED | OUTPATIENT
Start: 2022-06-20

## 2022-06-20 RX ORDER — SODIUM CHLORIDE 9 MG/ML
5-40 INJECTION INTRAMUSCULAR; INTRAVENOUS; SUBCUTANEOUS AS NEEDED
Status: ACTIVE | OUTPATIENT
Start: 2022-06-20 | End: 2022-06-20

## 2022-06-20 RX ORDER — SODIUM CHLORIDE 0.9 % (FLUSH) 0.9 %
5-40 SYRINGE (ML) INJECTION AS NEEDED
Status: CANCELLED | OUTPATIENT
Start: 2022-06-20

## 2022-06-20 RX ORDER — PROCHLORPERAZINE EDISYLATE 5 MG/ML
5 INJECTION INTRAMUSCULAR; INTRAVENOUS
Status: DISCONTINUED | OUTPATIENT
Start: 2022-06-20 | End: 2022-06-22 | Stop reason: HOSPADM

## 2022-06-20 RX ADMIN — SODIUM CHLORIDE 1000 ML: 9 INJECTION, SOLUTION INTRAVENOUS at 10:30

## 2022-06-20 RX ADMIN — PROCHLORPERAZINE EDISYLATE 5 MG: 5 INJECTION INTRAMUSCULAR; INTRAVENOUS at 11:58

## 2022-06-20 RX ADMIN — ONDANSETRON 8 MG: 2 INJECTION INTRAMUSCULAR; INTRAVENOUS at 10:33

## 2022-06-20 NOTE — PROGRESS NOTES
[facility] at [address], [address] 100[address] [facility]  Luke Shone: [phone]  F: [phone] Patient ID  Name: Shantel García  YOB: 1955  MRN: [id number]  Referring Provider:   No referring provider defined for this encounter. 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 This summary highlights focused aspects of patient's ongoing care and may have a recurring section in notes with either updates or remain unchanged as a longitudinal care summary. -------------------------------------------------------------------------------------------------------------------------------------------------------------------------------------------------------  DIAGNOSIS:  · Diffuse Large B-Cell Lymphoma     ORIGINAL STAGING:  · Stage IV     SITES OF DISEASE:  · Left Cervical Lymph Node,Bone Disease, Stage IV    CURRENT TREATMENT:  · C1,D1 on 5/11/22 DA-R-EPOCH     PRIOR TREATMENT:  · n/a     GOALS OF CARE:  · Curative Intent     PATHOLOGY:  · Specimen #:[id number] Collect: 4/22/2022      ==========================================================================                    * * *SURGICAL PATHOLOGY REPORT* * *   ==========================================================================   * * *PROCEDURES/ADDENDA* * *   ADDENDUM   Date Ordered: 4/26/2022     Status: Signed Out   Date Complete: 4/26/2022     By: Tristian Ford.  Meg Johnson MD   Date Reported: 4/26/2022   Addendum Diagnosis   Lymph node, left posterior cervical lymph node, excision:   In situ hybridization for Jesús-Barr viral RNA: Negative   CPT: 96548  Addendum Comment   * * *CLINICAL HISTORY* * *   Cervical lymphadenopathy, rule out lymphoma ==========================================================================   * * *FINAL PATHOLOGIC DIAGNOSIS* * *        Lymph node, left posterior cervical lymph node, excision:        Large B cell lymphoma. See comment. * * *Comment* * *   The histologic section has fragments of lymphoid tissue with diffuse and   focal follicular architecture. Diffuse areas are comprised of large,   atypical lymphocytes with centroblastic morphology and increased mitotic   activity. Immunohistochemical stains confirm the malignant cells are CD20   positive B cells that coexpress CD10 and BCL6 without MUM1. CD23   highlights rare follicular dendritic networks associated with lymphoid   follicles with germinal centers and express CD10 and BCL6 without BCL2. CyclinD1 and CD56 stains are negative. Concurrent flow cytometric analysis   confirms a clonal CD10 positive B-cell population comprised of medium to   large cells. The Ki-67 proliferation index is 30%. The overall findings favor the diagnosis of diffuse large B-cell lymphoma,   germinal center subtype. Molecular genetic studies are pending for further   characterization will be reported in an addendum. Flow cytometry:   Consistent with CD10-positive B-cell lymphoproliferative disorder. Comments: Flow cytometry shows monoclonal B-cells (36% of total cells)   with CD10 expression without CD5, consistent with a CD10-positive B-cell   lymphoproliferative disorder. The main differential diagnosis includes   follicular lymphoma, Burkitt lymphoma and large B-cell lymphoma. Please   correlate the result with morphologic findings and clinical information. Flow Differential (%) and Population Analysis:   Lymphocytes: 93.7%   T-cells (39% of lymphoid cells) show a CD4/CD8 ratio of 3.3 without overt   phenotypic abnormality. NK-cells (1% of lymphoid cells) are unremarkable.    Mature B-cells (56% of lymphoid cells) include large forms based on   forward scattered pattern and show: CD5 neg, CD10+, CD11c+dim, CD19+,   CD20+ with surface lambda light chain restriction. Markers Performed: CD2, CD3, CD4, CD5, CD7, CD8, CD10, CD11c, CD19, CD20,   CD23, CD34, CD38, CD45, CD56, Kappa, Lambda (17 Markers)   The Technical Component Processing of this test was completed at   [facility], [address] [place] [address], [facility] / [id number] /   [phone] / [facility] # [id number].  Interpretation by Bailey Palacios M.D. The   complete scanned report is available in Epic. CPT: F5728820, K865854, [id number], [id number], [id number], [id number]   [facility]2/2022   *Electronically Signed Out By Dominick Munguia. Elise Alicea MD*   ==========================================================================   * * *Gross Description* * *   Specimen #1, received fresh labeled with the patient's name,  and left   posterior cervical lymph node, consists of two paper towels containing a   1.7 x 1.5 x 0.5 cm aggregate of pale pink-tan, fleshy soft tissue   fragments. The specimen is handled according to the flow cytometry   protocol as follows:   7          Two air-dried touch prep slides - one Diff Quik stained, one   rapid-fixed in 95% alcohol   7          Representative sections in B plus fixative (1A)   7          One piece held in RPMI for possible flow cytometry analysis   7          Remaining tissue fixed in formalin for permanents (1B)   Dr. Elise Alicea will determine if flow cytometry is necessary. 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 F who presents for a follow-up evaluation for side effects from DA R-EPOCH s/p Cycle #2. She came last week for her Neulasta injection but was complaining of diarrhea. She tested positive for Clostridium difficile. Patient overall reports feeling worse.  She reports that she is without fever.   -However, reports the following:  Appetite:Grade 2  Diarrhea:Grade 1  Fatigue:Grade 3  Hair Loss:Grade 2  Chills:Grade 2  Nausea:Grade 2  Vomiting: \"Dry Heaves\"  Hot Flashes:Grade 2  Insomnia:Grade 3  Cognition:Grade 2  Concentration:Grade 3  Pain:General 9            Past Medical History:   Diagnosis Date    Adverse effect of anesthesia       PHLEGM IN THROAT POST OP & NEEDED X2 BREATHING TREATMENTS    Anxiety      COPD (chronic obstructive pulmonary disease) ([facility]) 2022     emphysema    Depression      GERD (gastroesophageal reflux disease)      Hernia, femoral       since childhood    Hypertension      Joint pain      Nausea & vomiting      TMJ arthritis              Past Surgical History:   Procedure Laterality Date    HX HYSTERECTOMY   2019    HX TONSILLECTOMY       History of Salivary Gland Surgery in 30's for evaluation for dry mouth. Social History            Tobacco Use    Smoking status: Current Every Day Smoker       Packs/day: 0.50       Years: 40.00       Pack years: 20.00       Types: Cigarettes    Smokeless tobacco: Never Used    Tobacco comment: 0.5-1 pack per day   Substance Use Topics    Alcohol use: Not Currently       Alcohol/week: 0.0 standard drinks            Family History   Problem Relation Age of Onset    Heart Disease Mother 46    Heart Surgery Mother           HEART TRANSPLANT    Elevated Lipids Mother      Hypertension Mother      COPD Father      Emphysema Father      Sudden Death Brother 46    Other Maternal Grandmother           BRAIN TUMOR    No Known Problems Son      No Known Problems Daughter             Current Outpatient Medications   Medication Sig    nystatin (MYCOSTATIN) 100,000 unit/mL suspension Take 5 mL by mouth four (4) times daily. Swish and gargle,  then spit or swallow    famotidine (PEPCID) 20 mg tablet Take 1 Tablet by mouth every twelve (12) hours for 7 days.  morphine IR (MS IR) 15 mg tablet Take 15 mg by mouth every four (4) hours as needed for Pain.     trimethoprim-sulfamethoxazole (BACTRIM DS, SEPTRA DS) 160-800 mg per tablet Take 1 Tablet by mouth BID Mon Wed & Fri.    zolpidem (AMBIEN) 5 mg tablet Take 1 Tablet by mouth nightly as needed for Sleep. Max Daily Amount: 5 mg.  LORazepam (ATIVAN) 1 mg tablet Take 1 Tablet by mouth every eight (8) hours as needed for Anxiety. Max Daily Amount: 3 mg. Indications: anxious    ondansetron (ZOFRAN ODT) 8 mg disintegrating tablet Take 1 Tablet by mouth every eight (8) hours as needed for Nausea or Vomiting.  prochlorperazine (COMPAZINE) 10 mg tablet Take 1 Tablet by mouth every six (6) hours as needed for Nausea (do not take if groggy; take if nausea despite zofran).  docusate sodium (COLACE) 100 mg capsule Take 1 Capsule by mouth two (2) times a day for 90 days.  polyethylene glycol (MIRALAX) 17 gram packet Take 1 Packet by mouth daily.  gabapentin (NEURONTIN) 300 mg capsule Take 1 Capsule by mouth daily as needed for Pain.  diclofenac EC (VOLTAREN) 50 mg EC tablet Take 1 Tablet by mouth two (2) times daily as needed for Pain.  FLUoxetine (PROzac) 40 mg capsule TAKE 1 CAPSULE BY MOUTH EVERY MORNING (Patient taking differently: Take 40 mg by mouth every morning. TAKE 1 CAPSULE BY MOUTH EVERY MORNING)    amLODIPine-benazepril (LOTREL) 5-20 mg per capsule TAKE 1 CAPSULE BY MOUTH EVERY MORNING (Patient taking differently: Take 1 Capsule by mouth every morning. TAKE 1 CAPSULE BY MOUTH EVERY MORNING)      No current facility-administered medications for this visit.             Facility-Administered Medications Ordered in Other Visits   Medication Dose Route Frequency    heparin (porcine) pf 500 Units  500 Units IntraVENous PRN    sodium chloride (NS) flush 5-10 mL  5-10 mL IntraVENous PRN           Allergies   Allergen Reactions    Oxycodone Nausea Only      -  Review of Systems Provided by:  Patient  Review of Systems: A complete review of systems was obtained, reviewed.   Pertinent findings reviewed above.        Objective:      Patient Vitals for the past 24 hrs:    Temp Pulse Resp BP SpO2   06/13/22 1304 -- 86 18 (!) 121/57 --   06/13/22 1127 97.5 °F (36.4 °C) 90 18 (!) 111/58 94 %   3- Capable of only limited selfcare; confined to bed or chair more than 50% of waking hours. Physical Exam  Constitutional: No acute distress. and Non-toxic appearance. HENT: Normocephalic and atraumatic head. and Wearing a mask during COVID-19 precautions. Eyes: Normal Conjunctivae. Anicteric sclerae. Cardiovascular: S1,S2 auscultated. No pitting edema. Pulmonary: Normal Respiratory Effort. No wheezing. No rhonchi. No rales. Minor coarse breath sounds. Abdominal: Normal bowel sounds. Soft Abdomen to palpation. No abdominal tenderness. Skin: No jaundice. No rash. Musculoskeletal: No muscle pain on palpation. No temporal muscle wasting on inspection. Neurological: Alert and oriented. No tremor on inspection. Abnormal Gait. In wheelchair in clinic. Psychiatric: mood normal. normal speech rate normal affect      Results:      I personally reviewed Epic EHR labs/results below:         Lab Results   Component Value Date/Time     WBC 10.4 06/13/2022 11:10 AM     HGB 11.6 06/13/2022 11:10 AM     HCT 35.3 06/13/2022 11:10 AM     PLATELET 802 74/13/5490 11:10 AM     MCV 94.9 06/13/2022 11:10 AM     ABS. NEUTROPHILS 9.7 (H) 06/13/2022 11:10 AM            Lab Results   Component Value Date/Time     Sodium 137 06/13/2022 11:10 AM     Potassium 3.2 (L) 06/13/2022 11:10 AM     Chloride 98 06/13/2022 11:10 AM     CO2 31 06/13/2022 11:10 AM     Glucose 112 (H) 06/13/2022 11:10 AM     BUN 12 06/13/2022 11:10 AM     Creatinine 0.60 06/13/2022 11:10 AM     GFR est AA >60 06/13/2022 11:10 AM     GFR est non-AA >60 06/13/2022 11:10 AM     Calcium 8.4 (L) 06/13/2022 11:10 AM     Glucose (POC) 98 05/04/2022 01:37 PM            Lab Results   Component Value Date/Time     Bilirubin, total 1.0 06/13/2022 11:10 AM     ALT (SGPT) 27 06/13/2022 11:10 AM     Alk.  phosphatase 60 06/13/2022 11:10 AM     Protein, total 5.5 (L) 06/13/2022 11:10 AM     Albumin 3.1 (L) 06/13/2022 11:10 AM     Globulin 2.4 06/13/2022 11:10 AM            Lab Results   Component Value Date/Time     Hepatitis B surface Ag <0.10 04/27/2022 12:23 PM     Hepatitis B surface Ab 3.66 04/27/2022 12:23 PM     Hep B Core Ab, total Negative 06/08/2022 06:22 AM            Assessment and Recommendations:      1. Chemotherapy induced nausea and vomiting  -Proceed to IV fluids and anti-emetics today. Will start IV fluids and IV antiemetics. 2. Productive cough  -reportedly responsive to mucinex. She notes that she was not taking bactrim and instead had focused on taking flagyl for her C diff infection last week. We discussed that we may need to attain a chest x-ray especially if cough persists. No purulent sputum reported. No fevers. It may be as well that without smoking her cilia are now becoming motile and helping her clear more sputum too. However, cannot exclude an occult infection but with recent C diff will not rush to start antibiotics without significant symptoms/signs. 3. Diffuse large B-cell lymphoma of lymph nodes of neck (HCC)  -will order CT neck/chest/abdomen/pelvis scans (will await creatinine to assess for use of contrast)    4. Port-A-Cath in place  No signs of infection. 5. Other fatigue  -CBC pending but without any anemia. Keep Hgb greater than 7g/dL with RBC transfusion. Follow-up and Dispositions    · Return in about 1 week (around 6/27/2022).          Syeda Cifuentes MD  Hematology/Medical Oncology Provider  Manan Hodges  P: [phone]     Signed By:   Jamie Price MD

## 2022-06-20 NOTE — TELEPHONE ENCOUNTER
The patient is requesting a refill for Morphine. She is completely out. She is not feeling well and Dr. Dayne Go is sending her for fluids.

## 2022-06-20 NOTE — PROGRESS NOTES
[facility] Lab Visit:    0805  Pt arrived ambulatory and in no distress, labs drawn  2 sticks 1 in left ac 1 in left wrist per KK . Departed [facility] ambulatory and in no distress. Visit Vitals  /66   Pulse 95   Temp 96.8 °F (36 °C)   Resp 18   SpO2 93%       Labs available in CC once resulted.

## 2022-06-20 NOTE — PROGRESS NOTES
[facility] Progress Note    Date: 2022    Name: Michelle Mathur    MRN: [id number]         : 1955    Ms. Braxton Noland Arrived ambulatory and in no distress for Hydration Regimen. Assessment was completed, no acute issues at this time, no new complaints voiced. Pt requested a PIV instead of port. PIV placed, + blood. Covid questionnaire completed. 1. Do you have any symptoms of COVID-19? SOB, coughing, fever, or generally not feeling well - no    2. Have you been exposed to COVID-19 recently? - no    3. Have you had any recent contact with family/friend that has a pending COVID test? - no      Ms. Charles Caba vitals were reviewed. Visit Vitals  /66   Pulse 95   Temp 96.8 °F (36 °C)   Resp 18   SpO2 93%       Medications:  Medications Administered     ondansetron (ZOFRAN) injection 8 mg     Admin Date  2022 Action  Given Dose  8 mg Route  IntraVENous Administered By  Navin Worrell RN          prochlorperazine (COMPAZINE) injection 5 mg     Admin Date  2022 Action  Given Dose  5 mg Route  IntraVENous Administered By  Navin Worrell RN          sodium chloride 0.9 % bolus infusion 1,000 mL     Admin Date  2022 Action  New Bag Dose  1,000 mL Rate  666.7 mL/hr Route  IntraVENous Administered By  Navin Worrell RN                    Ms. Braxton Noland tolerated treatment well and was discharged from [place] [address] in stable condition. PIV flushed and removed. She is to return on 22 for her next appointment.     Carmen Cui RN  2022

## 2022-06-20 NOTE — PROGRESS NOTES
Identified pt with two pt identifiers(name and ). Reviewed record in preparation for visit and have obtained necessary documentation. Chief Complaint   Patient presents with    Follow-up     lymphoma, severe nausea and sickness      Pt has depressive sx but would not like further discussion at this time      Health Maintenance Due   Topic    Pneumococcal 65+ years (1 - PCV)    DTaP/Tdap/Td series (1 - Tdap)    Lipid Screen     Shingrix Vaccine Age 50> (1 of 2)    Bone Densitometry (Dexa) Screening     COVID-19 Vaccine (3 - Pfizer risk 4-dose series)    Medicare Yearly Exam      Vitals:    22 0859   BP: 117/66   Pulse: 95   Resp: 18   Temp: 98.6 °F (37 °C)   SpO2: 95%   PainSc:   9       Pain Scale: 9/10        Coordination of Care Questionnaire:  :     1. Have you been to the ER, urgent care clinic since your last visit? Hospitalized since your last visit? [facility] abdominal pain and sickness    2. Have you seen or consulted any other health care providers outside of the [address] [place] [address] since your last visit? Include any pap smears or colon screening.  No

## 2022-06-20 NOTE — TELEPHONE ENCOUNTER
Triage for Controlled Substance Refill Request    Pain Diagnosis: _Diffuse large B-cell lymphoma of lymph nodes of neck (HCC) (C83.31)    Last Outpatient Visit: _5/17/2022    Next Outpatient Visit: _none    Reason for refill needed outside of office visit? Appointment not scheduled prior to need for scheduled refill      Pharmacy: [address] [place] [address]    Medication:morphine IR (MS IR) 15 mg tablet       Dose and directions: Take 1 Tablet by mouth every four (4) hours as needed for Pain for up to 14 days  Number dispensed:30  Date filled ( or Pharmacy):6/10/2022  # left:       reviewed: _yes    Date of Urine Drug Screen:  _    Opioid Safety Handout given:  _yes    Appropriate for refill:  _yes    Action:  _ Medication pending

## 2022-06-21 ENCOUNTER — APPOINTMENT (OUTPATIENT)
Dept: INFUSION THERAPY | Age: 67
End: 2022-06-21

## 2022-06-21 ENCOUNTER — TELEPHONE (OUTPATIENT)
Dept: ONCOLOGY | Age: 67
End: 2022-06-21

## 2022-06-21 NOTE — TELEPHONE ENCOUNTER
Daiva Cranker from [facility] called and stated this patient was referred to them for a second opinion. She stated their first available appointment is on 9/1/22 and she offered that to the patient and the patient wanted her to give Dr. Shaggy Forrest a call to let him know how far out the first available is. Please advise.     # [phone]

## 2022-06-22 ENCOUNTER — PATIENT MESSAGE (OUTPATIENT)
Dept: ONCOLOGY | Age: 67
End: 2022-06-22

## 2022-06-22 NOTE — TELEPHONE ENCOUNTER
[address] at [place]  [phone]    06/22/22 10:46 AM - Called and spoke with the patient. Patient's ID verified x 2. Advised Dr. Kane Valle has placed orders for CT scans. Advised patient she will need to call to get these scheduled. Patient was unable to find a pen to write the scheduling number down. Advised this nurse will send her a [employer] message with the scheduling number. Advised patient to mention to the  that there are 2 orders, so they will need to place the appointment date on both. The patient voiced understanding and gratitude for the call. No further questions or concerns.

## 2022-06-24 ENCOUNTER — TELEPHONE (OUTPATIENT)
Dept: PALLATIVE CARE | Age: 67
End: 2022-06-24

## 2022-06-24 ENCOUNTER — PATIENT MESSAGE (OUTPATIENT)
Dept: ONCOLOGY | Age: 67
End: 2022-06-24

## 2022-06-24 ENCOUNTER — TELEPHONE (OUTPATIENT)
Dept: ONCOLOGY | Age: 67
End: 2022-06-24

## 2022-06-24 NOTE — TELEPHONE ENCOUNTER
Accessing patients chart, the only the appt/activity is cancelled infusion appt on 7/12/2022. Spoke with patient indicated that She has had a change in treatment so her appt have changed.  However patient does have a scheduled appt on 7/18/2022

## 2022-06-24 NOTE — PROGRESS NOTES
Spoke to Dr. Shahnaz Martinez; he agreed with CT scans; consideration of dose deescalation of DA-R-EPOCH and will try to advance patients appt sooner.     Hai De Guzman MD

## 2022-06-24 NOTE — TELEPHONE ENCOUNTER
The patient called to schedule a vv. She advised she will be admitted to [facility] on 7/12/22 if Dr. Ilia Pardo wants to visit her.

## 2022-06-24 NOTE — TELEPHONE ENCOUNTER
SintiaColleen alas [place]  [phone]    06/24/22 10:46 AM - Attempted to call the patient regarding a message Dr. Charles Baca sent this nurse (please see message below). There was no answer. The telephone rang multiple times and never went to a voicemail box. Will attempt again later. The message from Dr. Charles Baca reads as follows:  \"I called u about kita clement; would you mind calling or sending a [employer] message to her letting her know dr Cabral Form agreed with going back to prior dosing and that we can even reduce the dose. He agreed with us getting scans before she takes the next tx. Pleas encourage her to get scans doen in next week because i will talk to her monday about taking the next tx the following week\"      [address] at [place]  [phone]    06/24/22 12:42 PM - Called and spoke with the patient. Patient's ID verified x 2. Advised patient of Dr. Brittany Taylor message above. She states she has her scans schedule but they are not until 7/5. Advised this nurse will try to call and see if Central Scheduling can possibly move the scans up to next week instead. Advised this nurse will update Dr. Charles Baca when he returns on Monday. Advised this nurse will call her back as soon as possible. The patient voiced understanding and gratitude for the call. No further questions or concerns. SintiaColleen Barboza  [phone]    06/24/22 12:54 PM -  Called and spoke with the patient. Advised her scans have been rescheduled to Tuesday, 6/28. The patient states she'd like the information sent to her via [address] Advised this nurse will get that sent to her. The patient voiced understanding and gratitude for the call. No further questions or concerns.

## 2022-06-27 ENCOUNTER — TELEPHONE (OUTPATIENT)
Dept: ONCOLOGY | Age: 67
End: 2022-06-27

## 2022-06-27 ENCOUNTER — TELEPHONE (OUTPATIENT)
Dept: PALLATIVE CARE | Age: 67
End: 2022-06-27

## 2022-06-27 DIAGNOSIS — C83.38 DIFFUSE LARGE B-CELL LYMPHOMA OF LYMPH NODES OF MULTIPLE REGIONS (HCC): ICD-10-CM

## 2022-06-27 RX ORDER — MORPHINE SULFATE 15 MG/1
15 TABLET ORAL
Qty: 90 TABLET | Refills: 0 | Status: SHIPPED | OUTPATIENT
Start: 2022-06-27 | End: 2022-07-12 | Stop reason: SDUPTHER

## 2022-06-27 RX ORDER — HEPARIN 100 UNIT/ML
500 SYRINGE INTRAVENOUS AS NEEDED
Status: CANCELLED | OUTPATIENT
Start: 2022-08-16

## 2022-06-27 RX ORDER — SODIUM CHLORIDE 9 MG/ML
5-250 INJECTION, SOLUTION INTRAVENOUS AS NEEDED
Status: CANCELLED | OUTPATIENT
Start: 2022-08-16

## 2022-06-27 RX ORDER — SODIUM CHLORIDE 0.9 % (FLUSH) 0.9 %
5-40 SYRINGE (ML) INJECTION AS NEEDED
Status: CANCELLED | OUTPATIENT
Start: 2022-08-16

## 2022-06-27 RX ORDER — SODIUM CHLORIDE 9 MG/ML
5-40 INJECTION INTRAMUSCULAR; INTRAVENOUS; SUBCUTANEOUS AS NEEDED
Status: CANCELLED | OUTPATIENT
Start: 2022-08-16

## 2022-06-27 NOTE — TELEPHONE ENCOUNTER
Patient has previously been prescribed morphine IR 5 mg #30 with is equivalent to a 5 day supply. Please advise if you would like to change quantity     Triage for Controlled Substance Refill Request    Pain Diagnosis: _Diffuse large B-cell lymphoma of lymph nodes of multiple regions [facility]) (C83.38)    Last Outpatient Visit: _5/17/2022    Next Outpatient Visit: _7/18/2022    Reason for refill needed outside of office visit? Appointment not scheduled prior to need for scheduled refill      Pharmacy: [address] [place] [address]    Medication:morphine IR (MS IR) 15 mg tablet      Dose and directions: Take 1 Tablet by mouth every four (4) hours as needed for Pain for up to 14 days  Number dispensed: 30  Date filled ( or Pharmacy):6/20/2022  # left: 3 pills left     Medication:  Dose and directions:  Number dispensed:  Date filled ( or Pharmacy):  #left:     reviewed: _yes    Date of Urine Drug Screen:  _    Opioid Safety Handout given:  _yes    Appropriate for refill:  _yes    Action:  _ Medication pending for Dr. Daniele Hamilton

## 2022-06-27 NOTE — TELEPHONE ENCOUNTER
[address] at [place]  [phone]    06/27/22 2:26 PM - Attempted to call the patient regarding her missed appointment today. There was no answer. Left a voicemail for the patient to call back at their earliest convenience along with the phone number to our office. Agnieszka Barboza  [phone]    06/28/22 12:00 PM - Attempted to call the patient. There was no answer. Left a voicemail for the patient to call back at their earliest convenience along with the phone number to our office.

## 2022-06-27 NOTE — TELEPHONE ENCOUNTER
Patient is requesting refill on Morphine, she has 3 pills left. She will call back as well her CB:[phone].

## 2022-06-27 NOTE — TELEPHONE ENCOUNTER
[facility] office calling due to they can not get the patient in for a 2nd opinion until Sept 1. Do you want the patient to still make this appt. ?         # [phone]

## 2022-06-28 ENCOUNTER — HOSPITAL ENCOUNTER (OUTPATIENT)
Dept: CT IMAGING | Age: 67
Discharge: HOME | End: 2022-06-28
Attending: INTERNAL MEDICINE
Payer: MEDICARE

## 2022-06-28 DIAGNOSIS — C83.31 DIFFUSE LARGE B-CELL LYMPHOMA OF LYMPH NODES OF NECK (HCC): ICD-10-CM

## 2022-06-28 DIAGNOSIS — C79.51 METASTATIC CANCER TO BONE (HCC): ICD-10-CM

## 2022-06-28 PROCEDURE — 74177 CT ABD & PELVIS W/CONTRAST: CPT

## 2022-06-28 PROCEDURE — 74011000636 HC RX REV CODE- 636

## 2022-06-28 PROCEDURE — 70491 CT SOFT TISSUE NECK W/DYE: CPT

## 2022-06-28 RX ORDER — HEPARIN 100 UNIT/ML
500 SYRINGE INTRAVENOUS
Status: DISCONTINUED | OUTPATIENT
Start: 2022-06-28 | End: 2022-06-29 | Stop reason: HOSPADM

## 2022-06-28 RX ADMIN — IOPAMIDOL 100 ML: 755 INJECTION, SOLUTION INTRAVENOUS at 14:14

## 2022-06-28 NOTE — PROGRESS NOTES
Hi,    As discussed, please restart the bactrim as we recommended since there are linette findings in the lungs that suggest you could have the PCP pneumonia. I know you had some difficulty taking it when you had nausea nad a C diff infection but you should be able to take the bactrim now. I will refer you to [facility] to have them follow-up on the lung findings too. Overall, you are responding to treatment. Dr. Jerald Baldwin noted that we can dose reduce you. We are working with his office to try for a sooner appointment for you. Please call our office to schedule your follow-up next week since you missed yesterday's appointment. Since you are hoping to hold off on chemo for a 1-2 weeks I think this is reasonable especially with the concern for the PCP infection. Radha Woods, can you refer to PAR for lung opacities.   Thanks,  Dr. Eloy Car

## 2022-06-29 ENCOUNTER — TELEPHONE (OUTPATIENT)
Dept: ONCOLOGY | Age: 67
End: 2022-06-29

## 2022-06-29 DIAGNOSIS — R91.8 OPACITY OF LUNG ON IMAGING STUDY: Primary | ICD-10-CM

## 2022-06-29 NOTE — TELEPHONE ENCOUNTER
Called and left a VM for the patient to add them to the schedule July 5 at [facility] [place] [facility] per Dr. Ant Fink.  Left the office number to call back
 used

## 2022-06-30 ENCOUNTER — TELEPHONE (OUTPATIENT)
Dept: ONCOLOGY | Age: 67
End: 2022-06-30

## 2022-06-30 NOTE — TELEPHONE ENCOUNTER
[address] at [facility]  [phone]    06/30/22 11:28 AM - Attempted to call the patient to see if she is able to come in for an appointment on Tuesday, 7/5 at 10:00 AM.  There was no answer. Left a voicemail for the patient to call back at their earliest convenience along with the phone number to our office.

## 2022-07-05 ENCOUNTER — OFFICE VISIT (OUTPATIENT)
Dept: ONCOLOGY | Age: 67
End: 2022-07-05
Payer: MEDICARE

## 2022-07-05 ENCOUNTER — APPOINTMENT (OUTPATIENT)
Dept: INFUSION THERAPY | Age: 67
End: 2022-07-05

## 2022-07-05 ENCOUNTER — TELEPHONE (OUTPATIENT)
Dept: ONCOLOGY | Age: 67
End: 2022-07-05

## 2022-07-05 ENCOUNTER — HOSPITAL ENCOUNTER (OUTPATIENT)
Dept: INFUSION THERAPY | Age: 67
End: 2022-07-05

## 2022-07-05 VITALS
TEMPERATURE: 97.5 F | DIASTOLIC BLOOD PRESSURE: 88 MMHG | BODY MASS INDEX: 33.06 KG/M2 | SYSTOLIC BLOOD PRESSURE: 122 MMHG | HEART RATE: 89 BPM | WEIGHT: 186.6 LBS | RESPIRATION RATE: 20 BRPM | HEIGHT: 63 IN | OXYGEN SATURATION: 91 %

## 2022-07-05 DIAGNOSIS — G89.3 CANCER RELATED PAIN: ICD-10-CM

## 2022-07-05 DIAGNOSIS — C83.31 DIFFUSE LARGE B-CELL LYMPHOMA OF LYMPH NODES OF NECK (HCC): Primary | ICD-10-CM

## 2022-07-05 DIAGNOSIS — R53.83 OTHER FATIGUE: ICD-10-CM

## 2022-07-05 DIAGNOSIS — R05.8 PRODUCTIVE COUGH: ICD-10-CM

## 2022-07-05 DIAGNOSIS — G62.0 NEUROPATHY DUE TO CHEMOTHERAPEUTIC DRUG (HCC): ICD-10-CM

## 2022-07-05 DIAGNOSIS — T45.1X5A NEUROPATHY DUE TO CHEMOTHERAPEUTIC DRUG (HCC): ICD-10-CM

## 2022-07-05 DIAGNOSIS — C79.51 METASTATIC CANCER TO BONE (HCC): ICD-10-CM

## 2022-07-05 PROCEDURE — 99214 OFFICE O/P EST MOD 30 MIN: CPT | Performed by: INTERNAL MEDICINE

## 2022-07-05 PROCEDURE — G9899 SCRN MAM PERF RSLTS DOC: HCPCS | Performed by: INTERNAL MEDICINE

## 2022-07-05 PROCEDURE — 3017F COLORECTAL CA SCREEN DOC REV: CPT | Performed by: INTERNAL MEDICINE

## 2022-07-05 PROCEDURE — G8400 PT W/DXA NO RESULTS DOC: HCPCS | Performed by: INTERNAL MEDICINE

## 2022-07-05 PROCEDURE — G8536 NO DOC ELDER MAL SCRN: HCPCS | Performed by: INTERNAL MEDICINE

## 2022-07-05 PROCEDURE — 1123F ACP DISCUSS/DSCN MKR DOCD: CPT | Performed by: INTERNAL MEDICINE

## 2022-07-05 PROCEDURE — G9717 DOC PT DX DEP/BP F/U NT REQ: HCPCS | Performed by: INTERNAL MEDICINE

## 2022-07-05 PROCEDURE — 1101F PT FALLS ASSESS-DOCD LE1/YR: CPT | Performed by: INTERNAL MEDICINE

## 2022-07-05 PROCEDURE — 1090F PRES/ABSN URINE INCON ASSESS: CPT | Performed by: INTERNAL MEDICINE

## 2022-07-05 PROCEDURE — G8417 CALC BMI ABV UP PARAM F/U: HCPCS | Performed by: INTERNAL MEDICINE

## 2022-07-05 PROCEDURE — G8427 DOCREV CUR MEDS BY ELIG CLIN: HCPCS | Performed by: INTERNAL MEDICINE

## 2022-07-05 PROCEDURE — 1111F DSCHRG MED/CURRENT MED MERGE: CPT | Performed by: INTERNAL MEDICINE

## 2022-07-05 NOTE — PROGRESS NOTES
Chief Complaint   Patient presents with    Follow-up     1 week f/u        Visit Vitals  /88   Pulse 89   Temp 97.5 °F (36.4 °C)   Resp 20   Ht 5' 3\" (1.6 m)   Wt 186 lb 9.6 oz (84.6 kg)   SpO2 91%   BMI 33.05 kg/m²

## 2022-07-05 NOTE — TELEPHONE ENCOUNTER
[facility] at [facility]  [phone]    07/05/22 11:43 AM - [facility] and spoke with Destinee Roberts at [facility]. Canceled the patient's appointment for tomorrow at 1:00 PM with Dr. Genaro Carson, due to the patient not wanting to pursue the referral at this time.

## 2022-07-05 NOTE — LETTER
7/5/2022    Patient: Susie White   YOB: 1955   Date of Visit: 7/5/2022     Vivien Schmidt MD  [address] [address]  Via In Basket    Dear Vivien Schmidt MD,      Thank you for referring Ms. Susie White to [address] [place] [address] for evaluation. My notes for this consultation are attached. If you have questions, please do not hesitate to call me. I look forward to following your patient along with you.       Sincerely,    Ki Ceballos MD

## 2022-07-05 NOTE — Clinical Note
FYI- please reschedule patient's Neulasta to August 1. She is declining taking therapy at this time and wants to improve her strength.     Thanks,    Dr. Eloy Car

## 2022-07-05 NOTE — PROGRESS NOTES
Cancer [place] [address]  [address] [facility], [facility] : [phone]  F: [phone] Patient ID  Name: Kayla Perdue  YOB: 1955  MRN: [id number]  Referring Provider:   No referring provider defined for this encounter. 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 This summary highlights focused aspects of patient's ongoing care and may have a recurring section in notes with either updates or remain unchanged as a longitudinal care summary. -------------------------------------------------------------------------------------------------------------------------------------------------------------------------------------------------------  DIAGNOSIS:  · Diffuse Large B-Cell Lymphoma     ORIGINAL STAGING:  · Stage IV     SITES OF DISEASE:  · Left Cervical Lymph Node,Bone Disease, Stage IV    CURRENT TREATMENT:  · C1,D1 on 5/11/22 DA-R-EPOCH     PRIOR TREATMENT:  · n/a     GOALS OF CARE:  · Curative Intent     PATHOLOGY:  · Specimen #:[id number] Collect: 4/22/2022      ==========================================================================                    * * *SURGICAL PATHOLOGY REPORT* * *   ==========================================================================   * * *PROCEDURES/ADDENDA* * *   ADDENDUM   Date Ordered: 4/26/2022     Status: Signed Out   Date Complete: 4/26/2022     By: Concha Lara.  Dylan Vazquez MD   Date Reported: 4/26/2022   Addendum Diagnosis   Lymph node, left posterior cervical lymph node, excision:   In situ hybridization for Jesús-Barr viral RNA: Negative   CPT: 30271  Addendum Comment   * * *CLINICAL HISTORY* * *   Cervical lymphadenopathy, rule out lymphoma ==========================================================================   * * *FINAL PATHOLOGIC DIAGNOSIS* * *        Lymph node, left posterior cervical lymph node, excision:        Large B cell lymphoma. See comment. * * *Comment* * *   The histologic section has fragments of lymphoid tissue with diffuse and   focal follicular architecture. Diffuse areas are comprised of large,   atypical lymphocytes with centroblastic morphology and increased mitotic   activity. Immunohistochemical stains confirm the malignant cells are CD20   positive B cells that coexpress CD10 and BCL6 without MUM1. CD23   highlights rare follicular dendritic networks associated with lymphoid   follicles with germinal centers and express CD10 and BCL6 without BCL2. CyclinD1 and CD56 stains are negative. Concurrent flow cytometric analysis   confirms a clonal CD10 positive B-cell population comprised of medium to   large cells. The Ki-67 proliferation index is 30%. The overall findings favor the diagnosis of diffuse large B-cell lymphoma,   germinal center subtype. Molecular genetic studies are pending for further   characterization will be reported in an addendum. Flow cytometry:   Consistent with CD10-positive B-cell lymphoproliferative disorder. Comments: Flow cytometry shows monoclonal B-cells (36% of total cells)   with CD10 expression without CD5, consistent with a CD10-positive B-cell   lymphoproliferative disorder. The main differential diagnosis includes   follicular lymphoma, Burkitt lymphoma and large B-cell lymphoma. Please   correlate the result with morphologic findings and clinical information. Flow Differential (%) and Population Analysis:   Lymphocytes: 93.7%   T-cells (39% of lymphoid cells) show a CD4/CD8 ratio of 3.3 without overt   phenotypic abnormality. NK-cells (1% of lymphoid cells) are unremarkable.    Mature B-cells (56% of lymphoid cells) include large forms based on   forward scattered pattern and show: CD5 neg, CD10+, CD11c+dim, CD19+,   CD20+ with surface lambda light chain restriction. Markers Performed: CD2, CD3, CD4, CD5, CD7, CD8, CD10, CD11c, CD19, CD20,   CD23, CD34, CD38, CD45, CD56, Kappa, Lambda (17 Markers)   The Technical Component Processing of this test was completed at   [facility], [address] [place] [address], [place], [place] / [address] /   [phone] / Shaun Sheets # [id number].  Interpretation by Sandy Lindsey M.D. The   complete scanned report is available in Epic. CPT: V2283159, [id number], [id number], [id number], [id number], [id number]   [facility]2/2022   *Electronically Signed Out By Dennise Yoo. Severa Marking, MD*   ==========================================================================   * * *Gross Description* * *   Specimen #1, received fresh labeled with the patient's name,  and left   posterior cervical lymph node, consists of two paper towels containing a   1.7 x 1.5 x 0.5 cm aggregate of pale pink-tan, fleshy soft tissue   fragments. The specimen is handled according to the flow cytometry   protocol as follows:   7          Two air-dried touch prep slides - one Diff Quik stained, one   rapid-fixed in 95% alcohol   7          Representative sections in B plus fixative (1A)   7          One piece held in RPMI for possible flow cytometry analysis   7          Remaining tissue fixed in formalin for permanents (1B)   Dr. Severa Marking will determine if flow cytometry is necessary. 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 Patient overall reports feeling worse. She reportedly has no appetite and has not had much to eat. She reports that she is without any taste changes. She denies any painful swallowing. She simply ntoes that she has a lack of appetite. She denies any fevers. Reports cough slightly better.   She report walking to the bathroom without difficulty. She has been able to take in liquid calories with protein shakes. She has had some fruit smoothies and milk shakes. She notes that she feels debilitated from chemotherapy. -  Appetite: Grade 3  Fatigue:Grade 3  Hair Loss:Grade 2  Chills:Grade 2  Nausea:Grade 2  Hot Flashes:Grade 1  Insomnia:Grade 1  Anxiety:Grade 2  Pain:back,arms,legs 7  Cough:Grade 1  Numbness/Tingling:Grade 2  Urinary Frequency/Urgency:Grade 1       Past Medical History:   Diagnosis Date    Adverse effect of anesthesia     PHLEGM IN THROAT POST OP & NEEDED X2 BREATHING TREATMENTS    Anxiety     COPD (chronic obstructive pulmonary disease) ([facility]) 2022    emphysema    Depression     GERD (gastroesophageal reflux disease)     Hernia, femoral     since childhood    Hypertension     Joint pain     Nausea & vomiting     TMJ arthritis        Current Outpatient Medications:     morphine IR (MS IR) 15 mg tablet, Take 1 Tablet by mouth every four (4) hours as needed for Pain for up to 15 days. Max Daily Amount: 90 mg., Disp: 90 Tablet, Rfl: 0    prochlorperazine (COMPAZINE) 10 mg tablet, Take 1 Tablet by mouth every six (6) hours as needed for Nausea (do not take if groggy; take if nausea despite zofran). , Disp: 50 Tablet, Rfl: 1    nystatin (MYCOSTATIN) 100,000 unit/mL suspension, Take 5 mL by mouth four (4) times daily. Swish and gargle,  then spit or swallow, Disp: 200 mL, Rfl: 0    trimethoprim-sulfamethoxazole (BACTRIM DS, SEPTRA DS) 160-800 mg per tablet, Take 1 Tablet by mouth BID Mon Wed & Fri., Disp: 42 Tablet, Rfl: 1    zolpidem (AMBIEN) 5 mg tablet, Take 1 Tablet by mouth nightly as needed for Sleep. Max Daily Amount: 5 mg., Disp: 30 Tablet, Rfl: 0    LORazepam (ATIVAN) 1 mg tablet, Take 1 Tablet by mouth every eight (8) hours as needed for Anxiety. Max Daily Amount: 3 mg.  Indications: anxious, Disp: 90 Tablet, Rfl: 0    ondansetron (ZOFRAN ODT) 8 mg disintegrating tablet, Take 1 Tablet by mouth every eight (8) hours as needed for Nausea or Vomiting., Disp: 50 Tablet, Rfl: 1    docusate sodium (COLACE) 100 mg capsule, Take 1 Capsule by mouth two (2) times a day for 90 days. , Disp: 60 Capsule, Rfl: 2    diclofenac EC (VOLTAREN) 50 mg EC tablet, Take 1 Tablet by mouth two (2) times daily as needed for Pain., Disp: 60 Tablet, Rfl: 0    FLUoxetine (PROzac) 40 mg capsule, TAKE 1 CAPSULE BY MOUTH EVERY MORNING, Disp: 90 Capsule, Rfl: 1    amLODIPine-benazepril (LOTREL) 5-20 mg per capsule, TAKE 1 CAPSULE BY MOUTH EVERY MORNING, Disp: 90 Capsule, Rfl: 1    metroNIDAZOLE (FLAGYL) 500 mg tablet, Take 1 Tablet by mouth three (3) times daily. Indications: diarrhea from an infection with Clostridium difficile bacteria (Patient not taking: Reported on 7/5/2022), Disp: 42 Tablet, Rfl: 0    polyethylene glycol (MIRALAX) 17 gram packet, Take 1 Packet by mouth daily. (Patient not taking: Reported on 6/20/2022), Disp: 90 Each, Rfl: 1    gabapentin (NEURONTIN) 300 mg capsule, Take 1 Capsule by mouth daily as needed for Pain. (Patient not taking: Reported on 6/20/2022), Disp: 20 Capsule, Rfl: 0    Allergies   Allergen Reactions    Oxycodone Nausea Only       Review of Systems Provided by:  Patient  Review of Systems: A complete review of systems was obtained, reviewed. Pertinent findings reviewed above. Objective:      Visit Vitals  /88   Pulse 89   Temp 97.5 °F (36.4 °C)   Resp 20   Ht 5' 3\" (1.6 m)   Wt 186 lb 9.6 oz (84.6 kg)   SpO2 91%   BMI 33.05 kg/m²     ECOG: 3- Capable of only limited selfcare; confined to bed or chair more than 50% of waking hours. Physical Exam  Constitutional: No acute distress. , Non-toxic appearance. and Chronic ill appearance. HENT: Normocephalic and atraumatic head. and Wearing a mask during COVID-19 precautions. Eyes: Normal Conjunctivae. Anicteric sclerae. Cardiovascular: S1,S2 auscultated. No pitting edema. Pulmonary: Normal Respiratory Effort. No wheezing.  No rhonchi. No rales. Abdominal: Normal bowel sounds. Soft Abdomen to palpation. No abdominal tenderness. Skin: No jaundice. No rash. No petechiae. Right chest port without swelling. Musculoskeletal: No temporal muscle wasting on gross inspection. No myalgias on palpation. Neurological: Alert and oriented. No tremor on inspection. Normal Gait. Psychiatric: mood normal. normal speech rate normal affect    Results:      I personally reviewed Epic EHR labs/results below:   Lab Results   Component Value Date/Time    WBC 11.7 (H) 2022 08:29 AM    HGB 11.1 (L) 2022 08:29 AM    HCT 33.7 (L) 2022 08:29 AM    PLATELET 771  08:29 AM    MCV 93.1 2022 08:29 AM     Lab Results   Component Value Date/Time    Sodium 138 2022 08:29 AM    Potassium 3.2 (L) 2022 08:29 AM    Chloride 99 2022 08:29 AM    CO2 27 2022 08:29 AM    Anion gap 12 2022 08:29 AM    Glucose 131 (H) 2022 08:29 AM    BUN 5 (L) 2022 08:29 AM    Creatinine 0.82 2022 08:29 AM    BUN/Creatinine ratio 6 (L) 2022 08:29 AM    GFR est AA >60 2022 08:29 AM    GFR est non-AA >60 2022 08:29 AM    Calcium 9.2 2022 08:29 AM    Bilirubin, total 0.2 2022 08:29 AM    Alk. phosphatase 68 2022 08:29 AM    Protein, total 6.5 2022 08:29 AM    Albumin 3.5 2022 08:29 AM    Globulin 3.0 2022 08:29 AM    A-G Ratio 1.2 2022 08:29 AM    ALT (SGPT) 44 2022 08:29 AM    AST (SGOT) 28 2022 08:29 AM     IR INSERT TUNL CVC W PORT OVER 5 YEARS    Result Date: 2022  PATIENT NAME: Nolene                                             AGE, : 78 years, 1955 MRN: [id number] DATE: 2022 11:50 AM PROCEDURE(S): Single lumen Portacath placement SUPERVISING PHYSICIAN: Kentrell Andrade MD OPERATING PROVIDER: Mary García PA-C CLINICAL HISTORY: Mediport placement requested.  B-cell lymphoma Patient was evaluated and felt to be an appropriate candidate for conscious sedation. Moderate intravenous conscious sedation was supervised by Zackary Wyman MD. The patient was independently monitored by a registered nurse assigned to the Department of Radiology using automated blood pressure, EKG, and pulse oximetry. The detail conscious sedation record is stored in the hospital information system. MEDICATION: Antibiotic: Ancef 2g Versed: 6 mg Fentanyl: 100 mcg Intraprocedure time: 40 minutes FINDINGS: After informed consent was obtained, and the risks and benefits of the procedure including infection and bleeding were discussed, the patient was brought to the angiographic suite and placed on the angiographic table in a supine position. The right neck and chest were prepped and draped in maximum sterile barrier technique which includes: cap and mask, sterile gown, sterile gloves, and sterile body drape. The ultrasound transducer was prepped using sterile ultrasound technique which includes: sterile gel and probe cover. After adequate conscious sedation was achieved utilizing Fentanyl and Versed, the skin over the internal jugular vein was anesthetized with 1% lidocaine. Sonographic images of the right neck demonstrated a patent and compressible right internal jugular vein. The vein was accessed under direct sonographic guidance using a 21 gauge micropuncture set. An ultrasonographic image was saved in the record. A 0.035 J wire was advanced through the sheath into the inferior vena cava under fluoroscopic guidance. A peel-away sheath was advanced over the wire into the superior vena cava under fluoroscopic guidance. Attention was directed to the right chest, and after adequate local anesthesia, a horizontal incision was made. A subcutaneous pocket was then formed using a combination of sharp and blunt dissection. A tunnel was then formed between the pocket and the venotomy site and the catheter passed through the tunnel and through the peel-away sheath. The peel-away sheath was removed and the catheter tip was positioned at the right atrium. The catheter was cut at the pocket access point and the portacath was attached to the catheter and placed snugly in the pocket. The pocket was closed primarily with 2-0 Vicryl suture and Dermabond. The neck access site was closed with 2-0 Vicryl suture and Dermabond. The port was accessed and flushed with heparin solution. The patient tolerated the procedure well. Fluoroscopy demonstrates the catheter tip to be within the right atrium. RADIATION: Fluoroscopy time: 0.3 minutes Air kerma: 5.1 mGy     Technically successful placement of a single lumen port with the catheter tip in the right atrium. Catheter is ready for immediate use. CT NECK SOFT TISSUE W CONT    Result Date: 6/28/2022  INDICATION: Diffuse large b-cell lymphoma, lymph nodes of head, face, and neck EXAM:  CT NECK, CHEST, ABDOMEN, PELVIS WITH CONTRAST COMPARISON: PET scan May 4, 2022 TECHNIQUE:  CT imaging of the neck, chest, abdomen and pelvis was performed after the uneventful intravenous administration of IV contrast. Coronal and sagittal reconstructions were generated. CT dose reduction was achieved through use of a standardized protocol tailored for this examination and automatic exposure control for dose modulation. FINDINGS: NASOPHARYNX: Normal. SUPRAHYOID NECK: Normal oropharynx, oral cavity, parapharyngeal space, and retropharyngeal space. INFRAHYOID NECK: Normal larynx, hypopharynx and supraglottis. PAROTID GLANDS: Normal. SUBMANDIBULAR GLANDS: Absent left submandibular gland. THYROID GLAND: Normal. LYMPH NODES: Interval decrease in size of previous enlarged left level 2 lymph nodes, the more anterior of which decreased from 1.2 x 1.1 cm to 0.7 x 0.6 cm, and the posterior node decreased from 1.8 x 1.2 cm to 1.1 x 0.7 cm. Scattered subcentimeter lymph nodes throughout the neck. No new or enlarging nodes.  OSSEOUS STRUCTURES: No destructive bone lesion. Prior ACDF C5-7. ADDITIONAL COMMENTS: N/A. MEDIASTINUM/ELIZABETH: Scattered nonenlarged lymph nodes. Small hiatal hernia. HEART/PERICARDIUM: Unremarkable. LUNGS/PLEURA: No suspicious nodule. No pulmonary edema or pleural effusion. Tree-in-bud opacities in the lateral right lower lobe, and mild ill-defined nodular groundglass opacities right apex. BONES: No destructive bone lesion. ADDITIONAL COMMENTS: Unchanged round 9 mm density lateral right breast LIVER: No mass or biliary dilatation. GALLBLADDER: Surgically absent. SPLEEN: No enlargement or lesion. PANCREAS: No mass or ductal dilatation. ADRENALS: No mass. KIDNEYS: No mass, calculus, or hydronephrosis. GI TRACT: No bowel obstruction or wall thickening PERITONEUM: No free air or free fluid. APPENDIX: Not clearly visualized. RETROPERITONEUM: Interval decrease in size of previously enlarged lymph nodes: Gastrohepatic ligament lymph node decreased from 4.2 x 1.2 cm to 2.2 x 0.9 cm; aortocaval lymph node at the level of the right kidney, previously 1.2 cm, now 0.6 cm. No new or enlarging lymph nodes. ADDITIONAL COMMENTS: Right paraumbilical ventral hernia containing mesenteric fat and bowel loops similar to prior study. URINARY BLADDER: No mass or calculus. LYMPH NODES: Decreased size of left inguinal lymph nodes from 1.6 cm to 0.8 cm. REPRODUCTIVE ORGANS: Prior hysterectomy. FREE FLUID: None. BONES: No destructive bone lesion. ADDITIONAL COMMENTS: N/A.     1. Interval decrease in size of previously enlarged lymph nodes in the neck, abdomen, and pelvis. No new or enlarging lymph nodes. 2. New tree-in-bud opacities and groundglass opacities in the right lower and upper lobes may be infectious/inflammatory. May be assessed on follow-up examinations.  23X     CT CHEST ABD PELV W CONT    Result Date: 6/28/2022  INDICATION: Diffuse large b-cell lymphoma, lymph nodes of head, face, and neck EXAM:  CT NECK, CHEST, ABDOMEN, PELVIS WITH CONTRAST COMPARISON: PET scan May 4, 2022 TECHNIQUE:  CT imaging of the neck, chest, abdomen and pelvis was performed after the uneventful intravenous administration of IV contrast. Coronal and sagittal reconstructions were generated. CT dose reduction was achieved through use of a standardized protocol tailored for this examination and automatic exposure control for dose modulation. FINDINGS: NASOPHARYNX: Normal. SUPRAHYOID NECK: Normal oropharynx, oral cavity, parapharyngeal space, and retropharyngeal space. INFRAHYOID NECK: Normal larynx, hypopharynx and supraglottis. PAROTID GLANDS: Normal. SUBMANDIBULAR GLANDS: Absent left submandibular gland. THYROID GLAND: Normal. LYMPH NODES: Interval decrease in size of previous enlarged left level 2 lymph nodes, the more anterior of which decreased from 1.2 x 1.1 cm to 0.7 x 0.6 cm, and the posterior node decreased from 1.8 x 1.2 cm to 1.1 x 0.7 cm. Scattered subcentimeter lymph nodes throughout the neck. No new or enlarging nodes. OSSEOUS STRUCTURES: No destructive bone lesion. Prior ACDF C5-7. ADDITIONAL COMMENTS: N/A. MEDIASTINUM/ELIZABETH: Scattered nonenlarged lymph nodes. Small hiatal hernia. HEART/PERICARDIUM: Unremarkable. LUNGS/PLEURA: No suspicious nodule. No pulmonary edema or pleural effusion. Tree-in-bud opacities in the lateral right lower lobe, and mild ill-defined nodular groundglass opacities right apex. BONES: No destructive bone lesion. ADDITIONAL COMMENTS: Unchanged round 9 mm density lateral right breast LIVER: No mass or biliary dilatation. GALLBLADDER: Surgically absent. SPLEEN: No enlargement or lesion. PANCREAS: No mass or ductal dilatation. ADRENALS: No mass. KIDNEYS: No mass, calculus, or hydronephrosis. GI TRACT: No bowel obstruction or wall thickening PERITONEUM: No free air or free fluid. APPENDIX: Not clearly visualized.  RETROPERITONEUM: Interval decrease in size of previously enlarged lymph nodes: Gastrohepatic ligament lymph node decreased from 4.2 x 1.2 cm to 2.2 x 0.9 cm; aortocaval lymph node at the level of the right kidney, previously 1.2 cm, now 0.6 cm. No new or enlarging lymph nodes. ADDITIONAL COMMENTS: Right paraumbilical ventral hernia containing mesenteric fat and bowel loops similar to prior study. URINARY BLADDER: No mass or calculus. LYMPH NODES: Decreased size of left inguinal lymph nodes from 1.6 cm to 0.8 cm. REPRODUCTIVE ORGANS: Prior hysterectomy. FREE FLUID: None. BONES: No destructive bone lesion. ADDITIONAL COMMENTS: N/A.     1. Interval decrease in size of previously enlarged lymph nodes in the neck, abdomen, and pelvis. No new or enlarging lymph nodes. 2. New tree-in-bud opacities and groundglass opacities in the right lower and upper lobes may be infectious/inflammatory. May be assessed on follow-up examinations. 23X     PET/CT TUMOR IMAGE [facility] BDY W (INI)    Addendum Date: 5/5/2022    Addendum: There is increased metabolic activity in a small left frontal scalp lesion with SUV of 6.9. Result Date: 5/5/2022  PET/CT SCAN PROCEDURE: Following IV injection of 10.9 mCi 18 Fluoro 2 deoxyglucose (FDG) and a standard uptake delay, PET imaging is performed skull vertex to toes and axial, sagittal and coronal images were acquired. Unenhanced CT is obtained for anatomic localization, and attenuation correction of the PET scan. Patient preprocedure blood glucose level: 98 mg/dL. CORRELATIVE IMAGING STUDIES: None. COMPARISON PET: HISTORY: The study is requested for initial staging. Lymphoma. FINDINGS: HEAD/NECK: There is increased metabolic activity 1.2 cm left level 2 lymph node. There is increased metabolic activity in a 1.2 cm left level 5 lymph node with SUV of 7.7. CHEST: No foci of abnormal hypermetabolism. Low-grade activity in mediastinal and hilar lymph nodes is nonspecific but may be reactive. ABDOMEN/PELVIS: There is increased metabolic activity in a 2.6 cm hepatogastric ligament lymph node with SUV of 5.7.   There is increased metabolic activity in a 1.2 cm right retroperitoneal lymph node with SUV of 6. There is increased metabolic activity in a left retroperitoneal lymph node with SUV of 8 There is slight haziness in the mesentery with small lymph nodes with slight increased metabolic activity of 3.6. There is small focus of increased metabolic activity anterior to the left sacrum and anterior to the right sacrum with SUV of 7 suspicious for lymph nodes. There is increased metabolic activity in the right renal mass posterior cortex with SUV of 6.9. There is increased metabolic activity in a left inguinal lymph node with SUV of 8 There is a right abdominal wall hernia SKELETON: There is increased metabolic activity and left humeral head, right iliac bone, femurs and proximal right tibia. . There is slight increased metabolic activity right humeral head. There is increased metabolic activity in 2 lymph nodes in the left neck, in the hepatogastric ligament and retroperitoneal adenopathy, mesenteric lymph nodes and left inguinal adenopathy suspicious for lymphoma. There is increased metabolic activity in a mass posterior right renal cortex nonspecific but may represent lymphoma. There is increased bony activity as described above suspicious for lymphoma. . 23X        Assessment and Recommendations:      1. Diffuse large B-cell lymphoma of lymph nodes of neck (HCC)  -patient requesting time off from treatment due to weakness/debility/poor appetite. She does not want to pursue any lab work/ she does not want to pursue any IV fluids today. She is willing to come back in 2 weeks rather than the 3 weeks she originally mentioned. However, she does not think she will be ready for even dose minus level 1 of chemotherapy. The soonest that she is to be seen at [facility] is early September. Patient is okay with this timeline for now.  I spoke about her case with Dr. Haresh Reyes and he felt continuing DA-R-EPOCH was the best plan but to consider a dose reduction from Cycle#1 when patient is fit again for therapy. 2. Productive cough  -does not want to see Pulmonary;we were able to get her an appt tomorrow at 1pm. However, she does not want to pursue this and notes that she is improving on Bactrim 3x/week. -exam today without any wheezing. She is moving air well. For now, she plans to use mucinex as needed. 3. Other fatigue  -patient able to tolerate protein shakes but overall with substantial reduction in her oral intake. She was requesting to see us in 3 weeks. -recommended she consider green peas, greek yogurt for protein.  -consideration to start Mirtazapine but will forward note to Dr. Elian Rodriguez to see if she has any suggestions. I would avoid using steroids for her appetite at this time. 4. Cancer related pain  -does not feel that her cancer pain is contributing to her weakness. Overall improved s/p 2 Cycles of DA-R-EPOCH.  -she reports that she will have a virtual visit with Dr. Elian Rodriguez soon. 5. Neuropathy due to chemotherapeutic drug ([address]  -denies any neuropathy in her feet. Reports general debility as a barrier to walking longer distances but is able to walk inside around her house. Is in wheelchair in clinic due to general weakness. 6. Metastatic cancer to bone ([address]  -overall improvement s/p 2 cycles of treatmetn. Follow-up and Dispositions    · Return in about 2 weeks (around 7/19/2022). Routing History       Okay to schedule on Tuesday.     Jamir Bonilla MD  Hematology/Medical Oncology Provider  [facility]  P: [phone]     Signed By:   Khadra Junior MD

## 2022-07-11 ENCOUNTER — APPOINTMENT (OUTPATIENT)
Dept: INFUSION THERAPY | Age: 67
End: 2022-07-11

## 2022-07-12 ENCOUNTER — TELEPHONE (OUTPATIENT)
Dept: ONCOLOGY | Age: 67
End: 2022-07-12

## 2022-07-12 ENCOUNTER — DOCUMENTATION ONLY (OUTPATIENT)
Dept: ONCOLOGY | Age: 67
End: 2022-07-12

## 2022-07-12 ENCOUNTER — TELEPHONE (OUTPATIENT)
Dept: PALLATIVE CARE | Age: 67
End: 2022-07-12

## 2022-07-12 ENCOUNTER — APPOINTMENT (OUTPATIENT)
Dept: INFUSION THERAPY | Age: 67
End: 2022-07-12

## 2022-07-12 DIAGNOSIS — C83.38 DIFFUSE LARGE B-CELL LYMPHOMA OF LYMPH NODES OF MULTIPLE REGIONS (HCC): ICD-10-CM

## 2022-07-12 RX ORDER — MORPHINE SULFATE 15 MG/1
15 TABLET ORAL
Qty: 90 TABLET | Refills: 0 | Status: SHIPPED | OUTPATIENT
Start: 2022-07-12 | End: 2022-07-27

## 2022-07-12 NOTE — PROGRESS NOTES
-Carlton Bates,    Would you please contact patient if you know of any financial assistance (I.e. leukemia lymphoma [employer] grants/aid?). She expressed distress with co-pays when I spoke with her today that pulmonology recommended observation of her lung findings on last CT; also [facility] Dr. Wright Client noted that radiology mentioned potential for femur fracture on old scan from [place] before she started treatment). However, patient is without new symptoms and overall is doing better. She continues to recover from cycle #2 chemotherapy and is in line to consider restarting chemotherapy at our previously mentioned timeline.     Thanks,  Dr. Matthias Eddy

## 2022-07-12 NOTE — TELEPHONE ENCOUNTER
[facility]   Oncology Social Work  Encounter     Patient Name:  Albert Steve    Medical History: diffuse large b cell lymphoma     Advance Directives: none on file; conversation deferred    Narrative: Social work referral received from Dr. Jacqueline Jones regarding financial concerns. Called patient and she reports hospital balance and difficulty affording cost of care. Discussed the following resources:    1. Referral to [employer] financial counselor to screen/apply for drug/copay assistance programs. 2. [facility] - Will mail blank application for patient to complete. Patient will bring completed application when she RTC to be submitted. She voiced understanding and appreciation. Barriers to Care: financial     Plan:  1. Referral to [employer] financial counselor to screen/apply for drug/copay assistance programs. 2. [facility] - Will mail blank application for patient to complete. Patient will bring completed application when she RTC to be submitted.      Referral/Handouts:   Financial/Medication assistance referral     Thank you,  Chase Malik LCSW

## 2022-07-12 NOTE — TELEPHONE ENCOUNTER
Triage for Controlled Substance Refill Request     Pain Diagnosis: _Diffuse large B-cell lymphoma of lymph nodes of multiple regions [facility]) (C83.38)     Last Outpatient Visit: _5/17/2022     Next Outpatient Visit: _7/18/2022     Reason for refill needed outside of office visit? Appointment not scheduled prior to need for scheduled refill        Pharmacy: [facility] [phone] - [address] [phone] [place]      Medication:morphine IR (MS IR) 15 mg tablet        Dose and directions: Take 1 Tablet by mouth every four (4) hours as needed for Pain for up to 14 days  Number dispensed: 90  Date filled ( or Pharmacy):6/27/2022  # left: 3 pills left      Medication:  Dose and directions:  Number dispensed:  Date filled ( or Pharmacy):  #left:      reviewed: _yes     Date of Urine Drug Screen:  _     Opioid Safety Handout given:  _yes     Appropriate for refill:  _yes     Action:  _ Medication pending for Dr. Jake Garcia

## 2022-07-13 ENCOUNTER — TELEPHONE (OUTPATIENT)
Dept: PALLATIVE CARE | Age: 67
End: 2022-07-13

## 2022-07-13 DIAGNOSIS — R11.2 NAUSEA AND VOMITING, UNSPECIFIED VOMITING TYPE: ICD-10-CM

## 2022-07-13 DIAGNOSIS — C83.38 DIFFUSE LARGE B-CELL LYMPHOMA OF LYMPH NODES OF MULTIPLE REGIONS (HCC): ICD-10-CM

## 2022-07-13 DIAGNOSIS — F41.9 ANXIETY: ICD-10-CM

## 2022-07-13 RX ORDER — LORAZEPAM 1 MG/1
1 TABLET ORAL
Qty: 90 TABLET | Refills: 0 | Status: SHIPPED
Start: 2022-07-13 | End: 2022-08-03 | Stop reason: DRUGHIGH

## 2022-07-13 NOTE — TELEPHONE ENCOUNTER
Palliative Medicine  Nursing Note  [phone]  Fax [phone]      Telephone Call  Patient Name: Sabrina Chisholm  YOB: [birth year]        Primary Decision Maker: Iman Merrill - Daughter - [phone]    Primary Decision Maker: Dawn Frankel - [phone]   Advance Care Planning 6/8/2022   Patient's Healthcare Decision Maker is: Legal Next of Kin   Confirm Advance Directive None   Patient Would Like to Complete Advance Directive No   Does the patient have other document types -       Patient requesting refill on Lorazepam 1mg every 8 hour prn anxiety #90 as she is out of pills. Last filled 5/17/22.      Triage for Controlled Substance Refill Request    Pain Diagnosis: diffuse large B cell lymphoma    Last Outpatient Visit: 5/17/22    Next Outpatient Visit: 7/18/22    Reason for refill needed outside of office visit-   Appointment not scheduled prior to need for scheduled refill,    Any Reported Side effects: none    Last dose taken:     [facility]    Medication: Lorazepam  Dose and directions: 1mg every 8 hours prn  Number dispensed: 90  Date filled ( or Pharmacy): 5/17/22  # left: none       reviewed: 7/13/22    Date of Urine Drug Screen: _    Opioid Safety Handout given:  Yes    Appropriate for refill:  Yes    Action:  pended for Dr. Charmaine Black, [address] [place] [address]  Palliative Medicine

## 2022-07-13 NOTE — TELEPHONE ENCOUNTER
Triage for Controlled Substance Refill Request    Pain Diagnosis: _Nausea and vomiting, unspecified vomiting type (R11.2); Diffuse large B-cell lymphoma of lymph nodes of multiple regions (HCC) (C83.38); Anxiety (F41.9)    Last Outpatient Visit: _5/17/2022    Next Outpatient Visit: _7/18/2022    Reason for refill needed outside of office visit? Appointment not scheduled prior to need for scheduled refill      Pharmacy: [address] [place] [address]     Medication:LORazepam (ATIVAN) 1 mg tablet [      Dose and directions: Take 1 Tablet by mouth every eight (8) hours as needed for Anxiety.    Number dispensed:90  Date filled ( or Pharmacy):5/17/2022  # left:     reviewed: _yes    Date of Urine Drug Screen:  _    Opioid Safety Handout given:  _yes    Appropriate for refill:  _yes    Action:  _ Medication pending

## 2022-07-17 NOTE — PROGRESS NOTES
Palliative Medicine Outpatient Services  Parul: [phone]    Patient Name: Danay Street  YOB: 1955    Date of Current Visit: 07/18/22  Location of Current Visit:    [] [address] [place] [address] Office  [] [facility] Office  [] [facility] Office  [] Home  [x]Synchronous real-time A/V virtual visit    Date of Initial Visit: 5/17/22   Referral from: James Presley MD  Primary Care Physician: Yahir Joiner MD      SUMMARY:   Danay Street is a 78y.o. year old with a  history of stage IV diffuse large b-cell lymphoma, who was referred to Palliative Medicine by Dr. Jose Reyna for symptom management and psychosocial support. She was diagnosed in 4/2022. She was hospitalized at [facility] 5/11-5/15/22 for initiation of R-EPOCH. She received one additional cycle, with further treatment now on hold per patient request due to symptom burden. The patients social history includes: she's . She has 2 adult children, her daughter, Harjinder Vega, who lives locally, and a son, Mary Ritchie, who lives in the home with her. 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 Palliative Medicine is addressing the following current patient/family concerns: symptoms related to lymphoma and treatment; support while undergoing treatment     Initial Referral Intake note reviewed   PALLIATIVE DIAGNOSES:       ICD-10-CM ICD-9-CM    1. Right hip pain  M25.551 719.45 morphine ER (MS CONTIN) 15 mg CR tablet   2. Bilateral foot pain  M79.671 729.5 morphine ER (MS CONTIN) 15 mg CR tablet    M79.672     3. Anxiety  F41.9 300.00    4. Poor appetite  R63.0 783.0    5. Fatigue, unspecified type  R53.83 780.79    6. Nausea and vomiting, unspecified vomiting type  R11.2 787.01    7. Diffuse large B-cell lymphoma of lymph nodes of multiple regions (HCC)  C83.38 202.88 morphine ER (MS CONTIN) 15 mg CR tablet          PLAN:   Patient Instructions     Dear Danay Street ,    It was a pleasure seeing you today via virtual visit.     We will see you again in 3 weeks for an office visit. If labs or imaging tests have been ordered for you today, please call the office  at [phone] 48 hours after completion to obtain the results. Your described symptoms were: Fatigue: 9 Drowsiness: 6   Depression: 0 Pain: 9   Anxiety: 9 Nausea: 8   Anorexia: 10 Dyspnea: 0   Best Well-Bein Constipation: Yes   Other Problem (Comment): 0       This is the plan we talked about:    1. Pain  -Start extended-release morphine 15-mg every 12 hours  -Continue morphine immediate-release 15-mg every 4 hours as needed  -Restart diclofenac twice daily    2. Depression and anxiety  -You've lived with depression, anxiety and panic attacks most of your life  -Your recent cancer diagnosis has exacerbated these  -Your goal to feel less on edge all the time  -Continue fluoxetine 40-mg daily  -Continue lorazepam 1-mg: take 1 tab three times daily as needed  -I'm deferring starting alprazolam for now  -We talked about your seeing our clinical , Pradeep Pedraza, for additional support which you declined  -We also discussed rotating you from fluoxetine (prozac) to duloxetine (Cymbalta) both to help with your anxiety and as an adjuvant medication to help with the neuropathic component of your pain  -We decided to proceed with this rotation at your next visit    3. Poor appetite  -You have no appetite and you're losing weight  -Today I made a referral to Abdirahman Park, the [facility] dietician    4. Fatigue/poor sleep  -Fatigue is commonly experienced by people living with and being treated for cancer and is often multi-factorial in nature with poor sleep, pain and other symptoms, stress and mood issues and metabolic abnormalities all possible factors  -Please see below for additional information    5.  Nausea and vomiting  -Continue ondansetron 8-mg every 8 hours as needed  -Continue prochlorperazine 10-mg every 6 hours as needed  -You may need to take both of these medications in order to control your nausea    6. Lymphoma  -You've received  a total of 2 cycles of chemotherapy with the goal of inducing remission  -You're taking a short holiday and anticipate restarting reduced dose chemo soon        This is what you have shared with us about Advance Care Planning:      Primary Decision Maker: Shashi Vela - Daughter - [phone]    Primary Decision Maker: Moraima Palomo - [phone]  Advance Care Planning 7/18/2022   Patient's Healthcare Decision Maker is: Legal Next of Kin   Confirm Advance Directive None   Patient Would Like to Complete Advance Directive -   Does the patient have other document types -           The Palliative Medicine Team is here to support you and your family.        Sincerely,      Vanessa Yap MD and the Palliative Medicine Team       GOALS OF CARE / TREATMENT PREFERENCES:   [====Goals of Care====]  GOALS OF CARE:  Patient / health care proxy stated goals: See Patient Instructions / Summary    TREATMENT PREFERENCES:   Code Status:  [x] Attempt Resuscitation       [] Do Not Attempt Resuscitation    Advance Care Planning:  [x] The [facility] Interdisciplinary Team has updated the ACP Navigator with Decision Maker and Patient Capacity      Primary Decision Maker: Shashi Vela - Daughter - [phone]    Primary Decision Maker: Luli Cohen - [phone]  Advance Care Planning 7/18/2022   Patient's Healthcare Decision Maker is: Legal Next of Kin   Confirm Advance Directive None   Patient Would Like to Complete Advance Directive -   Does the patient have other document types -       Other:  (If patient appropriate for POST, consider using PALLPOST smart phrase here)    The palliative care team has discussed with patient / health care proxy about goals of care / treatment preferences for patient.  [====Goals of Care====]     PRESCRIPTIONS GIVEN:     Medications Ordered Today   Medications    morphine ER (MS CONTIN) 15 mg CR tablet     Sig: Take 1 Tablet by mouth every twelve (12) hours for 30 days. Max Daily Amount: 30 mg. Dispense:  60 Tablet     Refill:  0           FOLLOW UP:     Future Appointments   Date Time Provider Chandni Raza   7/19/2022 10:30 AM Barbara Sharma MD [facility] BS AMB   7/26/2022  8:30 AM SS INF3 CH1 >4H [facility]   8/16/2022  8:30 AM SS INF3 CH1 >4H [facility]           PHYSICIANS INVOLVED IN CARE:   Patient Care Team:  Maty Benítez MD as PCP - General (Family Medicine)  Maty Benítez MD as PCP - [facility] Empaneled Provider       HISTORY:   Reviewed patient-completed ESAS and advance care planning form. Reviewed patient record in prescription monitoring program.    CHIEF COMPLAINT:   Chief Complaint   Patient presents with    Arm Pain    Leg Pain       HPI/SUBJECTIVE:    The patient is: [x] Verbal / [] Nonverbal     She's not doing well. She has more pain, isn't eating, isn't sleeping  She feels more anxious . She would like to stop Ativan and start Xanax. See Plan/Patient Instructions for addition interval history    From IV 5/17/22:  She was diagnosed with lymphoma last month. She started feeling sick about 2 months ago with body aches, sore throat and fever. She went to an urgent care clinic and tested negative for COVID. She took antibiotics and she didn't get better. Then she noticed swelling/enlarged lymph nodes in neck and went to see her PCP who referred her to Dr. Yobani Sorensen. She was hospitalized last week to start chemo and was discharged 2 days ago. She was up all night last night with nausea and vomiting. She finally stopped the vomiting early this morning. She has a long history of depression, anxiety and panic attacks and these are all worse since her diagnosis. She's tried many different types of anti-depressants in the past and has had counseling. She used to take xanax for her panic attacks but not recently.   She's been taking lorazepam 0-5-mg every 6 hours and it isn't helping. Getting her cancer diagnosis was a big shock to her and to her family. She has pain in her right hip which she's had for a long time. She also has pain in her feet (see below). Her PCP prescribed gabapentin at bedtime which she stopped after a few days because it wasn't helping with the pain. She had morphine at home which she's been taking 3 to 4 times a day. She's had no appetite since coming home from the hospital but ate well before her chemo. She feels tired all the time. She's sleeping better since Dr. Scott Distance prescribed Ambien. She's retained fluid since receiving chemo (with steroids). She's taking a fluid pill (furosemide) for this.       Clinical Pain Assessment (nonverbal scale for nonverbal patients):   [++++ Clinical Pain Assessment++++]  [++++Pain Severity++++]: Pain: 9  [++++Pain Character++++]: \"like an electrode sticking in my foot\"  [++++Pain Duration++++]: years  [++++Pain Effect++++]:  [++++Pain Factors++++]: worse when walking  [++++Pain Frequency++++]: constant with varying intensity  [++++Pain Location++++]: bilateral great toes and dorsum of both feet  [++++ Clinical Pain Assessment++++]    [++++ Clinical Pain Assessment++++]  [++++Pain Severity++++]: Pain: 9  [++++Pain Character++++]: \"like something's pressing down on something that it shouldn't\"  [++++Pain Duration++++]: years  [++++Pain Effect++++]:   [++++Pain Factors++++]: worse with walking  [++++Pain Frequency++++]: constant with varying intensity  [++++Pain Location++++]: right hip  [++++ Clinical Pain Assessment++++]     FUNCTIONAL ASSESSMENT:     Palliative Performance Scale (PPS):  PPS: 70       PSYCHOSOCIAL/SPIRITUAL SCREENING:     Any spiritual / Gnosticism concerns:  [] Yes /  [x] No    Caregiver Burnout:  [] Yes /  [] No /  [x] No Caregiver Present      Anticipatory grief assessment:   [x] Normal  / [] Maladaptive       ESAS Anxiety: Anxiety: 9    ESAS Depression: Depression: 0       REVIEW OF SYSTEMS: The following systems were [x] reviewed / [] unable to be reviewed  Systems: constitutional, ears/nose/mouth/throat, respiratory, gastrointestinal, genitourinary, musculoskeletal, integumentary, neurologic, psychiatric, endocrine. Positive findings noted below. Modified ESAS Completed by: provider   Fatigue: 9 Drowsiness: 6   Depression: 0 Pain: 9   Anxiety: 9 Nausea: 8   Anorexia: 10 Dyspnea: 0   Best Well-Bein Constipation: Yes   Other Problem (Comment): 0          PHYSICAL EXAM:     Wt Readings from Last 3 Encounters:   22 186 lb 9.6 oz (84.6 kg)   22 200 lb (90.7 kg)   06/10/22 220 lb 3.8 oz (99.9 kg)     There were no vitals taken for this visit. Last bowel movement: See Nursing Note    Constitutional    [x] Appears well-developed and well-nourished in no apparent distress    [] Abnormal:  Mental status  [x] Alert and awake  [x] Oriented to person/place/time  [x] Able to follow commands  [] Abnormal:   Eyes  [x] EOM normal   [x] Sclera normal   [x] No visible ocular discharge  [] Abnormal:   HENT  [x] Normocephalic, atraumatic  [x] Mouth/Throat: Moist mucous membranes   [x] External Ears normal  [] Abnormal:  Neck  [x] No visualized mass  [] Abnormal:  Pulmonary/Chest   [x] Respiratory effort normal  [x] No visualized signs of difficulty breathing or respiratory distress  [] Abnormal:  Musculoskeletal  [x] Normal gait with no signs of ataxia  [x] Normal range of motion of neck  [] Abnormal:  Neurological:   [x] No facial asymmetry (Cranial nerve 7 motor function)  [x] No gaze palsy  [] Abnormal:   Skin  [x] No significant exanthematous lesions or discoloration noted on facial skin  [] Abnormal:                                  Psychiatric  [x] Normal affect  [x] No hallucinations  [] Abnormal:    Other pertinent observable physical exam findings:    Due to this being a TeleHealth evaluation, many elements of the physical examination are unable to be assessed.              HISTORY:     Past Medical History:   Diagnosis Date    Adverse effect of anesthesia     PHLEGM IN THROAT POST OP & NEEDED X2 BREATHING TREATMENTS    Anxiety     COPD (chronic obstructive pulmonary disease) ([facility] [address] 2022    emphysema    Depression     GERD (gastroesophageal reflux disease)     Hernia, femoral     since childhood    Hypertension     Joint pain     Nausea & vomiting     TMJ arthritis       Past Surgical History:   Procedure Laterality Date    HX CERVICAL FUSION  2012    C5-C6    HX CHOLECYSTECTOMY  2020    HX COLONOSCOPY      HX ENDOSCOPY      HX HYSTERECTOMY  2019    HX TONSILLECTOMY      AS A CHILD    HX WISDOM TEETH EXTRACTION      TEENAGER    IR INSERT TUNL CVC W PORT OVER 5 YEARS  4/29/2022      Family History   Problem Relation Age of Onset    Heart Disease Mother 46    Heart Surgery Mother         HEART TRANSPLANT    Elevated Lipids Mother     Hypertension Mother     COPD Father     Emphysema Father     Sudden Death Brother 46    Other Maternal Grandmother         BRAIN TUMOR    No Known Problems Son     No Known Problems Daughter       History reviewed, no pertinent family history. Social History     Tobacco Use    Smoking status: Former Smoker     Packs/day: 0.50     Years: 40.00     Pack years: 20.00     Types: Cigarettes    Smokeless tobacco: Never Used   Substance Use Topics    Alcohol use: Not Currently     Alcohol/week: 0.0 standard drinks     Allergies   Allergen Reactions    Oxycodone Nausea Only      Current Outpatient Medications   Medication Sig    morphine ER (MS CONTIN) 15 mg CR tablet Take 1 Tablet by mouth every twelve (12) hours for 30 days. Max Daily Amount: 30 mg.  LORazepam (ATIVAN) 1 mg tablet Take 1 Tablet by mouth every eight (8) hours as needed for Anxiety. Max Daily Amount: 3 mg. Indications: anxious    morphine IR (MS IR) 15 mg tablet Take 1 Tablet by mouth every four (4) hours as needed for Pain for up to 15 days.  Max Daily Amount: 90 mg.   Sylvia Elder prochlorperazine (COMPAZINE) 10 mg tablet Take 1 Tablet by mouth every six (6) hours as needed for Nausea (do not take if groggy; take if nausea despite zofran).  nystatin (MYCOSTATIN) 100,000 unit/mL suspension Take 5 mL by mouth four (4) times daily. Swish and gargle,  then spit or swallow    trimethoprim-sulfamethoxazole (BACTRIM DS, SEPTRA DS) 160-800 mg per tablet Take 1 Tablet by mouth BID Mon Wed & Fri.    ondansetron (ZOFRAN ODT) 8 mg disintegrating tablet Take 1 Tablet by mouth every eight (8) hours as needed for Nausea or Vomiting.  docusate sodium (COLACE) 100 mg capsule Take 1 Capsule by mouth two (2) times a day for 90 days.  FLUoxetine (PROzac) 40 mg capsule TAKE 1 CAPSULE BY MOUTH EVERY MORNING    amLODIPine-benazepril (LOTREL) 5-20 mg per capsule TAKE 1 CAPSULE BY MOUTH EVERY MORNING    metroNIDAZOLE (FLAGYL) 500 mg tablet Take 1 Tablet by mouth three (3) times daily. Indications: diarrhea from an infection with Clostridium difficile bacteria (Patient not taking: Reported on 7/5/2022)    zolpidem (AMBIEN) 5 mg tablet Take 1 Tablet by mouth nightly as needed for Sleep. Max Daily Amount: 5 mg. (Patient not taking: Reported on 7/18/2022)    polyethylene glycol (MIRALAX) 17 gram packet Take 1 Packet by mouth daily. (Patient not taking: Reported on 6/20/2022)    gabapentin (NEURONTIN) 300 mg capsule Take 1 Capsule by mouth daily as needed for Pain. (Patient not taking: Reported on 6/20/2022)    diclofenac EC (VOLTAREN) 50 mg EC tablet Take 1 Tablet by mouth two (2) times daily as needed for Pain. (Patient not taking: Reported on 7/18/2022)     No current facility-administered medications for this visit.           LAB DATA REVIEWED:     Lab Results   Component Value Date/Time    WBC 11.7 (H) 06/20/2022 08:29 AM    HGB 11.1 (L) 06/20/2022 08:29 AM    PLATELET 842 81/02/7844 08:29 AM     Lab Results   Component Value Date/Time    Sodium 138 06/20/2022 08:29 AM    Potassium 3.2 (L) 06/20/2022 08:29 AM    Chloride 99 06/20/2022 08:29 AM    CO2 27 06/20/2022 08:29 AM    BUN 5 (L) 06/20/2022 08:29 AM    Creatinine 0.82 06/20/2022 08:29 AM    Calcium 9.2 06/20/2022 08:29 AM    Phosphorus 4.0 06/06/2022 06:59 AM      Lab Results   Component Value Date/Time    Alk. phosphatase 68 06/20/2022 08:29 AM    Protein, total 6.5 06/20/2022 08:29 AM    Albumin 3.5 06/20/2022 08:29 AM    Globulin 3.0 06/20/2022 08:29 AM     No results found for: INR, PTMR, PTP, PT1, PT2, APTT, INREXT, INREXT   No results found for: IRON, FE, TIBC, IBCT, PSAT, FERR     CT chest/abdomen/pelvis 3/24/22:  1. Large gastrohepatic ligament lymph node measuring 4.2 x 2.5 cm. Multiple  mildly enlarged mesenteric lymph nodes. Borderline to mildly enlarged  retroperitoneal lymph nodes. Lymphoma is the primary differential possibility. These are not accessible by percutaneous biopsy but may be accessible by  endoscopic ultrasound.     2. Central right renal mass with second posterior right renal mass. Mild  right-sided hydronephrosis. Although these may represent uroepithelial or renal  cell carcinoma is, lymphoma should be considered as well.     3.  Borderline size left inguinal lymph node. This may be accessible for  percutaneous ultrasound-guided biopsy although is borderline size for pathology  may be reactive.     4.  Ground glass opacity superior segment left lower lobe. Attention on  follow-up imaging.     5.  Ventral hernia containing loops of small bowel without evidence of  Obstruction    PET-CT 5/4/22:  CHEST: No foci of abnormal hypermetabolism. Low-grade activity in mediastinal  and hilar lymph nodes is nonspecific but may be reactive.     ABDOMEN/PELVIS: There is increased metabolic activity in a 2.6 cm hepatogastric  ligament lymph node with SUV of 5.7. There is increased metabolic activity in a 1.2 cm right retroperitoneal lymph  node with SUV of 6.    There is increased metabolic activity in a left retroperitoneal lymph node with  SUV of 8   There is slight haziness in the mesentery with small lymph nodes with slight  increased metabolic activity of 3.6. There is small focus of increased metabolic activity anterior to the left sacrum  and anterior to the right sacrum with SUV of 7 suspicious for lymph nodes. There is increased metabolic activity in the right renal mass posterior cortex  with SUV of 6.9. There is increased metabolic activity in a left inguinal lymph node with SUV of  8  There is a right abdominal wall hernia     SKELETON: There is increased metabolic activity and left humeral head, right  iliac bone, femurs and proximal right tibia. . There is slight increased  metabolic activity right humeral head.     IMPRESSION  There is increased metabolic activity in 2 lymph nodes in the left  neck, in the hepatogastric ligament and retroperitoneal adenopathy, mesenteric  lymph nodes and left inguinal adenopathy suspicious for lymphoma. There is  increased metabolic activity in a mass posterior right renal cortex nonspecific  but may represent lymphoma. There is increased bony activity as described above  suspicious for lymphoma. CONTROLLED SUBSTANCES SAFETY ASSESSMENT (IF ON CONTROLLED SUBSTANCES):     Reviewed opioid safety handout:  [] Yes   [] No  24 hour opioid dose >150mg morphine equivalent/day:  [] Yes   [] No  Benzodiazepines:  [] Yes   [] No  Sleep apnea:  [] Yes   [] No  Urine Toxicology Testing within last 6 months:  [] Yes   [] No  History of or new aberrant medication taking behaviors:  [] Yes   [] No  Has Narcan been prescribed [] Yes   [] No          Total time:   Counseling / coordination time:   > 50% counseling / coordination?:     Ivana Machado , was evaluated through a synchronous (real-time) audio-video encounter. The patient (or guardian if applicable) is aware that this is a billable service, which includes applicable co-pays.  This Virtual Visit was conducted with patient's (and/or legal guardian's) consent. The visit was conducted pursuant to the emergency declaration under the [facility] [address] authority and the [employer] and [employer] General Act. Patient identification was verified, and a caregiver was present when appropriate. The patient was located in a state where the provider was licensed to provide care.

## 2022-07-18 ENCOUNTER — VIRTUAL VISIT (OUTPATIENT)
Dept: PALLATIVE CARE | Age: 67
End: 2022-07-18
Payer: MEDICARE

## 2022-07-18 ENCOUNTER — APPOINTMENT (OUTPATIENT)
Dept: INFUSION THERAPY | Age: 67
End: 2022-07-18

## 2022-07-18 DIAGNOSIS — F41.9 ANXIETY: ICD-10-CM

## 2022-07-18 DIAGNOSIS — C83.38 DIFFUSE LARGE B-CELL LYMPHOMA OF LYMPH NODES OF MULTIPLE REGIONS (HCC): ICD-10-CM

## 2022-07-18 DIAGNOSIS — R11.2 NAUSEA AND VOMITING, UNSPECIFIED VOMITING TYPE: ICD-10-CM

## 2022-07-18 DIAGNOSIS — R53.83 FATIGUE, UNSPECIFIED TYPE: ICD-10-CM

## 2022-07-18 DIAGNOSIS — M79.671 BILATERAL FOOT PAIN: ICD-10-CM

## 2022-07-18 DIAGNOSIS — R63.0 POOR APPETITE: ICD-10-CM

## 2022-07-18 DIAGNOSIS — M25.551 RIGHT HIP PAIN: Primary | ICD-10-CM

## 2022-07-18 DIAGNOSIS — M79.672 BILATERAL FOOT PAIN: ICD-10-CM

## 2022-07-18 PROCEDURE — 99214 OFFICE O/P EST MOD 30 MIN: CPT | Performed by: INTERNAL MEDICINE

## 2022-07-18 PROCEDURE — G9899 SCRN MAM PERF RSLTS DOC: HCPCS | Performed by: INTERNAL MEDICINE

## 2022-07-18 PROCEDURE — 3017F COLORECTAL CA SCREEN DOC REV: CPT | Performed by: INTERNAL MEDICINE

## 2022-07-18 PROCEDURE — G8417 CALC BMI ABV UP PARAM F/U: HCPCS | Performed by: INTERNAL MEDICINE

## 2022-07-18 PROCEDURE — G8536 NO DOC ELDER MAL SCRN: HCPCS | Performed by: INTERNAL MEDICINE

## 2022-07-18 PROCEDURE — 1101F PT FALLS ASSESS-DOCD LE1/YR: CPT | Performed by: INTERNAL MEDICINE

## 2022-07-18 PROCEDURE — G9717 DOC PT DX DEP/BP F/U NT REQ: HCPCS | Performed by: INTERNAL MEDICINE

## 2022-07-18 PROCEDURE — 1090F PRES/ABSN URINE INCON ASSESS: CPT | Performed by: INTERNAL MEDICINE

## 2022-07-18 PROCEDURE — G8400 PT W/DXA NO RESULTS DOC: HCPCS | Performed by: INTERNAL MEDICINE

## 2022-07-18 PROCEDURE — G8427 DOCREV CUR MEDS BY ELIG CLIN: HCPCS | Performed by: INTERNAL MEDICINE

## 2022-07-18 RX ORDER — MORPHINE SULFATE 15 MG/1
15 TABLET, FILM COATED, EXTENDED RELEASE ORAL EVERY 12 HOURS
Qty: 60 TABLET | Refills: 0 | Status: ON HOLD | OUTPATIENT
Start: 2022-07-18 | End: 2022-08-18

## 2022-07-18 NOTE — PROGRESS NOTES
Palliative Medicine Office Visit  Palliative Medicine Nurse Check In  [phone]    Patient Name: Nichole Camarillo  YOB: 1955      Date of Office Visit: 7/18/2022    Patient states: \"  \"    From Check In Sheet (scanned in Media):  Has a medical provider talked with you about cardiopulmonary resuscitation (CPR)? [x] Yes   [] No   [] Unable to obtain    Nurse reminder to complete or update ACP FlowSheet:    Is ACP on the Problem List?    [] Yes    [x] No  IF ACP Document is ON FILE; Nurse to place ACP on Problem List     Is there an ACP Note in Chart Review/Note? [x] Yes    [] No   If NO: ALERT PROVIDER       Primary Decision Maker: Tiarra Sommer - Daughter - [phone]    Primary Decision Maker: Ever Serrano - Son - [phone]  Advance Care Planning 7/18/2022   Patient's Healthcare Decision Maker is: Legal Next of Kin   Confirm Advance Directive None   Patient Would Like to Complete Advance Directive -   Does the patient have other document types -         Is there anything that we should know about you as a person in order to provide you the best care possible? Have you been to the ER, urgent care clinic since your last visit? [] Yes   [x] No   [] Unable to obtain    Have you been hospitalized since your last visit? [] Yes   [x] No   [] Unable to obtain    Have you seen or consulted any other health care providers outside of the [address] [place] [address] since your last visit? [] Yes   [x] No   [] Unable to obtain    Functional status (describe):         Last BM: 7/16/2022     accessed (date):      Bottle review (for opioid pain medication):  Medication 1:   Date filled:   Directions:   # filled:   # left:   # pills taking per day:  Last dose taken:    Medication 2:   Date filled:   Directions:   # filled:   # left:   # pills taking per day:  Last dose taken:    Medication 3:   Date filled:   Directions:   # filled:   # left:   # pills taking per day:  Last dose taken:     Medication 4:   Date filled:   Directions:   # filled:   # left:   # pills taking per day:  Last dose taken:

## 2022-07-18 NOTE — PATIENT INSTRUCTIONS
Dear Sabrina Chisholm ,    It was a pleasure seeing you today via virtual visit. We will see you again in 3 weeks for an office visit. If labs or imaging tests have been ordered for you today, please call the office  at [phone] 48 hours after completion to obtain the results. Your described symptoms were: Fatigue: 9 Drowsiness: 6   Depression: 0 Pain: 9   Anxiety: 9 Nausea: 8   Anorexia: 10 Dyspnea: 0   Best Well-Bein Constipation: Yes   Other Problem (Comment): 0       This is the plan we talked about:    1. Pain  -Start extended-release morphine 15-mg every 12 hours  -Continue morphine immediate-release 15-mg every 4 hours as needed  -Restart diclofenac twice daily    2. Depression and anxiety  -You've lived with depression, anxiety and panic attacks most of your life  -Your recent cancer diagnosis has exacerbated these  -Your goal to feel less on edge all the time  -Continue fluoxetine 40-mg daily  -Continue lorazepam 1-mg: take 1 tab three times daily as needed  -I'm deferring starting alprazolam for now  -We talked about your seeing our clinical , Christoph Hood, for additional support which you declined  -We also discussed rotating you from fluoxetine (prozac) to duloxetine (Cymbalta) both to help with your anxiety and as an adjuvant medication to help with the neuropathic component of your pain  -We decided to proceed with this rotation at your next visit    3. Poor appetite  -You have no appetite and you're losing weight  -Today I made a referral to Terri Flor, the [facility] dietician    4. Fatigue/poor sleep  -Fatigue is commonly experienced by people living with and being treated for cancer and is often multi-factorial in nature with poor sleep, pain and other symptoms, stress and mood issues and metabolic abnormalities all possible factors  -Please see below for additional information    5.  Nausea and vomiting  -Continue ondansetron 8-mg every 8 hours as needed  -Continue prochlorperazine 10-mg every 6 hours as needed  -You may need to take both of these medications in order to control your nausea    6. Lymphoma  -You've received  a total of 2 cycles of chemotherapy with the goal of inducing remission  -You're taking a short holiday and anticipate restarting reduced dose chemo soon        This is what you have shared with us about Advance Care Planning:      Primary Decision Maker: Sahaar Luna - Daughter - [phone]    Primary Decision Maker: Sathya Daniels - [phone]  Advance Care Planning 7/18/2022   Patient's Healthcare Decision Maker is: Legal Next of Kin   Confirm Advance Directive None   Patient Would Like to Complete Advance Directive -   Does the patient have other document types -           The Palliative Medicine Team is here to support you and your family.        Sincerely,      Zachary Freedman MD and the Palliative Medicine Team

## 2022-07-19 ENCOUNTER — HOME HEALTH ADMISSION (OUTPATIENT)
Dept: HOME HEALTH SERVICES | Facility: HOME HEALTH | Age: 67
End: 2022-07-19
Payer: MEDICARE

## 2022-07-19 ENCOUNTER — TELEPHONE (OUTPATIENT)
Dept: ONCOLOGY | Age: 67
End: 2022-07-19

## 2022-07-19 ENCOUNTER — OFFICE VISIT (OUTPATIENT)
Dept: ONCOLOGY | Age: 67
End: 2022-07-19
Payer: MEDICARE

## 2022-07-19 VITALS
BODY MASS INDEX: 33.63 KG/M2 | WEIGHT: 189.8 LBS | DIASTOLIC BLOOD PRESSURE: 85 MMHG | RESPIRATION RATE: 18 BRPM | TEMPERATURE: 98.1 F | SYSTOLIC BLOOD PRESSURE: 133 MMHG | HEART RATE: 86 BPM | OXYGEN SATURATION: 91 % | HEIGHT: 63 IN

## 2022-07-19 DIAGNOSIS — C83.31 DIFFUSE LARGE B-CELL LYMPHOMA OF LYMPH NODES OF NECK (HCC): ICD-10-CM

## 2022-07-19 DIAGNOSIS — R53.81 PHYSICAL DEBILITY: ICD-10-CM

## 2022-07-19 DIAGNOSIS — G89.3 CANCER RELATED PAIN: ICD-10-CM

## 2022-07-19 DIAGNOSIS — R53.81 PHYSICAL DEBILITY: Primary | ICD-10-CM

## 2022-07-19 DIAGNOSIS — R63.0 POOR APPETITE: ICD-10-CM

## 2022-07-19 DIAGNOSIS — R11.14 BILIOUS VOMITING WITH NAUSEA: ICD-10-CM

## 2022-07-19 PROCEDURE — 1123F ACP DISCUSS/DSCN MKR DOCD: CPT | Performed by: INTERNAL MEDICINE

## 2022-07-19 PROCEDURE — G9717 DOC PT DX DEP/BP F/U NT REQ: HCPCS | Performed by: INTERNAL MEDICINE

## 2022-07-19 PROCEDURE — 1101F PT FALLS ASSESS-DOCD LE1/YR: CPT | Performed by: INTERNAL MEDICINE

## 2022-07-19 PROCEDURE — 1090F PRES/ABSN URINE INCON ASSESS: CPT | Performed by: INTERNAL MEDICINE

## 2022-07-19 PROCEDURE — 3017F COLORECTAL CA SCREEN DOC REV: CPT | Performed by: INTERNAL MEDICINE

## 2022-07-19 PROCEDURE — G9899 SCRN MAM PERF RSLTS DOC: HCPCS | Performed by: INTERNAL MEDICINE

## 2022-07-19 PROCEDURE — 99214 OFFICE O/P EST MOD 30 MIN: CPT | Performed by: INTERNAL MEDICINE

## 2022-07-19 PROCEDURE — G8400 PT W/DXA NO RESULTS DOC: HCPCS | Performed by: INTERNAL MEDICINE

## 2022-07-19 PROCEDURE — G8427 DOCREV CUR MEDS BY ELIG CLIN: HCPCS | Performed by: INTERNAL MEDICINE

## 2022-07-19 PROCEDURE — G8536 NO DOC ELDER MAL SCRN: HCPCS | Performed by: INTERNAL MEDICINE

## 2022-07-19 PROCEDURE — G8417 CALC BMI ABV UP PARAM F/U: HCPCS | Performed by: INTERNAL MEDICINE

## 2022-07-19 RX ORDER — ONDANSETRON 8 MG/1
8 TABLET, ORALLY DISINTEGRATING ORAL
Qty: 50 TABLET | Refills: 1 | Status: SHIPPED | OUTPATIENT
Start: 2022-07-19 | End: 2022-09-02 | Stop reason: SDUPTHER

## 2022-07-19 RX ORDER — PROCHLORPERAZINE MALEATE 10 MG
10 TABLET ORAL
Qty: 50 TABLET | Refills: 1 | Status: ON HOLD | OUTPATIENT
Start: 2022-07-19 | End: 2022-08-18

## 2022-07-19 NOTE — PROGRESS NOTES
[facility] at [address], [address] [facility]: [phone]  F: [phone] Patient ID  Name: Anna Marie Avitia  YOB: 1955  MRN: [id number]  Referring Provider:   No referring provider defined for this encounter. 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 This summary highlights focused aspects of patient's ongoing care and may have a recurring section in notes with either updates or remain unchanged as a longitudinal care summary. -------------------------------------------------------------------------------------------------------------------------------------------------------------------------------------------------------  DIAGNOSIS:  · Diffuse Large B-Cell Lymphoma     ORIGINAL STAGING:  · Stage IV     SITES OF DISEASE:  · Left Cervical Lymph Node,Bone Disease, Stage IV    CURRENT TREATMENT:  · C1,D1 on 5/11/22 DA-R-EPOCH     PRIOR TREATMENT:  · n/a     GOALS OF CARE:  · Curative Intent     PATHOLOGY:  · Specimen #:[id number] Collect: 4/22/2022      ==========================================================================                    * * *SURGICAL PATHOLOGY REPORT* * *   ==========================================================================   * * *PROCEDURES/ADDENDA* * *   ADDENDUM   Date Ordered: 4/26/2022     Status: Signed Out   Date Complete: 4/26/2022     By: Pan Velasquez.  Bijan Hadley MD   Date Reported: 4/26/2022   Addendum Diagnosis   Lymph node, left posterior cervical lymph node, excision:   In situ hybridization for Jesús-Barr viral RNA: Negative   CPT: 21619  Addendum Comment   * * *CLINICAL HISTORY* * *   Cervical lymphadenopathy, rule out lymphoma ==========================================================================   * * *FINAL PATHOLOGIC DIAGNOSIS* * *        Lymph node, left posterior cervical lymph node, excision:        Large B cell lymphoma. See comment. * * *Comment* * *   The histologic section has fragments of lymphoid tissue with diffuse and   focal follicular architecture. Diffuse areas are comprised of large,   atypical lymphocytes with centroblastic morphology and increased mitotic   activity. Immunohistochemical stains confirm the malignant cells are CD20   positive B cells that coexpress CD10 and BCL6 without MUM1. CD23   highlights rare follicular dendritic networks associated with lymphoid   follicles with germinal centers and express CD10 and BCL6 without BCL2. CyclinD1 and CD56 stains are negative. Concurrent flow cytometric analysis   confirms a clonal CD10 positive B-cell population comprised of medium to   large cells. The Ki-67 proliferation index is 30%. The overall findings favor the diagnosis of diffuse large B-cell lymphoma,   germinal center subtype. Molecular genetic studies are pending for further   characterization will be reported in an addendum. Flow cytometry:   Consistent with CD10-positive B-cell lymphoproliferative disorder. Comments: Flow cytometry shows monoclonal B-cells (36% of total cells)   with CD10 expression without CD5, consistent with a CD10-positive B-cell   lymphoproliferative disorder. The main differential diagnosis includes   follicular lymphoma, Burkitt lymphoma and large B-cell lymphoma. Please   correlate the result with morphologic findings and clinical information. Flow Differential (%) and Population Analysis:   Lymphocytes: 93.7%   T-cells (39% of lymphoid cells) show a CD4/CD8 ratio of 3.3 without overt   phenotypic abnormality. NK-cells (1% of lymphoid cells) are unremarkable.    Mature B-cells (56% of lymphoid cells) include large forms based on   forward scattered pattern and show: CD5 neg, CD10+, CD11c+dim, CD19+,   CD20+ with surface lambda light chain restriction. Markers Performed: CD2, CD3, CD4, CD5, CD7, CD8, CD10, CD11c, CD19, CD20,   CD23, CD34, CD38, CD45, CD56, Kappa, Lambda (17 Markers)   The Technical Component Processing of this test was completed at   [facility], [address] [place] [address], [facility] / [id number] /   [phone] / [facility] # [id number].  Interpretation by Laura Stone M.D. The   complete scanned report is available in Epic. CPT: R4422793, N1803423, Q2676325, [id number], [id number], [id number]   jw2/2022   *Electronically Signed Out By Asher Walker MD*   ==========================================================================   * * *Gross Description* * *   Specimen #1, received fresh labeled with the patient's name,  and left   posterior cervical lymph node, consists of two paper towels containing a   1.7 x 1.5 x 0.5 cm aggregate of pale pink-tan, fleshy soft tissue   fragments. The specimen is handled according to the flow cytometry   protocol as follows:   7          Two air-dried touch prep slides - one Diff Quik stained, one   rapid-fixed in 95% alcohol   7          Representative sections in B plus fixative (1A)   7          One piece held in RPMI for possible flow cytometry analysis   7          Remaining tissue fixed in formalin for permanents (1B)   Dr. Korey Walker will determine if flow cytometry is necessary. 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 F who presents for a follow-up evaluation for triple hit lymphoma. She is s/p 2 cycles of DA-R-EPOCH. However, she has had significant debility after Cycle #2. Patient overall reports feeling worse. She unfortunately is not eating now. She was having some improvement but tells me that hs he has lost her appetite again.  She states that she is able to take in some nutrition but \"not a lot.\" She is consuming protein shakes. She reports that her breathing is still improved. She tells me that she \"thinks she needs a few weeks. \" She still thinks she is improving but only incrementally. She states that she does not think she needs physical therapy. Her friend is here with her today. Her friend. Appetite:Grade 4 (She clarifies that she is able to drink protein shakes). Constipation:Grade 2  Fatigue:Grade 2  Hair Loss:Grade 2  Chills:Grade 2  Nausea:Grade 2  Vomiting:Grade 1  Hot Flashes:Grade 1  Concentration:Grade 1  Pain:legs,arms,feet 8  Weight Changes: lost 30#  Itching:Grade 2  Numbness/Tingling:Grade 2  Additional Comments: \"Itching feet at nighttime. \"     Past Medical History:   Diagnosis Date    Adverse effect of anesthesia     PHLEGM IN THROAT POST OP & NEEDED X2 BREATHING TREATMENTS    Anxiety     COPD (chronic obstructive pulmonary disease) ([facility] [address] 2022    emphysema    Depression     GERD (gastroesophageal reflux disease)     Hernia, femoral     since childhood    Hypertension     Joint pain     Nausea & vomiting     TMJ arthritis        Current Outpatient Medications:     morphine ER (MS CONTIN) 15 mg CR tablet, Take 1 Tablet by mouth every twelve (12) hours for 30 days. Max Daily Amount: 30 mg., Disp: 60 Tablet, Rfl: 0    LORazepam (ATIVAN) 1 mg tablet, Take 1 Tablet by mouth every eight (8) hours as needed for Anxiety. Max Daily Amount: 3 mg. Indications: anxious, Disp: 90 Tablet, Rfl: 0    morphine IR (MS IR) 15 mg tablet, Take 1 Tablet by mouth every four (4) hours as needed for Pain for up to 15 days. Max Daily Amount: 90 mg., Disp: 90 Tablet, Rfl: 0    prochlorperazine (COMPAZINE) 10 mg tablet, Take 1 Tablet by mouth every six (6) hours as needed for Nausea (do not take if groggy; take if nausea despite zofran). , Disp: 50 Tablet, Rfl: 1    nystatin (MYCOSTATIN) 100,000 unit/mL suspension, Take 5 mL by mouth four (4) times daily.  Swish and gargle,  then spit or swallow, Disp: 200 mL, Rfl: 0    trimethoprim-sulfamethoxazole (BACTRIM DS, SEPTRA DS) 160-800 mg per tablet, Take 1 Tablet by mouth BID Mon Wed & Fri., Disp: 42 Tablet, Rfl: 1    ondansetron (ZOFRAN ODT) 8 mg disintegrating tablet, Take 1 Tablet by mouth every eight (8) hours as needed for Nausea or Vomiting., Disp: 50 Tablet, Rfl: 1    docusate sodium (COLACE) 100 mg capsule, Take 1 Capsule by mouth two (2) times a day for 90 days. , Disp: 60 Capsule, Rfl: 2    FLUoxetine (PROzac) 40 mg capsule, TAKE 1 CAPSULE BY MOUTH EVERY MORNING, Disp: 90 Capsule, Rfl: 1    amLODIPine-benazepril (LOTREL) 5-20 mg per capsule, TAKE 1 CAPSULE BY MOUTH EVERY MORNING, Disp: 90 Capsule, Rfl: 1    metroNIDAZOLE (FLAGYL) 500 mg tablet, Take 1 Tablet by mouth three (3) times daily. Indications: diarrhea from an infection with Clostridium difficile bacteria (Patient not taking: Reported on 7/5/2022), Disp: 42 Tablet, Rfl: 0    zolpidem (AMBIEN) 5 mg tablet, Take 1 Tablet by mouth nightly as needed for Sleep. Max Daily Amount: 5 mg. (Patient not taking: Reported on 7/18/2022), Disp: 30 Tablet, Rfl: 0    polyethylene glycol (MIRALAX) 17 gram packet, Take 1 Packet by mouth daily. (Patient not taking: Reported on 6/20/2022), Disp: 90 Each, Rfl: 1    gabapentin (NEURONTIN) 300 mg capsule, Take 1 Capsule by mouth daily as needed for Pain. (Patient not taking: Reported on 6/20/2022), Disp: 20 Capsule, Rfl: 0    diclofenac EC (VOLTAREN) 50 mg EC tablet, Take 1 Tablet by mouth two (2) times daily as needed for Pain. (Patient not taking: Reported on 7/18/2022), Disp: 60 Tablet, Rfl: 0    Allergies   Allergen Reactions    Oxycodone Nausea Only     Review of Systems Provided by:  Patient  Review of Systems: A complete review of systems was obtained, reviewed. Pertinent findings reviewed above.     Objective:      Visit Vitals  /85 (BP 1 Location: Left upper arm, BP Patient Position: Sitting, BP Cuff Size: Adult)   Pulse 86   Temp 98.1 °F (36.7 °C) (Oral)   Resp 18   Ht 5' 3\" (1.6 m)   Wt 189 lb 12.8 oz (86.1 kg)   SpO2 91%   BMI 33.62 kg/m²     ECOG: 3- Capable of only limited selfcare; confined to bed or chair more than 50% of waking hours. Physical Exam  Constitutional: No acute distress. , Non-toxic appearance. and Chronic ill appearance. HENT: Normocephalic and atraumatic head.  , Wearing a mask during COVID-19 precautions. and  Wearing a head turban. Eyes: Normal Conjunctivae. Anicteric sclerae. Cardiovascular: S1,S2 auscultated. No pitting edema. Pulmonary: Normal Respiratory Effort. No wheezing. No rhonchi. Abdominal: Normal bowel sounds. Soft Abdomen to palpation. No abdominal tenderness. Skin: No jaundice. No rash. Musculoskeletal: No temporal muscle wasting on gross inspection. No myalgias on palpation. Neurological: Alert and oriented. No tremor on inspection. Abnormal Gait. Sitting in wheelchair. Psychiatric: mood normal. normal speech rate. sad affect    Results:      I personally reviewed Epic EHR labs/results below:   Lab Results   Component Value Date/Time    WBC 11.7 (H) 06/20/2022 08:29 AM    HGB 11.1 (L) 06/20/2022 08:29 AM    HCT 33.7 (L) 06/20/2022 08:29 AM    PLATELET 704 29/50/5051 08:29 AM    MCV 93.1 06/20/2022 08:29 AM     Lab Results   Component Value Date/Time    Sodium 138 06/20/2022 08:29 AM    Potassium 3.2 (L) 06/20/2022 08:29 AM    Chloride 99 06/20/2022 08:29 AM    CO2 27 06/20/2022 08:29 AM    Anion gap 12 06/20/2022 08:29 AM    Glucose 131 (H) 06/20/2022 08:29 AM    BUN 5 (L) 06/20/2022 08:29 AM    Creatinine 0.82 06/20/2022 08:29 AM    BUN/Creatinine ratio 6 (L) 06/20/2022 08:29 AM    GFR est AA >60 06/20/2022 08:29 AM    GFR est non-AA >60 06/20/2022 08:29 AM    Calcium 9.2 06/20/2022 08:29 AM    Bilirubin, total 0.2 06/20/2022 08:29 AM    Alk.  phosphatase 68 06/20/2022 08:29 AM    Protein, total 6.5 06/20/2022 08:29 AM    Albumin 3.5 06/20/2022 08:29 AM    Globulin 3.0 06/20/2022 08:29 AM A-G Ratio 1.2 2022 08:29 AM    ALT (SGPT) 44 2022 08:29 AM    AST (SGOT) 28 2022 08:29 AM     IR INSERT TUNL CVC W PORT OVER 5 YEARS    Result Date: 2022  PATIENT NAME: Audra Romero                                            AGE, : 78 years, 1955 MRN: [id number] DATE: 2022 11:50 AM PROCEDURE(S): Single lumen Portacath placement SUPERVISING PHYSICIAN: Reba Butcher MD OPERATING PROVIDER: Roxy Reyes PA-C CLINICAL HISTORY: Mediport placement requested. B-cell lymphoma Patient was evaluated and felt to be an appropriate candidate for conscious sedation. Moderate intravenous conscious sedation was supervised by Reba Butcher MD. The patient was independently monitored by a registered nurse assigned to the Department of Radiology using automated blood pressure, EKG, and pulse oximetry. The detail conscious sedation record is stored in the hospital information system. MEDICATION: Antibiotic: Ancef 2g Versed: 6 mg Fentanyl: 100 mcg Intraprocedure time: 40 minutes FINDINGS: After informed consent was obtained, and the risks and benefits of the procedure including infection and bleeding were discussed, the patient was brought to the angiographic suite and placed on the angiographic table in a supine position. The right neck and chest were prepped and draped in maximum sterile barrier technique which includes: cap and mask, sterile gown, sterile gloves, and sterile body drape. The ultrasound transducer was prepped using sterile ultrasound technique which includes: sterile gel and probe cover. After adequate conscious sedation was achieved utilizing Fentanyl and Versed, the skin over the internal jugular vein was anesthetized with 1% lidocaine. Sonographic images of the right neck demonstrated a patent and compressible right internal jugular vein. The vein was accessed under direct sonographic guidance using a 21 gauge micropuncture set. An ultrasonographic image was saved in the record. A 0.035 J wire was advanced through the sheath into the inferior vena cava under fluoroscopic guidance. A peel-away sheath was advanced over the wire into the superior vena cava under fluoroscopic guidance. Attention was directed to the right chest, and after adequate local anesthesia, a horizontal incision was made. A subcutaneous pocket was then formed using a combination of sharp and blunt dissection. A tunnel was then formed between the pocket and the venotomy site and the catheter passed through the tunnel and through the peel-away sheath. The peel-away sheath was removed and the catheter tip was positioned at the right atrium. The catheter was cut at the pocket access point and the portacath was attached to the catheter and placed snugly in the pocket. The pocket was closed primarily with 2-0 Vicryl suture and Dermabond. The neck access site was closed with 2-0 Vicryl suture and Dermabond. The port was accessed and flushed with heparin solution. The patient tolerated the procedure well. Fluoroscopy demonstrates the catheter tip to be within the right atrium. RADIATION: Fluoroscopy time: 0.3 minutes Air kerma: 5.1 mGy     Technically successful placement of a single lumen port with the catheter tip in the right atrium. Catheter is ready for immediate use. CT NECK SOFT TISSUE W CONT    Result Date: 6/28/2022  INDICATION: Diffuse large b-cell lymphoma, lymph nodes of head, face, and neck EXAM:  CT NECK, CHEST, ABDOMEN, PELVIS WITH CONTRAST COMPARISON: PET scan May 4, 2022 TECHNIQUE:  CT imaging of the neck, chest, abdomen and pelvis was performed after the uneventful intravenous administration of IV contrast. Coronal and sagittal reconstructions were generated. CT dose reduction was achieved through use of a standardized protocol tailored for this examination and automatic exposure control for dose modulation.  FINDINGS: NASOPHARYNX: Normal. SUPRAHYOID NECK: Normal oropharynx, oral cavity, parapharyngeal space, and retropharyngeal space. INFRAHYOID NECK: Normal larynx, hypopharynx and supraglottis. PAROTID GLANDS: Normal. SUBMANDIBULAR GLANDS: Absent left submandibular gland. THYROID GLAND: Normal. LYMPH NODES: Interval decrease in size of previous enlarged left level 2 lymph nodes, the more anterior of which decreased from 1.2 x 1.1 cm to 0.7 x 0.6 cm, and the posterior node decreased from 1.8 x 1.2 cm to 1.1 x 0.7 cm. Scattered subcentimeter lymph nodes throughout the neck. No new or enlarging nodes. OSSEOUS STRUCTURES: No destructive bone lesion. Prior ACDF C5-7. ADDITIONAL COMMENTS: N/A. MEDIASTINUM/ELIZABETH: Scattered nonenlarged lymph nodes. Small hiatal hernia. HEART/PERICARDIUM: Unremarkable. LUNGS/PLEURA: No suspicious nodule. No pulmonary edema or pleural effusion. Tree-in-bud opacities in the lateral right lower lobe, and mild ill-defined nodular groundglass opacities right apex. BONES: No destructive bone lesion. ADDITIONAL COMMENTS: Unchanged round 9 mm density lateral right breast LIVER: No mass or biliary dilatation. GALLBLADDER: Surgically absent. SPLEEN: No enlargement or lesion. PANCREAS: No mass or ductal dilatation. ADRENALS: No mass. KIDNEYS: No mass, calculus, or hydronephrosis. GI TRACT: No bowel obstruction or wall thickening PERITONEUM: No free air or free fluid. APPENDIX: Not clearly visualized. RETROPERITONEUM: Interval decrease in size of previously enlarged lymph nodes: Gastrohepatic ligament lymph node decreased from 4.2 x 1.2 cm to 2.2 x 0.9 cm; aortocaval lymph node at the level of the right kidney, previously 1.2 cm, now 0.6 cm. No new or enlarging lymph nodes. ADDITIONAL COMMENTS: Right paraumbilical ventral hernia containing mesenteric fat and bowel loops similar to prior study. URINARY BLADDER: No mass or calculus. LYMPH NODES: Decreased size of left inguinal lymph nodes from 1.6 cm to 0.8 cm. REPRODUCTIVE ORGANS: Prior hysterectomy. FREE FLUID: None.  BONES: No destructive bone lesion. ADDITIONAL COMMENTS: N/A.     1. Interval decrease in size of previously enlarged lymph nodes in the neck, abdomen, and pelvis. No new or enlarging lymph nodes. 2. New tree-in-bud opacities and groundglass opacities in the right lower and upper lobes may be infectious/inflammatory. May be assessed on follow-up examinations. 23X     CT CHEST ABD PELV W CONT    Result Date: 6/28/2022  INDICATION: Diffuse large b-cell lymphoma, lymph nodes of head, face, and neck EXAM:  CT NECK, CHEST, ABDOMEN, PELVIS WITH CONTRAST COMPARISON: PET scan May 4, 2022 TECHNIQUE:  CT imaging of the neck, chest, abdomen and pelvis was performed after the uneventful intravenous administration of IV contrast. Coronal and sagittal reconstructions were generated. CT dose reduction was achieved through use of a standardized protocol tailored for this examination and automatic exposure control for dose modulation. FINDINGS: NASOPHARYNX: Normal. SUPRAHYOID NECK: Normal oropharynx, oral cavity, parapharyngeal space, and retropharyngeal space. INFRAHYOID NECK: Normal larynx, hypopharynx and supraglottis. PAROTID GLANDS: Normal. SUBMANDIBULAR GLANDS: Absent left submandibular gland. THYROID GLAND: Normal. LYMPH NODES: Interval decrease in size of previous enlarged left level 2 lymph nodes, the more anterior of which decreased from 1.2 x 1.1 cm to 0.7 x 0.6 cm, and the posterior node decreased from 1.8 x 1.2 cm to 1.1 x 0.7 cm. Scattered subcentimeter lymph nodes throughout the neck. No new or enlarging nodes. OSSEOUS STRUCTURES: No destructive bone lesion. Prior ACDF C5-7. ADDITIONAL COMMENTS: N/A. MEDIASTINUM/ELIZABETH: Scattered nonenlarged lymph nodes. Small hiatal hernia. HEART/PERICARDIUM: Unremarkable. LUNGS/PLEURA: No suspicious nodule. No pulmonary edema or pleural effusion. Tree-in-bud opacities in the lateral right lower lobe, and mild ill-defined nodular groundglass opacities right apex. BONES: No destructive bone lesion.  ADDITIONAL COMMENTS: Unchanged round 9 mm density lateral right breast LIVER: No mass or biliary dilatation. GALLBLADDER: Surgically absent. SPLEEN: No enlargement or lesion. PANCREAS: No mass or ductal dilatation. ADRENALS: No mass. KIDNEYS: No mass, calculus, or hydronephrosis. GI TRACT: No bowel obstruction or wall thickening PERITONEUM: No free air or free fluid. APPENDIX: Not clearly visualized. RETROPERITONEUM: Interval decrease in size of previously enlarged lymph nodes: Gastrohepatic ligament lymph node decreased from 4.2 x 1.2 cm to 2.2 x 0.9 cm; aortocaval lymph node at the level of the right kidney, previously 1.2 cm, now 0.6 cm. No new or enlarging lymph nodes. ADDITIONAL COMMENTS: Right paraumbilical ventral hernia containing mesenteric fat and bowel loops similar to prior study. URINARY BLADDER: No mass or calculus. LYMPH NODES: Decreased size of left inguinal lymph nodes from 1.6 cm to 0.8 cm. REPRODUCTIVE ORGANS: Prior hysterectomy. FREE FLUID: None. BONES: No destructive bone lesion. ADDITIONAL COMMENTS: N/A.     1. Interval decrease in size of previously enlarged lymph nodes in the neck, abdomen, and pelvis. No new or enlarging lymph nodes. 2. New tree-in-bud opacities and groundglass opacities in the right lower and upper lobes may be infectious/inflammatory. May be assessed on follow-up examinations. 23X     PET/CT TUMOR IMAGE [facility] BDY W (INI)    Addendum Date: 5/5/2022    Addendum: There is increased metabolic activity in a small left frontal scalp lesion with SUV of 6.9. Result Date: 5/5/2022  PET/CT SCAN PROCEDURE: Following IV injection of 10.9 mCi 18 Fluoro 2 deoxyglucose (FDG) and a standard uptake delay, PET imaging is performed skull vertex to toes and axial, sagittal and coronal images were acquired. Unenhanced CT is obtained for anatomic localization, and attenuation correction of the PET scan. Patient preprocedure blood glucose level: 98 mg/dL. CORRELATIVE IMAGING STUDIES: None.  COMPARISON PET: HISTORY: The study is requested for initial staging. Lymphoma. FINDINGS: HEAD/NECK: There is increased metabolic activity 1.2 cm left level 2 lymph node. There is increased metabolic activity in a 1.2 cm left level 5 lymph node with SUV of 7.7. CHEST: No foci of abnormal hypermetabolism. Low-grade activity in mediastinal and hilar lymph nodes is nonspecific but may be reactive. ABDOMEN/PELVIS: There is increased metabolic activity in a 2.6 cm hepatogastric ligament lymph node with SUV of 5.7. There is increased metabolic activity in a 1.2 cm right retroperitoneal lymph node with SUV of 6. There is increased metabolic activity in a left retroperitoneal lymph node with SUV of 8 There is slight haziness in the mesentery with small lymph nodes with slight increased metabolic activity of 3.6. There is small focus of increased metabolic activity anterior to the left sacrum and anterior to the right sacrum with SUV of 7 suspicious for lymph nodes. There is increased metabolic activity in the right renal mass posterior cortex with SUV of 6.9. There is increased metabolic activity in a left inguinal lymph node with SUV of 8 There is a right abdominal wall hernia SKELETON: There is increased metabolic activity and left humeral head, right iliac bone, femurs and proximal right tibia. . There is slight increased metabolic activity right humeral head. There is increased metabolic activity in 2 lymph nodes in the left neck, in the hepatogastric ligament and retroperitoneal adenopathy, mesenteric lymph nodes and left inguinal adenopathy suspicious for lymphoma. There is increased metabolic activity in a mass posterior right renal cortex nonspecific but may represent lymphoma. There is increased bony activity as described above suspicious for lymphoma. . 23X        Assessment and Recommendations:      1.  Physical debility  -proceed forward with home health referral as patient is essentially home bound as she cannot drive, she needs evaluation for care at home intermittently as her family is limited in helping her at [place] per patient and frien'ds report. I performed a face-to-face encounter today determining the need for home care to accelerate patient's healing process so we can restart her chemotherapy of potentially curative nature. Patient will go for labs today but understands that her recovery progress is not where it needs to be to restart her therapy. She provides good insight and actually wants to keep delaying her treamtent.    - [address]; Future  - CBC WITH AUTOMATED DIFF; Future    2. Diffuse large B-cell lymphoma of lymph nodes of neck (HCC)  -will have to delay her chemotherapy again. Plan will be to proceed with MD appt on 8/4/22 then possible inpatient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

## 2022-07-19 NOTE — LETTER
7/19/2022    Patient: Isra Torres   YOB: 1955   Date of Visit: 7/19/2022     Molly Peguero MD  [place] [address]  [facility]    Dear Molly Peguero MD,      [facility] you [facility]. My notes for this consultation are attached. If you have questions, please do not hesitate to call me. I look forward to following your patient along with you.       Sincerely,    Terri Barton MD

## 2022-07-19 NOTE — TELEPHONE ENCOUNTER
[address] left a voicemail stating that they would like to know the services that are needed for this patient.     # [phone]

## 2022-07-19 NOTE — PROGRESS NOTES
1. Have you been to the ER, urgent care clinic since your last visit? Hospitalized since your last visit? No    2. Have you seen or consulted any other health care providers outside of the [address] [place] [address] since your last visit? Include any pap smears or colon screening. 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 She reports pain in her backs, legs, feet, and arms, generalized pain at 7-8/10.

## 2022-07-19 NOTE — TELEPHONE ENCOUNTER
Agnieszka Barboza  [phone]    07/19/22 1:18 PM - Attempted to call [facility]. There was no answer. Left a voicemail for them to call back at their earliest convenience along with the phone number to our office. Agnieszka Barboza  [phone]    07/19/22 3:51 PM - Ab Quan and spoke with Alexandra at [facility]. Advised, per Dr. Jeanna Morgan, he wants the patient to have an in-home consult to determine what services she may need (evaluate and treat order). Alexandra states they will send a nurse out to the patient's home to evaluate and determine what her needs are. Advised Dr. Jeanna Morgan has now signed his note. Alexandra voiced gratitude for the call. No further questions or concerns.

## 2022-07-20 LAB
ALBUMIN SERPL-MCNC: 3.9 G/DL (ref 3.5–5)
ALBUMIN/GLOB SERPL: 1.6 {RATIO} (ref 1.1–2.2)
ALP SERPL-CCNC: 94 U/L (ref 45–117)
ALT SERPL-CCNC: 28 U/L (ref 12–78)
ANION GAP SERPL CALC-SCNC: 8 MMOL/L (ref 5–15)
AST SERPL-CCNC: 16 U/L (ref 15–37)
BASOPHILS # BLD: 0.1 K/UL (ref 0–0.1)
BASOPHILS NFR BLD: 1 % (ref 0–1)
BILIRUB SERPL-MCNC: 0.4 MG/DL (ref 0.2–1)
BUN SERPL-MCNC: 8 MG/DL (ref 6–20)
BUN/CREAT SERPL: 12 (ref 12–20)
CALCIUM SERPL-MCNC: 9.7 MG/DL (ref 8.5–10.1)
CHLORIDE SERPL-SCNC: 101 MMOL/L (ref 97–108)
CO2 SERPL-SCNC: 28 MMOL/L (ref 21–32)
CREAT SERPL-MCNC: 0.66 MG/DL (ref 0.55–1.02)
DIFFERENTIAL METHOD BLD: ABNORMAL
EOSINOPHIL # BLD: 0.8 K/UL (ref 0–0.4)
EOSINOPHIL NFR BLD: 8 % (ref 0–7)
ERYTHROCYTE [DISTWIDTH] IN BLOOD BY AUTOMATED COUNT: 16.5 % (ref 11.5–14.5)
GLOBULIN SER CALC-MCNC: 2.5 G/DL (ref 2–4)
GLUCOSE SERPL-MCNC: 106 MG/DL (ref 65–100)
HCT VFR BLD AUTO: 41.7 % (ref 35–47)
HGB BLD-MCNC: 13.2 G/DL (ref 11.5–16)
IMM GRANULOCYTES # BLD AUTO: 0 K/UL (ref 0–0.04)
IMM GRANULOCYTES NFR BLD AUTO: 0 % (ref 0–0.5)
LYMPHOCYTES # BLD: 0.9 K/UL (ref 0.8–3.5)
LYMPHOCYTES NFR BLD: 9 % (ref 12–49)
MCH RBC QN AUTO: 32.8 PG (ref 26–34)
MCHC RBC AUTO-ENTMCNC: 31.7 G/DL (ref 30–36.5)
MCV RBC AUTO: 103.5 FL (ref 80–99)
MONOCYTES # BLD: 1.3 K/UL (ref 0–1)
MONOCYTES NFR BLD: 13 % (ref 5–13)
NEUTS SEG # BLD: 6.6 K/UL (ref 1.8–8)
NEUTS SEG NFR BLD: 69 % (ref 32–75)
NRBC # BLD: 0 K/UL (ref 0–0.01)
NRBC BLD-RTO: 0 PER 100 WBC
PLATELET # BLD AUTO: 286 K/UL (ref 150–400)
PMV BLD AUTO: 10.1 FL (ref 8.9–12.9)
POTASSIUM SERPL-SCNC: 4.5 MMOL/L (ref 3.5–5.1)
PROT SERPL-MCNC: 6.4 G/DL (ref 6.4–8.2)
RBC # BLD AUTO: 4.03 M/UL (ref 3.8–5.2)
SODIUM SERPL-SCNC: 137 MMOL/L (ref 136–145)
WBC # BLD AUTO: 9.7 K/UL (ref 3.6–11)

## 2022-07-21 ENCOUNTER — TELEPHONE (OUTPATIENT)
Dept: ONCOLOGY | Age: 67
End: 2022-07-21

## 2022-07-21 NOTE — TELEPHONE ENCOUNTER
[address] at [facility]  [phone]    07/21/22 2:38 PM - Called and spoke with the patient's daughter. Patient's ID verified x 2.  Talbot Runner states her mother has been withdrawing from her. She is concerned about her mother's condition. She states she was not aware of her mother's appointment with Dr. Sonia Kennedy the other day. Ms. Finnegan Shows friend that attended the appointment with her reached out to Talbot Runner. The friend told Talbot Runner what Dr. Sonia Kennedy had mentioned. Talbot Runner states her mother tells her she is doing okay and just needs time but Talbot Runner feels it is more serious. She states she has offered to take the patient to her appointment but Ms. Rivas does not want to be a burden. Talbot Runner states the patient is vague when talking to her. Talbot Runner would like to discuss her mother's condition with Dr. Sonia Kennedy. Stone Berger she is not currently on the patient's PHI or HIPAA form, so we cannot release information regarding her care. Advised Ms. Rivas will need to put Anca Runner on the form at her next appointment or provide verbal consent for us to discuss her care. Advised this nurse will call Ms. Rivas to see if she will provide verbal consent. Stone Berger if she does, this nurse will relay the information above to Dr. Sonia Kennedy and see if he will call her upon his return to the office. Talbot Runner voiced understanding and gratitude for the call. No further questions or concerns. [address] at [facility]  [phone]    07/21/22 2:58 PM - Called and spoke with the patient. Patient's ID verified x 2. Advised patient her daughter, Talbot Runner, is not listed on the [facility] or HIPAA form so we are unable to discuss her care with Talbot Runner. Inquired if she would like to provide verbal consent for us to discuss her care with Talbot Runner.   The patient provided verbal consent for us to discuss her care, treatment, and condition with [place]. She states she was not aware her daughter was not on the form. Thanked the patient for her time. No further questions or concerns. [address] at [facility]  [phone]    08/02/22 2:58 PM - Attempted to call the patient's daughter, Sunita. There was no answer. Left a voicemail for her to call back at her earliest convenience along with the phone number to our office. [address] at [facility]  [phone]    08/02/22 3:43 PM - Returned call to Sunita. Patient's ID verified x 2. Advised Dr. Leonel Zhang would like to speak with her regarding her mother's status. Sunita voiced understanding and gratitude for the call. The call was then transferred to Dr. Leonel Zhang. No further questions or concerns.

## 2022-07-21 NOTE — TELEPHONE ENCOUNTER
Patients daughter left a voicemail stating that she would like a call back to discuss her mothers condition.     # [phone]

## 2022-07-22 ENCOUNTER — HOME CARE VISIT (OUTPATIENT)
Dept: SCHEDULING | Facility: HOME HEALTH | Age: 67
End: 2022-07-22
Payer: MEDICARE

## 2022-07-22 PROCEDURE — G0299 HHS/HOSPICE OF RN EA 15 MIN: HCPCS

## 2022-07-22 PROCEDURE — 400013 HH SOC

## 2022-07-22 PROCEDURE — 400018 HH-NO PAY CLAIM PROCEDURE

## 2022-07-22 PROCEDURE — 3331090002 HH PPS REVENUE DEBIT

## 2022-07-22 PROCEDURE — 3331090001 HH PPS REVENUE CREDIT

## 2022-07-23 PROCEDURE — 3331090002 HH PPS REVENUE DEBIT

## 2022-07-23 PROCEDURE — 3331090001 HH PPS REVENUE CREDIT

## 2022-07-24 PROCEDURE — 3331090002 HH PPS REVENUE DEBIT

## 2022-07-24 PROCEDURE — 3331090001 HH PPS REVENUE CREDIT

## 2022-07-25 PROCEDURE — 3331090002 HH PPS REVENUE DEBIT

## 2022-07-25 PROCEDURE — 3331090001 HH PPS REVENUE CREDIT

## 2022-07-26 ENCOUNTER — HOSPITAL ENCOUNTER (OUTPATIENT)
Dept: INFUSION THERAPY | Age: 67
End: 2022-07-26

## 2022-07-26 ENCOUNTER — TELEPHONE (OUTPATIENT)
Dept: ONCOLOGY | Age: 67
End: 2022-07-26

## 2022-07-26 ENCOUNTER — HOME CARE VISIT (OUTPATIENT)
Dept: SCHEDULING | Facility: HOME HEALTH | Age: 67
End: 2022-07-26
Payer: MEDICARE

## 2022-07-26 PROCEDURE — 3331090002 HH PPS REVENUE DEBIT

## 2022-07-26 PROCEDURE — 3331090001 HH PPS REVENUE CREDIT

## 2022-07-26 NOTE — TELEPHONE ENCOUNTER
[facility] at [address], [address] [place] [id number]  W: [phone]  F: [phone]      Medical Nutrition Therapy  Nutrition Referral:    Referral received from Dr. Cata Fontanez regarding weight loss. Called patient, no answer. Left a message, explaining that RD is available to address nutrition throughout the spectrum of care. Patient with lymphoma. Has decreased intake, appetite loss. Will send [employer] message with strategies for appetite and provided contact information.         Chemotherapy Flowsheet 6/10/2022   Cycle 2 Day 5   Date 6/10/2022   Drug / Regimen Cyclophosphamide   Dosage 1809mg   Time Up 1635   Time Down 1747   Pre Hydration yes , normal saline   Post Hydration -   Pre Meds 0841     Wt Readings from Last 5 Encounters:   07/19/22 189 lb 12.8 oz (86.1 kg)   07/05/22 186 lb 9.6 oz (84.6 kg)   06/14/22 200 lb (90.7 kg)   06/10/22 220 lb 3.8 oz (99.9 kg)   05/31/22 199 lb 11.2 oz (90.6 kg)       Signed By: Eduardo Crowder, [facility]

## 2022-07-27 VITALS
RESPIRATION RATE: 16 BRPM | WEIGHT: 185.5 LBS | HEIGHT: 63 IN | TEMPERATURE: 97.3 F | DIASTOLIC BLOOD PRESSURE: 66 MMHG | SYSTOLIC BLOOD PRESSURE: 122 MMHG | OXYGEN SATURATION: 94 % | BODY MASS INDEX: 32.87 KG/M2 | HEART RATE: 95 BPM

## 2022-07-27 PROCEDURE — 3331090001 HH PPS REVENUE CREDIT

## 2022-07-27 PROCEDURE — 3331090002 HH PPS REVENUE DEBIT

## 2022-07-28 ENCOUNTER — HOME CARE VISIT (OUTPATIENT)
Dept: SCHEDULING | Facility: HOME HEALTH | Age: 67
End: 2022-07-28
Payer: MEDICARE

## 2022-07-28 PROCEDURE — 3331090001 HH PPS REVENUE CREDIT

## 2022-07-28 PROCEDURE — 3331090002 HH PPS REVENUE DEBIT

## 2022-07-29 PROCEDURE — 3331090002 HH PPS REVENUE DEBIT

## 2022-07-29 PROCEDURE — 3331090001 HH PPS REVENUE CREDIT

## 2022-07-30 PROCEDURE — 3331090001 HH PPS REVENUE CREDIT

## 2022-07-30 PROCEDURE — 3331090002 HH PPS REVENUE DEBIT

## 2022-07-31 PROCEDURE — 3331090002 HH PPS REVENUE DEBIT

## 2022-07-31 PROCEDURE — 3331090001 HH PPS REVENUE CREDIT

## 2022-08-01 ENCOUNTER — APPOINTMENT (OUTPATIENT)
Dept: INFUSION THERAPY | Age: 67
End: 2022-08-01

## 2022-08-01 PROCEDURE — 3331090001 HH PPS REVENUE CREDIT

## 2022-08-01 PROCEDURE — 3331090002 HH PPS REVENUE DEBIT

## 2022-08-02 ENCOUNTER — HOME CARE VISIT (OUTPATIENT)
Dept: HOME HEALTH SERVICES | Facility: HOME HEALTH | Age: 67
End: 2022-08-02
Payer: MEDICARE

## 2022-08-02 ENCOUNTER — TELEPHONE (OUTPATIENT)
Dept: ONCOLOGY | Age: 67
End: 2022-08-02

## 2022-08-02 ENCOUNTER — APPOINTMENT (OUTPATIENT)
Dept: INFUSION THERAPY | Age: 67
End: 2022-08-02

## 2022-08-02 PROCEDURE — 3331090001 HH PPS REVENUE CREDIT

## 2022-08-02 PROCEDURE — 3331090002 HH PPS REVENUE DEBIT

## 2022-08-02 NOTE — TELEPHONE ENCOUNTER
[address] at [facility]  [phone]    08/02/22 3:44 PM - Please see most recent encounter.

## 2022-08-03 ENCOUNTER — VIRTUAL VISIT (OUTPATIENT)
Dept: PALLATIVE CARE | Age: 67
End: 2022-08-03
Payer: MEDICARE

## 2022-08-03 ENCOUNTER — TELEPHONE (OUTPATIENT)
Dept: PALLATIVE CARE | Age: 67
End: 2022-08-03

## 2022-08-03 DIAGNOSIS — C83.38 DIFFUSE LARGE B-CELL LYMPHOMA OF LYMPH NODES OF MULTIPLE REGIONS (HCC): ICD-10-CM

## 2022-08-03 DIAGNOSIS — M25.551 RIGHT HIP PAIN: ICD-10-CM

## 2022-08-03 DIAGNOSIS — R63.0 POOR APPETITE: ICD-10-CM

## 2022-08-03 DIAGNOSIS — M79.671 BILATERAL FOOT PAIN: ICD-10-CM

## 2022-08-03 DIAGNOSIS — F41.9 ANXIETY: Primary | ICD-10-CM

## 2022-08-03 DIAGNOSIS — F32.9 MAJOR DEPRESSIVE DISORDER WITH CURRENT ACTIVE EPISODE, UNSPECIFIED DEPRESSION EPISODE SEVERITY, UNSPECIFIED WHETHER RECURRENT: ICD-10-CM

## 2022-08-03 DIAGNOSIS — R11.2 NAUSEA AND VOMITING, UNSPECIFIED VOMITING TYPE: ICD-10-CM

## 2022-08-03 DIAGNOSIS — M79.672 BILATERAL FOOT PAIN: ICD-10-CM

## 2022-08-03 DIAGNOSIS — R53.83 FATIGUE, UNSPECIFIED TYPE: ICD-10-CM

## 2022-08-03 DIAGNOSIS — R53.81 DEBILITY: ICD-10-CM

## 2022-08-03 PROCEDURE — 3331090001 HH PPS REVENUE CREDIT

## 2022-08-03 PROCEDURE — G9899 SCRN MAM PERF RSLTS DOC: HCPCS | Performed by: INTERNAL MEDICINE

## 2022-08-03 PROCEDURE — 99214 OFFICE O/P EST MOD 30 MIN: CPT | Performed by: INTERNAL MEDICINE

## 2022-08-03 PROCEDURE — 1101F PT FALLS ASSESS-DOCD LE1/YR: CPT | Performed by: INTERNAL MEDICINE

## 2022-08-03 PROCEDURE — 1090F PRES/ABSN URINE INCON ASSESS: CPT | Performed by: INTERNAL MEDICINE

## 2022-08-03 PROCEDURE — G9717 DOC PT DX DEP/BP F/U NT REQ: HCPCS | Performed by: INTERNAL MEDICINE

## 2022-08-03 PROCEDURE — G8427 DOCREV CUR MEDS BY ELIG CLIN: HCPCS | Performed by: INTERNAL MEDICINE

## 2022-08-03 PROCEDURE — 3331090002 HH PPS REVENUE DEBIT

## 2022-08-03 PROCEDURE — G8400 PT W/DXA NO RESULTS DOC: HCPCS | Performed by: INTERNAL MEDICINE

## 2022-08-03 PROCEDURE — G8536 NO DOC ELDER MAL SCRN: HCPCS | Performed by: INTERNAL MEDICINE

## 2022-08-03 PROCEDURE — G8417 CALC BMI ABV UP PARAM F/U: HCPCS | Performed by: INTERNAL MEDICINE

## 2022-08-03 PROCEDURE — 3017F COLORECTAL CA SCREEN DOC REV: CPT | Performed by: INTERNAL MEDICINE

## 2022-08-03 RX ORDER — ALBUTEROL SULFATE 90 UG/1
AEROSOL, METERED RESPIRATORY (INHALATION)
Status: ON HOLD | COMMUNITY
Start: 2022-06-20 | End: 2022-08-18

## 2022-08-03 RX ORDER — LORAZEPAM 1 MG/1
1 TABLET ORAL
Qty: 60 TABLET | Refills: 0 | Status: SHIPPED | OUTPATIENT
Start: 2022-08-03 | End: 2022-08-18

## 2022-08-03 RX ORDER — MORPHINE SULFATE 15 MG/1
15 TABLET ORAL
Qty: 60 TABLET | Refills: 0 | Status: ON HOLD | OUTPATIENT
Start: 2022-08-03 | End: 2022-08-22 | Stop reason: SDUPTHER

## 2022-08-03 NOTE — PROGRESS NOTES
Palliative Medicine Outpatient Services  Parul: [phone] ([phone])    Patient Name: Barbara Navarro  YOB: 1955    Date of Current Visit: 08/03/22  Location of Current Visit:    [] [facility] Office  [] [facility] Office  [] [facility] Office  [] Home  [x]Synchronous real-time A/V virtual visit    Date of Initial Visit: 5/17/22   Referral from: Pancho Goodrich MD  Primary Care Physician: Ilsa Mcallister MD      SUMMARY:   Barbara Navarro is a 78y.o. year old with a  history of stage IV diffuse large b-cell lymphoma, who was referred to Palliative Medicine by Dr. Austin Morillo for symptom management and psychosocial support. She was diagnosed in 4/2022. She was hospitalized at [facility] 5/11-5/15/22 for initiation of R-EPOCH. She received one additional cycle, with further treatment now on hold per patient request due to symptom burden. The patients social history includes: she's . She has 2 adult children, her daughter, Harry Fletcher, who lives locally, and a son, Isabelle Stevens, who lives in the home with her. 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 Palliative Medicine is addressing the following current patient/family concerns: symptoms related to lymphoma and treatment; support while undergoing treatment     Initial Referral Intake note reviewed   PALLIATIVE DIAGNOSES:       ICD-10-CM ICD-9-CM    1. Anxiety  F41.9 300.00 LORazepam (ATIVAN) 1 mg tablet      2. Major depressive disorder with current active episode, unspecified depression episode severity, unspecified whether recurrent  F32.9 296.30       3. Right hip pain  M25.551 719.45 morphine IR (MS IR) 15 mg tablet      4. Bilateral foot pain  M79.671 729.5 morphine IR (MS IR) 15 mg tablet    M79.672        5. Poor appetite  R63.0 783.0       6. Nausea and vomiting, unspecified vomiting type  R11.2 787.01       7. Fatigue, unspecified type  R53.83 780.79       8. Debility  R53.81 799.3       9.  Diffuse large B-cell lymphoma of lymph nodes of multiple regions ([facility]  C83.38 202.88 morphine IR (MS IR) 15 mg tablet      LORazepam (ATIVAN) 1 mg tablet               PLAN:     Patient Instructions   Dear Maria Luisa Grigsby ,    It was a pleasure seeing you today via virtual visit. We will see you again in 3 weeks for an office visit. If labs or imaging tests have been ordered for you today, please call the office  at [phone] 48 hours after completion to obtain the results. Your described symptoms were: Fatigue: 8 Drowsiness: 7   Depression: 7 Pain: 8   Anxiety: 7 Nausea: 3   Anorexia: 10 Dyspnea: 0   Best Well-Bein Constipation: Yes   Other Problem (Comment): 0       This is the plan we talked about:    1. Depression and anxiety  -You've lived with depression, anxiety and panic attacks most of your life  -Your recent cancer diagnosis has exacerbated these  -Your goal to feel less on edge all the time  -Continue fluoxetine 40-mg daily  -Continue lorazepam 1-mg: increase to every 6 hours as needed     2. Pain  -Continue extended-release morphine 15-mg every 12 hours  -Continue morphine immediate-release 15-mg every 4 hours as needed    3. Poor appetite  -This continues to be a challenge for you  -Continue Ensure, juices and soft foods as tolerated    4. Fatigue/poor sleep  -Today I increased your lorazepam to every 6 hours as needed so you can continue taking lorazepam at bedtime  -This has been helpful in allowing you to sleep    5. Nausea and vomiting  -Continue ondansetron 8-mg every 8 hours as needed  -Continue prochlorperazine 10-mg every 6 hours as needed    6.  Function  -You're spending most of your time in bed  -You're able to walk to the bathroom and into the kitchen without difficulty  -You haven't had any falls  -You discharged home health after a few visits as you found these services not to be helpful    Lymphoma  -You've received  a total of 2 cycles of chemotherapy with the goal of inducing remission  -You see Dr. Jossie Kingsley again on Friday to discuss whether you're regained enough strength to restart chemo        This is what you have shared with us about Advance Care Planning:      Primary Decision Maker: Yanelis Camarena - Daughter - [phone]    Primary Decision Maker: Oneal Cunningham - [phone]  Advance Care Planning 8/3/2022   Patient's Healthcare Decision Maker is: Legal Next of Kin   Confirm Advance Directive None   Patient Would Like to Complete Advance Directive -   Does the patient have other document types -           The Palliative Medicine Team is here to support you and your family. Sincerely,      Mary Soto MD and the Palliative Medicine Team     GOALS OF CARE / TREATMENT PREFERENCES:   [====Goals of Care====]  GOALS OF CARE:  Patient / health care proxy stated goals: See Patient Instructions / Summary    TREATMENT PREFERENCES:   Code Status:  [x] Attempt Resuscitation       [] Do Not Attempt Resuscitation    Advance Care Planning:  [x] The [employer] [facility] Interdisciplinary Team has updated the ACP Navigator with Decision Maker and Patient Capacity      Primary Decision Maker: Yanelis Carias Daughter - [phone]    Primary Decision Maker: Jennifer Cohen - [phone]  Advance Care Planning 8/3/2022   Patient's Healthcare Decision Maker is: Legal Next of Kin   Confirm Advance Directive None   Patient Would Like to Complete Advance Directive -   Does the patient have other document types -       Other:  (If patient appropriate for POST, consider using PALLPOST smart phrase here)    The palliative care team has discussed with patient / health care proxy about goals of care / treatment preferences for patient.  [====Goals of Care====]     PRESCRIPTIONS GIVEN:     Medications Ordered Today   Medications    morphine IR (MS IR) 15 mg tablet     Sig: Take 1 Tablet by mouth every six (6) hours as needed for Pain for up to 15 days. Max Daily Amount: 60 mg.      Dispense:  60 Tablet     Refill:  0    LORazepam (ATIVAN) 1 mg tablet     Sig: Take 1 Tablet by mouth every six (6) hours as needed for Anxiety for up to 15 days. Max Daily Amount: 4 mg. Dispense:  60 Tablet     Refill:  0             FOLLOW UP:     Future Appointments   Date Time Provider Chandni Raza   8/5/2022  3:30 PM [facility]   8/9/2022 To Be Determined Niall Garcia RN [facility]   8/9/2022 To Be Determined Niall Garcia RN [facility]   8/15/2022 12:30 PM Amparo Novak MD PCS [facility]   8/16/2022  8:30 AM SS INF3 CH1 >4H [facility]   8/16/2022 To Be Determined Sienna Duron [place] [id number] [facility]   8/24/2022 To Be Determined Hieu Lemons RN [facility]   8/30/2022 To Be Determined Sienna Duron LPN [facility]   9/6/2022 To Be Determined Sienna Duron [place] [id number] [facility]   9/13/2022 To Be Determined Sienna Duron [place] [id number] [facility]   9/19/2022 To Be Determined Hieu Lemons RN [address] [facility]           PHYSICIANS INVOLVED IN CARE:   Patient Care Team:  Johan Leary MD as PCP - General (Family Medicine)  Johan Leary MD as PCP [place] [facility] [place] [facility]       HISTORY:   Reviewed patient-completed ESAS and advance care planning form. Reviewed patient record in prescription monitoring program.    CHIEF COMPLAINT:   Chief Complaint   Patient presents with    Anxiety    Depression         HPI/SUBJECTIVE:    The patient is: [x] Verbal / [] Nonverbal     She's not doing well. Her chemo is on hold because it was too strong. She has no appetite. She has no energy. She feels weak       See Plan/Patient Instructions for addition interval history    From  5/17/22:  She was diagnosed with lymphoma last month. She started feeling sick about 2 months ago with body aches, sore throat and fever. She went to an urgent care clinic and tested negative for COVID. She took antibiotics and she didn't get better.   Then she noticed swelling/enlarged lymph nodes in neck and went to see her PCP who referred her to Dr. Alvaro Maldonado. She was hospitalized last week to start chemo and was discharged 2 days ago. She was up all night last night with nausea and vomiting. She finally stopped the vomiting early this morning. She has a long history of depression, anxiety and panic attacks and these are all worse since her diagnosis. She's tried many different types of anti-depressants in the past and has had counseling. She used to take xanax for her panic attacks but not recently. She's been taking lorazepam 0-5-mg every 6 hours and it isn't helping. Getting her cancer diagnosis was a big shock to her and to her family. She has pain in her right hip which she's had for a long time. She also has pain in her feet (see below). Her PCP prescribed gabapentin at bedtime which she stopped after a few days because it wasn't helping with the pain. She had morphine at home which she's been taking 3 to 4 times a day. She's had no appetite since coming home from the hospital but ate well before her chemo. She feels tired all the time. She's sleeping better since Dr. Alvaro Maldonado prescribed Ambien. She's retained fluid since receiving chemo (with steroids). She's taking a fluid pill (furosemide) for this.       Clinical Pain Assessment (nonverbal scale for nonverbal patients):   [++++ Clinical Pain Assessment++++]  [++++Pain Severity++++]: Pain: 8  [++++Pain Character++++]: \"like an electrode sticking in my foot\"  [++++Pain Duration++++]: years  [++++Pain Effect++++]:  [++++Pain Factors++++]: worse when walking  [++++Pain Frequency++++]: constant with varying intensity  [++++Pain Location++++]: bilateral great toes and dorsum of both feet  [++++ Clinical Pain Assessment++++]    [++++ Clinical Pain Assessment++++]  [++++Pain Severity++++]: Pain: 8  [++++Pain Character++++]: \"like something's pressing down on something that it shouldn't\"  [++++Pain Duration++++]: years  [++++Pain Effect++++]:   [++++Pain Factors++++]: worse with walking  [++++Pain Frequency++++]: constant with varying intensity  [++++Pain Location++++]: right hip  [++++ Clinical Pain Assessment++++]     FUNCTIONAL ASSESSMENT:     Palliative Performance Scale (PPS):  PPS: 60       PSYCHOSOCIAL/SPIRITUAL SCREENING:     Any spiritual / Congregational concerns:  [] Yes /  [x] No    Caregiver Burnout:  [] Yes /  [] No /  [x] No Caregiver Present      Anticipatory grief assessment:   [x] Normal  / [] Maladaptive       ESAS Anxiety: Anxiety: 7    ESAS Depression: Depression: 7       REVIEW OF SYSTEMS:     The following systems were [x] reviewed / [] unable to be reviewed  Systems: constitutional, ears/nose/mouth/throat, respiratory, gastrointestinal, genitourinary, musculoskeletal, integumentary, neurologic, psychiatric, endocrine. Positive findings noted below. Modified ESAS Completed by: provider   Fatigue: 8 Drowsiness: 7   Depression: 7 Pain: 8   Anxiety: 7 Nausea: 3   Anorexia: 10 Dyspnea: 0   Best Well-Bein Constipation: Yes   Other Problem (Comment): 0          PHYSICAL EXAM:     Wt Readings from Last 3 Encounters:   22 185 lb 8 oz (84.1 kg)   22 189 lb 12.8 oz (86.1 kg)   22 186 lb 9.6 oz (84.6 kg)     There were no vitals taken for this visit.   Last bowel movement: See Nursing Note    Constitutional    [] Appears well-developed and well-nourished in no apparent distress    [x] Abnormal: lying in bed, appears fatigued  Mental status  [x] Alert and awake  [x] Oriented to person/place/time  [x] Able to follow commands  [] Abnormal:   Eyes  [x] EOM normal   [x] Sclera normal   [x] No visible ocular discharge  [] Abnormal:   HENT  [x] Normocephalic, atraumatic  [x] Mouth/Throat: Moist mucous membranes   [x] External Ears normal  [] Abnormal:  Neck  [x] No visualized mass  [] Abnormal:  Pulmonary/Chest   [x] Respiratory effort normal  [x] No visualized signs of difficulty breathing or respiratory distress  [] Abnormal:  Musculoskeletal  [] Normal gait with no signs of ataxia  [x] Normal range of motion of neck  [] Abnormal:  Neurological:   [x] No facial asymmetry (Cranial nerve 7 motor function)  [x] No gaze palsy  [] Abnormal:   Skin  [x] No significant exanthematous lesions or discoloration noted on facial skin  [] Abnormal:                                  Psychiatric  [x] Normal affect  [x] No hallucinations  [] Abnormal:    Other pertinent observable physical exam findings:    Due to this being a TeleHealth evaluation, many elements of the physical examination are unable to be assessed. HISTORY:     Past Medical History:   Diagnosis Date    Adverse effect of anesthesia     PHLEGM IN THROAT POST OP & NEEDED X2 BREATHING TREATMENTS    Anxiety     COPD (chronic obstructive pulmonary disease) ([facility] 2022    emphysema    Depression     GERD (gastroesophageal reflux disease)     Hernia, femoral     since childhood    Hypertension     Joint pain     Nausea & vomiting     TMJ arthritis       Past Surgical History:   Procedure Laterality Date    HX CERVICAL FUSION  2012    C5-C6    HX CHOLECYSTECTOMY  2020    HX COLONOSCOPY      HX ENDOSCOPY      HX HYSTERECTOMY  2019    HX TONSILLECTOMY      AS A CHILD    HX WISDOM TEETH EXTRACTION      TEENAGER    IR INSERT TUNL CVC W PORT OVER 5 YEARS  4/29/2022      Family History   Problem Relation Age of Onset    Heart Disease Mother 46    Heart Surgery Mother         HEART TRANSPLANT    Elevated Lipids Mother     Hypertension Mother     COPD Father     Emphysema Father     Sudden Death Brother 46    Other Maternal Grandmother         BRAIN TUMOR    No Known Problems Son     No Known Problems Daughter       History reviewed, no pertinent family history.   Social History     Tobacco Use    Smoking status: Some Days     Packs/day: 0.25     Years: 40.00     Pack years: 10.00     Types: Cigarettes    Smokeless tobacco: Never   Substance Use Topics    Alcohol use: Not Currently     Alcohol/week: 0.0 standard drinks     Allergies   Allergen Reactions    Oxycodone Nausea Only      Current Outpatient Medications   Medication Sig    morphine IR (MS IR) 15 mg tablet Take 1 Tablet by mouth every six (6) hours as needed for Pain for up to 15 days. Max Daily Amount: 60 mg. LORazepam (ATIVAN) 1 mg tablet Take 1 Tablet by mouth every six (6) hours as needed for Anxiety for up to 15 days. Max Daily Amount: 4 mg.    ondansetron (ZOFRAN ODT) 8 mg disintegrating tablet Take 1 Tablet by mouth every eight (8) hours as needed for Nausea or Vomiting. prochlorperazine (COMPAZINE) 10 mg tablet Take 1 Tablet by mouth every six (6) hours as needed for Nausea (do not take if groggy; take if nausea despite zofran). morphine ER (MS CONTIN) 15 mg CR tablet Take 1 Tablet by mouth every twelve (12) hours for 30 days. Max Daily Amount: 30 mg.    nystatin (MYCOSTATIN) 100,000 unit/mL suspension Take 5 mL by mouth four (4) times daily. Swish and gargle,  then spit or swallow    docusate sodium (COLACE) 100 mg capsule Take 1 Capsule by mouth two (2) times a day for 90 days. FLUoxetine (PROzac) 40 mg capsule TAKE 1 CAPSULE BY MOUTH EVERY MORNING    amLODIPine-benazepril (LOTREL) 5-20 mg per capsule TAKE 1 CAPSULE BY MOUTH EVERY MORNING    albuterol (PROVENTIL HFA, VENTOLIN HFA, PROAIR HFA) 90 mcg/actuation inhaler     metroNIDAZOLE (FLAGYL) 500 mg tablet Take 1 Tablet by mouth three (3) times daily. Indications: diarrhea from an infection with Clostridium difficile bacteria (Patient not taking: No sig reported)    trimethoprim-sulfamethoxazole (BACTRIM DS, SEPTRA DS) 160-800 mg per tablet Take 1 Tablet by mouth BID Mon Wed & Fri. (Patient not taking: Reported on 8/3/2022)    zolpidem (AMBIEN) 5 mg tablet Take 1 Tablet by mouth nightly as needed for Sleep. Max Daily Amount: 5 mg.  (Patient not taking: No sig reported)    polyethylene glycol (MIRALAX) 17 gram packet Take 1 Packet by mouth daily. (Patient not taking: Reported on 8/3/2022)    gabapentin (NEURONTIN) 300 mg capsule Take 1 Capsule by mouth daily as needed for Pain. (Patient not taking: No sig reported)    diclofenac EC (VOLTAREN) 50 mg EC tablet Take 1 Tablet by mouth two (2) times daily as needed for Pain. (Patient not taking: No sig reported)     No current facility-administered medications for this visit. LAB DATA REVIEWED:     Lab Results   Component Value Date/Time    WBC 9.7 07/19/2022 11:45 AM    HGB 13.2 07/19/2022 11:45 AM    PLATELET 494 83/88/9303 11:45 AM     Lab Results   Component Value Date/Time    Sodium 137 07/19/2022 11:45 AM    Potassium 4.5 07/19/2022 11:45 AM    Chloride 101 07/19/2022 11:45 AM    CO2 28 07/19/2022 11:45 AM    BUN 8 07/19/2022 11:45 AM    Creatinine 0.66 07/19/2022 11:45 AM    Calcium 9.7 07/19/2022 11:45 AM    Phosphorus 4.0 06/06/2022 06:59 AM      Lab Results   Component Value Date/Time    Alk. phosphatase 94 07/19/2022 11:45 AM    Protein, total 6.4 07/19/2022 11:45 AM    Albumin 3.9 07/19/2022 11:45 AM    Globulin 2.5 07/19/2022 11:45 AM     No results found for: INR, PTMR, PTP, PT1, PT2, APTT, INREXT, INREXT   No results found for: IRON, FE, TIBC, IBCT, PSAT, FERR     CT chest/abdomen/pelvis 3/24/22:  1. Large gastrohepatic ligament lymph node measuring 4.2 x 2.5 cm. Multiple  mildly enlarged mesenteric lymph nodes. Borderline to mildly enlarged  retroperitoneal lymph nodes. Lymphoma is the primary differential possibility. These are not accessible by percutaneous biopsy but may be accessible by  endoscopic ultrasound. 2.  Central right renal mass with second posterior right renal mass. Mild  right-sided hydronephrosis. Although these may represent uroepithelial or renal  cell carcinoma is, lymphoma should be considered as well. 3.  Borderline size left inguinal lymph node.  This may be accessible for  percutaneous ultrasound-guided biopsy although is borderline size for pathology  may be reactive. 4.  Ground glass opacity superior segment left lower lobe. Attention on  follow-up imaging. 5.  Ventral hernia containing loops of small bowel without evidence of  Obstruction    PET-CT 5/4/22:  CHEST: No foci of abnormal hypermetabolism. Low-grade activity in mediastinal  and hilar lymph nodes is nonspecific but may be reactive. ABDOMEN/PELVIS: There is increased metabolic activity in a 2.6 cm hepatogastric  ligament lymph node with SUV of 5.7. There is increased metabolic activity in a 1.2 cm right retroperitoneal lymph  node with SUV of 6. There is increased metabolic activity in a left retroperitoneal lymph node with  SUV of 8   There is slight haziness in the mesentery with small lymph nodes with slight  increased metabolic activity of 3.6. There is small focus of increased metabolic activity anterior to the left sacrum  and anterior to the right sacrum with SUV of 7 suspicious for lymph nodes. There is increased metabolic activity in the right renal mass posterior cortex  with SUV of 6.9. There is increased metabolic activity in a left inguinal lymph node with SUV of  8  There is a right abdominal wall hernia     SKELETON: There is increased metabolic activity and left humeral head, right  iliac bone, femurs and proximal right tibia. . There is slight increased  metabolic activity right humeral head. IMPRESSION  There is increased metabolic activity in 2 lymph nodes in the left  neck, in the hepatogastric ligament and retroperitoneal adenopathy, mesenteric  lymph nodes and left inguinal adenopathy suspicious for lymphoma. There is  increased metabolic activity in a mass posterior right renal cortex nonspecific  but may represent lymphoma. There is increased bony activity as described above  suspicious for lymphoma.      CONTROLLED SUBSTANCES SAFETY ASSESSMENT (IF ON CONTROLLED SUBSTANCES):     Reviewed opioid safety handout:  [] Yes   [] No  24 hour opioid dose >150mg morphine equivalent/day:  [] Yes   [] No  Benzodiazepines:  [] Yes   [] No  Sleep apnea:  [] Yes   [] No  Urine Toxicology Testing within last 6 months:  [] Yes   [] No  History of or new aberrant medication taking behaviors:  [] Yes   [] No  Has Narcan been prescribed [] Yes   [] No          Total time:   Counseling / coordination time:   > 50% counseling / coordination?:     Maria Luisa Grigsby , was evaluated through a synchronous (real-time) audio-video encounter. The patient (or guardian if applicable) is aware that this is a billable service, which includes applicable co-pays. This Virtual Visit was conducted with patient's (and/or legal guardian's) consent. The visit was conducted pursuant to the emergency declaration under the [address] [place] [address] authority and the [employer] General Act. Patient identification was verified, and a caregiver was present when appropriate. The patient was located in a state where the provider was licensed to provide care.

## 2022-08-03 NOTE — PATIENT INSTRUCTIONS
Dear Marbella Sandoval ,    It was a pleasure seeing you today via virtual visit. We will see you again in 3 weeks for an office visit. If labs or imaging tests have been ordered for you today, please call the office  at [phone] 48 hours after completion to obtain the results. Your described symptoms were: Fatigue: 8 Drowsiness: 7   Depression: 7 Pain: 8   Anxiety: 7 Nausea: 3   Anorexia: 10 Dyspnea: 0   Best Well-Bein Constipation: Yes   Other Problem (Comment): 0       This is the plan we talked about:    1. Depression and anxiety  -You've lived with depression, anxiety and panic attacks most of your life  -Your recent cancer diagnosis has exacerbated these  -Your goal to feel less on edge all the time  -Continue fluoxetine 40-mg daily  -Continue lorazepam 1-mg: increase to every 6 hours as needed     2. Pain  -Continue extended-release morphine 15-mg every 12 hours  -Continue morphine immediate-release 15-mg every 4 hours as needed    3. Poor appetite  -This continues to be a challenge for you  -Continue Ensure, juices and soft foods as tolerated    4. Fatigue/poor sleep  -Today I increased your lorazepam to every 6 hours as needed so you can continue taking lorazepam at bedtime  -This has been helpful in allowing you to sleep    5. Nausea and vomiting  -Continue ondansetron 8-mg every 8 hours as needed  -Continue prochlorperazine 10-mg every 6 hours as needed    6.  Function  -You're spending most of your time in bed  -You're able to walk to the bathroom and into the kitchen without difficulty  -You haven't had any falls  -You discharged home health after a few visits as you found these services not to be helpful    Lymphoma  -You've received  a total of 2 cycles of chemotherapy with the goal of inducing remission  -You see Dr. Clemencia Fajardo again on Friday to discuss whether you're regained enough strength to restart chemo        This is what you have shared with us about Advance Care Planning: Primary Decision Maker: Ashley Worthington - Daughter - [phone]    Primary Decision Maker: Myles Boy - [phone]  Advance Care Planning 8/3/2022   Patient's Healthcare Decision Maker is: Legal Next of Kin   Confirm Advance Directive None   Patient Would Like to Complete Advance Directive -   Does the patient have other document types -           The Palliative Medicine Team is here to support you and your family.        Sincerely,      Claudette Opal, MD and the Palliative Medicine Team

## 2022-08-03 NOTE — PROGRESS NOTES
Palliative Medicine Office Visit  Palliative Medicine Nurse Check In  [phone]    Patient Name: William Patel  YOB: 1955      Date of Office Visit: 8/3/2022    Patient states: \"  \"    From Check In Sheet (scanned in Media):  Has a medical provider talked with you about cardiopulmonary resuscitation (CPR)? [x] Yes   [] No   [] Unable to obtain    Nurse reminder to complete or update ACP FlowSheet:    Is ACP on the Problem List?    [] Yes    [x] No  IF ACP Document is ON FILE; Nurse to place ACP on Problem List     Is there an ACP Note in Chart Review/Note? [x] Yes    [] No   If NO: ALERT PROVIDER       Primary Decision Maker: Chiquis Nunes - Daughter - [phone]    Primary Decision Maker: Angelique Gibson - Son - [phone]  Advance Care Planning 8/3/2022   Patient's Healthcare Decision Maker is: Legal Next of Kin   Confirm Advance Directive None   Patient Would Like to Complete Advance Directive -   Does the patient have other document types -        Is there anything that we should know about you as a person in order to provide you the best care possible? Have you been to the ER, urgent care clinic since your last visit? [] Yes   [x] No   [] Unable to obtain    Have you been hospitalized since your last visit? [] Yes   [x] No   [] Unable to obtain    Have you seen or consulted any other health care providers outside of the [address] [place] [address] since your last visit? [] Yes   [x] No   [] Unable to obtain    Functional status (describe):         Last BM: 8/2/2022     accessed (date):      Bottle review (for opioid pain medication):  Medication 1:   Date filled:   Directions:   # filled:   # left:   # pills taking per day:  Last dose taken:    Medication 2:   Date filled:   Directions:   # filled:   # left:   # pills taking per day:  Last dose taken:    Medication 3:   Date filled:   Directions:   # filled:   # left:   # pills taking per day:  Last dose taken:     Medication 4:   Date filled:   Directions:   # filled:   # left:   # pills taking per day:  Last dose taken:

## 2022-08-03 NOTE — TELEPHONE ENCOUNTER
[facility] Palliative Medicine Office  Nursing Note  [phone]  Fax [phone]     Name:  Mandy Johnson  YOB: 1955     Patient called outpatient Palliative office today and spoke with Darline Aguilar LPN requesting a Lorazepam 1 mg refill 10 days early. Patient says she has 4 tabs left. Patient indicates that instead of using Lorazepam 1 mg every 8 hrs PRN, patient is using every 5-6 hrs PRN, and even overnight due to difficulty sleeping. Patient acknowledges that she taking over the recommended amount. Dr. Melanie Montalvo requests that this nurse call patient to discuss and to let her know that Dr. Melanie Montalvo would like to do a virtual visit today at 3pm prior to refilling lorazepam.  This nurse called patient. She reports what she told Ms. Russ Alexander this morning. Patient says that she used to take a sleeping pill (per chart, it was Ambien) but she thinks that the lorazepam helps her sleep better than the ambien. She states that she still feels anxious even with the increased lorazepam frequency. This nurse asked about home health. Patient says she told them to stop coming because she didn't think she needs them. Will schedule patient for virtual visit at 3pm today. Patient is agreeable to this.     Alison Love, YAIMAN, RN  Palliative Medicine  [phone]

## 2022-08-04 ENCOUNTER — TELEPHONE (OUTPATIENT)
Dept: PALLATIVE CARE | Age: 67
End: 2022-08-04

## 2022-08-04 PROCEDURE — 3331090002 HH PPS REVENUE DEBIT

## 2022-08-04 PROCEDURE — 3331090001 HH PPS REVENUE CREDIT

## 2022-08-04 NOTE — TELEPHONE ENCOUNTER
Patient notified that both prescriptions were sent into [facility] yesterday.  Advised to follow up with the pharmacy for processing

## 2022-08-05 ENCOUNTER — OFFICE VISIT (OUTPATIENT)
Dept: ONCOLOGY | Age: 67
End: 2022-08-05
Payer: MEDICARE

## 2022-08-05 VITALS
WEIGHT: 178 LBS | RESPIRATION RATE: 18 BRPM | DIASTOLIC BLOOD PRESSURE: 96 MMHG | HEIGHT: 63 IN | OXYGEN SATURATION: 98 % | SYSTOLIC BLOOD PRESSURE: 147 MMHG | HEART RATE: 92 BPM | BODY MASS INDEX: 31.54 KG/M2 | TEMPERATURE: 98 F

## 2022-08-05 DIAGNOSIS — G89.3 CANCER RELATED PAIN: ICD-10-CM

## 2022-08-05 DIAGNOSIS — C83.31 DIFFUSE LARGE B-CELL LYMPHOMA OF LYMPH NODES OF NECK (HCC): Primary | ICD-10-CM

## 2022-08-05 DIAGNOSIS — R63.0 POOR APPETITE: ICD-10-CM

## 2022-08-05 DIAGNOSIS — F41.8 ANXIETY ABOUT HEALTH: ICD-10-CM

## 2022-08-05 PROCEDURE — 3331090002 HH PPS REVENUE DEBIT

## 2022-08-05 PROCEDURE — G8427 DOCREV CUR MEDS BY ELIG CLIN: HCPCS | Performed by: INTERNAL MEDICINE

## 2022-08-05 PROCEDURE — G8536 NO DOC ELDER MAL SCRN: HCPCS | Performed by: INTERNAL MEDICINE

## 2022-08-05 PROCEDURE — 1090F PRES/ABSN URINE INCON ASSESS: CPT | Performed by: INTERNAL MEDICINE

## 2022-08-05 PROCEDURE — 1123F ACP DISCUSS/DSCN MKR DOCD: CPT | Performed by: INTERNAL MEDICINE

## 2022-08-05 PROCEDURE — G8400 PT W/DXA NO RESULTS DOC: HCPCS | Performed by: INTERNAL MEDICINE

## 2022-08-05 PROCEDURE — 1101F PT FALLS ASSESS-DOCD LE1/YR: CPT | Performed by: INTERNAL MEDICINE

## 2022-08-05 PROCEDURE — G8417 CALC BMI ABV UP PARAM F/U: HCPCS | Performed by: INTERNAL MEDICINE

## 2022-08-05 PROCEDURE — 3017F COLORECTAL CA SCREEN DOC REV: CPT | Performed by: INTERNAL MEDICINE

## 2022-08-05 PROCEDURE — 3331090001 HH PPS REVENUE CREDIT

## 2022-08-05 PROCEDURE — 99214 OFFICE O/P EST MOD 30 MIN: CPT | Performed by: INTERNAL MEDICINE

## 2022-08-05 PROCEDURE — G9717 DOC PT DX DEP/BP F/U NT REQ: HCPCS | Performed by: INTERNAL MEDICINE

## 2022-08-05 PROCEDURE — G9899 SCRN MAM PERF RSLTS DOC: HCPCS | Performed by: INTERNAL MEDICINE

## 2022-08-05 NOTE — LETTER
8/8/2022    Patient: Monico Jacques   YOB: 1955   Date of Visit: 8/5/2022     Bertha Silvemran MD  [place] [address]  [facility]    Dear Bertha Silverman MD,      Thank you for referring Ms. Monico Jacques to [address] [place] [address] for evaluation. My notes for this consultation are attached. If you have questions, please do not hesitate to call me. I look forward to following your patient along with you.       Sincerely,    Eh Francisco MD

## 2022-08-05 NOTE — PROGRESS NOTES
1. Have you been to the ER, urgent care clinic since your last visit? Hospitalized since your last visit? No    2. Have you seen or consulted any other health care providers outside of the [address] [place] [address] since your last visit? Include any pap smears or colon screening. No        Chief Complaint   Patient presents with    Follow-up     Follow up for lymphoma.

## 2022-08-05 NOTE — PROGRESS NOTES
Cancer [place] [address]  [address] [facility], Formerly [facility]9 [address] : [phone]  F: [phone] Patient ID  Name: Kayla Perdue  YOB: 1955  MRN: [id number]  Referring Provider:   No referring provider defined for this encounter. 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 This summary highlights focused aspects of patient's ongoing care and may have a recurring section in notes with either updates or remain unchanged as a longitudinal care summary. -------------------------------------------------------------------------------------------------------------------------------------------------------------------------------------------------------  DIAGNOSIS:  Diffuse Large B-Cell Lymphoma     ORIGINAL STAGING:  Stage IV     SITES OF DISEASE:  Left Cervical Lymph Node,Bone Disease, Stage IV    CURRENT TREATMENT:  C1,D1 on 5/11/22 DA-R-EPOCH     PRIOR TREATMENT:  n/a     GOALS OF CARE:  Curative Intent     PATHOLOGY:  Specimen #:[id number] Collect: 4/22/2022      ==========================================================================                    * * *SURGICAL PATHOLOGY REPORT* * *   ==========================================================================   * * *PROCEDURES/ADDENDA* * *   ADDENDUM   Date Ordered: 4/26/2022     Status: Signed Out   Date Complete: 4/26/2022     By: Concha Lara.  Dylan Vazquez MD   Date Reported: 4/26/2022   Addendum Diagnosis   Lymph node, left posterior cervical lymph node, excision:   In situ hybridization for Jesús-Barr viral RNA: Negative   CPT: 34875  Addendum Comment   * * *CLINICAL HISTORY* * *   Cervical lymphadenopathy, rule out lymphoma   ========================================================================== * * *FINAL PATHOLOGIC DIAGNOSIS* * *        Lymph node, left posterior cervical lymph node, excision:        Large B cell lymphoma. See comment. * * *Comment* * *   The histologic section has fragments of lymphoid tissue with diffuse and   focal follicular architecture. Diffuse areas are comprised of large,   atypical lymphocytes with centroblastic morphology and increased mitotic   activity. Immunohistochemical stains confirm the malignant cells are CD20   positive B cells that coexpress CD10 and BCL6 without MUM1. CD23   highlights rare follicular dendritic networks associated with lymphoid   follicles with germinal centers and express CD10 and BCL6 without BCL2. CyclinD1 and CD56 stains are negative. Concurrent flow cytometric analysis   confirms a clonal CD10 positive B-cell population comprised of medium to   large cells. The Ki-67 proliferation index is 30%. The overall findings favor the diagnosis of diffuse large B-cell lymphoma,   germinal center subtype. Molecular genetic studies are pending for further   characterization will be reported in an addendum. Flow cytometry:   Consistent with CD10-positive B-cell lymphoproliferative disorder. Comments: Flow cytometry shows monoclonal B-cells (36% of total cells)   with CD10 expression without CD5, consistent with a CD10-positive B-cell   lymphoproliferative disorder. The main differential diagnosis includes   follicular lymphoma, Burkitt lymphoma and large B-cell lymphoma. Please   correlate the result with morphologic findings and clinical information. Flow Differential (%) and Population Analysis:   Lymphocytes: 93.7%   T-cells (39% of lymphoid cells) show a CD4/CD8 ratio of 3.3 without overt   phenotypic abnormality. NK-cells (1% of lymphoid cells) are unremarkable.    Mature B-cells (56% of lymphoid cells) include large forms based on   forward scattered pattern and show: CD5 neg, CD10+, CD11c+dim, CD19+,   CD20+ with surface lambda light chain restriction. Markers Performed: CD2, CD3, CD4, CD5, CD7, CD8, CD10, CD11c, CD19, CD20,   CD23, CD34, CD38, CD45, CD56, Kappa, Lambda (17 Markers)   The Technical Component Processing of this test was completed at   [facility], [address] [place] [address], [facility], [facility] / [id number] /   [phone] / Main [facility] # [id number]. Interpretation by Yola Lara M.D. The   complete scanned report is available in Epic. CPT: F1752586, T8141411, [id number], [id number], [id number], [id number]   jjw2/2022   *Electronically Signed Out By Vi Mixon. Syd Kraft MD*   ==========================================================================   * * *Gross Description* * *   Specimen #1, received fresh labeled with the patient's name,  and left   posterior cervical lymph node, consists of two paper towels containing a   1.7 x 1.5 x 0.5 cm aggregate of pale pink-tan, fleshy soft tissue   fragments. The specimen is handled according to the flow cytometry   protocol as follows: 7          Two air-dried touch prep slides - one Diff Quik stained, one   rapid-fixed in 95% alcohol   7          Representative sections in B plus fixative (1A)   7          One piece held in RPMI for possible flow cytometry analysis   7          Remaining tissue fixed in formalin for permanents (1B)   Dr. Syd Kraft will determine if flow cytometry is necessary. 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 F who presents for a follow-up evaluation for triple hit lymphoma. She is s/p 2 cycles of DA-R-EPOCH. She is here today with daughter to discuss plans to restart her therapy. She reports wax/wane of intake; was able to eat roast beef the other day. She states that she \"needs a few more weeks\" to get better.      Past Medical History:   Diagnosis Date    Adverse effect of anesthesia     PHLEGM IN THROAT POST OP & NEEDED X2 BREATHING TREATMENTS    Anxiety     COPD (chronic obstructive pulmonary disease) ([facility].) 2022    emphysema    Depression     GERD (gastroesophageal reflux disease)     Hernia, femoral     since childhood    Hypertension     Joint pain     Nausea & vomiting     TMJ arthritis        Current Outpatient Medications:     morphine IR (MS IR) 15 mg tablet, Take 1 Tablet by mouth every six (6) hours as needed for Pain for up to 15 days. Max Daily Amount: 60 mg., Disp: 60 Tablet, Rfl: 0    ondansetron (ZOFRAN ODT) 8 mg disintegrating tablet, Take 1 Tablet by mouth every eight (8) hours as needed for Nausea or Vomiting., Disp: 50 Tablet, Rfl: 1    prochlorperazine (COMPAZINE) 10 mg tablet, Take 1 Tablet by mouth every six (6) hours as needed for Nausea (do not take if groggy; take if nausea despite zofran). , Disp: 50 Tablet, Rfl: 1    morphine ER (MS CONTIN) 15 mg CR tablet, Take 1 Tablet by mouth every twelve (12) hours for 30 days. Max Daily Amount: 30 mg., Disp: 60 Tablet, Rfl: 0    nystatin (MYCOSTATIN) 100,000 unit/mL suspension, Take 5 mL by mouth four (4) times daily. Swish and gargle,  then spit or swallow, Disp: 200 mL, Rfl: 0    docusate sodium (COLACE) 100 mg capsule, Take 1 Capsule by mouth two (2) times a day for 90 days. , Disp: 60 Capsule, Rfl: 2    diclofenac EC (VOLTAREN) 50 mg EC tablet, Take 1 Tablet by mouth two (2) times daily as needed for Pain., Disp: 60 Tablet, Rfl: 0    FLUoxetine (PROzac) 40 mg capsule, TAKE 1 CAPSULE BY MOUTH EVERY MORNING, Disp: 90 Capsule, Rfl: 1    amLODIPine-benazepril (LOTREL) 5-20 mg per capsule, TAKE 1 CAPSULE BY MOUTH EVERY MORNING, Disp: 90 Capsule, Rfl: 1    albuterol (PROVENTIL HFA, VENTOLIN HFA, PROAIR HFA) 90 mcg/actuation inhaler, , Disp: , Rfl:     LORazepam (ATIVAN) 1 mg tablet, Take 1 Tablet by mouth every six (6) hours as needed for Anxiety for up to 15 days. Max Daily Amount: 4 mg.  (Patient not taking: Reported on 8/5/2022), Disp: 60 Tablet, Rfl: 0    metroNIDAZOLE (FLAGYL) 500 mg tablet, Take 1 Tablet by mouth three (3) times daily. Indications: diarrhea from an infection with Clostridium difficile bacteria (Patient not taking: No sig reported), Disp: 42 Tablet, Rfl: 0    trimethoprim-sulfamethoxazole (BACTRIM DS, SEPTRA DS) 160-800 mg per tablet, Take 1 Tablet by mouth BID Mon Wed & Fri. (Patient not taking: No sig reported), Disp: 42 Tablet, Rfl: 1    zolpidem (AMBIEN) 5 mg tablet, Take 1 Tablet by mouth nightly as needed for Sleep. Max Daily Amount: 5 mg. (Patient not taking: No sig reported), Disp: 30 Tablet, Rfl: 0    polyethylene glycol (MIRALAX) 17 gram packet, Take 1 Packet by mouth daily. (Patient not taking: No sig reported), Disp: 90 Each, Rfl: 1    gabapentin (NEURONTIN) 300 mg capsule, Take 1 Capsule by mouth daily as needed for Pain. (Patient not taking: No sig reported), Disp: 20 Capsule, Rfl: 0    Allergies   Allergen Reactions    Oxycodone Nausea Only     Review of Systems Provided by:  Patient  Review of Systems: A complete review of systems was obtained, reviewed. Pertinent findings reviewed above. Objective:      Visit Vitals  BP (!) 147/96   Pulse 92   Temp 98 °F (36.7 °C) (Oral)   Resp 18   Ht 5' 3\" (1.6 m)   Wt 178 lb (80.7 kg)   SpO2 98%   BMI 31.53 kg/m²     ECOG: 3- Capable of only limited selfcare; confined to bed or chair more than 50% of waking hours. Physical Exam  Constitutional: No acute distress. and Non-toxic appearance. HENT: Normocephalic and atraumatic head. and wearing head covering. Eyes: Normal Conjunctivae. Anicteric sclerae. Cardiovascular: S1,S2 auscultated. No pitting edema. Pulmonary: Normal Respiratory Effort. No wheezing. Very minor rhonchi. No rales. Abdominal: Normal bowel sounds. Soft Abdomen to palpation. No abdominal tenderness. Skin: No jaundice. No rash. Musculoskeletal: No muscle pain on palpation. No temporal muscle wasting on inspection. No overt scalp lesion recurrence.   Lymph: No cervical, supraclavicular,axillary, or inguinal lymph node enlargement or tenderness. No cervical, supraclavicular,or axillary lymph node enlargement or tenderness. Neurological: Alert and oriented. No tremor on inspection. Psychiatric: mood normal. normal speech rate normal affect        Results:      I personally reviewed Epic EHR labs/results below:   Lab Results   Component Value Date/Time    WBC 9.7 2022 11:45 AM    HGB 13.2 2022 11:45 AM    HCT 41.7 2022 11:45 AM    PLATELET 695  11:45 AM    .5 (H) 2022 11:45 AM     Lab Results   Component Value Date/Time    Sodium 137 2022 11:45 AM    Potassium 4.5 2022 11:45 AM    Chloride 101 2022 11:45 AM    CO2 28 2022 11:45 AM    Anion gap 8 2022 11:45 AM    Glucose 106 (H) 2022 11:45 AM    BUN 8 2022 11:45 AM    Creatinine 0.66 2022 11:45 AM    BUN/Creatinine ratio 12 2022 11:45 AM    GFR est AA >60 2022 11:45 AM    GFR est non-AA >60 2022 11:45 AM    Calcium 9.7 2022 11:45 AM    Bilirubin, total 0.4 2022 11:45 AM    Alk. phosphatase 94 2022 11:45 AM    Protein, total 6.4 2022 11:45 AM    Albumin 3.9 2022 11:45 AM    Globulin 2.5 2022 11:45 AM    A-G Ratio 1.6 2022 11:45 AM    ALT (SGPT) 28 2022 11:45 AM    AST (SGOT) 16 2022 11:45 AM     IR INSERT TUNL CVC W PORT OVER 5 YEARS    Result Date: 2022  PATIENT NAME: Armond Wen                                            AGE, : 78 years, 1955 MRN: [id number] DATE: 2022 11:50 AM PROCEDURE(S): Single lumen Portacath placement SUPERVISING PHYSICIAN: Leah Montana MD OPERATING PROVIDER: Rose Chavis PA-C CLINICAL HISTORY: Mediport placement requested. B-cell lymphoma Patient was evaluated and felt to be an appropriate candidate for conscious sedation.  Moderate intravenous conscious sedation was supervised by Leah Montana MD. The patient was independently monitored by a registered nurse assigned to the Department of Radiology using automated blood pressure, EKG, and pulse oximetry. The detail conscious sedation record is stored in the hospital information system. MEDICATION: Antibiotic: Ancef 2g Versed: 6 mg Fentanyl: 100 mcg Intraprocedure time: 40 minutes FINDINGS: After informed consent was obtained, and the risks and benefits of the procedure including infection and bleeding were discussed, the patient was brought to the angiographic suite and placed on the angiographic table in a supine position. The right neck and chest were prepped and draped in maximum sterile barrier technique which includes: cap and mask, sterile gown, sterile gloves, and sterile body drape. The ultrasound transducer was prepped using sterile ultrasound technique which includes: sterile gel and probe cover. After adequate conscious sedation was achieved utilizing Fentanyl and Versed, the skin over the internal jugular vein was anesthetized with 1% lidocaine. Sonographic images of the right neck demonstrated a patent and compressible right internal jugular vein. The vein was accessed under direct sonographic guidance using a 21 gauge micropuncture set. An ultrasonographic image was saved in the record. A 0.035 J wire was advanced through the sheath into the inferior vena cava under fluoroscopic guidance. A peel-away sheath was advanced over the wire into the superior vena cava under fluoroscopic guidance. Attention was directed to the right chest, and after adequate local anesthesia, a horizontal incision was made. A subcutaneous pocket was then formed using a combination of sharp and blunt dissection. A tunnel was then formed between the pocket and the venotomy site and the catheter passed through the tunnel and through the peel-away sheath. The peel-away sheath was removed and the catheter tip was positioned at the right atrium.   The catheter was cut at the pocket access point and the portacath was attached to the catheter and placed snugly in the pocket. The pocket was closed primarily with 2-0 Vicryl suture and Dermabond. The neck access site was closed with 2-0 Vicryl suture and Dermabond. The port was accessed and flushed with heparin solution. The patient tolerated the procedure well. Fluoroscopy demonstrates the catheter tip to be within the right atrium. RADIATION: Fluoroscopy time: 0.3 minutes Air kerma: 5.1 mGy     Technically successful placement of a single lumen port with the catheter tip in the right atrium. Catheter is ready for immediate use. CT NECK SOFT TISSUE W CONT    Result Date: 6/28/2022  INDICATION: Diffuse large b-cell lymphoma, lymph nodes of head, face, and neck EXAM:  CT NECK, CHEST, ABDOMEN, PELVIS WITH CONTRAST COMPARISON: PET scan May 4, 2022 TECHNIQUE:  CT imaging of the neck, chest, abdomen and pelvis was performed after the uneventful intravenous administration of IV contrast. Coronal and sagittal reconstructions were generated. CT dose reduction was achieved through use of a standardized protocol tailored for this examination and automatic exposure control for dose modulation. FINDINGS: NASOPHARYNX: Normal. SUPRAHYOID NECK: Normal oropharynx, oral cavity, parapharyngeal space, and retropharyngeal space. INFRAHYOID NECK: Normal larynx, hypopharynx and supraglottis. PAROTID GLANDS: Normal. SUBMANDIBULAR GLANDS: Absent left submandibular gland. THYROID GLAND: Normal. LYMPH NODES: Interval decrease in size of previous enlarged left level 2 lymph nodes, the more anterior of which decreased from 1.2 x 1.1 cm to 0.7 x 0.6 cm, and the posterior node decreased from 1.8 x 1.2 cm to 1.1 x 0.7 cm. Scattered subcentimeter lymph nodes throughout the neck. No new or enlarging nodes. OSSEOUS STRUCTURES: No destructive bone lesion. Prior ACDF C5-7. ADDITIONAL COMMENTS: N/A. MEDIASTINUM/ELIZABETH: Scattered nonenlarged lymph nodes. Small hiatal hernia. HEART/PERICARDIUM: Unremarkable. LUNGS/PLEURA: No suspicious nodule.  No pulmonary edema or pleural effusion. Tree-in-bud opacities in the lateral right lower lobe, and mild ill-defined nodular groundglass opacities right apex. BONES: No destructive bone lesion. ADDITIONAL COMMENTS: Unchanged round 9 mm density lateral right breast LIVER: No mass or biliary dilatation. GALLBLADDER: Surgically absent. SPLEEN: No enlargement or lesion. PANCREAS: No mass or ductal dilatation. ADRENALS: No mass. KIDNEYS: No mass, calculus, or hydronephrosis. GI TRACT: No bowel obstruction or wall thickening PERITONEUM: No free air or free fluid. APPENDIX: Not clearly visualized. RETROPERITONEUM: Interval decrease in size of previously enlarged lymph nodes: Gastrohepatic ligament lymph node decreased from 4.2 x 1.2 cm to 2.2 x 0.9 cm; aortocaval lymph node at the level of the right kidney, previously 1.2 cm, now 0.6 cm. No new or enlarging lymph nodes. ADDITIONAL COMMENTS: Right paraumbilical ventral hernia containing mesenteric fat and bowel loops similar to prior study. URINARY BLADDER: No mass or calculus. LYMPH NODES: Decreased size of left inguinal lymph nodes from 1.6 cm to 0.8 cm. REPRODUCTIVE ORGANS: Prior hysterectomy. FREE FLUID: None. BONES: No destructive bone lesion. ADDITIONAL COMMENTS: N/A.     1. Interval decrease in size of previously enlarged lymph nodes in the neck, abdomen, and pelvis. No new or enlarging lymph nodes. 2. New tree-in-bud opacities and groundglass opacities in the right lower and upper lobes may be infectious/inflammatory. May be assessed on follow-up examinations. 23X     CT CHEST ABD PELV W CONT    Result Date: 6/28/2022  INDICATION: Diffuse large b-cell lymphoma, lymph nodes of head, face, and neck EXAM:  CT NECK, CHEST, ABDOMEN, PELVIS WITH CONTRAST COMPARISON: PET scan May 4, 2022 TECHNIQUE:  CT imaging of the neck, chest, abdomen and pelvis was performed after the uneventful intravenous administration of IV contrast. Coronal and sagittal reconstructions were generated. CT dose reduction was achieved through use of a standardized protocol tailored for this examination and automatic exposure control for dose modulation. FINDINGS: NASOPHARYNX: Normal. SUPRAHYOID NECK: Normal oropharynx, oral cavity, parapharyngeal space, and retropharyngeal space. INFRAHYOID NECK: Normal larynx, hypopharynx and supraglottis. PAROTID GLANDS: Normal. SUBMANDIBULAR GLANDS: Absent left submandibular gland. THYROID GLAND: Normal. LYMPH NODES: Interval decrease in size of previous enlarged left level 2 lymph nodes, the more anterior of which decreased from 1.2 x 1.1 cm to 0.7 x 0.6 cm, and the posterior node decreased from 1.8 x 1.2 cm to 1.1 x 0.7 cm. Scattered subcentimeter lymph nodes throughout the neck. No new or enlarging nodes. OSSEOUS STRUCTURES: No destructive bone lesion. Prior ACDF C5-7. ADDITIONAL COMMENTS: N/A. MEDIASTINUM/ELIZABETH: Scattered nonenlarged lymph nodes. Small hiatal hernia. HEART/PERICARDIUM: Unremarkable. LUNGS/PLEURA: No suspicious nodule. No pulmonary edema or pleural effusion. Tree-in-bud opacities in the lateral right lower lobe, and mild ill-defined nodular groundglass opacities right apex. BONES: No destructive bone lesion. ADDITIONAL COMMENTS: Unchanged round 9 mm density lateral right breast LIVER: No mass or biliary dilatation. GALLBLADDER: Surgically absent. SPLEEN: No enlargement or lesion. PANCREAS: No mass or ductal dilatation. ADRENALS: No mass. KIDNEYS: No mass, calculus, or hydronephrosis. GI TRACT: No bowel obstruction or wall thickening PERITONEUM: No free air or free fluid. APPENDIX: Not clearly visualized. RETROPERITONEUM: Interval decrease in size of previously enlarged lymph nodes: Gastrohepatic ligament lymph node decreased from 4.2 x 1.2 cm to 2.2 x 0.9 cm; aortocaval lymph node at the level of the right kidney, previously 1.2 cm, now 0.6 cm. No new or enlarging lymph nodes.  ADDITIONAL COMMENTS: Right paraumbilical ventral hernia containing mesenteric fat and bowel loops similar to prior study. URINARY BLADDER: No mass or calculus. LYMPH NODES: Decreased size of left inguinal lymph nodes from 1.6 cm to 0.8 cm. REPRODUCTIVE ORGANS: Prior hysterectomy. FREE FLUID: None. BONES: No destructive bone lesion. ADDITIONAL COMMENTS: N/A.     1. Interval decrease in size of previously enlarged lymph nodes in the neck, abdomen, and pelvis. No new or enlarging lymph nodes. 2. New tree-in-bud opacities and groundglass opacities in the right lower and upper lobes may be infectious/inflammatory. May be assessed on follow-up examinations. 23X     PET/CT TUMOR IMAGE [address] [facility] BDY W (INI)    Addendum Date: 5/5/2022    Addendum: There is increased metabolic activity in a small left frontal scalp lesion with SUV of 6.9. Result Date: 5/5/2022  PET/CT SCAN PROCEDURE: Following IV injection of 10.9 mCi 18 Fluoro 2 deoxyglucose (FDG) and a standard uptake delay, PET imaging is performed skull vertex to toes and axial, sagittal and coronal images were acquired. Unenhanced CT is obtained for anatomic localization, and attenuation correction of the PET scan. Patient preprocedure blood glucose level: 98 mg/dL. CORRELATIVE IMAGING STUDIES: None. COMPARISON PET: HISTORY: The study is requested for initial staging. Lymphoma. FINDINGS: HEAD/NECK: There is increased metabolic activity 1.2 cm left level 2 lymph node. There is increased metabolic activity in a 1.2 cm left level 5 lymph node with SUV of 7.7. CHEST: No foci of abnormal hypermetabolism. Low-grade activity in mediastinal and hilar lymph nodes is nonspecific but may be reactive. ABDOMEN/PELVIS: There is increased metabolic activity in a 2.6 cm hepatogastric ligament lymph node with SUV of 5.7. There is increased metabolic activity in a 1.2 cm right retroperitoneal lymph node with SUV of 6.  There is increased metabolic activity in a left retroperitoneal lymph node with SUV of 8 There is slight haziness in the mesentery with small lymph nodes with slight increased metabolic activity of 3.6. There is small focus of increased metabolic activity anterior to the left sacrum and anterior to the right sacrum with SUV of 7 suspicious for lymph nodes. There is increased metabolic activity in the right renal mass posterior cortex with SUV of 6.9. There is increased metabolic activity in a left inguinal lymph node with SUV of 8 There is a right abdominal wall hernia SKELETON: There is increased metabolic activity and left humeral head, right iliac bone, femurs and proximal right tibia. . There is slight increased metabolic activity right humeral head. There is increased metabolic activity in 2 lymph nodes in the left neck, in the hepatogastric ligament and retroperitoneal adenopathy, mesenteric lymph nodes and left inguinal adenopathy suspicious for lymphoma. There is increased metabolic activity in a mass posterior right renal cortex nonspecific but may represent lymphoma. There is increased bony activity as described above suspicious for lymphoma. . 23X        Assessment and Recommendations:      1. Diffuse large B-cell lymphoma of lymph nodes of neck (HCC)  Restart Cycle 3. She understands the risks  of procrastinating therapy. She understands that if she doesn't restart therapy then the risk of relapse likely increases. 2. Poor appetite  She is more agreeable to see GI. However, her appetite is slightly improving. 3. Cancer related pain  Overall controlled. 4. Anxiety about health  -she admits some anxiety about restarting chemotherapy and admits some avoidance. Ultimately, she would like to proceed with restarting and we have agreed on August 15th for Cycle #3. Follow-up and Dispositions    Return in about 17 days (around 8/22/2022).             Catrachita Jack MD  Hematology/Medical Oncology Provider  Man[facility] Hodges  P: [phone]     Signed By:   Breonna Reza MD

## 2022-08-06 PROCEDURE — 3331090002 HH PPS REVENUE DEBIT

## 2022-08-06 PROCEDURE — 3331090001 HH PPS REVENUE CREDIT

## 2022-08-07 PROCEDURE — 3331090001 HH PPS REVENUE CREDIT

## 2022-08-07 PROCEDURE — 3331090002 HH PPS REVENUE DEBIT

## 2022-08-08 PROBLEM — F41.8 ANXIETY ABOUT HEALTH: Status: ACTIVE | Noted: 2022-08-08

## 2022-08-08 PROCEDURE — 3331090002 HH PPS REVENUE DEBIT

## 2022-08-08 PROCEDURE — 3331090001 HH PPS REVENUE CREDIT

## 2022-08-09 PROCEDURE — 3331090002 HH PPS REVENUE DEBIT

## 2022-08-09 PROCEDURE — 3331090001 HH PPS REVENUE CREDIT

## 2022-08-10 ENCOUNTER — HOME CARE VISIT (OUTPATIENT)
Dept: SCHEDULING | Facility: HOME HEALTH | Age: 67
End: 2022-08-10
Payer: MEDICARE

## 2022-08-10 PROCEDURE — 3331090001 HH PPS REVENUE CREDIT

## 2022-08-10 PROCEDURE — 3331090002 HH PPS REVENUE DEBIT

## 2022-08-11 PROCEDURE — 3331090001 HH PPS REVENUE CREDIT

## 2022-08-11 PROCEDURE — 3331090002 HH PPS REVENUE DEBIT

## 2022-08-11 NOTE — CASE COMMUNICATION
Pt had no call, no show on 8/10/22 for Home Health nursing.  Multiple messages left to return my call with no call back

## 2022-08-12 ENCOUNTER — TELEPHONE (OUTPATIENT)
Dept: PALLATIVE CARE | Age: 67
End: 2022-08-12

## 2022-08-12 PROCEDURE — 3331090002 HH PPS REVENUE DEBIT

## 2022-08-12 PROCEDURE — 3331090001 HH PPS REVENUE CREDIT

## 2022-08-12 NOTE — TELEPHONE ENCOUNTER
Called patient to advise/confirm upcoming appt with Dr. Mindy Jackson on 8/15/22 at [facility]. No answer, left voicemail requesting call back to confirm appointment.      *Also advised to please bring in your 's License and Insurance Card and any pain medications in the original container with you to appointment.

## 2022-08-13 PROCEDURE — 3331090002 HH PPS REVENUE DEBIT

## 2022-08-13 PROCEDURE — 3331090001 HH PPS REVENUE CREDIT

## 2022-08-14 PROCEDURE — 3331090002 HH PPS REVENUE DEBIT

## 2022-08-14 PROCEDURE — 3331090001 HH PPS REVENUE CREDIT

## 2022-08-15 ENCOUNTER — APPOINTMENT (OUTPATIENT)
Dept: INFUSION THERAPY | Age: 67
End: 2022-08-15

## 2022-08-15 PROCEDURE — 3331090002 HH PPS REVENUE DEBIT

## 2022-08-15 PROCEDURE — 3331090001 HH PPS REVENUE CREDIT

## 2022-08-16 ENCOUNTER — HOSPITAL ENCOUNTER (OUTPATIENT)
Dept: INFUSION THERAPY | Age: 67
End: 2022-08-16
Attending: INTERNAL MEDICINE | Admitting: INTERNAL MEDICINE

## 2022-08-16 PROCEDURE — 3331090001 HH PPS REVENUE CREDIT

## 2022-08-16 PROCEDURE — 3331090002 HH PPS REVENUE DEBIT

## 2022-08-16 RX ORDER — PREDNISONE 1 MG/1
60 TABLET ORAL 2 TIMES DAILY WITH MEALS
Status: CANCELLED
Start: 2022-08-18

## 2022-08-17 ENCOUNTER — HOSPITAL ENCOUNTER (OUTPATIENT)
Dept: INFUSION THERAPY | Age: 67
Discharge: HOME | End: 2022-08-17
Payer: MEDICARE

## 2022-08-17 VITALS
TEMPERATURE: 97.4 F | SYSTOLIC BLOOD PRESSURE: 129 MMHG | DIASTOLIC BLOOD PRESSURE: 71 MMHG | HEART RATE: 90 BPM | WEIGHT: 175.2 LBS | BODY MASS INDEX: 31.04 KG/M2 | RESPIRATION RATE: 16 BRPM

## 2022-08-17 DIAGNOSIS — R11.2 CHEMOTHERAPY INDUCED NAUSEA AND VOMITING: Primary | ICD-10-CM

## 2022-08-17 DIAGNOSIS — R19.7 DIARRHEA OF PRESUMED INFECTIOUS ORIGIN: ICD-10-CM

## 2022-08-17 DIAGNOSIS — C83.31 DIFFUSE LARGE B-CELL LYMPHOMA OF LYMPH NODES OF NECK (HCC): ICD-10-CM

## 2022-08-17 DIAGNOSIS — T45.1X5A CHEMOTHERAPY INDUCED NAUSEA AND VOMITING: Primary | ICD-10-CM

## 2022-08-17 LAB
ALBUMIN SERPL-MCNC: 3.9 G/DL (ref 3.5–5)
ALBUMIN/GLOB SERPL: 1.3 {RATIO} (ref 1.1–2.2)
ALP SERPL-CCNC: 104 U/L (ref 45–117)
ALT SERPL-CCNC: 51 U/L (ref 12–78)
ANION GAP SERPL CALC-SCNC: 9 MMOL/L (ref 5–15)
AST SERPL-CCNC: 22 U/L (ref 15–37)
BASOPHILS # BLD: 0 K/UL (ref 0–0.1)
BASOPHILS NFR BLD: 0 % (ref 0–1)
BILIRUB SERPL-MCNC: 0.5 MG/DL (ref 0.2–1)
BUN SERPL-MCNC: 7 MG/DL (ref 6–20)
BUN/CREAT SERPL: 11 (ref 12–20)
CALCIUM SERPL-MCNC: 9.3 MG/DL (ref 8.5–10.1)
CHLORIDE SERPL-SCNC: 106 MMOL/L (ref 97–108)
CO2 SERPL-SCNC: 23 MMOL/L (ref 21–32)
CREAT SERPL-MCNC: 0.62 MG/DL (ref 0.55–1.02)
DIFFERENTIAL METHOD BLD: ABNORMAL
EOSINOPHIL # BLD: 0 K/UL (ref 0–0.4)
EOSINOPHIL NFR BLD: 0 % (ref 0–7)
ERYTHROCYTE [DISTWIDTH] IN BLOOD BY AUTOMATED COUNT: 13.6 % (ref 11.5–14.5)
GLOBULIN SER CALC-MCNC: 3.1 G/DL (ref 2–4)
GLUCOSE SERPL-MCNC: 122 MG/DL (ref 65–100)
HCT VFR BLD AUTO: 44.4 % (ref 35–47)
HGB BLD-MCNC: 14.6 G/DL (ref 11.5–16)
IMM GRANULOCYTES # BLD AUTO: 0 K/UL (ref 0–0.04)
IMM GRANULOCYTES NFR BLD AUTO: 0 % (ref 0–0.5)
LYMPHOCYTES # BLD: 0.5 K/UL (ref 0.8–3.5)
LYMPHOCYTES NFR BLD: 8 % (ref 12–49)
MCH RBC QN AUTO: 32.2 PG (ref 26–34)
MCHC RBC AUTO-ENTMCNC: 32.9 G/DL (ref 30–36.5)
MCV RBC AUTO: 97.8 FL (ref 80–99)
MONOCYTES # BLD: 0.4 K/UL (ref 0–1)
MONOCYTES NFR BLD: 6 % (ref 5–13)
NEUTS SEG # BLD: 5.8 K/UL (ref 1.8–8)
NEUTS SEG NFR BLD: 86 % (ref 32–75)
NRBC # BLD: 0 K/UL (ref 0–0.01)
NRBC BLD-RTO: 0 PER 100 WBC
PLATELET # BLD AUTO: 318 K/UL (ref 150–400)
PMV BLD AUTO: 10 FL (ref 8.9–12.9)
POTASSIUM SERPL-SCNC: 3.8 MMOL/L (ref 3.5–5.1)
PROT SERPL-MCNC: 7 G/DL (ref 6.4–8.2)
RBC # BLD AUTO: 4.54 M/UL (ref 3.8–5.2)
RBC MORPH BLD: ABNORMAL
SODIUM SERPL-SCNC: 138 MMOL/L (ref 136–145)
WBC # BLD AUTO: 6.7 K/UL (ref 3.6–11)

## 2022-08-17 PROCEDURE — 3331090001 HH PPS REVENUE CREDIT

## 2022-08-17 PROCEDURE — 80053 COMPREHEN METABOLIC PANEL: CPT

## 2022-08-17 PROCEDURE — 85025 COMPLETE CBC W/AUTO DIFF WBC: CPT

## 2022-08-17 PROCEDURE — 3331090002 HH PPS REVENUE DEBIT

## 2022-08-17 PROCEDURE — 36415 COLL VENOUS BLD VENIPUNCTURE: CPT

## 2022-08-17 RX ORDER — ACYCLOVIR 200 MG/1
400 CAPSULE ORAL 2 TIMES DAILY
Status: CANCELLED
Start: 2022-08-18

## 2022-08-17 RX ORDER — ALBUTEROL SULFATE 0.83 MG/ML
2.5 SOLUTION RESPIRATORY (INHALATION) AS NEEDED
Status: CANCELLED
Start: 2022-08-18

## 2022-08-17 RX ORDER — PREDNISONE 1 MG/1
60 TABLET ORAL 2 TIMES DAILY WITH MEALS
Status: CANCELLED
Start: 2022-08-18

## 2022-08-17 RX ORDER — ACETAMINOPHEN 325 MG/1
650 TABLET ORAL AS NEEDED
Status: CANCELLED
Start: 2022-08-18

## 2022-08-17 RX ORDER — SODIUM CHLORIDE 0.9 % (FLUSH) 0.9 %
10 SYRINGE (ML) INJECTION AS NEEDED
Status: CANCELLED | OUTPATIENT
Start: 2022-08-18

## 2022-08-17 RX ORDER — ONDANSETRON 2 MG/ML
8 INJECTION INTRAMUSCULAR; INTRAVENOUS EVERY 8 HOURS
Status: CANCELLED | OUTPATIENT
Start: 2022-08-18

## 2022-08-17 RX ORDER — SULFAMETHOXAZOLE AND TRIMETHOPRIM 800; 160 MG/1; MG/1
1 TABLET ORAL
Status: CANCELLED
Start: 2022-08-18

## 2022-08-17 RX ORDER — DIPHENHYDRAMINE HYDROCHLORIDE 50 MG/ML
50 INJECTION, SOLUTION INTRAMUSCULAR; INTRAVENOUS ONCE
Status: CANCELLED
Start: 2022-08-18 | End: 2022-08-18

## 2022-08-17 RX ORDER — HEPARIN SODIUM (PORCINE) LOCK FLUSH IV SOLN 100 UNIT/ML 100 UNIT/ML
300-500 SOLUTION INTRAVENOUS AS NEEDED
Status: CANCELLED
Start: 2022-08-18

## 2022-08-17 RX ORDER — PANTOPRAZOLE SODIUM 40 MG/1
40 TABLET, DELAYED RELEASE ORAL
Status: CANCELLED
Start: 2022-08-18

## 2022-08-17 RX ORDER — LORAZEPAM 2 MG/ML
0.5 INJECTION INTRAMUSCULAR
Status: CANCELLED
Start: 2022-08-18

## 2022-08-17 RX ORDER — ONDANSETRON 2 MG/ML
8 INJECTION INTRAMUSCULAR; INTRAVENOUS AS NEEDED
Status: CANCELLED | OUTPATIENT
Start: 2022-08-18

## 2022-08-17 RX ORDER — OLANZAPINE 2.5 MG/1
5 TABLET ORAL EVERY EVENING
Status: CANCELLED | OUTPATIENT
Start: 2022-08-18

## 2022-08-17 RX ORDER — HYDROCORTISONE SODIUM SUCCINATE 100 MG/2ML
100 INJECTION, POWDER, FOR SOLUTION INTRAMUSCULAR; INTRAVENOUS AS NEEDED
Status: CANCELLED | OUTPATIENT
Start: 2022-08-18

## 2022-08-17 RX ORDER — DIPHENHYDRAMINE HYDROCHLORIDE 50 MG/ML
25 INJECTION, SOLUTION INTRAMUSCULAR; INTRAVENOUS AS NEEDED
Status: CANCELLED
Start: 2022-08-18

## 2022-08-17 RX ORDER — SODIUM CHLORIDE 9 MG/ML
45 INJECTION, SOLUTION INTRAVENOUS CONTINUOUS
Status: CANCELLED
Start: 2022-08-18

## 2022-08-17 RX ORDER — ACETAMINOPHEN 325 MG/1
650 TABLET ORAL ONCE
Status: CANCELLED
Start: 2022-08-18 | End: 2022-08-18

## 2022-08-17 RX ORDER — EPINEPHRINE 1 MG/ML
0.3 INJECTION, SOLUTION, CONCENTRATE INTRAVENOUS AS NEEDED
Status: CANCELLED | OUTPATIENT
Start: 2022-08-18

## 2022-08-17 RX ORDER — DIPHENHYDRAMINE HYDROCHLORIDE 50 MG/ML
50 INJECTION, SOLUTION INTRAMUSCULAR; INTRAVENOUS AS NEEDED
Status: CANCELLED
Start: 2022-08-18

## 2022-08-17 NOTE — PROGRESS NOTES
[facility] Lab Visit:     1852 Pt arrived ambulatory and in no distress, labs drawn 1 stick in right hand per KK . Departed [facility] ambulatory and in no distress. Visit Vitals  /71   Pulse 90   Temp 97.4 °F (36.3 °C)   Resp 16   Wt 79.5 kg (175 lb 3.2 oz)   BMI 31.04 kg/m²       Labs available in CC once resulted.

## 2022-08-18 ENCOUNTER — APPOINTMENT (OUTPATIENT)
Dept: GENERAL RADIOLOGY | Age: 67
DRG: 847 | End: 2022-08-18
Attending: RADIOLOGY
Payer: MEDICARE

## 2022-08-18 ENCOUNTER — HOSPITAL ENCOUNTER (INPATIENT)
Age: 67
LOS: 4 days | Discharge: HOME HEALTH | DRG: 847 | End: 2022-08-22
Attending: INTERNAL MEDICINE | Admitting: INTERNAL MEDICINE
Payer: MEDICARE

## 2022-08-18 DIAGNOSIS — Z51.11 ENCOUNTER FOR ANTINEOPLASTIC CHEMOTHERAPY: ICD-10-CM

## 2022-08-18 DIAGNOSIS — Z95.828 PORT-A-CATH IN PLACE: ICD-10-CM

## 2022-08-18 DIAGNOSIS — F17.200 TOBACCO DEPENDENCE: ICD-10-CM

## 2022-08-18 DIAGNOSIS — G89.3 CANCER RELATED PAIN: ICD-10-CM

## 2022-08-18 DIAGNOSIS — C79.51 METASTATIC CANCER TO BONE (HCC): ICD-10-CM

## 2022-08-18 DIAGNOSIS — C83.31 DIFFUSE LARGE B-CELL LYMPHOMA OF LYMPH NODES OF NECK (HCC): Primary | ICD-10-CM

## 2022-08-18 LAB
ALBUMIN SERPL-MCNC: 3.7 G/DL (ref 3.5–5)
ALBUMIN/GLOB SERPL: 1.5 {RATIO} (ref 1.1–2.2)
ALP SERPL-CCNC: 97 U/L (ref 45–117)
ALT SERPL-CCNC: 43 U/L (ref 12–78)
ANION GAP SERPL CALC-SCNC: 8 MMOL/L (ref 5–15)
AST SERPL-CCNC: 22 U/L (ref 15–37)
BILIRUB SERPL-MCNC: 0.6 MG/DL (ref 0.2–1)
BUN SERPL-MCNC: 7 MG/DL (ref 6–20)
BUN/CREAT SERPL: 13 (ref 12–20)
CALCIUM SERPL-MCNC: 9.1 MG/DL (ref 8.5–10.1)
CHLORIDE SERPL-SCNC: 105 MMOL/L (ref 97–108)
CO2 SERPL-SCNC: 24 MMOL/L (ref 21–32)
CREAT SERPL-MCNC: 0.56 MG/DL (ref 0.55–1.02)
ERYTHROCYTE [DISTWIDTH] IN BLOOD BY AUTOMATED COUNT: 13.5 % (ref 11.5–14.5)
GLOBULIN SER CALC-MCNC: 2.5 G/DL (ref 2–4)
GLUCOSE SERPL-MCNC: 114 MG/DL (ref 65–100)
HCT VFR BLD AUTO: 42.5 % (ref 35–47)
HGB BLD-MCNC: 14.1 G/DL (ref 11.5–16)
MCH RBC QN AUTO: 32.4 PG (ref 26–34)
MCHC RBC AUTO-ENTMCNC: 33.2 G/DL (ref 30–36.5)
MCV RBC AUTO: 97.7 FL (ref 80–99)
NRBC # BLD: 0 K/UL (ref 0–0.01)
NRBC BLD-RTO: 0 PER 100 WBC
PLATELET # BLD AUTO: 294 K/UL (ref 150–400)
PMV BLD AUTO: 9.6 FL (ref 8.9–12.9)
POTASSIUM SERPL-SCNC: 4 MMOL/L (ref 3.5–5.1)
PROT SERPL-MCNC: 6.2 G/DL (ref 6.4–8.2)
RBC # BLD AUTO: 4.35 M/UL (ref 3.8–5.2)
SODIUM SERPL-SCNC: 137 MMOL/L (ref 136–145)
WBC # BLD AUTO: 6.9 K/UL (ref 3.6–11)

## 2022-08-18 PROCEDURE — 71046 X-RAY EXAM CHEST 2 VIEWS: CPT

## 2022-08-18 PROCEDURE — 80053 COMPREHEN METABOLIC PANEL: CPT

## 2022-08-18 PROCEDURE — 3331090003 HH PPS REVENUE ADJ

## 2022-08-18 PROCEDURE — 74011250637 HC RX REV CODE- 250/637: Performed by: INTERNAL MEDICINE

## 2022-08-18 PROCEDURE — 3331090002 HH PPS REVENUE DEBIT

## 2022-08-18 PROCEDURE — 85027 COMPLETE CBC AUTOMATED: CPT

## 2022-08-18 PROCEDURE — 74011000258 HC RX REV CODE- 258: Performed by: INTERNAL MEDICINE

## 2022-08-18 PROCEDURE — 74011000250 HC RX REV CODE- 250: Performed by: NURSE PRACTITIONER

## 2022-08-18 PROCEDURE — 99223 1ST HOSP IP/OBS HIGH 75: CPT | Performed by: INTERNAL MEDICINE

## 2022-08-18 PROCEDURE — 74011636637 HC RX REV CODE- 636/637: Performed by: INTERNAL MEDICINE

## 2022-08-18 PROCEDURE — 3331090001 HH PPS REVENUE CREDIT

## 2022-08-18 PROCEDURE — 74011000250 HC RX REV CODE- 250: Performed by: INTERNAL MEDICINE

## 2022-08-18 PROCEDURE — 74011250636 HC RX REV CODE- 250/636: Performed by: INTERNAL MEDICINE

## 2022-08-18 PROCEDURE — 36415 COLL VENOUS BLD VENIPUNCTURE: CPT

## 2022-08-18 PROCEDURE — 74011250637 HC RX REV CODE- 250/637: Performed by: NURSE PRACTITIONER

## 2022-08-18 PROCEDURE — 65270000029 HC RM PRIVATE

## 2022-08-18 RX ORDER — AMLODIPINE BESYLATE 5 MG/1
5 TABLET ORAL DAILY
Status: DISCONTINUED | OUTPATIENT
Start: 2022-08-18 | End: 2022-08-22 | Stop reason: HOSPADM

## 2022-08-18 RX ORDER — IBUPROFEN 200 MG
1 TABLET ORAL DAILY
Status: DISCONTINUED | OUTPATIENT
Start: 2022-08-18 | End: 2022-08-22 | Stop reason: HOSPADM

## 2022-08-18 RX ORDER — ACETAMINOPHEN 325 MG/1
650 TABLET ORAL ONCE
Status: COMPLETED | OUTPATIENT
Start: 2022-08-18 | End: 2022-08-18

## 2022-08-18 RX ORDER — SODIUM CHLORIDE 0.9 % (FLUSH) 0.9 %
10 SYRINGE (ML) INJECTION AS NEEDED
Status: DISCONTINUED | OUTPATIENT
Start: 2022-08-18 | End: 2022-08-22 | Stop reason: HOSPADM

## 2022-08-18 RX ORDER — SULFAMETHOXAZOLE AND TRIMETHOPRIM 800; 160 MG/1; MG/1
1 TABLET ORAL
Status: DISCONTINUED | OUTPATIENT
Start: 2022-08-19 | End: 2022-08-22 | Stop reason: HOSPADM

## 2022-08-18 RX ORDER — SULFAMETHOXAZOLE AND TRIMETHOPRIM 800; 160 MG/1; MG/1
1 TABLET ORAL
Status: DISCONTINUED | OUTPATIENT
Start: 2022-08-19 | End: 2022-08-18 | Stop reason: SDUPTHER

## 2022-08-18 RX ORDER — GABAPENTIN 300 MG/1
300 CAPSULE ORAL
Status: DISCONTINUED | OUTPATIENT
Start: 2022-08-18 | End: 2022-08-18

## 2022-08-18 RX ORDER — ACETAMINOPHEN 325 MG/1
650 TABLET ORAL AS NEEDED
Status: DISCONTINUED | OUTPATIENT
Start: 2022-08-18 | End: 2022-08-22 | Stop reason: HOSPADM

## 2022-08-18 RX ORDER — LISINOPRIL 20 MG/1
20 TABLET ORAL DAILY
Status: DISCONTINUED | OUTPATIENT
Start: 2022-08-18 | End: 2022-08-22 | Stop reason: HOSPADM

## 2022-08-18 RX ORDER — SULFAMETHOXAZOLE AND TRIMETHOPRIM 800; 160 MG/1; MG/1
1 TABLET ORAL
Status: DISCONTINUED | OUTPATIENT
Start: 2022-08-19 | End: 2022-08-18

## 2022-08-18 RX ORDER — ACETAMINOPHEN 650 MG/1
650 SUPPOSITORY RECTAL
Status: DISCONTINUED | OUTPATIENT
Start: 2022-08-18 | End: 2022-08-22 | Stop reason: HOSPADM

## 2022-08-18 RX ORDER — ONDANSETRON 2 MG/ML
8 INJECTION INTRAMUSCULAR; INTRAVENOUS AS NEEDED
Status: DISCONTINUED | OUTPATIENT
Start: 2022-08-18 | End: 2022-08-22 | Stop reason: HOSPADM

## 2022-08-18 RX ORDER — ZOLPIDEM TARTRATE 5 MG/1
5 TABLET ORAL
Status: DISCONTINUED | OUTPATIENT
Start: 2022-08-18 | End: 2022-08-22 | Stop reason: HOSPADM

## 2022-08-18 RX ORDER — SODIUM CHLORIDE 0.9 % (FLUSH) 0.9 %
5-40 SYRINGE (ML) INJECTION EVERY 8 HOURS
Status: DISCONTINUED | OUTPATIENT
Start: 2022-08-18 | End: 2022-08-22 | Stop reason: HOSPADM

## 2022-08-18 RX ORDER — MORPHINE SULFATE 15 MG/1
15 TABLET, FILM COATED, EXTENDED RELEASE ORAL EVERY 12 HOURS
Status: DISCONTINUED | OUTPATIENT
Start: 2022-08-18 | End: 2022-08-18

## 2022-08-18 RX ORDER — ALBUTEROL SULFATE 0.83 MG/ML
2.5 SOLUTION RESPIRATORY (INHALATION) AS NEEDED
Status: DISCONTINUED | OUTPATIENT
Start: 2022-08-18 | End: 2022-08-22 | Stop reason: HOSPADM

## 2022-08-18 RX ORDER — DIPHENHYDRAMINE HYDROCHLORIDE 50 MG/ML
50 INJECTION, SOLUTION INTRAMUSCULAR; INTRAVENOUS ONCE
Status: COMPLETED | OUTPATIENT
Start: 2022-08-18 | End: 2022-08-18

## 2022-08-18 RX ORDER — HYDROCORTISONE SODIUM SUCCINATE 100 MG/2ML
100 INJECTION, POWDER, FOR SOLUTION INTRAMUSCULAR; INTRAVENOUS AS NEEDED
Status: ACTIVE | OUTPATIENT
Start: 2022-08-18 | End: 2022-08-18

## 2022-08-18 RX ORDER — POLYETHYLENE GLYCOL 3350 17 G/17G
17 POWDER, FOR SOLUTION ORAL DAILY PRN
Status: DISCONTINUED | OUTPATIENT
Start: 2022-08-18 | End: 2022-08-22 | Stop reason: HOSPADM

## 2022-08-18 RX ORDER — SODIUM CHLORIDE 9 MG/ML
45 INJECTION, SOLUTION INTRAVENOUS CONTINUOUS
Status: DISCONTINUED | OUTPATIENT
Start: 2022-08-18 | End: 2022-08-22 | Stop reason: HOSPADM

## 2022-08-18 RX ORDER — OLANZAPINE 5 MG/1
5 TABLET ORAL EVERY EVENING
Status: COMPLETED | OUTPATIENT
Start: 2022-08-18 | End: 2022-08-21

## 2022-08-18 RX ORDER — EPINEPHRINE 1 MG/ML
0.3 INJECTION, SOLUTION, CONCENTRATE INTRAVENOUS AS NEEDED
Status: ACTIVE | OUTPATIENT
Start: 2022-08-18 | End: 2022-08-18

## 2022-08-18 RX ORDER — DIPHENHYDRAMINE HYDROCHLORIDE 50 MG/ML
50 INJECTION, SOLUTION INTRAMUSCULAR; INTRAVENOUS AS NEEDED
Status: DISCONTINUED | OUTPATIENT
Start: 2022-08-18 | End: 2022-08-22 | Stop reason: HOSPADM

## 2022-08-18 RX ORDER — ACETAMINOPHEN 325 MG/1
650 TABLET ORAL
Status: DISCONTINUED | OUTPATIENT
Start: 2022-08-18 | End: 2022-08-22 | Stop reason: HOSPADM

## 2022-08-18 RX ORDER — PANTOPRAZOLE SODIUM 40 MG/1
40 TABLET, DELAYED RELEASE ORAL
Status: DISCONTINUED | OUTPATIENT
Start: 2022-08-19 | End: 2022-08-22 | Stop reason: HOSPADM

## 2022-08-18 RX ORDER — LORAZEPAM 1 MG/1
1 TABLET ORAL
Status: DISCONTINUED | OUTPATIENT
Start: 2022-08-18 | End: 2022-08-22 | Stop reason: HOSPADM

## 2022-08-18 RX ORDER — MORPHINE SULFATE 15 MG/1
15 TABLET ORAL EVERY 8 HOURS
Status: DISCONTINUED | OUTPATIENT
Start: 2022-08-18 | End: 2022-08-22 | Stop reason: HOSPADM

## 2022-08-18 RX ORDER — DIPHENHYDRAMINE HYDROCHLORIDE 50 MG/ML
25 INJECTION, SOLUTION INTRAMUSCULAR; INTRAVENOUS AS NEEDED
Status: DISCONTINUED | OUTPATIENT
Start: 2022-08-18 | End: 2022-08-22 | Stop reason: HOSPADM

## 2022-08-18 RX ORDER — SODIUM CHLORIDE 0.9 % (FLUSH) 0.9 %
5-40 SYRINGE (ML) INJECTION AS NEEDED
Status: DISCONTINUED | OUTPATIENT
Start: 2022-08-18 | End: 2022-08-22 | Stop reason: HOSPADM

## 2022-08-18 RX ORDER — MORPHINE SULFATE 15 MG/1
15 TABLET ORAL
Status: DISCONTINUED | OUTPATIENT
Start: 2022-08-18 | End: 2022-08-18

## 2022-08-18 RX ORDER — ONDANSETRON 2 MG/ML
8 INJECTION INTRAMUSCULAR; INTRAVENOUS EVERY 8 HOURS
Status: DISCONTINUED | OUTPATIENT
Start: 2022-08-18 | End: 2022-08-22 | Stop reason: HOSPADM

## 2022-08-18 RX ORDER — HEPARIN 100 UNIT/ML
300-500 SYRINGE INTRAVENOUS AS NEEDED
Status: DISCONTINUED | OUTPATIENT
Start: 2022-08-18 | End: 2022-08-22 | Stop reason: HOSPADM

## 2022-08-18 RX ORDER — FLUOXETINE HYDROCHLORIDE 20 MG/1
40 CAPSULE ORAL DAILY
Status: DISCONTINUED | OUTPATIENT
Start: 2022-08-18 | End: 2022-08-22 | Stop reason: HOSPADM

## 2022-08-18 RX ORDER — LORAZEPAM 2 MG/ML
0.5 INJECTION, SOLUTION INTRAMUSCULAR; INTRAVENOUS
Status: DISCONTINUED | OUTPATIENT
Start: 2022-08-18 | End: 2022-08-22 | Stop reason: HOSPADM

## 2022-08-18 RX ADMIN — SODIUM CHLORIDE 75.2 MG: 9 INJECTION, SOLUTION INTRAVENOUS at 16:19

## 2022-08-18 RX ADMIN — ONDANSETRON 8 MG: 2 INJECTION INTRAMUSCULAR; INTRAVENOUS at 20:43

## 2022-08-18 RX ADMIN — AMLODIPINE BESYLATE 5 MG: 5 TABLET ORAL at 08:54

## 2022-08-18 RX ADMIN — LORAZEPAM 1 MG: 1 TABLET ORAL at 14:48

## 2022-08-18 RX ADMIN — LORAZEPAM 1 MG: 1 TABLET ORAL at 20:42

## 2022-08-18 RX ADMIN — SODIUM CHLORIDE 705 MG: 9 INJECTION, SOLUTION INTRAVENOUS at 14:01

## 2022-08-18 RX ADMIN — PREDNISONE 115 MG: 5 TABLET ORAL at 20:42

## 2022-08-18 RX ADMIN — Medication 10 ML: at 20:46

## 2022-08-18 RX ADMIN — MORPHINE SULFATE 15 MG: 15 TABLET ORAL at 08:54

## 2022-08-18 RX ADMIN — FLUOXETINE 40 MG: 20 CAPSULE ORAL at 08:54

## 2022-08-18 RX ADMIN — ACETAMINOPHEN 650 MG: 325 TABLET ORAL at 13:11

## 2022-08-18 RX ADMIN — LORAZEPAM 1 MG: 1 TABLET ORAL at 08:54

## 2022-08-18 RX ADMIN — DIPHENHYDRAMINE HYDROCHLORIDE 50 MG: 50 INJECTION, SOLUTION INTRAMUSCULAR; INTRAVENOUS at 13:13

## 2022-08-18 RX ADMIN — OLANZAPINE 5 MG: 5 TABLET, FILM COATED ORAL at 17:58

## 2022-08-18 RX ADMIN — SODIUM CHLORIDE 500 ML: 9 INJECTION, SOLUTION INTRAVENOUS at 12:46

## 2022-08-18 RX ADMIN — SODIUM CHLORIDE 45 ML/HR: 9 INJECTION, SOLUTION INTRAVENOUS at 13:51

## 2022-08-18 RX ADMIN — SODIUM CHLORIDE: 9 INJECTION, SOLUTION INTRAVENOUS at 16:17

## 2022-08-18 RX ADMIN — LISINOPRIL 20 MG: 20 TABLET ORAL at 08:54

## 2022-08-18 RX ADMIN — MORPHINE SULFATE 15 MG: 15 TABLET ORAL at 21:57

## 2022-08-18 RX ADMIN — ALTEPLASE 2 MG: 2.2 INJECTION, POWDER, LYOPHILIZED, FOR SOLUTION INTRAVENOUS at 10:28

## 2022-08-18 RX ADMIN — Medication 20 ML: at 08:00

## 2022-08-18 RX ADMIN — ONDANSETRON 8 MG: 2 INJECTION INTRAMUSCULAR; INTRAVENOUS at 13:15

## 2022-08-18 RX ADMIN — PREDNISONE 115 MG: 5 TABLET ORAL at 13:11

## 2022-08-18 RX ADMIN — MORPHINE SULFATE 15 MG: 15 TABLET ORAL at 14:48

## 2022-08-18 NOTE — PROGRESS NOTES
Patients port was accessed by night shift RN. Day shift charge re-accessed due to not getting good blood return. Re-access still did not give blood return. Patient transported downstairs to obtain an X-ray to check placement. RN was told access was a bit too high. CCL re-accessed, still with no blood return. RN administered 1 mg Cath Jairon; checked blood return after 30 minutes, no success. Re-checked blood return at the 1 hour tera and successfully obtained a good blood return flow. All chemo orders released and will follow administration accordingly.

## 2022-08-18 NOTE — PROGRESS NOTES
Problem: Falls - Risk of  Goal: *Absence of Falls  Description: Document Loretta Urbina Fall Risk and appropriate interventions in the flowsheet.   Outcome: Progressing Towards Goal  Note: Fall Risk Interventions:            Medication Interventions: Patient to call before getting OOB, Teach patient to arise slowly

## 2022-08-18 NOTE — PROGRESS NOTES
CARE MANAGEMENT INITIAL ASSESSEMENT      NAME:   William Patel   :     1955   MRN:     [id number]       Emergency Contact:  Extended Emergency Contact Information  Primary Emergency Contact: [address], [address] Phone: [phone]  Mobile Phone: [phone]  Relation: Friend   needed? No  Secondary Emergency Contact: Chiquis Nunes  Mobile Phone: [phone]  Relation: Daughter   needed? No    Advance Directive:  Full Code, does not have an advance directive. [facility] Decision Maker:    Primary Decision Maker: Chiquis Nunes - Daughter - [phone]    Primary Decision Maker: Gillian Apple - [phone]     Reason for Admission:  Ms. Jessica Mcfadden is a 78 y.o. female with history that includes  metastatic cancer   who was emergently admitted for:  lymphoma (receiving chemo). Patient Active Problem List   Diagnosis Code    Tobacco dependence F17.200    Diffuse large B-cell lymphoma of lymph nodes of neck (HCC) C83.31    High risk medication use Z79.899    Encounter for antineoplastic chemotherapy Z51.11    Encounter for screening for other viral diseases Z11.59    Renal lesion N28.9    Metastatic cancer to bone ([address] C79.51    Diffuse large B cell lymphoma ([address] C83.30    Cancer related pain G89.3    Insomnia due to medical condition G47.01    Other secondary hypertension I15.8    Generalized edema R60.1    Chronic renal disease, stage III N18.30    Episode of recurrent major depressive disorder (HCC) F33.9    Neuropathy due to chemotherapeutic drug ([address] G62.0, T45.1X5A    Chemotherapy induced nausea and vomiting R11.2, T45.1X5A    Diarrhea of presumed infectious origin R19.7    Port-A-Cath in place Z95.828    Productive cough R05.8    Other fatigue R53.83    Physical debility R53.81    Bilious vomiting with nausea R11.14    Poor appetite R63.0    Anxiety about health F41.8       Assessment: In person with patient.       RUR:  13%  Risk Level:  Low  Value-based purchasing:   No  Bundle patient:  No    Residency:  Private residence  Exterior Steps:   4  Interior Steps:  None    Lives With:  Adult children (Son)    Prior functioning:  Independent. Patient requires assistance with:  N/A    Prior DME required:  None    DME available:  None    Rehab history:  None    Discharge Concerns/Barriers Identified:  Medical conditions- significant      Insurer:  Payor: Mike Nancy / Plan: [address] [place] [address] HMO / Product Type: Managed Care Medicare /     PCP: Rubina Alexandra MD   Name of Practice:   [address] [place] [address]   Current patient: Yes   Approximate date of last visit: March   Access to virtual PCP visits:  Unknown    Pharmacy:  [facility] (unsure of location)   Financial/Difficulty affording medications:  None identified    COVID-19 vaccination status:  Fully vaccinated. Pt/family unable to recall dates. DC Transport:  Family      Transition of care plan:  Home with outpatient follow-up     Comments:   Pt admitted on 8/18/22 for lymphoma. CM met with Pt to complete initial assessment. Pt lives at home with her son. Pt has hx of HH but can't recall agency name. Pt reports she is not currently open to services. Pt has no hx of  home O2 or equipment. Pt is independent with ADLs and ambulation. Pt denies problems with either. Pt is retired. Pt is able to drive. Discharge plan is for Pt to return home. Family will transport Pt home. CM will continue to follow. _____________________________________  Cecilio Reza [address] [facility]  Available via [facility]  8/18/2022   4:44 PM      Care Management Interventions  PCP Verified by CM: Yes (Suanne Heading)  Mode of Transport at Discharge:  Other (see comment) (family)  Transition of Care Consult (CM Consult): Discharge Planning  MyChart Signup: No  Discharge Durable Medical Equipment: No  Physical Therapy Consult: No  Occupational Therapy Consult: No  Speech Therapy Consult: No  Support Systems: Child(bethany), Friend/Neighbor  Confirm Follow Up Transport: Family  Discharge Location  Patient Expects to be Discharged to[de-identified] Home with outpatient services

## 2022-08-18 NOTE — H&P
[facility] at [address], [address] [facility]: [phone]  F: [phone] Patient ID  Name: Ana Laura Cartagena  YOB: 1955  MRN: [id number]  Referring Provider:   No referring provider defined for this encounter. 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 This summary highlights focused aspects of patient's ongoing care and may have a recurring section in notes with either updates or remain unchanged as a longitudinal care summary. -------------------------------------------------------------------------------------------------------------------------------------------------------------------------------------------------------  DIAGNOSIS:  Diffuse Large B-Cell Lymphoma     ORIGINAL STAGING:  Stage IV     SITES OF DISEASE:  Left Cervical Lymph Node,Bone Disease, Stage IV    CURRENT TREATMENT:  C1,D1 on 5/11/22 DA-R-EPOCH  C2,D1 on 6/6/22 DA-R-EPOCH +1 dose escalation (except for doxorubicin due to swelling in C1)  Admission for Cycle 3. PRIOR TREATMENT:  n/a     GOALS OF CARE:  Curative Intent     PATHOLOGY:  Specimen #:[id number] Collect: 4/22/2022      ==========================================================================                    * * *SURGICAL PATHOLOGY REPORT* * *   ==========================================================================   * * *PROCEDURES/ADDENDA* * *   ADDENDUM   Date Ordered: 4/26/2022     Status: Signed Out   Date Complete: 4/26/2022     By: Bayron Scanlon MD   Date Reported: 4/26/2022   Addendum Diagnosis   Lymph node, left posterior cervical lymph node, excision:   In situ hybridization for Jesús-Barr viral RNA: Negative   CPT: 39785  Addendum Comment   * * *CLINICAL HISTORY* * * Cervical lymphadenopathy, rule out lymphoma   ==========================================================================   * * *FINAL PATHOLOGIC DIAGNOSIS* * *        Lymph node, left posterior cervical lymph node, excision:        Large B cell lymphoma. See comment. * * *Comment* * *   The histologic section has fragments of lymphoid tissue with diffuse and   focal follicular architecture. Diffuse areas are comprised of large,   atypical lymphocytes with centroblastic morphology and increased mitotic   activity. Immunohistochemical stains confirm the malignant cells are CD20   positive B cells that coexpress CD10 and BCL6 without MUM1. CD23   highlights rare follicular dendritic networks associated with lymphoid   follicles with germinal centers and express CD10 and BCL6 without BCL2. CyclinD1 and CD56 stains are negative. Concurrent flow cytometric analysis   confirms a clonal CD10 positive B-cell population comprised of medium to   large cells. The Ki-67 proliferation index is 30%. The overall findings favor the diagnosis of diffuse large B-cell lymphoma,   germinal center subtype. Molecular genetic studies are pending for further   characterization will be reported in an addendum. Flow cytometry:   Consistent with CD10-positive B-cell lymphoproliferative disorder. Comments: Flow cytometry shows monoclonal B-cells (36% of total cells)   with CD10 expression without CD5, consistent with a CD10-positive B-cell   lymphoproliferative disorder. The main differential diagnosis includes   follicular lymphoma, Burkitt lymphoma and large B-cell lymphoma. Please   correlate the result with morphologic findings and clinical information. Flow Differential (%) and Population Analysis:   Lymphocytes: 93.7%   T-cells (39% of lymphoid cells) show a CD4/CD8 ratio of 3.3 without overt   phenotypic abnormality. NK-cells (1% of lymphoid cells) are unremarkable.    Mature B-cells (56% of lymphoid cells) include large forms based on   forward scattered pattern and show: CD5 neg, CD10+, CD11c+dim, CD19+,   CD20+ with surface lambda light chain restriction. Markers Performed: CD2, CD3, CD4, CD5, CD7, CD8, CD10, CD11c, CD19, CD20,   CD23, CD34, CD38, CD45, CD56, Kappa, Lambda (17 Markers)   The Technical Component Processing of this test was completed at   [facility], [address] [place] [address], [facility] / [id number] /   [phone] / Lee No # [id number]. Interpretation by Maliha Alejandra M.D. The   complete scanned report is available in Epic. CPT: F1855670, Q1514249, [id number], [id number], [id number], [id number]   jw2/2022   *Electronically Signed Out By Katalina Conner MD*   ==========================================================================   * * *Gross Description* * *   Specimen #1, received fresh labeled with the patient's name,  and left   posterior cervical lymph node, consists of two paper towels containing a   1.7 x 1.5 x 0.5 cm aggregate of pale pink-tan, fleshy soft tissue   fragments. The specimen is handled according to the flow cytometry   protocol as follows: 7          Two air-dried touch prep slides - one Diff Quik stained, one   rapid-fixed in 95% alcohol   7          Representative sections in B plus fixative (1A)   7          One piece held in RPMI for possible flow cytometry analysis   7          Remaining tissue fixed in formalin for permanents (1B)   Dr. Stefanie Conner will determine if flow cytometry is necessary. 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 F who presents as a hospital admission for Cycle #3. Patient reports feeling well overall. Patient reports decreased oral intake with some good and bad days. Patient denies any bowel or bladder problems. Patient denies any pain. Patient denies any shortness of breath. Patient denies any fatigue.  Patient denies any gait disturbance. Past Medical History:   Diagnosis Date    Adverse effect of anesthesia     PHLEGM IN THROAT POST OP & NEEDED X2 BREATHING TREATMENTS    Anxiety     COPD (chronic obstructive pulmonary disease) ([facility]) 2022    emphysema    Depression     GERD (gastroesophageal reflux disease)     Hernia, femoral     since childhood    Hypertension     Joint pain     Nausea & vomiting     TMJ arthritis        Current Facility-Administered Medications:     FLUoxetine (PROzac) capsule 40 mg, 40 mg, Oral, DAILY, Schoeneweis, Ann, NP    gabapentin (NEURONTIN) capsule 300 mg, 300 mg, Oral, DAILY PRN, Schoeneweis, Ann, NP    LORazepam (ATIVAN) tablet 1 mg, 1 mg, Oral, Q6H PRN, Schoeneweis, Ann, NP    morphine ER (MS CONTIN) tablet 15 mg, 15 mg, Oral, Q12H, Schoeneweis, Ann, NP    morphine IR (MS IR) tablet 15 mg, 15 mg, Oral, Q6H PRN, Schoeneweis, Ann, NP    [START ON 8/19/2022] trimethoprim-sulfamethoxazole (BACTRIM DS, SEPTRA DS) 160-800 mg per tablet 1 Tablet, 1 Tablet, Oral, BID Mon Wed & Fri, Lorie Alicia NP    zolpidem (AMBIEN) tablet 5 mg, 5 mg, Oral, QHS PRN, Schoeneweis, Ann, NP    sodium chloride (NS) flush 5-40 mL, 5-40 mL, IntraVENous, Q8H, Schoeneweis, Ann, NP    sodium chloride (NS) flush 5-40 mL, 5-40 mL, IntraVENous, PRN, Schoeneweis, Ann, NP    acetaminophen (TYLENOL) tablet 650 mg, 650 mg, Oral, Q6H PRN **OR** acetaminophen (TYLENOL) suppository 650 mg, 650 mg, Rectal, Q6H PRN, Schoeneweis, Ann, NP    polyethylene glycol (MIRALAX) packet 17 g, 17 g, Oral, DAILY PRN, Schoeneweis, Ann, NP    lisinopriL (PRINIVIL, ZESTRIL) tablet 20 mg, 20 mg, Oral, DAILY, Schoeneweis, Ann, NP    amLODIPine (NORVASC) tablet 5 mg, 5 mg, Oral, DAILY, Schoeneweis, Ann, NP    Allergies   Allergen Reactions    Oxycodone Nausea Only     Review of Systems provided by:patient  General: denies fever, denies fatigue, and denies chills. Denies appetite loss.   Eyes: denies any acute vision loss and denies any eye pain  HEENT: denies epistaxis and denies trouble swallowing  Cardio: denies any chest pain and denies any leg swelling  Resp: denies any shortness of breath. denies any hemoptysis. Abdomen: denies any abdominal pain, denies any nausea, denies any vomiting, and denies any diarrhea  MSK: denies any myalgias and denies any arthralgias  Skin: denies any rash and denies any itching  Lymph: denies any lymph node enlargement and denies any lymph node tenderness  Neuro: denies any headache and denies any tremor  : Denies any dysuria. Denies any hematuria. Psych: denies depression and reports anxiety      Objective:      Visit Vitals  BP (!) 148/92   Pulse (!) 107   Temp 98 °F (36.7 °C)   Resp 16   Ht 5' 3\" (1.6 m)   Wt 175 lb 14.8 oz (79.8 kg)   SpO2 92%   BMI 31.16 kg/m²     ECO- Restricted in physically strenuous activity but ambulatory and able to carry out work of a light or sedentary nature, e.g., light house work, office work. Physical Exam  Constitutional: No acute distress. , Non-toxic appearance., Non-diaphoretic., and Chronic ill appearance. HENT: Normocephalic and atraumatic head. Eyes: Normal Conjunctivae. Anicteric sclerae. Cardiovascular: S1,S2 auscultated. No pitting edema. Pulmonary: Normal Respiratory Effort. No wheezing. No rhonchi. No rales. Abdominal: Normal bowel sounds. Soft Abdomen to palpation. No abdominal tenderness. Skin: No jaundice. No rash. Port without redness or swelling. Musculoskeletal: No muscle pain on palpation. No temporal muscle wasting on inspection. Lymph: No cervical or supraclavicular lymph node enlargement or tenderness. Neurological: Alert and oriented. No tremor on inspection.     Psychiatric: mood normal. normal speech rate normal affect      Results:      I personally reviewed Epic EHR labs/results below:   Lab Results   Component Value Date/Time    WBC 6.9 2022 08:00 AM    HGB 14.1 2022 08:00 AM    HCT 42.5 2022 08:00 AM    PLATELET 430  08:00 AM MCV 97.7 2022 08:00 AM     Lab Results   Component Value Date/Time    Sodium 138 2022 11:45 AM    Potassium 3.8 2022 11:45 AM    Chloride 106 2022 11:45 AM    CO2 23 2022 11:45 AM    Anion gap 9 2022 11:45 AM    Glucose 122 (H) 2022 11:45 AM    BUN 7 2022 11:45 AM    Creatinine 0.62 2022 11:45 AM    BUN/Creatinine ratio 11 (L) 2022 11:45 AM    GFR est AA >60 2022 11:45 AM    GFR est non-AA >60 2022 11:45 AM    Calcium 9.3 2022 11:45 AM    Bilirubin, total 0.5 2022 11:45 AM    Alk. phosphatase 104 2022 11:45 AM    Protein, total 7.0 2022 11:45 AM    Albumin 3.9 2022 11:45 AM    Globulin 3.1 2022 11:45 AM    A-G Ratio 1.3 2022 11:45 AM    ALT (SGPT) 51 2022 11:45 AM    AST (SGOT) 22 2022 11:45 AM     IR INSERT TUNL CVC W PORT OVER 5 YEARS    Result Date: 2022  PATIENT NAME: Gretel Adkins                                            AGE, : 78 years, 1955 MRN: [id number] DATE: 2022 11:50 AM PROCEDURE(S): Single lumen Portacath placement SUPERVISING PHYSICIAN: Renée Steven MD OPERATING PROVIDER: Claudetta Skeens, PA-C CLINICAL HISTORY: Mediport placement requested. B-cell lymphoma Patient was evaluated and felt to be an appropriate candidate for conscious sedation. Moderate intravenous conscious sedation was supervised by Renée Steven MD. The patient was independently monitored by a registered nurse assigned to the Department of Radiology using automated blood pressure, EKG, and pulse oximetry. The detail conscious sedation record is stored in the hospital information system.  MEDICATION: Antibiotic: Ancef 2g Versed: 6 mg Fentanyl: 100 mcg Intraprocedure time: 40 minutes FINDINGS: After informed consent was obtained, and the risks and benefits of the procedure including infection and bleeding were discussed, the patient was brought to the angiographic suite and placed on the angiographic table in a supine position. The right neck and chest were prepped and draped in maximum sterile barrier technique which includes: cap and mask, sterile gown, sterile gloves, and sterile body drape. The ultrasound transducer was prepped using sterile ultrasound technique which includes: sterile gel and probe cover. After adequate conscious sedation was achieved utilizing Fentanyl and Versed, the skin over the internal jugular vein was anesthetized with 1% lidocaine. Sonographic images of the right neck demonstrated a patent and compressible right internal jugular vein. The vein was accessed under direct sonographic guidance using a 21 gauge micropuncture set. An ultrasonographic image was saved in the record. A 0.035 J wire was advanced through the sheath into the inferior vena cava under fluoroscopic guidance. A peel-away sheath was advanced over the wire into the superior vena cava under fluoroscopic guidance. Attention was directed to the right chest, and after adequate local anesthesia, a horizontal incision was made. A subcutaneous pocket was then formed using a combination of sharp and blunt dissection. A tunnel was then formed between the pocket and the venotomy site and the catheter passed through the tunnel and through the peel-away sheath. The peel-away sheath was removed and the catheter tip was positioned at the right atrium. The catheter was cut at the pocket access point and the portacath was attached to the catheter and placed snugly in the pocket. The pocket was closed primarily with 2-0 Vicryl suture and Dermabond. The neck access site was closed with 2-0 Vicryl suture and Dermabond. The port was accessed and flushed with heparin solution. The patient tolerated the procedure well. Fluoroscopy demonstrates the catheter tip to be within the right atrium.  RADIATION: Fluoroscopy time: 0.3 minutes Air kerma: 5.1 mGy     Technically successful placement of a single lumen port with the catheter tip in the right atrium. Catheter is ready for immediate use. CT NECK SOFT TISSUE W CONT    Result Date: 6/28/2022  INDICATION: Diffuse large b-cell lymphoma, lymph nodes of head, face, and neck EXAM:  CT NECK, CHEST, ABDOMEN, PELVIS WITH CONTRAST COMPARISON: PET scan May 4, 2022 TECHNIQUE:  CT imaging of the neck, chest, abdomen and pelvis was performed after the uneventful intravenous administration of IV contrast. Coronal and sagittal reconstructions were generated. CT dose reduction was achieved through use of a standardized protocol tailored for this examination and automatic exposure control for dose modulation. FINDINGS: NASOPHARYNX: Normal. SUPRAHYOID NECK: Normal oropharynx, oral cavity, parapharyngeal space, and retropharyngeal space. INFRAHYOID NECK: Normal larynx, hypopharynx and supraglottis. PAROTID GLANDS: Normal. SUBMANDIBULAR GLANDS: Absent left submandibular gland. THYROID GLAND: Normal. LYMPH NODES: Interval decrease in size of previous enlarged left level 2 lymph nodes, the more anterior of which decreased from 1.2 x 1.1 cm to 0.7 x 0.6 cm, and the posterior node decreased from 1.8 x 1.2 cm to 1.1 x 0.7 cm. Scattered subcentimeter lymph nodes throughout the neck. No new or enlarging nodes. OSSEOUS STRUCTURES: No destructive bone lesion. Prior ACDF C5-7. ADDITIONAL COMMENTS: N/A. MEDIASTINUM/ELIZABETH: Scattered nonenlarged lymph nodes. Small hiatal hernia. HEART/PERICARDIUM: Unremarkable. LUNGS/PLEURA: No suspicious nodule. No pulmonary edema or pleural effusion. Tree-in-bud opacities in the lateral right lower lobe, and mild ill-defined nodular groundglass opacities right apex. BONES: No destructive bone lesion. ADDITIONAL COMMENTS: Unchanged round 9 mm density lateral right breast LIVER: No mass or biliary dilatation. GALLBLADDER: Surgically absent. SPLEEN: No enlargement or lesion. PANCREAS: No mass or ductal dilatation. ADRENALS: No mass.  KIDNEYS: No mass, calculus, or hydronephrosis. GI TRACT: No bowel obstruction or wall thickening PERITONEUM: No free air or free fluid. APPENDIX: Not clearly visualized. RETROPERITONEUM: Interval decrease in size of previously enlarged lymph nodes: Gastrohepatic ligament lymph node decreased from 4.2 x 1.2 cm to 2.2 x 0.9 cm; aortocaval lymph node at the level of the right kidney, previously 1.2 cm, now 0.6 cm. No new or enlarging lymph nodes. ADDITIONAL COMMENTS: Right paraumbilical ventral hernia containing mesenteric fat and bowel loops similar to prior study. URINARY BLADDER: No mass or calculus. LYMPH NODES: Decreased size of left inguinal lymph nodes from 1.6 cm to 0.8 cm. REPRODUCTIVE ORGANS: Prior hysterectomy. FREE FLUID: None. BONES: No destructive bone lesion. ADDITIONAL COMMENTS: N/A.     1. Interval decrease in size of previously enlarged lymph nodes in the neck, abdomen, and pelvis. No new or enlarging lymph nodes. 2. New tree-in-bud opacities and groundglass opacities in the right lower and upper lobes may be infectious/inflammatory. May be assessed on follow-up examinations. 23X     CT CHEST ABD PELV W CONT    Result Date: 6/28/2022  INDICATION: Diffuse large b-cell lymphoma, lymph nodes of head, face, and neck EXAM:  CT NECK, CHEST, ABDOMEN, PELVIS WITH CONTRAST COMPARISON: PET scan May 4, 2022 TECHNIQUE:  CT imaging of the neck, chest, abdomen and pelvis was performed after the uneventful intravenous administration of IV contrast. Coronal and sagittal reconstructions were generated. CT dose reduction was achieved through use of a standardized protocol tailored for this examination and automatic exposure control for dose modulation. FINDINGS: NASOPHARYNX: Normal. SUPRAHYOID NECK: Normal oropharynx, oral cavity, parapharyngeal space, and retropharyngeal space. INFRAHYOID NECK: Normal larynx, hypopharynx and supraglottis. PAROTID GLANDS: Normal. SUBMANDIBULAR GLANDS: Absent left submandibular gland.  THYROID GLAND: Normal. LYMPH NODES: Interval decrease in size of previous enlarged left level 2 lymph nodes, the more anterior of which decreased from 1.2 x 1.1 cm to 0.7 x 0.6 cm, and the posterior node decreased from 1.8 x 1.2 cm to 1.1 x 0.7 cm. Scattered subcentimeter lymph nodes throughout the neck. No new or enlarging nodes. OSSEOUS STRUCTURES: No destructive bone lesion. Prior ACDF C5-7. ADDITIONAL COMMENTS: N/A. MEDIASTINUM/ELIZABETH: Scattered nonenlarged lymph nodes. Small hiatal hernia. HEART/PERICARDIUM: Unremarkable. LUNGS/PLEURA: No suspicious nodule. No pulmonary edema or pleural effusion. Tree-in-bud opacities in the lateral right lower lobe, and mild ill-defined nodular groundglass opacities right apex. BONES: No destructive bone lesion. ADDITIONAL COMMENTS: Unchanged round 9 mm density lateral right breast LIVER: No mass or biliary dilatation. GALLBLADDER: Surgically absent. SPLEEN: No enlargement or lesion. PANCREAS: No mass or ductal dilatation. ADRENALS: No mass. KIDNEYS: No mass, calculus, or hydronephrosis. GI TRACT: No bowel obstruction or wall thickening PERITONEUM: No free air or free fluid. APPENDIX: Not clearly visualized. RETROPERITONEUM: Interval decrease in size of previously enlarged lymph nodes: Gastrohepatic ligament lymph node decreased from 4.2 x 1.2 cm to 2.2 x 0.9 cm; aortocaval lymph node at the level of the right kidney, previously 1.2 cm, now 0.6 cm. No new or enlarging lymph nodes. ADDITIONAL COMMENTS: Right paraumbilical ventral hernia containing mesenteric fat and bowel loops similar to prior study. URINARY BLADDER: No mass or calculus. LYMPH NODES: Decreased size of left inguinal lymph nodes from 1.6 cm to 0.8 cm. REPRODUCTIVE ORGANS: Prior hysterectomy. FREE FLUID: None. BONES: No destructive bone lesion. ADDITIONAL COMMENTS: N/A.     1. Interval decrease in size of previously enlarged lymph nodes in the neck, abdomen, and pelvis. No new or enlarging lymph nodes.  2. New tree-in-bud opacities and groundglass opacities in the right lower and upper lobes may be infectious/inflammatory. May be assessed on follow-up examinations. 23X     PET/CT TUMOR IMAGE [address] [place] W ([place]I)    Addendum Date: 5/5/2022    Addendum: There is increased metabolic activity in a small left frontal scalp lesion with SUV of 6.9. Result Date: 5/5/2022  PET/CT SCAN PROCEDURE: Following IV injection of 10.9 mCi 18 Fluoro 2 deoxyglucose (FDG) and a standard uptake delay, PET imaging is performed skull vertex to toes and axial, sagittal and coronal images were acquired. Unenhanced CT is obtained for anatomic localization, and attenuation correction of the PET scan. Patient preprocedure blood glucose level: 98 mg/dL. CORRELATIVE IMAGING STUDIES: None. COMPARISON PET: HISTORY: The study is requested for initial staging. Lymphoma. FINDINGS: HEAD/NECK: There is increased metabolic activity 1.2 cm left level 2 lymph node. There is increased metabolic activity in a 1.2 cm left level 5 lymph node with SUV of 7.7. CHEST: No foci of abnormal hypermetabolism. Low-grade activity in mediastinal and hilar lymph nodes is nonspecific but may be reactive. ABDOMEN/PELVIS: There is increased metabolic activity in a 2.6 cm hepatogastric ligament lymph node with SUV of 5.7. There is increased metabolic activity in a 1.2 cm right retroperitoneal lymph node with SUV of 6. There is increased metabolic activity in a left retroperitoneal lymph node with SUV of 8 There is slight haziness in the mesentery with small lymph nodes with slight increased metabolic activity of 3.6. There is small focus of increased metabolic activity anterior to the left sacrum and anterior to the right sacrum with SUV of 7 suspicious for lymph nodes. There is increased metabolic activity in the right renal mass posterior cortex with SUV of 6.9.  There is increased metabolic activity in a left inguinal lymph node with SUV of 8 There is a right abdominal wall hernia SKELETON: There is increased metabolic activity and left humeral head, right iliac bone, femurs and proximal right tibia. . There is slight increased metabolic activity right humeral head. There is increased metabolic activity in 2 lymph nodes in the left neck, in the hepatogastric ligament and retroperitoneal adenopathy, mesenteric lymph nodes and left inguinal adenopathy suspicious for lymphoma. There is increased metabolic activity in a mass posterior right renal cortex nonspecific but may represent lymphoma. There is increased bony activity as described above suspicious for lymphoma. . 23X        Assessment and Recommendations:      Patient Active Problem List   Diagnosis    Tobacco dependence  -recommend nicotine patch during inpatient. Diffuse large B-cell lymphoma of lymph nodes of neck (HCC)  -fortunately no signs of progressive disease while she has been off treatment. Encounter for antineoplastic chemotherapy  -move forward with chemotherapy DA-R-EPOCH C3 Dose reduced level 1 (by -20%)  -bactrim prophylaxis. Poor appetite  -still working on her appetite. Dose reduced her chemotherapy. Anxiety about health  -continue her medications.  -continue to provide reassurance; Cycle 3 dose reduced.  -she is following through better on recommendations to proceed with treatment. FOLLOW:  Will follow daily on our service.     Klever Hunt MD  Hematology/Medical Oncology Provider  [address]  P: [phone]      Klever Hunt MD  Hematology/Medical Oncology Provider  [facility] [address]  P: [phone]     Signed By:   Guille Urrutia MD

## 2022-08-19 LAB
ALBUMIN SERPL-MCNC: 3.2 G/DL (ref 3.5–5)
ALBUMIN/GLOB SERPL: 1.1 {RATIO} (ref 1.1–2.2)
ALP SERPL-CCNC: 86 U/L (ref 45–117)
ALT SERPL-CCNC: 35 U/L (ref 12–78)
ANION GAP SERPL CALC-SCNC: 6 MMOL/L (ref 5–15)
AST SERPL-CCNC: 13 U/L (ref 15–37)
BASOPHILS # BLD: 0 K/UL (ref 0–0.1)
BASOPHILS NFR BLD: 0 % (ref 0–1)
BILIRUB SERPL-MCNC: 0.2 MG/DL (ref 0.2–1)
BUN SERPL-MCNC: 8 MG/DL (ref 6–20)
BUN/CREAT SERPL: 13 (ref 12–20)
CALCIUM SERPL-MCNC: 8.5 MG/DL (ref 8.5–10.1)
CHLORIDE SERPL-SCNC: 108 MMOL/L (ref 97–108)
CO2 SERPL-SCNC: 26 MMOL/L (ref 21–32)
CREAT SERPL-MCNC: 0.62 MG/DL (ref 0.55–1.02)
DIFFERENTIAL METHOD BLD: ABNORMAL
EOSINOPHIL # BLD: 0 K/UL (ref 0–0.4)
EOSINOPHIL NFR BLD: 0 % (ref 0–7)
ERYTHROCYTE [DISTWIDTH] IN BLOOD BY AUTOMATED COUNT: 13.3 % (ref 11.5–14.5)
GLOBULIN SER CALC-MCNC: 3 G/DL (ref 2–4)
GLUCOSE SERPL-MCNC: 155 MG/DL (ref 65–100)
HCT VFR BLD AUTO: 40.8 % (ref 35–47)
HGB BLD-MCNC: 13 G/DL (ref 11.5–16)
IMM GRANULOCYTES # BLD AUTO: 0 K/UL (ref 0–0.04)
IMM GRANULOCYTES NFR BLD AUTO: 0 % (ref 0–0.5)
LYMPHOCYTES # BLD: 0.3 K/UL (ref 0.8–3.5)
LYMPHOCYTES NFR BLD: 5 % (ref 12–49)
MCH RBC QN AUTO: 31.5 PG (ref 26–34)
MCHC RBC AUTO-ENTMCNC: 31.9 G/DL (ref 30–36.5)
MCV RBC AUTO: 98.8 FL (ref 80–99)
MONOCYTES # BLD: 0.1 K/UL (ref 0–1)
MONOCYTES NFR BLD: 1 % (ref 5–13)
NEUTS SEG # BLD: 5.3 K/UL (ref 1.8–8)
NEUTS SEG NFR BLD: 94 % (ref 32–75)
NRBC # BLD: 0 K/UL (ref 0–0.01)
NRBC BLD-RTO: 0 PER 100 WBC
PLATELET # BLD AUTO: 258 K/UL (ref 150–400)
PMV BLD AUTO: 9.8 FL (ref 8.9–12.9)
POTASSIUM SERPL-SCNC: 4 MMOL/L (ref 3.5–5.1)
PROT SERPL-MCNC: 6.2 G/DL (ref 6.4–8.2)
RBC # BLD AUTO: 4.13 M/UL (ref 3.8–5.2)
RBC MORPH BLD: ABNORMAL
SODIUM SERPL-SCNC: 140 MMOL/L (ref 136–145)
WBC # BLD AUTO: 5.7 K/UL (ref 3.6–11)

## 2022-08-19 PROCEDURE — 74011250637 HC RX REV CODE- 250/637: Performed by: INTERNAL MEDICINE

## 2022-08-19 PROCEDURE — 74011250636 HC RX REV CODE- 250/636: Performed by: INTERNAL MEDICINE

## 2022-08-19 PROCEDURE — 65270000029 HC RM PRIVATE

## 2022-08-19 PROCEDURE — 74011250637 HC RX REV CODE- 250/637: Performed by: NURSE PRACTITIONER

## 2022-08-19 PROCEDURE — 94761 N-INVAS EAR/PLS OXIMETRY MLT: CPT

## 2022-08-19 PROCEDURE — 74011000250 HC RX REV CODE- 250: Performed by: NURSE PRACTITIONER

## 2022-08-19 PROCEDURE — 3331090002 HH PPS REVENUE DEBIT

## 2022-08-19 PROCEDURE — 77010033678 HC OXYGEN DAILY

## 2022-08-19 PROCEDURE — 80053 COMPREHEN METABOLIC PANEL: CPT

## 2022-08-19 PROCEDURE — 85025 COMPLETE CBC W/AUTO DIFF WBC: CPT

## 2022-08-19 PROCEDURE — 99233 SBSQ HOSP IP/OBS HIGH 50: CPT | Performed by: INTERNAL MEDICINE

## 2022-08-19 PROCEDURE — 36415 COLL VENOUS BLD VENIPUNCTURE: CPT

## 2022-08-19 PROCEDURE — 74011636637 HC RX REV CODE- 636/637: Performed by: INTERNAL MEDICINE

## 2022-08-19 PROCEDURE — 3331090001 HH PPS REVENUE CREDIT

## 2022-08-19 RX ORDER — FUROSEMIDE 20 MG/1
20 TABLET ORAL DAILY
Status: COMPLETED | OUTPATIENT
Start: 2022-08-19 | End: 2022-08-22

## 2022-08-19 RX ADMIN — MORPHINE SULFATE 15 MG: 15 TABLET ORAL at 06:17

## 2022-08-19 RX ADMIN — SODIUM CHLORIDE 75.2 MG: 9 INJECTION, SOLUTION INTRAVENOUS at 15:26

## 2022-08-19 RX ADMIN — LORAZEPAM 1 MG: 1 TABLET ORAL at 09:11

## 2022-08-19 RX ADMIN — LORAZEPAM 1 MG: 1 TABLET ORAL at 17:16

## 2022-08-19 RX ADMIN — SULFAMETHOXAZOLE AND TRIMETHOPRIM 1 TABLET: 800; 160 TABLET ORAL at 09:11

## 2022-08-19 RX ADMIN — AMLODIPINE BESYLATE 5 MG: 5 TABLET ORAL at 09:11

## 2022-08-19 RX ADMIN — PANTOPRAZOLE SODIUM 40 MG: 40 TABLET, DELAYED RELEASE ORAL at 08:07

## 2022-08-19 RX ADMIN — Medication 10 ML: at 06:17

## 2022-08-19 RX ADMIN — MORPHINE SULFATE 15 MG: 15 TABLET ORAL at 21:29

## 2022-08-19 RX ADMIN — SODIUM CHLORIDE 45 ML/HR: 9 INJECTION, SOLUTION INTRAVENOUS at 00:27

## 2022-08-19 RX ADMIN — FUROSEMIDE 20 MG: 20 TABLET ORAL at 09:11

## 2022-08-19 RX ADMIN — SODIUM CHLORIDE: 9 INJECTION, SOLUTION INTRAVENOUS at 15:26

## 2022-08-19 RX ADMIN — OLANZAPINE 5 MG: 5 TABLET, FILM COATED ORAL at 17:16

## 2022-08-19 RX ADMIN — LORAZEPAM 1 MG: 1 TABLET ORAL at 02:51

## 2022-08-19 RX ADMIN — PREDNISONE 115 MG: 5 TABLET ORAL at 21:29

## 2022-08-19 RX ADMIN — FLUOXETINE 40 MG: 20 CAPSULE ORAL at 09:11

## 2022-08-19 RX ADMIN — SODIUM CHLORIDE 45 ML/HR: 9 INJECTION, SOLUTION INTRAVENOUS at 21:37

## 2022-08-19 RX ADMIN — Medication 10 ML: at 21:29

## 2022-08-19 RX ADMIN — ONDANSETRON 8 MG: 2 INJECTION INTRAMUSCULAR; INTRAVENOUS at 13:13

## 2022-08-19 RX ADMIN — Medication 10 ML: at 06:21

## 2022-08-19 RX ADMIN — MORPHINE SULFATE 15 MG: 15 TABLET ORAL at 14:08

## 2022-08-19 RX ADMIN — ONDANSETRON 8 MG: 2 INJECTION INTRAMUSCULAR; INTRAVENOUS at 21:29

## 2022-08-19 RX ADMIN — ONDANSETRON 8 MG: 2 INJECTION INTRAMUSCULAR; INTRAVENOUS at 06:17

## 2022-08-19 RX ADMIN — PREDNISONE 115 MG: 5 TABLET ORAL at 08:07

## 2022-08-19 RX ADMIN — LISINOPRIL 20 MG: 20 TABLET ORAL at 09:11

## 2022-08-19 NOTE — PROGRESS NOTES
[facility] [place] at [address], [address] [facility] [address] [facility]  aN [place]: [phone]  F: [phone] Patient ID  Name: Jacky Burton  YOB: 1955  MRN: [id number]  Referring Provider:   No referring provider defined for this encounter. 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 This summary highlights focused aspects of patient's ongoing care and may have a recurring section in notes with either updates or remain unchanged as a longitudinal care summary. -------------------------------------------------------------------------------------------------------------------------------------------------------------------------------------------------------  DIAGNOSIS:  Diffuse Large B-Cell Lymphoma     ORIGINAL STAGING:  Stage IV     SITES OF DISEASE:  Left Cervical Lymph Node,Bone Disease, Stage IV    CURRENT TREATMENT:  C1,D1 on 5/11/22 DA-R-EPOCH  C2,D1 on 6/6/22 DA-R-EPOCH +1 dose escalation (except for doxorubicin due to swelling in C1)  Admission for Cycle 3. PRIOR TREATMENT:  n/a     GOALS OF CARE:  Curative Intent     PATHOLOGY:  Specimen #:[id number] Collect: 4/22/2022      ==========================================================================                    * * *SURGICAL PATHOLOGY REPORT* * *   ==========================================================================   * * *PROCEDURES/ADDENDA* * *   ADDENDUM   Date Ordered: 4/26/2022     Status: Signed Out   Date Complete: 4/26/2022     By: Tiana Ca.  Carmen Reagan MD   Date Reported: 4/26/2022   Addendum Diagnosis   Lymph node, left posterior cervical lymph node, excision:   In situ hybridization for Jesús-Barr viral RNA: Negative   CPT: 50899  Addendum Comment   * * *CLINICAL HISTORY* * * Cervical lymphadenopathy, rule out lymphoma   ==========================================================================   * * *FINAL PATHOLOGIC DIAGNOSIS* * *        Lymph node, left posterior cervical lymph node, excision:        Large B cell lymphoma. See comment. * * *Comment* * *   The histologic section has fragments of lymphoid tissue with diffuse and   focal follicular architecture. Diffuse areas are comprised of large,   atypical lymphocytes with centroblastic morphology and increased mitotic   activity. Immunohistochemical stains confirm the malignant cells are CD20   positive B cells that coexpress CD10 and BCL6 without MUM1. CD23   highlights rare follicular dendritic networks associated with lymphoid   follicles with germinal centers and express CD10 and BCL6 without BCL2. CyclinD1 and CD56 stains are negative. Concurrent flow cytometric analysis   confirms a clonal CD10 positive B-cell population comprised of medium to   large cells. The Ki-67 proliferation index is 30%. The overall findings favor the diagnosis of diffuse large B-cell lymphoma,   germinal center subtype. Molecular genetic studies are pending for further   characterization will be reported in an addendum. Flow cytometry:   Consistent with CD10-positive B-cell lymphoproliferative disorder. Comments: Flow cytometry shows monoclonal B-cells (36% of total cells)   with CD10 expression without CD5, consistent with a CD10-positive B-cell   lymphoproliferative disorder. The main differential diagnosis includes   follicular lymphoma, Burkitt lymphoma and large B-cell lymphoma. Please   correlate the result with morphologic findings and clinical information. Flow Differential (%) and Population Analysis:   Lymphocytes: 93.7%   T-cells (39% of lymphoid cells) show a CD4/CD8 ratio of 3.3 without overt   phenotypic abnormality. NK-cells (1% of lymphoid cells) are unremarkable.    Mature B-cells (56% of lymphoid cells) include large forms based on   forward scattered pattern and show: CD5 neg, CD10+, CD11c+dim, CD19+,   CD20+ with surface lambda light chain restriction. Markers Performed: CD2, CD3, CD4, CD5, CD7, CD8, CD10, CD11c, CD19, CD20,   CD23, CD34, CD38, CD45, CD56, Kappa, Lambda (17 Markers)   The Technical Component Processing of this test was completed at   [facility], [address] [place] [address], [place], [place] / [id number] /   [phone] / Barbara Del Toro # [id number]. Interpretation by Chuck Teran M.D. The   complete scanned report is available in Epic. CPT: Z0446713, [id number], [id number], [id number], [id number], [id number]   [facility]2/2022   *Electronically Signed Out By Anam Mcdaniel. Ju Courtney MD*   ==========================================================================   * * *Gross Description* * *   Specimen #1, received fresh labeled with the patient's name,  and left   posterior cervical lymph node, consists of two paper towels containing a   1.7 x 1.5 x 0.5 cm aggregate of pale pink-tan, fleshy soft tissue   fragments. The specimen is handled according to the flow cytometry   protocol as follows: 7          Two air-dried touch prep slides - one Diff Quik stained, one   rapid-fixed in 95% alcohol   7          Representative sections in B plus fixative (1A)   7          One piece held in RPMI for possible flow cytometry analysis   7          Remaining tissue fixed in formalin for permanents (1B)   Dr. Ju Courtney will determine if flow cytometry is necessary. 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 F who presents as a hospital admission for Cycle #3. Patient reports feeling well overall. Patient reports decreased oral intake with some good and bad days. Patient denies any bowel or bladder problems. Patient denies any pain. Patient denies any shortness of breath. Patient denies any fatigue.  Patient denies any gait disturbance. Interval History: Tolerating tx well. No complaints. Denies N/V but appetite remains poor. Allergies   Allergen Reactions    Oxycodone Nausea Only       Objective:      Visit Vitals  /71 (BP 1 Location: Left upper arm, BP Patient Position: Lying right side)   Pulse 64   Temp 97.7 °F (36.5 °C)   Resp 15   Ht 5' 3\" (1.6 m)   Wt 178 lb 2.1 oz (80.8 kg)   SpO2 94%   BMI 31.55 kg/m²     ECO  General: no distress  Eyes: anicteric sclerae  HENT: atraumatic  Neck: supple  Respiratory: normal respiratory effort  CV: no peripheral edema  GI: soft, nontender, nondistended, no masses, no hepatomegaly, no splenomegaly  Skin: no rashes; no ecchymoses; no petechiae  Psych: alert, oriented, appropriate affect, normal judgment/insight       Results:     Lab Results   Component Value Date/Time    WBC 5.7 2022 03:06 AM    HGB 13.0 2022 03:06 AM    HCT 40.8 2022 03:06 AM    PLATELET 435  03:06 AM    MCV 98.8 2022 03:06 AM     Lab Results   Component Value Date/Time    Sodium 140 2022 03:06 AM    Potassium 4.0 2022 03:06 AM    Chloride 108 2022 03:06 AM    CO2 26 2022 03:06 AM    Anion gap 6 2022 03:06 AM    Glucose 155 (H) 2022 03:06 AM    BUN 8 2022 03:06 AM    Creatinine 0.62 2022 03:06 AM    BUN/Creatinine ratio 13 2022 03:06 AM    GFR est AA >60 2022 03:06 AM    GFR est non-AA >60 2022 03:06 AM    Calcium 8.5 2022 03:06 AM    Bilirubin, total 0.2 2022 03:06 AM    Alk.  phosphatase 86 2022 03:06 AM    Protein, total 6.2 (L) 2022 03:06 AM    Albumin 3.2 (L) 2022 03:06 AM    Globulin 3.0 2022 03:06 AM    A-G Ratio 1.1 2022 03:06 AM    ALT (SGPT) 35 2022 03:06 AM    AST (SGOT) 13 (L) 2022 03:06 AM     IR INSERT TUNL CVC W PORT OVER 5 YEARS    Result Date: 2022  PATIENT NAME: BETO SHEPHERD                                            AGE, : 67 years, 1955 MRN: [id number] DATE: 4/29/2022 11:50 AM PROCEDURE(S): Single lumen Portacath placement SUPERVISING PHYSICIAN: Kentrell Andrade MD OPERATING PROVIDER: Mary García PA-C CLINICAL HISTORY: Mediport placement requested. B-cell lymphoma Patient was evaluated and felt to be an appropriate candidate for conscious sedation. Moderate intravenous conscious sedation was supervised by Kentrell Andrade MD. The patient was independently monitored by a registered nurse assigned to the Department of Radiology using automated blood pressure, EKG, and pulse oximetry. The detail conscious sedation record is stored in the hospital information system. MEDICATION: Antibiotic: Ancef 2g Versed: 6 mg Fentanyl: 100 mcg Intraprocedure time: 40 minutes FINDINGS: After informed consent was obtained, and the risks and benefits of the procedure including infection and bleeding were discussed, the patient was brought to the angiographic suite and placed on the angiographic table in a supine position. The right neck and chest were prepped and draped in maximum sterile barrier technique which includes: cap and mask, sterile gown, sterile gloves, and sterile body drape. The ultrasound transducer was prepped using sterile ultrasound technique which includes: sterile gel and probe cover. After adequate conscious sedation was achieved utilizing Fentanyl and Versed, the skin over the internal jugular vein was anesthetized with 1% lidocaine. Sonographic images of the right neck demonstrated a patent and compressible right internal jugular vein. The vein was accessed under direct sonographic guidance using a 21 gauge micropuncture set. An ultrasonographic image was saved in the record. A 0.035 J wire was advanced through the sheath into the inferior vena cava under fluoroscopic guidance. A peel-away sheath was advanced over the wire into the superior vena cava under fluoroscopic guidance.   Attention was directed to the right chest, and after adequate local anesthesia, a horizontal incision was made. A subcutaneous pocket was then formed using a combination of sharp and blunt dissection. A tunnel was then formed between the pocket and the venotomy site and the catheter passed through the tunnel and through the peel-away sheath. The peel-away sheath was removed and the catheter tip was positioned at the right atrium. The catheter was cut at the pocket access point and the portacath was attached to the catheter and placed snugly in the pocket. The pocket was closed primarily with 2-0 Vicryl suture and Dermabond. The neck access site was closed with 2-0 Vicryl suture and Dermabond. The port was accessed and flushed with heparin solution. The patient tolerated the procedure well. Fluoroscopy demonstrates the catheter tip to be within the right atrium. RADIATION: Fluoroscopy time: 0.3 minutes Air kerma: 5.1 mGy     Technically successful placement of a single lumen port with the catheter tip in the right atrium. Catheter is ready for immediate use. CT NECK SOFT TISSUE W CONT    Result Date: 6/28/2022  INDICATION: Diffuse large b-cell lymphoma, lymph nodes of head, face, and neck EXAM:  CT NECK, CHEST, ABDOMEN, PELVIS WITH CONTRAST COMPARISON: PET scan May 4, 2022 TECHNIQUE:  CT imaging of the neck, chest, abdomen and pelvis was performed after the uneventful intravenous administration of IV contrast. Coronal and sagittal reconstructions were generated. CT dose reduction was achieved through use of a standardized protocol tailored for this examination and automatic exposure control for dose modulation. FINDINGS: NASOPHARYNX: Normal. SUPRAHYOID NECK: Normal oropharynx, oral cavity, parapharyngeal space, and retropharyngeal space. INFRAHYOID NECK: Normal larynx, hypopharynx and supraglottis. PAROTID GLANDS: Normal. SUBMANDIBULAR GLANDS: Absent left submandibular gland.  THYROID GLAND: Normal. LYMPH NODES: Interval decrease in size of previous enlarged left level 2 lymph nodes, the more anterior of which decreased from 1.2 x 1.1 cm to 0.7 x 0.6 cm, and the posterior node decreased from 1.8 x 1.2 cm to 1.1 x 0.7 cm. Scattered subcentimeter lymph nodes throughout the neck. No new or enlarging nodes. OSSEOUS STRUCTURES: No destructive bone lesion. Prior ACDF C5-7. ADDITIONAL COMMENTS: N/A. MEDIASTINUM/ELIZABETH: Scattered nonenlarged lymph nodes. Small hiatal hernia. HEART/PERICARDIUM: Unremarkable. LUNGS/PLEURA: No suspicious nodule. No pulmonary edema or pleural effusion. Tree-in-bud opacities in the lateral right lower lobe, and mild ill-defined nodular groundglass opacities right apex. BONES: No destructive bone lesion. ADDITIONAL COMMENTS: Unchanged round 9 mm density lateral right breast LIVER: No mass or biliary dilatation. GALLBLADDER: Surgically absent. SPLEEN: No enlargement or lesion. PANCREAS: No mass or ductal dilatation. ADRENALS: No mass. KIDNEYS: No mass, calculus, or hydronephrosis. GI TRACT: No bowel obstruction or wall thickening PERITONEUM: No free air or free fluid. APPENDIX: Not clearly visualized. RETROPERITONEUM: Interval decrease in size of previously enlarged lymph nodes: Gastrohepatic ligament lymph node decreased from 4.2 x 1.2 cm to 2.2 x 0.9 cm; aortocaval lymph node at the level of the right kidney, previously 1.2 cm, now 0.6 cm. No new or enlarging lymph nodes. ADDITIONAL COMMENTS: Right paraumbilical ventral hernia containing mesenteric fat and bowel loops similar to prior study. URINARY BLADDER: No mass or calculus. LYMPH NODES: Decreased size of left inguinal lymph nodes from 1.6 cm to 0.8 cm. REPRODUCTIVE ORGANS: Prior hysterectomy. FREE FLUID: None. BONES: No destructive bone lesion. ADDITIONAL COMMENTS: N/A.     1. Interval decrease in size of previously enlarged lymph nodes in the neck, abdomen, and pelvis. No new or enlarging lymph nodes.  2. New tree-in-bud opacities and groundglass opacities in the right lower and upper lobes may be infectious/inflammatory. May be assessed on follow-up examinations. 23X     CT CHEST ABD PELV W CONT    Result Date: 6/28/2022  INDICATION: Diffuse large b-cell lymphoma, lymph nodes of head, face, and neck EXAM:  CT NECK, CHEST, ABDOMEN, PELVIS WITH CONTRAST COMPARISON: PET scan May 4, 2022 TECHNIQUE:  CT imaging of the neck, chest, abdomen and pelvis was performed after the uneventful intravenous administration of IV contrast. Coronal and sagittal reconstructions were generated. CT dose reduction was achieved through use of a standardized protocol tailored for this examination and automatic exposure control for dose modulation. FINDINGS: NASOPHARYNX: Normal. SUPRAHYOID NECK: Normal oropharynx, oral cavity, parapharyngeal space, and retropharyngeal space. INFRAHYOID NECK: Normal larynx, hypopharynx and supraglottis. PAROTID GLANDS: Normal. SUBMANDIBULAR GLANDS: Absent left submandibular gland. THYROID GLAND: Normal. LYMPH NODES: Interval decrease in size of previous enlarged left level 2 lymph nodes, the more anterior of which decreased from 1.2 x 1.1 cm to 0.7 x 0.6 cm, and the posterior node decreased from 1.8 x 1.2 cm to 1.1 x 0.7 cm. Scattered subcentimeter lymph nodes throughout the neck. No new or enlarging nodes. OSSEOUS STRUCTURES: No destructive bone lesion. Prior ACDF C5-7. ADDITIONAL COMMENTS: N/A. MEDIASTINUM/ELIZABETH: Scattered nonenlarged lymph nodes. Small hiatal hernia. HEART/PERICARDIUM: Unremarkable. LUNGS/PLEURA: No suspicious nodule. No pulmonary edema or pleural effusion. Tree-in-bud opacities in the lateral right lower lobe, and mild ill-defined nodular groundglass opacities right apex. BONES: No destructive bone lesion. ADDITIONAL COMMENTS: Unchanged round 9 mm density lateral right breast LIVER: No mass or biliary dilatation. GALLBLADDER: Surgically absent. SPLEEN: No enlargement or lesion. PANCREAS: No mass or ductal dilatation. ADRENALS: No mass. KIDNEYS: No mass, calculus, or hydronephrosis. GI TRACT: No bowel obstruction or wall thickening PERITONEUM: No free air or free fluid. APPENDIX: Not clearly visualized. RETROPERITONEUM: Interval decrease in size of previously enlarged lymph nodes: Gastrohepatic ligament lymph node decreased from 4.2 x 1.2 cm to 2.2 x 0.9 cm; aortocaval lymph node at the level of the right kidney, previously 1.2 cm, now 0.6 cm. No new or enlarging lymph nodes. ADDITIONAL COMMENTS: Right paraumbilical ventral hernia containing mesenteric fat and bowel loops similar to prior study. URINARY BLADDER: No mass or calculus. LYMPH NODES: Decreased size of left inguinal lymph nodes from 1.6 cm to 0.8 cm. REPRODUCTIVE ORGANS: Prior hysterectomy. FREE FLUID: None. BONES: No destructive bone lesion. ADDITIONAL COMMENTS: N/A.     1. Interval decrease in size of previously enlarged lymph nodes in the neck, abdomen, and pelvis. No new or enlarging lymph nodes. 2. New tree-in-bud opacities and groundglass opacities in the right lower and upper lobes may be infectious/inflammatory. May be assessed on follow-up examinations. 23X     PET/CT TUMOR IMAGE [facility] BDY W (INI)    Addendum Date: 5/5/2022    Addendum: There is increased metabolic activity in a small left frontal scalp lesion with SUV of 6.9. Result Date: 5/5/2022  PET/CT SCAN PROCEDURE: Following IV injection of 10.9 mCi 18 Fluoro 2 deoxyglucose (FDG) and a standard uptake delay, PET imaging is performed skull vertex to toes and axial, sagittal and coronal images were acquired. Unenhanced CT is obtained for anatomic localization, and attenuation correction of the PET scan. Patient preprocedure blood glucose level: 98 mg/dL. CORRELATIVE IMAGING STUDIES: None. COMPARISON PET: HISTORY: The study is requested for initial staging. Lymphoma. FINDINGS: HEAD/NECK: There is increased metabolic activity 1.2 cm left level 2 lymph node. There is increased metabolic activity in a 1.2 cm left level 5 lymph node with SUV of 7.7.  CHEST: No foci of abnormal hypermetabolism. Low-grade activity in mediastinal and hilar lymph nodes is nonspecific but may be reactive. ABDOMEN/PELVIS: There is increased metabolic activity in a 2.6 cm hepatogastric ligament lymph node with SUV of 5.7. There is increased metabolic activity in a 1.2 cm right retroperitoneal lymph node with SUV of 6. There is increased metabolic activity in a left retroperitoneal lymph node with SUV of 8 There is slight haziness in the mesentery with small lymph nodes with slight increased metabolic activity of 3.6. There is small focus of increased metabolic activity anterior to the left sacrum and anterior to the right sacrum with SUV of 7 suspicious for lymph nodes. There is increased metabolic activity in the right renal mass posterior cortex with SUV of 6.9. There is increased metabolic activity in a left inguinal lymph node with SUV of 8 There is a right abdominal wall hernia SKELETON: There is increased metabolic activity and left humeral head, right iliac bone, femurs and proximal right tibia. . There is slight increased metabolic activity right humeral head. There is increased metabolic activity in 2 lymph nodes in the left neck, in the hepatogastric ligament and retroperitoneal adenopathy, mesenteric lymph nodes and left inguinal adenopathy suspicious for lymphoma. There is increased metabolic activity in a mass posterior right renal cortex nonspecific but may represent lymphoma. There is increased bony activity as described above suspicious for lymphoma. . 23X        Assessment and Recommendations:      Patient Active Problem List   Diagnosis    Tobacco dependence  -initiated nicotine patch during inpatient. Diffuse large B-cell lymphoma of lymph nodes of neck (HCC)  -fortunately no signs of progressive disease while she has been off treatment.       Encounter for antineoplastic chemotherapy  -move forward with chemotherapy DA-R-EPOCH C3 Dose reduced level 1 (by -20%); proceed with C3d2 today.   -bactrim prophylaxis.  -added lasix 20 mg po prophylaxis for swelling      Poor appetite  -still working on her appetite. Dose reduced her chemotherapy. -will consult dietician; for recs      Anxiety about health  -continue her medications.  -continue to provide reassurance; Cycle 3 dose reduced.  -she is following through better on recommendations to proceed with treatment.      Plan reviewed with Dr Maximus Cedillo

## 2022-08-19 NOTE — PROGRESS NOTES
Problem: Falls - Risk of  Goal: *Absence of Falls  Outcome: Progressing Towards Goal  Note: Fall Risk Interventions:  Mobility Interventions: Patient to call before getting OOB         Medication Interventions: Patient to call before getting OOB, Teach patient to arise slowly                   Problem: Patient Education: Go to Patient Education Activity  Goal: Patient/Family Education  Outcome: Progressing Towards Goal     Problem: Patient Education: Go to Patient Education Activity  Goal: Patient/Family Education  Outcome: Progressing Towards Goal     Problem: Patient Education: Go to Patient Education Activity  Goal: Patient/Family Education  Outcome: Progressing Towards Goal     Problem: Chemotherapy, Day 1  Goal: Off Pathway (Use only if patient is Off Pathway)  Outcome: Progressing Towards Goal  Goal: Activity/Safety  Outcome: Progressing Towards Goal  Goal: Consults, if ordered  Outcome: Progressing Towards Goal  Goal: Diagnostic Test/Procedures  Outcome: Progressing Towards Goal  Goal: Nutrition/Diet  Outcome: Progressing Towards Goal  Goal: Discharge Planning  Outcome: Progressing Towards Goal  Goal: Medications  Outcome: Progressing Towards Goal  Goal: Respiratory  Outcome: Progressing Towards Goal  Goal: Treatments/Interventions/Procedures  Outcome: Progressing Towards Goal  Goal: Psychosocial  Outcome: Progressing Towards Goal  Goal: *Optimal pain control at patient's stated goal  Outcome: Progressing Towards Goal  Goal: *Hemodynamically stable  Outcome: Progressing Towards Goal  Goal: *Adequate oxygenation  Outcome: Progressing Towards Goal  Goal: *Chemotherapy regimen is initiated  Outcome: Progressing Towards Goal  Goal: *Patient and family verbalize understanding of plan of care  Outcome: Progressing Towards Goal     Problem: Chemotherapy, Day 1  Goal: Off Pathway (Use only if patient is Off Pathway)  Outcome: Progressing Towards Goal  Goal: Activity/Safety  Outcome: Progressing Towards Goal  Goal: Consults, if ordered  Outcome: Progressing Towards Goal  Goal: Diagnostic Test/Procedures  Outcome: Progressing Towards Goal  Goal: Nutrition/Diet  Outcome: Progressing Towards Goal  Goal: Discharge Planning  Outcome: Progressing Towards Goal  Goal: Medications  Outcome: Progressing Towards Goal  Goal: Respiratory  Outcome: Progressing Towards Goal  Goal: Treatments/Interventions/Procedures  Outcome: Progressing Towards Goal  Goal: Psychosocial  Outcome: Progressing Towards Goal  Goal: *Optimal pain control at patient's stated goal  Outcome: Progressing Towards Goal  Goal: *Hemodynamically stable  Outcome: Progressing Towards Goal  Goal: *Adequate oxygenation  Outcome: Progressing Towards Goal  Goal: *Chemotherapy regimen is initiated  Outcome: Progressing Towards Goal  Goal: *Patient and family verbalize understanding of plan of care  Outcome: Progressing Towards Goal

## 2022-08-19 NOTE — PROGRESS NOTES
8/19/2022  11:15 AM  Pt discussed in IDR is continuing top require medical management for Diffuse Large B-Cell Lymphoma, admitted for Cycle 3 Chemotherapy  Transitions of Care Plan:  RUR 15 % Moderate Risk of Readmission/Yellow   LOS 1 Day  Medical management continues  Cycle 3 Chemotherapy through 8/22  CM to follow through for treatment/response  DC 8/22 to home w/ family assistance, pt resides w/ her son, is independent w/ ADLs and drives   Outpatient oncology  Family will transport at DC   CM will follow and assist in DC needs  YAIMA oDuglas

## 2022-08-19 NOTE — ROUTINE PROCESS
Bedside and Verbal shift change report given to Mallie Riedel (oncoming nurse) by Santo Goodson (offgoing nurse). Report included the following information SBAR and Kardex.

## 2022-08-19 NOTE — PROGRESS NOTES
Comprehensive Nutrition Assessment    Type and Reason for Visit: Consult    Nutrition Recommendations/Plan:   Continue regular diet order  Provide Ensure Enlive TID (1050 kcal, 132 g carbs, 60 g P) to aid in meeting kcal/protein needs. Consider starting appetite stimulant      Malnutrition Assessment:  Malnutrition Status: Moderate malnutrition (08/19/22 1348)    Context:  Chronic illness   - acute on chronic   Findings of the 6 clinical characteristics of malnutrition:   Energy Intake:  Mild decrease in energy intake (specify) - x 1 week  Weight Loss:  Greater than 7.5% over 3 months     Body Fat Loss:  No significant body fat loss,     Muscle Mass Loss:  Mild muscle mass loss, Clavicles (pectoralis &deltoids), Temples (temporalis)  Fluid Accumulation:  No significant fluid accumulation,     Strength:  Not performed       Nutrition Assessment:     Pt is a 78year old female admitted with Diffuse large B cell lymphoma ([facility], [place] [address] [C83.30]. She  has a past medical history of Adverse effect of anesthesia, Anxiety, COPD (chronic obstructive pulmonary disease) ([facility] [place] [address] (2022), Depression, GERD (gastroesophageal reflux disease), Hernia, femoral, Hypertension, Joint pain, Nausea & vomiting, and TMJ arthritis. RD consulted for poor intake/appetite. Patient currently recieiving chemotherapy. No PO intake documented. Per chart review, patient has lost 21# (10.5%) x 3 months, clinically significant for timeframe. Patient states # and reports decreased appetite since starting chemo. Denies chewing/swallowing problems but states she feels that pills and some foods 'won't go down'. Drinks Ensure at home - voiced acceptance of ONS during admission. Discussed starting appetite stimulant. NKFA. No N/V/D at this time.     Wt Readings from Last 10 Encounters:   08/19/22 80.8 kg (178 lb 2.1 oz)   08/17/22 79.5 kg (175 lb 3.2 oz)   08/05/22 80.7 kg (178 lb)   07/22/22 84.1 kg (185 lb 8 oz)   07/19/22 86.1 kg (189 lb 12.8 oz) 07/05/22 84.6 kg (186 lb 9.6 oz)   06/14/22 90.7 kg (200 lb)   06/10/22 99.9 kg (220 lb 3.8 oz)   05/31/22 90.6 kg (199 lb 11.2 oz)   05/31/22 90.9 kg (200 lb 6.4 oz)       Nutrition Related Findings:      Wound Type: None  Abdominal Assessment: Intact  Appetite: Good  Bowel Sounds: Active   Edema:No data recorded    Nutr. Labs:    Lab Results   Component Value Date/Time    GFR est AA >60 08/19/2022 03:06 AM    GFR est non-AA >60 08/19/2022 03:06 AM    Creatinine 0.62 08/19/2022 03:06 AM    BUN 8 08/19/2022 03:06 AM    Sodium 140 08/19/2022 03:06 AM    Potassium 4.0 08/19/2022 03:06 AM    Chloride 108 08/19/2022 03:06 AM    CO2 26 08/19/2022 03:06 AM       Lab Results   Component Value Date/Time    Glucose 155 (H) 08/19/2022 03:06 AM    Glucose (POC) 98 05/04/2022 01:37 PM       No results found for: HBA1C, OHT2UUBT, YCZ2NAST, TSC6UJBV    Nutr. Meds:  Norvasc, lasix, lisinopril, zofran PRN, Protonix, miralax PRN, prednisone, bactrim      Current Nutrition Intake & Therapies:  Average Meal Intake: Unable to assess  Average Supplement Intake: None ordered  ADULT DIET Regular  DIET ONE TIME MESSAGE    Anthropometric Measures:  Height: 5' 2.99\" (160 cm)  Ideal Body Weight (IBW): 115 lbs (52 kg)     Current Body Wt:  80.8 kg (178 lb 2.1 oz), 154.9 % IBW. Standing scale  Current BMI (kg/m2): 31.6  Usual Body Weight: 90.7 kg (200 lb)  % Weight Change (Calculated): -10.9  Weight Adjustment: No adjustment                 BMI Category: Obese class 1 (BMI 30.0-34. 9)    Estimated Daily Nutrient Needs:  Energy Requirements Based On: Kcal/kg  Weight Used for Energy Requirements: Current  Energy (kcal/day): 1101-7464 (30-35 kcal/kg, cancer)  Weight Used for Protein Requirements: Current  Protein (g/day):  (1.2-1.5 g/kg)     Fluid (ml/day): 1806-2703    Nutrition Diagnosis:   Increased nutrient needs related to catabolic illness as evidenced by  (stage IV cancer)  Moderate malnutrition related to inadequate protein-energy intake as evidenced by poor intake prior to admission, weight loss 7.5% in 3 months    Nutrition Interventions:   Food and/or Nutrient Delivery: Continue current diet, Start oral nutrition supplement  Nutrition Education/Counseling: No recommendations at this time  Coordination of Nutrition Care: Continue to monitor while inpatient, Interdisciplinary rounds       Goals:     Goals: by next RD assessment, PO intake 50% or greater       Nutrition Monitoring and Evaluation:   Behavioral-Environmental Outcomes: None identified  Food/Nutrient Intake Outcomes: Food and nutrient intake, Supplement intake  Physical Signs/Symptoms Outcomes: Biochemical data, Weight    Discharge Planning:    Continue oral nutrition supplement    Katie Stein MS, RD  Contact: Ext: [phone], or via [employer]

## 2022-08-19 NOTE — PROGRESS NOTES
Problem: Falls - Risk of  Goal: *Absence of Falls  Description: Document Danne Lobe Fall Risk and appropriate interventions in the flowsheet.   Outcome: Progressing Towards Goal  Note: Fall Risk Interventions:  Mobility Interventions: Patient to call before getting OOB, Communicate number of staff needed for ambulation/transfer         Medication Interventions: Patient to call before getting OOB, Teach patient to arise slowly                   Problem: Chemotherapy, Day 1  Goal: Off Pathway (Use only if patient is Off Pathway)  Outcome: Progressing Towards Goal  Goal: Activity/Safety  Outcome: Progressing Towards Goal  Goal: Consults, if ordered  Outcome: Progressing Towards Goal  Goal: Diagnostic Test/Procedures  Outcome: Progressing Towards Goal  Goal: Nutrition/Diet  Outcome: Progressing Towards Goal  Goal: Discharge Planning  Outcome: Progressing Towards Goal  Goal: Medications  Outcome: Progressing Towards Goal  Goal: Respiratory  Outcome: Progressing Towards Goal  Goal: Treatments/Interventions/Procedures  Outcome: Progressing Towards Goal  Goal: Psychosocial  Outcome: Progressing Towards Goal  Goal: *Optimal pain control at patient's stated goal  Outcome: Progressing Towards Goal  Goal: *Hemodynamically stable  Outcome: Progressing Towards Goal  Goal: *Adequate oxygenation  Outcome: Progressing Towards Goal  Goal: *Chemotherapy regimen is initiated  Outcome: Progressing Towards Goal  Goal: *Patient and family verbalize understanding of plan of care  Outcome: Progressing Towards Goal

## 2022-08-20 ENCOUNTER — HOME CARE VISIT (OUTPATIENT)
Dept: SCHEDULING | Facility: HOME HEALTH | Age: 67
End: 2022-08-20
Payer: MEDICARE

## 2022-08-20 LAB
ALBUMIN SERPL-MCNC: 3 G/DL (ref 3.5–5)
ALBUMIN/GLOB SERPL: 1.2 {RATIO} (ref 1.1–2.2)
ALP SERPL-CCNC: 75 U/L (ref 45–117)
ALT SERPL-CCNC: 30 U/L (ref 12–78)
ANION GAP SERPL CALC-SCNC: 5 MMOL/L (ref 5–15)
AST SERPL-CCNC: 12 U/L (ref 15–37)
BASOPHILS # BLD: 0 K/UL (ref 0–0.1)
BASOPHILS NFR BLD: 0 % (ref 0–1)
BILIRUB SERPL-MCNC: 0.2 MG/DL (ref 0.2–1)
BUN SERPL-MCNC: 11 MG/DL (ref 6–20)
BUN/CREAT SERPL: 15 (ref 12–20)
CALCIUM SERPL-MCNC: 8.3 MG/DL (ref 8.5–10.1)
CHLORIDE SERPL-SCNC: 111 MMOL/L (ref 97–108)
CO2 SERPL-SCNC: 27 MMOL/L (ref 21–32)
CREAT SERPL-MCNC: 0.72 MG/DL (ref 0.55–1.02)
DIFFERENTIAL METHOD BLD: ABNORMAL
EOSINOPHIL # BLD: 0 K/UL (ref 0–0.4)
EOSINOPHIL NFR BLD: 0 % (ref 0–7)
ERYTHROCYTE [DISTWIDTH] IN BLOOD BY AUTOMATED COUNT: 13.3 % (ref 11.5–14.5)
GLOBULIN SER CALC-MCNC: 2.6 G/DL (ref 2–4)
GLUCOSE SERPL-MCNC: 128 MG/DL (ref 65–100)
HCT VFR BLD AUTO: 39.2 % (ref 35–47)
HGB BLD-MCNC: 12.9 G/DL (ref 11.5–16)
IMM GRANULOCYTES # BLD AUTO: 0 K/UL (ref 0–0.04)
IMM GRANULOCYTES NFR BLD AUTO: 0 % (ref 0–0.5)
LYMPHOCYTES # BLD: 0.3 K/UL (ref 0.8–3.5)
LYMPHOCYTES NFR BLD: 3 % (ref 12–49)
MCH RBC QN AUTO: 31.2 PG (ref 26–34)
MCHC RBC AUTO-ENTMCNC: 32.9 G/DL (ref 30–36.5)
MCV RBC AUTO: 94.9 FL (ref 80–99)
MONOCYTES # BLD: 0.3 K/UL (ref 0–1)
MONOCYTES NFR BLD: 3 % (ref 5–13)
NEUTS SEG # BLD: 10.5 K/UL (ref 1.8–8)
NEUTS SEG NFR BLD: 94 % (ref 32–75)
NRBC # BLD: 0 K/UL (ref 0–0.01)
NRBC BLD-RTO: 0 PER 100 WBC
PLATELET # BLD AUTO: 280 K/UL (ref 150–400)
PMV BLD AUTO: 9.5 FL (ref 8.9–12.9)
POTASSIUM SERPL-SCNC: 3.6 MMOL/L (ref 3.5–5.1)
PROT SERPL-MCNC: 5.6 G/DL (ref 6.4–8.2)
RBC # BLD AUTO: 4.13 M/UL (ref 3.8–5.2)
SODIUM SERPL-SCNC: 143 MMOL/L (ref 136–145)
WBC # BLD AUTO: 11.2 K/UL (ref 3.6–11)

## 2022-08-20 PROCEDURE — 74011250637 HC RX REV CODE- 250/637: Performed by: NURSE PRACTITIONER

## 2022-08-20 PROCEDURE — 74011000250 HC RX REV CODE- 250: Performed by: NURSE PRACTITIONER

## 2022-08-20 PROCEDURE — 74011250636 HC RX REV CODE- 250/636: Performed by: INTERNAL MEDICINE

## 2022-08-20 PROCEDURE — 85025 COMPLETE CBC W/AUTO DIFF WBC: CPT

## 2022-08-20 PROCEDURE — 74011250637 HC RX REV CODE- 250/637: Performed by: INTERNAL MEDICINE

## 2022-08-20 PROCEDURE — 3331090003 HH PPS REVENUE ADJ

## 2022-08-20 PROCEDURE — 74011636637 HC RX REV CODE- 636/637: Performed by: INTERNAL MEDICINE

## 2022-08-20 PROCEDURE — 80053 COMPREHEN METABOLIC PANEL: CPT

## 2022-08-20 PROCEDURE — 3331090002 HH PPS REVENUE DEBIT

## 2022-08-20 PROCEDURE — 36415 COLL VENOUS BLD VENIPUNCTURE: CPT

## 2022-08-20 PROCEDURE — 3331090001 HH PPS REVENUE CREDIT

## 2022-08-20 PROCEDURE — 65270000029 HC RM PRIVATE

## 2022-08-20 PROCEDURE — 94761 N-INVAS EAR/PLS OXIMETRY MLT: CPT

## 2022-08-20 PROCEDURE — 77010033678 HC OXYGEN DAILY

## 2022-08-20 PROCEDURE — 99233 SBSQ HOSP IP/OBS HIGH 50: CPT | Performed by: INTERNAL MEDICINE

## 2022-08-20 RX ORDER — DIPHENHYDRAMINE HCL 25 MG
25 CAPSULE ORAL
Status: DISCONTINUED | OUTPATIENT
Start: 2022-08-20 | End: 2022-08-22 | Stop reason: HOSPADM

## 2022-08-20 RX ADMIN — PANTOPRAZOLE SODIUM 40 MG: 40 TABLET, DELAYED RELEASE ORAL at 06:44

## 2022-08-20 RX ADMIN — FUROSEMIDE 20 MG: 20 TABLET ORAL at 08:18

## 2022-08-20 RX ADMIN — MORPHINE SULFATE 15 MG: 15 TABLET ORAL at 06:44

## 2022-08-20 RX ADMIN — Medication 10 ML: at 13:45

## 2022-08-20 RX ADMIN — SODIUM CHLORIDE: 9 INJECTION, SOLUTION INTRAVENOUS at 14:05

## 2022-08-20 RX ADMIN — AMLODIPINE BESYLATE 5 MG: 5 TABLET ORAL at 08:18

## 2022-08-20 RX ADMIN — LISINOPRIL 20 MG: 20 TABLET ORAL at 08:18

## 2022-08-20 RX ADMIN — PREDNISONE 115 MG: 5 TABLET ORAL at 08:18

## 2022-08-20 RX ADMIN — MORPHINE SULFATE 15 MG: 15 TABLET ORAL at 13:44

## 2022-08-20 RX ADMIN — ONDANSETRON 8 MG: 2 INJECTION INTRAMUSCULAR; INTRAVENOUS at 12:15

## 2022-08-20 RX ADMIN — SODIUM CHLORIDE 45 ML/HR: 9 INJECTION, SOLUTION INTRAVENOUS at 17:15

## 2022-08-20 RX ADMIN — MORPHINE SULFATE 15 MG: 15 TABLET ORAL at 21:45

## 2022-08-20 RX ADMIN — Medication 10 ML: at 06:44

## 2022-08-20 RX ADMIN — ONDANSETRON 8 MG: 2 INJECTION INTRAMUSCULAR; INTRAVENOUS at 06:44

## 2022-08-20 RX ADMIN — SODIUM CHLORIDE 75.2 MG: 9 INJECTION, SOLUTION INTRAVENOUS at 14:05

## 2022-08-20 RX ADMIN — LORAZEPAM 1 MG: 1 TABLET ORAL at 08:30

## 2022-08-20 RX ADMIN — Medication 10 ML: at 21:47

## 2022-08-20 RX ADMIN — PREDNISONE 115 MG: 5 TABLET ORAL at 21:45

## 2022-08-20 RX ADMIN — LORAZEPAM 1 MG: 1 TABLET ORAL at 15:32

## 2022-08-20 RX ADMIN — ONDANSETRON 8 MG: 2 INJECTION INTRAMUSCULAR; INTRAVENOUS at 21:45

## 2022-08-20 RX ADMIN — LORAZEPAM 1 MG: 1 TABLET ORAL at 03:30

## 2022-08-20 RX ADMIN — DIPHENHYDRAMINE HYDROCHLORIDE 25 MG: 25 CAPSULE ORAL at 13:44

## 2022-08-20 RX ADMIN — OLANZAPINE 5 MG: 5 TABLET, FILM COATED ORAL at 17:15

## 2022-08-20 RX ADMIN — FLUOXETINE 40 MG: 20 CAPSULE ORAL at 08:18

## 2022-08-20 NOTE — PROGRESS NOTES
Bedside shift change report given to Tiffanie (oncoming nurse) by Evon Vance (offgoing nurse). Report included the following information SBAR, Kardex, Intake/Output, MAR, and Recent Results.

## 2022-08-20 NOTE — PROGRESS NOTES
Bedside and Verbal shift change report given to DEANNA Eaton (oncoming nurse) by Paxton Juarez (offgoing nurse). Report included the following information SBAR and Kardex.

## 2022-08-20 NOTE — PROGRESS NOTES
Problem: Falls - Risk of  Goal: *Absence of Falls  Outcome: Progressing Towards Goal  Note: Fall Risk Interventions:  Mobility Interventions: Patient to call before getting OOB, Communicate number of staff needed for ambulation/transfer         Medication Interventions: Patient to call before getting OOB, Teach patient to arise slowly                   Problem: Patient Education: Go to Patient Education Activity  Goal: Patient/Family Education  Outcome: Progressing Towards Goal     Problem: Patient Education: Go to Patient Education Activity  Goal: Patient/Family Education  Outcome: Progressing Towards Goal     Problem: Chemotherapy, Day 3 to Discharge  Goal: Off Pathway (Use only if patient is Off Pathway)  Outcome: Progressing Towards Goal  Goal: Activity/Safety  Outcome: Progressing Towards Goal  Goal: Consults, if ordered  Outcome: Progressing Towards Goal  Goal: Diagnostic Test/Procedures  Outcome: Progressing Towards Goal  Goal: Nutrition/Diet  Outcome: Progressing Towards Goal  Goal: Discharge Planning  Outcome: Progressing Towards Goal  Goal: Medications  Outcome: Progressing Towards Goal  Goal: Respiratory  Outcome: Progressing Towards Goal  Goal: Treatments/Interventions/Procedures  Outcome: Progressing Towards Goal  Goal: Psychosocial  Outcome: Progressing Towards Goal  Goal: *Optimal pain control at patient's stated goal  Outcome: Progressing Towards Goal  Goal: *Hemodynamically stable  Outcome: Progressing Towards Goal  Goal: *Adequate oxygenation  Outcome: Progressing Towards Goal     Problem: Chemotherapy, Day 3 to Discharge  Goal: Off Pathway (Use only if patient is Off Pathway)  Outcome: Progressing Towards Goal     Problem: Chemotherapy, Day 3 to Discharge  Goal: Activity/Safety  Outcome: Progressing Towards Goal     Problem: Chemotherapy, Day 3 to Discharge  Goal: Consults, if ordered  Outcome: Progressing Towards Goal

## 2022-08-20 NOTE — PROGRESS NOTES
[facility] [place] at [address], [address] [facility]  [place]: [phone]  F: [phone] Patient ID  Name: Barbara Navarro  YOB: 1955  MRN: [id number]  Referring Provider:   No referring provider defined for this encounter. 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 This summary highlights focused aspects of patient's ongoing care and may have a recurring section in notes with either updates or remain unchanged as a longitudinal care summary. -------------------------------------------------------------------------------------------------------------------------------------------------------------------------------------------------------  DIAGNOSIS:  Diffuse Large B-Cell Lymphoma     ORIGINAL STAGING:  Stage IV     SITES OF DISEASE:  Left Cervical Lymph Node,Bone Disease, Stage IV    CURRENT TREATMENT:  C1,D1 on 5/11/22 DA-R-EPOCH  C2,D1 on 6/6/22 DA-R-EPOCH +1 dose escalation (except for doxorubicin due to swelling in C1)  Admission for Cycle 3. PRIOR TREATMENT:  n/a     GOALS OF CARE:  Curative Intent     PATHOLOGY:  Specimen #:[id number] Collect: 4/22/2022      ==========================================================================                    * * *SURGICAL PATHOLOGY REPORT* * *   ==========================================================================   * * *PROCEDURES/ADDENDA* * *   ADDENDUM   Date Ordered: 4/26/2022     Status: Signed Out   Date Complete: 4/26/2022     By: Ronal Melchor.  Julissa Vega MD   Date Reported: 4/26/2022   Addendum Diagnosis   Lymph node, left posterior cervical lymph node, excision:   In situ hybridization for Jesús-Barr viral RNA: Negative   CPT: 38214  Addendum Comment   * * *CLINICAL HISTORY* * * Cervical lymphadenopathy, rule out lymphoma   ==========================================================================   * * *FINAL PATHOLOGIC DIAGNOSIS* * *        Lymph node, left posterior cervical lymph node, excision:        Large B cell lymphoma. See comment. * * *Comment* * *   The histologic section has fragments of lymphoid tissue with diffuse and   focal follicular architecture. Diffuse areas are comprised of large,   atypical lymphocytes with centroblastic morphology and increased mitotic   activity. Immunohistochemical stains confirm the malignant cells are CD20   positive B cells that coexpress CD10 and BCL6 without MUM1. CD23   highlights rare follicular dendritic networks associated with lymphoid   follicles with germinal centers and express CD10 and BCL6 without BCL2. CyclinD1 and CD56 stains are negative. Concurrent flow cytometric analysis   confirms a clonal CD10 positive B-cell population comprised of medium to   large cells. The Ki-67 proliferation index is 30%. The overall findings favor the diagnosis of diffuse large B-cell lymphoma,   germinal center subtype. Molecular genetic studies are pending for further   characterization will be reported in an addendum. Flow cytometry:   Consistent with CD10-positive B-cell lymphoproliferative disorder. Comments: Flow cytometry shows monoclonal B-cells (36% of total cells)   with CD10 expression without CD5, consistent with a CD10-positive B-cell   lymphoproliferative disorder. The main differential diagnosis includes   follicular lymphoma, Burkitt lymphoma and large B-cell lymphoma. Please   correlate the result with morphologic findings and clinical information. Flow Differential (%) and Population Analysis:   Lymphocytes: 93.7%   T-cells (39% of lymphoid cells) show a CD4/CD8 ratio of 3.3 without overt   phenotypic abnormality. NK-cells (1% of lymphoid cells) are unremarkable.    Mature B-cells (56% of lymphoid cells) include large forms based on   forward scattered pattern and show: CD5 neg, CD10+, CD11c+dim, CD19+,   CD20+ with surface lambda light chain restriction. Markers Performed: CD2, CD3, CD4, CD5, CD7, CD8, CD10, CD11c, CD19, CD20,   CD23, CD34, CD38, CD45, CD56, Kappa, Lambda (17 Markers)   The Technical Component Processing of this test was completed at   [facility], [address] [place] [address], [place], [place] / [id number] /   [phone] / [facility] # [id number]. Interpretation by Boaz Peterson M.D. The   complete scanned report is available in Epic. CPT: R6045982, M9512750, [id number], [id number], [id number], [id number]   [facility]2/2022   *Electronically Signed Out By Hector Gillis. Vikas Slade MD*   ==========================================================================   * * *Gross Description* * *   Specimen #1, received fresh labeled with the patient's name,  and left   posterior cervical lymph node, consists of two paper towels containing a   1.7 x 1.5 x 0.5 cm aggregate of pale pink-tan, fleshy soft tissue   fragments. The specimen is handled according to the flow cytometry   protocol as follows: 7          Two air-dried touch prep slides - one Diff Quik stained, one   rapid-fixed in 95% alcohol   7          Representative sections in B plus fixative (1A)   7          One piece held in RPMI for possible flow cytometry analysis   7          Remaining tissue fixed in formalin for permanents (1B)   Dr. Vikas Slade will determine if flow cytometry is necessary. 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 F who presents as a hospital admission for Cycle #3. Patient reports feeling well overall. Patient reports decreased oral intake with some good and bad days. Patient denies any bowel or bladder problems. Patient denies any pain. Patient denies any shortness of breath. Patient denies any fatigue.  Patient denies any gait disturbance. Interval History:   She is feeling ok today, no changes. Pain in legs, controlled with medications. No nausea. Allergies   Allergen Reactions    Oxycodone Nausea Only       Objective:      Visit Vitals  /68 (BP 1 Location: Left lower arm)   Pulse 74   Temp 97.9 °F (36.6 °C)   Resp 16   Ht 5' 2.99\" (1.6 m)   Wt 180 lb 8.9 oz (81.9 kg)   SpO2 93%   BMI 31.99 kg/m²     ECO  General: no distress  Eyes: anicteric sclerae  HENT: atraumatic  Neck: supple  Respiratory: normal respiratory effort  CV: no peripheral edema  GI: soft, nontender, nondistended, no masses, no hepatomegaly, no splenomegaly  Skin: no rashes; no ecchymoses; no petechiae  Psych: alert, oriented, appropriate affect, normal judgment/insight       Results:     Lab Results   Component Value Date/Time    WBC 11.2 (H) 2022 03:32 AM    HGB 12.9 2022 03:32 AM    HCT 39.2 2022 03:32 AM    PLATELET 490  03:32 AM    MCV 94.9 2022 03:32 AM     Lab Results   Component Value Date/Time    Sodium 143 2022 03:32 AM    Potassium 3.6 2022 03:32 AM    Chloride 111 (H) 2022 03:32 AM    CO2 27 2022 03:32 AM    Anion gap 5 2022 03:32 AM    Glucose 128 (H) 2022 03:32 AM    BUN 11 2022 03:32 AM    Creatinine 0.72 2022 03:32 AM    BUN/Creatinine ratio 15 2022 03:32 AM    GFR est AA >60 2022 03:32 AM    GFR est non-AA >60 2022 03:32 AM    Calcium 8.3 (L) 2022 03:32 AM    Bilirubin, total 0.2 2022 03:32 AM    Alk.  phosphatase 75 2022 03:32 AM    Protein, total 5.6 (L) 2022 03:32 AM    Albumin 3.0 (L) 2022 03:32 AM    Globulin 2.6 2022 03:32 AM    A-G Ratio 1.2 2022 03:32 AM    ALT (SGPT) 30 2022 03:32 AM    AST (SGOT) 12 (L) 2022 03:32 AM     IR INSERT TUNL CVC W PORT OVER 5 YEARS    Result Date: 2022  PATIENT NAME: Juan Salas                                            AGE, : 78 years, 1955 MRN: [id number] DATE: 4/29/2022 11:50 AM PROCEDURE(S): Single lumen Portacath placement SUPERVISING PHYSICIAN: Jaguar Wheeler MD OPERATING PROVIDER: Jasmin Ulloa PA-C CLINICAL HISTORY: Mediport placement requested. B-cell lymphoma Patient was evaluated and felt to be an appropriate candidate for conscious sedation. Moderate intravenous conscious sedation was supervised by Jaguar Wheeler MD. The patient was independently monitored by a registered nurse assigned to the Department of Radiology using automated blood pressure, EKG, and pulse oximetry. The detail conscious sedation record is stored in the hospital information system. MEDICATION: Antibiotic: Ancef 2g Versed: 6 mg Fentanyl: 100 mcg Intraprocedure time: 40 minutes FINDINGS: After informed consent was obtained, and the risks and benefits of the procedure including infection and bleeding were discussed, the patient was brought to the angiographic suite and placed on the angiographic table in a supine position. The right neck and chest were prepped and draped in maximum sterile barrier technique which includes: cap and mask, sterile gown, sterile gloves, and sterile body drape. The ultrasound transducer was prepped using sterile ultrasound technique which includes: sterile gel and probe cover. After adequate conscious sedation was achieved utilizing Fentanyl and Versed, the skin over the internal jugular vein was anesthetized with 1% lidocaine. Sonographic images of the right neck demonstrated a patent and compressible right internal jugular vein. The vein was accessed under direct sonographic guidance using a 21 gauge micropuncture set. An ultrasonographic image was saved in the record. A 0.035 J wire was advanced through the sheath into the inferior vena cava under fluoroscopic guidance. A peel-away sheath was advanced over the wire into the superior vena cava under fluoroscopic guidance.   Attention was directed to the right chest, and after adequate local anesthesia, a horizontal incision was made. A subcutaneous pocket was then formed using a combination of sharp and blunt dissection. A tunnel was then formed between the pocket and the venotomy site and the catheter passed through the tunnel and through the peel-away sheath. The peel-away sheath was removed and the catheter tip was positioned at the right atrium. The catheter was cut at the pocket access point and the portacath was attached to the catheter and placed snugly in the pocket. The pocket was closed primarily with 2-0 Vicryl suture and Dermabond. The neck access site was closed with 2-0 Vicryl suture and Dermabond. The port was accessed and flushed with heparin solution. The patient tolerated the procedure well. Fluoroscopy demonstrates the catheter tip to be within the right atrium. RADIATION: Fluoroscopy time: 0.3 minutes Air kerma: 5.1 mGy     Technically successful placement of a single lumen port with the catheter tip in the right atrium. Catheter is ready for immediate use. CT NECK SOFT TISSUE W CONT    Result Date: 6/28/2022  INDICATION: Diffuse large b-cell lymphoma, lymph nodes of head, face, and neck EXAM:  CT NECK, CHEST, ABDOMEN, PELVIS WITH CONTRAST COMPARISON: PET scan May 4, 2022 TECHNIQUE:  CT imaging of the neck, chest, abdomen and pelvis was performed after the uneventful intravenous administration of IV contrast. Coronal and sagittal reconstructions were generated. CT dose reduction was achieved through use of a standardized protocol tailored for this examination and automatic exposure control for dose modulation. FINDINGS: NASOPHARYNX: Normal. SUPRAHYOID NECK: Normal oropharynx, oral cavity, parapharyngeal space, and retropharyngeal space. INFRAHYOID NECK: Normal larynx, hypopharynx and supraglottis. PAROTID GLANDS: Normal. SUBMANDIBULAR GLANDS: Absent left submandibular gland.  THYROID GLAND: Normal. LYMPH NODES: Interval decrease in size of previous enlarged left level 2 lymph nodes, the more anterior of which decreased from 1.2 x 1.1 cm to 0.7 x 0.6 cm, and the posterior node decreased from 1.8 x 1.2 cm to 1.1 x 0.7 cm. Scattered subcentimeter lymph nodes throughout the neck. No new or enlarging nodes. OSSEOUS STRUCTURES: No destructive bone lesion. Prior ACDF C5-7. ADDITIONAL COMMENTS: N/A. MEDIASTINUM/ELIZABETH: Scattered nonenlarged lymph nodes. Small hiatal hernia. HEART/PERICARDIUM: Unremarkable. LUNGS/PLEURA: No suspicious nodule. No pulmonary edema or pleural effusion. Tree-in-bud opacities in the lateral right lower lobe, and mild ill-defined nodular groundglass opacities right apex. BONES: No destructive bone lesion. ADDITIONAL COMMENTS: Unchanged round 9 mm density lateral right breast LIVER: No mass or biliary dilatation. GALLBLADDER: Surgically absent. SPLEEN: No enlargement or lesion. PANCREAS: No mass or ductal dilatation. ADRENALS: No mass. KIDNEYS: No mass, calculus, or hydronephrosis. GI TRACT: No bowel obstruction or wall thickening PERITONEUM: No free air or free fluid. APPENDIX: Not clearly visualized. RETROPERITONEUM: Interval decrease in size of previously enlarged lymph nodes: Gastrohepatic ligament lymph node decreased from 4.2 x 1.2 cm to 2.2 x 0.9 cm; aortocaval lymph node at the level of the right kidney, previously 1.2 cm, now 0.6 cm. No new or enlarging lymph nodes. ADDITIONAL COMMENTS: Right paraumbilical ventral hernia containing mesenteric fat and bowel loops similar to prior study. URINARY BLADDER: No mass or calculus. LYMPH NODES: Decreased size of left inguinal lymph nodes from 1.6 cm to 0.8 cm. REPRODUCTIVE ORGANS: Prior hysterectomy. FREE FLUID: None. BONES: No destructive bone lesion. ADDITIONAL COMMENTS: N/A.     1. Interval decrease in size of previously enlarged lymph nodes in the neck, abdomen, and pelvis. No new or enlarging lymph nodes.  2. New tree-in-bud opacities and groundglass opacities in the right lower and upper lobes may be infectious/inflammatory. May be assessed on follow-up examinations. 23X     CT CHEST ABD PELV W CONT    Result Date: 6/28/2022  INDICATION: Diffuse large b-cell lymphoma, lymph nodes of head, face, and neck EXAM:  CT NECK, CHEST, ABDOMEN, PELVIS WITH CONTRAST COMPARISON: PET scan May 4, 2022 TECHNIQUE:  CT imaging of the neck, chest, abdomen and pelvis was performed after the uneventful intravenous administration of IV contrast. Coronal and sagittal reconstructions were generated. CT dose reduction was achieved through use of a standardized protocol tailored for this examination and automatic exposure control for dose modulation. FINDINGS: NASOPHARYNX: Normal. SUPRAHYOID NECK: Normal oropharynx, oral cavity, parapharyngeal space, and retropharyngeal space. INFRAHYOID NECK: Normal larynx, hypopharynx and supraglottis. PAROTID GLANDS: Normal. SUBMANDIBULAR GLANDS: Absent left submandibular gland. THYROID GLAND: Normal. LYMPH NODES: Interval decrease in size of previous enlarged left level 2 lymph nodes, the more anterior of which decreased from 1.2 x 1.1 cm to 0.7 x 0.6 cm, and the posterior node decreased from 1.8 x 1.2 cm to 1.1 x 0.7 cm. Scattered subcentimeter lymph nodes throughout the neck. No new or enlarging nodes. OSSEOUS STRUCTURES: No destructive bone lesion. Prior ACDF C5-7. ADDITIONAL COMMENTS: N/A. MEDIASTINUM/ELIZABETH: Scattered nonenlarged lymph nodes. Small hiatal hernia. HEART/PERICARDIUM: Unremarkable. LUNGS/PLEURA: No suspicious nodule. No pulmonary edema or pleural effusion. Tree-in-bud opacities in the lateral right lower lobe, and mild ill-defined nodular groundglass opacities right apex. BONES: No destructive bone lesion. ADDITIONAL COMMENTS: Unchanged round 9 mm density lateral right breast LIVER: No mass or biliary dilatation. GALLBLADDER: Surgically absent. SPLEEN: No enlargement or lesion. PANCREAS: No mass or ductal dilatation. ADRENALS: No mass. KIDNEYS: No mass, calculus, or hydronephrosis. GI TRACT: No bowel obstruction or wall thickening PERITONEUM: No free air or free fluid. APPENDIX: Not clearly visualized. RETROPERITONEUM: Interval decrease in size of previously enlarged lymph nodes: Gastrohepatic ligament lymph node decreased from 4.2 x 1.2 cm to 2.2 x 0.9 cm; aortocaval lymph node at the level of the right kidney, previously 1.2 cm, now 0.6 cm. No new or enlarging lymph nodes. ADDITIONAL COMMENTS: Right paraumbilical ventral hernia containing mesenteric fat and bowel loops similar to prior study. URINARY BLADDER: No mass or calculus. LYMPH NODES: Decreased size of left inguinal lymph nodes from 1.6 cm to 0.8 cm. REPRODUCTIVE ORGANS: Prior hysterectomy. FREE FLUID: None. BONES: No destructive bone lesion. ADDITIONAL COMMENTS: N/A.     1. Interval decrease in size of previously enlarged lymph nodes in the neck, abdomen, and pelvis. No new or enlarging lymph nodes. 2. New tree-in-bud opacities and groundglass opacities in the right lower and upper lobes may be infectious/inflammatory. May be assessed on follow-up examinations. 23X     PET/CT TUMOR IMAGE [facility] W (INI)    Addendum Date: 5/5/2022    Addendum: There is increased metabolic activity in a small left frontal scalp lesion with SUV of 6.9. Result Date: 5/5/2022  PET/CT SCAN PROCEDURE: Following IV injection of 10.9 mCi 18 Fluoro 2 deoxyglucose (FDG) and a standard uptake delay, PET imaging is performed skull vertex to toes and axial, sagittal and coronal images were acquired. Unenhanced CT is obtained for anatomic localization, and attenuation correction of the PET scan. Patient preprocedure blood glucose level: 98 mg/dL. CORRELATIVE IMAGING STUDIES: None. COMPARISON PET: HISTORY: The study is requested for initial staging. Lymphoma. FINDINGS: HEAD/NECK: There is increased metabolic activity 1.2 cm left level 2 lymph node. There is increased metabolic activity in a 1.2 cm left level 5 lymph node with SUV of 7.7.  CHEST: No foci of abnormal hypermetabolism. Low-grade activity in mediastinal and hilar lymph nodes is nonspecific but may be reactive. ABDOMEN/PELVIS: There is increased metabolic activity in a 2.6 cm hepatogastric ligament lymph node with SUV of 5.7. There is increased metabolic activity in a 1.2 cm right retroperitoneal lymph node with SUV of 6. There is increased metabolic activity in a left retroperitoneal lymph node with SUV of 8 There is slight haziness in the mesentery with small lymph nodes with slight increased metabolic activity of 3.6. There is small focus of increased metabolic activity anterior to the left sacrum and anterior to the right sacrum with SUV of 7 suspicious for lymph nodes. There is increased metabolic activity in the right renal mass posterior cortex with SUV of 6.9. There is increased metabolic activity in a left inguinal lymph node with SUV of 8 There is a right abdominal wall hernia SKELETON: There is increased metabolic activity and left humeral head, right iliac bone, femurs and proximal right tibia. . There is slight increased metabolic activity right humeral head. There is increased metabolic activity in 2 lymph nodes in the left neck, in the hepatogastric ligament and retroperitoneal adenopathy, mesenteric lymph nodes and left inguinal adenopathy suspicious for lymphoma. There is increased metabolic activity in a mass posterior right renal cortex nonspecific but may represent lymphoma. There is increased bony activity as described above suspicious for lymphoma. . 23X        Assessment and Recommendations:      Patient Active Problem List   Diagnosis    Tobacco dependence  -initiated nicotine patch during inpatient. Diffuse large B-cell lymphoma of lymph nodes of neck (HCC)  -fortunately no signs of progressive disease while she has been off treatment.       Encounter for antineoplastic chemotherapy  -C3 D3 DA-R-EPOCH today  -Dose reduced level 1 (by -20%)  -bactrim prophylaxis.  -lasix 20 mg po prophylaxis for swelling      Poor appetite  -still working on her appetite. Dose reduced her chemotherapy. -seen by dietician      Anxiety about health  -continue her medications.  -continue to provide reassurance; Cycle 3 dose reduced.  -she is following through better on recommendations to proceed with treatment.

## 2022-08-21 LAB
ALBUMIN SERPL-MCNC: 3.1 G/DL (ref 3.5–5)
ALBUMIN/GLOB SERPL: 1.3 {RATIO} (ref 1.1–2.2)
ALP SERPL-CCNC: 74 U/L (ref 45–117)
ALT SERPL-CCNC: 40 U/L (ref 12–78)
ANION GAP SERPL CALC-SCNC: 5 MMOL/L (ref 5–15)
AST SERPL-CCNC: 28 U/L (ref 15–37)
BASOPHILS # BLD: 0 K/UL (ref 0–0.1)
BASOPHILS NFR BLD: 0 % (ref 0–1)
BILIRUB SERPL-MCNC: 0.4 MG/DL (ref 0.2–1)
BUN SERPL-MCNC: 12 MG/DL (ref 6–20)
BUN/CREAT SERPL: 18 (ref 12–20)
CALCIUM SERPL-MCNC: 8.3 MG/DL (ref 8.5–10.1)
CHLORIDE SERPL-SCNC: 110 MMOL/L (ref 97–108)
CO2 SERPL-SCNC: 28 MMOL/L (ref 21–32)
CREAT SERPL-MCNC: 0.66 MG/DL (ref 0.55–1.02)
DIFFERENTIAL METHOD BLD: ABNORMAL
EOSINOPHIL # BLD: 0 K/UL (ref 0–0.4)
EOSINOPHIL NFR BLD: 0 % (ref 0–7)
ERYTHROCYTE [DISTWIDTH] IN BLOOD BY AUTOMATED COUNT: 13.2 % (ref 11.5–14.5)
GLOBULIN SER CALC-MCNC: 2.3 G/DL (ref 2–4)
GLUCOSE SERPL-MCNC: 130 MG/DL (ref 65–100)
HCT VFR BLD AUTO: 37.7 % (ref 35–47)
HGB BLD-MCNC: 12.4 G/DL (ref 11.5–16)
IMM GRANULOCYTES # BLD AUTO: 0.1 K/UL (ref 0–0.04)
IMM GRANULOCYTES NFR BLD AUTO: 1 % (ref 0–0.5)
LYMPHOCYTES # BLD: 0.4 K/UL (ref 0.8–3.5)
LYMPHOCYTES NFR BLD: 5 % (ref 12–49)
MCH RBC QN AUTO: 32 PG (ref 26–34)
MCHC RBC AUTO-ENTMCNC: 32.9 G/DL (ref 30–36.5)
MCV RBC AUTO: 97.2 FL (ref 80–99)
MONOCYTES # BLD: 0.2 K/UL (ref 0–1)
MONOCYTES NFR BLD: 3 % (ref 5–13)
NEUTS SEG # BLD: 7.2 K/UL (ref 1.8–8)
NEUTS SEG NFR BLD: 91 % (ref 32–75)
NRBC # BLD: 0 K/UL (ref 0–0.01)
NRBC BLD-RTO: 0 PER 100 WBC
PLATELET # BLD AUTO: 244 K/UL (ref 150–400)
PMV BLD AUTO: 9.8 FL (ref 8.9–12.9)
POTASSIUM SERPL-SCNC: 3.4 MMOL/L (ref 3.5–5.1)
PROT SERPL-MCNC: 5.4 G/DL (ref 6.4–8.2)
RBC # BLD AUTO: 3.88 M/UL (ref 3.8–5.2)
RBC MORPH BLD: ABNORMAL
SODIUM SERPL-SCNC: 143 MMOL/L (ref 136–145)
WBC # BLD AUTO: 7.9 K/UL (ref 3.6–11)

## 2022-08-21 PROCEDURE — 77010033678 HC OXYGEN DAILY

## 2022-08-21 PROCEDURE — 65270000029 HC RM PRIVATE

## 2022-08-21 PROCEDURE — 85025 COMPLETE CBC W/AUTO DIFF WBC: CPT

## 2022-08-21 PROCEDURE — 74011000250 HC RX REV CODE- 250: Performed by: NURSE PRACTITIONER

## 2022-08-21 PROCEDURE — 94761 N-INVAS EAR/PLS OXIMETRY MLT: CPT

## 2022-08-21 PROCEDURE — 74011636637 HC RX REV CODE- 636/637: Performed by: INTERNAL MEDICINE

## 2022-08-21 PROCEDURE — 74011250636 HC RX REV CODE- 250/636: Performed by: INTERNAL MEDICINE

## 2022-08-21 PROCEDURE — 74011250637 HC RX REV CODE- 250/637: Performed by: INTERNAL MEDICINE

## 2022-08-21 PROCEDURE — 99233 SBSQ HOSP IP/OBS HIGH 50: CPT | Performed by: INTERNAL MEDICINE

## 2022-08-21 PROCEDURE — 80053 COMPREHEN METABOLIC PANEL: CPT

## 2022-08-21 PROCEDURE — 74011250637 HC RX REV CODE- 250/637: Performed by: NURSE PRACTITIONER

## 2022-08-21 PROCEDURE — 36415 COLL VENOUS BLD VENIPUNCTURE: CPT

## 2022-08-21 RX ADMIN — Medication 10 ML: at 06:22

## 2022-08-21 RX ADMIN — ZOLPIDEM TARTRATE 5 MG: 5 TABLET ORAL at 23:58

## 2022-08-21 RX ADMIN — AMLODIPINE BESYLATE 5 MG: 5 TABLET ORAL at 09:25

## 2022-08-21 RX ADMIN — LORAZEPAM 1 MG: 1 TABLET ORAL at 17:04

## 2022-08-21 RX ADMIN — Medication 10 ML: at 21:29

## 2022-08-21 RX ADMIN — MORPHINE SULFATE 15 MG: 15 TABLET ORAL at 06:22

## 2022-08-21 RX ADMIN — PREDNISONE 115 MG: 5 TABLET ORAL at 09:23

## 2022-08-21 RX ADMIN — MORPHINE SULFATE 15 MG: 15 TABLET ORAL at 21:28

## 2022-08-21 RX ADMIN — LORAZEPAM 1 MG: 1 TABLET ORAL at 09:39

## 2022-08-21 RX ADMIN — Medication 10 ML: at 17:07

## 2022-08-21 RX ADMIN — SODIUM CHLORIDE: 9 INJECTION, SOLUTION INTRAVENOUS at 13:16

## 2022-08-21 RX ADMIN — PANTOPRAZOLE SODIUM 40 MG: 40 TABLET, DELAYED RELEASE ORAL at 06:22

## 2022-08-21 RX ADMIN — LISINOPRIL 20 MG: 20 TABLET ORAL at 09:23

## 2022-08-21 RX ADMIN — SODIUM CHLORIDE 75.2 MG: 9 INJECTION, SOLUTION INTRAVENOUS at 13:16

## 2022-08-21 RX ADMIN — LORAZEPAM 1 MG: 1 TABLET ORAL at 03:41

## 2022-08-21 RX ADMIN — FLUOXETINE 40 MG: 20 CAPSULE ORAL at 09:25

## 2022-08-21 RX ADMIN — MORPHINE SULFATE 15 MG: 15 TABLET ORAL at 13:23

## 2022-08-21 RX ADMIN — FUROSEMIDE 20 MG: 20 TABLET ORAL at 09:23

## 2022-08-21 RX ADMIN — OLANZAPINE 5 MG: 5 TABLET, FILM COATED ORAL at 17:07

## 2022-08-21 RX ADMIN — LORAZEPAM 1 MG: 1 TABLET ORAL at 23:58

## 2022-08-21 RX ADMIN — PREDNISONE 115 MG: 5 TABLET ORAL at 21:28

## 2022-08-21 RX ADMIN — ONDANSETRON 8 MG: 2 INJECTION INTRAMUSCULAR; INTRAVENOUS at 21:28

## 2022-08-21 RX ADMIN — ONDANSETRON 8 MG: 2 INJECTION INTRAMUSCULAR; INTRAVENOUS at 06:21

## 2022-08-21 RX ADMIN — ONDANSETRON 8 MG: 2 INJECTION INTRAMUSCULAR; INTRAVENOUS at 13:24

## 2022-08-21 NOTE — PROGRESS NOTES
[facility] [place] at [address], [address] [facility]: [phone]  F: [phone] Patient ID  Name: Maria Luisa Grigsby  YOB: 1955  MRN: [id number]  Referring Provider:   No referring provider defined for this encounter. 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 This summary highlights focused aspects of patient's ongoing care and may have a recurring section in notes with either updates or remain unchanged as a longitudinal care summary. -------------------------------------------------------------------------------------------------------------------------------------------------------------------------------------------------------  DIAGNOSIS:  Diffuse Large B-Cell Lymphoma     ORIGINAL STAGING:  Stage IV     SITES OF DISEASE:  Left Cervical Lymph Node,Bone Disease, Stage IV    CURRENT TREATMENT:  C1,D1 on 5/11/22 DA-R-EPOCH  C2,D1 on 6/6/22 DA-R-EPOCH +1 dose escalation (except for doxorubicin due to swelling in C1)  Admission for Cycle 3. PRIOR TREATMENT:  n/a     GOALS OF CARE:  Curative Intent     PATHOLOGY:  Specimen #:[id number] Collect: 4/22/2022      ==========================================================================                    * * *SURGICAL PATHOLOGY REPORT* * *   ==========================================================================   * * *PROCEDURES/ADDENDA* * *   ADDENDUM   Date Ordered: 4/26/2022     Status: Signed Out   Date Complete: 4/26/2022     By: Vi Mixon.  Syd Kraft MD   Date Reported: 4/26/2022   Addendum Diagnosis   Lymph node, left posterior cervical lymph node, excision:   In situ hybridization for Jesús-Barr viral RNA: Negative   CPT: 66875  Addendum Comment   * * *CLINICAL HISTORY* * * Cervical lymphadenopathy, rule out lymphoma   ==========================================================================   * * *FINAL PATHOLOGIC DIAGNOSIS* * *        Lymph node, left posterior cervical lymph node, excision:        Large B cell lymphoma. See comment. * * *Comment* * *   The histologic section has fragments of lymphoid tissue with diffuse and   focal follicular architecture. Diffuse areas are comprised of large,   atypical lymphocytes with centroblastic morphology and increased mitotic   activity. Immunohistochemical stains confirm the malignant cells are CD20   positive B cells that coexpress CD10 and BCL6 without MUM1. CD23   highlights rare follicular dendritic networks associated with lymphoid   follicles with germinal centers and express CD10 and BCL6 without BCL2. CyclinD1 and CD56 stains are negative. Concurrent flow cytometric analysis   confirms a clonal CD10 positive B-cell population comprised of medium to   large cells. The Ki-67 proliferation index is 30%. The overall findings favor the diagnosis of diffuse large B-cell lymphoma,   germinal center subtype. Molecular genetic studies are pending for further   characterization will be reported in an addendum. Flow cytometry:   Consistent with CD10-positive B-cell lymphoproliferative disorder. Comments: Flow cytometry shows monoclonal B-cells (36% of total cells)   with CD10 expression without CD5, consistent with a CD10-positive B-cell   lymphoproliferative disorder. The main differential diagnosis includes   follicular lymphoma, Burkitt lymphoma and large B-cell lymphoma. Please   correlate the result with morphologic findings and clinical information. Flow Differential (%) and Population Analysis:   Lymphocytes: 93.7%   T-cells (39% of lymphoid cells) show a CD4/CD8 ratio of 3.3 without overt   phenotypic abnormality. NK-cells (1% of lymphoid cells) are unremarkable.    Mature B-cells (56% of lymphoid cells) include large forms based on   forward scattered pattern and show: CD5 neg, CD10+, CD11c+dim, CD19+,   CD20+ with surface lambda light chain restriction. Markers Performed: CD2, CD3, CD4, CD5, CD7, CD8, CD10, CD11c, CD19, CD20,   CD23, CD34, CD38, CD45, CD56, Kappa, Lambda (17 Markers)   The Technical Component Processing of this test was completed at   [facility], [address] [place] [address], [place], [place] / [id number] /   [phone] / [employer] # [id number]. Interpretation by Samanta Ellis M.D. The   complete scanned report is available in Epic. CPT: D4675115, [id number], [id number], [id number], [id number], [id number]   jw2/2022   *Electronically Signed Out By Particia Spotted. Miranda Tee MD*   ==========================================================================   * * *Gross Description* * *   Specimen #1, received fresh labeled with the patient's name,  and left   posterior cervical lymph node, consists of two paper towels containing a   1.7 x 1.5 x 0.5 cm aggregate of pale pink-tan, fleshy soft tissue   fragments. The specimen is handled according to the flow cytometry   protocol as follows: 7          Two air-dried touch prep slides - one Diff Quik stained, one   rapid-fixed in 95% alcohol   7          Representative sections in B plus fixative (1A)   7          One piece held in RPMI for possible flow cytometry analysis   7          Remaining tissue fixed in formalin for permanents (1B)   Dr. Miranda Tee will determine if flow cytometry is necessary. 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 F who presents as a hospital admission for Cycle #3. Patient reports feeling well overall. Patient reports decreased oral intake with some good and bad days. Patient denies any bowel or bladder problems. Patient denies any pain. Patient denies any shortness of breath. Patient denies any fatigue.  Patient denies any gait disturbance. Interval History:   She has had some pruritis. Started benadryl last night, getting some relief from this. No other new issues. No nausea. Continues on chemotherapy. Allergies   Allergen Reactions    Oxycodone Nausea Only       Objective:      Visit Vitals  BP (!) 148/80 (BP 1 Location: Right upper arm, BP Patient Position: At rest)   Pulse 68   Temp 97.8 °F (36.6 °C)   Resp 17   Ht 5' 2.99\" (1.6 m)   Wt 184 lb 4.9 oz (83.6 kg)   SpO2 96%   BMI 32.66 kg/m²     ECO  General: no distress  Eyes: anicteric sclerae  HENT: atraumatic  Neck: supple  Respiratory: normal respiratory effort  CV: no peripheral edema  GI: soft, nontender, nondistended, no masses, no hepatomegaly, no splenomegaly  Skin: no rashes; no ecchymoses; no petechiae  Psych: alert, oriented, appropriate affect, normal judgment/insight       Results:     Lab Results   Component Value Date/Time    WBC 7.9 2022 03:29 AM    HGB 12.4 2022 03:29 AM    HCT 37.7 2022 03:29 AM    PLATELET 744 [birth year]/1950 03:29 AM    MCV 97.2 2022 03:29 AM     Lab Results   Component Value Date/Time    Sodium 143 2022 03:29 AM    Potassium 3.4 (L) 2022 03:29 AM    Chloride 110 (H) 2022 03:29 AM    CO2 28 2022 03:29 AM    Anion gap 5 2022 03:29 AM    Glucose 130 (H) 2022 03:29 AM    BUN 12 2022 03:29 AM    Creatinine 0.66 2022 03:29 AM    BUN/Creatinine ratio 18 2022 03:29 AM    GFR est AA >60 2022 03:29 AM    GFR est non-AA >60 2022 03:29 AM    Calcium 8.3 (L) 2022 03:29 AM    Bilirubin, total 0.4 2022 03:29 AM    Alk.  phosphatase 74 2022 03:29 AM    Protein, total 5.4 (L) 2022 03:29 AM    Albumin 3.1 (L) 2022 03:29 AM    Globulin 2.3 2022 03:29 AM    A-G Ratio 1.3 2022 03:29 AM    ALT (SGPT) 40 2022 03:29 AM    AST (SGOT) 28 2022 03:29 AM     IR INSERT TUNL CVC W PORT OVER 5 YEARS    Result Date: 2022  PATIENT NAME: Michelle Kanh                                            AGE, : 78 years, 1955 MRN: [id number] DATE: 2022 11:50 AM PROCEDURE(S): Single lumen Portacath placement SUPERVISING PHYSICIAN: Maile Bloom MD OPERATING PROVIDER: Lulu Stoner PA-C CLINICAL HISTORY: Mediport placement requested. B-cell lymphoma Patient was evaluated and felt to be an appropriate candidate for conscious sedation. Moderate intravenous conscious sedation was supervised by Maile Bloom MD. The patient was independently monitored by a registered nurse assigned to the Department of Radiology using automated blood pressure, EKG, and pulse oximetry. The detail conscious sedation record is stored in the hospital information system. MEDICATION: Antibiotic: Ancef 2g Versed: 6 mg Fentanyl: 100 mcg Intraprocedure time: 40 minutes FINDINGS: After informed consent was obtained, and the risks and benefits of the procedure including infection and bleeding were discussed, the patient was brought to the angiographic suite and placed on the angiographic table in a supine position. The right neck and chest were prepped and draped in maximum sterile barrier technique which includes: cap and mask, sterile gown, sterile gloves, and sterile body drape. The ultrasound transducer was prepped using sterile ultrasound technique which includes: sterile gel and probe cover. After adequate conscious sedation was achieved utilizing Fentanyl and Versed, the skin over the internal jugular vein was anesthetized with 1% lidocaine. Sonographic images of the right neck demonstrated a patent and compressible right internal jugular vein. The vein was accessed under direct sonographic guidance using a 21 gauge micropuncture set. An ultrasonographic image was saved in the record. A 0.035 J wire was advanced through the sheath into the inferior vena cava under fluoroscopic guidance.  A peel-away sheath was advanced over the wire into the superior vena cava under fluoroscopic guidance. Attention was directed to the right chest, and after adequate local anesthesia, a horizontal incision was made. A subcutaneous pocket was then formed using a combination of sharp and blunt dissection. A tunnel was then formed between the pocket and the venotomy site and the catheter passed through the tunnel and through the peel-away sheath. The peel-away sheath was removed and the catheter tip was positioned at the right atrium. The catheter was cut at the pocket access point and the portacath was attached to the catheter and placed snugly in the pocket. The pocket was closed primarily with 2-0 Vicryl suture and Dermabond. The neck access site was closed with 2-0 Vicryl suture and Dermabond. The port was accessed and flushed with heparin solution. The patient tolerated the procedure well. Fluoroscopy demonstrates the catheter tip to be within the right atrium. RADIATION: Fluoroscopy time: 0.3 minutes Air kerma: 5.1 mGy     Technically successful placement of a single lumen port with the catheter tip in the right atrium. Catheter is ready for immediate use. CT NECK SOFT TISSUE W CONT    Result Date: 6/28/2022  INDICATION: Diffuse large b-cell lymphoma, lymph nodes of head, face, and neck EXAM:  CT NECK, CHEST, ABDOMEN, PELVIS WITH CONTRAST COMPARISON: PET scan May 4, 2022 TECHNIQUE:  CT imaging of the neck, chest, abdomen and pelvis was performed after the uneventful intravenous administration of IV contrast. Coronal and sagittal reconstructions were generated. CT dose reduction was achieved through use of a standardized protocol tailored for this examination and automatic exposure control for dose modulation. FINDINGS: NASOPHARYNX: Normal. SUPRAHYOID NECK: Normal oropharynx, oral cavity, parapharyngeal space, and retropharyngeal space. INFRAHYOID NECK: Normal larynx, hypopharynx and supraglottis.  PAROTID GLANDS: Normal. SUBMANDIBULAR GLANDS: Absent left submandibular gland. THYROID GLAND: Normal. LYMPH NODES: Interval decrease in size of previous enlarged left level 2 lymph nodes, the more anterior of which decreased from 1.2 x 1.1 cm to 0.7 x 0.6 cm, and the posterior node decreased from 1.8 x 1.2 cm to 1.1 x 0.7 cm. Scattered subcentimeter lymph nodes throughout the neck. No new or enlarging nodes. OSSEOUS STRUCTURES: No destructive bone lesion. Prior ACDF C5-7. ADDITIONAL COMMENTS: N/A. MEDIASTINUM/ELIZABETH: Scattered nonenlarged lymph nodes. Small hiatal hernia. HEART/PERICARDIUM: Unremarkable. LUNGS/PLEURA: No suspicious nodule. No pulmonary edema or pleural effusion. Tree-in-bud opacities in the lateral right lower lobe, and mild ill-defined nodular groundglass opacities right apex. BONES: No destructive bone lesion. ADDITIONAL COMMENTS: Unchanged round 9 mm density lateral right breast LIVER: No mass or biliary dilatation. GALLBLADDER: Surgically absent. SPLEEN: No enlargement or lesion. PANCREAS: No mass or ductal dilatation. ADRENALS: No mass. KIDNEYS: No mass, calculus, or hydronephrosis. GI TRACT: No bowel obstruction or wall thickening PERITONEUM: No free air or free fluid. APPENDIX: Not clearly visualized. RETROPERITONEUM: Interval decrease in size of previously enlarged lymph nodes: Gastrohepatic ligament lymph node decreased from 4.2 x 1.2 cm to 2.2 x 0.9 cm; aortocaval lymph node at the level of the right kidney, previously 1.2 cm, now 0.6 cm. No new or enlarging lymph nodes. ADDITIONAL COMMENTS: Right paraumbilical ventral hernia containing mesenteric fat and bowel loops similar to prior study. URINARY BLADDER: No mass or calculus. LYMPH NODES: Decreased size of left inguinal lymph nodes from 1.6 cm to 0.8 cm. REPRODUCTIVE ORGANS: Prior hysterectomy. FREE FLUID: None. BONES: No destructive bone lesion. ADDITIONAL COMMENTS: N/A.     1. Interval decrease in size of previously enlarged lymph nodes in the neck, abdomen, and pelvis. No new or enlarging lymph nodes.  2. New tree-in-bud opacities and groundglass opacities in the right lower and upper lobes may be infectious/inflammatory. May be assessed on follow-up examinations. 23X     CT CHEST ABD PELV W CONT    Result Date: 6/28/2022  INDICATION: Diffuse large b-cell lymphoma, lymph nodes of head, face, and neck EXAM:  CT NECK, CHEST, ABDOMEN, PELVIS WITH CONTRAST COMPARISON: PET scan May 4, 2022 TECHNIQUE:  CT imaging of the neck, chest, abdomen and pelvis was performed after the uneventful intravenous administration of IV contrast. Coronal and sagittal reconstructions were generated. CT dose reduction was achieved through use of a standardized protocol tailored for this examination and automatic exposure control for dose modulation. FINDINGS: NASOPHARYNX: Normal. SUPRAHYOID NECK: Normal oropharynx, oral cavity, parapharyngeal space, and retropharyngeal space. INFRAHYOID NECK: Normal larynx, hypopharynx and supraglottis. PAROTID GLANDS: Normal. SUBMANDIBULAR GLANDS: Absent left submandibular gland. THYROID GLAND: Normal. LYMPH NODES: Interval decrease in size of previous enlarged left level 2 lymph nodes, the more anterior of which decreased from 1.2 x 1.1 cm to 0.7 x 0.6 cm, and the posterior node decreased from 1.8 x 1.2 cm to 1.1 x 0.7 cm. Scattered subcentimeter lymph nodes throughout the neck. No new or enlarging nodes. OSSEOUS STRUCTURES: No destructive bone lesion. Prior ACDF C5-7. ADDITIONAL COMMENTS: N/A. MEDIASTINUM/ELIZABETH: Scattered nonenlarged lymph nodes. Small hiatal hernia. HEART/PERICARDIUM: Unremarkable. LUNGS/PLEURA: No suspicious nodule. No pulmonary edema or pleural effusion. Tree-in-bud opacities in the lateral right lower lobe, and mild ill-defined nodular groundglass opacities right apex. BONES: No destructive bone lesion. ADDITIONAL COMMENTS: Unchanged round 9 mm density lateral right breast LIVER: No mass or biliary dilatation. GALLBLADDER: Surgically absent. SPLEEN: No enlargement or lesion.  PANCREAS: No mass or ductal dilatation. ADRENALS: No mass. KIDNEYS: No mass, calculus, or hydronephrosis. GI TRACT: No bowel obstruction or wall thickening PERITONEUM: No free air or free fluid. APPENDIX: Not clearly visualized. RETROPERITONEUM: Interval decrease in size of previously enlarged lymph nodes: Gastrohepatic ligament lymph node decreased from 4.2 x 1.2 cm to 2.2 x 0.9 cm; aortocaval lymph node at the level of the right kidney, previously 1.2 cm, now 0.6 cm. No new or enlarging lymph nodes. ADDITIONAL COMMENTS: Right paraumbilical ventral hernia containing mesenteric fat and bowel loops similar to prior study. URINARY BLADDER: No mass or calculus. LYMPH NODES: Decreased size of left inguinal lymph nodes from 1.6 cm to 0.8 cm. REPRODUCTIVE ORGANS: Prior hysterectomy. FREE FLUID: None. BONES: No destructive bone lesion. ADDITIONAL COMMENTS: N/A.     1. Interval decrease in size of previously enlarged lymph nodes in the neck, abdomen, and pelvis. No new or enlarging lymph nodes. 2. New tree-in-bud opacities and groundglass opacities in the right lower and upper lobes may be infectious/inflammatory. May be assessed on follow-up examinations. 23X     PET/CT TUMOR IMAGE [address] [facility] BDY W (INI)    Addendum Date: 5/5/2022    Addendum: There is increased metabolic activity in a small left frontal scalp lesion with SUV of 6.9. Result Date: 5/5/2022  PET/CT SCAN PROCEDURE: Following IV injection of 10.9 mCi 18 Fluoro 2 deoxyglucose (FDG) and a standard uptake delay, PET imaging is performed skull vertex to toes and axial, sagittal and coronal images were acquired. Unenhanced CT is obtained for anatomic localization, and attenuation correction of the PET scan. Patient preprocedure blood glucose level: 98 mg/dL. CORRELATIVE IMAGING STUDIES: None. COMPARISON PET: HISTORY: The study is requested for initial staging. Lymphoma. FINDINGS: HEAD/NECK: There is increased metabolic activity 1.2 cm left level 2 lymph node.  There is increased metabolic activity in a 1.2 cm left level 5 lymph node with SUV of 7.7. CHEST: No foci of abnormal hypermetabolism. Low-grade activity in mediastinal and hilar lymph nodes is nonspecific but may be reactive. ABDOMEN/PELVIS: There is increased metabolic activity in a 2.6 cm hepatogastric ligament lymph node with SUV of 5.7. There is increased metabolic activity in a 1.2 cm right retroperitoneal lymph node with SUV of 6. There is increased metabolic activity in a left retroperitoneal lymph node with SUV of 8 There is slight haziness in the mesentery with small lymph nodes with slight increased metabolic activity of 3.6. There is small focus of increased metabolic activity anterior to the left sacrum and anterior to the right sacrum with SUV of 7 suspicious for lymph nodes. There is increased metabolic activity in the right renal mass posterior cortex with SUV of 6.9. There is increased metabolic activity in a left inguinal lymph node with SUV of 8 There is a right abdominal wall hernia SKELETON: There is increased metabolic activity and left humeral head, right iliac bone, femurs and proximal right tibia. . There is slight increased metabolic activity right humeral head. There is increased metabolic activity in 2 lymph nodes in the left neck, in the hepatogastric ligament and retroperitoneal adenopathy, mesenteric lymph nodes and left inguinal adenopathy suspicious for lymphoma. There is increased metabolic activity in a mass posterior right renal cortex nonspecific but may represent lymphoma. There is increased bony activity as described above suspicious for lymphoma. . 23X        Assessment and Recommendations:      Patient Active Problem List   Diagnosis    Tobacco dependence  -initiated nicotine patch during inpatient. Diffuse large B-cell lymphoma of lymph nodes of neck (HCC)  -fortunately no signs of progressive disease while she has been off treatment.       Encounter for antineoplastic chemotherapy  -C3 D4 DA-R-EPOCH today  -Dose reduced level 1 (by -20%)  -bactrim prophylaxis.  -lasix 20 mg po prophylaxis for swelling      Poor appetite  -still working on her appetite. Dose reduced her chemotherapy. -seen by dietician      Anxiety about health  -continue her medications.  -continue to provide reassurance; Cycle 3 dose reduced.  -she is following through better on recommendations to proceed with treatment.

## 2022-08-21 NOTE — PROGRESS NOTES
Problem: Falls - Risk of  Goal: *Absence of Falls  Outcome: Progressing Towards Goal  Note: Fall Risk Interventions:  Mobility Interventions: Patient to call before getting OOB, Communicate number of staff needed for ambulation/transfer         Medication Interventions: Evaluate medications/consider consulting pharmacy                   Problem: Patient Education: Go to Patient Education Activity  Goal: Patient/Family Education  Outcome: Progressing Towards Goal     Problem: Patient Education: Go to Patient Education Activity  Goal: Patient/Family Education  Outcome: Progressing Towards Goal     Problem: Patient Education: Go to Patient Education Activity  Goal: Patient/Family Education  Outcome: Progressing Towards Goal     Problem: Chemotherapy, Day 3 to Discharge  Goal: Off Pathway (Use only if patient is Off Pathway)  Outcome: Progressing Towards Goal  Goal: Activity/Safety  Outcome: Progressing Towards Goal  Goal: Consults, if ordered  Outcome: Progressing Towards Goal  Goal: Diagnostic Test/Procedures  Outcome: Progressing Towards Goal  Goal: Nutrition/Diet  Outcome: Progressing Towards Goal  Goal: Discharge Planning  Outcome: Progressing Towards Goal  Goal: Medications  Outcome: Progressing Towards Goal  Goal: Respiratory  Outcome: Progressing Towards Goal  Goal: Treatments/Interventions/Procedures  Outcome: Progressing Towards Goal  Goal: Psychosocial  Outcome: Progressing Towards Goal  Goal: *Optimal pain control at patient's stated goal  Outcome: Progressing Towards Goal  Goal: *Hemodynamically stable  Outcome: Progressing Towards Goal  Goal: *Adequate oxygenation  Outcome: Progressing Towards Goal     Problem: Chemotherapy, Day 3 to Discharge  Goal: Off Pathway (Use only if patient is Off Pathway)  Outcome: Progressing Towards Goal     Problem: Chemotherapy, Day 3 to Discharge  Goal: Activity/Safety  Outcome: Progressing Towards Goal     Problem: Chemotherapy, Day 3 to Discharge  Goal: Diagnostic Test/Procedures  Outcome: Progressing Towards Goal     Problem: Chemotherapy, Day 3 to Discharge  Goal: Medications  Outcome: Progressing Towards Goal     Problem: Chemotherapy, Day 3 to Discharge  Goal: Respiratory  Outcome: Progressing Towards Goal     Problem: Chemotherapy, Day 3 to Discharge  Goal: Treatments/Interventions/Procedures  Outcome: Progressing Towards Goal     Problem: Chemotherapy, Day 3 to Discharge  Goal: Psychosocial  Outcome: Progressing Towards Goal     Problem: Chemotherapy Discharge Outcomes  Goal: *Verbalizes understanding and describes prescribed diet  Outcome: Progressing Towards Goal  Goal: *Describes follow-up/return visits to physicians  Outcome: Progressing Towards Goal  Goal: *Describes home care/support arrangements established based on need  Outcome: Progressing Towards Goal  Goal: *Describes available resources and support systems  Outcome: Progressing Towards Goal  Goal: *Anxiety reduced or absent  Outcome: Progressing Towards Goal  Goal: *Understands and describes signs and symptoms to report to providers(Stroke Metric)  Outcome: Progressing Towards Goal  Goal: *Hemodynamically stable  Outcome: Progressing Towards Goal  Goal: *Tolerating diet  Outcome: Progressing Towards Goal  Goal: *Verbalizes understanding and describes medication purposes and frequencies  Outcome: Progressing Towards Goal  Goal: *Venous access site with positive blood return and without signs and symptoms of infection  Outcome: Progressing Towards Goal  Goal: *Verbalizes understanding of bowel regimen  Outcome: Progressing Towards Goal     Problem: Chemotherapy Discharge Outcomes  Goal: *Verbalizes understanding and describes prescribed diet  Outcome: Progressing Towards Goal     Problem: Chemotherapy Discharge Outcomes  Goal: *Describes follow-up/return visits to physicians  Outcome: Progressing Towards Goal     Problem: Chemotherapy Discharge Outcomes  Goal: *Describes home care/support arrangements established based on need  Outcome: Progressing Towards Goal

## 2022-08-21 NOTE — PROGRESS NOTES
Bedside shift change report given to Tiffanie (oncoming nurse) by Sarah Abraham (offgoing nurse). Report included the following information SBAR, Kardex, Intake/Output, MAR, and Recent Results.

## 2022-08-21 NOTE — ROUTINE PROCESS
Bedside and Verbal shift change report given to DEANNA Eaton(oncoming nurse) by Jose Eduardo Renee (offgoing nurse). Report included the following information SBAR and Kardex.

## 2022-08-22 ENCOUNTER — HOME CARE VISIT (OUTPATIENT)
Dept: HOME HEALTH SERVICES | Facility: HOME HEALTH | Age: 67
End: 2022-08-22

## 2022-08-22 ENCOUNTER — APPOINTMENT (OUTPATIENT)
Dept: INFUSION THERAPY | Age: 67
End: 2022-08-22

## 2022-08-22 VITALS
HEIGHT: 63 IN | SYSTOLIC BLOOD PRESSURE: 154 MMHG | HEART RATE: 75 BPM | RESPIRATION RATE: 18 BRPM | BODY MASS INDEX: 33.2 KG/M2 | TEMPERATURE: 97.7 F | WEIGHT: 187.39 LBS | OXYGEN SATURATION: 92 % | DIASTOLIC BLOOD PRESSURE: 79 MMHG

## 2022-08-22 DIAGNOSIS — M79.672 BILATERAL FOOT PAIN: ICD-10-CM

## 2022-08-22 DIAGNOSIS — C83.38 DIFFUSE LARGE B-CELL LYMPHOMA OF LYMPH NODES OF MULTIPLE REGIONS (HCC): ICD-10-CM

## 2022-08-22 DIAGNOSIS — M79.671 BILATERAL FOOT PAIN: ICD-10-CM

## 2022-08-22 DIAGNOSIS — M25.551 RIGHT HIP PAIN: ICD-10-CM

## 2022-08-22 LAB
ALBUMIN SERPL-MCNC: 2.8 G/DL (ref 3.5–5)
ALBUMIN/GLOB SERPL: 1.3 {RATIO} (ref 1.1–2.2)
ALP SERPL-CCNC: 68 U/L (ref 45–117)
ALT SERPL-CCNC: 97 U/L (ref 12–78)
ANION GAP SERPL CALC-SCNC: 8 MMOL/L (ref 5–15)
AST SERPL-CCNC: 37 U/L (ref 15–37)
BASOPHILS # BLD: 0 K/UL (ref 0–0.1)
BASOPHILS NFR BLD: 0 % (ref 0–1)
BILIRUB SERPL-MCNC: 0.3 MG/DL (ref 0.2–1)
BUN SERPL-MCNC: 11 MG/DL (ref 6–20)
BUN/CREAT SERPL: 15 (ref 12–20)
CALCIUM SERPL-MCNC: 8.2 MG/DL (ref 8.5–10.1)
CHLORIDE SERPL-SCNC: 106 MMOL/L (ref 97–108)
CO2 SERPL-SCNC: 28 MMOL/L (ref 21–32)
CREAT SERPL-MCNC: 0.71 MG/DL (ref 0.55–1.02)
DIFFERENTIAL METHOD BLD: ABNORMAL
EOSINOPHIL # BLD: 0 K/UL (ref 0–0.4)
EOSINOPHIL NFR BLD: 0 % (ref 0–7)
ERYTHROCYTE [DISTWIDTH] IN BLOOD BY AUTOMATED COUNT: 13 % (ref 11.5–14.5)
GLOBULIN SER CALC-MCNC: 2.1 G/DL (ref 2–4)
GLUCOSE SERPL-MCNC: 174 MG/DL (ref 65–100)
HCT VFR BLD AUTO: 36.2 % (ref 35–47)
HGB BLD-MCNC: 12.2 G/DL (ref 11.5–16)
IMM GRANULOCYTES # BLD AUTO: 0.1 K/UL (ref 0–0.04)
IMM GRANULOCYTES NFR BLD AUTO: 1 % (ref 0–0.5)
LYMPHOCYTES # BLD: 0.2 K/UL (ref 0.8–3.5)
LYMPHOCYTES NFR BLD: 3 % (ref 12–49)
MCH RBC QN AUTO: 31.1 PG (ref 26–34)
MCHC RBC AUTO-ENTMCNC: 33.7 G/DL (ref 30–36.5)
MCV RBC AUTO: 92.3 FL (ref 80–99)
MONOCYTES # BLD: 0.1 K/UL (ref 0–1)
MONOCYTES NFR BLD: 2 % (ref 5–13)
NEUTS SEG # BLD: 4.7 K/UL (ref 1.8–8)
NEUTS SEG NFR BLD: 94 % (ref 32–75)
NRBC # BLD: 0 K/UL (ref 0–0.01)
NRBC BLD-RTO: 0 PER 100 WBC
PLATELET # BLD AUTO: 197 K/UL (ref 150–400)
PMV BLD AUTO: 9.6 FL (ref 8.9–12.9)
POTASSIUM SERPL-SCNC: 3.1 MMOL/L (ref 3.5–5.1)
PROT SERPL-MCNC: 4.9 G/DL (ref 6.4–8.2)
RBC # BLD AUTO: 3.92 M/UL (ref 3.8–5.2)
RBC MORPH BLD: ABNORMAL
SODIUM SERPL-SCNC: 142 MMOL/L (ref 136–145)
WBC # BLD AUTO: 5.1 K/UL (ref 3.6–11)

## 2022-08-22 PROCEDURE — 36591 DRAW BLOOD OFF VENOUS DEVICE: CPT

## 2022-08-22 PROCEDURE — 74011250637 HC RX REV CODE- 250/637: Performed by: NURSE PRACTITIONER

## 2022-08-22 PROCEDURE — 74011636637 HC RX REV CODE- 636/637: Performed by: INTERNAL MEDICINE

## 2022-08-22 PROCEDURE — 99233 SBSQ HOSP IP/OBS HIGH 50: CPT | Performed by: INTERNAL MEDICINE

## 2022-08-22 PROCEDURE — 74011250636 HC RX REV CODE- 250/636: Performed by: INTERNAL MEDICINE

## 2022-08-22 PROCEDURE — 94761 N-INVAS EAR/PLS OXIMETRY MLT: CPT

## 2022-08-22 PROCEDURE — 80053 COMPREHEN METABOLIC PANEL: CPT

## 2022-08-22 PROCEDURE — 74011250637 HC RX REV CODE- 250/637: Performed by: INTERNAL MEDICINE

## 2022-08-22 PROCEDURE — 74011000250 HC RX REV CODE- 250: Performed by: NURSE PRACTITIONER

## 2022-08-22 PROCEDURE — 74011000250 HC RX REV CODE- 250: Performed by: INTERNAL MEDICINE

## 2022-08-22 PROCEDURE — 85025 COMPLETE CBC W/AUTO DIFF WBC: CPT

## 2022-08-22 RX ORDER — MORPHINE SULFATE 15 MG/1
15 TABLET ORAL
Qty: 60 TABLET | Refills: 0 | Status: SHIPPED | OUTPATIENT
Start: 2022-08-22 | End: 2022-09-01

## 2022-08-22 RX ORDER — SULFAMETHOXAZOLE AND TRIMETHOPRIM 800; 160 MG/1; MG/1
1 TABLET ORAL
Qty: 15 TABLET | Refills: 2 | Status: SHIPPED | OUTPATIENT
Start: 2022-08-24

## 2022-08-22 RX ORDER — POTASSIUM CHLORIDE 750 MG/1
20 TABLET, FILM COATED, EXTENDED RELEASE ORAL
Status: COMPLETED | OUTPATIENT
Start: 2022-08-22 | End: 2022-08-22

## 2022-08-22 RX ADMIN — HEPARIN 300 UNITS: 100 SYRINGE at 16:13

## 2022-08-22 RX ADMIN — PREDNISONE 115 MG: 5 TABLET ORAL at 09:37

## 2022-08-22 RX ADMIN — MORPHINE SULFATE 15 MG: 15 TABLET ORAL at 05:58

## 2022-08-22 RX ADMIN — SODIUM CHLORIDE, PRESERVATIVE FREE 10 ML: 5 INJECTION INTRAVENOUS at 16:15

## 2022-08-22 RX ADMIN — LISINOPRIL 20 MG: 20 TABLET ORAL at 09:38

## 2022-08-22 RX ADMIN — Medication 10 ML: at 14:04

## 2022-08-22 RX ADMIN — SODIUM CHLORIDE 1128 MG: 9 INJECTION, SOLUTION INTRAVENOUS at 14:08

## 2022-08-22 RX ADMIN — SODIUM CHLORIDE 45 ML/HR: 9 INJECTION, SOLUTION INTRAVENOUS at 09:44

## 2022-08-22 RX ADMIN — PANTOPRAZOLE SODIUM 40 MG: 40 TABLET, DELAYED RELEASE ORAL at 05:58

## 2022-08-22 RX ADMIN — AMLODIPINE BESYLATE 5 MG: 5 TABLET ORAL at 09:37

## 2022-08-22 RX ADMIN — ONDANSETRON 8 MG: 2 INJECTION INTRAMUSCULAR; INTRAVENOUS at 05:58

## 2022-08-22 RX ADMIN — PREDNISONE 115 MG: 5 TABLET ORAL at 16:13

## 2022-08-22 RX ADMIN — MORPHINE SULFATE 15 MG: 15 TABLET ORAL at 14:03

## 2022-08-22 RX ADMIN — LORAZEPAM 1 MG: 1 TABLET ORAL at 09:38

## 2022-08-22 RX ADMIN — Medication 10 ML: at 05:59

## 2022-08-22 RX ADMIN — SULFAMETHOXAZOLE AND TRIMETHOPRIM 1 TABLET: 800; 160 TABLET ORAL at 09:38

## 2022-08-22 RX ADMIN — FUROSEMIDE 20 MG: 20 TABLET ORAL at 09:37

## 2022-08-22 RX ADMIN — FLUOXETINE 40 MG: 20 CAPSULE ORAL at 09:38

## 2022-08-22 RX ADMIN — POTASSIUM CHLORIDE 20 MEQ: 750 TABLET, FILM COATED, EXTENDED RELEASE ORAL at 09:38

## 2022-08-22 RX ADMIN — ONDANSETRON 8 MG: 2 INJECTION INTRAMUSCULAR; INTRAVENOUS at 13:59

## 2022-08-22 NOTE — PROGRESS NOTES
Problem: Falls - Risk of  Goal: *Absence of Falls  Outcome: Progressing Towards Goal  Note: Fall Risk Interventions:  Mobility Interventions: Patient to call before getting OOB, Communicate number of staff needed for ambulation/transfer         Medication Interventions: Patient to call before getting OOB, Teach patient to arise slowly                   Problem: Patient Education: Go to Patient Education Activity  Goal: Patient/Family Education  Outcome: Progressing Towards Goal     Problem: Chemotherapy, Day 3 to Discharge  Goal: Off Pathway (Use only if patient is Off Pathway)  Outcome: Progressing Towards Goal  Goal: Activity/Safety  Outcome: Progressing Towards Goal  Goal: Consults, if ordered  Outcome: Progressing Towards Goal  Goal: Diagnostic Test/Procedures  Outcome: Progressing Towards Goal  Goal: Nutrition/Diet  Outcome: Progressing Towards Goal  Goal: Discharge Planning  Outcome: Progressing Towards Goal  Goal: Medications  Outcome: Progressing Towards Goal  Goal: Respiratory  Outcome: Progressing Towards Goal  Goal: Treatments/Interventions/Procedures  Outcome: Progressing Towards Goal  Goal: Psychosocial  Outcome: Progressing Towards Goal  Goal: *Optimal pain control at patient's stated goal  Outcome: Progressing Towards Goal  Goal: *Hemodynamically stable  Outcome: Progressing Towards Goal  Goal: *Adequate oxygenation  Outcome: Progressing Towards Goal     Problem: Chemotherapy, Day 3 to Discharge  Goal: Activity/Safety  Outcome: Progressing Towards Goal     Problem: Chemotherapy, Day 3 to Discharge  Goal: Consults, if ordered  Outcome: Progressing Towards Goal     Problem: Chemotherapy, Day 3 to Discharge  Goal: Diagnostic Test/Procedures  Outcome: Progressing Towards Goal     Problem: Chemotherapy, Day 3 to Discharge  Goal: Nutrition/Diet  Outcome: Progressing Towards Goal     Problem: Chemotherapy, Day 3 to Discharge  Goal: Discharge Planning  Outcome: Progressing Towards Goal     Problem: Chemotherapy, Day 3 to Discharge  Goal: *Optimal pain control at patient's stated goal  Outcome: Progressing Towards Goal     Problem: Chemotherapy, Day 3 to Discharge  Goal: *Hemodynamically stable  Outcome: Progressing Towards Goal     Problem: Chemotherapy Discharge Outcomes  Goal: *Verbalizes understanding and describes prescribed diet  Outcome: Progressing Towards Goal  Goal: *Describes follow-up/return visits to physicians  Outcome: Progressing Towards Goal  Goal: *Describes home care/support arrangements established based on need  Outcome: Progressing Towards Goal  Goal: *Describes available resources and support systems  Outcome: Progressing Towards Goal  Goal: *Anxiety reduced or absent  Outcome: Progressing Towards Goal  Goal: *Understands and describes signs and symptoms to report to providers(Stroke Metric)  Outcome: Progressing Towards Goal  Goal: *Hemodynamically stable  Outcome: Progressing Towards Goal  Goal: *Tolerating diet  Outcome: Progressing Towards Goal  Goal: *Verbalizes understanding and describes medication purposes and frequencies  Outcome: Progressing Towards Goal  Goal: *Venous access site with positive blood return and without signs and symptoms of infection  Outcome: Progressing Towards Goal  Goal: *Verbalizes understanding of bowel regimen  Outcome: Progressing Towards Goal     Problem: Chemotherapy Discharge Outcomes  Goal: *Venous access site with positive blood return and without signs and symptoms of infection  Outcome: Progressing Towards Goal     Problem: Chemotherapy Discharge Outcomes  Goal: *Verbalizes understanding of bowel regimen  Outcome: Progressing Towards Goal

## 2022-08-22 NOTE — PROGRESS NOTES
1625: Port de-accessed (see previous note) and discharge information reviewed with pt. Opportunity given to ask questions. Pt voiced understanding.

## 2022-08-22 NOTE — PROGRESS NOTES
[facility] [place] at [address], [address] [facility] [address] [facility]  [place]: [phone]  F: [phone] Patient ID  Name: Maria Elena Graham  YOB: 1955  MRN: [id number]  Referring Provider:   No referring provider defined for this encounter. 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 This summary highlights focused aspects of patient's ongoing care and may have a recurring section in notes with either updates or remain unchanged as a longitudinal care summary. -------------------------------------------------------------------------------------------------------------------------------------------------------------------------------------------------------  DIAGNOSIS:  Diffuse Large B-Cell Lymphoma     ORIGINAL STAGING:  Stage IV     SITES OF DISEASE:  Left Cervical Lymph Node,Bone Disease, Stage IV    CURRENT TREATMENT:  C1,D1 on 5/11/22 DA-R-EPOCH  C2,D1 on 6/6/22 DA-R-EPOCH +1 dose escalation (except for doxorubicin due to swelling in C1)  Admission for Cycle 3. PRIOR TREATMENT:  n/a     GOALS OF CARE:  Curative Intent     PATHOLOGY:  Specimen #:[id number] Collect: 4/22/2022      ==========================================================================                    * * *SURGICAL PATHOLOGY REPORT* * *   ==========================================================================   * * *PROCEDURES/ADDENDA* * *   ADDENDUM   Date Ordered: 4/26/2022     Status: Signed Out   Date Complete: 4/26/2022     By: Zeeshan Pierre.  Cari Thomas MD   Date Reported: 4/26/2022   Addendum Diagnosis   Lymph node, left posterior cervical lymph node, excision:   In situ hybridization for Jesús-Barr viral RNA: Negative   CPT: 35569  Addendum Comment   * * *CLINICAL HISTORY* * * Cervical lymphadenopathy, rule out lymphoma   ==========================================================================   * * *FINAL PATHOLOGIC DIAGNOSIS* * *        Lymph node, left posterior cervical lymph node, excision:        Large B cell lymphoma. See comment. * * *Comment* * *   The histologic section has fragments of lymphoid tissue with diffuse and   focal follicular architecture. Diffuse areas are comprised of large,   atypical lymphocytes with centroblastic morphology and increased mitotic   activity. Immunohistochemical stains confirm the malignant cells are CD20   positive B cells that coexpress CD10 and BCL6 without MUM1. CD23   highlights rare follicular dendritic networks associated with lymphoid   follicles with germinal centers and express CD10 and BCL6 without BCL2. CyclinD1 and CD56 stains are negative. Concurrent flow cytometric analysis   confirms a clonal CD10 positive B-cell population comprised of medium to   large cells. The Ki-67 proliferation index is 30%. The overall findings favor the diagnosis of diffuse large B-cell lymphoma,   germinal center subtype. Molecular genetic studies are pending for further   characterization will be reported in an addendum. Flow cytometry:   Consistent with CD10-positive B-cell lymphoproliferative disorder. Comments: Flow cytometry shows monoclonal B-cells (36% of total cells)   with CD10 expression without CD5, consistent with a CD10-positive B-cell   lymphoproliferative disorder. The main differential diagnosis includes   follicular lymphoma, Burkitt lymphoma and large B-cell lymphoma. Please   correlate the result with morphologic findings and clinical information. Flow Differential (%) and Population Analysis:   Lymphocytes: 93.7%   T-cells (39% of lymphoid cells) show a CD4/CD8 ratio of 3.3 without overt   phenotypic abnormality. NK-cells (1% of lymphoid cells) are unremarkable.    Mature B-cells (56% of lymphoid cells) include large forms based on   forward scattered pattern and show: CD5 neg, CD10+, CD11c+dim, CD19+,   CD20+ with surface lambda light chain restriction. Markers Performed: CD2, CD3, CD4, CD5, CD7, CD8, CD10, CD11c, CD19, CD20,   CD23, CD34, CD38, CD45, CD56, Kappa, Lambda (17 Markers)   The Technical Component Processing of this test was completed at   [facility], [address] [place] [address], [place], [place] / [id number] /   [phone] / Main Servin # [id number]. Interpretation by Yola Lara M.D. The   complete scanned report is available in Epic. CPT: B2493976, P2898071, C6048298, P505173, [id number], 3001446   jjw2/2022   *Electronically Signed Out By Vi Mixon. Syd Kraft MD*   ==========================================================================   * * *Gross Description* * *   Specimen #1, received fresh labeled with the patient's name,  and left   posterior cervical lymph node, consists of two paper towels containing a   1.7 x 1.5 x 0.5 cm aggregate of pale pink-tan, fleshy soft tissue   fragments. The specimen is handled according to the flow cytometry   protocol as follows: 7          Two air-dried touch prep slides - one Diff Quik stained, one   rapid-fixed in 95% alcohol   7          Representative sections in B plus fixative (1A)   7          One piece held in RPMI for possible flow cytometry analysis   7          Remaining tissue fixed in formalin for permanents (1B)   Dr. Syd Kraft will determine if flow cytometry is necessary. 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 F who presents as a hospital admission for Cycle #3. Patient reports feeling well overall. Patient reports decreased oral intake with some good and bad days. Patient denies any bowel or bladder problems. Patient denies any pain. Patient denies any shortness of breath. Patient denies any fatigue.  Patient denies any gait disturbance. Interval History: Tolerated tx well. Little anxious about going home with concern of getting sick. Reports she is out of pain medication. Appetite is good. No further complaints at this time. Friend at bedside. Allergies   Allergen Reactions    Oxycodone Nausea Only       Objective:      Visit Vitals  BP (!) 168/88 (BP 1 Location: Left upper arm, BP Patient Position: At rest)   Pulse 72   Temp 97.6 °F (36.4 °C)   Resp 18   Ht 5' 2.99\" (1.6 m)   Wt 187 lb 6.3 oz (85 kg)   SpO2 93%   BMI 33.20 kg/m²     ECO  General: no distress  Eyes: anicteric sclerae  HENT: atraumatic  Neck: supple  Respiratory: normal respiratory effort  CV: no peripheral edema  GI: soft, nontender, nondistended, no masses, no hepatomegaly, no splenomegaly  Skin: no rashes; no ecchymoses; no petechiae  Psych: alert, oriented, appropriate affect, normal judgment/insight       Results:     Lab Results   Component Value Date/Time    WBC 5.1 2022 06:15 AM    HGB 12.2 2022 06:15 AM    HCT 36.2 2022 06:15 AM    PLATELET 325 1580 06:15 AM    MCV 92.3 2022 06:15 AM     Lab Results   Component Value Date/Time    Sodium 142 2022 06:15 AM    Potassium 3.1 (L) 2022 06:15 AM    Chloride 106 2022 06:15 AM    CO2 28 2022 06:15 AM    Anion gap 8 2022 06:15 AM    Glucose 174 (H) 2022 06:15 AM    BUN 11 2022 06:15 AM    Creatinine 0.71 2022 06:15 AM    BUN/Creatinine ratio 15 2022 06:15 AM    GFR est AA >60 2022 06:15 AM    GFR est non-AA >60 2022 06:15 AM    Calcium 8.2 (L) 2022 06:15 AM    Bilirubin, total 0.3 2022 06:15 AM    Alk.  phosphatase 68 2022 06:15 AM    Protein, total 4.9 (L) 2022 06:15 AM    Albumin 2.8 (L) 2022 06:15 AM    Globulin 2.1 2022 06:15 AM    A-G Ratio 1.3 2022 06:15 AM    ALT (SGPT) 97 (H) 2022 06:15 AM    AST (SGOT) 37 2022 06:15 AM     IR INSERT TUNL CVC W PORT OVER 5 YEARS    Result Date: 2022  PATIENT NAME: Dipak Baeza                                            AGE, : 78 years, 1955 MRN: [id number] DATE: 2022 11:50 AM PROCEDURE(S): Single lumen Portacath placement SUPERVISING PHYSICIAN: Kirsten Galvan MD OPERATING PROVIDER: Tiffanie Mendoza PA-C CLINICAL HISTORY: Mediport placement requested. B-cell lymphoma Patient was evaluated and felt to be an appropriate candidate for conscious sedation. Moderate intravenous conscious sedation was supervised by Kirsten Galvan MD. The patient was independently monitored by a registered nurse assigned to the Department of Radiology using automated blood pressure, EKG, and pulse oximetry. The detail conscious sedation record is stored in the hospital information system. MEDICATION: Antibiotic: Ancef 2g Versed: 6 mg Fentanyl: 100 mcg Intraprocedure time: 40 minutes FINDINGS: After informed consent was obtained, and the risks and benefits of the procedure including infection and bleeding were discussed, the patient was brought to the angiographic suite and placed on the angiographic table in a supine position. The right neck and chest were prepped and draped in maximum sterile barrier technique which includes: cap and mask, sterile gown, sterile gloves, and sterile body drape. The ultrasound transducer was prepped using sterile ultrasound technique which includes: sterile gel and probe cover. After adequate conscious sedation was achieved utilizing Fentanyl and Versed, the skin over the internal jugular vein was anesthetized with 1% lidocaine. Sonographic images of the right neck demonstrated a patent and compressible right internal jugular vein. The vein was accessed under direct sonographic guidance using a 21 gauge micropuncture set. An ultrasonographic image was saved in the record. A 0.035 J wire was advanced through the sheath into the inferior vena cava under fluoroscopic guidance.  A peel-away sheath was advanced over the wire into the superior vena cava under fluoroscopic guidance. Attention was directed to the right chest, and after adequate local anesthesia, a horizontal incision was made. A subcutaneous pocket was then formed using a combination of sharp and blunt dissection. A tunnel was then formed between the pocket and the venotomy site and the catheter passed through the tunnel and through the peel-away sheath. The peel-away sheath was removed and the catheter tip was positioned at the right atrium. The catheter was cut at the pocket access point and the portacath was attached to the catheter and placed snugly in the pocket. The pocket was closed primarily with 2-0 Vicryl suture and Dermabond. The neck access site was closed with 2-0 Vicryl suture and Dermabond. The port was accessed and flushed with heparin solution. The patient tolerated the procedure well. Fluoroscopy demonstrates the catheter tip to be within the right atrium. RADIATION: Fluoroscopy time: 0.3 minutes Air kerma: 5.1 mGy     Technically successful placement of a single lumen port with the catheter tip in the right atrium. Catheter is ready for immediate use. CT NECK SOFT TISSUE W CONT    Result Date: 6/28/2022  INDICATION: Diffuse large b-cell lymphoma, lymph nodes of head, face, and neck EXAM:  CT NECK, CHEST, ABDOMEN, PELVIS WITH CONTRAST COMPARISON: PET scan May 4, 2022 TECHNIQUE:  CT imaging of the neck, chest, abdomen and pelvis was performed after the uneventful intravenous administration of IV contrast. Coronal and sagittal reconstructions were generated. CT dose reduction was achieved through use of a standardized protocol tailored for this examination and automatic exposure control for dose modulation. FINDINGS: NASOPHARYNX: Normal. SUPRAHYOID NECK: Normal oropharynx, oral cavity, parapharyngeal space, and retropharyngeal space. INFRAHYOID NECK: Normal larynx, hypopharynx and supraglottis.  PAROTID GLANDS: Normal. SUBMANDIBULAR GLANDS: Absent left submandibular gland. THYROID GLAND: Normal. LYMPH NODES: Interval decrease in size of previous enlarged left level 2 lymph nodes, the more anterior of which decreased from 1.2 x 1.1 cm to 0.7 x 0.6 cm, and the posterior node decreased from 1.8 x 1.2 cm to 1.1 x 0.7 cm. Scattered subcentimeter lymph nodes throughout the neck. No new or enlarging nodes. OSSEOUS STRUCTURES: No destructive bone lesion. Prior ACDF C5-7. ADDITIONAL COMMENTS: N/A. MEDIASTINUM/ELIZABETH: Scattered nonenlarged lymph nodes. Small hiatal hernia. HEART/PERICARDIUM: Unremarkable. LUNGS/PLEURA: No suspicious nodule. No pulmonary edema or pleural effusion. Tree-in-bud opacities in the lateral right lower lobe, and mild ill-defined nodular groundglass opacities right apex. BONES: No destructive bone lesion. ADDITIONAL COMMENTS: Unchanged round 9 mm density lateral right breast LIVER: No mass or biliary dilatation. GALLBLADDER: Surgically absent. SPLEEN: No enlargement or lesion. PANCREAS: No mass or ductal dilatation. ADRENALS: No mass. KIDNEYS: No mass, calculus, or hydronephrosis. GI TRACT: No bowel obstruction or wall thickening PERITONEUM: No free air or free fluid. APPENDIX: Not clearly visualized. RETROPERITONEUM: Interval decrease in size of previously enlarged lymph nodes: Gastrohepatic ligament lymph node decreased from 4.2 x 1.2 cm to 2.2 x 0.9 cm; aortocaval lymph node at the level of the right kidney, previously 1.2 cm, now 0.6 cm. No new or enlarging lymph nodes. ADDITIONAL COMMENTS: Right paraumbilical ventral hernia containing mesenteric fat and bowel loops similar to prior study. URINARY BLADDER: No mass or calculus. LYMPH NODES: Decreased size of left inguinal lymph nodes from 1.6 cm to 0.8 cm. REPRODUCTIVE ORGANS: Prior hysterectomy. FREE FLUID: None. BONES: No destructive bone lesion. ADDITIONAL COMMENTS: N/A.     1. Interval decrease in size of previously enlarged lymph nodes in the neck, abdomen, and pelvis.  No new or enlarging lymph nodes. 2. New tree-in-bud opacities and groundglass opacities in the right lower and upper lobes may be infectious/inflammatory. May be assessed on follow-up examinations. 23X     CT CHEST ABD PELV W CONT    Result Date: 6/28/2022  INDICATION: Diffuse large b-cell lymphoma, lymph nodes of head, face, and neck EXAM:  CT NECK, CHEST, ABDOMEN, PELVIS WITH CONTRAST COMPARISON: PET scan May 4, 2022 TECHNIQUE:  CT imaging of the neck, chest, abdomen and pelvis was performed after the uneventful intravenous administration of IV contrast. Coronal and sagittal reconstructions were generated. CT dose reduction was achieved through use of a standardized protocol tailored for this examination and automatic exposure control for dose modulation. FINDINGS: NASOPHARYNX: Normal. SUPRAHYOID NECK: Normal oropharynx, oral cavity, parapharyngeal space, and retropharyngeal space. INFRAHYOID NECK: Normal larynx, hypopharynx and supraglottis. PAROTID GLANDS: Normal. SUBMANDIBULAR GLANDS: Absent left submandibular gland. THYROID GLAND: Normal. LYMPH NODES: Interval decrease in size of previous enlarged left level 2 lymph nodes, the more anterior of which decreased from 1.2 x 1.1 cm to 0.7 x 0.6 cm, and the posterior node decreased from 1.8 x 1.2 cm to 1.1 x 0.7 cm. Scattered subcentimeter lymph nodes throughout the neck. No new or enlarging nodes. OSSEOUS STRUCTURES: No destructive bone lesion. Prior ACDF C5-7. ADDITIONAL COMMENTS: N/A. MEDIASTINUM/ELIZABETH: Scattered nonenlarged lymph nodes. Small hiatal hernia. HEART/PERICARDIUM: Unremarkable. LUNGS/PLEURA: No suspicious nodule. No pulmonary edema or pleural effusion. Tree-in-bud opacities in the lateral right lower lobe, and mild ill-defined nodular groundglass opacities right apex. BONES: No destructive bone lesion. ADDITIONAL COMMENTS: Unchanged round 9 mm density lateral right breast LIVER: No mass or biliary dilatation. GALLBLADDER: Surgically absent.  SPLEEN: No enlargement or lesion. PANCREAS: No mass or ductal dilatation. ADRENALS: No mass. KIDNEYS: No mass, calculus, or hydronephrosis. GI TRACT: No bowel obstruction or wall thickening PERITONEUM: No free air or free fluid. APPENDIX: Not clearly visualized. RETROPERITONEUM: Interval decrease in size of previously enlarged lymph nodes: Gastrohepatic ligament lymph node decreased from 4.2 x 1.2 cm to 2.2 x 0.9 cm; aortocaval lymph node at the level of the right kidney, previously 1.2 cm, now 0.6 cm. No new or enlarging lymph nodes. ADDITIONAL COMMENTS: Right paraumbilical ventral hernia containing mesenteric fat and bowel loops similar to prior study. URINARY BLADDER: No mass or calculus. LYMPH NODES: Decreased size of left inguinal lymph nodes from 1.6 cm to 0.8 cm. REPRODUCTIVE ORGANS: Prior hysterectomy. FREE FLUID: None. BONES: No destructive bone lesion. ADDITIONAL COMMENTS: N/A.     1. Interval decrease in size of previously enlarged lymph nodes in the neck, abdomen, and pelvis. No new or enlarging lymph nodes. 2. New tree-in-bud opacities and groundglass opacities in the right lower and upper lobes may be infectious/inflammatory. May be assessed on follow-up examinations. 23X     PET/CT TUMOR IMAGE [facility] BDY W (INI)    Addendum Date: 5/5/2022    Addendum: There is increased metabolic activity in a small left frontal scalp lesion with SUV of 6.9. Result Date: 5/5/2022  PET/CT SCAN PROCEDURE: Following IV injection of 10.9 mCi 18 Fluoro 2 deoxyglucose (FDG) and a standard uptake delay, PET imaging is performed skull vertex to toes and axial, sagittal and coronal images were acquired. Unenhanced CT is obtained for anatomic localization, and attenuation correction of the PET scan. Patient preprocedure blood glucose level: 98 mg/dL. CORRELATIVE IMAGING STUDIES: None. COMPARISON PET: HISTORY: The study is requested for initial staging. Lymphoma.  FINDINGS: HEAD/NECK: There is increased metabolic activity 1.2 cm left level 2 lymph node. There is increased metabolic activity in a 1.2 cm left level 5 lymph node with SUV of 7.7. CHEST: No foci of abnormal hypermetabolism. Low-grade activity in mediastinal and hilar lymph nodes is nonspecific but may be reactive. ABDOMEN/PELVIS: There is increased metabolic activity in a 2.6 cm hepatogastric ligament lymph node with SUV of 5.7. There is increased metabolic activity in a 1.2 cm right retroperitoneal lymph node with SUV of 6. There is increased metabolic activity in a left retroperitoneal lymph node with SUV of 8 There is slight haziness in the mesentery with small lymph nodes with slight increased metabolic activity of 3.6. There is small focus of increased metabolic activity anterior to the left sacrum and anterior to the right sacrum with SUV of 7 suspicious for lymph nodes. There is increased metabolic activity in the right renal mass posterior cortex with SUV of 6.9. There is increased metabolic activity in a left inguinal lymph node with SUV of 8 There is a right abdominal wall hernia SKELETON: There is increased metabolic activity and left humeral head, right iliac bone, femurs and proximal right tibia. . There is slight increased metabolic activity right humeral head. There is increased metabolic activity in 2 lymph nodes in the left neck, in the hepatogastric ligament and retroperitoneal adenopathy, mesenteric lymph nodes and left inguinal adenopathy suspicious for lymphoma. There is increased metabolic activity in a mass posterior right renal cortex nonspecific but may represent lymphoma. There is increased bony activity as described above suspicious for lymphoma. . 23X        Assessment and Recommendations:      Patient Active Problem List   Diagnosis    Tobacco dependence  -initiated nicotine patch during inpatient. Diffuse large B-cell lymphoma of lymph nodes of neck (HCC)  -fortunately no signs of progressive disease while she has been off treatment.       Encounter for antineoplastic chemotherapy  -C3 D5 DA-R-EPOCH today  -Dose reduced level 1 (by -20%)  -bactrim prophylaxis.  -lasix 20 mg po prophylaxis for swelling; additional 10 mg added prior to discharge.   -follow up established in clinic to receive neulasta injection and provider appt for Wed 8/23      Poor appetite  -still working on her appetite. Dose reduced her chemotherapy. -seen by dietician      Anxiety about health  -continue her medications.  -continue to provide reassurance; Cycle 3 dose reduced.  -she is following through better on recommendations to proceed with treatment.       Pain regimen: managed by Dr Margarita Regan; have reached out to outpatient team     Plan reviewed with Dr Bubba Carranza

## 2022-08-22 NOTE — DISCHARGE SUMMARY
Physician Discharge Summary       Patient: Leonila Baires MRN: [id number]  SSN: [id number]    YOB: 1955  Age: 78 y.o. Sex: female    PCP: Rafa Yoo MD    Allergies: Oxycodone    Admit date: 8/18/2022  Admitting Provider: Roopa Dick MD    Discharge date: 8/22/2022  Discharging Provider: Javan Monge NP    * Admission Diagnoses: Diff[facility] [C83.30]    * Discharge Diagnoses:    Hospital Problems as of 8/22/2022 Date Reviewed: 8/3/2022            Codes Class Noted - Resolved POA    Diffuse large B cell lymphoma ([facility] ICD-10-CM: C83.30  ICD-9-CM: 202.80  5/11/2022 - Present Unknown           * Hospital Course: Ms Ozzie Armendariz was admitted on 8/18/22 for C3 chemotherapy with da-R-EPOCH. She tolerated therapy well, completing tx on Monday, 8/22/22. She experienced mild edema which was controlled with lasix 20 mg po daily and an additional dose on day of discharge of lasix 10 mg po. She was noted to have K+ 3.1 and received oral K+ replacement. She was rx a nicotine patch while inpatient. She completed C3 uneventfully and was discharged home with close follow up in the clinic on Wed 8/24/22 for neulasta injection and provider appt. She will also be followed by the palliative outpatient team.     * Procedures: none  Consults: None    Significant Diagnostic Studies: labs:    Discharge Exam: See progress noted from today. * Discharge Condition: good  * Disposition: Home    Discharge Medications:  Current Discharge Medication List        CONTINUE these medications which have CHANGED    Details   trimethoprim-sulfamethoxazole (BACTRIM DS, SEPTRA DS) 160-800 mg per tablet Take 1 Tablet by mouth every Monday, Wednesday, Friday.  Indications: prophylaxis treatment of cancer related infections  Qty: 15 Tablet, Refills: 2  Start date: 8/24/2022           CONTINUE these medications which have NOT CHANGED    Details   FLUoxetine (PROzac) 40 mg capsule TAKE 1 CAPSULE BY MOUTH EVERY MORNING  Qty: 90 Capsule, Refills: 1    Associated Diagnoses: Current moderate episode of major depressive disorder, unspecified whether recurrent (HCC)      amLODIPine-benazepril (LOTREL) 5-20 mg per capsule TAKE 1 CAPSULE BY MOUTH EVERY MORNING  Qty: 90 Capsule, Refills: 1    Associated Diagnoses: Essential hypertension      ondansetron (ZOFRAN ODT) 8 mg disintegrating tablet Take 1 Tablet by mouth every eight (8) hours as needed for Nausea or Vomiting. Qty: 50 Tablet, Refills: 1    Associated Diagnoses: Diffuse large B-cell lymphoma of lymph nodes of neck ([facility] Bilious vomiting with nausea           STOP taking these medications       nystatin (MYCOSTATIN) 100,000 unit/mL suspension Comments:   Reason for Stopping:         diclofenac EC (VOLTAREN) 50 mg EC tablet Comments:   Reason for Stopping:               * Follow-up Care/Patient Instructions:   Activity: Activity as tolerated  Diet: Regular Diet  Wound Care: None needed    Follow-up Information       Follow up With Specialties Details Why Contact Info    Kristine Pires MD Hematology and Oncology Follow up on 8/24/2022 appt at 10:30 am for neulasta injection and then provider appt [place] [address]  [phone]      [facility] [employer]   [facility] [phone]              Signed:  Cong Green NP  8/22/2022  2:41 PM

## 2022-08-22 NOTE — TELEPHONE ENCOUNTER
Triage for Controlled Substance Refill Request    Pain Diagnosis: _Right hip pain (M25.551); Bilateral foot pain (M79.671 , M79.672); Diffuse large B-cell lymphoma of lymph nodes of multiple regions [facility]) (C83.38    Last Outpatient Visit: _8/3/2022    Next Outpatient Visit: _1/52/5582    Reason for refill needed outside of office visit? Appointment not scheduled prior to need for scheduled refill    Pharmacy: [address] [place] [address]    Medication:morphine IR (MS IR) 15 mg tablet    Dose and directions: Take 1 Tablet by mouth every six (6) hours as needed for Pain for up to 15 days  Number dispensed:60  Date filled ( or Pharmacy):8/3/2022  # left:         reviewed: _yes    Date of Urine Drug Screen:  _    Opioid Safety Handout given:  _yes    Appropriate for refill:  _yes    Action:  _ medication pending

## 2022-08-22 NOTE — PROGRESS NOTES
8/22/2022  Case Management Progress Note    12:10 PM  Patient is 78year old female admitted 8/18 for diffuse large B cell lymphoma, planned chemotherapy   Patient's RUR is 17% yellow/moderate risk for readmission  Covid test: none this admission  Chart reviewed--patient discussed at IDR rounds  Confirmed at rounds this morning that patient will receive last dose of chemotherapy today and discharge home after that. Noted in chart that patient is followed by [address] and placed resumption orders for them to to see. No further needs, plan remains for patient to return home with family and [facility] support.      Transition of Care Plan   Continue medical management/treatment  Home with family assistance and [facility] today   Family will transport this afternoon at discharge  Follow up outpatient closely as indicated  CM will continue to follow until discharge, but ok to go from us once released medically    CHIVO Hazel

## 2022-08-22 NOTE — ADT AUTH CERT NOTES
by Tj Wagoner       Review Status Review Entered   In Primary 2022 12:22      Criteria Review        Visit Vitals  BP (!) 148/80 (BP 1 Location: Right upper arm, BP Patient Position: At rest)   Pulse 68   Temp 97.8 °F (36.6 °C)   Resp 17   Ht 5' 2.99\" (1.6 m)   Wt 184 lb 4.9 oz (83.6 kg)   SpO2 96%   BMI 32.66 kg/m²      ECO  General: no distress  Eyes: anicteric sclerae  HENT: atraumatic  Neck: supple  Respiratory: normal respiratory effort  CV: no peripheral edema  GI: soft, nontender, nondistended, no masses, no hepatomegaly, no splenomegaly  Skin: no rashes; no ecchymoses; no petechiae  Psych: alert, oriented, appropriate affect, normal judgment/insight         Results:                Lab Results   Component Value Date/Time     WBC 7.9 2022 03:29 AM     HGB 12.4 2022 03:29 AM     HCT 37.7 2022 03:29 AM     PLATELET 916  03:29 AM     MCV 97.2 2022 03:29 AM                Lab Results   Component Value Date/Time     Sodium 143 2022 03:29 AM     Potassium 3.4 (L) 2022 03:29 AM     Chloride 110 (H) 2022 03:29 AM     CO2 28 2022 03:29 AM     Anion gap 5 2022 03:29 AM     Glucose 130 (H) 2022 03:29 AM     BUN 12 2022 03:29 AM     Creatinine 0.66 2022 03:29 AM     BUN/Creatinine ratio 18 2022 03:29 AM     GFR est AA >60 2022 03:29 AM     GFR est non-AA >60 2022 03:29 AM     Calcium 8.3 (L) 2022 03:29 AM     Bilirubin, total 0.4 2022 03:29 AM     Alk. phosphatase 74 2022 03:29 AM     Protein, total 5.4 (L) 2022 03:29 AM     Albumin 3.1 (L) 2022 03:29 AM     Globulin 2.3 2022 03:29 AM     A-G Ratio 1.3 2022 03:29 AM     ALT (SGPT) 40 2022 03:29 AM     AST (SGOT) 28 2022 03:29 AM      Interval History:   She has had some pruritis. Started benadryl last night, getting some relief from this. No other new issues. No nausea.   Continues on chemotherapy. Assessment and Recommendations:            Patient Active Problem List   Diagnosis    Tobacco dependence  -initiated nicotine patch during inpatient. Diffuse large B-cell lymphoma of lymph nodes of neck (HCC)  -fortunately no signs of progressive disease while she has been off treatment. Encounter for antineoplastic chemotherapy  -C3 D4 DA-R-EPOCH today  -Dose reduced level 1 (by -20%)  -bactrim prophylaxis.  -lasix 20 mg po prophylaxis for swelling       Poor appetite  -still working on her appetite. Dose reduced her chemotherapy. -seen by dietician       Anxiety about health  -continue her medications.  -continue to provide reassurance; Cycle 3 dose reduced.  -she is following through better on recommendations to proceed with treatment.  by Stefanie Suarez       Review Status Review Entered   In Primary 2022 12:10      Criteria Review        History of Present Illness:      Zada Meckel is a 78 y.o. F who presents as a hospital admission for Cycle #3.       swelling in C1        Visit Vitals  /68 (BP 1 Location: Left lower arm)   Pulse 74   Temp 97.9 °F (36.6 °C)   Resp 16   Ht 5' 2.99\" (1.6 m)   Wt 180 lb 8.9 oz (81.9 kg)   SpO2 93%   BMI 31.99 kg/m²      ECO  General: no distress  Eyes: anicteric sclerae  HENT: atraumatic  Neck: supple  Respiratory: normal respiratory effort  CV: no peripheral edema  GI: soft, nontender, nondistended, no masses, no hepatomegaly, no splenomegaly  Skin: no rashes; no ecchymoses; no petechiae  Psych: alert, oriented, appropriate affect, normal judgment/insight         Results:                Lab Results   Component Value Date/Time     WBC 11.2 (H) 2022 03:32 AM     HGB 12.9 2022 03:32 AM     HCT 39.2 2022 03:32 AM     PLATELET 457  03:32 AM     MCV 94.9 2022 03:32 AM                Lab Results   Component Value Date/Time     Sodium 143 2022 03:32 AM     Potassium 3.6 2022 03:32 AM     Chloride 111 (H) 08/20/2022 03:32 AM     CO2 27 08/20/2022 03:32 AM     Anion gap 5 08/20/2022 03:32 AM     Glucose 128 (H) 08/20/2022 03:32 AM     BUN 11 08/20/2022 03:32 AM     Creatinine 0.72 08/20/2022 03:32 AM     BUN/Creatinine ratio 15 08/20/2022 03:32 AM     GFR est AA >60 08/20/2022 03:32 AM     GFR est non-AA >60 08/20/2022 03:32 AM     Calcium 8.3 (L) 08/20/2022 03:32 AM     Bilirubin, total 0.2 08/20/2022 03:32 AM     Alk. phosphatase 75 08/20/2022 03:32 AM     Protein, total 5.6 (L) 08/20/2022 03:32 AM     Albumin 3.0 (L) 08/20/2022 03:32 AM     Globulin 2.6 08/20/2022 03:32 AM     A-G Ratio 1.2 08/20/2022 03:32 AM     ALT (SGPT) 30 08/20/2022 03:32 AM     AST (SGOT) 12 (L) 08/20/2022 03:32 AM      DIAGNOSIS:  Diffuse Large B-Cell Lymphoma     ORIGINAL STAGING:  Stage IV     SITES OF DISEASE:  Left Cervical Lymph Node,Bone Disease, Stage IV    CURRENT TREATMENT:  C1,D1 on 5/11/22 DA-R-EPOCH  C2,D1 on 6/6/22 DA-R-EPOCH +1 dose escalation (except for doxorubicin due to swelling in C1)  Admission for Cycle 3. Assessment and Recommendations:            Patient Active Problem List   Diagnosis    Tobacco dependence  -initiated nicotine patch during inpatient. Diffuse large B-cell lymphoma of lymph nodes of neck (HCC)  -fortunately no signs of progressive disease while she has been off treatment. Encounter for antineoplastic chemotherapy  -C3 D3 DA-R-EPOCH today  -Dose reduced level 1 (by -20%)  -bactrim prophylaxis.  -lasix 20 mg po prophylaxis for swelling       Poor appetite  -still working on her appetite. Dose reduced her chemotherapy. -seen by dietician       Anxiety about health  -continue her medications.  -continue to provide reassurance; Cycle 3 dose reduced.  -she is following through better on recommendations to proceed with treatment.             Medical Oncology [address] [place] [address] Day 2 (8/19/2022) by Nini Kelley       Review Status Review Entered   Completed 8/19/2022 16:37      Criteria Review      Care Day: 2 Care Date: 8/19/2022 Level of Care: Inpatient Floor    Guideline Day 1    Level Of Care    ( ) ICU or intermediate care    8/19/2022 16:37:20 EDT by Alix Garcia      Medical [facility]    Clinical Status    ( ) * Clinical Indications met    Interventions    (X) Inpatient interventions as needed    8/19/2022 16:37:20 EDT by Alix Garcia      vinCRIStine (VINCASAR PFS) 0.8 mg, DOXOrubicin (ADRIAMYCIN) 15 mg in 0.9% sodium chloride 250 mL IV q24hr    etoposide (VEPESID) 75.2 mg in 0.9% sodium chloride 250 mL, overfill volume 25 mL chemo infusion q24hr  NS 45 mL/hr IV drip, Zofran 8 mg IV q8hr    * Milestone   Additional Notes    DATE: 8/19      Vitals:    97.7 °F (36.5 °C) 74 133/82 99 - At rest 16 87 % Abnormal  ra       97.7 °F (36.5 °C) 64 115/71 86 - Lying right side 15 94 % Nasal cannula 2 l/min       Abnl/Pertinent Labs/Radiology/Diagnostic Studies:   8/19/22 03:06   WBC: 5.7   NRBC: 0.0   RBC: 4.13   HGB: 13.0   HCT: 40.8   8/19/22 03:06   Sodium: 140   Potassium: 4.0   Chloride: 108   CO2: 26   Anion gap: 6   Glucose: 155 (H)   BUN: 8   Creatinine: 0.62      Physical Exam:   General: no distress   Eyes: anicteric sclerae   HENT: atraumatic   Neck: supple   Respiratory: normal respiratory effort   CV: no peripheral edema   GI: soft, nontender, nondistended, no masses, no hepatomegaly, no splenomegaly   Skin: no rashes; no ecchymoses; no petechiae   Psych: alert, oriented, appropriate affect, normal judgment/insight       Hematology note:   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 · Diffuse large B-cell lymphoma of lymph nodes of neck (HCC)   -fortunately no signs of progressive disease while she has been off treatment.        · Encounter for antineoplastic chemotherapy   -move forward with chemotherapy DA-R-EPOCH C3 Dose reduced level 1 (by -20%); proceed with C3d2 today.    -bactrim prophylaxis.   -added lasix 20 mg po prophylaxis for swelling       · Poor appetite   -still working on her appetite. Dose reduced her chemotherapy. -will consult dietician; for recs       · Anxiety about health   -continue her medications.   -continue to provide reassurance; Cycle 3 dose reduced.   -she is following through better on recommendations to proceed with treatment.        Plan reviewed with Dr Jose Reyna

## 2022-08-22 NOTE — PROGRESS NOTES
08/22/22    Medicare pt has received, reviewed, and signed 2nd IM letter informing them of their right to appeal the discharge. Signed copied has been placed on pt bedside chart.

## 2022-08-22 NOTE — PROGRESS NOTES
Bedside and Verbal shift change report given to Guerita Arguello (oncoming nurse) by [employer] nurse). Report included the following information SBAR and Kardex.

## 2022-08-22 NOTE — HOME CARE
Please note that this patient was open to [facility] - INPATIENT services at the time of hospital admission. If services will be needed at discharge, please order to resume them.  Thank you, Benton Snellen RN, [phone]

## 2022-08-22 NOTE — ADT AUTH CERT NOTES
by Kalpesh Villavicencio       Review Status Review Entered   In Primary 2022 12:22      Criteria Review        Visit Vitals  BP (!) 148/80 (BP 1 Location: Right upper arm, BP Patient Position: At rest)   Pulse 68   Temp 97.8 °F (36.6 °C)   Resp 17   Ht 5' 2.99\" (1.6 m)   Wt 184 lb 4.9 oz (83.6 kg)   SpO2 96%   BMI 32.66 kg/m²      ECO  General: no distress  Eyes: anicteric sclerae  HENT: atraumatic  Neck: supple  Respiratory: normal respiratory effort  CV: no peripheral edema  GI: soft, nontender, nondistended, no masses, no hepatomegaly, no splenomegaly  Skin: no rashes; no ecchymoses; no petechiae  Psych: alert, oriented, appropriate affect, normal judgment/insight         Results:                Lab Results   Component Value Date/Time     WBC 7.9 2022 03:29 AM     HGB 12.4 2022 03:29 AM     HCT 37.7 2022 03:29 AM     PLATELET 782  03:29 AM     MCV 97.2 2022 03:29 AM                Lab Results   Component Value Date/Time     Sodium 143 2022 03:29 AM     Potassium 3.4 (L) 2022 03:29 AM     Chloride 110 (H) 2022 03:29 AM     CO2 28 2022 03:29 AM     Anion gap 5 2022 03:29 AM     Glucose 130 (H) 2022 03:29 AM     BUN 12 2022 03:29 AM     Creatinine 0.66 2022 03:29 AM     BUN/Creatinine ratio 18 2022 03:29 AM     GFR est AA >60 2022 03:29 AM     GFR est non-AA >60 2022 03:29 AM     Calcium 8.3 (L) 2022 03:29 AM     Bilirubin, total 0.4 2022 03:29 AM     Alk. phosphatase 74 2022 03:29 AM     Protein, total 5.4 (L) 2022 03:29 AM     Albumin 3.1 (L) 2022 03:29 AM     Globulin 2.3 2022 03:29 AM     A-G Ratio 1.3 2022 03:29 AM     ALT (SGPT) 40 2022 03:29 AM     AST (SGOT) 28 2022 03:29 AM      Interval History:   She has had some pruritis. Started benadryl last night, getting some relief from this. No other new issues. No nausea.   Continues on chemotherapy. Assessment and Recommendations:            Patient Active Problem List   Diagnosis    Tobacco dependence  -initiated nicotine patch during inpatient. Diffuse large B-cell lymphoma of lymph nodes of neck (HCC)  -fortunately no signs of progressive disease while she has been off treatment. Encounter for antineoplastic chemotherapy  -C3 D4 DA-R-EPOCH today  -Dose reduced level 1 (by -20%)  -bactrim prophylaxis.  -lasix 20 mg po prophylaxis for swelling       Poor appetite  -still working on her appetite. Dose reduced her chemotherapy. -seen by dietician       Anxiety about health  -continue her medications.  -continue to provide reassurance; Cycle 3 dose reduced.  -she is following through better on recommendations to proceed with treatment.  by Jefe Hua       Review Status Review Entered   In Primary 2022 12:10      Criteria Review        History of Present Illness:      Osmel Ely is a 78 y.o. F who presents as a hospital admission for Cycle #3.       swelling in C1        Visit Vitals  /68 (BP 1 Location: Left lower arm)   Pulse 74   Temp 97.9 °F (36.6 °C)   Resp 16   Ht 5' 2.99\" (1.6 m)   Wt 180 lb 8.9 oz (81.9 kg)   SpO2 93%   BMI 31.99 kg/m²      ECO  General: no distress  Eyes: anicteric sclerae  HENT: atraumatic  Neck: supple  Respiratory: normal respiratory effort  CV: no peripheral edema  GI: soft, nontender, nondistended, no masses, no hepatomegaly, no splenomegaly  Skin: no rashes; no ecchymoses; no petechiae  Psych: alert, oriented, appropriate affect, normal judgment/insight         Results:                Lab Results   Component Value Date/Time     WBC 11.2 (H) 2022 03:32 AM     HGB 12.9 2022 03:32 AM     HCT 39.2 2022 03:32 AM     PLATELET 118 7466 03:32 AM     MCV 94.9 2022 03:32 AM                Lab Results   Component Value Date/Time     Sodium 143 2022 03:32 AM     Potassium 3.6 2022 03:32 AM     Chloride 111 (H) 08/20/2022 03:32 AM     CO2 27 08/20/2022 03:32 AM     Anion gap 5 08/20/2022 03:32 AM     Glucose 128 (H) 08/20/2022 03:32 AM     BUN 11 08/20/2022 03:32 AM     Creatinine 0.72 08/20/2022 03:32 AM     BUN/Creatinine ratio 15 08/20/2022 03:32 AM     GFR est AA >60 08/20/2022 03:32 AM     GFR est non-AA >60 08/20/2022 03:32 AM     Calcium 8.3 (L) 08/20/2022 03:32 AM     Bilirubin, total 0.2 08/20/2022 03:32 AM     Alk. phosphatase 75 08/20/2022 03:32 AM     Protein, total 5.6 (L) 08/20/2022 03:32 AM     Albumin 3.0 (L) 08/20/2022 03:32 AM     Globulin 2.6 08/20/2022 03:32 AM     A-G Ratio 1.2 08/20/2022 03:32 AM     ALT (SGPT) 30 08/20/2022 03:32 AM     AST (SGOT) 12 (L) 08/20/2022 03:32 AM      DIAGNOSIS:  Diffuse Large B-Cell Lymphoma     ORIGINAL STAGING:  Stage IV     SITES OF DISEASE:  Left Cervical Lymph Node,Bone Disease, Stage IV    CURRENT TREATMENT:  C1,D1 on 5/11/22 DA-R-EPOCH  C2,D1 on 6/6/22 DA-R-EPOCH +1 dose escalation (except for doxorubicin due to swelling in C1)  Admission for Cycle 3. Assessment and Recommendations:            Patient Active Problem List   Diagnosis    Tobacco dependence  -initiated nicotine patch during inpatient. Diffuse large B-cell lymphoma of lymph nodes of neck (HCC)  -fortunately no signs of progressive disease while she has been off treatment. Encounter for antineoplastic chemotherapy  -C3 D3 DA-R-EPOCH today  -Dose reduced level 1 (by -20%)  -bactrim prophylaxis.  -lasix 20 mg po prophylaxis for swelling       Poor appetite  -still working on her appetite. Dose reduced her chemotherapy. -seen by dietician       Anxiety about health  -continue her medications.  -continue to provide reassurance; Cycle 3 dose reduced.  -she is following through better on recommendations to proceed with treatment.

## 2022-08-23 ENCOUNTER — APPOINTMENT (OUTPATIENT)
Dept: INFUSION THERAPY | Age: 67
End: 2022-08-23
Payer: MEDICARE

## 2022-08-23 ENCOUNTER — HOME CARE VISIT (OUTPATIENT)
Dept: HOME HEALTH SERVICES | Facility: HOME HEALTH | Age: 67
End: 2022-08-23

## 2022-08-24 ENCOUNTER — HOSPITAL ENCOUNTER (OUTPATIENT)
Dept: INFUSION THERAPY | Age: 67
Discharge: HOME | End: 2022-08-24
Payer: MEDICARE

## 2022-08-24 ENCOUNTER — TELEPHONE (OUTPATIENT)
Dept: ONCOLOGY | Age: 67
End: 2022-08-24

## 2022-08-24 ENCOUNTER — OFFICE VISIT (OUTPATIENT)
Dept: ONCOLOGY | Age: 67
End: 2022-08-24
Payer: MEDICARE

## 2022-08-24 VITALS
HEIGHT: 63 IN | WEIGHT: 181 LBS | HEART RATE: 83 BPM | RESPIRATION RATE: 19 BRPM | DIASTOLIC BLOOD PRESSURE: 71 MMHG | OXYGEN SATURATION: 98 % | BODY MASS INDEX: 32.07 KG/M2 | SYSTOLIC BLOOD PRESSURE: 109 MMHG | TEMPERATURE: 98 F

## 2022-08-24 VITALS
HEART RATE: 85 BPM | SYSTOLIC BLOOD PRESSURE: 107 MMHG | RESPIRATION RATE: 18 BRPM | OXYGEN SATURATION: 96 % | DIASTOLIC BLOOD PRESSURE: 63 MMHG | TEMPERATURE: 97.8 F

## 2022-08-24 DIAGNOSIS — Z79.899 HIGH RISK MEDICATION USE: ICD-10-CM

## 2022-08-24 DIAGNOSIS — B37.0 THRUSH: Primary | ICD-10-CM

## 2022-08-24 DIAGNOSIS — C83.31 DIFFUSE LARGE B-CELL LYMPHOMA OF LYMPH NODES OF NECK (HCC): Primary | ICD-10-CM

## 2022-08-24 DIAGNOSIS — E87.6 HYPOKALEMIA: ICD-10-CM

## 2022-08-24 DIAGNOSIS — R11.2 CHEMOTHERAPY INDUCED NAUSEA AND VOMITING: ICD-10-CM

## 2022-08-24 DIAGNOSIS — T45.1X5A CHEMOTHERAPY INDUCED NAUSEA AND VOMITING: ICD-10-CM

## 2022-08-24 DIAGNOSIS — C83.31 DIFFUSE LARGE B-CELL LYMPHOMA OF LYMPH NODES OF NECK (HCC): ICD-10-CM

## 2022-08-24 LAB
ANION GAP SERPL CALC-SCNC: 6 MMOL/L (ref 5–15)
BUN SERPL-MCNC: 11 MG/DL (ref 6–20)
BUN/CREAT SERPL: 15 (ref 12–20)
CALCIUM SERPL-MCNC: 9.1 MG/DL (ref 8.5–10.1)
CHLORIDE SERPL-SCNC: 100 MMOL/L (ref 97–108)
CO2 SERPL-SCNC: 32 MMOL/L (ref 21–32)
CREAT SERPL-MCNC: 0.73 MG/DL (ref 0.55–1.02)
GLUCOSE SERPL-MCNC: 118 MG/DL (ref 65–100)
POTASSIUM SERPL-SCNC: 3 MMOL/L (ref 3.5–5.1)
SODIUM SERPL-SCNC: 138 MMOL/L (ref 136–145)

## 2022-08-24 PROCEDURE — G8427 DOCREV CUR MEDS BY ELIG CLIN: HCPCS | Performed by: INTERNAL MEDICINE

## 2022-08-24 PROCEDURE — 80048 BASIC METABOLIC PNL TOTAL CA: CPT

## 2022-08-24 PROCEDURE — 1090F PRES/ABSN URINE INCON ASSESS: CPT | Performed by: INTERNAL MEDICINE

## 2022-08-24 PROCEDURE — 1101F PT FALLS ASSESS-DOCD LE1/YR: CPT | Performed by: INTERNAL MEDICINE

## 2022-08-24 PROCEDURE — G8400 PT W/DXA NO RESULTS DOC: HCPCS | Performed by: INTERNAL MEDICINE

## 2022-08-24 PROCEDURE — 1111F DSCHRG MED/CURRENT MED MERGE: CPT | Performed by: INTERNAL MEDICINE

## 2022-08-24 PROCEDURE — G9717 DOC PT DX DEP/BP F/U NT REQ: HCPCS | Performed by: INTERNAL MEDICINE

## 2022-08-24 PROCEDURE — 99214 OFFICE O/P EST MOD 30 MIN: CPT | Performed by: INTERNAL MEDICINE

## 2022-08-24 PROCEDURE — 3017F COLORECTAL CA SCREEN DOC REV: CPT | Performed by: INTERNAL MEDICINE

## 2022-08-24 PROCEDURE — 96372 THER/PROPH/DIAG INJ SC/IM: CPT

## 2022-08-24 PROCEDURE — 36415 COLL VENOUS BLD VENIPUNCTURE: CPT

## 2022-08-24 PROCEDURE — 74011250636 HC RX REV CODE- 250/636: Performed by: INTERNAL MEDICINE

## 2022-08-24 PROCEDURE — 1123F ACP DISCUSS/DSCN MKR DOCD: CPT | Performed by: INTERNAL MEDICINE

## 2022-08-24 PROCEDURE — G8536 NO DOC ELDER MAL SCRN: HCPCS | Performed by: INTERNAL MEDICINE

## 2022-08-24 PROCEDURE — G9899 SCRN MAM PERF RSLTS DOC: HCPCS | Performed by: INTERNAL MEDICINE

## 2022-08-24 PROCEDURE — G8417 CALC BMI ABV UP PARAM F/U: HCPCS | Performed by: INTERNAL MEDICINE

## 2022-08-24 RX ORDER — FLUCONAZOLE 100 MG/1
100 TABLET ORAL DAILY
Qty: 4 TABLET | Refills: 0 | Status: SHIPPED | OUTPATIENT
Start: 2022-08-24 | End: 2022-08-27

## 2022-08-24 RX ORDER — LORAZEPAM 0.5 MG/1
1 TABLET ORAL
COMMUNITY
End: 2022-08-29

## 2022-08-24 RX ORDER — POTASSIUM CHLORIDE 20 MEQ/1
20 TABLET, EXTENDED RELEASE ORAL DAILY
Qty: 30 TABLET | Refills: 0 | Status: SHIPPED
Start: 2022-08-24 | End: 2022-09-16

## 2022-08-24 RX ADMIN — PEGFILGRASTIM-CBQV 6 MG: 6 INJECTION, SOLUTION SUBCUTANEOUS at 10:50

## 2022-08-24 NOTE — PROGRESS NOTES
Identified pt with two pt identifiers(name and ). Reviewed record in preparation for visit and have obtained necessary documentation. Chief Complaint   Patient presents with    Follow-up        Vitals:    22 1103   BP: 109/71   Pulse: 83   Resp: 19   Temp: 98 °F (36.7 °C)   TempSrc: Oral   SpO2: 98%   Weight: 181 lb (82.1 kg)   Height: 5' 3\" (1.6 m)   PainSc:   6   PainLoc: Abdomen       Allergies   Allergen Reactions    Oxycodone Nausea Only       Current Outpatient Medications   Medication Instructions    amLODIPine-benazepril (LOTREL) 5-20 mg per capsule TAKE 1 CAPSULE BY MOUTH EVERY MORNING    FLUoxetine (PROzac) 40 mg capsule TAKE 1 CAPSULE BY MOUTH EVERY MORNING    LORazepam (Ativan) 0.5 mg tablet Oral    morphine IR (MS IR) 15 mg, Oral, EVERY 4 HOURS AS NEEDED    ondansetron (ZOFRAN ODT) 8 mg, Oral, EVERY 8 HOURS AS NEEDED    trimethoprim-sulfamethoxazole (BACTRIM DS, SEPTRA DS) 160-800 mg per tablet 1 Tablet, Oral, 3 TIMES A WEEK (MON,WED & FRI)       Health Maintenance Review: Patient reminded of \"due or due soon\" health maintenance. I have asked the patient to contact his/her primary care provider (PCP) for follow-up on his/her health maintenance. Immunization History   Administered Date(s) Administered    COVID-19, PFIZER PURPLE top, DILUTE for use, (age 15 y+), IM, 30mcg/0.3mL 2021, 2021    Influenza Vaccine 2020           Coordination of Care Questionnaire:  :   1) Have you been to an emergency room, urgent care, or hospitalized since your last visit? If yes, where when, and reason for visit? no       2. Have seen or consulted any other health care provider since your last visit? If yes, where when, and reason for visit? NO      Patient is accompanied by self I have received verbal consent from Osmel Ely to discuss any/all medical information while they are present in the room.

## 2022-08-24 NOTE — TELEPHONE ENCOUNTER
A women named Jem Holcomb from the [address] [place] [address] called to get some clarification on directions for the patient's fluconazole prescription.     # [phone]

## 2022-08-24 NOTE — PROGRESS NOTES
\[facility]\"" Progress Note    Date: 2022    Name: Ivana Machado    MRN: [id number]         : 1955    Ms. Chitra Dixon Arrived in a wheelchair and in no distress for Udenyca Injection. Assessment was completed, patient reports nausea. Labs drawn peripherally and sent for processing. Ms. Chaitanya Ny vitals were reviewed. Visit Vitals  /63   Pulse 85   Temp 97.8 °F (36.6 °C)   Resp 18   SpO2 96%   Breastfeeding No         Medications:  Medications Administered       pegfilgrastim-cbqv (UDENYCA) injection 6 mg       Admin Date  2022 Action  Given Dose  6 mg Route  SubCUTAneous Administered By  [place], [place]                     Ms. Chitra Dixon tolerated treatment well and was discharged from [place] [address] in stable condition. She is to follow up to schedule  her next appointment.     [facility]  2022

## 2022-08-24 NOTE — TELEPHONE ENCOUNTER
8/24/22 7:16 p.m.- Called and spoke to pharmacist who states that someone else had called earlier and clarified prescription and they had no further questions or needs from our office at this time.

## 2022-08-24 NOTE — PROGRESS NOTES
[facility] [place] at [address], [address] [facility] [address] [facility]  [facility] [phone]  F: [phone] Patient ID  Name: Susie White  YOB: 1955  MRN: [id number]  Referring Provider:   No referring provider defined for this encounter. 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 This summary highlights focused aspects of patient's ongoing care and may have a recurring section in notes with either updates or remain unchanged as a longitudinal care summary. -------------------------------------------------------------------------------------------------------------------------------------------------------------------------------------------------------  DIAGNOSIS:  Diffuse Large B-Cell Lymphoma     ORIGINAL STAGING:  Stage IV     SITES OF DISEASE:  Left Cervical Lymph Node,Bone Disease, Stage IV    CURRENT TREATMENT:  C1,D1 on 5/11/22 DA-R-EPOCH     PRIOR TREATMENT:  n/a     GOALS OF CARE:  Curative Intent     PATHOLOGY:  Specimen #:[id number] Collect: 4/22/2022      ==========================================================================                    * * *SURGICAL PATHOLOGY REPORT* * *   ==========================================================================   * * *PROCEDURES/ADDENDA* * *   ADDENDUM   Date Ordered: 4/26/2022     Status: Signed Out   Date Complete: 4/26/2022     By: Carmelita Boxer.  Faith Sim MD   Date Reported: 4/26/2022   Addendum Diagnosis   Lymph node, left posterior cervical lymph node, excision:   In situ hybridization for Jesús-Barr viral RNA: Negative   CPT: 49435  Addendum Comment   * * *CLINICAL HISTORY* * *   Cervical lymphadenopathy, rule out lymphoma   ========================================================================== * * *FINAL PATHOLOGIC DIAGNOSIS* * *        Lymph node, left posterior cervical lymph node, excision:        Large B cell lymphoma. See comment. * * *Comment* * *   The histologic section has fragments of lymphoid tissue with diffuse and   focal follicular architecture. Diffuse areas are comprised of large,   atypical lymphocytes with centroblastic morphology and increased mitotic   activity. Immunohistochemical stains confirm the malignant cells are CD20   positive B cells that coexpress CD10 and BCL6 without MUM1. CD23   highlights rare follicular dendritic networks associated with lymphoid   follicles with germinal centers and express CD10 and BCL6 without BCL2. CyclinD1 and CD56 stains are negative. Concurrent flow cytometric analysis   confirms a clonal CD10 positive B-cell population comprised of medium to   large cells. The Ki-67 proliferation index is 30%. The overall findings favor the diagnosis of diffuse large B-cell lymphoma,   germinal center subtype. Molecular genetic studies are pending for further   characterization will be reported in an addendum. Flow cytometry:   Consistent with CD10-positive B-cell lymphoproliferative disorder. Comments: Flow cytometry shows monoclonal B-cells (36% of total cells)   with CD10 expression without CD5, consistent with a CD10-positive B-cell   lymphoproliferative disorder. The main differential diagnosis includes   follicular lymphoma, Burkitt lymphoma and large B-cell lymphoma. Please   correlate the result with morphologic findings and clinical information. Flow Differential (%) and Population Analysis:   Lymphocytes: 93.7%   T-cells (39% of lymphoid cells) show a CD4/CD8 ratio of 3.3 without overt   phenotypic abnormality. NK-cells (1% of lymphoid cells) are unremarkable.    Mature B-cells (56% of lymphoid cells) include large forms based on   forward scattered pattern and show: CD5 neg, CD10+, CD11c+dim, CD19+,   CD20+ with surface lambda light chain restriction. Markers Performed: CD2, CD3, CD4, CD5, CD7, CD8, CD10, CD11c, CD19, CD20,   CD23, CD34, CD38, CD45, CD56, Kappa, Lambda (17 Markers)   The Technical Component Processing of this test was completed at   [facility], [address] [place] [address], [facility] / [id number] /   [phone] / Lisbeth Jorgensen # [id number]. Interpretation by Juancarlos Flores M.D. The   complete scanned report is available in Epic. CPT: Y0084711, R8654790, [id number], [id number], [id number], [id number]   [facility]2/2022   *Electronically Signed Out By Sam Garcia. Sudhakar Herrmann MD*   ==========================================================================   * * *Gross Description* * *   Specimen #1, received fresh labeled with the patient's name,  and left   posterior cervical lymph node, consists of two paper towels containing a   1.7 x 1.5 x 0.5 cm aggregate of pale pink-tan, fleshy soft tissue   fragments. The specimen is handled according to the flow cytometry   protocol as follows: 7          Two air-dried touch prep slides - one Diff Quik stained, one   rapid-fixed in 95% alcohol   7          Representative sections in B plus fixative (1A)   7          One piece held in RPMI for possible flow cytometry analysis   7          Remaining tissue fixed in formalin for permanents (1B)   Dr. Sudhakar Herrmann will determine if flow cytometry is necessary. 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 F who presents for a follow-up evaluation for diffuse large b-cell lymphoma. Patient overall reports feeling slightly worse reportedly related to thrush. She denies any fevers. Reports tolerating her Neulasta injection well.   -  Appetite:Grade 1  Fatigue:Grade 3  Chills:Grade 1  Nausea:Grade 1  Vomiting:Grade 1  Pain:mild pain  Mouth Sores:Grade 1; notes thrush  Swelling:Grade 1       Past Medical History:   Diagnosis Date    Adverse effect of anesthesia PHLEGM IN THROAT POST OP & NEEDED X2 BREATHING TREATMENTS    Anxiety     COPD (chronic obstructive pulmonary disease) ([facility] [address]     emphysema    Depression     GERD (gastroesophageal reflux disease)     Hernia, femoral     since childhood    Hypertension     Joint pain     Nausea & vomiting     TMJ arthritis        Current Outpatient Medications:     LORazepam (ATIVAN) 0.5 mg tablet, Take 1 mg by mouth every six (6) hours as needed. , Disp: , Rfl:     trimethoprim-sulfamethoxazole (BACTRIM DS, SEPTRA DS) 160-800 mg per tablet, Take 1 Tablet by mouth every Monday, Wednesday, Friday. Indications: prophylaxis treatment of cancer related infections, Disp: 15 Tablet, Rfl: 2    morphine IR (MS IR) 15 mg tablet, Take 1 Tablet by mouth every four (4) hours as needed for Pain for up to 10 days. Max Daily Amount: 90 mg., Disp: 60 Tablet, Rfl: 0    ondansetron (ZOFRAN ODT) 8 mg disintegrating tablet, Take 1 Tablet by mouth every eight (8) hours as needed for Nausea or Vomiting., Disp: 50 Tablet, Rfl: 1    FLUoxetine (PROzac) 40 mg capsule, TAKE 1 CAPSULE BY MOUTH EVERY MORNING, Disp: 90 Capsule, Rfl: 1    amLODIPine-benazepril (LOTREL) 5-20 mg per capsule, TAKE 1 CAPSULE BY MOUTH EVERY MORNING, Disp: 90 Capsule, Rfl: 1  No current facility-administered medications for this visit. Allergies   Allergen Reactions    Oxycodone Nausea Only   Review of Systems Provided by:  Patient  Review of Systems: A complete review of systems was obtained, reviewed. Pertinent findings reviewed above. Objective:      Visit Vitals  /71 (BP 1 Location: Left upper arm, BP Patient Position: Sitting, BP Cuff Size: Adult)   Pulse 83   Temp 98 °F (36.7 °C) (Oral)   Resp 19   Ht 5' 3\" (1.6 m)   Wt 181 lb (82.1 kg)   SpO2 98%   BMI 32.06 kg/m²     ECO- Restricted in physically strenuous activity but ambulatory and able to carry out work of a light or sedentary nature, e.g., light house work, office work.   Physical Exam  Constitutional: No acute distress. , Non-toxic appearance. , and Non-diaphoretic. HENT: Normocephalic and atraumatic head. White coating on tongue. Wearing a mask during COVID-19 precautions. Eyes: Normal Conjunctivae. Anicteric sclerae. Cardiovascular: S1,S2 auscultated. No pitting edema. Pulmonary: Normal Respiratory Effort. No wheezing. No rhonchi. No rales. Abdominal: Normal bowel sounds. Soft Abdomen to palpation. No abdominal tenderness. Skin: No jaundice. No rash. Musculoskeletal: No muscle pain on palpation. No temporal muscle wasting on inspection. Neurological: Alert and oriented. No tremor on inspection. Psychiatric: mood normal. normal speech rate normal affect      Results:      I personally reviewed Epic EHR labs/results below:   Lab Results   Component Value Date/Time    WBC 5.1 2022 06:15 AM    HGB 12.2 2022 06:15 AM    HCT 36.2 2022 06:15 AM    PLATELET 435  06:15 AM    MCV 92.3 2022 06:15 AM     Lab Results   Component Value Date/Time    Sodium 138 2022 10:47 AM    Potassium 3.0 (L) 2022 10:47 AM    Chloride 100 2022 10:47 AM    CO2 32 2022 10:47 AM    Anion gap 6 2022 10:47 AM    Glucose 118 (H) 2022 10:47 AM    BUN 11 2022 10:47 AM    Creatinine 0.73 2022 10:47 AM    BUN/Creatinine ratio 15 2022 10:47 AM    GFR est AA >60 2022 10:47 AM    GFR est non-AA >60 2022 10:47 AM    Calcium 9.1 2022 10:47 AM    Bilirubin, total 0.3 2022 06:15 AM    Alk.  phosphatase 68 2022 06:15 AM    Protein, total 4.9 (L) 2022 06:15 AM    Albumin 2.8 (L) 2022 06:15 AM    Globulin 2.1 2022 06:15 AM    A-G Ratio 1.3 2022 06:15 AM    ALT (SGPT) 97 (H) 2022 06:15 AM    AST (SGOT) 37 2022 06:15 AM     IR INSERT TUNL CVC W PORT OVER 5 YEARS    Result Date: 2022  PATIENT NAME: Arvin Kelley                                            AGE, : 78 years, 1955 MRN: [id number] DATE: 4/29/2022 11:50 AM PROCEDURE(S): Single lumen Portacath placement SUPERVISING PHYSICIAN: Jaguar Wheeler MD OPERATING PROVIDER: Jasmin Ulloa PA-C CLINICAL HISTORY: Mediport placement requested. B-cell lymphoma Patient was evaluated and felt to be an appropriate candidate for conscious sedation. Moderate intravenous conscious sedation was supervised by Jaguar Wheeler MD. The patient was independently monitored by a registered nurse assigned to the Department of Radiology using automated blood pressure, EKG, and pulse oximetry. The detail conscious sedation record is stored in the hospital information system. MEDICATION: Antibiotic: Ancef 2g Versed: 6 mg Fentanyl: 100 mcg Intraprocedure time: 40 minutes FINDINGS: After informed consent was obtained, and the risks and benefits of the procedure including infection and bleeding were discussed, the patient was brought to the angiographic suite and placed on the angiographic table in a supine position. The right neck and chest were prepped and draped in maximum sterile barrier technique which includes: cap and mask, sterile gown, sterile gloves, and sterile body drape. The ultrasound transducer was prepped using sterile ultrasound technique which includes: sterile gel and probe cover. After adequate conscious sedation was achieved utilizing Fentanyl and Versed, the skin over the internal jugular vein was anesthetized with 1% lidocaine. Sonographic images of the right neck demonstrated a patent and compressible right internal jugular vein. The vein was accessed under direct sonographic guidance using a 21 gauge micropuncture set. An ultrasonographic image was saved in the record. A 0.035 J wire was advanced through the sheath into the inferior vena cava under fluoroscopic guidance. A peel-away sheath was advanced over the wire into the superior vena cava under fluoroscopic guidance.   Attention was directed to the right chest, and after adequate local anesthesia, a horizontal incision was made. A subcutaneous pocket was then formed using a combination of sharp and blunt dissection. A tunnel was then formed between the pocket and the venotomy site and the catheter passed through the tunnel and through the peel-away sheath. The peel-away sheath was removed and the catheter tip was positioned at the right atrium. The catheter was cut at the pocket access point and the portacath was attached to the catheter and placed snugly in the pocket. The pocket was closed primarily with 2-0 Vicryl suture and Dermabond. The neck access site was closed with 2-0 Vicryl suture and Dermabond. The port was accessed and flushed with heparin solution. The patient tolerated the procedure well. Fluoroscopy demonstrates the catheter tip to be within the right atrium. RADIATION: Fluoroscopy time: 0.3 minutes Air kerma: 5.1 mGy     Technically successful placement of a single lumen port with the catheter tip in the right atrium. Catheter is ready for immediate use. CT NECK SOFT TISSUE W CONT    Result Date: 6/28/2022  INDICATION: Diffuse large b-cell lymphoma, lymph nodes of head, face, and neck EXAM:  CT NECK, CHEST, ABDOMEN, PELVIS WITH CONTRAST COMPARISON: PET scan May 4, 2022 TECHNIQUE:  CT imaging of the neck, chest, abdomen and pelvis was performed after the uneventful intravenous administration of IV contrast. Coronal and sagittal reconstructions were generated. CT dose reduction was achieved through use of a standardized protocol tailored for this examination and automatic exposure control for dose modulation. FINDINGS: NASOPHARYNX: Normal. SUPRAHYOID NECK: Normal oropharynx, oral cavity, parapharyngeal space, and retropharyngeal space. INFRAHYOID NECK: Normal larynx, hypopharynx and supraglottis. PAROTID GLANDS: Normal. SUBMANDIBULAR GLANDS: Absent left submandibular gland.  THYROID GLAND: Normal. LYMPH NODES: Interval decrease in size of previous enlarged left level 2 lymph nodes, the more anterior of which decreased from 1.2 x 1.1 cm to 0.7 x 0.6 cm, and the posterior node decreased from 1.8 x 1.2 cm to 1.1 x 0.7 cm. Scattered subcentimeter lymph nodes throughout the neck. No new or enlarging nodes. OSSEOUS STRUCTURES: No destructive bone lesion. Prior ACDF C5-7. ADDITIONAL COMMENTS: N/A. MEDIASTINUM/ELIZABETH: Scattered nonenlarged lymph nodes. Small hiatal hernia. HEART/PERICARDIUM: Unremarkable. LUNGS/PLEURA: No suspicious nodule. No pulmonary edema or pleural effusion. Tree-in-bud opacities in the lateral right lower lobe, and mild ill-defined nodular groundglass opacities right apex. BONES: No destructive bone lesion. ADDITIONAL COMMENTS: Unchanged round 9 mm density lateral right breast LIVER: No mass or biliary dilatation. GALLBLADDER: Surgically absent. SPLEEN: No enlargement or lesion. PANCREAS: No mass or ductal dilatation. ADRENALS: No mass. KIDNEYS: No mass, calculus, or hydronephrosis. GI TRACT: No bowel obstruction or wall thickening PERITONEUM: No free air or free fluid. APPENDIX: Not clearly visualized. RETROPERITONEUM: Interval decrease in size of previously enlarged lymph nodes: Gastrohepatic ligament lymph node decreased from 4.2 x 1.2 cm to 2.2 x 0.9 cm; aortocaval lymph node at the level of the right kidney, previously 1.2 cm, now 0.6 cm. No new or enlarging lymph nodes. ADDITIONAL COMMENTS: Right paraumbilical ventral hernia containing mesenteric fat and bowel loops similar to prior study. URINARY BLADDER: No mass or calculus. LYMPH NODES: Decreased size of left inguinal lymph nodes from 1.6 cm to 0.8 cm. REPRODUCTIVE ORGANS: Prior hysterectomy. FREE FLUID: None. BONES: No destructive bone lesion. ADDITIONAL COMMENTS: N/A.     1. Interval decrease in size of previously enlarged lymph nodes in the neck, abdomen, and pelvis. No new or enlarging lymph nodes.  2. New tree-in-bud opacities and groundglass opacities in the right lower and upper lobes may be infectious/inflammatory. May be assessed on follow-up examinations. 23X     CT CHEST ABD PELV W CONT    Result Date: 6/28/2022  INDICATION: Diffuse large b-cell lymphoma, lymph nodes of head, face, and neck EXAM:  CT NECK, CHEST, ABDOMEN, PELVIS WITH CONTRAST COMPARISON: PET scan May 4, 2022 TECHNIQUE:  CT imaging of the neck, chest, abdomen and pelvis was performed after the uneventful intravenous administration of IV contrast. Coronal and sagittal reconstructions were generated. CT dose reduction was achieved through use of a standardized protocol tailored for this examination and automatic exposure control for dose modulation. FINDINGS: NASOPHARYNX: Normal. SUPRAHYOID NECK: Normal oropharynx, oral cavity, parapharyngeal space, and retropharyngeal space. INFRAHYOID NECK: Normal larynx, hypopharynx and supraglottis. PAROTID GLANDS: Normal. SUBMANDIBULAR GLANDS: Absent left submandibular gland. THYROID GLAND: Normal. LYMPH NODES: Interval decrease in size of previous enlarged left level 2 lymph nodes, the more anterior of which decreased from 1.2 x 1.1 cm to 0.7 x 0.6 cm, and the posterior node decreased from 1.8 x 1.2 cm to 1.1 x 0.7 cm. Scattered subcentimeter lymph nodes throughout the neck. No new or enlarging nodes. OSSEOUS STRUCTURES: No destructive bone lesion. Prior ACDF C5-7. ADDITIONAL COMMENTS: N/A. MEDIASTINUM/ELIZABETH: Scattered nonenlarged lymph nodes. Small hiatal hernia. HEART/PERICARDIUM: Unremarkable. LUNGS/PLEURA: No suspicious nodule. No pulmonary edema or pleural effusion. Tree-in-bud opacities in the lateral right lower lobe, and mild ill-defined nodular groundglass opacities right apex. BONES: No destructive bone lesion. ADDITIONAL COMMENTS: Unchanged round 9 mm density lateral right breast LIVER: No mass or biliary dilatation. GALLBLADDER: Surgically absent. SPLEEN: No enlargement or lesion. PANCREAS: No mass or ductal dilatation. ADRENALS: No mass. KIDNEYS: No mass, calculus, or hydronephrosis. GI TRACT: No bowel obstruction or wall thickening PERITONEUM: No free air or free fluid. APPENDIX: Not clearly visualized. RETROPERITONEUM: Interval decrease in size of previously enlarged lymph nodes: Gastrohepatic ligament lymph node decreased from 4.2 x 1.2 cm to 2.2 x 0.9 cm; aortocaval lymph node at the level of the right kidney, previously 1.2 cm, now 0.6 cm. No new or enlarging lymph nodes. ADDITIONAL COMMENTS: Right paraumbilical ventral hernia containing mesenteric fat and bowel loops similar to prior study. URINARY BLADDER: No mass or calculus. LYMPH NODES: Decreased size of left inguinal lymph nodes from 1.6 cm to 0.8 cm. REPRODUCTIVE ORGANS: Prior hysterectomy. FREE FLUID: None. BONES: No destructive bone lesion. ADDITIONAL COMMENTS: N/A.     1. Interval decrease in size of previously enlarged lymph nodes in the neck, abdomen, and pelvis. No new or enlarging lymph nodes. 2. New tree-in-bud opacities and groundglass opacities in the right lower and upper lobes may be infectious/inflammatory. May be assessed on follow-up examinations. 23X     PET/CT TUMOR IMAGE [facility] BD[facility] W (INI)    Addendum Date: 5/5/2022    Addendum: There is increased metabolic activity in a small left frontal scalp lesion with SUV of 6.9. Result Date: 5/5/2022  PET/CT SCAN PROCEDURE: Following IV injection of 10.9 mCi 18 Fluoro 2 deoxyglucose (FDG) and a standard uptake delay, PET imaging is performed skull vertex to toes and axial, sagittal and coronal images were acquired. Unenhanced CT is obtained for anatomic localization, and attenuation correction of the PET scan. Patient preprocedure blood glucose level: 98 mg/dL. CORRELATIVE IMAGING STUDIES: None. COMPARISON PET: HISTORY: The study is requested for initial staging. Lymphoma. FINDINGS: HEAD/NECK: There is increased metabolic activity 1.2 cm left level 2 lymph node. There is increased metabolic activity in a 1.2 cm left level 5 lymph node with SUV of 7.7.  CHEST: No foci of abnormal hypermetabolism. Low-grade activity in mediastinal and hilar lymph nodes is nonspecific but may be reactive. ABDOMEN/PELVIS: There is increased metabolic activity in a 2.6 cm hepatogastric ligament lymph node with SUV of 5.7. There is increased metabolic activity in a 1.2 cm right retroperitoneal lymph node with SUV of 6. There is increased metabolic activity in a left retroperitoneal lymph node with SUV of 8 There is slight haziness in the mesentery with small lymph nodes with slight increased metabolic activity of 3.6. There is small focus of increased metabolic activity anterior to the left sacrum and anterior to the right sacrum with SUV of 7 suspicious for lymph nodes. There is increased metabolic activity in the right renal mass posterior cortex with SUV of 6.9. There is increased metabolic activity in a left inguinal lymph node with SUV of 8 There is a right abdominal wall hernia SKELETON: There is increased metabolic activity and left humeral head, right iliac bone, femurs and proximal right tibia. . There is slight increased metabolic activity right humeral head. There is increased metabolic activity in 2 lymph nodes in the left neck, in the hepatogastric ligament and retroperitoneal adenopathy, mesenteric lymph nodes and left inguinal adenopathy suspicious for lymphoma. There is increased metabolic activity in a mass posterior right renal cortex nonspecific but may represent lymphoma. There is increased bony activity as described above suspicious for lymphoma. . 23X        Assessment and Recommendations:      1. Corky Ewing  -reportedly gagged on nystatin solution when had in past; will give fluconazole. - fluconazole (DIFLUCAN) 100 mg tablet; Take 1 Tablet by mouth daily for 3 days. Take TWO 100mg Tablets on Day 1. FDA advises cautious prescribing of oral fluconazole in pregnancy. Dispense: 4 Tablet; Refill: 0    2.  Diffuse large B-cell lymphoma of lymph nodes of neck ([facility] [address]  Plan for next treatment inpatient on 9/12/22 with C4 DA-R-EPOCH; will not escalate dosing. 3. Chemotherapy induced nausea and vomiting  . offered IV hydration and IV antiemetics but patient declines. She understands that she can contact us as needed. 4. Hypokalemia  Continue with potassium repletion. - potassium chloride (K-DUR, KLOR-CON M20) 20 mEq tablet; Take 1 Tablet by mouth daily. Dispense: 30 Tablet; Refill: 0         Follow-up and Dispositions    Return in about 8 days (around 9/1/2022).            Tex Argueta MD  Hematology/Medical Oncology Provider  [facility]  P: [phone]       Signed By:   Sierra Mejia MD

## 2022-08-24 NOTE — LETTER
8/25/2022    Patient: Anna Marie Avitia   YOB: 1955   Date of Visit: 8/24/2022     Ernestina Cai MD  [address]    Dear Ernestina Cai MD,      Thank you for referring [facility]. My notes for this consultation are attached. If you have questions, please do not hesitate to call me. I look forward to following your patient along with you.       Sincerely,    Bina William MD

## 2022-08-25 PROBLEM — E87.6 HYPOKALEMIA: Status: ACTIVE | Noted: 2022-01-01

## 2022-08-25 PROBLEM — B37.0 THRUSH: Status: ACTIVE | Noted: 2022-01-01

## 2022-08-26 NOTE — TELEPHONE ENCOUNTER
Called patient to advise/confirm upcoming appt with Dr. Scott Haddad on 08/29/22  at 11:30 at [facility]. Spoke with Irene Horton and confirmed appointment. Also advised to please bring in your 's License and Insurance Card and any pain medications in the original container with you to appointment.

## 2022-08-28 NOTE — PROGRESS NOTES
Palliative Medicine Outpatient Services  Parul: [phone] ([phone])    Patient Name: Barbara Navarro  YOB: 1955    Date of Current Visit: 08/29/22  Location of Current Visit:    [] [facility] Office  [x] [facility] Office  [] [facility] Office  [] Home  []Synchronous real-time A/V virtual visit    Date of Initial Visit: 5/17/22   Referral from: Pancho Goodrich MD  Primary Care Physician: Ilsa Mcallister MD      SUMMARY:   Barbara Navarro is a 78y.o. year old with a  history of stage IV diffuse large b-cell lymphoma, who was referred to Palliative Medicine by Dr. Austin Morillo for symptom management and psychosocial support. She was diagnosed in 4/2022. She was hospitalized at [facility] 5/11-5/15/22 for initiation of R-EPOCH. She continues to receive cancer-directed therapy with the goal of palliation of symptoms and prolongation of life. The patients social history includes: she's . She has 2 adult children, her daughter, Harry Fletcher, who lives locally, and a son, Isabelle Stevens, who lives in the home with her. 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 Palliative Medicine is addressing the following current patient/family concerns: symptoms related to lymphoma and treatment; support while undergoing treatment     Initial Referral Intake note reviewed   PALLIATIVE DIAGNOSES:       ICD-10-CM ICD-9-CM    1. Major depressive disorder with current active episode, unspecified depression episode severity, unspecified whether recurrent  F32.9 296.30       2. Anxiety  F41.9 300.00 ALPRAZolam (XANAX) 1 mg tablet      3. Generalized pain  R52 780.96       4. Right hip pain  M25.551 719.45 morphine ER (MS CONTIN) 15 mg CR tablet      naloxone (Narcan) 4 mg/actuation nasal spray      5. Bilateral foot pain  M79.671 729.5     M79.672        6. Poor appetite  R63.0 783.0       7. Fatigue, unspecified type  R53.83 780.79       8. Nausea and vomiting, unspecified vomiting type  R11.2 787.01       9.  Diffuse large B-cell lymphoma of lymph nodes of multiple regions (HCC)  C83.38 202.88 ALPRAZolam (XANAX) 1 mg tablet      morphine ER (MS CONTIN) 15 mg CR tablet                 PLAN:     Patient Instructions   Dear Ana Laura Cartagena ,    It was a pleasure seeing you today in [facility]. We will see you again in 1 week for a virtual visit. If labs or imaging tests have been ordered for you today, please call the office  at [phone] 48 hours after completion to obtain the results. Your described symptoms were: Fatigue: 8 Drowsiness: 4   Depression: 4 Pain: 7   Anxiety: 7 Nausea: 6   Anorexia: 10 Dyspnea: 0   Best Well-Bein Constipation: No           This is the plan we talked about:    Today we talked about your \"over all\" body pain from a \"total pain\" perspective in which your physical pain, your sadness and morning, the impact of your cancer diagnosis and treatment on your social life and your relationships and your history of depression and anxiety are all factors in your distress. Medications can be helpful in alleviating some aspects of total pain but are usually not sufficient to bring relief to other aspects of your distress. We agreed to try the following plan for 1 week, then will meet again to review and adjust if needed. Today I prescribed a 1-week supply of Xanax and a 30-day supply of extended release morphine.       1. Depression and anxiety  -Continue fluoxetine 40-mg daily  -You've used Xanax in the past and found it to be more effective than lorazepam  -We agreed to rotate you from lorazepam to Xanax twice daily  -Xanax 1-mg twice daily as needed for anxiety  -I encourage you to go out with one of your friends once during the next week   -Today you met our clinical , Rose Marie Leary, who will reach out to you by phone later this week  -We talked bout the synergistic (additive) effects between Xanax and morphine which places you at higher risk for breathing problems so it's important for you to take both your morphine and Xanax as prescribed    2. Pain  -Restart extended-release morphine 15-mg every 12 hours  -Continue morphine immediate-release 15-mg every 6 hours as needed (4 pills/24 hours)    3. Poor appetite  -Continue Ensure, juices and soft foods as tolerated    4. Fatigue/poor sleep  -This is multifactorial in nature with your depression and sadness, your body pain, your medications and your poor appetite all playing a role in your low energy  -Your hemoglobin on 8/22/22 was within normal limits so you're not anemic  -Your TSH on 3/6/22 was also within normal limits which indicates your thyroid hormone levels were normal at that time    5. Nausea and vomiting  -Continue ondansetron 8-mg every 8 hours as needed  -Continue prochlorperazine 10-mg every 6 hours as needed    6. Function  -You're spending most of your time in bed  -You're able to walk to the bathroom and into the kitchen without difficulty  -You haven't had any falls    7. Lymphoma  -You've received  a total of 3 cycles of chemotherapy with the goal of inducing remission    This is what you have shared with us about Advance Care Planning:      Primary Decision Maker: Steph Almanzar - Sal - [phone]    Primary Decision Maker: Yajaira Lennon - [phone]  Advance Care Planning 8/24/2022   Patient's [address] is: Legal Next [place] [phone] Directive -   Patient Would Like to Complete Advance Directive -   Does the patient have other document types -           The Palliative Medicine Team is here to support you and your family.        Sincerely,      Claudette Opal, MD and the Palliative Medicine Team     GOALS OF CARE / TREATMENT PREFERENCES:   [====Goals of Care====]  GOALS OF CARE:  Patient / health care proxy stated goals: See Patient Instructions / Summary    TREATMENT PREFERENCES:   Code Status:  [x] Attempt Resuscitation       [] Do Not Attempt Resuscitation    Advance Care Planning:  [x] The [facility] Interdisciplinary Team has updated the ACP Navigator with Decision Maker and Patient Capacity      Primary Decision Maker: Alexander Shafer - Sal - [phone]    Primary Decision Maker: Benton Short - [phone]  Advance Care Planning 8/24/2022   Patient's Healthcare Decision Maker is: Legal Next of Kin   Confirm Advance Directive -   Patient Would Like to Complete Advance Directive -   Does the patient have other document types -       Other:  (If patient appropriate for POST, consider using PALLPOST smart phrase here)    The palliative care team has discussed with patient / health care proxy about goals of care / treatment preferences for patient.  [====Goals of Care====]     PRESCRIPTIONS GIVEN:     Medications Ordered Today   Medications    ALPRAZolam (XANAX) 1 mg tablet     Sig: Take 1 Tablet by mouth two (2) times daily as needed for Anxiety. Max Daily Amount: 2 mg. Dispense:  16 Tablet     Refill:  0    morphine ER (MS CONTIN) 15 mg CR tablet     Sig: Take 1 Tablet by mouth every twelve (12) hours for 30 days. Max Daily Amount: 30 mg. Dispense:  60 Tablet     Refill:  0    naloxone (Narcan) 4 mg/actuation nasal spray     Sig: Use 1 spray intranasally, then discard. Repeat with new spray every 2 min as needed for opioid overdose symptoms, alternating nostrils. Dispense:  1 Each     Refill:  0               FOLLOW UP:     Future Appointments   Date Time Provider Chandni Raza   9/1/2022 11:30 AM Tona Burgess MD [facility] BS AMB   9/6/2022  2:30 PM Carlie Novak MD [facility] BS [facility]           PHYSICIANS INVOLVED IN CARE:   Patient Care Team:  Elle Rodarte MD as PCP - General (Family Medicine)  Elle Rodarte MD as PCP - [facility]  Vonnie Tan MD (Palliative Medicine)       HISTORY:   Reviewed patient-completed ESAS and advance care planning form.   Reviewed patient record in prescription monitoring program.    CHIEF COMPLAINT:   Chief Complaint   Patient presents with    Depression    Anxiety           HPI/SUBJECTIVE:    The patient is: [x] Verbal / [] Nonverbal     She was hospitalized at [facility] 8/18-8/22/22 for cycle 3 chemotherapy. This time it wasn't so bad because she had a reduced dose. She's having pain \"all over\" her body. She stopped taking extended-release morphine because it wasn't helping. She's been taking immediate-release morphine every 3 - 3.5 hours. Night time is the worse, that's when she might take it every 3 hours. When she takes her morphine, it helps her to relax. It makes it easier for her to rest but it doesn't help with sleep unless she goes to sleep right away. Nothing helps with her sleep. Isidro Rathke Isidro Rathke Ambien didn't help, lorazepam doesn't help. She feels sad. It's a different type of sad than she's felt in the past.  This feels like mourning. She worries a lot. ..what's going to happen in the future, worries about money. Knowing her family and friends care about her helps. She's spending all of her time in bed. She sometimes watches TV in her room. She isn't going out unless it's to go to the doctor's office or to the hospital.  She used to enjoy going out to eat with her friend, Dolores. See Plan/Patient Instructions for addition interval history    From IV 5/17/22:  She was diagnosed with lymphoma last month. She started feeling sick about 2 months ago with body aches, sore throat and fever. She went to an urgent care clinic and tested negative for COVID. She took antibiotics and she didn't get better. Then she noticed swelling/enlarged lymph nodes in neck and went to see her PCP who referred her to Dr. Kamla Coates. She was hospitalized last week to start chemo and was discharged 2 days ago. She was up all night last night with nausea and vomiting. She finally stopped the vomiting early this morning.     She has a long history of depression, anxiety and panic attacks and these are all worse since her diagnosis. She's tried many different types of anti-depressants in the past and has had counseling. She used to take xanax for her panic attacks but not recently. She's been taking lorazepam 0-5-mg every 6 hours and it isn't helping. Getting her cancer diagnosis was a big shock to her and to her family. She has pain in her right hip which she's had for a long time. She also has pain in her feet (see below). Her PCP prescribed gabapentin at bedtime which she stopped after a few days because it wasn't helping with the pain. She had morphine at home which she's been taking 3 to 4 times a day. She's had no appetite since coming home from the hospital but ate well before her chemo. She feels tired all the time. She's sleeping better since Dr. Leonel Zhang prescribed Ambien. She's retained fluid since receiving chemo (with steroids). She's taking a fluid pill (furosemide) for this.       Clinical Pain Assessment (nonverbal scale for nonverbal patients):   [++++ Clinical Pain Assessment++++]  [++++Pain Severity++++]: Pain: 7  [++++Pain Character++++]: \"like an electrode sticking in my foot\"  [++++Pain Duration++++]: years  [++++Pain Effect++++]:  [++++Pain Factors++++]: worse when walking  [++++Pain Frequency++++]: constant with varying intensity  [++++Pain Location++++]: bilateral great toes and dorsum of both feet  [++++ Clinical Pain Assessment++++]    [++++ Clinical Pain Assessment++++]  [++++Pain Severity++++]: Pain: 7  [++++Pain Character++++]: \"like something's pressing down on something that it shouldn't\"  [++++Pain Duration++++]: years  [++++Pain Effect++++]:   [++++Pain Factors++++]: worse with walking  [++++Pain Frequency++++]: constant with varying intensity  [++++Pain Location++++]: right hip  [++++ Clinical Pain Assessment++++]     FUNCTIONAL ASSESSMENT:     Palliative Performance Scale (PPS):  PPS: 60       PSYCHOSOCIAL/SPIRITUAL SCREENING:     Any spiritual / Tenriism concerns:  [] Yes /  [x] No    Caregiver Burnout:  [] Yes /  [] No /  [x] No Caregiver Present      Anticipatory grief assessment:   [x] Normal  / [] Maladaptive       ESAS Anxiety: Anxiety: 7    ESAS Depression: Depression: 4       REVIEW OF SYSTEMS:     The following systems were [x] reviewed / [] unable to be reviewed  Systems: constitutional, ears/nose/mouth/throat, respiratory, gastrointestinal, genitourinary, musculoskeletal, integumentary, neurologic, psychiatric, endocrine. Positive findings noted below. Modified ESAS Completed by: provider   Fatigue: 8 Drowsiness: 4   Depression: 4 Pain: 7   Anxiety: 7 Nausea: 6   Anorexia: 10 Dyspnea: 0   Best Well-Bein Constipation: No              PHYSICAL EXAM:     Wt Readings from Last 3 Encounters:   22 171 lb 3.2 oz (77.7 kg)   22 181 lb (82.1 kg)   22 187 lb 6.3 oz (85 kg)     Blood pressure (!) 137/96, pulse (!) 107, temperature 97.8 °F (36.6 °C), temperature source Oral, resp. rate 20, weight 171 lb 3.2 oz (77.7 kg), SpO2 96 %.   Last bowel movement: See Nursing Note    Constitutional:   Eyes: pupils equal, anicteric  ENMT: no nasal discharge, moist mucous membranes  Cardiovascular: regular rhythm, distal pulses intact  Respiratory: breathing not labored, symmetric  Gastrointestinal: soft non-tender, +bowel sounds  Musculoskeletal: no deformity, no tenderness to palpation  Skin: warm, dry  Neurologic: following commands, moving all extremities  Psychiatric: full affect, no hallucinations  Other:          HISTORY:     Past Medical History:   Diagnosis Date    Adverse effect of anesthesia     PHLEGM IN THROAT POST OP & NEEDED X2 BREATHING TREATMENTS    Anxiety     COPD (chronic obstructive pulmonary disease) ([facility] [address]     emphysema    Depression     GERD (gastroesophageal reflux disease)     Hernia, femoral     since childhood    Hypertension     Joint pain     Nausea & vomiting     TMJ arthritis       Past Surgical History:   Procedure Laterality Date    HX CERVICAL FUSION  2012    C5-C6    HX CHOLECYSTECTOMY  2020    HX COLONOSCOPY      HX ENDOSCOPY      HX HYSTERECTOMY  2019    HX TONSILLECTOMY      AS A CHILD    HX WISDOM TEETH EXTRACTION      TEENAGER    IR INSERT TUNL CVC W PORT OVER 5 YEARS  4/29/2022      Family History   Problem Relation Age of Onset    Heart Disease Mother 46    Heart Surgery Mother         HEART TRANSPLANT    Elevated Lipids Mother     Hypertension Mother     COPD Father     Emphysema Father     Sudden Death Brother 46    Other Maternal Grandmother         BRAIN TUMOR    No Known Problems Son     No Known Problems Daughter       History reviewed, no pertinent family history. Social History     Tobacco Use    Smoking status: Some Days     Packs/day: 0.25     Years: 40.00     Pack years: 10.00     Types: Cigarettes    Smokeless tobacco: Never   Substance Use Topics    Alcohol use: Not Currently     Alcohol/week: 0.0 standard drinks     Allergies   Allergen Reactions    Oxycodone Nausea Only      Current Outpatient Medications   Medication Sig    ALPRAZolam (XANAX) 1 mg tablet Take 1 Tablet by mouth two (2) times daily as needed for Anxiety. Max Daily Amount: 2 mg. morphine ER (MS CONTIN) 15 mg CR tablet Take 1 Tablet by mouth every twelve (12) hours for 30 days. Max Daily Amount: 30 mg.    naloxone (Narcan) 4 mg/actuation nasal spray Use 1 spray intranasally, then discard. Repeat with new spray every 2 min as needed for opioid overdose symptoms, alternating nostrils. trimethoprim-sulfamethoxazole (BACTRIM DS, SEPTRA DS) 160-800 mg per tablet Take 1 Tablet by mouth every Monday, Wednesday, Friday. Indications: prophylaxis treatment of cancer related infections    morphine IR (MS IR) 15 mg tablet Take 1 Tablet by mouth every four (4) hours as needed for Pain for up to 10 days.  Max Daily Amount: 90 mg.    ondansetron (ZOFRAN ODT) 8 mg disintegrating tablet Take 1 Tablet by mouth every eight (8) hours as needed for Nausea or Vomiting. FLUoxetine (PROzac) 40 mg capsule TAKE 1 CAPSULE BY MOUTH EVERY MORNING    amLODIPine-benazepril (LOTREL) 5-20 mg per capsule TAKE 1 CAPSULE BY MOUTH EVERY MORNING    potassium chloride (K-DUR, KLOR-CON M20) 20 mEq tablet Take 1 Tablet by mouth daily. (Patient not taking: Reported on 8/29/2022)     No current facility-administered medications for this visit. LAB DATA REVIEWED:     Lab Results   Component Value Date/Time    WBC 5.1 08/22/2022 06:15 AM    HGB 12.2 08/22/2022 06:15 AM    PLATELET 723 29/08/7552 06:15 AM     Lab Results   Component Value Date/Time    Sodium 138 08/24/2022 10:47 AM    Potassium 3.0 (L) 08/24/2022 10:47 AM    Chloride 100 08/24/2022 10:47 AM    CO2 32 08/24/2022 10:47 AM    BUN 11 08/24/2022 10:47 AM    Creatinine 0.73 08/24/2022 10:47 AM    Calcium 9.1 08/24/2022 10:47 AM    Phosphorus 4.0 06/06/2022 06:59 AM      Lab Results   Component Value Date/Time    Alk. phosphatase 68 08/22/2022 06:15 AM    Protein, total 4.9 (L) 08/22/2022 06:15 AM    Albumin 2.8 (L) 08/22/2022 06:15 AM    Globulin 2.1 08/22/2022 06:15 AM     No results found for: INR, PTMR, PTP, PT1, PT2, APTT, INREXT, INREXT   No results found for: IRON, FE, TIBC, IBCT, PSAT, FERR     CT chest/abdomen/pelvis 3/24/22:  1. Large gastrohepatic ligament lymph node measuring 4.2 x 2.5 cm. Multiple  mildly enlarged mesenteric lymph nodes. Borderline to mildly enlarged  retroperitoneal lymph nodes. Lymphoma is the primary differential possibility. These are not accessible by percutaneous biopsy but may be accessible by  endoscopic ultrasound. 2.  Central right renal mass with second posterior right renal mass. Mild  right-sided hydronephrosis. Although these may represent uroepithelial or renal  cell carcinoma is, lymphoma should be considered as well. 3.  Borderline size left inguinal lymph node.  This may be accessible for  percutaneous ultrasound-guided biopsy although is borderline size for pathology  may be reactive. 4.  Ground glass opacity superior segment left lower lobe. Attention on  follow-up imaging. 5.  Ventral hernia containing loops of small bowel without evidence of  Obstruction    PET-CT 5/4/22:  CHEST: No foci of abnormal hypermetabolism. Low-grade activity in mediastinal  and hilar lymph nodes is nonspecific but may be reactive. ABDOMEN/PELVIS: There is increased metabolic activity in a 2.6 cm hepatogastric  ligament lymph node with SUV of 5.7. There is increased metabolic activity in a 1.2 cm right retroperitoneal lymph  node with SUV of 6. There is increased metabolic activity in a left retroperitoneal lymph node with  SUV of 8   There is slight haziness in the mesentery with small lymph nodes with slight  increased metabolic activity of 3.6. There is small focus of increased metabolic activity anterior to the left sacrum  and anterior to the right sacrum with SUV of 7 suspicious for lymph nodes. There is increased metabolic activity in the right renal mass posterior cortex  with SUV of 6.9. There is increased metabolic activity in a left inguinal lymph node with SUV of  8  There is a right abdominal wall hernia     SKELETON: There is increased metabolic activity and left humeral head, right  iliac bone, femurs and proximal right tibia. . There is slight increased  metabolic activity right humeral head. IMPRESSION  There is increased metabolic activity in 2 lymph nodes in the left  neck, in the hepatogastric ligament and retroperitoneal adenopathy, mesenteric  lymph nodes and left inguinal adenopathy suspicious for lymphoma. There is  increased metabolic activity in a mass posterior right renal cortex nonspecific  but may represent lymphoma. There is increased bony activity as described above  suspicious for lymphoma.      CONTROLLED SUBSTANCES SAFETY ASSESSMENT (IF ON CONTROLLED SUBSTANCES):     Reviewed opioid safety handout:  [x] Yes   [] No  24 hour opioid dose >150mg morphine equivalent/day:  [] Yes   [x] No  Benzodiazepines:  [x] Yes   [] No  Sleep apnea:  [] Yes   [x] No  Urine Toxicology Testing within last 6 months:  [] Yes   [] No  History of or new aberrant medication taking behaviors:  [x] Yes   [] No  Has Narcan been prescribed [x] Yes   [] No          Total time: 40 minutes  Counseling / coordination time: 30 minutes  > 50% counseling / coordination?: yes - chart review; symptom assessment and management options; opioid education; care plan coordination with Palliative Medicine team; documentation

## 2022-08-29 ENCOUNTER — OFFICE VISIT (OUTPATIENT)
Dept: PALLATIVE CARE | Age: 67
End: 2022-08-29
Payer: MEDICARE

## 2022-08-29 VITALS
SYSTOLIC BLOOD PRESSURE: 137 MMHG | BODY MASS INDEX: 30.33 KG/M2 | DIASTOLIC BLOOD PRESSURE: 96 MMHG | OXYGEN SATURATION: 96 % | RESPIRATION RATE: 20 BRPM | TEMPERATURE: 97.8 F | HEART RATE: 107 BPM | WEIGHT: 171.2 LBS

## 2022-08-29 DIAGNOSIS — M79.671 BILATERAL FOOT PAIN: ICD-10-CM

## 2022-08-29 DIAGNOSIS — R52 GENERALIZED PAIN: ICD-10-CM

## 2022-08-29 DIAGNOSIS — F41.9 ANXIETY: ICD-10-CM

## 2022-08-29 DIAGNOSIS — R11.2 NAUSEA AND VOMITING, UNSPECIFIED VOMITING TYPE: ICD-10-CM

## 2022-08-29 DIAGNOSIS — R53.83 FATIGUE, UNSPECIFIED TYPE: ICD-10-CM

## 2022-08-29 DIAGNOSIS — F32.9 MAJOR DEPRESSIVE DISORDER WITH CURRENT ACTIVE EPISODE, UNSPECIFIED DEPRESSION EPISODE SEVERITY, UNSPECIFIED WHETHER RECURRENT: Primary | ICD-10-CM

## 2022-08-29 DIAGNOSIS — C83.38 DIFFUSE LARGE B-CELL LYMPHOMA OF LYMPH NODES OF MULTIPLE REGIONS (HCC): ICD-10-CM

## 2022-08-29 DIAGNOSIS — R63.0 POOR APPETITE: ICD-10-CM

## 2022-08-29 DIAGNOSIS — M79.672 BILATERAL FOOT PAIN: ICD-10-CM

## 2022-08-29 DIAGNOSIS — M25.551 RIGHT HIP PAIN: ICD-10-CM

## 2022-08-29 PROBLEM — R05.8 PRODUCTIVE COUGH: Status: RESOLVED | Noted: 2022-01-01 | Resolved: 2022-01-01

## 2022-08-29 PROCEDURE — G8427 DOCREV CUR MEDS BY ELIG CLIN: HCPCS | Performed by: INTERNAL MEDICINE

## 2022-08-29 PROCEDURE — G8400 PT W/DXA NO RESULTS DOC: HCPCS | Performed by: INTERNAL MEDICINE

## 2022-08-29 PROCEDURE — G8417 CALC BMI ABV UP PARAM F/U: HCPCS | Performed by: INTERNAL MEDICINE

## 2022-08-29 PROCEDURE — 1101F PT FALLS ASSESS-DOCD LE1/YR: CPT | Performed by: INTERNAL MEDICINE

## 2022-08-29 PROCEDURE — G9717 DOC PT DX DEP/BP F/U NT REQ: HCPCS | Performed by: INTERNAL MEDICINE

## 2022-08-29 PROCEDURE — 3017F COLORECTAL CA SCREEN DOC REV: CPT | Performed by: INTERNAL MEDICINE

## 2022-08-29 PROCEDURE — 99215 OFFICE O/P EST HI 40 MIN: CPT | Performed by: INTERNAL MEDICINE

## 2022-08-29 PROCEDURE — G8536 NO DOC ELDER MAL SCRN: HCPCS | Performed by: INTERNAL MEDICINE

## 2022-08-29 PROCEDURE — G9899 SCRN MAM PERF RSLTS DOC: HCPCS | Performed by: INTERNAL MEDICINE

## 2022-08-29 PROCEDURE — 1090F PRES/ABSN URINE INCON ASSESS: CPT | Performed by: INTERNAL MEDICINE

## 2022-08-29 PROCEDURE — 1111F DSCHRG MED/CURRENT MED MERGE: CPT | Performed by: INTERNAL MEDICINE

## 2022-08-29 RX ORDER — ALPRAZOLAM 1 MG/1
1 TABLET ORAL
Qty: 16 TABLET | Refills: 0 | Status: SHIPPED | OUTPATIENT
Start: 2022-08-29 | End: 2022-09-06 | Stop reason: SDUPTHER

## 2022-08-29 RX ORDER — MORPHINE SULFATE 15 MG/1
15 TABLET, FILM COATED, EXTENDED RELEASE ORAL EVERY 12 HOURS
Qty: 60 TABLET | Refills: 0 | Status: SHIPPED | OUTPATIENT
Start: 2022-08-29 | End: 2022-09-28

## 2022-08-29 RX ORDER — NALOXONE HYDROCHLORIDE 4 MG/.1ML
SPRAY NASAL
Qty: 1 EACH | Refills: 0 | Status: SHIPPED
Start: 2022-08-29 | End: 2022-09-16

## 2022-08-29 NOTE — PROGRESS NOTES
Palliative Medicine Office Visit  Palliative Medicine Nurse Check In  [phone]    Patient Name: Anna Marie Avitia  YOB: 1955      Date of Office Visit: 8/29/22    Patient states: \" follow up \"    From Check In Sheet (scanned in Media):  Has a medical provider talked with you about cardiopulmonary resuscitation (CPR)? [] Yes   [x] No   [] Unable to obtain    Nurse reminder to complete or update ACP FlowSheet:    Is ACP on the Problem List?    [] Yes    [x] No  IF ACP Document is ON FILE; Nurse to place ACP on Problem List     Is there an ACP Note in Chart Review/Note? [] Yes    [x] No   If NO: [address] Planning 8/24/2022   Patient's Healthcare Decision Maker is: Legal Next of Kin   Confirm Advance Directive -   Patient Would Like to Complete Advance Directive -   Does the patient have other document types -       Is there anything that we should know about you as a person in order to provide you the best care possible? Have you been to the ER, urgent care clinic since your last visit? [] Yes   [x] No   [] Unable to obtain    Have you been hospitalized since your last visit? [x] Yes   [] No   [] Unable to obtain - Planned for Chemotherapy    Have you seen or consulted any other health care providers outside of the [address] [place] [address] since your last visit? [] Yes   [x] No   [] Unable to obtain    Functional status (describe):    Independent      Last BM: 3-4 days ago     accessed (date): Yes    Bottle review (for opioid pain medication):  Medication 1:  Morphine IR 15mg  Date filled:   Directions: every 4 hours prn  # filled:   # left:   # pills taking per day: 6 a day  Last dose taken:    Lorazepam - 0.5mg every 6 hours prn

## 2022-08-29 NOTE — PATIENT INSTRUCTIONS
Dear Katerine Cross ,    It was a pleasure seeing you today in [facility]. We will see you again in 1 week for a virtual visit. If labs or imaging tests have been ordered for you today, please call the office  at [phone] 48 hours after completion to obtain the results. Your described symptoms were: Fatigue: 8 Drowsiness: 4   Depression: 4 Pain: 7   Anxiety: 7 Nausea: 6   Anorexia: 10 Dyspnea: 0   Best Well-Bein Constipation: No           This is the plan we talked about:    Today we talked about your \"over all\" body pain from a \"total pain\" perspective in which your physical pain, your sadness and morning, the impact of your cancer diagnosis and treatment on your social life and your relationships and your history of depression and anxiety are all factors in your distress. Medications can be helpful in alleviating some aspects of total pain but are usually not sufficient to bring relief to other aspects of your distress. We agreed to try the following plan for 1 week, then will meet again to review and adjust if needed. Today I prescribed a 1-week supply of Xanax and a 30-day supply of extended release morphine. 1. Depression and anxiety  -Continue fluoxetine 40-mg daily  -You've used Xanax in the past and found it to be more effective than lorazepam  -We agreed to rotate you from lorazepam to Xanax twice daily  -Xanax 1-mg twice daily as needed for anxiety  -I encourage you to go out with one of your friends once during the next week   -Today you met our clinical , Shanelle Tucker, who will reach out to you by phone later this week  -We talked bout the synergistic (additive) effects between Xanax and morphine which places you at higher risk for breathing problems so it's important for you to take both your morphine and Xanax as prescribed    2.  Pain  -Restart extended-release morphine 15-mg every 12 hours  -Continue morphine immediate-release 15-mg every 6 hours as needed (4 pills/24 hours)    3. Poor appetite  -Continue Ensure, juices and soft foods as tolerated    4. Fatigue/poor sleep  -This is multifactorial in nature with your depression and sadness, your body pain, your medications and your poor appetite all playing a role in your low energy  -Your hemoglobin on 8/22/22 was within normal limits so you're not anemic  -Your TSH on 3/6/22 was also within normal limits which indicates your thyroid hormone levels were normal at that time    5. Nausea and vomiting  -Continue ondansetron 8-mg every 8 hours as needed  -Continue prochlorperazine 10-mg every 6 hours as needed    6. Function  -You're spending most of your time in bed  -You're able to walk to the bathroom and into the kitchen without difficulty  -You haven't had any falls    7. Lymphoma  -You've received  a total of 3 cycles of chemotherapy with the goal of inducing remission    This is what you have shared with us about Advance Care Planning:      Primary Decision Maker: Laureen Schwab - Daughter - [phone]    Primary Decision Maker: Mick Burgos - [phone]  Advance Care Planning 8/24/2022   Patient's [employer] is: Legal Next of [facility] Directive -   Patient Would Like to Complete Advance Directive -   Does the patient have other document types -           The Palliative Medicine Team is here to support you and your family.        Sincerely,      Tricia Shields MD and the Palliative Medicine Team

## 2022-08-30 ENCOUNTER — TELEPHONE (OUTPATIENT)
Dept: FAMILY MEDICINE CLINIC | Age: 67
End: 2022-08-30

## 2022-08-30 NOTE — TELEPHONE ENCOUNTER
Called pt to reschedule appt that was missed and discuss message. Pt VM box is full.     ----- Message from [facility] sent at 8/30/2022 11:10 AM EDT -----  Subject: Message to Provider    QUESTIONS  Information for Provider? Patient called in to let her provider know that   she did not skip an appointment with her. She stated that she did not   schedule the hospital follow up. Please contact to further assist.   ---------------------------------------------------------------------------  --------------  Yareli Muir INFO  [phone]; OK to leave message on voicemail  ---------------------------------------------------------------------------  --------------  SCRIPT ANSWERS  Relationship to Patient?  Self

## 2022-09-01 ENCOUNTER — TELEPHONE (OUTPATIENT)
Dept: ONCOLOGY | Age: 67
End: 2022-09-01

## 2022-09-01 ENCOUNTER — OFFICE VISIT (OUTPATIENT)
Dept: ONCOLOGY | Age: 67
End: 2022-09-01
Payer: MEDICARE

## 2022-09-01 VITALS
RESPIRATION RATE: 20 BRPM | OXYGEN SATURATION: 95 % | BODY MASS INDEX: 31.54 KG/M2 | SYSTOLIC BLOOD PRESSURE: 121 MMHG | WEIGHT: 178 LBS | DIASTOLIC BLOOD PRESSURE: 80 MMHG | HEIGHT: 63 IN | TEMPERATURE: 97.5 F | HEART RATE: 88 BPM

## 2022-09-01 DIAGNOSIS — F17.200 TOBACCO DEPENDENCE: ICD-10-CM

## 2022-09-01 DIAGNOSIS — Z79.899 HIGH RISK MEDICATION USE: ICD-10-CM

## 2022-09-01 DIAGNOSIS — R11.14 BILIOUS VOMITING WITH NAUSEA: ICD-10-CM

## 2022-09-01 DIAGNOSIS — C83.31 DIFFUSE LARGE B-CELL LYMPHOMA OF LYMPH NODES OF NECK (HCC): Primary | ICD-10-CM

## 2022-09-01 PROCEDURE — 3017F COLORECTAL CA SCREEN DOC REV: CPT | Performed by: INTERNAL MEDICINE

## 2022-09-01 PROCEDURE — G9717 DOC PT DX DEP/BP F/U NT REQ: HCPCS | Performed by: INTERNAL MEDICINE

## 2022-09-01 PROCEDURE — G8417 CALC BMI ABV UP PARAM F/U: HCPCS | Performed by: INTERNAL MEDICINE

## 2022-09-01 PROCEDURE — G8400 PT W/DXA NO RESULTS DOC: HCPCS | Performed by: INTERNAL MEDICINE

## 2022-09-01 PROCEDURE — G8536 NO DOC ELDER MAL SCRN: HCPCS | Performed by: INTERNAL MEDICINE

## 2022-09-01 PROCEDURE — 1123F ACP DISCUSS/DSCN MKR DOCD: CPT | Performed by: INTERNAL MEDICINE

## 2022-09-01 PROCEDURE — 1111F DSCHRG MED/CURRENT MED MERGE: CPT | Performed by: INTERNAL MEDICINE

## 2022-09-01 PROCEDURE — G9899 SCRN MAM PERF RSLTS DOC: HCPCS | Performed by: INTERNAL MEDICINE

## 2022-09-01 PROCEDURE — 99214 OFFICE O/P EST MOD 30 MIN: CPT | Performed by: INTERNAL MEDICINE

## 2022-09-01 PROCEDURE — G8427 DOCREV CUR MEDS BY ELIG CLIN: HCPCS | Performed by: INTERNAL MEDICINE

## 2022-09-01 PROCEDURE — 1090F PRES/ABSN URINE INCON ASSESS: CPT | Performed by: INTERNAL MEDICINE

## 2022-09-01 PROCEDURE — 1101F PT FALLS ASSESS-DOCD LE1/YR: CPT | Performed by: INTERNAL MEDICINE

## 2022-09-01 NOTE — TELEPHONE ENCOUNTER
9/1/22- Patient was in office so this user requested nurse to pass on a letter for additional documentation needed for the care card. Per DEANNA Saleh patient was appreciative of the letter and will gather requested documents and get to this user to submit.

## 2022-09-01 NOTE — PROGRESS NOTES
Cancer [place] at [address], [address] [facility] [address] [facility]  Erik Huston: [phone]  F: [phone] Patient ID  Name: Javier Mathews  YOB: 1955  MRN: [id number]  Referring Provider:   No referring provider defined for this encounter. 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 This summary highlights focused aspects of patient's ongoing care and may have a recurring section in notes with either updates or remain unchanged as a longitudinal care summary. -------------------------------------------------------------------------------------------------------------------------------------------------------------------------------------------------------  DIAGNOSIS:  Diffuse Large B-Cell Lymphoma     ORIGINAL STAGING:  Stage IV     SITES OF DISEASE:  Left Cervical Lymph Node,Bone Disease, Stage IV    CURRENT TREATMENT:  C1,D1 on 22 DA-R-EPOCH  C2  C3 delayed due to appetite issues and patient anxiety to restart therapy     PRIOR TREATMENT:  n/a     GOALS OF CARE:  Curative Intent     PATHOLOGY:  Specimen #:[id number] Collect: 2022      ==========================================================================                    * * *SURGICAL PATHOLOGY REPORT* * *   ==========================================================================   * * *PROCEDURES/ADDENDA* * *   ADDENDUM   Date Ordered: 2022     Status: Signed Out   Date Complete: 2022     By: Soledad Barton MD   Date Reported: 2022   Addendum Diagnosis   Lymph node, left posterior cervical lymph node, excision:   In situ hybridization for Jesús-Barr viral RNA: Negative   CPT: 67556  Addendum Comment   * * *CLINICAL HISTORY* * *   Cervical lymphadenopathy, rule out lymphoma ==========================================================================   * * *FINAL PATHOLOGIC DIAGNOSIS* * *        Lymph node, left posterior cervical lymph node, excision:        Large B cell lymphoma. See comment. * * *Comment* * *   The histologic section has fragments of lymphoid tissue with diffuse and   focal follicular architecture. Diffuse areas are comprised of large,   atypical lymphocytes with centroblastic morphology and increased mitotic   activity. Immunohistochemical stains confirm the malignant cells are CD20   positive B cells that coexpress CD10 and BCL6 without MUM1. CD23   highlights rare follicular dendritic networks associated with lymphoid   follicles with germinal centers and express CD10 and BCL6 without BCL2. CyclinD1 and CD56 stains are negative. Concurrent flow cytometric analysis   confirms a clonal CD10 positive B-cell population comprised of medium to   large cells. The Ki-67 proliferation index is 30%. The overall findings favor the diagnosis of diffuse large B-cell lymphoma,   germinal center subtype. Molecular genetic studies are pending for further   characterization will be reported in an addendum. Flow cytometry:   Consistent with CD10-positive B-cell lymphoproliferative disorder. Comments: Flow cytometry shows monoclonal B-cells (36% of total cells)   with CD10 expression without CD5, consistent with a CD10-positive B-cell   lymphoproliferative disorder. The main differential diagnosis includes   follicular lymphoma, Burkitt lymphoma and large B-cell lymphoma. Please   correlate the result with morphologic findings and clinical information. Flow Differential (%) and Population Analysis:   Lymphocytes: 93.7%   T-cells (39% of lymphoid cells) show a CD4/CD8 ratio of 3.3 without overt   phenotypic abnormality. NK-cells (1% of lymphoid cells) are unremarkable.    Mature B-cells (56% of lymphoid cells) include large forms based on   forward scattered pattern and show: CD5 neg, CD10+, CD11c+dim, CD19+,   CD20+ with surface lambda light chain restriction. Markers Performed: CD2, CD3, CD4, CD5, CD7, CD8, CD10, CD11c, CD19, CD20,   CD23, CD34, CD38, CD45, CD56, Kappa, Lambda (17 Markers)   The Technical Component Processing of this test was completed at   [facility], [address] [place] [address], [place], [place] / [id number] /   [phone] / Marcelino Rain # [id number]. Interpretation by Palak Dennis M.D. The   complete scanned report is available in Epic. CPT: H1198346, O2615608, R2438160, [id number], [id number], [id number]   [facility]2/2022   *Electronically Signed Out By Barrett Frederick. Alka Chacon MD*   ==========================================================================   * * *Gross Description* * *   Specimen #1, received fresh labeled with the patient's name,  and left   posterior cervical lymph node, consists of two paper towels containing a   1.7 x 1.5 x 0.5 cm aggregate of pale pink-tan, fleshy soft tissue   fragments. The specimen is handled according to the flow cytometry   protocol as follows: 7          Two air-dried touch prep slides - one Diff Quik stained, one   rapid-fixed in 95% alcohol   7          Representative sections in B plus fixative (1A)   7          One piece held in RPMI for possible flow cytometry analysis   7          Remaining tissue fixed in formalin for permanents (1B)   Dr. Alka Chacon will determine if flow cytometry is necessary. 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 F who presents for a follow-up evaluation for diffuse large b-cell lymphoma. Patient overall reports feeling improved. She reports feeling substantially better. She states that the only complaint she has is that some foods she eats gives her acid reflux. However, she notes that taking pepcid. She denies any breathing difficulties.     Appetite:Grade 2  Fatigue:Grade 2  Insomnia:Grade 2  Anxiety:Grade 2  Pain:moderate         Past Medical History:   Diagnosis Date    Adverse effect of anesthesia     PHLEGM IN THROAT POST OP & NEEDED X2 BREATHING TREATMENTS    Anxiety     COPD (chronic obstructive pulmonary disease) ([facility] [address] 2022    emphysema    Depression     GERD (gastroesophageal reflux disease)     Hernia, femoral     since childhood    Hypertension     Joint pain     Nausea & vomiting     TMJ arthritis        Current Outpatient Medications:     ALPRAZolam (XANAX) 1 mg tablet, Take 1 Tablet by mouth two (2) times daily as needed for Anxiety. Max Daily Amount: 2 mg., Disp: 16 Tablet, Rfl: 0    morphine ER (MS CONTIN) 15 mg CR tablet, Take 1 Tablet by mouth every twelve (12) hours for 30 days. Max Daily Amount: 30 mg., Disp: 60 Tablet, Rfl: 0    trimethoprim-sulfamethoxazole (BACTRIM DS, SEPTRA DS) 160-800 mg per tablet, Take 1 Tablet by mouth every Monday, Wednesday, Friday. Indications: prophylaxis treatment of cancer related infections, Disp: 15 Tablet, Rfl: 2    morphine IR (MS IR) 15 mg tablet, Take 1 Tablet by mouth every four (4) hours as needed for Pain for up to 10 days. Max Daily Amount: 90 mg., Disp: 60 Tablet, Rfl: 0    ondansetron (ZOFRAN ODT) 8 mg disintegrating tablet, Take 1 Tablet by mouth every eight (8) hours as needed for Nausea or Vomiting., Disp: 50 Tablet, Rfl: 1    FLUoxetine (PROzac) 40 mg capsule, TAKE 1 CAPSULE BY MOUTH EVERY MORNING, Disp: 90 Capsule, Rfl: 1    amLODIPine-benazepril (LOTREL) 5-20 mg per capsule, TAKE 1 CAPSULE BY MOUTH EVERY MORNING, Disp: 90 Capsule, Rfl: 1    naloxone (Narcan) 4 mg/actuation nasal spray, Use 1 spray intranasally, then discard. Repeat with new spray every 2 min as needed for opioid overdose symptoms, alternating nostrils. (Patient not taking: Reported on 9/1/2022), Disp: 1 Each, Rfl: 0    potassium chloride (K-DUR, KLOR-CON M20) 20 mEq tablet, Take 1 Tablet by mouth daily.  (Patient not taking: Reported on 9/1/2022), Disp: 30 Tablet, Rfl: 0    Allergies   Allergen Reactions    Oxycodone Nausea Only     Review of Systems Provided by:  Patient  Review of Systems: A complete review of systems was obtained, reviewed. Pertinent findings reviewed above. Objective:      Visit Vitals  /80   Pulse 88   Temp 97.5 °F (36.4 °C) (Oral)   Resp 20   Ht 5' 3\" (1.6 m)   Wt 178 lb (80.7 kg)   SpO2 95%   BMI 31.53 kg/m²     ECO- Restricted in physically strenuous activity but ambulatory and able to carry out work of a light or sedentary nature, e.g., light house work, office work. Physical Exam  Constitutional: No acute distress. and Non-toxic appearance. HENT: Normocephalic and atraumatic head. and Wearing a mask during COVID-19 precautions. No thrush. Eyes: Normal Conjunctivae. Anicteric sclerae. Cardiovascular: S1,S2 auscultated. No pitting edema. Pulmonary: Normal Respiratory Effort. No wheezing. No rhonchi. No rales. Abdominal: Normal bowel sounds. Soft Abdomen to palpation. No abdominal tenderness. Skin: No jaundice. No rash. Musculoskeletal: No temporal muscle wasting on gross inspection. No myalgias on palpation. Neurological: Alert and oriented. No tremor on inspection. Normal Gait.   Psychiatric: mood normal. normal speech rate normal affect      Results:      I personally reviewed Epic EHR labs/results below:   Lab Results   Component Value Date/Time    WBC 5.1 2022 06:15 AM    HGB 12.2 2022 06:15 AM    HCT 36.2 2022 06:15 AM    PLATELET 605  06:15 AM    MCV 92.3 2022 06:15 AM     Lab Results   Component Value Date/Time    Sodium 138 2022 10:47 AM    Potassium 3.0 (L) 2022 10:47 AM    Chloride 100 2022 10:47 AM    CO2 32 2022 10:47 AM    Anion gap 6 2022 10:47 AM    Glucose 118 (H) 2022 10:47 AM    BUN 11 2022 10:47 AM    Creatinine 0.73 2022 10:47 AM    BUN/Creatinine ratio 15 2022 10:47 AM    GFR est AA >60 2022 10:47 AM    GFR est non-AA >60 2022 10:47 AM    Calcium 9.1 2022 10:47 AM    Bilirubin, total 0.3 2022 06:15 AM    Alk. phosphatase 68 2022 06:15 AM    Protein, total 4.9 (L) 2022 06:15 AM    Albumin 2.8 (L) 2022 06:15 AM    Globulin 2.1 2022 06:15 AM    A-G Ratio 1.3 2022 06:15 AM    ALT (SGPT) 97 (H) 2022 06:15 AM    AST (SGOT) 37 2022 06:15 AM     IR INSERT TUNL CVC W PORT OVER 5 YEARS    Result Date: 2022  PATIENT NAME: Ben Baldwin                                            AGE, : 78 years, 1955 MRN: [id number] DATE: 2022 11:50 AM PROCEDURE(S): Single lumen Portacath placement SUPERVISING PHYSICIAN: Shady Ma MD OPERATING PROVIDER: Xavi Woods PA-C CLINICAL HISTORY: Mediport placement requested. B-cell lymphoma Patient was evaluated and felt to be an appropriate candidate for conscious sedation. Moderate intravenous conscious sedation was supervised by Shady Ma MD. The patient was independently monitored by a registered nurse assigned to the Department of Radiology using automated blood pressure, EKG, and pulse oximetry. The detail conscious sedation record is stored in the hospital information system. MEDICATION: Antibiotic: Ancef 2g Versed: 6 mg Fentanyl: 100 mcg Intraprocedure time: 40 minutes FINDINGS: After informed consent was obtained, and the risks and benefits of the procedure including infection and bleeding were discussed, the patient was brought to the angiographic suite and placed on the angiographic table in a supine position. The right neck and chest were prepped and draped in maximum sterile barrier technique which includes: cap and mask, sterile gown, sterile gloves, and sterile body drape. The ultrasound transducer was prepped using sterile ultrasound technique which includes: sterile gel and probe cover.  After adequate conscious sedation was achieved utilizing Fentanyl and Versed, the skin over the internal jugular vein was anesthetized with 1% lidocaine. Sonographic images of the right neck demonstrated a patent and compressible right internal jugular vein. The vein was accessed under direct sonographic guidance using a 21 gauge micropuncture set. An ultrasonographic image was saved in the record. A 0.035 J wire was advanced through the sheath into the inferior vena cava under fluoroscopic guidance. A peel-away sheath was advanced over the wire into the superior vena cava under fluoroscopic guidance. Attention was directed to the right chest, and after adequate local anesthesia, a horizontal incision was made. A subcutaneous pocket was then formed using a combination of sharp and blunt dissection. A tunnel was then formed between the pocket and the venotomy site and the catheter passed through the tunnel and through the peel-away sheath. The peel-away sheath was removed and the catheter tip was positioned at the right atrium. The catheter was cut at the pocket access point and the portacath was attached to the catheter and placed snugly in the pocket. The pocket was closed primarily with 2-0 Vicryl suture and Dermabond. The neck access site was closed with 2-0 Vicryl suture and Dermabond. The port was accessed and flushed with heparin solution. The patient tolerated the procedure well. Fluoroscopy demonstrates the catheter tip to be within the right atrium. RADIATION: Fluoroscopy time: 0.3 minutes Air kerma: 5.1 mGy     Technically successful placement of a single lumen port with the catheter tip in the right atrium. Catheter is ready for immediate use.      CT NECK SOFT TISSUE W CONT    Result Date: 6/28/2022  INDICATION: Diffuse large b-cell lymphoma, lymph nodes of head, face, and neck EXAM:  CT NECK, CHEST, ABDOMEN, PELVIS WITH CONTRAST COMPARISON: PET scan May 4, 2022 TECHNIQUE:  CT imaging of the neck, chest, abdomen and pelvis was performed after the uneventful intravenous administration of IV contrast. Coronal and sagittal reconstructions were generated. CT dose reduction was achieved through use of a standardized protocol tailored for this examination and automatic exposure control for dose modulation. FINDINGS: NASOPHARYNX: Normal. SUPRAHYOID NECK: Normal oropharynx, oral cavity, parapharyngeal space, and retropharyngeal space. INFRAHYOID NECK: Normal larynx, hypopharynx and supraglottis. PAROTID GLANDS: Normal. SUBMANDIBULAR GLANDS: Absent left submandibular gland. THYROID GLAND: Normal. LYMPH NODES: Interval decrease in size of previous enlarged left level 2 lymph nodes, the more anterior of which decreased from 1.2 x 1.1 cm to 0.7 x 0.6 cm, and the posterior node decreased from 1.8 x 1.2 cm to 1.1 x 0.7 cm. Scattered subcentimeter lymph nodes throughout the neck. No new or enlarging nodes. OSSEOUS STRUCTURES: No destructive bone lesion. Prior ACDF C5-7. ADDITIONAL COMMENTS: N/A. MEDIASTINUM/ELIZABETH: Scattered nonenlarged lymph nodes. Small hiatal hernia. HEART/PERICARDIUM: Unremarkable. LUNGS/PLEURA: No suspicious nodule. No pulmonary edema or pleural effusion. Tree-in-bud opacities in the lateral right lower lobe, and mild ill-defined nodular groundglass opacities right apex. BONES: No destructive bone lesion. ADDITIONAL COMMENTS: Unchanged round 9 mm density lateral right breast LIVER: No mass or biliary dilatation. GALLBLADDER: Surgically absent. SPLEEN: No enlargement or lesion. PANCREAS: No mass or ductal dilatation. ADRENALS: No mass. KIDNEYS: No mass, calculus, or hydronephrosis. GI TRACT: No bowel obstruction or wall thickening PERITONEUM: No free air or free fluid. APPENDIX: Not clearly visualized. RETROPERITONEUM: Interval decrease in size of previously enlarged lymph nodes: Gastrohepatic ligament lymph node decreased from 4.2 x 1.2 cm to 2.2 x 0.9 cm; aortocaval lymph node at the level of the right kidney, previously 1.2 cm, now 0.6 cm. No new or enlarging lymph nodes.  ADDITIONAL COMMENTS: Right paraumbilical ventral hernia containing mesenteric fat and bowel loops similar to prior study. URINARY BLADDER: No mass or calculus. LYMPH NODES: Decreased size of left inguinal lymph nodes from 1.6 cm to 0.8 cm. REPRODUCTIVE ORGANS: Prior hysterectomy. FREE FLUID: None. BONES: No destructive bone lesion. ADDITIONAL COMMENTS: N/A.     1. Interval decrease in size of previously enlarged lymph nodes in the neck, abdomen, and pelvis. No new or enlarging lymph nodes. 2. New tree-in-bud opacities and groundglass opacities in the right lower and upper lobes may be infectious/inflammatory. May be assessed on follow-up examinations. 23X     CT CHEST ABD PELV W CONT    Result Date: 6/28/2022  INDICATION: Diffuse large b-cell lymphoma, lymph nodes of head, face, and neck EXAM:  CT NECK, CHEST, ABDOMEN, PELVIS WITH CONTRAST COMPARISON: PET scan May 4, 2022 TECHNIQUE:  CT imaging of the neck, chest, abdomen and pelvis was performed after the uneventful intravenous administration of IV contrast. Coronal and sagittal reconstructions were generated. CT dose reduction was achieved through use of a standardized protocol tailored for this examination and automatic exposure control for dose modulation. FINDINGS: NASOPHARYNX: Normal. SUPRAHYOID NECK: Normal oropharynx, oral cavity, parapharyngeal space, and retropharyngeal space. INFRAHYOID NECK: Normal larynx, hypopharynx and supraglottis. PAROTID GLANDS: Normal. SUBMANDIBULAR GLANDS: Absent left submandibular gland. THYROID GLAND: Normal. LYMPH NODES: Interval decrease in size of previous enlarged left level 2 lymph nodes, the more anterior of which decreased from 1.2 x 1.1 cm to 0.7 x 0.6 cm, and the posterior node decreased from 1.8 x 1.2 cm to 1.1 x 0.7 cm. Scattered subcentimeter lymph nodes throughout the neck. No new or enlarging nodes. OSSEOUS STRUCTURES: No destructive bone lesion. Prior ACDF C5-7. ADDITIONAL COMMENTS: N/A. MEDIASTINUM/ELIZABETH: Scattered nonenlarged lymph nodes.  Small hiatal hernia. HEART/PERICARDIUM: Unremarkable. LUNGS/PLEURA: No suspicious nodule. No pulmonary edema or pleural effusion. Tree-in-bud opacities in the lateral right lower lobe, and mild ill-defined nodular groundglass opacities right apex. BONES: No destructive bone lesion. ADDITIONAL COMMENTS: Unchanged round 9 mm density lateral right breast LIVER: No mass or biliary dilatation. GALLBLADDER: Surgically absent. SPLEEN: No enlargement or lesion. PANCREAS: No mass or ductal dilatation. ADRENALS: No mass. KIDNEYS: No mass, calculus, or hydronephrosis. GI TRACT: No bowel obstruction or wall thickening PERITONEUM: No free air or free fluid. APPENDIX: Not clearly visualized. RETROPERITONEUM: Interval decrease in size of previously enlarged lymph nodes: Gastrohepatic ligament lymph node decreased from 4.2 x 1.2 cm to 2.2 x 0.9 cm; aortocaval lymph node at the level of the right kidney, previously 1.2 cm, now 0.6 cm. No new or enlarging lymph nodes. ADDITIONAL COMMENTS: Right paraumbilical ventral hernia containing mesenteric fat and bowel loops similar to prior study. URINARY BLADDER: No mass or calculus. LYMPH NODES: Decreased size of left inguinal lymph nodes from 1.6 cm to 0.8 cm. REPRODUCTIVE ORGANS: Prior hysterectomy. FREE FLUID: None. BONES: No destructive bone lesion. ADDITIONAL COMMENTS: N/A.     1. Interval decrease in size of previously enlarged lymph nodes in the neck, abdomen, and pelvis. No new or enlarging lymph nodes. 2. New tree-in-bud opacities and groundglass opacities in the right lower and upper lobes may be infectious/inflammatory. May be assessed on follow-up examinations. 23X     PET/CT TUMOR IMAGE [facility] W (INI)    Addendum Date: 5/5/2022    Addendum: There is increased metabolic activity in a small left frontal scalp lesion with SUV of 6.9.     Result Date: 5/5/2022  PET/CT SCAN PROCEDURE: Following IV injection of 10.9 mCi 18 Fluoro 2 deoxyglucose (FDG) and a standard uptake delay, PET imaging is performed skull vertex to toes and axial, sagittal and coronal images were acquired. Unenhanced CT is obtained for anatomic localization, and attenuation correction of the PET scan. Patient preprocedure blood glucose level: 98 mg/dL. CORRELATIVE IMAGING STUDIES: None. COMPARISON PET: HISTORY: The study is requested for initial staging. Lymphoma. FINDINGS: HEAD/NECK: There is increased metabolic activity 1.2 cm left level 2 lymph node. There is increased metabolic activity in a 1.2 cm left level 5 lymph node with SUV of 7.7. CHEST: No foci of abnormal hypermetabolism. Low-grade activity in mediastinal and hilar lymph nodes is nonspecific but may be reactive. ABDOMEN/PELVIS: There is increased metabolic activity in a 2.6 cm hepatogastric ligament lymph node with SUV of 5.7. There is increased metabolic activity in a 1.2 cm right retroperitoneal lymph node with SUV of 6. There is increased metabolic activity in a left retroperitoneal lymph node with SUV of 8 There is slight haziness in the mesentery with small lymph nodes with slight increased metabolic activity of 3.6. There is small focus of increased metabolic activity anterior to the left sacrum and anterior to the right sacrum with SUV of 7 suspicious for lymph nodes. There is increased metabolic activity in the right renal mass posterior cortex with SUV of 6.9. There is increased metabolic activity in a left inguinal lymph node with SUV of 8 There is a right abdominal wall hernia SKELETON: There is increased metabolic activity and left humeral head, right iliac bone, femurs and proximal right tibia. . There is slight increased metabolic activity right humeral head. There is increased metabolic activity in 2 lymph nodes in the left neck, in the hepatogastric ligament and retroperitoneal adenopathy, mesenteric lymph nodes and left inguinal adenopathy suspicious for lymphoma.  There is increased metabolic activity in a mass posterior right renal cortex nonspecific but may represent lymphoma. There is increased bony activity as described above suspicious for lymphoma. . 23X        Assessment and Recommendations:      1. Diffuse large B-cell lymphoma of lymph nodes of neck ([facility] [address]  -proceed to C4 for 9/12/22 admission. -pt without signs of disease progression; hold on any imaging until after Cycle 4.    2. High risk medication use  -since there is high risk that patient may stop therapy if we dose escalate, we will keep DA-MARÍA at dose level 1 reduction for remaining plan. 3. Tobacco dependence  -she plans to call her insurance company to see if they will cover the nicotine patches taper kit. Follow-up and Dispositions    Return in about 18 days (around 9/19/2022).            Pancho Goodrich MD  Hematology/Medical Oncology Provider  [place] [address]  P: [phone]    Signed By:   Latha Mills MD

## 2022-09-01 NOTE — TELEPHONE ENCOUNTER
Patient stated that she would Like to speak with you; did not specify what exactly she wanted to speak about.

## 2022-09-01 NOTE — PROGRESS NOTES
Identified pt with two pt identifiers(name and ). Reviewed record in preparation for visit and have obtained necessary documentation. Chief Complaint   Patient presents with    Follow-up     1 wk, lymphoma, thrush          Vitals:    22 1125   BP: 121/80   Pulse: 88   Resp: 20   Temp: 97.5 °F (36.4 °C)   TempSrc: Oral   SpO2: 95%   Weight: 178 lb (80.7 kg)   Height: 5' 3\" (1.6 m)   PainSc:   6   PainLoc: Leg       Pain Scale: 6/10        Coordination of Care Questionnaire:  :     1. Have you been to the ER, urgent care clinic since your last visit? Hospitalized since your last visit? No    2. Have you seen or consulted any other health care providers outside of the [address] [place] [address] since your last visit? Include any pap smears or colon screening.  No

## 2022-09-01 NOTE — LETTER
9/2/2022    Patient: Mandy Johnson   YOB: 1955   Date of Visit: 9/1/2022     Stephani Real MD  [place] [address]  [facility]    Dear Stephani Real MD,      Thank you for referring Ms. Mandy Johnson to [facility] for evaluation. My notes for this consultation are attached. If you have questions, please do not hesitate to call me. I look forward to following your patient along with you.       Sincerely,    Breonna Reza MD

## 2022-09-02 ENCOUNTER — TELEPHONE (OUTPATIENT)
Dept: PALLATIVE CARE | Age: 67
End: 2022-09-02

## 2022-09-02 RX ORDER — PROCHLORPERAZINE MALEATE 10 MG
10 TABLET ORAL
Qty: 50 TABLET | Refills: 1 | Status: SHIPPED | OUTPATIENT
Start: 2022-09-02

## 2022-09-02 RX ORDER — ONDANSETRON 8 MG/1
8 TABLET, ORALLY DISINTEGRATING ORAL
Qty: 50 TABLET | Refills: 1 | Status: SHIPPED | OUTPATIENT
Start: 2022-09-02

## 2022-09-02 NOTE — TELEPHONE ENCOUNTER
Called patient to advise/confirm upcoming vv appt with Dr. Hannah Salas on 09/06/22  at 2:30  at virtual. Got voicemail. Mailbox is full. Could not leave a message. Also advised to please bring in your 's License and Insurance Card and any pain medications in the original container with you to appointment.

## 2022-09-05 NOTE — PROGRESS NOTES
Palliative Medicine Outpatient Services  [place]: [phone])    Patient Name: Ivana Machado  YOB: 1955    Date of Current Visit: 09/06/22   Location of Current Visit:    [] [facility] Office  [] [facility] Office  [] [id number] [facility] Office  [] Home  [x]Synchronous real-time A/V virtual visit    Date of Initial Visit: 5/17/22   Referral from: Tex Argueta MD  Primary Care Physician: Preet Berger MD      SUMMARY:   Ivana Machado is a 78y.o. year old with a  history of stage IV diffuse large b-cell lymphoma, who was referred to Palliative Medicine by Dr. Sheridan Howard for symptom management and psychosocial support. She was diagnosed in 4/2022. She was hospitalized at [facility] 5/11-5/15/22 for initiation of R-EPOCH. She continues to receive cancer-directed therapy with the goal of palliation of symptoms and prolongation of life. The patients social history includes: she's . She has 2 adult children, her daughter, Sole Owens, who lives locally, and a son, María Elena Levine, who lives in the home with her. 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 Palliative Medicine is addressing the following current patient/family concerns: symptoms related to lymphoma and treatment; support while undergoing treatment     Initial Referral Intake note reviewed   PALLIATIVE DIAGNOSES:       ICD-10-CM ICD-9-CM    1. Anxiety  F41.9 300.00 ALPRAZolam (XANAX) 1 mg tablet      2. Right hip pain  M25.551 719.45 morphine IR (MS IR) 15 mg tablet      3. Major depressive disorder with current active episode, unspecified depression episode severity, unspecified whether recurrent  F32.9 296.30       4. Poor appetite  R63.0 783.0       5. Nausea and vomiting, unspecified vomiting type  R11.2 787.01       6.  Diffuse large B-cell lymphoma of lymph nodes of multiple regions (HCC)  C83.38 202.88 ALPRAZolam (XANAX) 1 mg tablet      morphine IR (MS IR) 15 mg tablet                   PLAN:     Patient Instructions   Dear Ivana Machado ,    It was a pleasure seeing you today for a virtual visit. We will see you again in 4 weeks for a virtual visit. If labs or imaging tests have been ordered for you today, please call the office  at [phone] 48 hours after completion to obtain the results. Your described symptoms were: Fatigue: 6 Drowsiness: 2   Depression: 4 Pain: 6   Anxiety: 4 Nausea: 6   Anorexia: 6 Dyspnea: 0   Best Well-Bein Constipation: No   Other Problem (Comment): 0       This is the plan we talked about:    Today we talked about your \"over all\" body pain from a \"total pain\" perspective in which your physical pain, your sadness and morning, the impact of your cancer diagnosis and treatment on your social life and your relationships and your history of depression and anxiety are all factors in your distress. Medications can be helpful in alleviating some aspects of total pain but are usually not sufficient to bring relief to other aspects of your distress. We agreed to try the following plan for 1 week, then will meet again to review and adjust if needed. Today I prescribed a 1-week supply of Xanax and a 30-day supply of extended release morphine. 1. Depression and anxiety  -Continue fluoxetine 40-mg daily  -Xanax 1-mg: take 1/2 to 1 tab twice daily as needed for anxiety  -You enjoyed going out to lunch last week with your friend, Abisai Canela, and you anticipate going out to eat again later this week  -You've met our clinical , Sheri Guerra, who is available to you for additional support if you wish  -We've talked bout the synergistic (additive) effects between Xanax and morphine which places you at higher risk for breathing problems so it's important for you to take both your morphine and Xanax as prescribed    2. Pain  -Continue extended-release morphine 15-mg every 12 hours  -Continue morphine immediate-release 15-mg every 6 hours as needed (4 pills/24 hours)    3.  Poor appetite  -Continue Ensure, juices and soft foods as tolerated    4. Fatigue/poor sleep  -This is multifactorial in nature with your depression and sadness, your body pain, your medications and your poor appetite all playing a role in your low energy  -Your hemoglobin on 8/22/22 was within normal limits so you're not anemic  -Your TSH on 3/6/22 was also within normal limits which indicates your thyroid hormone levels were normal at that time    5. Nausea and vomiting  -Continue ondansetron 8-mg every 8 hours as needed  -Continue prochlorperazine 10-mg every 6 hours as needed    6. Function  -You're spending most of your time in bed  -You're able to walk to the bathroom and into the kitchen without difficulty  -You haven't had any falls    7. Lymphoma  -You've received  a total of 3 cycles of chemotherapy with the goal of inducing remission  -You had fewer side-effects with dose-reduced chemo last month  -You anticipate hospitalization on 9/10 for your next cycle of chemo    This is what you have shared with us about Advance Care Planning:      Primary Decision Maker: Celeste Wilson - Daughter - [phone]    Primary Decision Maker: Julian Arias - [phone]  Advance Care Planning 9/6/2022   Patient's [address] is: Legal Next of [facility] [address] Directive None   Patient Would Like to Complete Advance Directive -   Does the patient have other document types -           The Palliative Medicine Team is here to support you and your family.        Sincerely,      Flakito Cohn MD and the Palliative Medicine Team     GOALS OF CARE / TREATMENT PREFERENCES:   [====Goals of Care====]  GOALS OF CARE:  Patient / health care proxy stated goals: See Patient Instructions / Summary    TREATMENT PREFERENCES:   Code Status:  [x] Attempt Resuscitation       [] Do Not Attempt Resuscitation    Advance Care Planning:  [x] [facility] Interdisciplinary Team has updated the ACP Navigator with Decision Maker and Patient Capacity      Primary Decision Maker: Omar Simeon - Sal - [phone]    Primary Decision Maker: Yinka Seth - [phone]  Advance Care Planning 9/6/2022   Patient's Healthcare Decision Maker is: Legal Next of Kin   Confirm Advance Directive None   Patient Would Like to Complete Advance Directive -   Does the patient have other document types -       Other:  (If patient appropriate for POST, consider using PALLPOST smart phrase here)    The palliative care team has discussed with patient / health care proxy about goals of care / treatment preferences for patient.  [====Goals of Care====]     PRESCRIPTIONS GIVEN:     Medications Ordered Today   Medications    ALPRAZolam (XANAX) 1 mg tablet     Sig: Take 1 Tablet by mouth two (2) times daily as needed for Anxiety for up to 15 days. Max Daily Amount: 2 mg. Dispense:  30 Tablet     Refill:  0    morphine IR (MS IR) 15 mg tablet     Sig: Take 1 Tablet by mouth every six (6) hours as needed for Pain for up to 15 days. Max Daily Amount: 60 mg. Dispense:  60 Tablet     Refill:  0                 FOLLOW UP:     Future Appointments   Date Time Provider Chandni Raza   10/19/2022 11:30 AM Hubert Beckett MD [facility] AMB           PHYSICIANS INVOLVED IN CARE:   Patient Care Team:  Lindle Meckel, MD as PCP - General (Family Medicine)  Lindle Meckel, MD [facility]  Suzi Stein MD (Palliative Medicine)       HISTORY:   Reviewed patient-completed ESAS and advance care planning form. Reviewed patient record in prescription monitoring program.    CHIEF COMPLAINT:   Chief Complaint   Patient presents with    Anxiety    Hip Pain             HPI/SUBJECTIVE:    The patient is: [x] Verbal / [] Nonverbal       She feels much less anxious with the change to Xanax. She's taking Xanax at night and is sleeping better than she has in months. She typically takes an additional Xanax during the day.     She restarted extended-release morphine and has been taking twice daily. She's taking immediate-release morphine 4 times daily. See Plan/Patient Instructions for addition interval history    From IV 5/17/22:  She was diagnosed with lymphoma last month. She started feeling sick about 2 months ago with body aches, sore throat and fever. She went to an urgent care clinic and tested negative for COVID. She took antibiotics and she didn't get better. Then she noticed swelling/enlarged lymph nodes in neck and went to see her PCP who referred her to Dr. Kaela Rivera. She was hospitalized last week to start chemo and was discharged 2 days ago. She was up all night last night with nausea and vomiting. She finally stopped the vomiting early this morning. She has a long history of depression, anxiety and panic attacks and these are all worse since her diagnosis. She's tried many different types of anti-depressants in the past and has had counseling. She used to take xanax for her panic attacks but not recently. She's been taking lorazepam 0-5-mg every 6 hours and it isn't helping. Getting her cancer diagnosis was a big shock to her and to her family. She has pain in her right hip which she's had for a long time. She also has pain in her feet (see below). Her PCP prescribed gabapentin at bedtime which she stopped after a few days because it wasn't helping with the pain. She had morphine at home which she's been taking 3 to 4 times a day. She's had no appetite since coming home from the hospital but ate well before her chemo. She feels tired all the time. She's sleeping better since Dr. Kaela Rivera prescribed Ambien. She's retained fluid since receiving chemo (with steroids). She's taking a fluid pill (furosemide) for this.       Clinical Pain Assessment (nonverbal scale for nonverbal patients):   [++++ Clinical Pain Assessment++++]  [++++Pain Severity++++]: Pain: 6  [++++Pain Character++++]: \"like an electrode sticking in my foot\"  [++++Pain Duration++++]: years  [++++Pain Effect++++]:  [++++Pain Factors++++]: worse when walking  [++++Pain Frequency++++]: constant with varying intensity  [++++Pain Location++++]: bilateral great toes and dorsum of both feet  [++++ Clinical Pain Assessment++++]    [++++ Clinical Pain Assessment++++]  [++++Pain Severity++++]: Pain: 6  [++++Pain Character++++]: \"like something's pressing down on something that it shouldn't\"  [++++Pain Duration++++]: years  [++++Pain Effect++++]:   [++++Pain Factors++++]: worse with walking  [++++Pain Frequency++++]: constant with varying intensity  [++++Pain Location++++]: right hip  [++++ Clinical Pain Assessment++++]     FUNCTIONAL ASSESSMENT:     Palliative Performance Scale (PPS):  PPS: 70       PSYCHOSOCIAL/SPIRITUAL SCREENING:     Any spiritual / Orthodoxy concerns:  [] Yes /  [x] No    Caregiver Burnout:  [] Yes /  [] No /  [x] No Caregiver Present      Anticipatory grief assessment:   [x] Normal  / [] Maladaptive       ESAS Anxiety: Anxiety: 4    ESAS Depression: Depression: 4       REVIEW OF SYSTEMS:     The following systems were [x] reviewed / [] unable to be reviewed  Systems: constitutional, ears/nose/mouth/throat, respiratory, gastrointestinal, genitourinary, musculoskeletal, integumentary, neurologic, psychiatric, endocrine. Positive findings noted below. Modified ESAS Completed by: provider   Fatigue: 6 Drowsiness: 2   Depression: 4 Pain: 6   Anxiety: 4 Nausea: 6   Anorexia: 6 Dyspnea: 0   Best Well-Bein Constipation: No   Other Problem (Comment): 0          PHYSICAL EXAM:     Wt Readings from Last 3 Encounters:   22 178 lb (80.7 kg)   22 171 lb 3.2 oz (77.7 kg)   22 181 lb (82.1 kg)     There were no vitals taken for this visit.   Last bowel movement: See Nursing Note    Constitutional    [x] Appears well-developed and well-nourished in no apparent distress    [] Abnormal:  Mental status  [x] Alert and awake  [x] Oriented to person/place/time  [x] Able to follow commands  [] Abnormal:   Eyes  [x] EOM normal   [x] Sclera normal   [x] No visible ocular discharge  [] Abnormal:   HENT  [x] Normocephalic, atraumatic  [x] Mouth/Throat: Moist mucous membranes   [x] External Ears normal  [] Abnormal:  Neck  [x] No visualized mass  [] Abnormal:  Pulmonary/Chest   [x] Respiratory effort normal  [x] No visualized signs of difficulty breathing or respiratory distress  [] Abnormal:  Musculoskeletal  []    lying in bed  [x] Normal range of motion of neck  [] Abnormal:  Neurological:   [x] No facial asymmetry (Cranial nerve 7 motor function)  [x] No gaze palsy  [] Abnormal:   Skin  [x] No significant exanthematous lesions or discoloration noted on facial skin  [] Abnormal:                                  Psychiatric  [x] Normal affect  [x] No hallucinations  [] Abnormal:    Other pertinent observable physical exam findings:    Due to this being a TeleHealth evaluation, many elements of the physical examination are unable to be assessed.               HISTORY:     Past Medical History:   Diagnosis Date    Adverse effect of anesthesia     PHLEGM IN THROAT POST OP & NEEDED X2 BREATHING TREATMENTS    Anxiety     COPD (chronic obstructive pulmonary disease) ([facility] [address] 2022    emphysema    Depression     GERD (gastroesophageal reflux disease)     Hernia, femoral     since childhood    Hypertension     Joint pain     Nausea & vomiting     TMJ arthritis       Past Surgical History:   Procedure Laterality Date    HX CERVICAL FUSION  2012    C5-C6    HX CHOLECYSTECTOMY  2020    HX COLONOSCOPY      HX ENDOSCOPY      HX HYSTERECTOMY  2019    HX TONSILLECTOMY      AS A CHILD    HX WISDOM TEETH EXTRACTION      TEENAGER    IR INSERT TUNL CVC W PORT OVER 5 YEARS  4/29/2022      Family History   Problem Relation Age of Onset    Heart Disease Mother 46    Heart Surgery Mother         HEART TRANSPLANT    Elevated Lipids Mother     Hypertension Mother     COPD Father     Emphysema Father     Sudden Death Brother 46    Other Maternal Grandmother         BRAIN TUMOR    No Known Problems Son     No Known Problems Daughter       History reviewed, no pertinent family history. Social History     Tobacco Use    Smoking status: Some Days     Packs/day: 0.25     Years: 40.00     Pack years: 10.00     Types: Cigarettes    Smokeless tobacco: Never   Substance Use Topics    Alcohol use: Not Currently     Alcohol/week: 0.0 standard drinks     Allergies   Allergen Reactions    Oxycodone Nausea Only      Current Outpatient Medications   Medication Sig    ALPRAZolam (XANAX) 1 mg tablet Take 1 Tablet by mouth two (2) times daily as needed for Anxiety for up to 15 days. Max Daily Amount: 2 mg. morphine IR (MS IR) 15 mg tablet Take 1 Tablet by mouth every six (6) hours as needed for Pain for up to 15 days. Max Daily Amount: 60 mg.    ondansetron (ZOFRAN ODT) 8 mg disintegrating tablet Take 1 Tablet by mouth every eight (8) hours as needed for Nausea or Vomiting. prochlorperazine (COMPAZINE) 10 mg tablet Take 1 Tablet by mouth every six (6) hours as needed for Nausea (do not take if groggy; take if nausea despite zofran). morphine ER (MS CONTIN) 15 mg CR tablet Take 1 Tablet by mouth every twelve (12) hours for 30 days. Max Daily Amount: 30 mg.    trimethoprim-sulfamethoxazole (BACTRIM DS, SEPTRA DS) 160-800 mg per tablet Take 1 Tablet by mouth every Monday, Wednesday, Friday. Indications: prophylaxis treatment of cancer related infections    FLUoxetine (PROzac) 40 mg capsule TAKE 1 CAPSULE BY MOUTH EVERY MORNING    amLODIPine-benazepril (LOTREL) 5-20 mg per capsule TAKE 1 CAPSULE BY MOUTH EVERY MORNING    naloxone (Narcan) 4 mg/actuation nasal spray Use 1 spray intranasally, then discard. Repeat with new spray every 2 min as needed for opioid overdose symptoms, alternating nostrils.  (Patient not taking: No sig reported)    potassium chloride (K-DUR, KLOR-CON M20) 20 mEq tablet Take 1 Tablet by mouth daily. (Patient not taking: No sig reported)     No current facility-administered medications for this visit. LAB DATA REVIEWED:     Lab Results   Component Value Date/Time    WBC 5.1 08/22/2022 06:15 AM    HGB 12.2 08/22/2022 06:15 AM    PLATELET 314 93/08/1722 06:15 AM     Lab Results   Component Value Date/Time    Sodium 138 08/24/2022 10:47 AM    Potassium 3.0 (L) 08/24/2022 10:47 AM    Chloride 100 08/24/2022 10:47 AM    CO2 32 08/24/2022 10:47 AM    BUN 11 08/24/2022 10:47 AM    Creatinine 0.73 08/24/2022 10:47 AM    Calcium 9.1 08/24/2022 10:47 AM    Phosphorus 4.0 06/06/2022 06:59 AM      Lab Results   Component Value Date/Time    Alk. phosphatase 68 08/22/2022 06:15 AM    Protein, total 4.9 (L) 08/22/2022 06:15 AM    Albumin 2.8 (L) 08/22/2022 06:15 AM    Globulin 2.1 08/22/2022 06:15 AM     No results found for: INR, PTMR, PTP, PT1, PT2, APTT, INREXT, INREXT   No results found for: IRON, FE, TIBC, IBCT, PSAT, FERR     CT chest/abdomen/pelvis 3/24/22:  1. Large gastrohepatic ligament lymph node measuring 4.2 x 2.5 cm. Multiple  mildly enlarged mesenteric lymph nodes. Borderline to mildly enlarged  retroperitoneal lymph nodes. Lymphoma is the primary differential possibility. These are not accessible by percutaneous biopsy but may be accessible by  endoscopic ultrasound. 2.  Central right renal mass with second posterior right renal mass. Mild  right-sided hydronephrosis. Although these may represent uroepithelial or renal  cell carcinoma is, lymphoma should be considered as well. 3.  Borderline size left inguinal lymph node. This may be accessible for  percutaneous ultrasound-guided biopsy although is borderline size for pathology  may be reactive. 4.  Ground glass opacity superior segment left lower lobe. Attention on  follow-up imaging.      5.  Ventral hernia containing loops of small bowel without evidence of  Obstruction    PET-CT 5/4/22:  CHEST: No foci of abnormal hypermetabolism. Low-grade activity in mediastinal  and hilar lymph nodes is nonspecific but may be reactive. ABDOMEN/PELVIS: There is increased metabolic activity in a 2.6 cm hepatogastric  ligament lymph node with SUV of 5.7. There is increased metabolic activity in a 1.2 cm right retroperitoneal lymph  node with SUV of 6. There is increased metabolic activity in a left retroperitoneal lymph node with  SUV of 8   There is slight haziness in the mesentery with small lymph nodes with slight  increased metabolic activity of 3.6. There is small focus of increased metabolic activity anterior to the left sacrum  and anterior to the right sacrum with SUV of 7 suspicious for lymph nodes. There is increased metabolic activity in the right renal mass posterior cortex  with SUV of 6.9. There is increased metabolic activity in a left inguinal lymph node with SUV of  8  There is a right abdominal wall hernia     SKELETON: There is increased metabolic activity and left humeral head, right  iliac bone, femurs and proximal right tibia. . There is slight increased  metabolic activity right humeral head. IMPRESSION  There is increased metabolic activity in 2 lymph nodes in the left  neck, in the hepatogastric ligament and retroperitoneal adenopathy, mesenteric  lymph nodes and left inguinal adenopathy suspicious for lymphoma. There is  increased metabolic activity in a mass posterior right renal cortex nonspecific  but may represent lymphoma. There is increased bony activity as described above  suspicious for lymphoma. CT neck/chest/abdomen/pelvis 6/24/22:  1. Interval decrease in size of previously enlarged lymph nodes in the neck,  abdomen, and pelvis. No new or enlarging lymph nodes. 2. New tree-in-bud opacities and groundglass opacities in the right lower and  upper lobes may be infectious/inflammatory. May be assessed on follow-up  examinations.      CONTROLLED SUBSTANCES SAFETY ASSESSMENT (IF ON CONTROLLED SUBSTANCES):     Reviewed opioid safety handout:  [x] Yes   [] No  24 hour opioid dose >150mg morphine equivalent/day:  [] Yes   [x] No  Benzodiazepines:  [x] Yes   [] No  Sleep apnea:  [] Yes   [x] No  Urine Toxicology Testing within last 6 months:  [] Yes   [] No  History of or new aberrant medication taking behaviors:  [x] Yes   [] No  (early refills)  Has Narcan been prescribed [x] Yes   [] No          Total time:   Counseling / coordination time:   > 50% counseling / coordination?:     Mandy Johnson , was evaluated through a synchronous (real-time) audio-video encounter. The patient (or guardian if applicable) is aware that this is a billable service, which includes applicable co-pays. This Virtual Visit was conducted with patient's (and/or legal guardian's) consent. The visit was conducted pursuant to the emergency declaration under the [address] [place] [address] authority and the [place] and [employer] General Act. Patient identification was verified, and a caregiver was present when appropriate. The patient was located in a state where the provider was licensed to provide care.

## 2022-09-06 ENCOUNTER — TELEPHONE (OUTPATIENT)
Dept: PALLATIVE CARE | Age: 67
End: 2022-09-06

## 2022-09-06 ENCOUNTER — VIRTUAL VISIT (OUTPATIENT)
Dept: PALLATIVE CARE | Age: 67
End: 2022-09-06
Payer: MEDICARE

## 2022-09-06 DIAGNOSIS — F32.9 MAJOR DEPRESSIVE DISORDER WITH CURRENT ACTIVE EPISODE, UNSPECIFIED DEPRESSION EPISODE SEVERITY, UNSPECIFIED WHETHER RECURRENT: ICD-10-CM

## 2022-09-06 DIAGNOSIS — R63.0 POOR APPETITE: ICD-10-CM

## 2022-09-06 DIAGNOSIS — C83.38 DIFFUSE LARGE B-CELL LYMPHOMA OF LYMPH NODES OF MULTIPLE REGIONS (HCC): ICD-10-CM

## 2022-09-06 DIAGNOSIS — M25.551 RIGHT HIP PAIN: ICD-10-CM

## 2022-09-06 DIAGNOSIS — F41.9 ANXIETY: Primary | ICD-10-CM

## 2022-09-06 DIAGNOSIS — R11.2 NAUSEA AND VOMITING, UNSPECIFIED VOMITING TYPE: ICD-10-CM

## 2022-09-06 PROCEDURE — G8536 NO DOC ELDER MAL SCRN: HCPCS | Performed by: INTERNAL MEDICINE

## 2022-09-06 PROCEDURE — 99214 OFFICE O/P EST MOD 30 MIN: CPT | Performed by: INTERNAL MEDICINE

## 2022-09-06 PROCEDURE — 1111F DSCHRG MED/CURRENT MED MERGE: CPT | Performed by: INTERNAL MEDICINE

## 2022-09-06 PROCEDURE — G9717 DOC PT DX DEP/BP F/U NT REQ: HCPCS | Performed by: INTERNAL MEDICINE

## 2022-09-06 PROCEDURE — 1101F PT FALLS ASSESS-DOCD LE1/YR: CPT | Performed by: INTERNAL MEDICINE

## 2022-09-06 PROCEDURE — G9899 SCRN MAM PERF RSLTS DOC: HCPCS | Performed by: INTERNAL MEDICINE

## 2022-09-06 PROCEDURE — G8427 DOCREV CUR MEDS BY ELIG CLIN: HCPCS | Performed by: INTERNAL MEDICINE

## 2022-09-06 PROCEDURE — 1090F PRES/ABSN URINE INCON ASSESS: CPT | Performed by: INTERNAL MEDICINE

## 2022-09-06 PROCEDURE — G8400 PT W/DXA NO RESULTS DOC: HCPCS | Performed by: INTERNAL MEDICINE

## 2022-09-06 PROCEDURE — 3017F COLORECTAL CA SCREEN DOC REV: CPT | Performed by: INTERNAL MEDICINE

## 2022-09-06 PROCEDURE — G8417 CALC BMI ABV UP PARAM F/U: HCPCS | Performed by: INTERNAL MEDICINE

## 2022-09-06 RX ORDER — MORPHINE SULFATE 15 MG/1
15 TABLET ORAL
Qty: 60 TABLET | Refills: 0 | Status: SHIPPED | OUTPATIENT
Start: 2022-09-06 | End: 2022-09-19 | Stop reason: SDUPTHER

## 2022-09-06 RX ORDER — ALPRAZOLAM 1 MG/1
1 TABLET ORAL
Qty: 30 TABLET | Refills: 0 | Status: SHIPPED | OUTPATIENT
Start: 2022-09-06 | End: 2022-09-19 | Stop reason: SDUPTHER

## 2022-09-06 NOTE — TELEPHONE ENCOUNTER
Patient was referred to this LCSW by Dr. Amanda Srinivasan for supportive services for the patient. Met with patient when she was in clinic. Patient was encouraged to call to set up appt with this LCSW. Patient was open to this LCSW reaching out to her as well. As LCSW did not receive any calls from patient, called patient today. Call not answered, VM is full and unable ot leave a message. Will try again.        Sheri Guerra, LCSW

## 2022-09-06 NOTE — PROGRESS NOTES
Palliative Medicine Office Visit  Palliative Medicine Nurse Check In  [phone]    Patient Name: Matt Llanos  YOB: 1955      Date of Office Visit: 9/6/2022    Patient states: \"  \"    From Check In Sheet (scanned in Media):  Has a medical provider talked with you about cardiopulmonary resuscitation (CPR)? [x] Yes   [] No   [] Unable to obtain    Nurse reminder to complete or update ACP FlowSheet:    Is ACP on the Problem List?    [] Yes    [x] No  IF ACP Document is ON FILE; Nurse to place ACP on Problem List     Is there an ACP Note in Chart Review/Note? [x] Yes    [] No   If NO: ALERT PROVIDER       Primary Decision Maker: Alexander Carias Daughter - [phone]    Primary Decision Maker: [place] Short - [phone]  Advance Care Planning 9/6/2022   Patient's Healthcare Decision Maker is: Legal Next of Kin   Confirm Advance Directive None   Patient Would Like to Complete Advance Directive -   Does the patient have other document types -        Is there anything that we should know about you as a person in order to provide you the best care possible? Have you been to the ER, urgent care clinic since your last visit? [] Yes   [x] No   [] Unable to obtain    Have you been hospitalized since your last visit? [] Yes   [x] No   [] Unable to obtain    Have you seen or consulted any other health care providers outside of the [address] [place] [address] since your last visit? [] Yes   [x] No   [] Unable to obtain    Functional status (describe):         Last BM: 9/6/2022     accessed (date):      Bottle review (for opioid pain medication):  Medication 1:   Date filled:   Directions:   # filled:   # left:   # pills taking per day:  Last dose taken:    Medication 2:   Date filled:   Directions:   # filled:   # left:   # pills taking per day:  Last dose taken:    Medication 3:   Date filled:   Directions:   # filled:   # left:   # pills taking per day:  Last dose taken:     Medication 4:   Date filled:   Directions:   # filled:   # left:   # pills taking per day:  Last dose taken:

## 2022-09-06 NOTE — PATIENT INSTRUCTIONS
Dear Thania Ambrose ,    It was a pleasure seeing you today for a virtual visit. We will see you again in 4 weeks for a virtual visit. If labs or imaging tests have been ordered for you today, please call the office  at [phone] 48 hours after completion to obtain the results. Your described symptoms were: Fatigue: 6 Drowsiness: 2   Depression: 4 Pain: 6   Anxiety: 4 Nausea: 6   Anorexia: 6 Dyspnea: 0   Best Well-Bein Constipation: No   Other Problem (Comment): 0       This is the plan we talked about:    During your previous visit, we talked about your \"over all\" body pain from a \"total pain\" perspective in which your physical pain, your sadness and mourning, the impact of your cancer diagnosis and treatment on your social life and your relationships and your history of depression and anxiety are all factors in your distress. Medications can be helpful in alleviating some aspects of total pain but are usually not sufficient to bring relief to other aspects of your distress. We're working together to use medications and non-medication options to alleviate your pain and distress. 1. Depression and anxiety  -Continue fluoxetine 40-mg daily  -Xanax 1-mg: take 1/2 to 1 tab twice daily as needed for anxiety  -You enjoyed going out to lunch last week with your friend, Haim Niño, and you anticipate going out to eat again later this week  -You've met our clinical , Pooja Gonzalez, who is available to you for additional support if you wish  -We've talked bout the synergistic (additive) effects between Xanax and morphine which places you at higher risk for breathing problems so it's important for you to take both your morphine and Xanax as prescribed    2. Pain  -Continue extended-release morphine 15-mg every 12 hours  -Continue morphine immediate-release 15-mg every 6 hours as needed (4 pills/24 hours)    3.  Poor appetite  -Continue Ensure, juices and soft foods as tolerated    4. Fatigue/poor sleep  -This is multifactorial in nature with your depression and sadness, your body pain, your medications and your poor appetite all playing a role in your low energy  -Your hemoglobin on 8/22/22 was within normal limits so you're not anemic  -Your TSH on 3/6/22 was also within normal limits which indicates your thyroid hormone levels were normal at that time    5. Nausea and vomiting  -Continue ondansetron 8-mg every 8 hours as needed  -Continue prochlorperazine 10-mg every 6 hours as needed    6. Function  -You're spending most of your time in bed  -You're able to walk to the bathroom and into the kitchen without difficulty  -You haven't had any falls    7. Lymphoma  -You've received  a total of 3 cycles of chemotherapy with the goal of inducing remission  -You had fewer side-effects with dose-reduced chemo last month  -You anticipate hospitalization on 9/10 for your next cycle of chemo    This is what you have shared with us about Advance Care Planning:      Primary Decision Maker: Nick Sauceda - Daughter - [phone]    Primary Decision Maker: Sri Hinojosa - [phone]  Advance Care Planning 9/6/2022   Patient's [address] is: Legal Next of [facility] Directive None   Patient Would Like to Complete Advance Directive -   Does the patient have other document types -           The Palliative Medicine Team is here to support you and your family.        Sincerely,      Mary Soto MD and the Palliative Medicine Team

## 2022-09-08 RX ORDER — ALBUTEROL SULFATE 0.83 MG/ML
2.5 SOLUTION RESPIRATORY (INHALATION) AS NEEDED
Status: CANCELLED
Start: 2022-09-12

## 2022-09-08 RX ORDER — LORAZEPAM 2 MG/ML
0.5 INJECTION INTRAMUSCULAR
Status: CANCELLED
Start: 2022-09-12

## 2022-09-08 RX ORDER — ACETAMINOPHEN 325 MG/1
650 TABLET ORAL ONCE
Status: CANCELLED
Start: 2022-09-12 | End: 2022-09-12

## 2022-09-08 RX ORDER — HYDROCORTISONE SODIUM SUCCINATE 100 MG/2ML
100 INJECTION, POWDER, FOR SOLUTION INTRAMUSCULAR; INTRAVENOUS AS NEEDED
Status: CANCELLED | OUTPATIENT
Start: 2022-09-12

## 2022-09-08 RX ORDER — SODIUM CHLORIDE 9 MG/ML
45 INJECTION, SOLUTION INTRAVENOUS CONTINUOUS
Status: CANCELLED
Start: 2022-09-12

## 2022-09-08 RX ORDER — DIPHENHYDRAMINE HYDROCHLORIDE 50 MG/ML
50 INJECTION, SOLUTION INTRAMUSCULAR; INTRAVENOUS ONCE
Status: CANCELLED
Start: 2022-09-12 | End: 2022-09-12

## 2022-09-08 RX ORDER — EPINEPHRINE 1 MG/ML
0.3 INJECTION, SOLUTION, CONCENTRATE INTRAVENOUS AS NEEDED
Status: CANCELLED | OUTPATIENT
Start: 2022-09-12

## 2022-09-08 RX ORDER — ACETAMINOPHEN 325 MG/1
650 TABLET ORAL AS NEEDED
Status: CANCELLED
Start: 2022-09-12

## 2022-09-08 RX ORDER — ONDANSETRON 2 MG/ML
8 INJECTION INTRAMUSCULAR; INTRAVENOUS EVERY 8 HOURS
Status: CANCELLED | OUTPATIENT
Start: 2022-09-12

## 2022-09-08 RX ORDER — DIPHENHYDRAMINE HYDROCHLORIDE 50 MG/ML
25 INJECTION, SOLUTION INTRAMUSCULAR; INTRAVENOUS AS NEEDED
Status: CANCELLED
Start: 2022-09-12

## 2022-09-08 RX ORDER — SODIUM CHLORIDE 0.9 % (FLUSH) 0.9 %
10 SYRINGE (ML) INJECTION AS NEEDED
Status: CANCELLED | OUTPATIENT
Start: 2022-09-12

## 2022-09-08 RX ORDER — HEPARIN SODIUM (PORCINE) LOCK FLUSH IV SOLN 100 UNIT/ML 100 UNIT/ML
300-500 SOLUTION INTRAVENOUS AS NEEDED
Status: CANCELLED
Start: 2022-09-12

## 2022-09-08 RX ORDER — ONDANSETRON 2 MG/ML
8 INJECTION INTRAMUSCULAR; INTRAVENOUS AS NEEDED
Status: CANCELLED | OUTPATIENT
Start: 2022-09-12

## 2022-09-08 RX ORDER — SULFAMETHOXAZOLE AND TRIMETHOPRIM 800; 160 MG/1; MG/1
1 TABLET ORAL
Status: CANCELLED
Start: 2022-09-12

## 2022-09-08 RX ORDER — OLANZAPINE 2.5 MG/1
5 TABLET ORAL EVERY EVENING
Status: CANCELLED | OUTPATIENT
Start: 2022-09-12

## 2022-09-08 RX ORDER — DIPHENHYDRAMINE HYDROCHLORIDE 50 MG/ML
50 INJECTION, SOLUTION INTRAMUSCULAR; INTRAVENOUS AS NEEDED
Status: CANCELLED
Start: 2022-09-12

## 2022-09-08 RX ORDER — PREDNISONE 1 MG/1
60 TABLET ORAL 2 TIMES DAILY WITH MEALS
Status: CANCELLED
Start: 2022-09-12

## 2022-09-08 RX ORDER — PANTOPRAZOLE SODIUM 40 MG/1
40 TABLET, DELAYED RELEASE ORAL
Status: CANCELLED
Start: 2022-09-12

## 2022-09-12 ENCOUNTER — APPOINTMENT (OUTPATIENT)
Dept: GENERAL RADIOLOGY | Age: 67
DRG: 846 | End: 2022-09-12
Attending: NURSE PRACTITIONER
Payer: MEDICARE

## 2022-09-12 ENCOUNTER — HOSPITAL ENCOUNTER (INPATIENT)
Age: 67
LOS: 4 days | Discharge: HOME | DRG: 846 | End: 2022-09-16
Attending: INTERNAL MEDICINE | Admitting: INTERNAL MEDICINE
Payer: MEDICARE

## 2022-09-12 ENCOUNTER — APPOINTMENT (OUTPATIENT)
Dept: CT IMAGING | Age: 67
DRG: 846 | End: 2022-09-12
Attending: NURSE PRACTITIONER
Payer: MEDICARE

## 2022-09-12 DIAGNOSIS — F32.A ANXIETY AND DEPRESSION: ICD-10-CM

## 2022-09-12 DIAGNOSIS — F41.8 ANXIETY ABOUT HEALTH: ICD-10-CM

## 2022-09-12 DIAGNOSIS — E44.1 MILD MALNUTRITION (HCC): ICD-10-CM

## 2022-09-12 DIAGNOSIS — G89.3 CANCER RELATED PAIN: ICD-10-CM

## 2022-09-12 DIAGNOSIS — J96.01 ACUTE RESPIRATORY FAILURE WITH HYPOXIA (HCC): ICD-10-CM

## 2022-09-12 DIAGNOSIS — R60.1 GENERALIZED EDEMA: ICD-10-CM

## 2022-09-12 DIAGNOSIS — Z51.11 ENCOUNTER FOR ANTINEOPLASTIC CHEMOTHERAPY: ICD-10-CM

## 2022-09-12 DIAGNOSIS — F41.9 ANXIETY AND DEPRESSION: ICD-10-CM

## 2022-09-12 DIAGNOSIS — C83.31 DIFFUSE LARGE B-CELL LYMPHOMA OF LYMPH NODES OF NECK (HCC): Primary | ICD-10-CM

## 2022-09-12 DIAGNOSIS — C79.51 METASTATIC CANCER TO BONE (HCC): ICD-10-CM

## 2022-09-12 DIAGNOSIS — F17.200 TOBACCO DEPENDENCE: ICD-10-CM

## 2022-09-12 LAB
ALBUMIN SERPL-MCNC: 3.2 G/DL (ref 3.5–5)
ALBUMIN/GLOB SERPL: 1.3 {RATIO} (ref 1.1–2.2)
ALP SERPL-CCNC: 74 U/L (ref 45–117)
ALT SERPL-CCNC: 20 U/L (ref 12–78)
ANION GAP SERPL CALC-SCNC: 5 MMOL/L (ref 5–15)
AST SERPL-CCNC: 14 U/L (ref 15–37)
BASOPHILS # BLD: 0.1 K/UL (ref 0–0.1)
BASOPHILS NFR BLD: 2 % (ref 0–1)
BILIRUB SERPL-MCNC: 0.2 MG/DL (ref 0.2–1)
BUN SERPL-MCNC: 7 MG/DL (ref 6–20)
BUN/CREAT SERPL: 11 (ref 12–20)
CALCIUM SERPL-MCNC: 8.6 MG/DL (ref 8.5–10.1)
CHLORIDE SERPL-SCNC: 108 MMOL/L (ref 97–108)
CO2 SERPL-SCNC: 27 MMOL/L (ref 21–32)
COVID-19 RAPID TEST, COVR: NOT DETECTED
CREAT SERPL-MCNC: 0.63 MG/DL (ref 0.55–1.02)
DIFFERENTIAL METHOD BLD: ABNORMAL
EOSINOPHIL # BLD: 0 K/UL (ref 0–0.4)
EOSINOPHIL NFR BLD: 1 % (ref 0–7)
ERYTHROCYTE [DISTWIDTH] IN BLOOD BY AUTOMATED COUNT: 14.8 % (ref 11.5–14.5)
GLOBULIN SER CALC-MCNC: 2.5 G/DL (ref 2–4)
GLUCOSE SERPL-MCNC: 107 MG/DL (ref 65–100)
HCT VFR BLD AUTO: 37.9 % (ref 35–47)
HGB BLD-MCNC: 12.4 G/DL (ref 11.5–16)
IMM GRANULOCYTES # BLD AUTO: 0 K/UL (ref 0–0.04)
IMM GRANULOCYTES NFR BLD AUTO: 1 % (ref 0–0.5)
LYMPHOCYTES # BLD: 1 K/UL (ref 0.8–3.5)
LYMPHOCYTES NFR BLD: 17 % (ref 12–49)
MCH RBC QN AUTO: 31.3 PG (ref 26–34)
MCHC RBC AUTO-ENTMCNC: 32.7 G/DL (ref 30–36.5)
MCV RBC AUTO: 95.7 FL (ref 80–99)
MONOCYTES # BLD: 1.1 K/UL (ref 0–1)
MONOCYTES NFR BLD: 19 % (ref 5–13)
NEUTS SEG # BLD: 3.6 K/UL (ref 1.8–8)
NEUTS SEG NFR BLD: 60 % (ref 32–75)
NRBC # BLD: 0 K/UL (ref 0–0.01)
NRBC BLD-RTO: 0 PER 100 WBC
PLATELET # BLD AUTO: 394 K/UL (ref 150–400)
PMV BLD AUTO: 9.4 FL (ref 8.9–12.9)
POTASSIUM SERPL-SCNC: 3.9 MMOL/L (ref 3.5–5.1)
PROT SERPL-MCNC: 5.7 G/DL (ref 6.4–8.2)
RBC # BLD AUTO: 3.96 M/UL (ref 3.8–5.2)
SODIUM SERPL-SCNC: 140 MMOL/L (ref 136–145)
SOURCE, COVRS: NORMAL
WBC # BLD AUTO: 5.9 K/UL (ref 3.6–11)

## 2022-09-12 PROCEDURE — 74011250637 HC RX REV CODE- 250/637: Performed by: NURSE PRACTITIONER

## 2022-09-12 PROCEDURE — 74011000636 HC RX REV CODE- 636: Performed by: INTERNAL MEDICINE

## 2022-09-12 PROCEDURE — 74011000250 HC RX REV CODE- 250: Performed by: NURSE PRACTITIONER

## 2022-09-12 PROCEDURE — 74011250636 HC RX REV CODE- 250/636: Performed by: INTERNAL MEDICINE

## 2022-09-12 PROCEDURE — 99223 1ST HOSP IP/OBS HIGH 75: CPT | Performed by: INTERNAL MEDICINE

## 2022-09-12 PROCEDURE — 85025 COMPLETE CBC W/AUTO DIFF WBC: CPT

## 2022-09-12 PROCEDURE — 71275 CT ANGIOGRAPHY CHEST: CPT

## 2022-09-12 PROCEDURE — 36415 COLL VENOUS BLD VENIPUNCTURE: CPT

## 2022-09-12 PROCEDURE — 74011636637 HC RX REV CODE- 636/637: Performed by: INTERNAL MEDICINE

## 2022-09-12 PROCEDURE — 74011000258 HC RX REV CODE- 258: Performed by: INTERNAL MEDICINE

## 2022-09-12 PROCEDURE — 80053 COMPREHEN METABOLIC PANEL: CPT

## 2022-09-12 PROCEDURE — 65270000029 HC RM PRIVATE

## 2022-09-12 PROCEDURE — 71046 X-RAY EXAM CHEST 2 VIEWS: CPT

## 2022-09-12 PROCEDURE — 87635 SARS-COV-2 COVID-19 AMP PRB: CPT

## 2022-09-12 PROCEDURE — 2709999900 HC NON-CHARGEABLE SUPPLY

## 2022-09-12 PROCEDURE — 74011250637 HC RX REV CODE- 250/637: Performed by: INTERNAL MEDICINE

## 2022-09-12 RX ORDER — ACETAMINOPHEN 325 MG/1
650 TABLET ORAL
Status: DISCONTINUED | OUTPATIENT
Start: 2022-09-12 | End: 2022-09-16 | Stop reason: HOSPADM

## 2022-09-12 RX ORDER — ACETAMINOPHEN 325 MG/1
650 TABLET ORAL ONCE
Status: COMPLETED | OUTPATIENT
Start: 2022-09-12 | End: 2022-09-12

## 2022-09-12 RX ORDER — EPINEPHRINE 1 MG/ML
0.3 INJECTION, SOLUTION, CONCENTRATE INTRAVENOUS AS NEEDED
Status: ACTIVE | OUTPATIENT
Start: 2022-09-12 | End: 2022-09-12

## 2022-09-12 RX ORDER — DIPHENHYDRAMINE HYDROCHLORIDE 50 MG/ML
50 INJECTION, SOLUTION INTRAMUSCULAR; INTRAVENOUS ONCE
Status: COMPLETED | OUTPATIENT
Start: 2022-09-12 | End: 2022-09-12

## 2022-09-12 RX ORDER — OLANZAPINE 5 MG/1
5 TABLET ORAL EVERY EVENING
Status: COMPLETED | OUTPATIENT
Start: 2022-09-12 | End: 2022-09-15

## 2022-09-12 RX ORDER — POLYETHYLENE GLYCOL 3350 17 G/17G
17 POWDER, FOR SOLUTION ORAL DAILY PRN
Status: DISCONTINUED | OUTPATIENT
Start: 2022-09-12 | End: 2022-09-16 | Stop reason: HOSPADM

## 2022-09-12 RX ORDER — HYDROCORTISONE SODIUM SUCCINATE 100 MG/2ML
100 INJECTION, POWDER, FOR SOLUTION INTRAMUSCULAR; INTRAVENOUS AS NEEDED
Status: DISCONTINUED | OUTPATIENT
Start: 2022-09-12 | End: 2022-09-16 | Stop reason: HOSPADM

## 2022-09-12 RX ORDER — AMLODIPINE BESYLATE 5 MG/1
5 TABLET ORAL DAILY
Status: DISCONTINUED | OUTPATIENT
Start: 2022-09-12 | End: 2022-09-16 | Stop reason: HOSPADM

## 2022-09-12 RX ORDER — SULFAMETHOXAZOLE AND TRIMETHOPRIM 800; 160 MG/1; MG/1
1 TABLET ORAL
Status: DISCONTINUED | OUTPATIENT
Start: 2022-09-12 | End: 2022-09-16 | Stop reason: HOSPADM

## 2022-09-12 RX ORDER — ZOLPIDEM TARTRATE 5 MG/1
5 TABLET ORAL
Status: DISCONTINUED | OUTPATIENT
Start: 2022-09-12 | End: 2022-09-16 | Stop reason: HOSPADM

## 2022-09-12 RX ORDER — HYDRALAZINE HYDROCHLORIDE 20 MG/ML
10 INJECTION INTRAMUSCULAR; INTRAVENOUS
Status: DISCONTINUED | OUTPATIENT
Start: 2022-09-12 | End: 2022-09-16 | Stop reason: HOSPADM

## 2022-09-12 RX ORDER — SODIUM CHLORIDE 0.9 % (FLUSH) 0.9 %
5-40 SYRINGE (ML) INJECTION EVERY 8 HOURS
Status: DISCONTINUED | OUTPATIENT
Start: 2022-09-12 | End: 2022-09-16 | Stop reason: HOSPADM

## 2022-09-12 RX ORDER — LISINOPRIL 20 MG/1
20 TABLET ORAL DAILY
Status: DISCONTINUED | OUTPATIENT
Start: 2022-09-12 | End: 2022-09-16 | Stop reason: HOSPADM

## 2022-09-12 RX ORDER — PANTOPRAZOLE SODIUM 40 MG/1
40 TABLET, DELAYED RELEASE ORAL
Status: COMPLETED | OUTPATIENT
Start: 2022-09-12 | End: 2022-09-16

## 2022-09-12 RX ORDER — ALPRAZOLAM 0.5 MG/1
1 TABLET ORAL
Status: DISCONTINUED | OUTPATIENT
Start: 2022-09-12 | End: 2022-09-14

## 2022-09-12 RX ORDER — SODIUM CHLORIDE 9 MG/ML
45 INJECTION, SOLUTION INTRAVENOUS CONTINUOUS
Status: DISCONTINUED | OUTPATIENT
Start: 2022-09-12 | End: 2022-09-16 | Stop reason: HOSPADM

## 2022-09-12 RX ORDER — HEPARIN 100 UNIT/ML
300-500 SYRINGE INTRAVENOUS AS NEEDED
Status: DISCONTINUED | OUTPATIENT
Start: 2022-09-12 | End: 2022-09-16 | Stop reason: HOSPADM

## 2022-09-12 RX ORDER — LORAZEPAM 2 MG/ML
0.5 INJECTION, SOLUTION INTRAMUSCULAR; INTRAVENOUS
Status: DISCONTINUED | OUTPATIENT
Start: 2022-09-12 | End: 2022-09-16 | Stop reason: HOSPADM

## 2022-09-12 RX ORDER — DIPHENHYDRAMINE HYDROCHLORIDE 50 MG/ML
25 INJECTION, SOLUTION INTRAMUSCULAR; INTRAVENOUS AS NEEDED
Status: DISCONTINUED | OUTPATIENT
Start: 2022-09-12 | End: 2022-09-16 | Stop reason: HOSPADM

## 2022-09-12 RX ORDER — FUROSEMIDE 20 MG/1
20 TABLET ORAL DAILY
Status: COMPLETED | OUTPATIENT
Start: 2022-09-13 | End: 2022-09-16

## 2022-09-12 RX ORDER — FLUOXETINE HYDROCHLORIDE 20 MG/1
40 CAPSULE ORAL DAILY
Status: DISCONTINUED | OUTPATIENT
Start: 2022-09-12 | End: 2022-09-16 | Stop reason: HOSPADM

## 2022-09-12 RX ORDER — ONDANSETRON 2 MG/ML
8 INJECTION INTRAMUSCULAR; INTRAVENOUS AS NEEDED
Status: DISCONTINUED | OUTPATIENT
Start: 2022-09-12 | End: 2022-09-16 | Stop reason: HOSPADM

## 2022-09-12 RX ORDER — MORPHINE SULFATE 15 MG/1
15 TABLET, FILM COATED, EXTENDED RELEASE ORAL EVERY 12 HOURS
Status: DISCONTINUED | OUTPATIENT
Start: 2022-09-12 | End: 2022-09-16 | Stop reason: HOSPADM

## 2022-09-12 RX ORDER — DIPHENHYDRAMINE HYDROCHLORIDE 50 MG/ML
50 INJECTION, SOLUTION INTRAMUSCULAR; INTRAVENOUS AS NEEDED
Status: DISCONTINUED | OUTPATIENT
Start: 2022-09-12 | End: 2022-09-16 | Stop reason: HOSPADM

## 2022-09-12 RX ORDER — ONDANSETRON 2 MG/ML
8 INJECTION INTRAMUSCULAR; INTRAVENOUS EVERY 8 HOURS
Status: DISCONTINUED | OUTPATIENT
Start: 2022-09-12 | End: 2022-09-16 | Stop reason: HOSPADM

## 2022-09-12 RX ORDER — SULFAMETHOXAZOLE AND TRIMETHOPRIM 800; 160 MG/1; MG/1
1 TABLET ORAL
Status: DISCONTINUED | OUTPATIENT
Start: 2022-09-12 | End: 2022-09-12 | Stop reason: SDUPTHER

## 2022-09-12 RX ORDER — ACETAMINOPHEN 325 MG/1
650 TABLET ORAL AS NEEDED
Status: DISCONTINUED | OUTPATIENT
Start: 2022-09-12 | End: 2022-09-16 | Stop reason: HOSPADM

## 2022-09-12 RX ORDER — MORPHINE SULFATE 15 MG/1
15 TABLET ORAL
Status: DISCONTINUED | OUTPATIENT
Start: 2022-09-12 | End: 2022-09-16 | Stop reason: HOSPADM

## 2022-09-12 RX ORDER — ACETAMINOPHEN 650 MG/1
650 SUPPOSITORY RECTAL
Status: DISCONTINUED | OUTPATIENT
Start: 2022-09-12 | End: 2022-09-16 | Stop reason: HOSPADM

## 2022-09-12 RX ORDER — ALBUTEROL SULFATE 0.83 MG/ML
2.5 SOLUTION RESPIRATORY (INHALATION) AS NEEDED
Status: DISCONTINUED | OUTPATIENT
Start: 2022-09-12 | End: 2022-09-16 | Stop reason: HOSPADM

## 2022-09-12 RX ORDER — SODIUM CHLORIDE 0.9 % (FLUSH) 0.9 %
5-40 SYRINGE (ML) INJECTION AS NEEDED
Status: DISCONTINUED | OUTPATIENT
Start: 2022-09-12 | End: 2022-09-16 | Stop reason: HOSPADM

## 2022-09-12 RX ORDER — SODIUM CHLORIDE 0.9 % (FLUSH) 0.9 %
10 SYRINGE (ML) INJECTION AS NEEDED
Status: DISCONTINUED | OUTPATIENT
Start: 2022-09-12 | End: 2022-09-16 | Stop reason: HOSPADM

## 2022-09-12 RX ORDER — IBUPROFEN 200 MG
1 TABLET ORAL DAILY
Status: DISCONTINUED | OUTPATIENT
Start: 2022-09-12 | End: 2022-09-16 | Stop reason: HOSPADM

## 2022-09-12 RX ADMIN — ACETAMINOPHEN 650 MG: 325 TABLET ORAL at 09:20

## 2022-09-12 RX ADMIN — SULFAMETHOXAZOLE AND TRIMETHOPRIM 1 TABLET: 800; 160 TABLET ORAL at 08:07

## 2022-09-12 RX ADMIN — MORPHINE SULFATE 15 MG: 15 TABLET ORAL at 16:55

## 2022-09-12 RX ADMIN — SODIUM CHLORIDE 705 MG: 9 INJECTION, SOLUTION INTRAVENOUS at 10:07

## 2022-09-12 RX ADMIN — LISINOPRIL 20 MG: 20 TABLET ORAL at 08:07

## 2022-09-12 RX ADMIN — SODIUM CHLORIDE 75.2 MG: 9 INJECTION, SOLUTION INTRAVENOUS at 12:32

## 2022-09-12 RX ADMIN — OLANZAPINE 5 MG: 5 TABLET, FILM COATED ORAL at 19:25

## 2022-09-12 RX ADMIN — ALPRAZOLAM 1 MG: 0.5 TABLET ORAL at 07:07

## 2022-09-12 RX ADMIN — FLUOXETINE 40 MG: 20 CAPSULE ORAL at 08:07

## 2022-09-12 RX ADMIN — Medication 10 ML: at 22:56

## 2022-09-12 RX ADMIN — SODIUM CHLORIDE 500 ML: 9 INJECTION, SOLUTION INTRAVENOUS at 08:38

## 2022-09-12 RX ADMIN — PREDNISONE 115 MG: 5 TABLET ORAL at 16:55

## 2022-09-12 RX ADMIN — DIPHENHYDRAMINE HYDROCHLORIDE 50 MG: 50 INJECTION INTRAMUSCULAR; INTRAVENOUS at 09:19

## 2022-09-12 RX ADMIN — SODIUM CHLORIDE 45 ML/HR: 9 INJECTION, SOLUTION INTRAVENOUS at 20:04

## 2022-09-12 RX ADMIN — PANTOPRAZOLE SODIUM 40 MG: 40 TABLET, DELAYED RELEASE ORAL at 08:38

## 2022-09-12 RX ADMIN — MORPHINE SULFATE 15 MG: 15 TABLET, FILM COATED, EXTENDED RELEASE ORAL at 08:07

## 2022-09-12 RX ADMIN — SODIUM CHLORIDE: 9 INJECTION, SOLUTION INTRAVENOUS at 12:32

## 2022-09-12 RX ADMIN — Medication 10 ML: at 07:05

## 2022-09-12 RX ADMIN — AMLODIPINE BESYLATE 5 MG: 5 TABLET ORAL at 08:07

## 2022-09-12 RX ADMIN — SODIUM CHLORIDE 45 ML/HR: 9 INJECTION, SOLUTION INTRAVENOUS at 08:38

## 2022-09-12 RX ADMIN — IOPAMIDOL 100 ML: 755 INJECTION, SOLUTION INTRAVENOUS at 18:51

## 2022-09-12 RX ADMIN — PREDNISONE 115 MG: 5 TABLET ORAL at 09:31

## 2022-09-12 RX ADMIN — MORPHINE SULFATE 15 MG: 15 TABLET, FILM COATED, EXTENDED RELEASE ORAL at 20:09

## 2022-09-12 RX ADMIN — ONDANSETRON 8 MG: 2 INJECTION INTRAMUSCULAR; INTRAVENOUS at 09:19

## 2022-09-12 RX ADMIN — ONDANSETRON 8 MG: 2 INJECTION INTRAMUSCULAR; INTRAVENOUS at 16:55

## 2022-09-12 RX ADMIN — ALPRAZOLAM 1 MG: 0.5 TABLET ORAL at 20:10

## 2022-09-12 NOTE — PROGRESS NOTES
Comprehensive Nutrition Assessment    Type and Reason for Visit: Initial, Positive nutrition screen    Nutrition Recommendations/Plan:   Continue Regular diet  Add Ensure Enlive TID per request - chocolate. Malnutrition Assessment:  Malnutrition Status:  Mild malnutrition (09/12/22 1453)    Context:  Chronic illness     Findings of the 6 clinical characteristics of malnutrition:   Energy Intake:  Mild decrease in energy intake (specify)  Weight Loss:  No significant weight loss     Body Fat Loss:  No significant body fat loss,     Muscle Mass Loss:  Mild muscle mass loss,    Fluid Accumulation:  No significant fluid accumulation,     Strength:  Not performed     Nutrition Assessment:         Pt admitted with Diffuse large B cell lymphoma (HCC) [C83.30]    Past Medical History:   Diagnosis Date    Adverse effect of anesthesia     PHLEGM IN THROAT POST OP & NEEDED X2 BREATHING TREATMENTS    Anxiety     COPD (chronic obstructive pulmonary disease) ([address] 2022    emphysema    Depression     GERD (gastroesophageal reflux disease)     Hernia, femoral     since childhood    Hypertension     Joint pain     Nausea & vomiting     TMJ arthritis        MST for weight loss. Pt familiar to service from previous admission. Pt with significant wt loss prior to admission in August - wt since august admission has been stable - down 0.8 kg. Pt reports eating well currently, denies any issues, no n/v, c/d, chew/swallow difficulties, No known food allergies. Pt prefers chocolate Ensure - would like with all meals.        Last BM: 09/11/22,      PO intake: Patient Vitals for the past 168 hrs:   % Diet Eaten   09/12/22 0812 76 - 100%       Wt Readings from Last 30 Encounters:   09/12/22 80 kg (176 lb 5.9 oz)   09/01/22 80.7 kg (178 lb)   08/29/22 77.7 kg (171 lb 3.2 oz)   08/24/22 82.1 kg (181 lb)   08/21/22 85 kg (187 lb 6.3 oz)   08/17/22 79.5 kg (175 lb 3.2 oz)   08/05/22 80.7 kg (178 lb)   07/22/22 84.1 kg (185 lb 8 oz) 07/19/22 86.1 kg (189 lb 12.8 oz)   07/05/22 84.6 kg (186 lb 9.6 oz)   06/14/22 90.7 kg (200 lb)   06/10/22 99.9 kg (220 lb 3.8 oz)   05/31/22 90.6 kg (199 lb 11.2 oz)   05/31/22 90.9 kg (200 lb 6.4 oz)   05/23/22 93.2 kg (205 lb 6.4 oz)   05/20/22 95.2 kg (209 lb 14.4 oz)   05/17/22 97.5 kg (215 lb)   05/16/22 103.9 kg (229 lb)   05/15/22 101.6 kg (223 lb 15.8 oz)   05/09/22 94.8 kg (209 lb)   05/04/22 90.3 kg (199 lb)   04/29/22 90.4 kg (199 lb 6.4 oz)   04/27/22 94.1 kg (207 lb 6.4 oz)   04/22/22 91.2 kg (201 lb)   04/11/22 91.3 kg (201 lb 4.5 oz)   04/07/22 92.8 kg (204 lb 9.6 oz)   03/17/22 94.3 kg (208 lb)   03/03/22 92.8 kg (204 lb 9.6 oz)   01/07/21 96.6 kg (213 lb)           Nutrition Related Findings:      Wound Type: None    Edema: LLE: No Edema (9/12/2022  8:19 AM)  LUE: No Edema (9/12/2022  8:19 AM)  RLE: No Edema (9/12/2022  8:19 AM)  RUE: No Edema (9/12/2022  8:19 AM)        Current Nutrition Intake & Therapies:  Average Meal Intake: %  Average Supplement Intake: None ordered  ADULT DIET Regular    Anthropometric Measures:  Height: 5' 2.99\" (160 cm)  Ideal Body Weight (IBW): 115 lbs (52 kg)     Current Body Wt:  80 kg (176 lb 5.9 oz), 153.4 % IBW. Not specified  Current BMI (kg/m2): 31.3        Weight Adjustment: No adjustment                 BMI Category: Obese class 1 (BMI 30.0-34. 9)    Estimated Daily Nutrient Needs:  Energy Requirements Based On: Kcal/kg  Weight Used for Energy Requirements: Current  Energy (kcal/day): 3620-6358  Weight Used for Protein Requirements: Current  Protein (g/day): 120  Method Used for Fluid Requirements: 1 ml/kcal  Fluid (ml/day): 7370-7974    Nutrition Diagnosis:   Inadequate protein-energy intake related to increased demand for energy/nutrients as evidenced by poor intake prior to admission, weight loss    Nutrition Interventions:   Food and/or Nutrient Delivery: Continue current diet, Start oral nutrition supplement  Nutrition Education/Counseling: No recommendations at this time  Coordination of Nutrition Care: Continue to monitor while inpatient, Interdisciplinary rounds       Goals:     Goals: PO intake 50% or greater, by next RD assessment       Nutrition Monitoring and Evaluation:   Behavioral-Environmental Outcomes: None identified  Food/Nutrient Intake Outcomes: Food and nutrient intake, Supplement intake  Physical Signs/Symptoms Outcomes: Biochemical data, Skin, GI status    Discharge Planning:    Continue current diet, Continue oral nutrition supplement    Janessa Marsh, [address]: [phone]

## 2022-09-12 NOTE — ROUTINE PROCESS
Bedside and Verbal shift change report given to [address]  (oncoming nurse) by Jessie Yee RN  (offgoing nurse). Report included the following information SBAR, Kardex, Procedure Summary, MAR, and Recent Results.

## 2022-09-12 NOTE — H&P
[facility] at [address], [address] [facility]  Michelle Mix: [phone]  F: [phone] Patient ID  Name: Ivana Machado  YOB: 1955  MRN: [id number]  Referring Provider:   No referring provider defined for this encounter. 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 This summary highlights focused aspects of patient's ongoing care and may have a recurring section in notes with either updates or remain unchanged as a longitudinal care summary. -------------------------------------------------------------------------------------------------------------------------------------------------------------------------------------------------------  DIAGNOSIS:  Diffuse Large B-Cell Lymphoma     ORIGINAL STAGING:  Stage IV     SITES OF DISEASE:  Left Cervical Lymph Node,Bone Disease, Stage IV    CURRENT TREATMENT:  C1,D1 on 5/11/22 DA-R-EPOCH  C2  C3 delayed due to appetite issues and patient anxiety to restart therapy; administered on 8/18/22  C4-start on 9/12/22     PRIOR TREATMENT:  n/a     GOALS OF CARE:  Curative Intent     PATHOLOGY:  Specimen #:[id number] Collect: 4/22/2022      ==========================================================================                    * * *SURGICAL PATHOLOGY REPORT* * *   ==========================================================================   * * *PROCEDURES/ADDENDA* * *   ADDENDUM   Date Ordered: 4/26/2022     Status: Signed Out   Date Complete: 4/26/2022     By: Estela Hoang.  Roger Frost MD   Date Reported: 4/26/2022   Addendum Diagnosis   Lymph node, left posterior cervical lymph node, excision:   In situ hybridization for Jesús-Barr viral RNA: Negative   CPT: 59815  Addendum Comment   * * *CLINICAL HISTORY* * * Cervical lymphadenopathy, rule out lymphoma   ==========================================================================   * * *FINAL PATHOLOGIC DIAGNOSIS* * *        Lymph node, left posterior cervical lymph node, excision:        Large B cell lymphoma. See comment. * * *Comment* * *   The histologic section has fragments of lymphoid tissue with diffuse and   focal follicular architecture. Diffuse areas are comprised of large,   atypical lymphocytes with centroblastic morphology and increased mitotic   activity. Immunohistochemical stains confirm the malignant cells are CD20   positive B cells that coexpress CD10 and BCL6 without MUM1. CD23   highlights rare follicular dendritic networks associated with lymphoid   follicles with germinal centers and express CD10 and BCL6 without BCL2. CyclinD1 and CD56 stains are negative. Concurrent flow cytometric analysis   confirms a clonal CD10 positive B-cell population comprised of medium to   large cells. The Ki-67 proliferation index is 30%. The overall findings favor the diagnosis of diffuse large B-cell lymphoma,   germinal center subtype. Molecular genetic studies are pending for further   characterization will be reported in an addendum. Flow cytometry:   Consistent with CD10-positive B-cell lymphoproliferative disorder. Comments: Flow cytometry shows monoclonal B-cells (36% of total cells)   with CD10 expression without CD5, consistent with a CD10-positive B-cell   lymphoproliferative disorder. The main differential diagnosis includes   follicular lymphoma, Burkitt lymphoma and large B-cell lymphoma. Please   correlate the result with morphologic findings and clinical information. Flow Differential (%) and Population Analysis:   Lymphocytes: 93.7%   T-cells (39% of lymphoid cells) show a CD4/CD8 ratio of 3.3 without overt   phenotypic abnormality. NK-cells (1% of lymphoid cells) are unremarkable.    Mature B-cells (56% of lymphoid cells) include large forms based on   forward scattered pattern and show: CD5 neg, CD10+, CD11c+dim, CD19+,   CD20+ with surface lambda light chain restriction. Markers Performed: CD2, CD3, CD4, CD5, CD7, CD8, CD10, CD11c, CD19, CD20,   CD23, CD34, CD38, CD45, CD56, Kappa, Lambda (17 Markers)   The Technical Component Processing of this test was completed at   [facility], [address] [place] [address], [facility] / [id number] /   [phone] / Judith Brown # [id number]. Interpretation by Lyndsay Bhatti M.D. The   complete scanned report is available in Epic. CPT: X320568, Q4463247, P814138, [id number], [id number], [id number]   w2/2022   *Electronically Signed Out By Sonya Gambino MD*   ==========================================================================   * * *Gross Description* * *   Specimen #1, received fresh labeled with the patient's name,  and left   posterior cervical lymph node, consists of two paper towels containing a   1.7 x 1.5 x 0.5 cm aggregate of pale pink-tan, fleshy soft tissue   fragments. The specimen is handled according to the flow cytometry   protocol as follows: 7          Two air-dried touch prep slides - one Diff Quik stained, one   rapid-fixed in 95% alcohol   7          Representative sections in B plus fixative (1A)   7          One piece held in RPMI for possible flow cytometry analysis   7          Remaining tissue fixed in formalin for permanents (1B)   Dr. Dimitry Gambino will determine if flow cytometry is necessary. 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 F who presents as an inpatient admission for Cycle #4 DA-R-EPOCH systemic therapy. Patient overall reports feeling stable. She dusty any recurrence of the lesion at her scalp. She denies any significant fatigue. Denies any fevers. She has had some pains in her arms and legs. She denies any shortness of breath. She denies any swelling. Denies any thrush. She reports that she would like an as needed antifungal prescription upon discharge since she seems to develop thrush shortly thereafter.       Past Medical History:   Diagnosis Date    Adverse effect of anesthesia     PHLEGM IN THROAT POST OP & NEEDED X2 BREATHING TREATMENTS    Anxiety     COPD (chronic obstructive pulmonary disease) ([facility]) 2022    emphysema    Depression     GERD (gastroesophageal reflux disease)     Hernia, femoral     since childhood    Hypertension     Joint pain     Nausea & vomiting     TMJ arthritis        Current Facility-Administered Medications:     riTUXimab-pvvr (RUXIENCE) 705 mg in 0.9% sodium chloride 250 mL RAPID infusion, 375 mg/m2 (Treatment Plan Recorded), IntraVENous, ONCE TITR, Robbi Sanchez MD, 705 mg at 09/12/22 1007    ALPRAZolam (XANAX) tablet 1 mg, 1 mg, Oral, BID PRN, Schoeneweis, Ann, NP, 1 mg at 09/12/22 0707    amLODIPine (NORVASC) tablet 5 mg, 5 mg, Oral, DAILY, Schoeneweis, Ann, NP, 5 mg at 09/12/22 0318    FLUoxetine (PROzac) capsule 40 mg, 40 mg, Oral, DAILY, Schoeneweis, Ann, NP, 40 mg at 09/12/22 0807    morphine ER (MS CONTIN) tablet 15 mg, 15 mg, Oral, Q12H, Schoeneweis, Ann, NP, 15 mg at 09/12/22 0807    morphine IR (MS IR) tablet 15 mg, 15 mg, Oral, Q6H PRN, Schoeneweis, Ann, NP    trimethoprim-sulfamethoxazole (BACTRIM DS, SEPTRA DS) 160-800 mg per tablet 1 Tablet, 1 Tablet, Oral, Q MON, WED & FRI, Schoeneweis, Ann, NP, 1 Tablet at 09/12/22 0807    sodium chloride (NS) flush 5-40 mL, 5-40 mL, IntraVENous, Q8H, Schoeneweis, Ann, NP, 10 mL at 09/12/22 0705    sodium chloride (NS) flush 5-40 mL, 5-40 mL, IntraVENous, PRN, Schoeneweis, Ann, NP    acetaminophen (TYLENOL) tablet 650 mg, 650 mg, Oral, Q6H PRN **OR** acetaminophen (TYLENOL) suppository 650 mg, 650 mg, Rectal, Q6H PRN, Schoeneweis, Ann, NP    polyethylene glycol (MIRALAX) packet 17 g, 17 g, Oral, DAILY PRN, Schoeneweis, Ann, NP    nicotine (NICODERM CQ) 14 mg/24 hr patch 1 Patch, 1 Patch, TransDERmal, DAILY, Schoeneweis, Ann, NP, 1 Patch at 09/12/22 0807    zolpidem (AMBIEN) tablet 5 mg, 5 mg, Oral, QHS PRN, Schoeneweis, Ann, NP    hydrALAZINE (APRESOLINE) 20 mg/mL injection 10 mg, 10 mg, IntraVENous, Q6H PRN, Schoeneweis, Ann, NP    lisinopriL (PRINIVIL, ZESTRIL) tablet 20 mg, 20 mg, Oral, DAILY, Schoeneweis, Ann, NP, 20 mg at 09/12/22 0807    pantoprazole (PROTONIX) tablet 40 mg, 40 mg, Oral, ACB, Barbara Sharma MD, 40 mg at 09/12/22 0838    0.9% sodium chloride infusion, 45 mL/hr, IntraVENous, CONTINUOUS, Barbara Sharma MD, Last Rate: 45 mL/hr at 09/12/22 0838, 45 mL/hr at 09/12/22 0838    ondansetron (ZOFRAN) injection 8 mg, 8 mg, IntraVENous, Q8H, Barbara Sharma MD, 8 mg at 09/12/22 0919    LORazepam (ATIVAN) 2 mg/mL injection 0.5 mg, 0.5 mg, IntraVENous, Q6H PRN, Barbara Sharma MD    etoposide (VEPESID) 75.2 mg in 0.9% sodium chloride 250 mL, overfill volume 25 mL chemo infusion, 40 mg/m2 (Treatment Plan Recorded), IntraVENous, Q24H, Barbara Sharma MD    vinCRIStine (VINCASAR PFS) 0.8 mg, DOXOrubicin (ADRIAMYCIN) 15 mg in 0.9% sodium chloride 250 mL, overfill volume 25 mL chemo infusion, , IntraVENous, Q24H, Barbara Sharma MD    [START ON 9/16/2022] cyclophosphamide (CYTOXAN) 1,128 mg in 0.9% sodium chloride 250 mL, overfill volume 25 mL chemo infusion, 600 mg/m2 (Treatment Plan Recorded), IntraVENous, ONCE, Barbara Sharma MD    OLANZapine (ZyPREXA) tablet 5 mg, 5 mg, Oral, QPM, Barbara Sharma MD    sodium chloride (NS) flush 10 mL, 10 mL, InterCATHeter, PRN, Barbara Sharma MD    heparin (porcine) pf 300-500 Units, 300-500 Units, InterCATHeter, PRN, Barbara Sharma MD    meperidine (DEMEROL) injection 25 mg, 25 mg, IntraVENous, Q20MIN PRN, Barbara Sharma MD    sodium chloride 0.9 % bolus infusion 500 mL, 500 mL, IntraVENous, ONCE PRN, Barbara Sharma MD    diphenhydrAMINE (BENADRYL) injection 25 mg, 25 mg, IntraVENous, PRN, Mulugeta Fabian Talley MD    acetaminophen (TYLENOL) tablet 650 mg, 650 mg, Oral, PRN, Iza Carpenter MD    [START ON 9/15/2022] meperidine (DEMEROL) injection 25 mg, 25 mg, IntraVENous, Q20MIN PRN, Iza Carpenter MD    ondansetron [facility]) injection 8 mg, 8 mg, IntraVENous, PRN, Iza Carpenter MD    sodium chloride 0.9 % bolus infusion 500 mL, 500 mL, IntraVENous, ONCE PRN, Iza Carpenter MD    EPINEPHrine HCl (PF) (ADRENALIN) 1 mg/mL (1 mL) injection 0.3 mg, 0.3 mg, SubCUTAneous, PRN, Iza Carpenter MD    hydrocortisone Sod Succ (PF) (SOLU-CORTEF) injection 100 mg, 100 mg, IntraVENous, PRN, Iza Carpenter MD    diphenhydrAMINE (BENADRYL) injection 50 mg, 50 mg, IntraVENous, PRN, Iza Carpenter MD    albuterol (PROVENTIL VENTOLIN) nebulizer solution 2.5 mg, 2.5 mg, Nebulization, PRN, Iza Carpenter MD    predniSONE (DELTASONE) tablet 115 mg, 115 mg, Oral, BID WITH MEALS, Iza Carpenter MD, 115 mg at 09/12/22 5763    Allergies   Allergen Reactions    Oxycodone Nausea Only     Review of Systems provided by:patient  General: denies fever, denies fatigue, and denies chills  Eyes: denies any acute vision loss and denies any eye pain  HEENT: denies epistaxis and denies trouble swallowing  Cardio: denies any chest pain and denies any leg swelling  Resp: denies any shortness of breath. denies any hemoptysis. Abdomen: denies any abdominal pain, denies any nausea, denies any vomiting, and denies any diarrhea  MSK: denies any myalgias and reports arthralgias  Skin: denies any rash and denies any itching  Lymph: denies any lymph node enlargement and denies any lymph node tenderness  Neuro: denies any headache and denies any tremor  : Denies any dysuria. Denies any hematuria.   Psych: denies depression and denies anxiety    Objective:      Visit Vitals  BP (!) 99/57 (BP 1 Location: Right upper arm, BP Patient Position: Lying left side)   Pulse 73   Temp 97.8 °F (36.6 °C)   Resp 14   Ht 5' 3\" (1.6 m) Wt 176 lb 5.9 oz (80 kg)   SpO2 92%   BMI 31.24 kg/m²     ECO- Restricted in physically strenuous activity but ambulatory and able to carry out work of a light or sedentary nature, e.g., light house work, office work. Physical Exam  Constitutional: No acute distress. and Non-toxic appearance. HENT: Normocephalic and atraumatic head. Eyes: Normal Conjunctivae. Anicteric sclerae. Cardiovascular: S1,S2 auscultated. No pitting edema. No gallops auscultated. Pulmonary: Normal Respiratory Effort. No wheezing. No rhonchi. No rales. Abdominal: Normal bowel sounds. Soft Abdomen to palpation. No abdominal tenderness. Skin: No jaundice. No rash. Musculoskeletal: No muscle pain on palpation. No temporal muscle wasting on inspection. Inspected frontal scalp without any apparent bone lesion. Neurological: Alert and oriented. No tremor on inspection. Psychiatric: mood normal. normal speech rate normal affect. Results:      I personally reviewed Epic EHR labs/results below:   Lab Results   Component Value Date/Time    WBC 5.9 2022 07:08 AM    HGB 12.4 2022 07:08 AM    HCT 37.9 2022 07:08 AM    PLATELET 337  07:08 AM    MCV 95.7 2022 07:08 AM     Lab Results   Component Value Date/Time    Sodium 140 2022 07:08 AM    Potassium 3.9 2022 07:08 AM    Chloride 108 2022 07:08 AM    CO2 27 2022 07:08 AM    Anion gap 5 2022 07:08 AM    Glucose 107 (H) 2022 07:08 AM    BUN 7 2022 07:08 AM    Creatinine 0.63 2022 07:08 AM    BUN/Creatinine ratio 11 (L) 2022 07:08 AM    GFR est AA >60 2022 07:08 AM    GFR est non-AA >60 2022 07:08 AM    Calcium 8.6 2022 07:08 AM    Bilirubin, total 0.2 2022 07:08 AM    Alk.  phosphatase 74 2022 07:08 AM    Protein, total 5.7 (L) 2022 07:08 AM    Albumin 3.2 (L) 2022 07:08 AM    Globulin 2.5 2022 07:08 AM    A-G Ratio 1.3 2022 07:08 AM    ALT (SGPT) 20 2022 07:08 AM    AST (SGOT) 14 (L) 2022 07:08 AM     IR INSERT TUNL CVC W PORT OVER 5 YEARS    Result Date: 2022  PATIENT NAME: Mike Bolden                                            AGE, : 78 years, 1955 MRN: [id number] DATE: 2022 11:50 AM PROCEDURE(S): Single lumen Portacath placement SUPERVISING PHYSICIAN: Fransisco Felder MD OPERATING PROVIDER: Robyne Goldberg, PA-C CLINICAL HISTORY: Mediport placement requested. B-cell lymphoma Patient was evaluated and felt to be an appropriate candidate for conscious sedation. Moderate intravenous conscious sedation was supervised by Fransisco Felder MD. The patient was independently monitored by a registered nurse assigned to the Department of Radiology using automated blood pressure, EKG, and pulse oximetry. The detail conscious sedation record is stored in the hospital information system. MEDICATION: Antibiotic: Ancef 2g Versed: 6 mg Fentanyl: 100 mcg Intraprocedure time: 40 minutes FINDINGS: After informed consent was obtained, and the risks and benefits of the procedure including infection and bleeding were discussed, the patient was brought to the angiographic suite and placed on the angiographic table in a supine position. The right neck and chest were prepped and draped in maximum sterile barrier technique which includes: cap and mask, sterile gown, sterile gloves, and sterile body drape. The ultrasound transducer was prepped using sterile ultrasound technique which includes: sterile gel and probe cover. After adequate conscious sedation was achieved utilizing Fentanyl and Versed, the skin over the internal jugular vein was anesthetized with 1% lidocaine. Sonographic images of the right neck demonstrated a patent and compressible right internal jugular vein. The vein was accessed under direct sonographic guidance using a 21 gauge micropuncture set. An ultrasonographic image was saved in the record.  A 0.035 J wire was advanced through the sheath into the inferior vena cava under fluoroscopic guidance. A peel-away sheath was advanced over the wire into the superior vena cava under fluoroscopic guidance. Attention was directed to the right chest, and after adequate local anesthesia, a horizontal incision was made. A subcutaneous pocket was then formed using a combination of sharp and blunt dissection. A tunnel was then formed between the pocket and the venotomy site and the catheter passed through the tunnel and through the peel-away sheath. The peel-away sheath was removed and the catheter tip was positioned at the right atrium. The catheter was cut at the pocket access point and the portacath was attached to the catheter and placed snugly in the pocket. The pocket was closed primarily with 2-0 Vicryl suture and Dermabond. The neck access site was closed with 2-0 Vicryl suture and Dermabond. The port was accessed and flushed with heparin solution. The patient tolerated the procedure well. Fluoroscopy demonstrates the catheter tip to be within the right atrium. RADIATION: Fluoroscopy time: 0.3 minutes Air kerma: 5.1 mGy     Technically successful placement of a single lumen port with the catheter tip in the right atrium. Catheter is ready for immediate use. CT NECK SOFT TISSUE W CONT    Result Date: 6/28/2022  INDICATION: Diffuse large b-cell lymphoma, lymph nodes of head, face, and neck EXAM:  CT NECK, CHEST, ABDOMEN, PELVIS WITH CONTRAST COMPARISON: PET scan May 4, 2022 TECHNIQUE:  CT imaging of the neck, chest, abdomen and pelvis was performed after the uneventful intravenous administration of IV contrast. Coronal and sagittal reconstructions were generated. CT dose reduction was achieved through use of a standardized protocol tailored for this examination and automatic exposure control for dose modulation. FINDINGS: NASOPHARYNX: Normal. SUPRAHYOID NECK: Normal oropharynx, oral cavity, parapharyngeal space, and retropharyngeal space. INFRAHYOID NECK: Normal larynx, hypopharynx and supraglottis. PAROTID GLANDS: Normal. SUBMANDIBULAR GLANDS: Absent left submandibular gland. THYROID GLAND: Normal. LYMPH NODES: Interval decrease in size of previous enlarged left level 2 lymph nodes, the more anterior of which decreased from 1.2 x 1.1 cm to 0.7 x 0.6 cm, and the posterior node decreased from 1.8 x 1.2 cm to 1.1 x 0.7 cm. Scattered subcentimeter lymph nodes throughout the neck. No new or enlarging nodes. OSSEOUS STRUCTURES: No destructive bone lesion. Prior ACDF C5-7. ADDITIONAL COMMENTS: N/A. MEDIASTINUM/ELIZABETH: Scattered nonenlarged lymph nodes. Small hiatal hernia. HEART/PERICARDIUM: Unremarkable. LUNGS/PLEURA: No suspicious nodule. No pulmonary edema or pleural effusion. Tree-in-bud opacities in the lateral right lower lobe, and mild ill-defined nodular groundglass opacities right apex. BONES: No destructive bone lesion. ADDITIONAL COMMENTS: Unchanged round 9 mm density lateral right breast LIVER: No mass or biliary dilatation. GALLBLADDER: Surgically absent. SPLEEN: No enlargement or lesion. PANCREAS: No mass or ductal dilatation. ADRENALS: No mass. KIDNEYS: No mass, calculus, or hydronephrosis. GI TRACT: No bowel obstruction or wall thickening PERITONEUM: No free air or free fluid. APPENDIX: Not clearly visualized. RETROPERITONEUM: Interval decrease in size of previously enlarged lymph nodes: Gastrohepatic ligament lymph node decreased from 4.2 x 1.2 cm to 2.2 x 0.9 cm; aortocaval lymph node at the level of the right kidney, previously 1.2 cm, now 0.6 cm. No new or enlarging lymph nodes. ADDITIONAL COMMENTS: Right paraumbilical ventral hernia containing mesenteric fat and bowel loops similar to prior study. URINARY BLADDER: No mass or calculus. LYMPH NODES: Decreased size of left inguinal lymph nodes from 1.6 cm to 0.8 cm. REPRODUCTIVE ORGANS: Prior hysterectomy. FREE FLUID: None. BONES: No destructive bone lesion.  ADDITIONAL COMMENTS: N/A.     1. Interval decrease in size of previously enlarged lymph nodes in the neck, abdomen, and pelvis. No new or enlarging lymph nodes. 2. New tree-in-bud opacities and groundglass opacities in the right lower and upper lobes may be infectious/inflammatory. May be assessed on follow-up examinations. 23X     CT CHEST ABD PELV W CONT    Result Date: 6/28/2022  INDICATION: Diffuse large b-cell lymphoma, lymph nodes of head, face, and neck EXAM:  CT NECK, CHEST, ABDOMEN, PELVIS WITH CONTRAST COMPARISON: PET scan May 4, 2022 TECHNIQUE:  CT imaging of the neck, chest, abdomen and pelvis was performed after the uneventful intravenous administration of IV contrast. Coronal and sagittal reconstructions were generated. CT dose reduction was achieved through use of a standardized protocol tailored for this examination and automatic exposure control for dose modulation. FINDINGS: NASOPHARYNX: Normal. SUPRAHYOID NECK: Normal oropharynx, oral cavity, parapharyngeal space, and retropharyngeal space. INFRAHYOID NECK: Normal larynx, hypopharynx and supraglottis. PAROTID GLANDS: Normal. SUBMANDIBULAR GLANDS: Absent left submandibular gland. THYROID GLAND: Normal. LYMPH NODES: Interval decrease in size of previous enlarged left level 2 lymph nodes, the more anterior of which decreased from 1.2 x 1.1 cm to 0.7 x 0.6 cm, and the posterior node decreased from 1.8 x 1.2 cm to 1.1 x 0.7 cm. Scattered subcentimeter lymph nodes throughout the neck. No new or enlarging nodes. OSSEOUS STRUCTURES: No destructive bone lesion. Prior ACDF C5-7. ADDITIONAL COMMENTS: N/A. MEDIASTINUM/ELIZABETH: Scattered nonenlarged lymph nodes. Small hiatal hernia. HEART/PERICARDIUM: Unremarkable. LUNGS/PLEURA: No suspicious nodule. No pulmonary edema or pleural effusion. Tree-in-bud opacities in the lateral right lower lobe, and mild ill-defined nodular groundglass opacities right apex. BONES: No destructive bone lesion.  ADDITIONAL COMMENTS: Unchanged round 9 mm density lateral right breast LIVER: No mass or biliary dilatation. GALLBLADDER: Surgically absent. SPLEEN: No enlargement or lesion. PANCREAS: No mass or ductal dilatation. ADRENALS: No mass. KIDNEYS: No mass, calculus, or hydronephrosis. GI TRACT: No bowel obstruction or wall thickening PERITONEUM: No free air or free fluid. APPENDIX: Not clearly visualized. RETROPERITONEUM: Interval decrease in size of previously enlarged lymph nodes: Gastrohepatic ligament lymph node decreased from 4.2 x 1.2 cm to 2.2 x 0.9 cm; aortocaval lymph node at the level of the right kidney, previously 1.2 cm, now 0.6 cm. No new or enlarging lymph nodes. ADDITIONAL COMMENTS: Right paraumbilical ventral hernia containing mesenteric fat and bowel loops similar to prior study. URINARY BLADDER: No mass or calculus. LYMPH NODES: Decreased size of left inguinal lymph nodes from 1.6 cm to 0.8 cm. REPRODUCTIVE ORGANS: Prior hysterectomy. FREE FLUID: None. BONES: No destructive bone lesion. ADDITIONAL COMMENTS: N/A.     1. Interval decrease in size of previously enlarged lymph nodes in the neck, abdomen, and pelvis. No new or enlarging lymph nodes. 2. New tree-in-bud opacities and groundglass opacities in the right lower and upper lobes may be infectious/inflammatory. May be assessed on follow-up examinations. 23X     PET/CT TUMOR IMAGE [address] [place] W (INI)    Addendum Date: 5/5/2022    Addendum: There is increased metabolic activity in a small left frontal scalp lesion with SUV of 6.9. Result Date: 5/5/2022  PET/CT SCAN PROCEDURE: Following IV injection of 10.9 mCi 18 Fluoro 2 deoxyglucose (FDG) and a standard uptake delay, PET imaging is performed skull vertex to toes and axial, sagittal and coronal images were acquired. Unenhanced CT is obtained for anatomic localization, and attenuation correction of the PET scan. Patient preprocedure blood glucose level: 98 mg/dL. CORRELATIVE IMAGING STUDIES: None.  COMPARISON PET: HISTORY: The study is requested for initial staging. Lymphoma. FINDINGS: HEAD/NECK: There is increased metabolic activity 1.2 cm left level 2 lymph node. There is increased metabolic activity in a 1.2 cm left level 5 lymph node with SUV of 7.7. CHEST: No foci of abnormal hypermetabolism. Low-grade activity in mediastinal and hilar lymph nodes is nonspecific but may be reactive. ABDOMEN/PELVIS: There is increased metabolic activity in a 2.6 cm hepatogastric ligament lymph node with SUV of 5.7. There is increased metabolic activity in a 1.2 cm right retroperitoneal lymph node with SUV of 6. There is increased metabolic activity in a left retroperitoneal lymph node with SUV of 8 There is slight haziness in the mesentery with small lymph nodes with slight increased metabolic activity of 3.6. There is small focus of increased metabolic activity anterior to the left sacrum and anterior to the right sacrum with SUV of 7 suspicious for lymph nodes. There is increased metabolic activity in the right renal mass posterior cortex with SUV of 6.9. There is increased metabolic activity in a left inguinal lymph node with SUV of 8 There is a right abdominal wall hernia SKELETON: There is increased metabolic activity and left humeral head, right iliac bone, femurs and proximal right tibia. . There is slight increased metabolic activity right humeral head. There is increased metabolic activity in 2 lymph nodes in the left neck, in the hepatogastric ligament and retroperitoneal adenopathy, mesenteric lymph nodes and left inguinal adenopathy suspicious for lymphoma. There is increased metabolic activity in a mass posterior right renal cortex nonspecific but may represent lymphoma. There is increased bony activity as described above suspicious for lymphoma. . 23X        Assessment and Recommendations: Active Hospital Problems    Diagnosis    Thrush  -recurrent problem without any acute complaints; patient requests fluconazole Rx on hospital discharge.       Diffuse large B cell lymphoma ([facility] [address]  -patient will need PET/CT as outpatient prior to Cycle #5. Encounter for antineoplastic chemotherapy  -proceed with Cycle #4 DA-R-EPOCH without any dose increase from Cycle #3. She is at dose level reduction 1 on etoposide,doxorubicin,cyclophosphamide. Tobacco dependence  -reports nicotine patch helps her while hospitalized. Continue froward with use. Follow-Up:  Will follow daily.     Tex Argueta MD  Hematology/Medical Oncology Provider  [facility]  P: [phone]    Signed By:   Sierra Mejia MD

## 2022-09-12 NOTE — PROGRESS NOTES
Bedside shift change report given to Melania Heredia (oncoming nurse) by Asha Canas (offgoing nurse). Report included the following information SBAR, Kardex, Intake/Output, MAR, and Recent Results.

## 2022-09-12 NOTE — PROGRESS NOTES
9/12/2022  Reason for Readmission:     Diffuse large B cell lymphoma  Patient is a planned readmission for chemotherapy. RUR Score/Risk Level:     17% yellow/moderate    PCP: First and Last name:  Chary Ramos MD   Name of Practice:    Are you a current patient: Yes/No:  Yes   Approximate date of last visit:    Can you participate in a virtual visit with your PCP:     Is a Care Conference indicated:   Not at this time    Did you attend your follow up appointment (s): If not, why not:  Yes       Resources/supports as identified by patient/family:   Patient's family is extremely supportive       Top Challenges facing patient (as identified by patient/family and CM): Finances/Medication cost?     Patient has insurance coverage and uses [facility] for prescriptions  Transportation      Patient can drive, family can also drive and will transport at discharge  Support system or lack thereof? Family very supportive  Living arrangements? Patient lives with her son   Self-care/ADLs/Cognition? Typically can do self care and is independent with ADLs. Good cognition       Current Advanced Directive/Advance Care Plan:  Full code           Plan for utilizing home health:   Patient has had [facility] in the past.              Transition of Care Plan:    Based on readmission, the patient's previous Plan of Care   has been evaluated and/or modified. The current Transition of Care Plan is:         As patient is a planned readmission, assessment completed primarily via chart review. Patient lives with family and is typically independent with ADLs and drives at baseline. She is currently open to [facility] but per liaison looks like she hasn't had a visit in a while--will place resumption orders for her. No noted DME. Patient's emergency contacts are her daughter Tracie Hope ([phone]) and her son Darcy Mahmood ([phone]). Family will transport at discharge.  Patient finishes this round of chemotherapy Friday 9/16 and will discharge afterward. Will continue to follow and assist with discharge planning. Transition of Care Plan   Continue medical management/treatment  Home with family assistance and [facility] at discharge  Family will transport   Follow up outpatient as indicated  CM will continue to follow    Care Management Interventions  PCP Verified by CM: Yes (Dr Kamran Cody)  Mode of Transport at Discharge:  Other (see comment) (family)  Transition of Care Consult (CM Consult): Discharge Planning, [address]: Yes  Support Systems: Child(bethany), Friend/Neighbor  Confirm Follow Up Transport: Family  Discharge Location  Patient Expects to be Discharged to[de-identified] Home with home health    Readmission Assessment  Number of days since last admission?: 8-30 days  Previous disposition: Home with Home Health  What was the patient's/caregiver's perception as to why they think they needed to return back to the hospital?: Other (Comment) (patient is a planned readmission)  Did you visit your Primary Care Physician after you left the hospital, before you returned this time?: No  Did you see a specialist, such as Cardiac, Pulmonary, Orthopedic Physician, etc. after you left the hospital?: Yes  Who advised the patient to return to the hospital?: Other (Comment), Physician  Does the patient report anything that got in the way of taking their medications?: No  In our efforts to provide the best possible care to you and others like you, can you think of anything that we could have done to help you after you left the hospital the first time, so that you might not have needed to return so soon?: Other (Comment) (n/a)    CHIVO Langley

## 2022-09-12 NOTE — PROGRESS NOTES
Pt requiring 2LPM O2 via NC to stay >90%. Satting in low 80s on RA. Denies feeling sob. No increased WOB, but expiratory wheezing noted. MD notified via perfect serve of need for O2 and wheezing.

## 2022-09-12 NOTE — PROGRESS NOTES
Heme/Onc  Pt requiring O2 @ 2L/min to maintain sats in the 90s. Pt denies SOB,  fevers or cough. Continues to smoke at least 5 cigarettes per day at home. No wheezing or rhonchi noted but diminished ? on left. Admission labs stable. Will order CXR PA/LATto further eval.       Addendum:  CXR returned normal  Will add CTA Chest to eval for PE and add rapid COVID testing. Reviewed with pt and nursing. Dr Kaela Rivera in to eval pt.      Graeme Calix NP

## 2022-09-13 ENCOUNTER — HOME HEALTH ADMISSION (OUTPATIENT)
Dept: HOME HEALTH SERVICES | Facility: HOME HEALTH | Age: 67
End: 2022-09-13

## 2022-09-13 PROBLEM — E44.1 MILD MALNUTRITION (HCC): Status: ACTIVE | Noted: 2022-01-01

## 2022-09-13 PROBLEM — J96.01 ACUTE RESPIRATORY FAILURE WITH HYPOXIA (HCC): Status: ACTIVE | Noted: 2022-01-01

## 2022-09-13 LAB
ALBUMIN SERPL-MCNC: 3.1 G/DL (ref 3.5–5)
ALBUMIN/GLOB SERPL: 1.2 {RATIO} (ref 1.1–2.2)
ALP SERPL-CCNC: 69 U/L (ref 45–117)
ALT SERPL-CCNC: 17 U/L (ref 12–78)
ANION GAP SERPL CALC-SCNC: 2 MMOL/L (ref 5–15)
AST SERPL-CCNC: 12 U/L (ref 15–37)
BASOPHILS # BLD: 0 K/UL (ref 0–0.1)
BASOPHILS NFR BLD: 0 % (ref 0–1)
BILIRUB SERPL-MCNC: 0.2 MG/DL (ref 0.2–1)
BUN SERPL-MCNC: 7 MG/DL (ref 6–20)
BUN/CREAT SERPL: 11 (ref 12–20)
CALCIUM SERPL-MCNC: 8.5 MG/DL (ref 8.5–10.1)
CHLORIDE SERPL-SCNC: 112 MMOL/L (ref 97–108)
CO2 SERPL-SCNC: 28 MMOL/L (ref 21–32)
CREAT SERPL-MCNC: 0.66 MG/DL (ref 0.55–1.02)
DIFFERENTIAL METHOD BLD: ABNORMAL
EOSINOPHIL # BLD: 0 K/UL (ref 0–0.4)
EOSINOPHIL NFR BLD: 0 % (ref 0–7)
ERYTHROCYTE [DISTWIDTH] IN BLOOD BY AUTOMATED COUNT: 14.3 % (ref 11.5–14.5)
GLOBULIN SER CALC-MCNC: 2.5 G/DL (ref 2–4)
GLUCOSE SERPL-MCNC: 148 MG/DL (ref 65–100)
HCT VFR BLD AUTO: 38.7 % (ref 35–47)
HGB BLD-MCNC: 12.2 G/DL (ref 11.5–16)
IMM GRANULOCYTES # BLD AUTO: 0.1 K/UL (ref 0–0.04)
IMM GRANULOCYTES NFR BLD AUTO: 1 % (ref 0–0.5)
LYMPHOCYTES # BLD: 0.4 K/UL (ref 0.8–3.5)
LYMPHOCYTES NFR BLD: 4 % (ref 12–49)
MCH RBC QN AUTO: 31.4 PG (ref 26–34)
MCHC RBC AUTO-ENTMCNC: 31.5 G/DL (ref 30–36.5)
MCV RBC AUTO: 99.7 FL (ref 80–99)
MONOCYTES # BLD: 0.2 K/UL (ref 0–1)
MONOCYTES NFR BLD: 2 % (ref 5–13)
NEUTS SEG # BLD: 8.3 K/UL (ref 1.8–8)
NEUTS SEG NFR BLD: 93 % (ref 32–75)
NRBC # BLD: 0 K/UL (ref 0–0.01)
NRBC BLD-RTO: 0 PER 100 WBC
PLATELET # BLD AUTO: 352 K/UL (ref 150–400)
PMV BLD AUTO: 9.4 FL (ref 8.9–12.9)
POTASSIUM SERPL-SCNC: 4.4 MMOL/L (ref 3.5–5.1)
PROT SERPL-MCNC: 5.6 G/DL (ref 6.4–8.2)
RBC # BLD AUTO: 3.88 M/UL (ref 3.8–5.2)
RBC MORPH BLD: ABNORMAL
SODIUM SERPL-SCNC: 142 MMOL/L (ref 136–145)
WBC # BLD AUTO: 9 K/UL (ref 3.6–11)

## 2022-09-13 PROCEDURE — 99233 SBSQ HOSP IP/OBS HIGH 50: CPT | Performed by: INTERNAL MEDICINE

## 2022-09-13 PROCEDURE — 74011250636 HC RX REV CODE- 250/636: Performed by: NURSE PRACTITIONER

## 2022-09-13 PROCEDURE — 80053 COMPREHEN METABOLIC PANEL: CPT

## 2022-09-13 PROCEDURE — 65270000029 HC RM PRIVATE

## 2022-09-13 PROCEDURE — 74011250636 HC RX REV CODE- 250/636: Performed by: INTERNAL MEDICINE

## 2022-09-13 PROCEDURE — 77030027138 HC INCENT SPIROMETER -A

## 2022-09-13 PROCEDURE — 74011250637 HC RX REV CODE- 250/637: Performed by: NURSE PRACTITIONER

## 2022-09-13 PROCEDURE — 74011250637 HC RX REV CODE- 250/637: Performed by: INTERNAL MEDICINE

## 2022-09-13 PROCEDURE — 3E04305 INTRODUCTION OF OTHER ANTINEOPLASTIC INTO CENTRAL VEIN, PERCUTANEOUS APPROACH: ICD-10-PCS | Performed by: INTERNAL MEDICINE

## 2022-09-13 PROCEDURE — 74011636637 HC RX REV CODE- 636/637: Performed by: INTERNAL MEDICINE

## 2022-09-13 PROCEDURE — 74011000250 HC RX REV CODE- 250: Performed by: NURSE PRACTITIONER

## 2022-09-13 PROCEDURE — 85025 COMPLETE CBC W/AUTO DIFF WBC: CPT

## 2022-09-13 PROCEDURE — 36415 COLL VENOUS BLD VENIPUNCTURE: CPT

## 2022-09-13 RX ORDER — ENOXAPARIN SODIUM 100 MG/ML
40 INJECTION SUBCUTANEOUS EVERY 24 HOURS
Status: DISCONTINUED | OUTPATIENT
Start: 2022-09-13 | End: 2022-09-16 | Stop reason: HOSPADM

## 2022-09-13 RX ADMIN — PANTOPRAZOLE SODIUM 40 MG: 40 TABLET, DELAYED RELEASE ORAL at 06:34

## 2022-09-13 RX ADMIN — ZOLPIDEM TARTRATE 5 MG: 5 TABLET ORAL at 20:38

## 2022-09-13 RX ADMIN — FLUOXETINE 40 MG: 20 CAPSULE ORAL at 08:17

## 2022-09-13 RX ADMIN — SODIUM CHLORIDE: 9 INJECTION, SOLUTION INTRAVENOUS at 12:01

## 2022-09-13 RX ADMIN — Medication 10 ML: at 15:26

## 2022-09-13 RX ADMIN — ENOXAPARIN SODIUM 40 MG: 100 INJECTION SUBCUTANEOUS at 15:25

## 2022-09-13 RX ADMIN — MORPHINE SULFATE 15 MG: 15 TABLET, FILM COATED, EXTENDED RELEASE ORAL at 20:38

## 2022-09-13 RX ADMIN — Medication 10 ML: at 06:34

## 2022-09-13 RX ADMIN — ONDANSETRON 8 MG: 2 INJECTION INTRAMUSCULAR; INTRAVENOUS at 17:24

## 2022-09-13 RX ADMIN — LISINOPRIL 20 MG: 20 TABLET ORAL at 08:17

## 2022-09-13 RX ADMIN — OLANZAPINE 5 MG: 5 TABLET, FILM COATED ORAL at 17:24

## 2022-09-13 RX ADMIN — Medication 10 ML: at 22:00

## 2022-09-13 RX ADMIN — ALPRAZOLAM 1 MG: 0.5 TABLET ORAL at 15:31

## 2022-09-13 RX ADMIN — FUROSEMIDE 20 MG: 20 TABLET ORAL at 08:17

## 2022-09-13 RX ADMIN — PREDNISONE 115 MG: 5 TABLET ORAL at 17:24

## 2022-09-13 RX ADMIN — MORPHINE SULFATE 15 MG: 15 TABLET, FILM COATED, EXTENDED RELEASE ORAL at 08:17

## 2022-09-13 RX ADMIN — AMLODIPINE BESYLATE 5 MG: 5 TABLET ORAL at 08:17

## 2022-09-13 RX ADMIN — ALPRAZOLAM 1 MG: 0.5 TABLET ORAL at 20:37

## 2022-09-13 RX ADMIN — ONDANSETRON 8 MG: 2 INJECTION INTRAMUSCULAR; INTRAVENOUS at 11:11

## 2022-09-13 RX ADMIN — PREDNISONE 115 MG: 5 TABLET ORAL at 08:17

## 2022-09-13 RX ADMIN — ONDANSETRON 8 MG: 2 INJECTION INTRAMUSCULAR; INTRAVENOUS at 00:40

## 2022-09-13 RX ADMIN — MORPHINE SULFATE 15 MG: 15 TABLET ORAL at 11:19

## 2022-09-13 RX ADMIN — SODIUM CHLORIDE 75.2 MG: 9 INJECTION, SOLUTION INTRAVENOUS at 12:01

## 2022-09-13 NOTE — PROGRESS NOTES
Bedside shift change report given to [address] (oncoming nurse) by Scarlett Gannon (offgoing nurse). Report included the following information SBAR, Kardex, MAR, Recent Results, and Med Rec Status.

## 2022-09-13 NOTE — PROGRESS NOTES
[facility] [place] at [address], [address] [facility]  [facility]: [phone]  F: [phone] Patient ID  Name: Mandy Johnson  YOB: 1955  MRN: [id number]  Referring Provider:   No referring provider defined for this encounter. 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 This summary highlights focused aspects of patient's ongoing care and may have a recurring section in notes with either updates or remain unchanged as a longitudinal care summary. -------------------------------------------------------------------------------------------------------------------------------------------------------------------------------------------------------  DIAGNOSIS:  Diffuse Large B-Cell Lymphoma     ORIGINAL STAGING:  Stage IV     SITES OF DISEASE:  Left Cervical Lymph Node,Bone Disease, Stage IV    CURRENT TREATMENT:  C1,D1 on 5/11/22 DA-R-EPOCH  C2  C3 delayed due to appetite issues and patient anxiety to restart therapy; administered on 8/18/22  C4-start on 9/12/22     PRIOR TREATMENT:  n/a     GOALS OF CARE:  Curative Intent     PATHOLOGY:  Specimen #:[id number] Collect: 4/22/2022      ==========================================================================                    * * *SURGICAL PATHOLOGY REPORT* * *   ==========================================================================   * * *PROCEDURES/ADDENDA* * *   ADDENDUM   Date Ordered: 4/26/2022     Status: Signed Out   Date Complete: 4/26/2022     By: Carlito Chow MD   Date Reported: 4/26/2022   Addendum Diagnosis   Lymph node, left posterior cervical lymph node, excision:   In situ hybridization for Jesús-Barr viral RNA: Negative   CPT: 53478  Addendum Comment   * * *CLINICAL HISTORY* * * Cervical lymphadenopathy, rule out lymphoma   ==========================================================================   * * *FINAL PATHOLOGIC DIAGNOSIS* * *        Lymph node, left posterior cervical lymph node, excision:        Large B cell lymphoma. See comment. * * *Comment* * *   The histologic section has fragments of lymphoid tissue with diffuse and   focal follicular architecture. Diffuse areas are comprised of large,   atypical lymphocytes with centroblastic morphology and increased mitotic   activity. Immunohistochemical stains confirm the malignant cells are CD20   positive B cells that coexpress CD10 and BCL6 without MUM1. CD23   highlights rare follicular dendritic networks associated with lymphoid   follicles with germinal centers and express CD10 and BCL6 without BCL2. CyclinD1 and CD56 stains are negative. Concurrent flow cytometric analysis   confirms a clonal CD10 positive B-cell population comprised of medium to   large cells. The Ki-67 proliferation index is 30%. The overall findings favor the diagnosis of diffuse large B-cell lymphoma,   germinal center subtype. Molecular genetic studies are pending for further   characterization will be reported in an addendum. Flow cytometry:   Consistent with CD10-positive B-cell lymphoproliferative disorder. Comments: Flow cytometry shows monoclonal B-cells (36% of total cells)   with CD10 expression without CD5, consistent with a CD10-positive B-cell   lymphoproliferative disorder. The main differential diagnosis includes   follicular lymphoma, Burkitt lymphoma and large B-cell lymphoma. Please   correlate the result with morphologic findings and clinical information. Flow Differential (%) and Population Analysis:   Lymphocytes: 93.7%   T-cells (39% of lymphoid cells) show a CD4/CD8 ratio of 3.3 without overt   phenotypic abnormality. NK-cells (1% of lymphoid cells) are unremarkable.    Mature B-cells (56% of lymphoid cells) include large forms based on   forward scattered pattern and show: CD5 neg, CD10+, CD11c+dim, CD19+,   CD20+ with surface lambda light chain restriction. Markers Performed: CD2, CD3, CD4, CD5, CD7, CD8, CD10, CD11c, CD19, CD20,   CD23, CD34, CD38, CD45, CD56, Kappa, Lambda (17 Markers)   The Technical Component Processing of this test was completed at   [facility], [address] [place] [address], [place], [place] / [id number] /   [phone] / Lisbeth Jorgensen # [id number]. Interpretation by Juancarlos Flores M.D. The   complete scanned report is available in Epic. CPT: G1203868, J1390015, [id number], [id number], [id number], [id number]   j2/2022   *Electronically Signed Out By Sam Garcia. Sudhakar Herrmann MD*   ==========================================================================   * * *Gross Description* * *   Specimen #1, received fresh labeled with the patient's name,  and left   posterior cervical lymph node, consists of two paper towels containing a   1.7 x 1.5 x 0.5 cm aggregate of pale pink-tan, fleshy soft tissue   fragments. The specimen is handled according to the flow cytometry   protocol as follows: 7          Two air-dried touch prep slides - one Diff Quik stained, one   rapid-fixed in 95% alcohol   7          Representative sections in B plus fixative (1A)   7          One piece held in RPMI for possible flow cytometry analysis   7          Remaining tissue fixed in formalin for permanents (1B)   Dr. Sudhakar Herrmann will determine if flow cytometry is necessary. 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 F who presents as an inpatient admission for Cycle #4 DA-R-EPOCH systemic therapy. Patient overall reports feeling stable. She dusty any recurrence of the lesion at her scalp. She denies any significant fatigue. Denies any fevers. She has had some pains in her arms and legs. She denies any shortness of breath. She denies any swelling. Denies any thrush. She reports that she would like an as needed antifungal prescription upon discharge since she seems to develop thrush shortly thereafter. Interval History: Tolerating tx well. Denies any SOB/ N/V. Appetite remains good. Denies pain. Denies cough. Friend at bedside. Objective:      Visit Vitals  BP (!) 116/59 (BP 1 Location: Right upper arm, BP Patient Position: Sitting)   Pulse 75   Temp 98.1 °F (36.7 °C)   Resp 16   Ht 5' 3\" (1.6 m)   Wt 180 lb 1.9 oz (81.7 kg)   SpO2 95%   BMI 31.91 kg/m²     ECO- Restricted in physically strenuous activity but ambulatory and able to carry out work of a light or sedentary nature, e.g., light house work, office work. General: no distress  Respiratory: crackles left base; no wheezing/rhonchi noted; normal respiratory effort: O2 in use  CV: slight BLE peripheral edema  Skin: no rashes; no ecchymoses; no petechiae  Psych: alert, oriented, normal mood/affect     Results:        Lab Results   Component Value Date/Time    WBC 9.0 2022 03:32 AM    HGB 12.2 2022 03:32 AM    HCT 38.7 2022 03:32 AM    PLATELET 319  03:32 AM    MCV 99.7 (H) 2022 03:32 AM     Lab Results   Component Value Date/Time    Sodium 142 2022 03:32 AM    Potassium 4.4 2022 03:32 AM    Chloride 112 (H) 2022 03:32 AM    CO2 28 2022 03:32 AM    Anion gap 2 (L) 2022 03:32 AM    Glucose 148 (H) 2022 03:32 AM    BUN 7 2022 03:32 AM    Creatinine 0.66 2022 03:32 AM    BUN/Creatinine ratio 11 (L) 2022 03:32 AM    GFR est AA >60 2022 03:32 AM    GFR est non-AA >60 2022 03:32 AM    Calcium 8.5 2022 03:32 AM    Bilirubin, total 0.2 2022 03:32 AM    Alk.  phosphatase 69 2022 03:32 AM    Protein, total 5.6 (L) 2022 03:32 AM    Albumin 3.1 (L) 2022 03:32 AM    Globulin 2.5 2022 03:32 AM    A-G Ratio 1.2 2022 03:32 AM    ALT (SGPT) 17 2022 03:32 AM    AST (SGOT) 12 (L) 09/13/2022 03:32 AM     IR INSERT TUNL CVC W PORT OVER 5 YEARS    Result Date: 4/29/2022    Technically successful placement of a single lumen port with the catheter tip in the right atrium. Catheter is ready for immediate use. CT NECK SOFT TISSUE W CONT    Result Date: 6/28/2022    1. Interval decrease in size of previously enlarged lymph nodes in the neck, abdomen, and pelvis. No new or enlarging lymph nodes. 2. New tree-in-bud opacities and groundglass opacities in the right lower and upper lobes may be infectious/inflammatory. May be assessed on follow-up examinations. 23X     CT CHEST ABD PELV W CONT    Result Date: 6/28/2022      1. Interval decrease in size of previously enlarged lymph nodes in the neck, abdomen, and pelvis. No new or enlarging lymph nodes. 2. New tree-in-bud opacities and groundglass opacities in the right lower and upper lobes may be infectious/inflammatory. May be assessed on follow-up examinations. 23X     PET/CT TUMOR IMAGE [address] (INI)    Addendum Date: 5/5/2022    Addendum: There is increased metabolic activity in a small left frontal scalp lesion with SUV of 6.9. Result Date: 5/5/2022  PET/CT SCAN PROCEDURE:  There is increased metabolic activity in 2 lymph nodes in the left neck, in the hepatogastric ligament and retroperitoneal adenopathy, mesenteric lymph nodes and left inguinal adenopathy suspicious for lymphoma. There is increased metabolic activity in a mass posterior right renal cortex nonspecific but may represent lymphoma. There is increased bony activity as described above suspicious for lymphoma. . 23X        Assessment and Recommendations:     Acute resp failure  Unclear etiology  Requiring supplemental O2 to maintain oxygen sats > 90  Chest Xray unrevealing and CTA negative for pulmonary embolus  - started on Lasix 20 mg po daily for fluid retention  -consulted pulmonary team    Diffuse B cell lymphoma:   -proceed with Cycle #4d2 DA-R-EPOCH without any dose increase from Cycle #3. She is at dose level reduction 1 on etoposide,doxorubicin,cyclophosphamide  -patient will need PET/CT as outpatient prior to Cycle #5. Tobacco dependence  -reports nicotine patch helps her while hospitalized. Continue with use. Thrush  -recurrent problem without any acute complaints; patient requests fluconazole Rx on hospital discharge. Plan reviewed with Dr Lianne Ramirze.

## 2022-09-13 NOTE — CONSULTS
[facility]     Name: Zada Meckel MRN: [id number]   : 1955 Hospital: [address] [facility]   Date: 2022        Impression Plan     Hypoxia   lymphoma   Tobacco abuse                No obvious parenchymal changes. Patient may have some COPD due to 40 years of smoking but unsure that this plays into current hypoxia. It may be due to being immobile in hospital bed for a few days and developing atelectasis. Patient encouraged to get into the chair and sit up for hours at a time. Incentive spirometer   Pulmonary function test as outpatient   Will need follow-up with pulmonary within the next month once discharged from hospital.           Radiology  ( personally reviewed)   CTA chest 22 reviewed: No infiltrate/masses/acute process. ABG No results for input(s): PHI, PO2I, PCO2I in the last 72 hours. Subjective       78year-old with past medical history of lymphoma who was hospitalized for chemotherapy treatment. Pulmonary was consulted for hypoxia which is new for the patient. She denies any shortness of breath/cough/wheezing. She states that she has been in the bed since being admitted. She smoked 1 pack per day for 40 years, currently down to 5 cigarettes a day. In her history COPD as noted however patient denies having a diagnosis. Review of Systems:  A comprehensive review of systems was negative except for that written in the HPI.     Past Medical History:   Diagnosis Date    Adverse effect of anesthesia     PHLEGM IN THROAT POST OP & NEEDED X2 BREATHING TREATMENTS    Anxiety     COPD (chronic obstructive pulmonary disease) ([facility] [address]     emphysema    Depression     GERD (gastroesophageal reflux disease)     Hernia, femoral     since childhood    Hypertension     Joint pain     Nausea & vomiting     TMJ arthritis       Past Surgical History:   Procedure Laterality Date    HX CERVICAL FUSION  2012    C5-C6    HX CHOLECYSTECTOMY      HX COLONOSCOPY HX ENDOSCOPY      HX HYSTERECTOMY  2019    HX TONSILLECTOMY      AS A CHILD    HX WISDOM TEETH EXTRACTION      TEENAGER    IR INSERT TUNL CVC W PORT OVER 5 YEARS  4/29/2022      Prior to Admission medications    Medication Sig Start Date End Date Taking? Authorizing Provider   ALPRAZoisabel Marks) 1 mg tablet Take 1 Tablet by mouth two (2) times daily as needed for Anxiety for up to 15 days. Max Daily Amount: 2 mg. 9/6/22 9/21/22 Yes Luh Novak MD   morphine IR (MS IR) 15 mg tablet Take 1 Tablet by mouth every six (6) hours as needed for Pain for up to 15 days. Max Daily Amount: 60 mg. 9/6/22 9/21/22 Yes Luh Novak MD   ondansetron (ZOFRAN ODT) 8 mg disintegrating tablet Take 1 Tablet by mouth every eight (8) hours as needed for Nausea or Vomiting. 9/2/22  Yes Jhoan Sun MD   prochlorperazine (COMPAZINE) 10 mg tablet Take 1 Tablet by mouth every six (6) hours as needed for Nausea (do not take if groggy; take if nausea despite zofran). 9/2/22  Yes Jhoan Sun MD   morphine ER (MS CONTIN) 15 mg CR tablet Take 1 Tablet by mouth every twelve (12) hours for 30 days. Max Daily Amount: 30 mg. 8/29/22 9/28/22 Yes Carlie Novak MD   trimethoprim-sulfamethoxazole (BACTRIM DS, SEPTRA DS) 160-800 mg per tablet Take 1 Tablet by mouth every Monday, Wednesday, Friday. Indications: prophylaxis treatment of cancer related infections 8/24/22  Yes Schoeneweis, Ann, STU   FLUoxetine (PROzac) 40 mg capsule TAKE 1 CAPSULE BY MOUTH EVERY MORNING 10/11/21  Yes Abelino Ahn MD   amLODIPine-benazepril (LOTREL) 5-20 mg per capsule TAKE 1 CAPSULE BY MOUTH EVERY MORNING 10/11/21  Yes Abelino Ahn MD   naloxone (Narcan) 4 mg/actuation nasal spray Use 1 spray intranasally, then discard. Repeat with new spray every 2 min as needed for opioid overdose symptoms, alternating nostrils.   Patient not taking: No sig reported 8/29/22   Carlie Novak MD   potassium chloride (K-DUR, KLOR-CON M20) 20 mEq tablet Take 1 Tablet by mouth daily.   Patient not taking: No sig reported 8/24/22   Pacheco Bartholomew MD     Current Facility-Administered Medications   Medication Dose Route Frequency    amLODIPine (NORVASC) tablet 5 mg  5 mg Oral DAILY    FLUoxetine (PROzac) capsule 40 mg  40 mg Oral DAILY    morphine ER (MS CONTIN) tablet 15 mg  15 mg Oral Q12H    trimethoprim-sulfamethoxazole (BACTRIM DS, SEPTRA DS) 160-800 mg per tablet 1 Tablet  1 Tablet Oral Q MON, WED & FRI    sodium chloride (NS) flush 5-40 mL  5-40 mL IntraVENous Q8H    nicotine (NICODERM CQ) 14 mg/24 hr patch 1 Patch  1 Patch TransDERmal DAILY    lisinopriL (PRINIVIL, ZESTRIL) tablet 20 mg  20 mg Oral DAILY    pantoprazole (PROTONIX) tablet 40 mg  40 mg Oral ACB    0.9% sodium chloride infusion  45 mL/hr IntraVENous CONTINUOUS    ondansetron (ZOFRAN) injection 8 mg  8 mg IntraVENous Q8H    etoposide (VEPESID) 75.2 mg in 0.9% sodium chloride 250 mL, overfill volume 25 mL chemo infusion  40 mg/m2 (Treatment Plan Recorded) IntraVENous Q24H    vinCRIStine (VINCASAR PFS) 0.8 mg, DOXOrubicin (ADRIAMYCIN) 15 mg in 0.9% sodium chloride 250 mL, overfill volume 25 mL chemo infusion   IntraVENous Q24H    [START ON 9/16/2022] cyclophosphamide (CYTOXAN) 1,128 mg in 0.9% sodium chloride 250 mL, overfill volume 25 mL chemo infusion  600 mg/m2 (Treatment Plan Recorded) IntraVENous ONCE    OLANZapine (ZyPREXA) tablet 5 mg  5 mg Oral QPM    predniSONE (DELTASONE) tablet 115 mg  115 mg Oral BID WITH MEALS    furosemide (LASIX) tablet 20 mg  20 mg Oral DAILY     Allergies   Allergen Reactions    Oxycodone Nausea Only      Social History     Tobacco Use    Smoking status: Some Days     Packs/day: 0.25     Years: 40.00     Pack years: 10.00     Types: Cigarettes    Smokeless tobacco: Never   Substance Use Topics    Alcohol use: Not Currently     Alcohol/week: 0.0 standard drinks      Family History   Problem Relation Age of Onset    Heart Disease Mother 46    Heart Surgery Mother         HEART TRANSPLANT    Elevated Lipids Mother     Hypertension Mother     COPD Father     Emphysema Father     Sudden Death Brother 46    Other Maternal Grandmother         BRAIN TUMOR    No Known Problems Son     No Known Problems Daughter           Laboratory: I have personally reviewed the critical care flowsheet and labs.      Recent Labs     09/13/22  0332 09/12/22  0708   WBC 9.0 5.9   HGB 12.2 12.4   HCT 38.7 37.9    394     Recent Labs     09/13/22  0332 09/12/22  0708    140   K 4.4 3.9   * 108   CO2 28 27   * 107*   BUN 7 7   CREA 0.66 0.63   CA 8.5 8.6   ALB 3.1* 3.2*   ALT 17 20       Objective:     Mode Rate Tidal Volume Pressure FiO2 PEEP                    Vital Signs:     TMAX(24)      Intake/Output:   Last shift:         Last 3 shifts: 09/13 0701 - 09/13 1900  In: 240 [P.O.:240]  Out: - RRIOLAST3  Intake/Output Summary (Last 24 hours) at 9/13/2022 1329  Last data filed at 9/13/2022 0815  Gross per 24 hour   Intake 1671.76 ml   Output --   Net 1671.76 ml     EXAM:   GENERAL: well developed and in no distress, HEENT:  PERRL, EOMI, no alar flaring or epistaxis, oral mucosa moist without cyanosis, NECK:  no jugular vein distention, no retractions, no thyromegaly or masses, LUNGS: CTA, no w/r/r, HEART:  Regular rate and rhythm with no MGR; +1 edema is present, ABDOMEN:  soft with no tenderness, bowel sounds present, EXTREMITIES:  warm with no cyanosis, SKIN:  no jaundice or ecchymosis, and NEUROLOGIC:  alert and oriented, grossly non-focal    Nile Curtis MD  [facility]

## 2022-09-13 NOTE — PROGRESS NOTES
Bedside shift change report given to Janette Mcdaniel (oncoming nurse) by Shalini Rouse (offgoing nurse). Report included the following information SBAR, Kardex, Intake/Output, MAR, and Recent Results.

## 2022-09-13 NOTE — PROGRESS NOTES
9/13/2022  Case Management Progress Note    12:21 PM  Patient is 78year old female here for a planned admission for chemotherapy   Patient's RUR is 18% yellow/moderate risk for readmission--currently a planned readmission  Covid test: negative 9/12  Chart reviewed--patient discussed at IDR rounds  Patient is here until she finishes chemotherapy on Friday. She is open to [facility] --will just need resumption orders prior to discharge. I have sent them a referral as requested. Will continue to follow and assist as needed.      Transition of Care Plan   Continue medical management/treatment  Home with family assist and resumption of HH  Family will transport at discharge  Follow up outpatient as indicated  CM will continue to follow and assist    CHIVO Flores

## 2022-09-13 NOTE — PROGRESS NOTES
Physician Progress Note      Ifeoma Louie  [facility] #:                  [id number]  :                       1955  ADMIT DATE:       2022 6:17 AM  DISCH DATE:  RESPONDING  PROVIDER #:        Heather Butcher MD          QUERY TEXTLorelie Metro afternoon  Patient admitted for antineoplastic chemotherapy. If possible, please document in progress notes and discharge summary if you are evaluating and /or treating any of the following: The medical record reflects the following:  Risk Factors:  Large B Cell Lymphoma  Clinical Indicators: Malnutrition Status:  Mild malnutrition (22 1453)  Context:  Chronic illness  Findings of the 6 clinical characteristics of malnutrition:  Energy Intake:  Mild decrease in energy intake (specify)  Weight Loss:  No significant weight loss  Body Fat Loss:  No significant body fat loss,  Muscle Mass Loss:  Mild muscle mass loss,  Fluid Accumulation:  No significant fluid accumulation,  Treatment: RD consult, daily labs  1. Continue Regular diet  2. Add Ensure Enlive TID per request - chocolate    Thank you  Delmi Graves RN [facility]  [phone]    ASPEN Criteria:  https://aspenjournals. onlinelibrary. hollis. com/doi/full/10.1177/8591591695955568  Options provided:  -- Protein calorie malnutrition mild  -- [Protein calorie malnutrition not present  -- Other - I will add my own diagnosis  -- Disagree - Not applicable / Not valid  -- Disagree - Clinically unable to determine / Unknown  -- Refer to Clinical Documentation Reviewer    PROVIDER RESPONSE TEXT:    This patient has mild protein calorie malnutrition.     Query created by: Brad Ma on 2022 3:21 PM      Electronically signed by:  Heather Butcher MD 2022 12:04 AM

## 2022-09-13 NOTE — PROGRESS NOTES
Spiritual Care Partner Volunteer visited patient at [address]  in [address] [place] [address] 1 on 9/13/2022   Documented by:  Janusz Stoner., MS., [address] [facility]  Page a  [phone])

## 2022-09-13 NOTE — PROGRESS NOTES
Physician Progress Note      PATIENTErmalene Comas  CSN #:                  [id number]  :                       1955  ADMIT DATE:       2022 6:17 AM  DISCH DATE:  RESPONDING  PROVIDER #:        Krystle Chappell MD          QUERY TEXT:    Pt admitted for antineoplastic chemotherapy. Pt noted to have SOB and need for supplemental O2. If possible, please document in the progress notes and discharge summary if you are evaluating and/or treating any of the following: The medical record reflects the following:  Risk Factors: Diffuse Large B Cell Lymphoma, malnutrition, tobacco dependince  Clinical Indicators: Found to be hypoxic with sats in the low 80s on RA. Placed on 2L NC  No increased WOB, but expiratory wheezing noted  92% on 2L  Treatment: supplemental O2, CTA, CXR, PO Prednisone    Thank you  Shawnee Silveira RN [facility]  [phone]  Options provided:  -- Acute respiratory failure with hypoxia  -- Chronic respiratory failure with hypoxia  -- Acute on chronic respiratory failure with hypoxia  -- Respiratory failure not present  -- Other - I will add my own diagnosis  -- Disagree - Not applicable / Not valid  -- Disagree - Clinically unable to determine / Unknown  -- Refer to Clinical Documentation Reviewer    PROVIDER RESPONSE TEXT:    This patient is in acute respiratory failure with hypoxia.     Query created by: Sagar Yoder on 2022 8:09 AM      Electronically signed by:  Krystle Chappell MD 2022 10:11 AM

## 2022-09-14 LAB
ALBUMIN SERPL-MCNC: 2.8 G/DL (ref 3.5–5)
ALBUMIN/GLOB SERPL: 1.3 {RATIO} (ref 1.1–2.2)
ALP SERPL-CCNC: 55 U/L (ref 45–117)
ALT SERPL-CCNC: 16 U/L (ref 12–78)
ANION GAP SERPL CALC-SCNC: 3 MMOL/L (ref 5–15)
AST SERPL-CCNC: 10 U/L (ref 15–37)
BASOPHILS # BLD: 0 K/UL (ref 0–0.1)
BASOPHILS NFR BLD: 0 % (ref 0–1)
BILIRUB SERPL-MCNC: 0.2 MG/DL (ref 0.2–1)
BUN SERPL-MCNC: 12 MG/DL (ref 6–20)
BUN/CREAT SERPL: 21 (ref 12–20)
CALCIUM SERPL-MCNC: 8.1 MG/DL (ref 8.5–10.1)
CHLORIDE SERPL-SCNC: 114 MMOL/L (ref 97–108)
CO2 SERPL-SCNC: 29 MMOL/L (ref 21–32)
CREAT SERPL-MCNC: 0.58 MG/DL (ref 0.55–1.02)
DIFFERENTIAL METHOD BLD: ABNORMAL
EOSINOPHIL # BLD: 0 K/UL (ref 0–0.4)
EOSINOPHIL NFR BLD: 0 % (ref 0–7)
ERYTHROCYTE [DISTWIDTH] IN BLOOD BY AUTOMATED COUNT: 14.4 % (ref 11.5–14.5)
GLOBULIN SER CALC-MCNC: 2.1 G/DL (ref 2–4)
GLUCOSE SERPL-MCNC: 142 MG/DL (ref 65–100)
HCT VFR BLD AUTO: 34.1 % (ref 35–47)
HGB BLD-MCNC: 10.8 G/DL (ref 11.5–16)
IMM GRANULOCYTES # BLD AUTO: 0.1 K/UL (ref 0–0.04)
IMM GRANULOCYTES NFR BLD AUTO: 1 % (ref 0–0.5)
LYMPHOCYTES # BLD: 0.2 K/UL (ref 0.8–3.5)
LYMPHOCYTES NFR BLD: 2 % (ref 12–49)
MCH RBC QN AUTO: 31.6 PG (ref 26–34)
MCHC RBC AUTO-ENTMCNC: 31.7 G/DL (ref 30–36.5)
MCV RBC AUTO: 99.7 FL (ref 80–99)
MONOCYTES # BLD: 0.2 K/UL (ref 0–1)
MONOCYTES NFR BLD: 2 % (ref 5–13)
NEUTS SEG # BLD: 11.9 K/UL (ref 1.8–8)
NEUTS SEG NFR BLD: 95 % (ref 32–75)
NRBC # BLD: 0 K/UL (ref 0–0.01)
NRBC BLD-RTO: 0 PER 100 WBC
PLATELET # BLD AUTO: 268 K/UL (ref 150–400)
PMV BLD AUTO: 9.6 FL (ref 8.9–12.9)
POTASSIUM SERPL-SCNC: 3.9 MMOL/L (ref 3.5–5.1)
PROT SERPL-MCNC: 4.9 G/DL (ref 6.4–8.2)
RBC # BLD AUTO: 3.42 M/UL (ref 3.8–5.2)
RBC MORPH BLD: ABNORMAL
SODIUM SERPL-SCNC: 146 MMOL/L (ref 136–145)
WBC # BLD AUTO: 12.4 K/UL (ref 3.6–11)

## 2022-09-14 PROCEDURE — 85025 COMPLETE CBC W/AUTO DIFF WBC: CPT

## 2022-09-14 PROCEDURE — 80053 COMPREHEN METABOLIC PANEL: CPT

## 2022-09-14 PROCEDURE — 74011636637 HC RX REV CODE- 636/637: Performed by: INTERNAL MEDICINE

## 2022-09-14 PROCEDURE — 74011250637 HC RX REV CODE- 250/637: Performed by: NURSE PRACTITIONER

## 2022-09-14 PROCEDURE — 74011250636 HC RX REV CODE- 250/636: Performed by: INTERNAL MEDICINE

## 2022-09-14 PROCEDURE — 36415 COLL VENOUS BLD VENIPUNCTURE: CPT

## 2022-09-14 PROCEDURE — 99223 1ST HOSP IP/OBS HIGH 75: CPT | Performed by: INTERNAL MEDICINE

## 2022-09-14 PROCEDURE — 99233 SBSQ HOSP IP/OBS HIGH 50: CPT | Performed by: INTERNAL MEDICINE

## 2022-09-14 PROCEDURE — 74011250637 HC RX REV CODE- 250/637: Performed by: INTERNAL MEDICINE

## 2022-09-14 PROCEDURE — 65270000029 HC RM PRIVATE

## 2022-09-14 PROCEDURE — 74011000250 HC RX REV CODE- 250: Performed by: NURSE PRACTITIONER

## 2022-09-14 PROCEDURE — 74011250636 HC RX REV CODE- 250/636: Performed by: NURSE PRACTITIONER

## 2022-09-14 RX ORDER — ALPRAZOLAM 0.5 MG/1
1 TABLET ORAL
Status: DISCONTINUED | OUTPATIENT
Start: 2022-09-14 | End: 2022-09-16 | Stop reason: HOSPADM

## 2022-09-14 RX ORDER — IBUPROFEN 200 MG
1 TABLET ORAL EVERY 24 HOURS
Qty: 30 PATCH | Refills: 0 | Status: SHIPPED | OUTPATIENT
Start: 2022-09-14 | End: 2022-10-14

## 2022-09-14 RX ADMIN — LISINOPRIL 20 MG: 20 TABLET ORAL at 08:38

## 2022-09-14 RX ADMIN — Medication 10 ML: at 06:32

## 2022-09-14 RX ADMIN — PANTOPRAZOLE SODIUM 40 MG: 40 TABLET, DELAYED RELEASE ORAL at 06:32

## 2022-09-14 RX ADMIN — ONDANSETRON 8 MG: 2 INJECTION INTRAMUSCULAR; INTRAVENOUS at 00:38

## 2022-09-14 RX ADMIN — ALPRAZOLAM 1 MG: 0.5 TABLET ORAL at 08:37

## 2022-09-14 RX ADMIN — FLUOXETINE 40 MG: 20 CAPSULE ORAL at 08:38

## 2022-09-14 RX ADMIN — MORPHINE SULFATE 15 MG: 15 TABLET, FILM COATED, EXTENDED RELEASE ORAL at 20:31

## 2022-09-14 RX ADMIN — SULFAMETHOXAZOLE AND TRIMETHOPRIM 1 TABLET: 800; 160 TABLET ORAL at 08:37

## 2022-09-14 RX ADMIN — SODIUM CHLORIDE 45 ML/HR: 9 INJECTION, SOLUTION INTRAVENOUS at 09:33

## 2022-09-14 RX ADMIN — ONDANSETRON 8 MG: 2 INJECTION INTRAMUSCULAR; INTRAVENOUS at 17:00

## 2022-09-14 RX ADMIN — OLANZAPINE 5 MG: 5 TABLET, FILM COATED ORAL at 17:01

## 2022-09-14 RX ADMIN — MORPHINE SULFATE 15 MG: 15 TABLET, FILM COATED, EXTENDED RELEASE ORAL at 08:37

## 2022-09-14 RX ADMIN — ZOLPIDEM TARTRATE 5 MG: 5 TABLET ORAL at 20:35

## 2022-09-14 RX ADMIN — AMLODIPINE BESYLATE 5 MG: 5 TABLET ORAL at 08:38

## 2022-09-14 RX ADMIN — PREDNISONE 115 MG: 5 TABLET ORAL at 08:38

## 2022-09-14 RX ADMIN — ENOXAPARIN SODIUM 40 MG: 100 INJECTION SUBCUTANEOUS at 15:27

## 2022-09-14 RX ADMIN — ALPRAZOLAM 1 MG: 0.5 TABLET ORAL at 17:01

## 2022-09-14 RX ADMIN — ONDANSETRON 8 MG: 2 INJECTION INTRAMUSCULAR; INTRAVENOUS at 08:32

## 2022-09-14 RX ADMIN — SODIUM CHLORIDE 75.2 MG: 9 INJECTION, SOLUTION INTRAVENOUS at 12:08

## 2022-09-14 RX ADMIN — PREDNISONE 115 MG: 5 TABLET ORAL at 17:01

## 2022-09-14 RX ADMIN — FUROSEMIDE 20 MG: 20 TABLET ORAL at 08:38

## 2022-09-14 RX ADMIN — SODIUM CHLORIDE: 9 INJECTION, SOLUTION INTRAVENOUS at 12:08

## 2022-09-14 NOTE — PROGRESS NOTES
Palliative Medicine      Code Status: Full Code        Advance Care Planning:  Advance Care Planning 9/14/2022   Patient's Healthcare Decision Maker is: Legal Next of Kin   Confirm Advance Directive None   Patient Would Like to Complete Advance Directive No   Does the patient have other document types Not on file     Patient / Family Encounter Documentation    Participants (names): Pt, Palliative Medicine (Dr. Darrick Goodwin, Jada Lyn)    Narrative:  Pt was awake in bed, alert and receptive to visit, no family currently present. Pt is , son Nura Tavera lives in the home with pt, dtr Fam Le lives locally. Pt retired from [employer]. Pt is currently inpt for chemo, reports she will be discharged home this Friday 9/16. Pt shared that she had been very tearful earlier today, stated it is not her nature to be outwardly emotional in front of other people but reports being much more prone to tears since cancer diagnosis. Pt reports long hx of depression/anxiety/panic attacks dating back to her teenage years, has been on antidepressants/anti-anxiety meds for decades. Pt has seen psychiatrists and psychologists in the past but stated \"that's not for me,\" does not find psychotherapy to be of benefit. Pt was not familiar with Cognitive Behavioral Therapy; discussed strategies that pt can employ when overwhelmed by intrusive thoughts/rumination. Provided information re: a free online course led by a counselor who specializes in Oncology teaching tools to cope with the emotional toll of a cancer diagnosis and treatment. Pt does not have AMD on file, declined to complete in the past, verbalized understanding that in absence of assigned medical POA, son and dtr are legal NOK and surrogate decision makers. Psychosocial Issues Identified/ Resilience Factors:  Long hx of anxiety/depression/panic attacks compounded by the stress of cancer diagnosis/treatment.   No spiritual concerns identified; pt shared that she is not afraid of death itself but is afraid of that pain that might come along with the dying process. Pt indicated that her dtr is very resilient but expressed concern re: how son will cope with her death, indicated that son relies on pt in many ways. Pt does not cry in front of son at home, stated she does not want son to worry about her though shared that she is open with her children re: her cancer treatment. Pt indicated financial stress, has been referred by Oncology SW to [facility] financial counselor, was sent an application for [facility]. Caregiver [place]: N/a, pt manages her own ADLs  Does the caregiver feel confident administering medication? N/a  Does the caregiver need any help connecting with community resources? N/a  Does the caregiver feel confident assisting with activities of daily living? N/a    Goals of Care / Plan: Pt requested an increase in frequency of Xanax while inpt, will continue to follow with Palliative Medicine outpt clinic upon discharge. Pt was encouraged to consider tools to manage anxiety/depression to supplement medication. SW will follow for support. Thank you for including Palliative Medicine in the care of Ms. Lila Vazquez.      Charan Blount LCSW, [facility]  [phone]

## 2022-09-14 NOTE — PROGRESS NOTES
[facility] [place]     Name: Bre Cho MRN: [id number]   : 1955 Hospital: [address]   Date: 2022        Impression Plan     Hypoxia   lymphoma   Tobacco abuse                No obvious parenchymal changes. Hypoxia likely from atelectasis  Patient encouraged to get into the chair and sit up for hours at a time. Incentive spirometer   Pulmonary function test as outpatient   Will need follow-up with pulmonary within the next month once discharged from hospital.  Signing off. Pls page with questions. Radiology  ( personally reviewed)   CTA chest 22 reviewed: No infiltrate/masses/acute process. ABG No results for input(s): PHI, PO2I, PCO2I in the last 72 hours. Subjective       78year-old with past medical history of lymphoma who was hospitalized for chemotherapy treatment. Pulmonary was consulted for hypoxia which is new for the patient. She denies any shortness of breath/cough/wheezing. She states that she has been in the bed since being admitted. She smoked 1 pack per day for 40 years, currently down to 5 cigarettes a day. In her history COPD as noted however patient denies having a diagnosis. Overnight events:  Patient sats in the 90s today on RA. Pt tearful and states she cannot stand wearing nasal cannula. Using IS and trying to be more mobile.     Past Medical History:   Diagnosis Date    Adverse effect of anesthesia     PHLEGM IN THROAT POST OP & NEEDED X2 BREATHING TREATMENTS    Anxiety     COPD (chronic obstructive pulmonary disease) ([facility] [address]     emphysema    Depression     GERD (gastroesophageal reflux disease)     Hernia, femoral     since childhood    Hypertension     Joint pain     Nausea & vomiting     TMJ arthritis       Past Surgical History:   Procedure Laterality Date    HX CERVICAL FUSION      C5-C6    HX CHOLECYSTECTOMY      HX COLONOSCOPY      HX ENDOSCOPY      HX HYSTERECTOMY  2019    HX TONSILLECTOMY      AS A CHILD    HX WISDOM TEETH EXTRACTION      TEENAGER    IR INSERT TUNL CVC W PORT OVER 5 YEARS  4/29/2022      Prior to Admission medications    Medication Sig Start Date End Date Taking? Authorizing Provider   ALPRAZolam Yaron Schaefer) 1 mg tablet Take 1 Tablet by mouth two (2) times daily as needed for Anxiety for up to 15 days. Max Daily Amount: 2 mg. 9/6/22 9/21/22 Yes Luh Novak MD   morphine IR (MS IR) 15 mg tablet Take 1 Tablet by mouth every six (6) hours as needed for Pain for up to 15 days. Max Daily Amount: 60 mg. 9/6/22 9/21/22 Yes Luh Novak MD   ondansetron (ZOFRAN ODT) 8 mg disintegrating tablet Take 1 Tablet by mouth every eight (8) hours as needed for Nausea or Vomiting. 9/2/22  Yes Pacheco Bartholomew MD   prochlorperazine (COMPAZINE) 10 mg tablet Take 1 Tablet by mouth every six (6) hours as needed for Nausea (do not take if groggy; take if nausea despite zofran). 9/2/22  Yes Pacheco Bartholomew MD   morphine ER (MS CONTIN) 15 mg CR tablet Take 1 Tablet by mouth every twelve (12) hours for 30 days. Max Daily Amount: 30 mg. 8/29/22 9/28/22 Yes Carlie Novak MD   trimethoprim-sulfamethoxazole (BACTRIM DS, SEPTRA DS) 160-800 mg per tablet Take 1 Tablet by mouth every Monday, Wednesday, Friday. Indications: prophylaxis treatment of cancer related infections 8/24/22  Yes Schoeneweis, Ann, STU   FLUoxetine (PROzac) 40 mg capsule TAKE 1 CAPSULE BY MOUTH EVERY MORNING 10/11/21  Yes Layla Grullon MD   amLODIPine-benazepril (LOTREL) 5-20 mg per capsule TAKE 1 CAPSULE BY MOUTH EVERY MORNING 10/11/21  Yes Layla Grullon MD   naloxone (Narcan) 4 mg/actuation nasal spray Use 1 spray intranasally, then discard. Repeat with new spray every 2 min as needed for opioid overdose symptoms, alternating nostrils. Patient not taking: No sig reported 8/29/22   Carlie Novak MD   potassium chloride (K-DUR, KLOR-CON M20) 20 mEq tablet Take 1 Tablet by mouth daily.   Patient not taking: No sig reported 8/24/22   Muna Bañuelos MD     Current Facility-Administered Medications   Medication Dose Route Frequency    enoxaparin (LOVENOX) injection 40 mg  40 mg SubCUTAneous Q24H    amLODIPine (NORVASC) tablet 5 mg  5 mg Oral DAILY    FLUoxetine (PROzac) capsule 40 mg  40 mg Oral DAILY    morphine ER (MS CONTIN) tablet 15 mg  15 mg Oral Q12H    trimethoprim-sulfamethoxazole (BACTRIM DS, SEPTRA DS) 160-800 mg per tablet 1 Tablet  1 Tablet Oral Q MON, WED & FRI    sodium chloride (NS) flush 5-40 mL  5-40 mL IntraVENous Q8H    nicotine (NICODERM CQ) 14 mg/24 hr patch 1 Patch  1 Patch TransDERmal DAILY    lisinopriL (PRINIVIL, ZESTRIL) tablet 20 mg  20 mg Oral DAILY    pantoprazole (PROTONIX) tablet 40 mg  40 mg Oral ACB    0.9% sodium chloride infusion  45 mL/hr IntraVENous CONTINUOUS    ondansetron (ZOFRAN) injection 8 mg  8 mg IntraVENous Q8H    etoposide (VEPESID) 75.2 mg in 0.9% sodium chloride 250 mL, overfill volume 25 mL chemo infusion  40 mg/m2 (Treatment Plan Recorded) IntraVENous Q24H    vinCRIStine (VINCASAR PFS) 0.8 mg, DOXOrubicin (ADRIAMYCIN) 15 mg in 0.9% sodium chloride 250 mL, overfill volume 25 mL chemo infusion   IntraVENous Q24H    [START ON 9/16/2022] cyclophosphamide (CYTOXAN) 1,128 mg in 0.9% sodium chloride 250 mL, overfill volume 25 mL chemo infusion  600 mg/m2 (Treatment Plan Recorded) IntraVENous ONCE    OLANZapine (ZyPREXA) tablet 5 mg  5 mg Oral QPM    predniSONE (DELTASONE) tablet 115 mg  115 mg Oral BID WITH MEALS    furosemide (LASIX) tablet 20 mg  20 mg Oral DAILY     Allergies   Allergen Reactions    Oxycodone Nausea Only      Social History     Tobacco Use    Smoking status: Some Days     Packs/day: 0.25     Years: 40.00     Pack years: 10.00     Types: Cigarettes    Smokeless tobacco: Never   Substance Use Topics    Alcohol use: Not Currently     Alcohol/week: 0.0 standard drinks      Family History   Problem Relation Age of Onset    Heart Disease Mother 46    Heart Surgery Mother HEART TRANSPLANT    Elevated Lipids Mother     Hypertension Mother     COPD Father     Emphysema Father     Sudden Death Brother 46    Other Maternal Grandmother         BRAIN TUMOR    No Known Problems Son     No Known Problems Daughter           Laboratory: I have personally reviewed the critical care flowsheet and labs. Recent Labs     09/14/22 0444 09/13/22  0332 09/12/22  0708   WBC 12.4* 9.0 5.9   HGB 10.8* 12.2 12.4   HCT 34.1* 38.7 37.9    352 394       Recent Labs     09/14/22 0444 09/13/22  0332 09/12/22  0708   * 142 140   K 3.9 4.4 3.9   * 112* 108   CO2 29 28 27   * 148* 107*   BUN 12 7 7   CREA 0.58 0.66 0.63   CA 8.1* 8.5 8.6   ALB 2.8* 3.1* 3.2*   ALT 16 17 20         Objective:     Mode Rate Tidal Volume Pressure FiO2 PEEP                    Vital Signs:     TMAX(24)      Intake/Output:   Last shift:         Last 3 shifts: No intake/output data recorded. RRIOLAST3  Intake/Output Summary (Last 24 hours) at 9/14/2022 1252  Last data filed at 9/14/2022 0352  Gross per 24 hour   Intake 1601.26 ml   Output --   Net 1601.26 ml       EXAM:   GENERAL: well developed and in no distress, HEENT:  PERRL, EOMI, no alar flaring or epistaxis, oral mucosa moist without cyanosis, NECK:  no jugular vein distention, no retractions, no thyromegaly or masses, LUNGS: CTA, no w/r/r, HEART:  Regular rate and rhythm with no MGR; +1 edema is present, ABDOMEN:  soft with no tenderness, bowel sounds present, EXTREMITIES:  warm with no cyanosis, SKIN:  no jaundice or ecchymosis, and NEUROLOGIC:  alert and oriented, grossly non-focal    Jarred Mendez MD  [facility]

## 2022-09-14 NOTE — PROGRESS NOTES
Spiritual Care Assessment/Progress Note  [address]      NAME: Srikanth Camarillo      MRN: [id number]  AGE: 78 y.o.  SEX: female  Mormonism Affiliation: No preference   Language: English     9/14/2022     Total Time (in minutes): 8     Spiritual Assessment begun in [facility] 5M1 MED SURG 1 through conversation with:         [x]Patient        [] Family    [] Friend(s)        Reason for Consult: Palliative Care, Initial/Spiritual Assessment     Spiritual beliefs: (Please include comment if needed)     [] Identifies with a chen tradition:         [] Supported by a chen community:            [] Claims no spiritual orientation:           [] Seeking spiritual identity:                [x] Adheres to an individual form of spirituality:           [] Not able to assess:                           Identified resources for coping:      [] Prayer                               [] Music                  [] Guided Imagery     [] Family/friends                 [] Pet visits     [] Devotional reading                         [x] Unknown     [] Other:                                             Interventions offered during this visit: (See comments for more details)    Patient Interventions: Affirmation of emotions/emotional suffering, Initial/Spiritual assessment, patient floor     Family/Friend(s): Spiritual care volunteer support     Plan of Care:     [] Support spiritual and/or cultural needs    [] Support AMD and/or advance care planning process      [] Support grieving process   [] Coordinate Rites and/or Rituals    [] Coordination with community clergy   [] No spiritual needs identified at this time   [] Detailed Plan of Care below (See Comments)  [] Make referral to Music Therapy  [] Make referral to Pet Therapy     [] Make referral to Addiction services  [] Make referral to [facility]  [] Make referral to Spiritual Care Partner  [] No future visits requested        [x] Contact Spiritual Care for further referrals Comments: Initial spiritual assessment in 5 Med Surg. Reviewed chart prior to visit. Miss Otis Kang indicated she is doing just fine. She has good family support and her own spiritual beliefs that support her. She has no needs at this time. Provided brief presence. Contact Spiritual Care for any further referrals.    Sanrda Vazquez., MS., [facility]  Page a  [phone] Reyna ([phone])

## 2022-09-14 NOTE — PROGRESS NOTES
Bedside shift change report given to Evan Rushing (oncoming nurse) by Betty Pires (offgoing nurse). Report included the following information SBAR, Kardex, Intake/Output, MAR, and Recent Results.

## 2022-09-14 NOTE — ACP (ADVANCE CARE PLANNING)
Primary Decision Maker: Artem Ayala - Daughter - [phone]    Primary Decision Maker: Nadir Lynch - [phone]  Advance Care Planning 9/14/2022   Patient's Healthcare Decision Maker is: Legal Next of Kin   Confirm Advance Directive None   Patient Would Like to Complete Advance Directive No   Does the patient have other document types Not on file      Pt does not have AMD on file, declined to complete in the past, verbalized understanding that in absence of assigned medical POA, son and dtr are legal NOK and surrogate decision makers.

## 2022-09-14 NOTE — PROGRESS NOTES
Problem: Falls - Risk of  Goal: *Absence of Falls  Description: Document Earma Fatmata Fall Risk and appropriate interventions in the flowsheet.   Outcome: Progressing Towards Goal  Note: Fall Risk Interventions:            Medication Interventions: Teach patient to arise slowly, Patient to call before getting OOB, Bed/chair exit alarm    Elimination Interventions: Patient to call for help with toileting needs, Call light in reach

## 2022-09-14 NOTE — PROGRESS NOTES
Problem: Falls - Risk of  Goal: *Absence of Falls  Description: Document Jolanta Card Fall Risk and appropriate interventions in the flowsheet.   Outcome: Progressing Towards Goal  Note: Fall Risk Interventions:            Medication Interventions: Evaluate medications/consider consulting pharmacy, Teach patient to arise slowly    Elimination Interventions: Patient to call for help with toileting needs, Toilet paper/wipes in reach

## 2022-09-14 NOTE — CONSULTS
Palliative Medicine Consult  Lemuel: [phone] ([phone])    Patient Name: Kayla Perdue  YOB: 1955    Date of Initial Consult: 9/14/22  Reason for Consult: overwhelming symptoms  Requesting Provider: Shahnaz Bolton NP   Primary Care Physician: Haroon Galloway MD     SUMMARY:   Kayla Perdue is a 78 y.o. woman with a medical history of stage IV diffuse large b-cell lymphoma. She was diagnosed in 4/2022. She is followed by Dr. Eliseo Mckeon and has been initiated on systemic chemotherapy with DA-R-EPOCH. Comorbid conditions include chronic anxiety and depression, nicotine dependence. She was admitted to [facility] electively for dose reduced cycle #4 on 9/12/2022. Current medical issues leading to Palliative Medicine involvement include: continuity of care, anxiety disorder worsened by cancer diagnosis. Psychosocial history: she's . She has 2 adult children, her daughter, Brianne Salvador, who lives locally, and a son, Tess Luna, who lives in the home with her. 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 PALLIATIVE DIAGNOSES:   Panic attack  Anxiety and depression  Acute respiratory failure- resolved  Stage IV DLBCL     PLAN:   Chronic anxiety and depression  Not in remission   Escalated in recent months due to cancer diagnosis  Continue Prozac 40 mg daily  Agreed to increase alprazolamm to 1 mg TID prn while inpatient and in receipt of chemotherapy, with understanding this will not be increased as an outpatient. She expressed understanding. Encouraged exploration of CBT (cognitive behavioral therapy) and other psychotherapeutic supports for rumination and coping. Explained equal efficacy compared to medication management. She remains resistant to these supports. Provided education about risks of chronic and escalating doses of benzodiazepine including risk of respiratory depression in combination with opioid therapy and risk of longer term cognitive and memory effects.   She acknowledged these facts and these do not raise her worry level. Though she is hopeful to extend her life as long as possible, she is not worried about dying. Chronic pain  Dr. Cecy Teran assessment and notes reflect outpatient discussion re: total pain. No changes today in her chronic morphine regimen  Nicotine dependence  Currently smokes 6-8 cigarettes per day  Has Nicotine patch in place while inpatient  Has desire to quit and requests outpatient script if covered, sent to pharmacy to assess coverage / need for prior auth. Made our outpatient RN/LPN team aware in case of prior-auth need. Thank you for the opportunity to participate in the care of Ro Whiteside  Communicated plan of care with: Palliative [facility] Team including oncology team.      [address] / TREATMENT PREFERENCES:     GOALS OF CARE:  Patient/Health Care Proxy Stated Goals: Prolong life    TREATMENT PREFERENCES:   Code Status: Full Code    Patient and family's personal goals include: to reduce her anxiety    Important upcoming milestones or family events: none reported    The patient identifies the following as important for living well: n/a      Advance Care Planning:  [x] [facility] Interdisciplinary Team has updated the ACP Navigator with [address] and Patient Capacity      Primary Decision Maker: Omar Simeon - Daughter - [phone]    Primary Decision Maker: Yinka Seth - [phone]    Advance Care Planning 9/12/2022   Patient's Healthcare Decision Maker is: -   Confirm Advance Directive None   Patient Would Like to Complete Advance Directive No   Does the patient have other document types -     Medical Interventions: Full interventions     Other:    As far as possible, the palliative care team has discussed with patient / health care proxy about goals of care / treatment preferences for patient.      HISTORY:     History obtained from: patient, EMR    CHIEF COMPLAINT: \"I was crying this morning and very anxious\"    HPI/SUBJECTIVE:    The patient is:   [x] Verbal and participatory  [] Non-participatory due to:     Met Ms. Laura Fenton today with SUSAN Yo. This is day #3 of admission which was elective for cycle 4 of systemic chemotherapy. She reports waking up this morning and feeling incredibly anxious and tearful with much greater intensity than is usual for her. Several clinicians had witnessed this episode prompting referral today to our team.  Sandra Dixon reports this is unusual for her that typically she holds her feelings in and is not this expressive in front of her physicians. She is not clear what triggered the increased anxiety but thinks it was probably related to waking up being tangled in her IV lines and feeling swollen. She was given her regular dose of Xanax after the incident started and began to feel better shortly after. She is hopeful to have this dose increase in the long-term to 3 times a day use. She was recently initiated on Xanax 1 mg twice daily in our clinic by Dr. Monserrat Hernández as she has been experiencing significant anxiety. She also has a history of benzodiazepine dependence for approximately 20 years time. This medication had been stopped 5 years ago when her PCP retired. She is not in receipt of any form of counseling or psychotherapy. She is also concomitantly taking Prozac 40 mg daily since sometime in the '90s. She recalls trials of many other medications for depression at that time, all caused significant side effects (she names Paxil, Zoloft, Wellbutrin). At one point she was also augmented with Abilify, she thinks this may have been helpful but cannot recall why it was stopped. She does not recall past use of BuSpar or mirtazapine.     Clinical Pain Assessment (nonverbal scale for severity on nonverbal patients):   Clinical Pain Assessment  Severity: 0          Duration: for how long has pt been experiencing pain (e.g., 2 days, 1 month, years)  Frequency: how often pain is an issue (e.g., several times per day, once every few days, constant)     FUNCTIONAL ASSESSMENT:     Palliative Performance Scale (PPS):  PPS: 70       PSYCHOSOCIAL/SPIRITUAL SCREENING:     Palliative IDT has assessed this patient for cultural preferences / practices and a referral made as appropriate to needs (Cultural Services, Patient Advocacy, Ethics, etc.)    Any spiritual / Mu-ism concerns:  [] Yes /  [x] No   If \"Yes\" to discuss with pastoral care during IDT     Does caregiver feel burdened by caring for their loved one:   [] Yes /  [] No /  [] No Caregiver Present/Available [x] No Caregiver [] Pt [facility] at [facility]  If \"Yes\" to discuss with social work during IDT    Anticipatory grief assessment:   [x] Normal  / [] Maladaptive     If \"Maladaptive\" to discuss with social work during IDT    ESAS Anxiety: Anxiety: 9    ESAS Depression: Depression: 7        REVIEW OF SYSTEMS:     Positive and pertinent negative findings in ROS are noted above in HPI. The following systems were [x] reviewed / [] unable to be reviewed as noted in HPI  Other findings are noted below. Systems: constitutional, ears/nose/mouth/throat, respiratory, gastrointestinal, genitourinary, musculoskeletal, integumentary, neurologic, psychiatric, endocrine. Positive findings noted below. Modified ESAS Completed by: provider     Drowsiness: 0   Depression: 7 Pain: 0   Anxiety: 9 Nausea: 0     Dyspnea: 0                    PHYSICAL EXAM:     From RN flowsheet:  Wt Readings from Last 3 Encounters:   09/13/22 180 lb 1.9 oz (81.7 kg)   09/01/22 178 lb (80.7 kg)   08/29/22 171 lb 3.2 oz (77.7 kg)     Blood pressure (!) 171/72, pulse 84, temperature 97.6 °F (36.4 °C), resp. rate 16, height 5' 3\" (1.6 m), weight 180 lb 1.9 oz (81.7 kg), SpO2 95 %.     Pain Scale 1: Numeric (0 - 10)  Pain Intensity 1: 7     Pain Location 1: Arm, Leg  Pain Orientation 1: Left, Right  Pain Description 1: Aching  Pain Intervention(s) 1: Medication (see MAR)  Last bowel movement, if known:     Constitutional: Obese, well kempt woman who appears generally well  Eyes: pupils equal, anicteric  ENMT: no nasal discharge, moist mucous membranes  Respiratory: breathing not labored, now on room air  Neurologic: Intact cognition, following commands, moving all extremities  Psychiatric: full affect, no hallucinations, calm and relaxed     HISTORY:     Active Problems:    Tobacco dependence (3/21/2022)      Encounter for antineoplastic chemotherapy (4/27/2022)      Diffuse large B cell lymphoma ([address] (5/11/2022)      Thrush (8/25/2022)      Mild malnutrition ([address] (9/13/2022)      Acute respiratory failure with hypoxia ([address] (9/13/2022)    Past Medical History:   Diagnosis Date    Adverse effect of anesthesia     PHLEGM IN THROAT POST OP & NEEDED X2 BREATHING TREATMENTS    Anxiety     COPD (chronic obstructive pulmonary disease) ([address] 2022    emphysema    Depression     GERD (gastroesophageal reflux disease)     Hernia, femoral     since childhood    Hypertension     Joint pain     Nausea & vomiting     TMJ arthritis       Past Surgical History:   Procedure Laterality Date    HX CERVICAL FUSION  2012    C5-C6    HX CHOLECYSTECTOMY  2020    HX COLONOSCOPY      HX ENDOSCOPY      HX HYSTERECTOMY  2019    HX TONSILLECTOMY      AS A CHILD    HX WISDOM TEETH EXTRACTION      TEENAGER    IR INSERT TUNL CVC W PORT OVER 5 YEARS  4/29/2022      Family History   Problem Relation Age of Onset    Heart Disease Mother 46    Heart Surgery Mother         HEART TRANSPLANT    Elevated Lipids Mother     Hypertension Mother     COPD Father     Emphysema Father     Sudden Death Brother 46    Other Maternal Grandmother         BRAIN TUMOR    No Known Problems Son     No Known Problems Daughter       History reviewed, no pertinent family history.   Social History     Tobacco Use    Smoking status: Some Days     Packs/day: 0.25     Years: 40.00     Pack years: 10.00     Types: Cigarettes    Smokeless tobacco: Never   Substance Use Topics    Alcohol use: Not Currently     Alcohol/week: 0.0 standard drinks     Allergies   Allergen Reactions    Oxycodone Nausea Only      Current Facility-Administered Medications   Medication Dose Route Frequency    ALPRAZolam (XANAX) tablet 1 mg  1 mg Oral TID PRN    enoxaparin (LOVENOX) injection 40 mg  40 mg SubCUTAneous Q24H    amLODIPine (NORVASC) tablet 5 mg  5 mg Oral DAILY    FLUoxetine (PROzac) capsule 40 mg  40 mg Oral DAILY    morphine ER (MS CONTIN) tablet 15 mg  15 mg Oral Q12H    morphine IR (MS IR) tablet 15 mg  15 mg Oral Q6H PRN    trimethoprim-sulfamethoxazole (BACTRIM DS, SEPTRA DS) 160-800 mg per tablet 1 Tablet  1 Tablet Oral Q MON, WED & FRI    sodium chloride (NS) flush 5-40 mL  5-40 mL IntraVENous Q8H    sodium chloride (NS) flush 5-40 mL  5-40 mL IntraVENous PRN    acetaminophen (TYLENOL) tablet 650 mg  650 mg Oral Q6H PRN    Or    acetaminophen (TYLENOL) suppository 650 mg  650 mg Rectal Q6H PRN    polyethylene glycol (MIRALAX) packet 17 g  17 g Oral DAILY PRN    nicotine (NICODERM CQ) 14 mg/24 hr patch 1 Patch  1 Patch TransDERmal DAILY    zolpidem (AMBIEN) tablet 5 mg  5 mg Oral QHS PRN    hydrALAZINE (APRESOLINE) 20 mg/mL injection 10 mg  10 mg IntraVENous Q6H PRN    lisinopriL (PRINIVIL, ZESTRIL) tablet 20 mg  20 mg Oral DAILY    pantoprazole (PROTONIX) tablet 40 mg  40 mg Oral ACB    0.9% sodium chloride infusion  45 mL/hr IntraVENous CONTINUOUS    ondansetron (ZOFRAN) injection 8 mg  8 mg IntraVENous Q8H    LORazepam (ATIVAN) 2 mg/mL injection 0.5 mg  0.5 mg IntraVENous Q6H PRN    etoposide (VEPESID) 75.2 mg in 0.9% sodium chloride 250 mL, overfill volume 25 mL chemo infusion  40 mg/m2 (Treatment Plan Recorded) IntraVENous Q24H    vinCRIStine (VINCASAR PFS) 0.8 mg, DOXOrubicin (ADRIAMYCIN) 15 mg in 0.9% sodium chloride 250 mL, overfill volume 25 mL chemo infusion   IntraVENous Q24H    [START ON 9/16/2022] cyclophosphamide (CYTOXAN) 1,128 mg in 0.9% sodium chloride 250 mL, overfill volume 25 mL chemo infusion  600 mg/m2 (Treatment Plan Recorded) IntraVENous ONCE    OLANZapine (ZyPREXA) tablet 5 mg  5 mg Oral QPM    sodium chloride (NS) flush 10 mL  10 mL InterCATHeter PRN    heparin (porcine) pf 300-500 Units  300-500 Units InterCATHeter PRN    meperidine (DEMEROL) injection 25 mg  25 mg IntraVENous Q20MIN PRN    diphenhydrAMINE (BENADRYL) injection 25 mg  25 mg IntraVENous PRN    acetaminophen (TYLENOL) tablet 650 mg  650 mg Oral PRN    [START ON 9/15/2022] meperidine (DEMEROL) injection 25 mg  25 mg IntraVENous Q20MIN PRN    ondansetron (ZOFRAN) injection 8 mg  8 mg IntraVENous PRN    hydrocortisone Sod Succ (PF) (SOLU-CORTEF) injection 100 mg  100 mg IntraVENous PRN    diphenhydrAMINE (BENADRYL) injection 50 mg  50 mg IntraVENous PRN    albuterol (PROVENTIL VENTOLIN) nebulizer solution 2.5 mg  2.5 mg Nebulization PRN    predniSONE (DELTASONE) tablet 115 mg  115 mg Oral BID WITH MEALS    furosemide (LASIX) tablet 20 mg  20 mg Oral DAILY          LAB AND IMAGING FINDINGS:     Lab Results   Component Value Date/Time    WBC 12.4 (H) 09/14/2022 04:44 AM    HGB 10.8 (L) 09/14/2022 04:44 AM    PLATELET 225 33/90/5780 04:44 AM     Lab Results   Component Value Date/Time    Sodium 146 (H) 09/14/2022 04:44 AM    Potassium 3.9 09/14/2022 04:44 AM    Chloride 114 (H) 09/14/2022 04:44 AM    CO2 29 09/14/2022 04:44 AM    BUN 12 09/14/2022 04:44 AM    Creatinine 0.58 09/14/2022 04:44 AM    Calcium 8.1 (L) 09/14/2022 04:44 AM    Phosphorus 4.0 06/06/2022 06:59 AM      Lab Results   Component Value Date/Time    Alk.  phosphatase 55 09/14/2022 04:44 AM    Protein, total 4.9 (L) 09/14/2022 04:44 AM    Albumin 2.8 (L) 09/14/2022 04:44 AM    Globulin 2.1 09/14/2022 04:44 AM     No results found for: INR, PTMR, PTP, PT1, PT2, APTT, INREXT, INREXT   No results found for: IRON, FE, TIBC, IBCT, PSAT, FERR   No results found for: PH, PCO2, PO2  No components found for: [place]   No results found for: CPK, CKMB             Total time: 70m  Counseling / coordination time, spent as noted above: 60m  > 50% counseling / coordination?:  Yes-time spent in history taking, counseling regarding management of anxiety and depression and review of use of high-risk medications. Prolonged service was provided for  []30 min   []75 min in face to face time in the presence of the patient, spent as noted above. Time Start:   Time End:   Note: this can only be billed with 37374 (initial) or 06966 (follow up). If multiple start / stop times, list each separately.

## 2022-09-14 NOTE — PROGRESS NOTES
9/14/2022  Case Management Progress Note    3:07 PM  Patient is 78year old female here for a planned admission for chemotherapy   Patient's RUR is 19% yellow/moderate risk for readmission--currently a planned readmission  Covid test: negative 9/12  Chart reviewed--patient discussed at [address] remains for patient to finish chemotherapy on Friday and then return home with family. Will continue to follow and assist as needed.      Transition of Care Plan   Continue medical management/treatment  Home with family after chemotherapy finishes  Family will transport at discharge   Follow up outpatient as indicated  CM will continue to follow    CHIVO Carter

## 2022-09-14 NOTE — PROGRESS NOTES
[facility] [place] at [address], 2[address] [facility] [address] [place]: [phone]  F: [phone] Patient ID  Name: Marbella Sandoval  YOB: 1955  MRN: [id number]  Referring Provider:   No referring provider defined for this encounter. 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 This summary highlights focused aspects of patient's ongoing care and may have a recurring section in notes with either updates or remain unchanged as a longitudinal care summary. -------------------------------------------------------------------------------------------------------------------------------------------------------------------------------------------------------  DIAGNOSIS:  Diffuse Large B-Cell Lymphoma     ORIGINAL STAGING:  Stage IV     SITES OF DISEASE:  Left Cervical Lymph Node,Bone Disease, Stage IV    CURRENT TREATMENT:  C1,D1 on 5/11/22 DA-R-EPOCH  C2  C3 delayed due to appetite issues and patient anxiety to restart therapy; administered on 8/18/22  C4-start on 9/12/22     PRIOR TREATMENT:  n/a     GOALS OF CARE:  Curative Intent     PATHOLOGY:  Specimen #:[id number] Collect: 4/22/2022      ==========================================================================                    * * *SURGICAL PATHOLOGY REPORT* * *   ==========================================================================   * * *PROCEDURES/ADDENDA* * *   ADDENDUM   Date Ordered: 4/26/2022     Status: Signed Out   Date Complete: 4/26/2022     By: Heidi Barrera.  Shelby Trujillo MD   Date Reported: 4/26/2022   Addendum Diagnosis   Lymph node, left posterior cervical lymph node, excision:   In situ hybridization for Jesús-Barr viral RNA: Negative   CPT: 19291  Addendum Comment   * * *CLINICAL HISTORY* * * Cervical lymphadenopathy, rule out lymphoma   ==========================================================================   * * *FINAL PATHOLOGIC DIAGNOSIS* * *        Lymph node, left posterior cervical lymph node, excision:        Large B cell lymphoma. See comment. * * *Comment* * *   The histologic section has fragments of lymphoid tissue with diffuse and   focal follicular architecture. Diffuse areas are comprised of large,   atypical lymphocytes with centroblastic morphology and increased mitotic   activity. Immunohistochemical stains confirm the malignant cells are CD20   positive B cells that coexpress CD10 and BCL6 without MUM1. CD23   highlights rare follicular dendritic networks associated with lymphoid   follicles with germinal centers and express CD10 and BCL6 without BCL2. CyclinD1 and CD56 stains are negative. Concurrent flow cytometric analysis   confirms a clonal CD10 positive B-cell population comprised of medium to   large cells. The Ki-67 proliferation index is 30%. The overall findings favor the diagnosis of diffuse large B-cell lymphoma,   germinal center subtype. Molecular genetic studies are pending for further   characterization will be reported in an addendum. Flow cytometry:   Consistent with CD10-positive B-cell lymphoproliferative disorder. Comments: Flow cytometry shows monoclonal B-cells (36% of total cells)   with CD10 expression without CD5, consistent with a CD10-positive B-cell   lymphoproliferative disorder. The main differential diagnosis includes   follicular lymphoma, Burkitt lymphoma and large B-cell lymphoma. Please   correlate the result with morphologic findings and clinical information. Flow Differential (%) and Population Analysis:   Lymphocytes: 93.7%   T-cells (39% of lymphoid cells) show a CD4/CD8 ratio of 3.3 without overt   phenotypic abnormality. NK-cells (1% of lymphoid cells) are unremarkable.    Mature B-cells (56% of lymphoid cells) include large forms based on   forward scattered pattern and show: CD5 neg, CD10+, CD11c+dim, CD19+,   CD20+ with surface lambda light chain restriction. Markers Performed: CD2, CD3, CD4, CD5, CD7, CD8, CD10, CD11c, CD19, CD20,   CD23, CD34, CD38, CD45, CD56, Kappa, Lambda (17 Markers)   The Technical Component Processing of this test was completed at   [facility], [address] [place] [address], [place], [place] / [address] /   [phone] / [facility] # [id number]. Interpretation by Aster Harris M.D. The   complete scanned report is available in Epic. CPT: S3377276, [id number], [id number], [id number], [id number], [id number]   [facility]2/2022   *Electronically Signed Out By Heidi Barrera. Shelby Trujillo MD*   ==========================================================================   * * *Gross Description* * *   Specimen #1, received fresh labeled with the patient's name,  and left   posterior cervical lymph node, consists of two paper towels containing a   1.7 x 1.5 x 0.5 cm aggregate of pale pink-tan, fleshy soft tissue   fragments. The specimen is handled according to the flow cytometry   protocol as follows: 7          Two air-dried touch prep slides - one Diff Quik stained, one   rapid-fixed in 95% alcohol   7          Representative sections in B plus fixative (1A)   7          One piece held in RPMI for possible flow cytometry analysis   7          Remaining tissue fixed in formalin for permanents (1B)   Dr. Shelby Trujillo will determine if flow cytometry is necessary. 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 F who presents as an inpatient admission for Cycle #4 DA-R-EPOCH systemic therapy. Patient overall reports feeling stable. She dusty any recurrence of the lesion at her scalp. She denies any significant fatigue. Denies any fevers. She has had some pains in her arms and legs. She denies any shortness of breath. She denies any swelling. Denies any thrush. She reports that she would like an as needed antifungal prescription upon discharge since she seems to develop thrush shortly thereafter. Interval History:     Teary this am; states woke up every emotional this morning; nonspecific concerns; having some pain to arms and swelling to hands; having a difficult time keeping oxygen on. No family at bedside. Objective:      Visit Vitals  BP (!) 171/72 (BP 1 Location: Left upper arm, BP Patient Position: Sitting)   Pulse 84   Temp 97.6 °F (36.4 °C)   Resp 16   Ht 5' 3\" (1.6 m)   Wt 180 lb 1.9 oz (81.7 kg)   SpO2 95%   BMI 31.91 kg/m²     ECO- Restricted in physically strenuous activity but ambulatory and able to carry out work of a light or sedentary nature, e.g., light house work, office work. General: no distress  Respiratory:  no wheezing/rhonchi noted; normal respiratory effort: O2 not in use  CV: slight BLE peripheral edema  Skin: no rashes; no ecchymoses; no petechiae  Psych: alert, oriented, normal mood/affect     Results:        Lab Results   Component Value Date/Time    WBC 12.4 (H) 2022 04:44 AM    HGB 10.8 (L) 2022 04:44 AM    HCT 34.1 (L) 2022 04:44 AM    PLATELET 252  04:44 AM    MCV 99.7 (H) 2022 04:44 AM     Lab Results   Component Value Date/Time    Sodium 146 (H) 2022 04:44 AM    Potassium 3.9 2022 04:44 AM    Chloride 114 (H) 2022 04:44 AM    CO2 29 2022 04:44 AM    Anion gap 3 (L) 2022 04:44 AM    Glucose 142 (H) 2022 04:44 AM    BUN 12 2022 04:44 AM    Creatinine 0.58 2022 04:44 AM    BUN/Creatinine ratio 21 (H) 2022 04:44 AM    GFR est AA >60 2022 04:44 AM    GFR est non-AA >60 2022 04:44 AM    Calcium 8.1 (L) 2022 04:44 AM    Bilirubin, total 0.2 2022 04:44 AM    Alk.  phosphatase 55 2022 04:44 AM    Protein, total 4.9 (L) 2022 04:44 AM    Albumin 2.8 (L) 2022 04:44 AM    Globulin 2.1 09/14/2022 04:44 AM    A-G Ratio 1.3 09/14/2022 04:44 AM    ALT (SGPT) 16 09/14/2022 04:44 AM    AST (SGOT) 10 (L) 09/14/2022 04:44 AM     IR INSERT TUNL CVC W PORT OVER 5 YEARS    Result Date: 4/29/2022    Technically successful placement of a single lumen port with the catheter tip in the right atrium. Catheter is ready for immediate use. CT NECK SOFT TISSUE W CONT    Result Date: 6/28/2022    1. Interval decrease in size of previously enlarged lymph nodes in the neck, abdomen, and pelvis. No new or enlarging lymph nodes. 2. New tree-in-bud opacities and groundglass opacities in the right lower and upper lobes may be infectious/inflammatory. May be assessed on follow-up examinations. 23X     CT CHEST ABD PELV W CONT    Result Date: 6/28/2022      1. Interval decrease in size of previously enlarged lymph nodes in the neck, abdomen, and pelvis. No new or enlarging lymph nodes. 2. New tree-in-bud opacities and groundglass opacities in the right lower and upper lobes may be infectious/inflammatory. May be assessed on follow-up examinations. 23X     PET/CT TUMOR IMAGE [address] BDY W (INI)    Addendum Date: 5/5/2022    Addendum: There is increased metabolic activity in a small left frontal scalp lesion with SUV of 6.9. Result Date: 5/5/2022  PET/CT SCAN PROCEDURE:  There is increased metabolic activity in 2 lymph nodes in the left neck, in the hepatogastric ligament and retroperitoneal adenopathy, mesenteric lymph nodes and left inguinal adenopathy suspicious for lymphoma. There is increased metabolic activity in a mass posterior right renal cortex nonspecific but may represent lymphoma. There is increased bony activity as described above suspicious for lymphoma. . 23X        Assessment and Recommendations:     Acute resp failure  Unclear etiology  Requiring supplemental O2 to maintain oxygen sats > 90  Chest Xray unrevealing and CTA negative for pulmonary embolus  - started on Lasix 20 mg po daily for fluid retention  - pulmonary team following : possibly 2/2 to COPD with immobility in hospital; added incentive spirometer; will follow outpatient     Diffuse B cell lymphoma:   -proceed with Cycle #4d3  DA-R-EPOCH without any dose increase from Cycle #3. She is at dose level reduction 1 on etoposide,doxorubicin,cyclophosphamide  -patient will need PET/CT as outpatient prior to Cycle #5. Tobacco dependence  -reports nicotine patch helps her while hospitalized. Continue with use. Thrush  -recurrent problem without any acute complaints; patient requests fluconazole Rx on hospital discharge. -Anxiety  Teary this am; [possibly from high dose steroids; on home meds of Prozac; Xanax 1 mg BID prn. Will consult palliative team to assist with possible medication adjustment  -palliative team consulted     Plan reviewed with Dr Lou Valladares.

## 2022-09-15 LAB
ALBUMIN SERPL-MCNC: 2.9 G/DL (ref 3.5–5)
ALBUMIN/GLOB SERPL: 1.3 {RATIO} (ref 1.1–2.2)
ALP SERPL-CCNC: 59 U/L (ref 45–117)
ALT SERPL-CCNC: 22 U/L (ref 12–78)
ANION GAP SERPL CALC-SCNC: 4 MMOL/L (ref 5–15)
AST SERPL-CCNC: 15 U/L (ref 15–37)
BASOPHILS # BLD: 0 K/UL (ref 0–0.1)
BASOPHILS NFR BLD: 0 % (ref 0–1)
BILIRUB SERPL-MCNC: 0.2 MG/DL (ref 0.2–1)
BUN SERPL-MCNC: 12 MG/DL (ref 6–20)
BUN/CREAT SERPL: 17 (ref 12–20)
CALCIUM SERPL-MCNC: 8.1 MG/DL (ref 8.5–10.1)
CHLORIDE SERPL-SCNC: 111 MMOL/L (ref 97–108)
CO2 SERPL-SCNC: 29 MMOL/L (ref 21–32)
CREAT SERPL-MCNC: 0.72 MG/DL (ref 0.55–1.02)
DIFFERENTIAL METHOD BLD: ABNORMAL
EOSINOPHIL # BLD: 0 K/UL (ref 0–0.4)
EOSINOPHIL NFR BLD: 0 % (ref 0–7)
ERYTHROCYTE [DISTWIDTH] IN BLOOD BY AUTOMATED COUNT: 14.3 % (ref 11.5–14.5)
GLOBULIN SER CALC-MCNC: 2.3 G/DL (ref 2–4)
GLUCOSE SERPL-MCNC: 151 MG/DL (ref 65–100)
HCT VFR BLD AUTO: 36.2 % (ref 35–47)
HGB BLD-MCNC: 11.7 G/DL (ref 11.5–16)
IMM GRANULOCYTES # BLD AUTO: 0.1 K/UL (ref 0–0.04)
IMM GRANULOCYTES NFR BLD AUTO: 1 % (ref 0–0.5)
LYMPHOCYTES # BLD: 0.2 K/UL (ref 0.8–3.5)
LYMPHOCYTES NFR BLD: 3 % (ref 12–49)
MCH RBC QN AUTO: 30.9 PG (ref 26–34)
MCHC RBC AUTO-ENTMCNC: 32.3 G/DL (ref 30–36.5)
MCV RBC AUTO: 95.5 FL (ref 80–99)
MONOCYTES # BLD: 0.3 K/UL (ref 0–1)
MONOCYTES NFR BLD: 4 % (ref 5–13)
NEUTS SEG # BLD: 7.6 K/UL (ref 1.8–8)
NEUTS SEG NFR BLD: 92 % (ref 32–75)
NRBC # BLD: 0 K/UL (ref 0–0.01)
NRBC BLD-RTO: 0 PER 100 WBC
PLATELET # BLD AUTO: 300 K/UL (ref 150–400)
PMV BLD AUTO: 10.1 FL (ref 8.9–12.9)
POTASSIUM SERPL-SCNC: 3.8 MMOL/L (ref 3.5–5.1)
PROT SERPL-MCNC: 5.2 G/DL (ref 6.4–8.2)
RBC # BLD AUTO: 3.79 M/UL (ref 3.8–5.2)
RBC MORPH BLD: ABNORMAL
SODIUM SERPL-SCNC: 144 MMOL/L (ref 136–145)
WBC # BLD AUTO: 8.2 K/UL (ref 3.6–11)

## 2022-09-15 PROCEDURE — 74011636637 HC RX REV CODE- 636/637: Performed by: INTERNAL MEDICINE

## 2022-09-15 PROCEDURE — 74011250636 HC RX REV CODE- 250/636: Performed by: NURSE PRACTITIONER

## 2022-09-15 PROCEDURE — 74011000250 HC RX REV CODE- 250: Performed by: NURSE PRACTITIONER

## 2022-09-15 PROCEDURE — 36415 COLL VENOUS BLD VENIPUNCTURE: CPT

## 2022-09-15 PROCEDURE — 74011636637 HC RX REV CODE- 636/637: Performed by: NURSE PRACTITIONER

## 2022-09-15 PROCEDURE — 65270000029 HC RM PRIVATE

## 2022-09-15 PROCEDURE — 80053 COMPREHEN METABOLIC PANEL: CPT

## 2022-09-15 PROCEDURE — 74011250637 HC RX REV CODE- 250/637: Performed by: INTERNAL MEDICINE

## 2022-09-15 PROCEDURE — 74011250636 HC RX REV CODE- 250/636: Performed by: INTERNAL MEDICINE

## 2022-09-15 PROCEDURE — 85025 COMPLETE CBC W/AUTO DIFF WBC: CPT

## 2022-09-15 PROCEDURE — 74011250637 HC RX REV CODE- 250/637: Performed by: NURSE PRACTITIONER

## 2022-09-15 PROCEDURE — 99232 SBSQ HOSP IP/OBS MODERATE 35: CPT | Performed by: INTERNAL MEDICINE

## 2022-09-15 RX ADMIN — SODIUM CHLORIDE 75.2 MG: 9 INJECTION, SOLUTION INTRAVENOUS at 12:11

## 2022-09-15 RX ADMIN — LISINOPRIL 20 MG: 20 TABLET ORAL at 08:59

## 2022-09-15 RX ADMIN — ONDANSETRON 8 MG: 2 INJECTION INTRAMUSCULAR; INTRAVENOUS at 16:12

## 2022-09-15 RX ADMIN — Medication 10 ML: at 21:26

## 2022-09-15 RX ADMIN — FLUOXETINE 40 MG: 20 CAPSULE ORAL at 08:59

## 2022-09-15 RX ADMIN — ONDANSETRON 8 MG: 2 INJECTION INTRAMUSCULAR; INTRAVENOUS at 08:59

## 2022-09-15 RX ADMIN — MORPHINE SULFATE 15 MG: 15 TABLET, FILM COATED, EXTENDED RELEASE ORAL at 08:59

## 2022-09-15 RX ADMIN — ALPRAZOLAM 1 MG: 0.5 TABLET ORAL at 21:27

## 2022-09-15 RX ADMIN — AMLODIPINE BESYLATE 5 MG: 5 TABLET ORAL at 09:00

## 2022-09-15 RX ADMIN — PREDNISONE 115 MG: 5 TABLET ORAL at 16:12

## 2022-09-15 RX ADMIN — PANTOPRAZOLE SODIUM 40 MG: 40 TABLET, DELAYED RELEASE ORAL at 06:45

## 2022-09-15 RX ADMIN — MORPHINE SULFATE 15 MG: 15 TABLET ORAL at 10:10

## 2022-09-15 RX ADMIN — PREDNISONE 115 MG: 5 TABLET ORAL at 09:00

## 2022-09-15 RX ADMIN — MORPHINE SULFATE 15 MG: 15 TABLET, FILM COATED, EXTENDED RELEASE ORAL at 21:07

## 2022-09-15 RX ADMIN — MORPHINE SULFATE 15 MG: 15 TABLET ORAL at 03:40

## 2022-09-15 RX ADMIN — SODIUM CHLORIDE 45 ML/HR: 9 INJECTION, SOLUTION INTRAVENOUS at 08:57

## 2022-09-15 RX ADMIN — ALPRAZOLAM 1 MG: 0.5 TABLET ORAL at 16:59

## 2022-09-15 RX ADMIN — SODIUM CHLORIDE: 9 INJECTION, SOLUTION INTRAVENOUS at 12:12

## 2022-09-15 RX ADMIN — FUROSEMIDE 20 MG: 20 TABLET ORAL at 08:59

## 2022-09-15 RX ADMIN — Medication 10 ML: at 09:00

## 2022-09-15 RX ADMIN — Medication 10 ML: at 13:32

## 2022-09-15 RX ADMIN — OLANZAPINE 5 MG: 5 TABLET, FILM COATED ORAL at 17:00

## 2022-09-15 RX ADMIN — MORPHINE SULFATE 15 MG: 15 TABLET ORAL at 16:11

## 2022-09-15 RX ADMIN — ONDANSETRON 8 MG: 2 INJECTION INTRAMUSCULAR; INTRAVENOUS at 02:41

## 2022-09-15 RX ADMIN — ALPRAZOLAM 1 MG: 0.5 TABLET ORAL at 02:45

## 2022-09-15 RX ADMIN — ENOXAPARIN SODIUM 40 MG: 100 INJECTION SUBCUTANEOUS at 13:22

## 2022-09-15 RX ADMIN — ALPRAZOLAM 1 MG: 0.5 TABLET ORAL at 08:59

## 2022-09-15 NOTE — PROGRESS NOTES
9/15/2022  Case Management Progress Note    9:43 AM  Patient is 78year old female here for a planned admission for chemotherapy   Patient's RUR is 18% yellow/moderate risk for readmission--currently a planned readmission  Covid test: negative 9/12  Chart reviewed  Patient finishes her course of chemotherapy tomorrow, Friday. Plan remains for her to discharge home with family afterwards. Patient has good family support at home. Noted she will return in 3 weeks for next treatment. Patient has had home health with [facility] recently and they will accept her back for additional support at home. Will continue to follow.      Transition of Care Plan   Continue medical management/treatment  [facility]  Family will transport at discharge  Follow up outpatient as indicated  CM will continue to follow    CHIVO Camp

## 2022-09-15 NOTE — PROGRESS NOTES
Bedside and Verbal shift change report given to Allie (oncoming nurse) by Chantelle Hernandez (offgoing nurse). Report included the following information SBAR, Kardex, Procedure Summary, Intake/Output, MAR, and Recent Results.

## 2022-09-15 NOTE — PROGRESS NOTES
[facility] [place] at [address], [address] [place]: [phone]  F: [phone] Patient ID  Name: Patti Abraham  YOB: 1955  MRN: [id number]  Referring Provider:   No referring provider defined for this encounter. 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 This summary highlights focused aspects of patient's ongoing care and may have a recurring section in notes with either updates or remain unchanged as a longitudinal care summary. -------------------------------------------------------------------------------------------------------------------------------------------------------------------------------------------------------  DIAGNOSIS:  Diffuse Large B-Cell Lymphoma     ORIGINAL STAGING:  Stage IV     SITES OF DISEASE:  Left Cervical Lymph Node,Bone Disease, Stage IV    CURRENT TREATMENT:  C1,D1 on 5/11/22 DA-R-EPOCH  C2  C3 delayed due to appetite issues and patient anxiety to restart therapy; administered on 8/18/22  C4-start on 9/12/22     PRIOR TREATMENT:  n/a     GOALS OF CARE:  Curative Intent     PATHOLOGY:  Specimen #:[id number] Collect: 4/22/2022      ==========================================================================                    * * *SURGICAL PATHOLOGY REPORT* * *   ==========================================================================   * * *PROCEDURES/ADDENDA* * *   ADDENDUM   Date Ordered: 4/26/2022     Status: Signed Out   Date Complete: 4/26/2022     By: Sonya Benitez.  Dimitry Gambino MD   Date Reported: 4/26/2022   Addendum Diagnosis   Lymph node, left posterior cervical lymph node, excision:   In situ hybridization for Jesús-Barr viral RNA: Negative   CPT: 20927  Addendum Comment   * * *CLINICAL HISTORY* * * Cervical lymphadenopathy, rule out lymphoma   ==========================================================================   * * *FINAL PATHOLOGIC DIAGNOSIS* * *        Lymph node, left posterior cervical lymph node, excision:        Large B cell lymphoma. See comment. * * *Comment* * *   The histologic section has fragments of lymphoid tissue with diffuse and   focal follicular architecture. Diffuse areas are comprised of large,   atypical lymphocytes with centroblastic morphology and increased mitotic   activity. Immunohistochemical stains confirm the malignant cells are CD20   positive B cells that coexpress CD10 and BCL6 without MUM1. CD23   highlights rare follicular dendritic networks associated with lymphoid   follicles with germinal centers and express CD10 and BCL6 without BCL2. CyclinD1 and CD56 stains are negative. Concurrent flow cytometric analysis   confirms a clonal CD10 positive B-cell population comprised of medium to   large cells. The Ki-67 proliferation index is 30%. The overall findings favor the diagnosis of diffuse large B-cell lymphoma,   germinal center subtype. Molecular genetic studies are pending for further   characterization will be reported in an addendum. Flow cytometry:   Consistent with CD10-positive B-cell lymphoproliferative disorder. Comments: Flow cytometry shows monoclonal B-cells (36% of total cells)   with CD10 expression without CD5, consistent with a CD10-positive B-cell   lymphoproliferative disorder. The main differential diagnosis includes   follicular lymphoma, Burkitt lymphoma and large B-cell lymphoma. Please   correlate the result with morphologic findings and clinical information. Flow Differential (%) and Population Analysis:   Lymphocytes: 93.7%   T-cells (39% of lymphoid cells) show a CD4/CD8 ratio of 3.3 without overt   phenotypic abnormality. NK-cells (1% of lymphoid cells) are unremarkable.    Mature B-cells (56% of lymphoid cells) include large forms based on   forward scattered pattern and show: CD5 neg, CD10+, CD11c+dim, CD19+,   CD20+ with surface lambda light chain restriction. Markers Performed: CD2, CD3, CD4, CD5, CD7, CD8, CD10, CD11c, CD19, CD20,   CD23, CD34, CD38, CD45, CD56, Kappa, Lambda (17 Markers)   The Technical Component Processing of this test was completed at   [facility], [address] [place] [address], [facility] / [address] /   [phone] / [facility] Levels # 58O660. Interpretation by Lavelle Gunderson M.D. The   complete scanned report is available in Epic. CPT: [id number], P3767551, [id number], [id number], [id number], [id number]   [facility]2/2022   *Electronically Signed Out By Uma Caba. Glenys Galvez MD*   ==========================================================================   * * *Gross Description* * *   Specimen #1, received fresh labeled with the patient's name,  and left   posterior cervical lymph node, consists of two paper towels containing a   1.7 x 1.5 x 0.5 cm aggregate of pale pink-tan, fleshy soft tissue   fragments. The specimen is handled according to the flow cytometry   protocol as follows: 7          Two air-dried touch prep slides - one Diff Quik stained, one   rapid-fixed in 95% alcohol   7          Representative sections in B plus fixative (1A)   7          One piece held in RPMI for possible flow cytometry analysis   7          Remaining tissue fixed in formalin for permanents (1B)   Dr. Glenys Galvez will determine if flow cytometry is necessary. 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 F who presents as an inpatient admission for Cycle #4 DA-R-EPOCH systemic therapy. Patient overall reports feeling stable. She dusty any recurrence of the lesion at her scalp. She denies any significant fatigue. Denies any fevers. She has had some pains in her arms and legs. She denies any shortness of breath. She denies any swelling.  Denies any thrush. She reports that she would like an as needed antifungal prescription upon discharge since she seems to develop thrush shortly thereafter. Interval History:     Feeling better today. Anxiety better. No requiring supplemental O2. No complaints. No family at bedside. Objective:      Visit Vitals  BP (!) 158/79 (BP 1 Location: Left upper arm, BP Patient Position: At rest)   Pulse (!) 55   Temp 98.1 °F (36.7 °C)   Resp 16   Ht 5' 3\" (1.6 m)   Wt 188 lb 11.4 oz (85.6 kg)   SpO2 94%   BMI 33.43 kg/m²     ECO- Restricted in physically strenuous activity but ambulatory and able to carry out work of a light or sedentary nature, e.g., light house work, office work. General: no distress  Respiratory:  no wheezing/rhonchi noted; normal respiratory effort: O2 not in use  CV: slight BLE peripheral edema  Skin: no rashes; no ecchymoses; no petechiae  Psych: alert, oriented, normal mood/affect     Results:        Lab Results   Component Value Date/Time    WBC 8.2 09/15/2022 02:39 AM    HGB 11.7 09/15/2022 02:39 AM    HCT 36.2 09/15/2022 02:39 AM    PLATELET 721  02:39 AM    MCV 95.5 09/15/2022 02:39 AM     Lab Results   Component Value Date/Time    Sodium 144 09/15/2022 02:39 AM    Potassium 3.8 09/15/2022 02:39 AM    Chloride 111 (H) 09/15/2022 02:39 AM    CO2 29 09/15/2022 02:39 AM    Anion gap 4 (L) 09/15/2022 02:39 AM    Glucose 151 (H) 09/15/2022 02:39 AM    BUN 12 09/15/2022 02:39 AM    Creatinine 0.72 09/15/2022 02:39 AM    BUN/Creatinine ratio 17 09/15/2022 02:39 AM    GFR est AA >60 09/15/2022 02:39 AM    GFR est non-AA >60 09/15/2022 02:39 AM    Calcium 8.1 (L) 09/15/2022 02:39 AM    Bilirubin, total 0.2 09/15/2022 02:39 AM    Alk.  phosphatase 59 09/15/2022 02:39 AM    Protein, total 5.2 (L) 09/15/2022 02:39 AM    Albumin 2.9 (L) 09/15/2022 02:39 AM    Globulin 2.3 09/15/2022 02:39 AM    A-G Ratio 1.3 09/15/2022 02:39 AM    ALT (SGPT) 22 09/15/2022 02:39 AM    AST (SGOT) 15 09/15/2022 02:39 AM     IR INSERT TUNL CVC W PORT OVER 5 YEARS    Result Date: 4/29/2022    Technically successful placement of a single lumen port with the catheter tip in the right atrium. Catheter is ready for immediate use. CT NECK SOFT TISSUE W CONT    Result Date: 6/28/2022    1. Interval decrease in size of previously enlarged lymph nodes in the neck, abdomen, and pelvis. No new or enlarging lymph nodes. 2. New tree-in-bud opacities and groundglass opacities in the right lower and upper lobes may be infectious/inflammatory. May be assessed on follow-up examinations. 23X     CT CHEST ABD PELV W CONT    Result Date: 6/28/2022      1. Interval decrease in size of previously enlarged lymph nodes in the neck, abdomen, and pelvis. No new or enlarging lymph nodes. 2. New tree-in-bud opacities and groundglass opacities in the right lower and upper lobes may be infectious/inflammatory. May be assessed on follow-up examinations. 23X     PET/CT TUMOR IMAGE [address] BD[address] W (INI)    Addendum Date: 5/5/2022    Addendum: There is increased metabolic activity in a small left frontal scalp lesion with SUV of 6.9. Result Date: 5/5/2022  PET/CT SCAN PROCEDURE:  There is increased metabolic activity in 2 lymph nodes in the left neck, in the hepatogastric ligament and retroperitoneal adenopathy, mesenteric lymph nodes and left inguinal adenopathy suspicious for lymphoma. There is increased metabolic activity in a mass posterior right renal cortex nonspecific but may represent lymphoma. There is increased bony activity as described above suspicious for lymphoma. . 23X        Assessment and Recommendations:     Acute resp failure  Resolved; now on room air.    Initially requiring supplemental O2 to maintain oxygen sats > 90  Chest Xray unrevealing and CTA negative for pulmonary embolus  - started on Lasix 20 mg po daily for fluid retention  - pulmonary team following : possibly 2/2 to COPD with immobility in hospital; added incentive spirometer; will follow outpatient     Diffuse B cell lymphoma:   -proceed with Cycle #4d4  DA-R-EPOCH without any dose increase from Cycle #3. She is at dose level reduction 1 on etoposide,doxorubicin,cyclophosphamide  -patient will need PET/CT as outpatient prior to Cycle #5. Tobacco dependence  -reports nicotine patch helps her while hospitalized. Continue with use. Thrush  -recurrent problem without any acute complaints; patient requests fluconazole Rx on hospital discharge. Anxiety  Improved this am  -palliative team following: adjustment made to inpatient Xanax ;      Plan reviewed with Dr Evonne Jaeger.

## 2022-09-15 NOTE — PROGRESS NOTES
Medicare pt has received, reviewed, and signed 2nd IM letter informing them of their right to appeal the discharge. Signed copied has been placed on pt bedside chart.   Duane Favors CMS

## 2022-09-15 NOTE — PROGRESS NOTES
Bedside and Verbal shift change report given to Garcia Mireles RN (oncoming nurse) by Treasure Mcginnis RN (offgoing nurse). Report included the following information SBAR, Kardex, Intake/Output, MAR, Accordion, Recent Results, and Med Rec Status.

## 2022-09-16 VITALS
HEART RATE: 65 BPM | DIASTOLIC BLOOD PRESSURE: 79 MMHG | OXYGEN SATURATION: 92 % | HEIGHT: 63 IN | SYSTOLIC BLOOD PRESSURE: 141 MMHG | RESPIRATION RATE: 18 BRPM | BODY MASS INDEX: 33.44 KG/M2 | TEMPERATURE: 97.7 F | WEIGHT: 188.71 LBS

## 2022-09-16 LAB
ALBUMIN SERPL-MCNC: 2.6 G/DL (ref 3.5–5)
ALBUMIN/GLOB SERPL: 1.2 {RATIO} (ref 1.1–2.2)
ALP SERPL-CCNC: 47 U/L (ref 45–117)
ALT SERPL-CCNC: 25 U/L (ref 12–78)
ANION GAP SERPL CALC-SCNC: 5 MMOL/L (ref 5–15)
AST SERPL-CCNC: 13 U/L (ref 15–37)
BASOPHILS # BLD: 0 K/UL (ref 0–0.1)
BASOPHILS NFR BLD: 0 % (ref 0–1)
BILIRUB SERPL-MCNC: 0.3 MG/DL (ref 0.2–1)
BUN SERPL-MCNC: 13 MG/DL (ref 6–20)
BUN/CREAT SERPL: 22 (ref 12–20)
CALCIUM SERPL-MCNC: 7.9 MG/DL (ref 8.5–10.1)
CHLORIDE SERPL-SCNC: 107 MMOL/L (ref 97–108)
CO2 SERPL-SCNC: 32 MMOL/L (ref 21–32)
CREAT SERPL-MCNC: 0.6 MG/DL (ref 0.55–1.02)
DIFFERENTIAL METHOD BLD: ABNORMAL
EOSINOPHIL # BLD: 0 K/UL (ref 0–0.4)
EOSINOPHIL NFR BLD: 0 % (ref 0–7)
ERYTHROCYTE [DISTWIDTH] IN BLOOD BY AUTOMATED COUNT: 14 % (ref 11.5–14.5)
GLOBULIN SER CALC-MCNC: 2.2 G/DL (ref 2–4)
GLUCOSE SERPL-MCNC: 171 MG/DL (ref 65–100)
HCT VFR BLD AUTO: 32.8 % (ref 35–47)
HGB BLD-MCNC: 10.7 G/DL (ref 11.5–16)
IMM GRANULOCYTES # BLD AUTO: 0 K/UL (ref 0–0.04)
IMM GRANULOCYTES NFR BLD AUTO: 1 % (ref 0–0.5)
LYMPHOCYTES # BLD: 0.1 K/UL (ref 0.8–3.5)
LYMPHOCYTES NFR BLD: 3 % (ref 12–49)
MCH RBC QN AUTO: 30.3 PG (ref 26–34)
MCHC RBC AUTO-ENTMCNC: 32.6 G/DL (ref 30–36.5)
MCV RBC AUTO: 92.9 FL (ref 80–99)
MONOCYTES # BLD: 0.1 K/UL (ref 0–1)
MONOCYTES NFR BLD: 3 % (ref 5–13)
NEUTS SEG # BLD: 4.3 K/UL (ref 1.8–8)
NEUTS SEG NFR BLD: 93 % (ref 32–75)
NRBC # BLD: 0 K/UL (ref 0–0.01)
NRBC BLD-RTO: 0 PER 100 WBC
PLATELET # BLD AUTO: 240 K/UL (ref 150–400)
PMV BLD AUTO: 9.8 FL (ref 8.9–12.9)
POTASSIUM SERPL-SCNC: 3.5 MMOL/L (ref 3.5–5.1)
PROT SERPL-MCNC: 4.8 G/DL (ref 6.4–8.2)
RBC # BLD AUTO: 3.53 M/UL (ref 3.8–5.2)
RBC MORPH BLD: ABNORMAL
SODIUM SERPL-SCNC: 144 MMOL/L (ref 136–145)
WBC # BLD AUTO: 4.5 K/UL (ref 3.6–11)

## 2022-09-16 PROCEDURE — 74011250637 HC RX REV CODE- 250/637: Performed by: NURSE PRACTITIONER

## 2022-09-16 PROCEDURE — 74011636637 HC RX REV CODE- 636/637: Performed by: NURSE PRACTITIONER

## 2022-09-16 PROCEDURE — 74011250637 HC RX REV CODE- 250/637: Performed by: INTERNAL MEDICINE

## 2022-09-16 PROCEDURE — 99232 SBSQ HOSP IP/OBS MODERATE 35: CPT | Performed by: INTERNAL MEDICINE

## 2022-09-16 PROCEDURE — 80053 COMPREHEN METABOLIC PANEL: CPT

## 2022-09-16 PROCEDURE — 74011000250 HC RX REV CODE- 250: Performed by: NURSE PRACTITIONER

## 2022-09-16 PROCEDURE — 85025 COMPLETE CBC W/AUTO DIFF WBC: CPT

## 2022-09-16 PROCEDURE — 36415 COLL VENOUS BLD VENIPUNCTURE: CPT

## 2022-09-16 PROCEDURE — 74011250636 HC RX REV CODE- 250/636: Performed by: INTERNAL MEDICINE

## 2022-09-16 RX ORDER — FLUCONAZOLE 100 MG/1
100 TABLET ORAL DAILY
Qty: 4 TABLET | Refills: 0 | Status: SHIPPED | OUTPATIENT
Start: 2022-09-16 | End: 2022-09-19

## 2022-09-16 RX ADMIN — MORPHINE SULFATE 15 MG: 15 TABLET ORAL at 06:45

## 2022-09-16 RX ADMIN — Medication 10 ML: at 12:25

## 2022-09-16 RX ADMIN — LISINOPRIL 20 MG: 20 TABLET ORAL at 09:12

## 2022-09-16 RX ADMIN — SODIUM CHLORIDE 45 ML/HR: 9 INJECTION, SOLUTION INTRAVENOUS at 06:45

## 2022-09-16 RX ADMIN — ONDANSETRON 8 MG: 2 INJECTION INTRAMUSCULAR; INTRAVENOUS at 09:11

## 2022-09-16 RX ADMIN — ONDANSETRON 8 MG: 2 INJECTION INTRAMUSCULAR; INTRAVENOUS at 00:31

## 2022-09-16 RX ADMIN — AMLODIPINE BESYLATE 5 MG: 5 TABLET ORAL at 09:11

## 2022-09-16 RX ADMIN — FLUOXETINE 40 MG: 20 CAPSULE ORAL at 09:11

## 2022-09-16 RX ADMIN — ALPRAZOLAM 1 MG: 0.5 TABLET ORAL at 06:45

## 2022-09-16 RX ADMIN — MORPHINE SULFATE 15 MG: 15 TABLET, FILM COATED, EXTENDED RELEASE ORAL at 09:11

## 2022-09-16 RX ADMIN — ZOLPIDEM TARTRATE 5 MG: 5 TABLET ORAL at 01:28

## 2022-09-16 RX ADMIN — CYCLOPHOSPHAMIDE 1128 MG: 2 INJECTION, POWDER, FOR SOLUTION INTRAVENOUS; ORAL at 11:00

## 2022-09-16 RX ADMIN — PREDNISONE 115 MG: 5 TABLET ORAL at 05:11

## 2022-09-16 RX ADMIN — Medication 10 ML: at 05:06

## 2022-09-16 RX ADMIN — SULFAMETHOXAZOLE AND TRIMETHOPRIM 1 TABLET: 800; 160 TABLET ORAL at 09:11

## 2022-09-16 RX ADMIN — PREDNISONE 115 MG: 5 TABLET ORAL at 13:45

## 2022-09-16 RX ADMIN — FUROSEMIDE 20 MG: 20 TABLET ORAL at 09:11

## 2022-09-16 RX ADMIN — PANTOPRAZOLE SODIUM 40 MG: 40 TABLET, DELAYED RELEASE ORAL at 06:34

## 2022-09-16 NOTE — PROGRESS NOTES
Problem: Patient Education: Go to Patient Education Activity  Goal: Patient/Family Education  Outcome: Progressing Towards Goal     Problem: Chemotherapy, Day 1  Goal: Nutrition/Diet  Outcome: Progressing Towards Goal  Goal: Discharge Planning  Outcome: Progressing Towards Goal     Problem: Falls - Risk of  Goal: *Absence of Falls  Description: Document Sherrill Castillo Fall Risk and appropriate interventions in the flowsheet.   Outcome: Progressing Towards Goal  Note: Fall Risk Interventions:  Mobility Interventions: Bed/chair exit alarm, Communicate number of staff needed for ambulation/transfer, OT consult for ADLs, Patient to call before getting OOB, PT Consult for mobility concerns, PT Consult for assist device competence, Strengthening exercises (ROM-active/passive), Utilize walker, cane, or other assistive device, Utilize gait belt for transfers/ambulation    Mentation Interventions: Bed/chair exit alarm, Adequate sleep, hydration, pain control, Door open when patient unattended, Evaluate medications/consider consulting pharmacy, More frequent rounding, Reorient patient, Room close to nurse's station, Self-releasing belt, Toileting rounds, Update white board    Medication Interventions: Assess postural VS orthostatic hypotension, Bed/chair exit alarm, Evaluate medications/consider consulting pharmacy, Patient to call before getting OOB, Teach patient to arise slowly    Elimination Interventions: Bed/chair exit alarm, Call light in reach, Elevated toilet seat, Patient to call for help with toileting needs, Toilet paper/wipes in reach, Toileting schedule/hourly rounds    History of Falls Interventions: Bed/chair exit alarm, Consult care management for discharge planning, Door open when patient unattended, Room close to nurse's station, Utilize gait belt for transfer/ambulation, Assess for delayed presentation/identification of injury for 48 hrs (comment for end date), Vital signs minimum Q4HRs X 24 hrs (comment for end date)         Problem: Patient Education: Go to Patient Education Activity  Goal: Patient/Family Education  Outcome: Progressing Towards Goal

## 2022-09-16 NOTE — ROUTINE PROCESS
Bedside and Verbal shift change report given to [address]  (oncoming nurse) by Charles Fishman RN  (offgoing nurse). Report included the following information SBAR, Kardex, Procedure Summary, MAR, and Recent Results.

## 2022-09-16 NOTE — PROGRESS NOTES
2022  Case Management Progress Note    12:01 PM  Patient is 78year old female here for a planned admission for chemotherapy   Patient's RUR is 18% yellow/moderate risk for readmission--currently a planned readmission  Covid test: negative   Chart reviewed--patient discussed at IDR rounds  Patient plans to return home today. Spoke with her at bedside to make sure she didn't have any questions or concerns and she does not. [address] called this morning and let me know that the reason patient had  [facility] orders is because she had declined services, so I checked with her this morning if she wants a home health nurse to come in and she declined. Patient does not have any other needs for discharge and is ok to dc home from CM standpoint.      Transition of Care Plan   Continue medical management/treatment  Home with family assistance this afternoon  Family will transport at discharge  Follow up outpatient as indicated  CM will continue to follow    CHIVO Aguiar

## 2022-09-16 NOTE — DISCHARGE SUMMARY
Physician Discharge Summary       Patient: rIvin Moya MRN: [id number]  SSN: [id number]    YOB: 1955  Age: 78 y.o. Sex: female    PCP: Thomas Kahn MD    Allergies: Oxycodone    Admit date: 9/12/2022  Admitting Provider: Khadra Junior MD    Discharge date: 9/16/2022  Discharging Provider: Mendoza Escobar NP    * Admission Diagnoses: Diffuse large B cell lymphoma ([facility] [C83.30]    * Discharge Diagnoses:    Hospital Problems as of 9/16/2022 Date Reviewed: 9/6/2022            Codes Class Noted - Resolved POA    Mild malnutrition ([facility] ICD-10-CM: E44.1  ICD-9-CM: 263.1  9/13/2022 - Present Unknown        Acute respiratory failure with hypoxia ([facility] ICD-10-CM: J96.01  ICD-9-CM: 518.81  9/13/2022 - Present Unknown        Thrush ICD-10-CM: B37.0  ICD-9-CM: 112.0  8/25/2022 - Present Yes        Diffuse large B cell lymphoma ([facility] ICD-10-CM: C83.30  ICD-9-CM: 202.80  5/11/2022 - Present Unknown        Encounter for antineoplastic chemotherapy ICD-10-CM: Z51.11  ICD-9-CM: V58.11  4/27/2022 - Present Yes        Tobacco dependence ICD-10-CM: F17.200  ICD-9-CM: 305.1  3/21/2022 - Present Yes           * Hospital Course:Ms Nba Mccoy was admitted on 9/12/22 for C4 chemotherapy with da-R-EPOCH. She tolerated therapy well, completing tx on Friday, 9/16/22. She experienced unexplained  hypoxia after admission requiring oxygen supplementation at 2 L/ min to maintain sats > 90. CXR was unrevealing. Pulmonary consult was placed and hypoxia was thought to be secondary to COPD and immobility in the hospital with possible developing atelectasis. Oxygen requirement resolved by mid week and at discharge pt was on room air with sats in low to mid 90s. She experienced mild edema which was controlled with lasix 20 mg po daily. She had  rx a nicotine patch while inpatient and palliative team prescribed patch  for home.  Palliative also made adjustment to her Xanax prescription to TID while inpatient  with instructions to return to BID prn at home. She completed C4 uneventfully and was discharged home with close follow up in the clinic on Mon  9/19/22 for neulasta injection and provider appt on Thurs 9/22/22. She will also be followed by the palliative outpatient team.        * Procedures: none      Consults: Pulmonary/Intensive care    Significant Diagnostic Studies: none    Discharge Exam: See progress note from today. * Discharge Condition: good  * Disposition: Home    Discharge Medications:  Current Discharge Medication List        START taking these medications    Details   fluconazole (DIFLUCAN) 100 mg tablet Take 1 Tablet by mouth daily for 3 days. FDA advises cautious prescribing of oral fluconazole in pregnancy. Qty: 4 Tablet, Refills: 0  Start date: 9/16/2022, End date: 9/19/2022    Comments: Take 2 tabs on Day 1 and then 1 tab for Day 2 and Day 3      nicotine (NICODERM CQ) 21 mg/24 hr 1 Patch by TransDERmal route every twenty-four (24) hours for 30 days. Qty: 30 Patch, Refills: 0  Start date: 9/14/2022, End date: 10/14/2022             * Follow-up Care/Patient Instructions: Activity: Activity as tolerated  Diet: Regular Diet  Wound Care: None needed    Follow-up Information       Follow up With Specialties Details Why [facility] Follow up  [facility]    Darylene Kava, MD Hematology and Oncology Go on 9/19/2022 appt at 7:30am for neulasta injection [address]  [phone]      Darylene Kava, MD Hematology and Oncology Go on 9/22/2022 appt 1 pm for labs and then provider appt [address]  [phone]              Signed:  Renetta Ch NP  9/16/2022  1:49 PM  =======  I personally saw and evaluated the patient and performed the key components of medical decision making.   The history, physical exam, and documentation were performed by Raf Vieyra NP. I reviewed and verified the above documentation and modified it as needed. Discharge home today; neulasta on Monday. Will need a PET/CT upon nearing completion of 4 cycles of chemotherapy but will order at next clinic visit.       Codey Cade MD  Hematology/Medical Oncology Physician  [address]  P: [phone]

## 2022-09-16 NOTE — PROGRESS NOTES
Problem: Falls - Risk of  Goal: *Absence of Falls  Description: Document Anish Mandel Fall Risk and appropriate interventions in the flowsheet.   Outcome: Progressing Towards Goal  Note: Fall Risk Interventions:  Mobility Interventions: Bed/chair exit alarm, Communicate number of staff needed for ambulation/transfer, Mechanical lift, OT consult for ADLs, Patient to call before getting OOB, PT Consult for mobility concerns, Strengthening exercises (ROM-active/passive), Utilize walker, cane, or other assistive device, Utilize gait belt for transfers/ambulation, PT Consult for assist device competence    Mentation Interventions: Adequate sleep, hydration, pain control, Bed/chair exit alarm, Door open when patient unattended, Evaluate medications/consider consulting pharmacy, Increase mobility, More frequent rounding, Reorient patient, Toileting rounds, Room close to nurse's station, Self-releasing belt, Update white board    Medication Interventions: Teach patient to arise slowly, Patient to call before getting OOB    Elimination Interventions: Bed/chair exit alarm, Call light in reach, Elevated toilet seat, Patient to call for help with toileting needs, Stay With Me (per policy), Toilet paper/wipes in reach, Toileting schedule/hourly rounds    History of Falls Interventions: Bed/chair exit alarm, Consult care management for discharge planning, Door open when patient unattended, Evaluate medications/consider consulting pharmacy, Room close to nurse's station, Utilize gait belt for transfer/ambulation, Assess for delayed presentation/identification of injury for 48 hrs (comment for end date), Vital signs minimum Q4HRs X 24 hrs (comment for end date)

## 2022-09-16 NOTE — PALLIATIVE CARE DISCHARGE
The Palliative Medicine team was consulted as part of your / your loved one's care in the hospital. Our team is a supportive service; we strive to relieve suffering and improve quality of life. You identified the following goal(s) as your main focus for healthcare: Patient/Health Care Proxy Stated Goals: Prolong life    We reviewed advance care planning information, which includes the following:  Advance Care Planning 9/14/2022   Patient's Healthcare Decision Maker is: Legal Next of Kin: Son [facility] and daughter Dariana Duque Directive None   Patient Would Like to Complete Advance Directive No   Does the patient have other document types Not on file     We reviewed / discussed your code status as: Full Code     Full Code means perform CPR in the event of cardiac arrest     [facility] means do NOT perform CPR in the event of cardiac arrest     Partial Code means you have specific preferences, please discuss with your health care team     No Order means this issue was not addressed / resolved during your stay    Because of the importance of this information, we are providing you with a printed copy to share with other healthcare providers after this hospitalization is complete. If any of the above information is incomplete or incorrect, please contact the Palliative Medicine team at [phone].
DISPLAY PLAN FREE TEXT

## 2022-09-16 NOTE — PROGRESS NOTES
[facility] [place] at [address], [address] [facility]  [facility]: [phone]  F: [phone] Patient ID  Name: Nichole Camarillo  YOB: 1955  MRN: [id number]  Referring Provider:   No referring provider defined for this encounter. 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 This summary highlights focused aspects of patient's ongoing care and may have a recurring section in notes with either updates or remain unchanged as a longitudinal care summary. -------------------------------------------------------------------------------------------------------------------------------------------------------------------------------------------------------  DIAGNOSIS:  Diffuse Large B-Cell Lymphoma     ORIGINAL STAGING:  Stage IV     SITES OF DISEASE:  Left Cervical Lymph Node,Bone Disease, Stage IV    CURRENT TREATMENT:  C1,D1 on 5/11/22 DA-R-EPOCH  C2  C3 delayed due to appetite issues and patient anxiety to restart therapy; administered on 8/18/22  C4-start on 9/12/22     PRIOR TREATMENT:  n/a     GOALS OF CARE:  Curative Intent     PATHOLOGY:  Specimen #:[id number] Collect: 4/22/2022      ==========================================================================                    * * *SURGICAL PATHOLOGY REPORT* * *   ==========================================================================   * * *PROCEDURES/ADDENDA* * *   ADDENDUM   Date Ordered: 4/26/2022     Status: Signed Out   Date Complete: 4/26/2022     By: Ena Tee MD   Date Reported: 4/26/2022   Addendum Diagnosis   Lymph node, left posterior cervical lymph node, excision:   In situ hybridization for Jesús-Barr viral RNA: Negative   CPT: 52778  Addendum Comment   * * *CLINICAL HISTORY* * * Cervical lymphadenopathy, rule out lymphoma   ==========================================================================   * * *FINAL PATHOLOGIC DIAGNOSIS* * *        Lymph node, left posterior cervical lymph node, excision:        Large B cell lymphoma. See comment. * * *Comment* * *   The histologic section has fragments of lymphoid tissue with diffuse and   focal follicular architecture. Diffuse areas are comprised of large,   atypical lymphocytes with centroblastic morphology and increased mitotic   activity. Immunohistochemical stains confirm the malignant cells are CD20   positive B cells that coexpress CD10 and BCL6 without MUM1. CD23   highlights rare follicular dendritic networks associated with lymphoid   follicles with germinal centers and express CD10 and BCL6 without BCL2. CyclinD1 and CD56 stains are negative. Concurrent flow cytometric analysis   confirms a clonal CD10 positive B-cell population comprised of medium to   large cells. The Ki-67 proliferation index is 30%. The overall findings favor the diagnosis of diffuse large B-cell lymphoma,   germinal center subtype. Molecular genetic studies are pending for further   characterization will be reported in an addendum. Flow cytometry:   Consistent with CD10-positive B-cell lymphoproliferative disorder. Comments: Flow cytometry shows monoclonal B-cells (36% of total cells)   with CD10 expression without CD5, consistent with a CD10-positive B-cell   lymphoproliferative disorder. The main differential diagnosis includes   follicular lymphoma, Burkitt lymphoma and large B-cell lymphoma. Please   correlate the result with morphologic findings and clinical information. Flow Differential (%) and Population Analysis:   Lymphocytes: 93.7%   T-cells (39% of lymphoid cells) show a CD4/CD8 ratio of 3.3 without overt   phenotypic abnormality. NK-cells (1% of lymphoid cells) are unremarkable.    Mature B-cells (56% of lymphoid cells) include large forms based on   forward scattered pattern and show: CD5 neg, CD10+, CD11c+dim, CD19+,   CD20+ with surface lambda light chain restriction. Markers Performed: CD2, CD3, CD4, CD5, CD7, CD8, CD10, CD11c, CD19, CD20,   CD23, CD34, CD38, CD45, CD56, Kappa, Lambda (17 Markers)   The Technical Component Processing of this test was completed at   [facility], [address] [place] [address], [place], [place] / [id number] /   [phone] / Reggy Fell # [id number]. Interpretation by Yarelis See M.D. The   complete scanned report is available in Epic. CPT: K8215028, O1075162, R8472501, [id number], [id number], [id number]   w2/2022   *Electronically Signed Out By Bayron Scanlon MD*   ==========================================================================   * * *Gross Description* * *   Specimen #1, received fresh labeled with the patient's name,  and left   posterior cervical lymph node, consists of two paper towels containing a   1.7 x 1.5 x 0.5 cm aggregate of pale pink-tan, fleshy soft tissue   fragments. The specimen is handled according to the flow cytometry   protocol as follows: 7          Two air-dried touch prep slides - one Diff Quik stained, one   rapid-fixed in 95% alcohol   7          Representative sections in B plus fixative (1A)   7          One piece held in RPMI for possible flow cytometry analysis   7          Remaining tissue fixed in formalin for permanents (1B)   Dr. Magdy Scanlon will determine if flow cytometry is necessary. 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 F who presents as an inpatient admission for Cycle #4 DA-R-EPOCH systemic therapy. Patient overall reports feeling stable. She dusty any recurrence of the lesion at her scalp. She denies any significant fatigue. Denies any fevers. She has had some pains in her arms and legs. She denies any shortness of breath. She denies any swelling.  Denies any thrush. She reports that she would like an as needed antifungal prescription upon discharge since she seems to develop thrush shortly thereafter. Interval History:     Continues to feel well; anxious to go home. Breathing well on room air with good sats. Denies N/V. Tolerating diet. Concerned about thrush following tx. No family at bedside. Objective:      Visit Vitals  BP (!) 141/79 (BP 1 Location: Left upper arm, BP Patient Position: At rest)   Pulse 65   Temp 97.7 °F (36.5 °C)   Resp 18   Ht 5' 3\" (1.6 m)   Wt 188 lb 11.4 oz (85.6 kg)   SpO2 92%   BMI 33.43 kg/m²     ECO- Restricted in physically strenuous activity but ambulatory and able to carry out work of a light or sedentary nature, e.g., light house work, office work. General: no distress  Respiratory:  no wheezing/rhonchi noted; normal respiratory effort: on room air  CV: slight BLE peripheral edema  Skin: no rashes; no ecchymoses; no petechiae  Psych: alert, oriented, normal mood/affect     Results:        Lab Results   Component Value Date/Time    WBC 4.5 2022 12:29 AM    HGB 10.7 (L) 2022 12:29 AM    HCT 32.8 (L) 2022 12:29 AM    PLATELET 747  12:29 AM    MCV 92.9 2022 12:29 AM     Lab Results   Component Value Date/Time    Sodium 144 2022 12:29 AM    Potassium 3.5 2022 12:29 AM    Chloride 107 2022 12:29 AM    CO2 32 2022 12:29 AM    Anion gap 5 2022 12:29 AM    Glucose 171 (H) 2022 12:29 AM    BUN 13 2022 12:29 AM    Creatinine 0.60 2022 12:29 AM    BUN/Creatinine ratio 22 (H) 2022 12:29 AM    GFR est AA >60 2022 12:29 AM    GFR est non-AA >60 2022 12:29 AM    Calcium 7.9 (L) 2022 12:29 AM    Bilirubin, total 0.3 2022 12:29 AM    Alk.  phosphatase 47 2022 12:29 AM    Protein, total 4.8 (L) 2022 12:29 AM    Albumin 2.6 (L) 2022 12:29 AM    Globulin 2.2 2022 12:29 AM    A-G Ratio 1.2 2022 12:29 AM    ALT (SGPT) 25 09/16/2022 12:29 AM    AST (SGOT) 13 (L) 09/16/2022 12:29 AM     IR INSERT TUNL CVC W PORT OVER 5 YEARS    Result Date: 4/29/2022    Technically successful placement of a single lumen port with the catheter tip in the right atrium. Catheter is ready for immediate use. CT NECK SOFT TISSUE W CONT    Result Date: 6/28/2022    1. Interval decrease in size of previously enlarged lymph nodes in the neck, abdomen, and pelvis. No new or enlarging lymph nodes. 2. New tree-in-bud opacities and groundglass opacities in the right lower and upper lobes may be infectious/inflammatory. May be assessed on follow-up examinations. 23X     CT CHEST ABD PELV W CONT    Result Date: 6/28/2022      1. Interval decrease in size of previously enlarged lymph nodes in the neck, abdomen, and pelvis. No new or enlarging lymph nodes. 2. New tree-in-bud opacities and groundglass opacities in the right lower and upper lobes may be infectious/inflammatory. May be assessed on follow-up examinations. 23X     PET/CT TUMOR IMAGE [address] (INI)    Addendum Date: 5/5/2022    Addendum: There is increased metabolic activity in a small left frontal scalp lesion with SUV of 6.9. Result Date: 5/5/2022  PET/CT SCAN PROCEDURE:  There is increased metabolic activity in 2 lymph nodes in the left neck, in the hepatogastric ligament and retroperitoneal adenopathy, mesenteric lymph nodes and left inguinal adenopathy suspicious for lymphoma. There is increased metabolic activity in a mass posterior right renal cortex nonspecific but may represent lymphoma. There is increased bony activity as described above suspicious for lymphoma. . 23X        Assessment and Recommendations:     Acute resp failure  Resolved; now on room air.    Initially requiring supplemental O2 to maintain oxygen sats > 90  Chest Xray unrevealing and CTA negative for pulmonary embolus  - started on Lasix 20 mg po daily for fluid retention  - pulmonary team following : possibly 2/2 to COPD with immobility in hospital; added incentive spirometer; will follow outpatient     Diffuse B cell lymphoma:   -proceed with Cycle #4d5 DA-R-EPOCH without any dose increase from Cycle #3. She is at dose level reduction 1 on etoposide,doxorubicin,cyclophosphamide  -patient will need PET/CT as outpatient prior to Cycle #5.  - will plan for discharge this pm after tx completed.   -has clinic appt on Monday for neulasta injection and follow up with Dr Jossie Kingsley for labs and provider appt later in the week. Tobacco dependence  -reports nicotine patch helps her while hospitalized. Continue with use. -Palliative provided rx for home patch    Thrush  -recurrent problem without any acute complaints; patient requests fluconazole Rx on hospital discharge.  -rx sent to pharmacy     Anxiety  Improved this am  -palliative team following: adjustment made to inpatient Xanax ; will resume BID prn at home     Plan reviewed with Dr Jossie Kingsley.

## 2022-09-16 NOTE — CONSULTS
Palliative Medicine Consult  Lemuel: [phone])    Patient Name: Nichole Camarillo  YOB: 1955    Date of Initial Consult: 9/14/22  Reason for Consult: overwhelming symptoms  Requesting Provider: Nubia Lazcano NP   Primary Care Physician: Maria T Canas MD     SUMMARY:   Nichole Camarillo is a 78 y.o. woman with a medical history of stage IV diffuse large b-cell lymphoma. She was diagnosed in 4/2022. She is followed by Dr. Jeanna Morgan and has been initiated on systemic chemotherapy with DA-R-EPOCH. Comorbid conditions include chronic anxiety and depression, nicotine dependence. She was admitted to [facility] electively for dose reduced cycle #4 on 9/12/2022. Current medical issues leading to Palliative Medicine involvement include: continuity of care, anxiety disorder worsened by cancer diagnosis. Psychosocial history: she's . She has 2 adult children, her daughter, Vera Valenzuela, who lives locally, and a son, Nicole Blunt, who lives in the home with her. 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 PALLIATIVE DIAGNOSES:   Panic attack  Anxiety and depression  Acute respiratory failure- resolved  Stage IV DLBCL     PLAN:   Chronic anxiety and depression  Not in remission   Escalated in recent months due to cancer diagnosis  Continue Prozac 40 mg daily  Increased alprazolam to 1 mg TID while inpatient to assist with anxiety related to hospitalization and adjustment disorder  Pt to resume alprazolam 1 mg BID at home. See notes from 9/14- encouraged CBT and other psychotherapeutic supports for rumination and coping. Pt not open or amenable at this time. Chronic pain  Dr. Estrada Bone assessment and notes reflect outpatient discussion re: total pain. No escalation of pain while inpatient. No changes today in her chronic morphine regimen.    Nicotine dependence  Currently smokes 8 cigarettes per day  Has Nicotine patch in place while inpatient  Has desire to quit and requests outpatient script if covered, sent to pharmacy on 9/14 to assess coverage / need for prior auth. Made our outpatient RN/LPN team aware in case of prior-auth need. Thank you for the opportunity to participate in the care of Mandy Johnson  Communicated plan of care with: Palliative [facility] Team including oncology team and outpatient pall med team.      [address] / TREATMENT PREFERENCES:     GOALS OF CARE:  Patient/Health Care Proxy Stated Goals: Prolong life    TREATMENT PREFERENCES:   Code Status: Full Code    Patient and family's personal goals include: to reduce her anxiety    Important upcoming milestones or family events: none reported    The patient identifies the following as important for living well: n/a      Advance Care Planning:  [x] The [facility] Interdisciplinary Team has updated the ACP Navigator with [address] and Patient Capacity      Primary Decision Maker: Julio Carballo [facility] - [phone]    Primary Decision Maker: Cecy Luke - [phone]    Advance Care Planning 9/14/2022   Patient's Healthcare Decision Maker is: Legal Next of [facility] Directive None   Patient Would Like to Complete Advance Directive No   Does the patient have other document types -     Medical Interventions: Full interventions     Other:    As far as possible, the palliative care team has discussed with patient / health care proxy about goals of care / treatment preferences for patient. HISTORY:     History obtained from: patient, EMR    CHIEF COMPLAINT: \"I was crying this morning and very anxious\"    HPI/SUBJECTIVE:    The patient is:   [x] Verbal and participatory  [] Non-participatory due to:     Met Ms. Rivas today with SUSAN Yo. This is day #3 of admission which was elective for cycle 4 of systemic chemotherapy.   She reports waking up this morning and feeling incredibly anxious and tearful with much greater intensity than is usual for her. Several clinicians had witnessed this episode prompting referral today to our team.  Narendra Morillo reports this is unusual for her that typically she holds her feelings in and is not this expressive in front of her physicians. She is not clear what triggered the increased anxiety but thinks it was probably related to waking up being tangled in her IV lines and feeling swollen. She was given her regular dose of Xanax after the incident started and began to feel better shortly after. She is hopeful to have this dose increase in the long-term to 3 times a day use. She was recently initiated on Xanax 1 mg twice daily in our clinic by Dr. Andrei Khan as she has been experiencing significant anxiety. She also has a history of benzodiazepine dependence for approximately 20 years time. This medication had been stopped 5 years ago when her PCP retired. She is not in receipt of any form of counseling or psychotherapy. She is also concomitantly taking Prozac 40 mg daily since sometime in the '90s. She recalls trials of many other medications for depression at that time, all caused significant side effects (she names Paxil, Zoloft, Wellbutrin). At one point she was also augmented with Abilify, she thinks this may have been helpful but cannot recall why it was stopped. She does not recall past use of BuSpar or mirtazapine. 9/16- doing well today. In good spirits, feels ready to go home after chemotherapy complete today. No further panic attacks since 9/14. States the extra dose of alprazolam was very effective without side effects.      Clinical Pain Assessment (nonverbal scale for severity on nonverbal patients):   Clinical Pain Assessment  Severity: 0          Duration: for how long has pt been experiencing pain (e.g., 2 days, 1 month, years)  Frequency: how often pain is an issue (e.g., several times per day, once every few days, constant)     FUNCTIONAL ASSESSMENT:     Palliative Performance Scale (PPS):  PPS: 70       PSYCHOSOCIAL/SPIRITUAL SCREENING:     Palliative IDT has assessed this patient for cultural preferences / practices and a referral made as appropriate to needs (Cultural Services, Patient Advocacy, Ethics, etc.)    Any spiritual / Caodaism concerns:  [] Yes /  [x] No   If \"Yes\" to discuss with pastoral care during IDT     Does caregiver feel burdened by caring for their loved one:   [] Yes /  [] No /  [] No Caregiver Present/Available [x] No Caregiver [] Pt Lives at [facility]  If \"Yes\" to discuss with social work during IDT    Anticipatory grief assessment:   [x] Normal  / [] Maladaptive     If \"Maladaptive\" to discuss with social work during IDT    ESAS Anxiety: Anxiety: 9    ESAS Depression: Depression: 7        REVIEW OF SYSTEMS:     Positive and pertinent negative findings in ROS are noted above in HPI. The following systems were [x] reviewed / [] unable to be reviewed as noted in HPI  Other findings are noted below. Systems: constitutional, ears/nose/mouth/throat, respiratory, gastrointestinal, genitourinary, musculoskeletal, integumentary, neurologic, psychiatric, endocrine. Positive findings noted below. Modified ESAS Completed by: provider     Drowsiness: 0   Depression: 7 Pain: 0   Anxiety: 9 Nausea: 0     Dyspnea: 0           Stool Occurrence(s): 0        PHYSICAL EXAM:     From RN flowsheet:  Wt Readings from Last 3 Encounters:   09/15/22 188 lb 11.4 oz (85.6 kg)   09/01/22 178 lb (80.7 kg)   08/29/22 171 lb 3.2 oz (77.7 kg)     Blood pressure (!) 141/79, pulse 65, temperature 97.7 °F (36.5 °C), resp. rate 18, height 5' 3\" (1.6 m), weight 188 lb 11.4 oz (85.6 kg), SpO2 92 %.     Pain Scale 1: Numeric (0 - 10)  Pain Intensity 1: 6  Pain Onset 1: chronic  Pain Location 1: Arm, Leg  Pain Orientation 1: Left, Right  Pain Description 1: Aching  Pain Intervention(s) 1: Distraction  Last bowel movement, if known:     Constitutional: Obese, well kempt woman who appears generally well  Eyes: pupils equal, anicteric  ENMT: no nasal discharge, moist mucous membranes  Respiratory: breathing not labored, now on room air  Neurologic: Intact cognition, following commands, moving all extremities  Psychiatric: full affect, no hallucinations, calm and relaxed     HISTORY:     Active Problems:    Tobacco dependence (3/21/2022)      Encounter for antineoplastic chemotherapy (4/27/2022)      Diffuse large B cell lymphoma ([address] (5/11/2022)      Gordon Chough (8/25/2022)      Mild malnutrition ([address] (9/13/2022)      Acute respiratory failure with hypoxia ([address] (9/13/2022)    Past Medical History:   Diagnosis Date    Adverse effect of anesthesia     PHLEGM IN THROAT POST OP & NEEDED X2 BREATHING TREATMENTS    Anxiety     COPD (chronic obstructive pulmonary disease) ([address] 2022    emphysema    Depression     GERD (gastroesophageal reflux disease)     Hernia, femoral     since childhood    Hypertension     Joint pain     Nausea & vomiting     TMJ arthritis       Past Surgical History:   Procedure Laterality Date    HX CERVICAL FUSION  2012    C5-C6    HX CHOLECYSTECTOMY  2020    HX COLONOSCOPY      HX ENDOSCOPY      HX HYSTERECTOMY  2019    HX TONSILLECTOMY      AS A CHILD    HX WISDOM TEETH EXTRACTION      TEENAGER    IR INSERT TUNL CVC W PORT OVER 5 YEARS  4/29/2022      Family History   Problem Relation Age of Onset    Heart Disease Mother 46    Heart Surgery Mother         HEART TRANSPLANT    Elevated Lipids Mother     Hypertension Mother     COPD Father     Emphysema Father     Sudden Death Brother 46    Other Maternal Grandmother         BRAIN TUMOR    No Known Problems Son     No Known Problems Daughter       History reviewed, no pertinent family history.   Social History     Tobacco Use    Smoking status: Some Days     Packs/day: 0.25     Years: 40.00     Pack years: 10.00     Types: Cigarettes    Smokeless tobacco: Never   Substance Use Topics    Alcohol use: Not Currently     Alcohol/week: 0.0 standard drinks     Allergies   Allergen Reactions    Oxycodone Nausea Only      Current Facility-Administered Medications   Medication Dose Route Frequency    predniSONE (DELTASONE) tablet 115 mg  115 mg Oral BID WITH MEALS    ALPRAZolam (XANAX) tablet 1 mg  1 mg Oral TID PRN    enoxaparin (LOVENOX) injection 40 mg  40 mg SubCUTAneous Q24H    amLODIPine (NORVASC) tablet 5 mg  5 mg Oral DAILY    FLUoxetine (PROzac) capsule 40 mg  40 mg Oral DAILY    morphine ER (MS CONTIN) tablet 15 mg  15 mg Oral Q12H    morphine IR (MS IR) tablet 15 mg  15 mg Oral Q6H PRN    trimethoprim-sulfamethoxazole (BACTRIM DS, SEPTRA DS) 160-800 mg per tablet 1 Tablet  1 Tablet Oral Q MON, WED & FRI    sodium chloride (NS) flush 5-40 mL  5-40 mL IntraVENous Q8H    sodium chloride (NS) flush 5-40 mL  5-40 mL IntraVENous PRN    acetaminophen (TYLENOL) tablet 650 mg  650 mg Oral Q6H PRN    Or    acetaminophen (TYLENOL) suppository 650 mg  650 mg Rectal Q6H PRN    polyethylene glycol (MIRALAX) packet 17 g  17 g Oral DAILY PRN    nicotine (NICODERM CQ) 14 mg/24 hr patch 1 Patch  1 Patch TransDERmal DAILY    zolpidem (AMBIEN) tablet 5 mg  5 mg Oral QHS PRN    hydrALAZINE (APRESOLINE) 20 mg/mL injection 10 mg  10 mg IntraVENous Q6H PRN    lisinopriL (PRINIVIL, ZESTRIL) tablet 20 mg  20 mg Oral DAILY    0.9% sodium chloride infusion  45 mL/hr IntraVENous CONTINUOUS    ondansetron (ZOFRAN) injection 8 mg  8 mg IntraVENous Q8H    LORazepam (ATIVAN) 2 mg/mL injection 0.5 mg  0.5 mg IntraVENous Q6H PRN    vinCRIStine (VINCASAR PFS) 0.8 mg, DOXOrubicin (ADRIAMYCIN) 15 mg in 0.9% sodium chloride 250 mL, overfill volume 25 mL chemo infusion   IntraVENous Q24H    cyclophosphamide (CYTOXAN) 1,128 mg in 0.9% sodium chloride 250 mL, overfill volume 25 mL chemo infusion  600 mg/m2 (Treatment Plan Recorded) IntraVENous ONCE    sodium chloride (NS) flush 10 mL  10 mL InterCATHeter PRN    heparin (porcine) pf 300-500 Units  300-500 Units InterCATHeter PRN diphenhydrAMINE (BENADRYL) injection 25 mg  25 mg IntraVENous PRN    acetaminophen (TYLENOL) tablet 650 mg  650 mg Oral PRN    meperidine (DEMEROL) injection 25 mg  25 mg IntraVENous Q20MIN PRN    ondansetron (ZOFRAN) injection 8 mg  8 mg IntraVENous PRN    hydrocortisone Sod Succ (PF) (SOLU-CORTEF) injection 100 mg  100 mg IntraVENous PRN    diphenhydrAMINE (BENADRYL) injection 50 mg  50 mg IntraVENous PRN    albuterol (PROVENTIL VENTOLIN) nebulizer solution 2.5 mg  2.5 mg Nebulization PRN          LAB AND IMAGING FINDINGS:     Lab Results   Component Value Date/Time    WBC 4.5 09/16/2022 12:29 AM    HGB 10.7 (L) 09/16/2022 12:29 AM    PLATELET 889 35/62/8424 12:29 AM     Lab Results   Component Value Date/Time    Sodium 144 09/16/2022 12:29 AM    Potassium 3.5 09/16/2022 12:29 AM    Chloride 107 09/16/2022 12:29 AM    CO2 32 09/16/2022 12:29 AM    BUN 13 09/16/2022 12:29 AM    Creatinine 0.60 09/16/2022 12:29 AM    Calcium 7.9 (L) 09/16/2022 12:29 AM    Phosphorus 4.0 06/06/2022 06:59 AM      Lab Results   Component Value Date/Time    Alk. phosphatase 47 09/16/2022 12:29 AM    Protein, total 4.8 (L) 09/16/2022 12:29 AM    Albumin 2.6 (L) 09/16/2022 12:29 AM    Globulin 2.2 09/16/2022 12:29 AM     No results found for: INR, PTMR, PTP, PT1, PT2, APTT, INREXT, INREXT   No results found for: IRON, FE, TIBC, IBCT, PSAT, FERR   No results found for: PH, PCO2, PO2  No components found for: GLPOC   No results found for: CPK, CKMB             Total time:   Counseling / coordination time, spent as noted above:   > 50% counseling / coordination?:      Prolonged service was provided for  []30 min   []75 min in face to face time in the presence of the patient, spent as noted above. Time Start:   Time End:   Note: this can only be billed with 73061 (initial) or 60108 (follow up). If multiple start / stop times, list each separately.

## 2022-09-16 NOTE — PROGRESS NOTES
Problem: Patient Education: Go to Patient Education Activity  Goal: Patient/Family Education  Outcome: Resolved/Met     Problem: Chemotherapy, Day 1  Goal: Nutrition/Diet  Outcome: Resolved/Met  Goal: Discharge Planning  Outcome: Resolved/Met     Problem: Falls - Risk of  Goal: *Absence of Falls  Description: Document Laurie Fall Risk and appropriate interventions in the flowsheet.   9/16/2022 1517 by Mally Catalan RN  Outcome: Resolved/Met  9/16/2022 1050 by Mally Catalan RN  Outcome: Progressing Towards Goal  Note: Fall Risk Interventions:  Mobility Interventions: Bed/chair exit alarm, Communicate number of staff needed for ambulation/transfer, Mechanical lift, OT consult for ADLs, Patient to call before getting OOB, PT Consult for mobility concerns, Strengthening exercises (ROM-active/passive), Utilize walker, cane, or other assistive device, Utilize gait belt for transfers/ambulation, PT Consult for assist device competence    Mentation Interventions: Adequate sleep, hydration, pain control, Bed/chair exit alarm, Door open when patient unattended, Evaluate medications/consider consulting pharmacy, Increase mobility, More frequent rounding, Reorient patient, Toileting rounds, Room close to nurse's station, Self-releasing belt, Update white board    Medication Interventions: Teach patient to arise slowly, Patient to call before getting OOB    Elimination Interventions: Bed/chair exit alarm, Call light in reach, Elevated toilet seat, Patient to call for help with toileting needs, Stay With Me (per policy), Toilet paper/wipes in reach, Toileting schedule/hourly rounds    History of Falls Interventions: Bed/chair exit alarm, Consult care management for discharge planning, Door open when patient unattended, Evaluate medications/consider consulting pharmacy, Room close to nurse's station, Utilize gait belt for transfer/ambulation, Assess for delayed presentation/identification of injury for 48 hrs (comment for end date), Vital signs minimum Q4HRs X 24 hrs (comment for end date)         Problem: Patient Education: Go to Patient Education Activity  Goal: Patient/Family Education  Outcome: Resolved/Met

## 2022-09-19 ENCOUNTER — HOSPITAL ENCOUNTER (OUTPATIENT)
Dept: INFUSION THERAPY | Age: 67
Discharge: HOME | End: 2022-09-19
Payer: MEDICARE

## 2022-09-19 VITALS
RESPIRATION RATE: 16 BRPM | DIASTOLIC BLOOD PRESSURE: 69 MMHG | TEMPERATURE: 98.4 F | HEART RATE: 84 BPM | OXYGEN SATURATION: 96 % | WEIGHT: 180 LBS | SYSTOLIC BLOOD PRESSURE: 124 MMHG | BODY MASS INDEX: 31.89 KG/M2

## 2022-09-19 DIAGNOSIS — F41.9 ANXIETY: ICD-10-CM

## 2022-09-19 DIAGNOSIS — Z79.899 HIGH RISK MEDICATION USE: ICD-10-CM

## 2022-09-19 DIAGNOSIS — C83.31 DIFFUSE LARGE B-CELL LYMPHOMA OF LYMPH NODES OF NECK (HCC): Primary | ICD-10-CM

## 2022-09-19 DIAGNOSIS — M25.551 RIGHT HIP PAIN: ICD-10-CM

## 2022-09-19 DIAGNOSIS — C83.38 DIFFUSE LARGE B-CELL LYMPHOMA OF LYMPH NODES OF MULTIPLE REGIONS (HCC): ICD-10-CM

## 2022-09-19 PROCEDURE — 96372 THER/PROPH/DIAG INJ SC/IM: CPT

## 2022-09-19 PROCEDURE — 74011250636 HC RX REV CODE- 250/636: Performed by: INTERNAL MEDICINE

## 2022-09-19 RX ORDER — MORPHINE SULFATE 15 MG/1
15 TABLET ORAL
Qty: 60 TABLET | Refills: 0 | Status: SHIPPED | OUTPATIENT
Start: 2022-09-19 | End: 2022-09-30 | Stop reason: SDUPTHER

## 2022-09-19 RX ORDER — ALPRAZOLAM 1 MG/1
1 TABLET ORAL
Qty: 30 TABLET | Refills: 0 | Status: SHIPPED | OUTPATIENT
Start: 2022-09-19 | End: 2022-09-30 | Stop reason: SDUPTHER

## 2022-09-19 RX ADMIN — PEGFILGRASTIM-CBQV 6 MG: 6 INJECTION, SOLUTION SUBCUTANEOUS at 07:45

## 2022-09-19 NOTE — TELEPHONE ENCOUNTER
Received call from patient requesting Xanax and Morphine IR refill. Patient has 2 pills remaining of Xanax. To be called in to [facility].

## 2022-09-19 NOTE — TELEPHONE ENCOUNTER
Triage for Controlled Substance Refill Request    Pain Diagnosis: _Diffuse large B-cell lymphoma of lymph nodes of multiple regions ([facility] (C83.38); Right hip pain (M25.551)    Last Outpatient Visit: _9/6/2022    Next Outpatient Visit: _none    Reason for refill needed outside of office visit? Appointment not scheduled prior to need for scheduled refill      Pharmacy: [address] [place] [address]    Medication:morphine IR (MS IR) 15 mg tablet    Dose and directions: Take 1 Tablet by mouth every six (6) hours as needed for Pain for up to 15 days. Number dispensed:60  Date filled ( or Pharmacy):9/6/2022  # left:    Medication:ALPRAZolam (XANAX) 1 mg tablet     Dose and directions: Take 1 Tablet by mouth two (2) times daily as needed for Anxiety for up to 15 days  Number dispensed:30  Date filled ( or Pharmacy):9/6/2022  #left:     reviewed: _yes    Date of Urine Drug Screen:  _none    Opioid Safety Handout given:  _yes    Appropriate for refill:  _yes    Action:  _ Medication pending

## 2022-09-19 NOTE — PROGRESS NOTES
Outpatient Infusion Center Short Visit Progress Note    Ms. Marda Cowden admitted to [facility] for Udenyca injection ambulatory in stable condition. Assessment completed. No new concerns voiced. Covid Screening      1. Do you have any symptoms of COVID-19? SOB, coughing, fever, or generally not feeling well ? NO  2. Have you been exposed to COVID-19 recently? NO  3. Have you had any recent contact with family/friend that has a pending COVID test? NO    Vital Signs:  Visit Vitals  /69   Pulse 84   Temp 98.4 °F (36.9 °C)   Resp 16   Wt 81.6 kg (180 lb)   SpO2 96%   BMI 31.89 kg/m²           Lab Results:  No lab order for today treatment. Medications:  Medications Administered       pegfilgrastim-cbqv (UDENYCA) injection 6 mg       Admin Date  09/19/2022 Action  Given Dose  6 mg Route  SubCUTAneous Administered By  Saba Parrish RN                      AVS declined. Patient tolerated treatment well. Patient discharged from [place] [address] ambulatory in no distress at 0750. Patient will be back on September 22, 2022 at 1300 of next appointment.     Yonathan Hurst RN  September 19, 2022    Future Appointments   Date Time Provider Chandni Raza   9/22/2022  1:00 PM [facility]  Abdirahman Dorene   9/22/2022  1:30 PM Renate Go MD [facility] BS AMB   10/19/2022 11:30 AM Renate Go MD [facility] BS AMB

## 2022-09-22 ENCOUNTER — OFFICE VISIT (OUTPATIENT)
Dept: ONCOLOGY | Age: 67
End: 2022-09-22
Payer: MEDICARE

## 2022-09-22 ENCOUNTER — HOSPITAL ENCOUNTER (OUTPATIENT)
Dept: INFUSION THERAPY | Age: 67
Discharge: HOME | End: 2022-09-22
Payer: MEDICARE

## 2022-09-22 VITALS
OXYGEN SATURATION: 98 % | BODY MASS INDEX: 29.88 KG/M2 | DIASTOLIC BLOOD PRESSURE: 81 MMHG | WEIGHT: 168.6 LBS | HEIGHT: 63 IN | TEMPERATURE: 97.7 F | HEART RATE: 94 BPM | SYSTOLIC BLOOD PRESSURE: 115 MMHG | RESPIRATION RATE: 16 BRPM

## 2022-09-22 DIAGNOSIS — G62.0 NEUROPATHY DUE TO CHEMOTHERAPEUTIC DRUG (HCC): ICD-10-CM

## 2022-09-22 DIAGNOSIS — C83.31 DIFFUSE LARGE B-CELL LYMPHOMA OF LYMPH NODES OF NECK (HCC): ICD-10-CM

## 2022-09-22 DIAGNOSIS — T45.1X5A CHEMOTHERAPY INDUCED NAUSEA AND VOMITING: Primary | ICD-10-CM

## 2022-09-22 DIAGNOSIS — Z79.899 HIGH RISK MEDICATION USE: ICD-10-CM

## 2022-09-22 DIAGNOSIS — C83.31 DIFFUSE LARGE B-CELL LYMPHOMA OF LYMPH NODES OF NECK (HCC): Primary | ICD-10-CM

## 2022-09-22 DIAGNOSIS — R19.7 DIARRHEA OF PRESUMED INFECTIOUS ORIGIN: ICD-10-CM

## 2022-09-22 DIAGNOSIS — T45.1X5A NEUROPATHY DUE TO CHEMOTHERAPEUTIC DRUG (HCC): ICD-10-CM

## 2022-09-22 DIAGNOSIS — R11.2 CHEMOTHERAPY INDUCED NAUSEA AND VOMITING: Primary | ICD-10-CM

## 2022-09-22 DIAGNOSIS — B37.0 THRUSH: ICD-10-CM

## 2022-09-22 LAB
ALBUMIN SERPL-MCNC: 4 G/DL (ref 3.5–5)
ALBUMIN/GLOB SERPL: 1.4 {RATIO} (ref 1.1–2.2)
ALP SERPL-CCNC: 76 U/L (ref 45–117)
ALT SERPL-CCNC: 36 U/L (ref 12–78)
ANION GAP SERPL CALC-SCNC: 7 MMOL/L (ref 5–15)
AST SERPL-CCNC: 11 U/L (ref 15–37)
BASOPHILS # BLD: 0 K/UL (ref 0–0.1)
BASOPHILS NFR BLD: 0 % (ref 0–1)
BILIRUB SERPL-MCNC: 0.6 MG/DL (ref 0.2–1)
BUN SERPL-MCNC: 7 MG/DL (ref 6–20)
BUN/CREAT SERPL: 11 (ref 12–20)
CALCIUM SERPL-MCNC: 9.5 MG/DL (ref 8.5–10.1)
CHLORIDE SERPL-SCNC: 103 MMOL/L (ref 97–108)
CO2 SERPL-SCNC: 25 MMOL/L (ref 21–32)
CREAT SERPL-MCNC: 0.63 MG/DL (ref 0.55–1.02)
DIFFERENTIAL METHOD BLD: ABNORMAL
EOSINOPHIL # BLD: 0.4 K/UL (ref 0–0.4)
EOSINOPHIL NFR BLD: 11 % (ref 0–7)
ERYTHROCYTE [DISTWIDTH] IN BLOOD BY AUTOMATED COUNT: 14.5 % (ref 11.5–14.5)
GLOBULIN SER CALC-MCNC: 2.9 G/DL (ref 2–4)
GLUCOSE SERPL-MCNC: 115 MG/DL (ref 65–100)
HCT VFR BLD AUTO: 38.6 % (ref 35–47)
HGB BLD-MCNC: 12.8 G/DL (ref 11.5–16)
IMM GRANULOCYTES # BLD AUTO: 0 K/UL (ref 0–0.04)
IMM GRANULOCYTES NFR BLD AUTO: 0 % (ref 0–0.5)
LYMPHOCYTES # BLD: 0.9 K/UL (ref 0.8–3.5)
LYMPHOCYTES NFR BLD: 22 % (ref 12–49)
MCH RBC QN AUTO: 30.6 PG (ref 26–34)
MCHC RBC AUTO-ENTMCNC: 33.2 G/DL (ref 30–36.5)
MCV RBC AUTO: 92.3 FL (ref 80–99)
METAMYELOCYTES NFR BLD MANUAL: 1 %
MONOCYTES # BLD: 0.2 K/UL (ref 0–1)
MONOCYTES NFR BLD: 6 % (ref 5–13)
NEUTS SEG # BLD: 2.4 K/UL (ref 1.8–8)
NEUTS SEG NFR BLD: 60 % (ref 32–75)
NRBC # BLD: 0 K/UL (ref 0–0.01)
NRBC BLD-RTO: 0 PER 100 WBC
PLATELET # BLD AUTO: 146 K/UL (ref 150–400)
PMV BLD AUTO: 10.9 FL (ref 8.9–12.9)
POTASSIUM SERPL-SCNC: 3.8 MMOL/L (ref 3.5–5.1)
PROT SERPL-MCNC: 6.9 G/DL (ref 6.4–8.2)
RBC # BLD AUTO: 4.18 M/UL (ref 3.8–5.2)
RBC MORPH BLD: ABNORMAL
SODIUM SERPL-SCNC: 135 MMOL/L (ref 136–145)
WBC # BLD AUTO: 4 K/UL (ref 3.6–11)

## 2022-09-22 PROCEDURE — G8400 PT W/DXA NO RESULTS DOC: HCPCS | Performed by: INTERNAL MEDICINE

## 2022-09-22 PROCEDURE — G9899 SCRN MAM PERF RSLTS DOC: HCPCS | Performed by: INTERNAL MEDICINE

## 2022-09-22 PROCEDURE — 80053 COMPREHEN METABOLIC PANEL: CPT

## 2022-09-22 PROCEDURE — G8427 DOCREV CUR MEDS BY ELIG CLIN: HCPCS | Performed by: INTERNAL MEDICINE

## 2022-09-22 PROCEDURE — 1123F ACP DISCUSS/DSCN MKR DOCD: CPT | Performed by: INTERNAL MEDICINE

## 2022-09-22 PROCEDURE — 36415 COLL VENOUS BLD VENIPUNCTURE: CPT

## 2022-09-22 PROCEDURE — 1090F PRES/ABSN URINE INCON ASSESS: CPT | Performed by: INTERNAL MEDICINE

## 2022-09-22 PROCEDURE — 3017F COLORECTAL CA SCREEN DOC REV: CPT | Performed by: INTERNAL MEDICINE

## 2022-09-22 PROCEDURE — G9717 DOC PT DX DEP/BP F/U NT REQ: HCPCS | Performed by: INTERNAL MEDICINE

## 2022-09-22 PROCEDURE — 99214 OFFICE O/P EST MOD 30 MIN: CPT | Performed by: INTERNAL MEDICINE

## 2022-09-22 PROCEDURE — 1101F PT FALLS ASSESS-DOCD LE1/YR: CPT | Performed by: INTERNAL MEDICINE

## 2022-09-22 PROCEDURE — 85025 COMPLETE CBC W/AUTO DIFF WBC: CPT

## 2022-09-22 PROCEDURE — G8417 CALC BMI ABV UP PARAM F/U: HCPCS | Performed by: INTERNAL MEDICINE

## 2022-09-22 PROCEDURE — G8536 NO DOC ELDER MAL SCRN: HCPCS | Performed by: INTERNAL MEDICINE

## 2022-09-22 PROCEDURE — 1111F DSCHRG MED/CURRENT MED MERGE: CPT | Performed by: INTERNAL MEDICINE

## 2022-09-22 RX ORDER — SODIUM CHLORIDE 0.9 % (FLUSH) 0.9 %
5-40 SYRINGE (ML) INJECTION AS NEEDED
Status: DISCONTINUED | OUTPATIENT
Start: 2022-09-22 | End: 2022-09-23 | Stop reason: HOSPADM

## 2022-09-22 RX ORDER — SODIUM CHLORIDE 9 MG/ML
5-250 INJECTION, SOLUTION INTRAVENOUS AS NEEDED
Status: DISCONTINUED | OUTPATIENT
Start: 2022-09-22 | End: 2022-09-23 | Stop reason: HOSPADM

## 2022-09-22 RX ORDER — SODIUM CHLORIDE 9 MG/ML
5-250 INJECTION, SOLUTION INTRAVENOUS AS NEEDED
Status: CANCELLED | OUTPATIENT
Start: 2022-09-22

## 2022-09-22 RX ORDER — HEPARIN 100 UNIT/ML
500 SYRINGE INTRAVENOUS AS NEEDED
Status: DISCONTINUED | OUTPATIENT
Start: 2022-09-22 | End: 2022-09-23 | Stop reason: HOSPADM

## 2022-09-22 RX ORDER — SODIUM CHLORIDE 9 MG/ML
5-40 INJECTION INTRAMUSCULAR; INTRAVENOUS; SUBCUTANEOUS AS NEEDED
Status: DISCONTINUED | OUTPATIENT
Start: 2022-09-22 | End: 2022-09-23 | Stop reason: HOSPADM

## 2022-09-22 RX ORDER — SODIUM CHLORIDE 9 MG/ML
5-40 INJECTION INTRAMUSCULAR; INTRAVENOUS; SUBCUTANEOUS AS NEEDED
Status: CANCELLED | OUTPATIENT
Start: 2022-09-22

## 2022-09-22 RX ORDER — SODIUM CHLORIDE 0.9 % (FLUSH) 0.9 %
5-40 SYRINGE (ML) INJECTION AS NEEDED
Status: CANCELLED | OUTPATIENT
Start: 2022-09-22

## 2022-09-22 RX ORDER — HEPARIN 100 UNIT/ML
500 SYRINGE INTRAVENOUS AS NEEDED
Status: CANCELLED | OUTPATIENT
Start: 2022-09-22

## 2022-09-22 NOTE — PROGRESS NOTES
Verified Name and  of the patient. Chief Complaint   Patient presents with    Follow-up     Diffuse large B-cell lymphoma nodes of neck. Health maintenance will be addressed with Primary Care Provider at next visit. Vitals:    22 1315   BP: 115/81   Pulse: 94   Resp: 16   Temp: 97.7 °F (36.5 °C)   TempSrc: Oral   SpO2: 98%   Weight: 168 lb 9.6 oz (76.5 kg)   Height: 5' 3\" (1.6 m)   PainSc:   8   PainLoc: Generalized       1. Have you been to the ER, urgent care clinic since your last visit? Hospitalized since your last visit? No    2. Have you seen or consulted any other health care providers outside of the [address] [place] [address] since your last visit? Include any pap smears or colon screening.  No

## 2022-09-22 NOTE — PROGRESS NOTES
Cancer [place] at [address], [address] [facility] [address] [facility]  [facility] [phone]  F: [phone] Patient ID  Name: Nichole Camarillo  YOB: 1955  MRN: [id number]  Referring Provider:   No referring provider defined for this encounter. 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 This summary highlights focused aspects of patient's ongoing care and may have a recurring section in notes with either updates or remain unchanged as a longitudinal care summary. -------------------------------------------------------------------------------------------------------------------------------------------------------------------------------------------------------  DIAGNOSIS:  Diffuse Large B-Cell Lymphoma     ORIGINAL STAGING:  Stage IV     SITES OF DISEASE:  Left Cervical Lymph Node,Bone Disease, Stage IV    CURRENT TREATMENT:  C1,D1 on 5/11/22 DA-R-EPOCH  C2  C3 delayed due to appetite issues and patient anxiety to restart therapy     PRIOR TREATMENT:  n/a     GOALS OF CARE:  Curative Intent     PATHOLOGY:  Specimen #:[id number] Collect: 4/22/2022      ==========================================================================                    * * *SURGICAL PATHOLOGY REPORT* * *   ==========================================================================   * * *PROCEDURES/ADDENDA* * *   ADDENDUM   Date Ordered: 4/26/2022     Status: Signed Out   Date Complete: 4/26/2022     By: Maluia SpottedShahram Tee MD   Date Reported: 4/26/2022   Addendum Diagnosis   Lymph node, left posterior cervical lymph node, excision:   In situ hybridization for Jesús-Barr viral RNA: Negative   CPT: 88226  Addendum Comment   * * *CLINICAL HISTORY* * *   Cervical lymphadenopathy, rule out lymphoma ==========================================================================   * * *FINAL PATHOLOGIC DIAGNOSIS* * *        Lymph node, left posterior cervical lymph node, excision:        Large B cell lymphoma. See comment. * * *Comment* * *   The histologic section has fragments of lymphoid tissue with diffuse and   focal follicular architecture. Diffuse areas are comprised of large,   atypical lymphocytes with centroblastic morphology and increased mitotic   activity. Immunohistochemical stains confirm the malignant cells are CD20   positive B cells that coexpress CD10 and BCL6 without MUM1. CD23   highlights rare follicular dendritic networks associated with lymphoid   follicles with germinal centers and express CD10 and BCL6 without BCL2. CyclinD1 and CD56 stains are negative. Concurrent flow cytometric analysis   confirms a clonal CD10 positive B-cell population comprised of medium to   large cells. The Ki-67 proliferation index is 30%. The overall findings favor the diagnosis of diffuse large B-cell lymphoma,   germinal center subtype. Molecular genetic studies are pending for further   characterization will be reported in an addendum. Flow cytometry:   Consistent with CD10-positive B-cell lymphoproliferative disorder. Comments: Flow cytometry shows monoclonal B-cells (36% of total cells)   with CD10 expression without CD5, consistent with a CD10-positive B-cell   lymphoproliferative disorder. The main differential diagnosis includes   follicular lymphoma, Burkitt lymphoma and large B-cell lymphoma. Please   correlate the result with morphologic findings and clinical information. Flow Differential (%) and Population Analysis:   Lymphocytes: 93.7%   T-cells (39% of lymphoid cells) show a CD4/CD8 ratio of 3.3 without overt   phenotypic abnormality. NK-cells (1% of lymphoid cells) are unremarkable.    Mature B-cells (56% of lymphoid cells) include large forms based on   forward scattered pattern and show: CD5 neg, CD10+, CD11c+dim, CD19+,   CD20+ with surface lambda light chain restriction. Markers Performed: CD2, CD3, CD4, CD5, CD7, CD8, CD10, CD11c, CD19, CD20,   CD23, CD34, CD38, CD45, CD56, Kappa, Lambda (17 Markers)   The Technical Component Processing of this test was completed at   [facility], [address] [place] [address], [place], [place] / [id number] /   [phone] / Larissa Barrera # [id number]. Interpretation by Jacqueline Meier M.D. The   complete scanned report is available in Epic. CPT: N4463912, [id number], [id number], [id number], [id number], [id number]   [facility]2/2022   *Electronically Signed Out By Soledad Barton MD*   ==========================================================================   * * *Gross Description* * *   Specimen #1, received fresh labeled with the patient's name,  and left   posterior cervical lymph node, consists of two paper towels containing a   1.7 x 1.5 x 0.5 cm aggregate of pale pink-tan, fleshy soft tissue   fragments. The specimen is handled according to the flow cytometry   protocol as follows: 7          Two air-dried touch prep slides - one Diff Quik stained, one   rapid-fixed in 95% alcohol   7          Representative sections in B plus fixative (1A)   7          One piece held in RPMI for possible flow cytometry analysis   7          Remaining tissue fixed in formalin for permanents (1B)   Dr. Dequan Barton will determine if flow cytometry is necessary. 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 F who presents for a follow-up evaluation for lymphoma s/p treatment. Patient overall reports feeling worse. She notes that her neuropathy is worse. She has had some pain and numbness mainly in her hands; nothing in her feet. Appetite:Grade 1  Fatigue:Grade 2  Hair Loss:Grade 2  Nausea:Grade 2 (she denies any vomitus; had some dry heaves) using antiemetics.   Insomnia:Grade 2  Concentration:Grade 1  Pain: 8-9 diffuse       Past Medical History:   Diagnosis Date    Adverse effect of anesthesia     PHLEGM IN THROAT POST OP & NEEDED X2 BREATHING TREATMENTS    Anxiety     [facility]    emphysema    Depression     GERD (gastroesophageal reflux disease)     Hernia, femoral     since childhood    Hypertension     Joint pain     Nausea & vomiting     TMJ arthritis        Current Outpatient Medications:     ALPRAZolam (XANAX) 1 mg tablet, Take 1 Tablet by mouth two (2) times daily as needed for Anxiety for up to 15 days. Max Daily Amount: 2 mg., Disp: 30 Tablet, Rfl: 0    morphine IR (MS IR) 15 mg tablet, Take 1 Tablet by mouth every six (6) hours as needed for Pain for up to 15 days. Max Daily Amount: 60 mg., Disp: 60 Tablet, Rfl: 0    nicotine (NICODERM CQ) 21 mg/24 hr, 1 Patch by TransDERmal route every twenty-four (24) hours for 30 days. , Disp: 30 Patch, Rfl: 0    ondansetron (ZOFRAN ODT) 8 mg disintegrating tablet, Take 1 Tablet by mouth every eight (8) hours as needed for Nausea or Vomiting., Disp: 50 Tablet, Rfl: 1    prochlorperazine (COMPAZINE) 10 mg tablet, Take 1 Tablet by mouth every six (6) hours as needed for Nausea (do not take if groggy; take if nausea despite zofran). , Disp: 50 Tablet, Rfl: 1    morphine ER (MS CONTIN) 15 mg CR tablet, Take 1 Tablet by mouth every twelve (12) hours for 30 days. Max Daily Amount: 30 mg., Disp: 60 Tablet, Rfl: 0    trimethoprim-sulfamethoxazole (BACTRIM DS, SEPTRA DS) 160-800 mg per tablet, Take 1 Tablet by mouth every Monday, Wednesday, Friday.  Indications: prophylaxis treatment of cancer related infections, Disp: 15 Tablet, Rfl: 2    FLUoxetine (PROzac) 40 mg capsule, TAKE 1 CAPSULE BY MOUTH EVERY MORNING, Disp: 90 Capsule, Rfl: 1    amLODIPine-benazepril (LOTREL) 5-20 mg per capsule, TAKE 1 CAPSULE BY MOUTH EVERY MORNING, Disp: 90 Capsule, Rfl: 1  No current facility-administered medications for this visit. Facility-Administered Medications Ordered in Other Visits:     sodium chloride (NS) flush 5-40 mL, 5-40 mL, IntraVENous, PRN, Loretta Flanagan MD    0.9% sodium chloride injection 5-40 mL, 5-40 mL, IntraVENous, PRN, Loretta Flanagan MD    0.9% sodium chloride infusion, 5-250 mL/hr, IntraVENous, PRN, Loretta Flanagan MD    heparin (porcine) pf 500 Units, 500 Units, InterCATHeter, PRN, Loretta Flanagan MD    alteplase (CATHFLO) 2 mg in sterile water (preservative free) 2 mL injection, 2 mg, InterCATHeter, PRN, Loretta Flanagan MD    Allergies   Allergen Reactions    Oxycodone Nausea Only     Review of Systems Provided by:  Patient  Review of Systems: A complete review of systems was obtained, reviewed. Pertinent findings reviewed above. Objective:      Visit Vitals  /81 (BP 1 Location: Left arm, BP Patient Position: Sitting, BP Cuff Size: Adult)   Pulse 94   Temp 97.7 °F (36.5 °C) (Oral)   Resp 16   Ht 5' 3\" (1.6 m)   Wt 168 lb 9.6 oz (76.5 kg)   SpO2 98%   BMI 29.87 kg/m²     ECO- Restricted in physically strenuous activity but ambulatory and able to carry out work of a light or sedentary nature, e.g., light house work, office work. Physical Exam  Constitutional: No acute distress. , Non-toxic appearance. , and Non-diaphoretic. HENT: Normocephalic and atraumatic head. Eyes: Normal Conjunctivae. Anicteric sclerae. Cardiovascular: S1,S2 auscultated. No pitting edema. Pulmonary: Normal Respiratory Effort. No wheezing. No rhonchi. No rales. Abdominal: Normal bowel sounds. Soft Abdomen to palpation. No abdominal tenderness. Skin: No jaundice. No rash. Musculoskeletal: No temporal muscle wasting on gross inspection. No myalgias on palpation. Neurological: Alert and oriented. No tremor on inspection. Normal Gait. Psychiatric: mood normal. normal speech rate. normal affect.     Results:      I personally reviewed Epic EHR labs/results below:   Lab Results   Component Value Date/Time    WBC 4.0 2022 01:00 PM    HGB 12.8 2022 01:00 PM    HCT 38.6 2022 01:00 PM    PLATELET 160 (L)  01:00 PM    MCV 92.3 2022 01:00 PM     Lab Results   Component Value Date/Time    Sodium 144 2022 12:29 AM    Potassium 3.5 2022 12:29 AM    Chloride 107 2022 12:29 AM    CO2 32 2022 12:29 AM    Anion gap 5 2022 12:29 AM    Glucose 171 (H) 2022 12:29 AM    BUN 13 2022 12:29 AM    Creatinine 0.60 2022 12:29 AM    BUN/Creatinine ratio 22 (H) 2022 12:29 AM    GFR est AA >60 2022 12:29 AM    GFR est non-AA >60 2022 12:29 AM    Calcium 7.9 (L) 2022 12:29 AM    Bilirubin, total 0.3 2022 12:29 AM    Alk. phosphatase 47 2022 12:29 AM    Protein, total 4.8 (L) 2022 12:29 AM    Albumin 2.6 (L) 2022 12:29 AM    Globulin 2.2 2022 12:29 AM    A-G Ratio 1.2 2022 12:29 AM    ALT (SGPT) 25 2022 12:29 AM    AST (SGOT) 13 (L) 2022 12:29 AM     IR INSERT TUNL CVC W PORT OVER 5 YEARS    Result Date: 2022  PATIENT NAME: Luciano Okeefe                                            AGE, : 78 years, 1955 MRN: [id number] DATE: 2022 11:50 AM PROCEDURE(S): Single lumen Portacath placement SUPERVISING PHYSICIAN: Fadia Hopkins MD OPERATING PROVIDER: Reny Greenberg PA-C CLINICAL HISTORY: Mediport placement requested. B-cell lymphoma Patient was evaluated and felt to be an appropriate candidate for conscious sedation. Moderate intravenous conscious sedation was supervised by Fadia Hopkins MD. The patient was independently monitored by a registered nurse assigned to the Department of Radiology using automated blood pressure, EKG, and pulse oximetry. The detail conscious sedation record is stored in the hospital information system.  MEDICATION: Antibiotic: Ancef 2g Versed: 6 mg Fentanyl: 100 mcg Intraprocedure time: 40 minutes FINDINGS: After informed consent was obtained, and the risks and benefits of the procedure including infection and bleeding were discussed, the patient was brought to the angiographic suite and placed on the angiographic table in a supine position. The right neck and chest were prepped and draped in maximum sterile barrier technique which includes: cap and mask, sterile gown, sterile gloves, and sterile body drape. The ultrasound transducer was prepped using sterile ultrasound technique which includes: sterile gel and probe cover. After adequate conscious sedation was achieved utilizing Fentanyl and Versed, the skin over the internal jugular vein was anesthetized with 1% lidocaine. Sonographic images of the right neck demonstrated a patent and compressible right internal jugular vein. The vein was accessed under direct sonographic guidance using a 21 gauge micropuncture set. An ultrasonographic image was saved in the record. A 0.035 J wire was advanced through the sheath into the inferior vena cava under fluoroscopic guidance. A peel-away sheath was advanced over the wire into the superior vena cava under fluoroscopic guidance. Attention was directed to the right chest, and after adequate local anesthesia, a horizontal incision was made. A subcutaneous pocket was then formed using a combination of sharp and blunt dissection. A tunnel was then formed between the pocket and the venotomy site and the catheter passed through the tunnel and through the peel-away sheath. The peel-away sheath was removed and the catheter tip was positioned at the right atrium. The catheter was cut at the pocket access point and the portacath was attached to the catheter and placed snugly in the pocket. The pocket was closed primarily with 2-0 Vicryl suture and Dermabond. The neck access site was closed with 2-0 Vicryl suture and Dermabond. The port was accessed and flushed with heparin solution. The patient tolerated the procedure well.  Fluoroscopy demonstrates the catheter tip to be within the right atrium. RADIATION: Fluoroscopy time: 0.3 minutes Air kerma: 5.1 mGy     Technically successful placement of a single lumen port with the catheter tip in the right atrium. Catheter is ready for immediate use. CT NECK SOFT TISSUE W CONT    Result Date: 6/28/2022  INDICATION: Diffuse large b-cell lymphoma, lymph nodes of head, face, and neck EXAM:  CT NECK, CHEST, ABDOMEN, PELVIS WITH CONTRAST COMPARISON: PET scan May 4, 2022 TECHNIQUE:  CT imaging of the neck, chest, abdomen and pelvis was performed after the uneventful intravenous administration of IV contrast. Coronal and sagittal reconstructions were generated. CT dose reduction was achieved through use of a standardized protocol tailored for this examination and automatic exposure control for dose modulation. FINDINGS: NASOPHARYNX: Normal. SUPRAHYOID NECK: Normal oropharynx, oral cavity, parapharyngeal space, and retropharyngeal space. INFRAHYOID NECK: Normal larynx, hypopharynx and supraglottis. PAROTID GLANDS: Normal. SUBMANDIBULAR GLANDS: Absent left submandibular gland. THYROID GLAND: Normal. LYMPH NODES: Interval decrease in size of previous enlarged left level 2 lymph nodes, the more anterior of which decreased from 1.2 x 1.1 cm to 0.7 x 0.6 cm, and the posterior node decreased from 1.8 x 1.2 cm to 1.1 x 0.7 cm. Scattered subcentimeter lymph nodes throughout the neck. No new or enlarging nodes. OSSEOUS STRUCTURES: No destructive bone lesion. Prior ACDF C5-7. ADDITIONAL COMMENTS: N/A. MEDIASTINUM/ELIZABETH: Scattered nonenlarged lymph nodes. Small hiatal hernia. HEART/PERICARDIUM: Unremarkable. LUNGS/PLEURA: No suspicious nodule. No pulmonary edema or pleural effusion. Tree-in-bud opacities in the lateral right lower lobe, and mild ill-defined nodular groundglass opacities right apex. BONES: No destructive bone lesion. ADDITIONAL COMMENTS: Unchanged round 9 mm density lateral right breast LIVER: No mass or biliary dilatation.  GALLBLADDER: Surgically absent. SPLEEN: No enlargement or lesion. PANCREAS: No mass or ductal dilatation. ADRENALS: No mass. KIDNEYS: No mass, calculus, or hydronephrosis. GI TRACT: No bowel obstruction or wall thickening PERITONEUM: No free air or free fluid. APPENDIX: Not clearly visualized. RETROPERITONEUM: Interval decrease in size of previously enlarged lymph nodes: Gastrohepatic ligament lymph node decreased from 4.2 x 1.2 cm to 2.2 x 0.9 cm; aortocaval lymph node at the level of the right kidney, previously 1.2 cm, now 0.6 cm. No new or enlarging lymph nodes. ADDITIONAL COMMENTS: Right paraumbilical ventral hernia containing mesenteric fat and bowel loops similar to prior study. URINARY BLADDER: No mass or calculus. LYMPH NODES: Decreased size of left inguinal lymph nodes from 1.6 cm to 0.8 cm. REPRODUCTIVE ORGANS: Prior hysterectomy. FREE FLUID: None. BONES: No destructive bone lesion. ADDITIONAL COMMENTS: N/A.     1. Interval decrease in size of previously enlarged lymph nodes in the neck, abdomen, and pelvis. No new or enlarging lymph nodes. 2. New tree-in-bud opacities and groundglass opacities in the right lower and upper lobes may be infectious/inflammatory. May be assessed on follow-up examinations. 23X     CT CHEST ABD PELV W CONT    Result Date: 6/28/2022  INDICATION: Diffuse large b-cell lymphoma, lymph nodes of head, face, and neck EXAM:  CT NECK, CHEST, ABDOMEN, PELVIS WITH CONTRAST COMPARISON: PET scan May 4, 2022 TECHNIQUE:  CT imaging of the neck, chest, abdomen and pelvis was performed after the uneventful intravenous administration of IV contrast. Coronal and sagittal reconstructions were generated. CT dose reduction was achieved through use of a standardized protocol tailored for this examination and automatic exposure control for dose modulation. FINDINGS: NASOPHARYNX: Normal. SUPRAHYOID NECK: Normal oropharynx, oral cavity, parapharyngeal space, and retropharyngeal space.  INFRAHYOID NECK: Normal larynx, hypopharynx and supraglottis. PAROTID GLANDS: Normal. SUBMANDIBULAR GLANDS: Absent left submandibular gland. THYROID GLAND: Normal. LYMPH NODES: Interval decrease in size of previous enlarged left level 2 lymph nodes, the more anterior of which decreased from 1.2 x 1.1 cm to 0.7 x 0.6 cm, and the posterior node decreased from 1.8 x 1.2 cm to 1.1 x 0.7 cm. Scattered subcentimeter lymph nodes throughout the neck. No new or enlarging nodes. OSSEOUS STRUCTURES: No destructive bone lesion. Prior ACDF C5-7. ADDITIONAL COMMENTS: N/A. MEDIASTINUM/ELIZABETH: Scattered nonenlarged lymph nodes. Small hiatal hernia. HEART/PERICARDIUM: Unremarkable. LUNGS/PLEURA: No suspicious nodule. No pulmonary edema or pleural effusion. Tree-in-bud opacities in the lateral right lower lobe, and mild ill-defined nodular groundglass opacities right apex. BONES: No destructive bone lesion. ADDITIONAL COMMENTS: Unchanged round 9 mm density lateral right breast LIVER: No mass or biliary dilatation. GALLBLADDER: Surgically absent. SPLEEN: No enlargement or lesion. PANCREAS: No mass or ductal dilatation. ADRENALS: No mass. KIDNEYS: No mass, calculus, or hydronephrosis. GI TRACT: No bowel obstruction or wall thickening PERITONEUM: No free air or free fluid. APPENDIX: Not clearly visualized. RETROPERITONEUM: Interval decrease in size of previously enlarged lymph nodes: Gastrohepatic ligament lymph node decreased from 4.2 x 1.2 cm to 2.2 x 0.9 cm; aortocaval lymph node at the level of the right kidney, previously 1.2 cm, now 0.6 cm. No new or enlarging lymph nodes. ADDITIONAL COMMENTS: Right paraumbilical ventral hernia containing mesenteric fat and bowel loops similar to prior study. URINARY BLADDER: No mass or calculus. LYMPH NODES: Decreased size of left inguinal lymph nodes from 1.6 cm to 0.8 cm. REPRODUCTIVE ORGANS: Prior hysterectomy. FREE FLUID: None. BONES: No destructive bone lesion.  ADDITIONAL COMMENTS: N/A.     1. Interval decrease in size of previously enlarged lymph nodes in the neck, abdomen, and pelvis. No new or enlarging lymph nodes. 2. New tree-in-bud opacities and groundglass opacities in the right lower and upper lobes may be infectious/inflammatory. May be assessed on follow-up examinations. 23X     PET/CT TUMOR IMAGE [address] [place] W (INI)    Addendum Date: 5/5/2022    Addendum: There is increased metabolic activity in a small left frontal scalp lesion with SUV of 6.9. Result Date: 5/5/2022  PET/CT SCAN PROCEDURE: Following IV injection of 10.9 mCi 18 Fluoro 2 deoxyglucose (FDG) and a standard uptake delay, PET imaging is performed skull vertex to toes and axial, sagittal and coronal images were acquired. Unenhanced CT is obtained for anatomic localization, and attenuation correction of the PET scan. Patient preprocedure blood glucose level: 98 mg/dL. CORRELATIVE IMAGING STUDIES: None. COMPARISON PET: HISTORY: The study is requested for initial staging. Lymphoma. FINDINGS: HEAD/NECK: There is increased metabolic activity 1.2 cm left level 2 lymph node. There is increased metabolic activity in a 1.2 cm left level 5 lymph node with SUV of 7.7. CHEST: No foci of abnormal hypermetabolism. Low-grade activity in mediastinal and hilar lymph nodes is nonspecific but may be reactive. ABDOMEN/PELVIS: There is increased metabolic activity in a 2.6 cm hepatogastric ligament lymph node with SUV of 5.7. There is increased metabolic activity in a 1.2 cm right retroperitoneal lymph node with SUV of 6. There is increased metabolic activity in a left retroperitoneal lymph node with SUV of 8 There is slight haziness in the mesentery with small lymph nodes with slight increased metabolic activity of 3.6. There is small focus of increased metabolic activity anterior to the left sacrum and anterior to the right sacrum with SUV of 7 suspicious for lymph nodes. There is increased metabolic activity in the right renal mass posterior cortex with SUV of 6.9.  There is increased metabolic activity in a left inguinal lymph node with SUV of 8 There is a right abdominal wall hernia SKELETON: There is increased metabolic activity and left humeral head, right iliac bone, femurs and proximal right tibia. . There is slight increased metabolic activity right humeral head. There is increased metabolic activity in 2 lymph nodes in the left neck, in the hepatogastric ligament and retroperitoneal adenopathy, mesenteric lymph nodes and left inguinal adenopathy suspicious for lymphoma. There is increased metabolic activity in a mass posterior right renal cortex nonspecific but may represent lymphoma. There is increased bony activity as described above suspicious for lymphoma. . 23X      Assessment and Recommendations:      1. Diffuse large B-cell lymphoma of lymph nodes of neck (HCC)  Proceed to Cycle #5 so long as continued response.  - PET/CT TUMOR IMAGE [address] BD[place] W (INI); Future    2. Neuropathy due to chemotherapeutic drug [facility])  Patient follows with palliative care; consideration for her to start gabapentin; already on prozac so duloxetine may duplicating care; will defer to palliative care; appreciate their help. 3. Thrush  Reports fluconazole effective; no thrush on exam inspection today. 4. High risk medication use  Declines any IV fluids or antiemetics today.     FOLLOW-UP:    MD BECCA Weinstein on 10/10   <><><> TREATMENT PLAN: MD  on 10/21    James Presley MD  Hematology/Medical Oncology Provider  Man[facility] Hodges  P: [phone]      Signed By:   Shea Lujan MD

## 2022-09-26 ENCOUNTER — TELEPHONE (OUTPATIENT)
Dept: ONCOLOGY | Age: 67
End: 2022-09-26

## 2022-09-26 NOTE — TELEPHONE ENCOUNTER
I looked in this patients chart. Looks like she has had a reaction before, maybe be admitted for further challenge of chemo? Please advise, keep appt or change date till after discharge.

## 2022-09-26 NOTE — TELEPHONE ENCOUNTER
Patient called and stated that she could not get her PET scan appointment until 10/11 however she goes into the hospital for inpatient chemo on 10/6 so she was wondering what she should do regarding her PET scan. Requested a call back.      # [phone]

## 2022-09-29 DIAGNOSIS — C79.51 METASTATIC CANCER TO BONE (HCC): ICD-10-CM

## 2022-09-29 DIAGNOSIS — C83.31 DIFFUSE LARGE B-CELL LYMPHOMA OF LYMPH NODES OF NECK (HCC): Primary | ICD-10-CM

## 2022-09-29 RX ORDER — DIPHENHYDRAMINE HYDROCHLORIDE 50 MG/ML
50 INJECTION, SOLUTION INTRAMUSCULAR; INTRAVENOUS AS NEEDED
Status: CANCELLED
Start: 2022-10-03

## 2022-09-29 RX ORDER — SULFAMETHOXAZOLE AND TRIMETHOPRIM 800; 160 MG/1; MG/1
1 TABLET ORAL
Status: CANCELLED
Start: 2022-10-03

## 2022-09-29 RX ORDER — DIPHENHYDRAMINE HYDROCHLORIDE 50 MG/ML
50 INJECTION, SOLUTION INTRAMUSCULAR; INTRAVENOUS ONCE
Status: CANCELLED
Start: 2022-10-03 | End: 2022-10-03

## 2022-09-29 RX ORDER — ACETAMINOPHEN 325 MG/1
650 TABLET ORAL AS NEEDED
Status: CANCELLED
Start: 2022-10-03

## 2022-09-29 RX ORDER — PANTOPRAZOLE SODIUM 40 MG/1
40 TABLET, DELAYED RELEASE ORAL
Status: CANCELLED
Start: 2022-10-03

## 2022-09-29 RX ORDER — OLANZAPINE 2.5 MG/1
5 TABLET ORAL EVERY EVENING
Status: CANCELLED | OUTPATIENT
Start: 2022-10-03

## 2022-09-29 RX ORDER — SODIUM CHLORIDE 9 MG/ML
45 INJECTION, SOLUTION INTRAVENOUS CONTINUOUS
Status: CANCELLED
Start: 2022-10-03

## 2022-09-29 RX ORDER — EPINEPHRINE 1 MG/ML
0.3 INJECTION, SOLUTION, CONCENTRATE INTRAVENOUS AS NEEDED
Status: CANCELLED | OUTPATIENT
Start: 2022-10-03

## 2022-09-29 RX ORDER — ACETAMINOPHEN 325 MG/1
650 TABLET ORAL ONCE
Status: CANCELLED
Start: 2022-10-03 | End: 2022-10-03

## 2022-09-29 RX ORDER — ALBUTEROL SULFATE 0.83 MG/ML
2.5 SOLUTION RESPIRATORY (INHALATION) AS NEEDED
Status: CANCELLED
Start: 2022-10-03

## 2022-09-29 RX ORDER — ONDANSETRON 2 MG/ML
8 INJECTION INTRAMUSCULAR; INTRAVENOUS AS NEEDED
Status: CANCELLED | OUTPATIENT
Start: 2022-10-03

## 2022-09-29 RX ORDER — DIPHENHYDRAMINE HYDROCHLORIDE 50 MG/ML
25 INJECTION, SOLUTION INTRAMUSCULAR; INTRAVENOUS AS NEEDED
Status: CANCELLED
Start: 2022-10-03

## 2022-09-29 RX ORDER — HEPARIN SODIUM (PORCINE) LOCK FLUSH IV SOLN 100 UNIT/ML 100 UNIT/ML
300-500 SOLUTION INTRAVENOUS AS NEEDED
Status: CANCELLED
Start: 2022-10-03

## 2022-09-29 RX ORDER — ONDANSETRON 2 MG/ML
8 INJECTION INTRAMUSCULAR; INTRAVENOUS EVERY 8 HOURS
Status: CANCELLED | OUTPATIENT
Start: 2022-10-03

## 2022-09-29 RX ORDER — SODIUM CHLORIDE 0.9 % (FLUSH) 0.9 %
10 SYRINGE (ML) INJECTION AS NEEDED
Status: CANCELLED | OUTPATIENT
Start: 2022-10-03

## 2022-09-29 RX ORDER — HYDROCORTISONE SODIUM SUCCINATE 100 MG/2ML
100 INJECTION, POWDER, FOR SOLUTION INTRAMUSCULAR; INTRAVENOUS AS NEEDED
Status: CANCELLED | OUTPATIENT
Start: 2022-10-03

## 2022-09-29 RX ORDER — LORAZEPAM 2 MG/ML
0.5 INJECTION INTRAMUSCULAR
Status: CANCELLED
Start: 2022-10-03

## 2022-09-29 RX ORDER — PREDNISONE 1 MG/1
60 TABLET ORAL 2 TIMES DAILY WITH MEALS
Status: CANCELLED
Start: 2022-10-03

## 2022-09-29 NOTE — TELEPHONE ENCOUNTER
[address] [place]  [phone]    09/29/22 9:37 AM - Rishi Hernandez and spoke with Rolando Grant at [facility]. Inquired if there is a sooner appointment available for this patient or a cancellation list she can be added to. Rolando Grant checked the schedule but states there are no sooner appointments available. She states there is currently no cancellation list she can place the patient on. Will update Dr. Yobani Sorensen of above. [address] [place]  [phone]    09/29/22 2:19 PM - Called and spoke with the patient. Patient's ID verified x 2. Advised patient of Dr. Quick Meter message. Advised patient this nurse will call and cancel her PET/CT. Provided patient with number to call and schedule her CT scan. The patient voiced understanding and gratitude for the call. No further questions or concerns. [address] [place]  [phone]    09/29/22 2:28 PM - Rishi Hernandez and spoke with Chucky Haywood at [facility]. Advised we'd like to cancel the patient's PET/CT scan appointment. Chucky Haywood voiced understanding and canceled the appointment. No further questions or concerns.

## 2022-09-30 ENCOUNTER — HOSPITAL ENCOUNTER (OUTPATIENT)
Dept: CT IMAGING | Age: 67
Discharge: HOME | DRG: 846 | End: 2022-09-30
Attending: INTERNAL MEDICINE
Payer: MEDICARE

## 2022-09-30 DIAGNOSIS — C83.38 DIFFUSE LARGE B-CELL LYMPHOMA OF LYMPH NODES OF MULTIPLE REGIONS (HCC): ICD-10-CM

## 2022-09-30 DIAGNOSIS — C79.51 METASTATIC CANCER TO BONE (HCC): ICD-10-CM

## 2022-09-30 DIAGNOSIS — C83.31 DIFFUSE LARGE B-CELL LYMPHOMA OF LYMPH NODES OF NECK (HCC): ICD-10-CM

## 2022-09-30 DIAGNOSIS — M25.551 RIGHT HIP PAIN: ICD-10-CM

## 2022-09-30 DIAGNOSIS — F41.9 ANXIETY: ICD-10-CM

## 2022-09-30 PROCEDURE — 74011000636 HC RX REV CODE- 636: Performed by: STUDENT IN AN ORGANIZED HEALTH CARE EDUCATION/TRAINING PROGRAM

## 2022-09-30 PROCEDURE — 74177 CT ABD & PELVIS W/CONTRAST: CPT

## 2022-09-30 RX ORDER — MORPHINE SULFATE 15 MG/1
15 TABLET ORAL
Qty: 60 TABLET | Refills: 0 | Status: SHIPPED | OUTPATIENT
Start: 2022-10-04 | End: 2022-10-19

## 2022-09-30 RX ORDER — ALPRAZOLAM 1 MG/1
1 TABLET ORAL
Qty: 30 TABLET | Refills: 0 | Status: SHIPPED | OUTPATIENT
Start: 2022-10-04 | End: 2022-10-19

## 2022-09-30 RX ADMIN — IOPAMIDOL 100 ML: 755 INJECTION, SOLUTION INTRAVENOUS at 14:18

## 2022-09-30 NOTE — TELEPHONE ENCOUNTER
Triage for Controlled Substance Refill Request    Pain Diagnosis: _Anxiety (F41.9); Diffuse large B-cell lymphoma of lymph nodes of multiple regions [facility]) (C83.38)    Last Outpatient Visit: _9/6/2022    Next Outpatient Visit: _none    Reason for refill needed outside of office visit? Appointment not scheduled prior to need for scheduled refill      Pharmacy: [address] [place] [address]     Medication:ALPRAZolam Shivam Nasuti) 1 mg tablet    Dose and directions: Take 1 Tablet by mouth two (2) times daily as needed for Anxiety for up to 15 days  Number dispensed:30  Date filled ( or Pharmacy):9/19/2022  # left:    Medication:morphine IR (MS IR) 15 mg tablet     Dose and directions: Take 1 Tablet by mouth every six (6) hours as needed for Pain for up to 15 days  Number dispensed:60  Date filled ( or Pharmacy):9/19/2022  #left:     reviewed: _yes    Date of Urine Drug Screen:  _    Opioid Safety Handout given:  _yes    Appropriate for refill:  _yes    Action:  _ medication pending

## 2022-09-30 NOTE — TELEPHONE ENCOUNTER
Received call from patient requesting Morphine and Xanax refills. To be called in to [address] [place] [address].

## 2022-10-03 ENCOUNTER — HOSPITAL ENCOUNTER (INPATIENT)
Age: 67
LOS: 4 days | Discharge: HOME | DRG: 846 | End: 2022-10-07
Attending: INTERNAL MEDICINE | Admitting: INTERNAL MEDICINE
Payer: MEDICARE

## 2022-10-03 DIAGNOSIS — Z51.11 ENCOUNTER FOR ANTINEOPLASTIC CHEMOTHERAPY: ICD-10-CM

## 2022-10-03 DIAGNOSIS — F17.200 TOBACCO DEPENDENCE: ICD-10-CM

## 2022-10-03 DIAGNOSIS — R63.0 POOR APPETITE: ICD-10-CM

## 2022-10-03 DIAGNOSIS — C79.51 METASTATIC CANCER TO BONE (HCC): ICD-10-CM

## 2022-10-03 DIAGNOSIS — Z95.828 PORT-A-CATH IN PLACE: ICD-10-CM

## 2022-10-03 DIAGNOSIS — G89.3 CANCER RELATED PAIN: ICD-10-CM

## 2022-10-03 DIAGNOSIS — Z79.899 HIGH RISK MEDICATION USE: ICD-10-CM

## 2022-10-03 DIAGNOSIS — C83.31 DIFFUSE LARGE B-CELL LYMPHOMA OF LYMPH NODES OF NECK (HCC): Primary | ICD-10-CM

## 2022-10-03 LAB
ALBUMIN SERPL-MCNC: 3.7 G/DL (ref 3.5–5)
ALBUMIN/GLOB SERPL: 1.4 {RATIO} (ref 1.1–2.2)
ALP SERPL-CCNC: 71 U/L (ref 45–117)
ALT SERPL-CCNC: 39 U/L (ref 12–78)
ANION GAP SERPL CALC-SCNC: 8 MMOL/L (ref 5–15)
AST SERPL-CCNC: 47 U/L (ref 15–37)
BASOPHILS # BLD: 0.1 K/UL (ref 0–0.1)
BASOPHILS NFR BLD: 1 % (ref 0–1)
BILIRUB SERPL-MCNC: 0.3 MG/DL (ref 0.2–1)
BUN SERPL-MCNC: 9 MG/DL (ref 6–20)
BUN/CREAT SERPL: 13 (ref 12–20)
CALCIUM SERPL-MCNC: 9 MG/DL (ref 8.5–10.1)
CHLORIDE SERPL-SCNC: 103 MMOL/L (ref 97–108)
CO2 SERPL-SCNC: 26 MMOL/L (ref 21–32)
CREAT SERPL-MCNC: 0.7 MG/DL (ref 0.55–1.02)
DIFFERENTIAL METHOD BLD: ABNORMAL
EOSINOPHIL # BLD: 0 K/UL (ref 0–0.4)
EOSINOPHIL NFR BLD: 0 % (ref 0–7)
ERYTHROCYTE [DISTWIDTH] IN BLOOD BY AUTOMATED COUNT: 15.9 % (ref 11.5–14.5)
GLOBULIN SER CALC-MCNC: 2.7 G/DL (ref 2–4)
GLUCOSE SERPL-MCNC: 152 MG/DL (ref 65–100)
HCT VFR BLD AUTO: 43 % (ref 35–47)
HGB BLD-MCNC: 14.2 G/DL (ref 11.5–16)
IMM GRANULOCYTES # BLD AUTO: 0.1 K/UL (ref 0–0.04)
IMM GRANULOCYTES NFR BLD AUTO: 1 % (ref 0–0.5)
LYMPHOCYTES # BLD: 0.6 K/UL (ref 0.8–3.5)
LYMPHOCYTES NFR BLD: 9 % (ref 12–49)
MCH RBC QN AUTO: 31.4 PG (ref 26–34)
MCHC RBC AUTO-ENTMCNC: 33 G/DL (ref 30–36.5)
MCV RBC AUTO: 95.1 FL (ref 80–99)
MONOCYTES # BLD: 0.8 K/UL (ref 0–1)
MONOCYTES NFR BLD: 12 % (ref 5–13)
NEUTS SEG # BLD: 5.3 K/UL (ref 1.8–8)
NEUTS SEG NFR BLD: 77 % (ref 32–75)
NRBC # BLD: 0 K/UL (ref 0–0.01)
NRBC BLD-RTO: 0 PER 100 WBC
PLATELET # BLD AUTO: 262 K/UL (ref 150–400)
PLATELET COMMENTS,PCOM: ABNORMAL
PMV BLD AUTO: 9.7 FL (ref 8.9–12.9)
POTASSIUM SERPL-SCNC: 3.2 MMOL/L (ref 3.5–5.1)
PROT SERPL-MCNC: 6.4 G/DL (ref 6.4–8.2)
RBC # BLD AUTO: 4.52 M/UL (ref 3.8–5.2)
RBC MORPH BLD: ABNORMAL
SODIUM SERPL-SCNC: 137 MMOL/L (ref 136–145)
WBC # BLD AUTO: 6.9 K/UL (ref 3.6–11)

## 2022-10-03 PROCEDURE — 74011000250 HC RX REV CODE- 250: Performed by: NURSE PRACTITIONER

## 2022-10-03 PROCEDURE — 74011250637 HC RX REV CODE- 250/637: Performed by: INTERNAL MEDICINE

## 2022-10-03 PROCEDURE — 74011250637 HC RX REV CODE- 250/637: Performed by: NURSE PRACTITIONER

## 2022-10-03 PROCEDURE — 74011636637 HC RX REV CODE- 636/637: Performed by: INTERNAL MEDICINE

## 2022-10-03 PROCEDURE — 74011000258 HC RX REV CODE- 258: Performed by: INTERNAL MEDICINE

## 2022-10-03 PROCEDURE — 74011250636 HC RX REV CODE- 250/636: Performed by: INTERNAL MEDICINE

## 2022-10-03 PROCEDURE — 74011250636 HC RX REV CODE- 250/636: Performed by: NURSE PRACTITIONER

## 2022-10-03 PROCEDURE — 65270000029 HC RM PRIVATE

## 2022-10-03 PROCEDURE — 80053 COMPREHEN METABOLIC PANEL: CPT

## 2022-10-03 PROCEDURE — 36415 COLL VENOUS BLD VENIPUNCTURE: CPT

## 2022-10-03 PROCEDURE — 99223 1ST HOSP IP/OBS HIGH 75: CPT | Performed by: INTERNAL MEDICINE

## 2022-10-03 PROCEDURE — 85025 COMPLETE CBC W/AUTO DIFF WBC: CPT

## 2022-10-03 RX ORDER — SODIUM CHLORIDE 0.9 % (FLUSH) 0.9 %
10 SYRINGE (ML) INJECTION AS NEEDED
Status: DISCONTINUED | OUTPATIENT
Start: 2022-10-03 | End: 2022-10-07 | Stop reason: HOSPADM

## 2022-10-03 RX ORDER — ONDANSETRON 2 MG/ML
8 INJECTION INTRAMUSCULAR; INTRAVENOUS AS NEEDED
Status: DISCONTINUED | OUTPATIENT
Start: 2022-10-03 | End: 2022-10-07 | Stop reason: HOSPADM

## 2022-10-03 RX ORDER — SODIUM CHLORIDE 9 MG/ML
45 INJECTION, SOLUTION INTRAVENOUS CONTINUOUS
Status: DISCONTINUED | OUTPATIENT
Start: 2022-10-03 | End: 2022-10-07 | Stop reason: HOSPADM

## 2022-10-03 RX ORDER — LISINOPRIL 20 MG/1
20 TABLET ORAL DAILY
Status: DISCONTINUED | OUTPATIENT
Start: 2022-10-03 | End: 2022-10-07 | Stop reason: HOSPADM

## 2022-10-03 RX ORDER — ALPRAZOLAM 0.5 MG/1
1 TABLET ORAL
Status: DISCONTINUED | OUTPATIENT
Start: 2022-10-03 | End: 2022-10-07 | Stop reason: HOSPADM

## 2022-10-03 RX ORDER — HEPARIN 100 UNIT/ML
300-500 SYRINGE INTRAVENOUS AS NEEDED
Status: DISCONTINUED | OUTPATIENT
Start: 2022-10-03 | End: 2022-10-07 | Stop reason: HOSPADM

## 2022-10-03 RX ORDER — DIPHENHYDRAMINE HYDROCHLORIDE 50 MG/ML
50 INJECTION, SOLUTION INTRAMUSCULAR; INTRAVENOUS ONCE
Status: COMPLETED | OUTPATIENT
Start: 2022-10-03 | End: 2022-10-03

## 2022-10-03 RX ORDER — FLUOXETINE HYDROCHLORIDE 20 MG/1
40 CAPSULE ORAL DAILY
Status: DISCONTINUED | OUTPATIENT
Start: 2022-10-03 | End: 2022-10-07 | Stop reason: HOSPADM

## 2022-10-03 RX ORDER — ACETAMINOPHEN 325 MG/1
650 TABLET ORAL AS NEEDED
Status: DISCONTINUED | OUTPATIENT
Start: 2022-10-03 | End: 2022-10-07 | Stop reason: HOSPADM

## 2022-10-03 RX ORDER — DIPHENHYDRAMINE HYDROCHLORIDE 50 MG/ML
25 INJECTION, SOLUTION INTRAMUSCULAR; INTRAVENOUS AS NEEDED
Status: DISCONTINUED | OUTPATIENT
Start: 2022-10-03 | End: 2022-10-07 | Stop reason: HOSPADM

## 2022-10-03 RX ORDER — ACETAMINOPHEN 325 MG/1
650 TABLET ORAL
Status: DISCONTINUED | OUTPATIENT
Start: 2022-10-03 | End: 2022-10-07 | Stop reason: HOSPADM

## 2022-10-03 RX ORDER — SODIUM CHLORIDE 0.9 % (FLUSH) 0.9 %
5-40 SYRINGE (ML) INJECTION AS NEEDED
Status: DISCONTINUED | OUTPATIENT
Start: 2022-10-03 | End: 2022-10-07 | Stop reason: HOSPADM

## 2022-10-03 RX ORDER — MEPERIDINE HYDROCHLORIDE 50 MG/ML
25 INJECTION INTRAMUSCULAR; INTRAVENOUS; SUBCUTANEOUS
Status: DISCONTINUED | OUTPATIENT
Start: 2022-10-06 | End: 2022-10-07 | Stop reason: HOSPADM

## 2022-10-03 RX ORDER — PANTOPRAZOLE SODIUM 40 MG/1
40 TABLET, DELAYED RELEASE ORAL
Status: DISCONTINUED | OUTPATIENT
Start: 2022-10-03 | End: 2022-10-07 | Stop reason: HOSPADM

## 2022-10-03 RX ORDER — SODIUM CHLORIDE 0.9 % (FLUSH) 0.9 %
5-40 SYRINGE (ML) INJECTION EVERY 8 HOURS
Status: DISCONTINUED | OUTPATIENT
Start: 2022-10-03 | End: 2022-10-07 | Stop reason: HOSPADM

## 2022-10-03 RX ORDER — POLYETHYLENE GLYCOL 3350 17 G/17G
17 POWDER, FOR SOLUTION ORAL DAILY PRN
Status: DISCONTINUED | OUTPATIENT
Start: 2022-10-03 | End: 2022-10-07 | Stop reason: HOSPADM

## 2022-10-03 RX ORDER — ALBUTEROL SULFATE 0.83 MG/ML
2.5 SOLUTION RESPIRATORY (INHALATION) AS NEEDED
Status: DISCONTINUED | OUTPATIENT
Start: 2022-10-03 | End: 2022-10-06

## 2022-10-03 RX ORDER — HYDRALAZINE HYDROCHLORIDE 20 MG/ML
10 INJECTION INTRAMUSCULAR; INTRAVENOUS
Status: DISCONTINUED | OUTPATIENT
Start: 2022-10-03 | End: 2022-10-07 | Stop reason: HOSPADM

## 2022-10-03 RX ORDER — ZOLPIDEM TARTRATE 5 MG/1
5 TABLET ORAL
Status: DISCONTINUED | OUTPATIENT
Start: 2022-10-03 | End: 2022-10-07 | Stop reason: HOSPADM

## 2022-10-03 RX ORDER — ACETAMINOPHEN 325 MG/1
650 TABLET ORAL ONCE
Status: COMPLETED | OUTPATIENT
Start: 2022-10-03 | End: 2022-10-03

## 2022-10-03 RX ORDER — DIPHENHYDRAMINE HYDROCHLORIDE 50 MG/ML
50 INJECTION, SOLUTION INTRAMUSCULAR; INTRAVENOUS AS NEEDED
Status: DISCONTINUED | OUTPATIENT
Start: 2022-10-03 | End: 2022-10-07 | Stop reason: HOSPADM

## 2022-10-03 RX ORDER — PROCHLORPERAZINE EDISYLATE 5 MG/ML
10 INJECTION INTRAMUSCULAR; INTRAVENOUS
Status: DISCONTINUED | OUTPATIENT
Start: 2022-10-03 | End: 2022-10-07 | Stop reason: HOSPADM

## 2022-10-03 RX ORDER — SULFAMETHOXAZOLE AND TRIMETHOPRIM 800; 160 MG/1; MG/1
1 TABLET ORAL
Status: DISCONTINUED | OUTPATIENT
Start: 2022-10-03 | End: 2022-10-03

## 2022-10-03 RX ORDER — LORAZEPAM 2 MG/ML
0.5 INJECTION, SOLUTION INTRAMUSCULAR; INTRAVENOUS
Status: DISCONTINUED | OUTPATIENT
Start: 2022-10-03 | End: 2022-10-07 | Stop reason: HOSPADM

## 2022-10-03 RX ORDER — ONDANSETRON 2 MG/ML
8 INJECTION INTRAMUSCULAR; INTRAVENOUS EVERY 8 HOURS
Status: DISCONTINUED | OUTPATIENT
Start: 2022-10-03 | End: 2022-10-07 | Stop reason: HOSPADM

## 2022-10-03 RX ORDER — ACETAMINOPHEN 650 MG/1
650 SUPPOSITORY RECTAL
Status: DISCONTINUED | OUTPATIENT
Start: 2022-10-03 | End: 2022-10-07 | Stop reason: HOSPADM

## 2022-10-03 RX ORDER — IBUPROFEN 200 MG
1 TABLET ORAL EVERY 24 HOURS
Status: DISCONTINUED | OUTPATIENT
Start: 2022-10-03 | End: 2022-10-07 | Stop reason: HOSPADM

## 2022-10-03 RX ORDER — HYDROCORTISONE SODIUM SUCCINATE 100 MG/2ML
100 INJECTION, POWDER, FOR SOLUTION INTRAMUSCULAR; INTRAVENOUS AS NEEDED
Status: ACTIVE | OUTPATIENT
Start: 2022-10-03 | End: 2022-10-03

## 2022-10-03 RX ORDER — SULFAMETHOXAZOLE AND TRIMETHOPRIM 800; 160 MG/1; MG/1
1 TABLET ORAL
Status: DISCONTINUED | OUTPATIENT
Start: 2022-10-03 | End: 2022-10-07 | Stop reason: HOSPADM

## 2022-10-03 RX ORDER — OLANZAPINE 5 MG/1
5 TABLET ORAL EVERY EVENING
Status: COMPLETED | OUTPATIENT
Start: 2022-10-03 | End: 2022-10-06

## 2022-10-03 RX ORDER — AMLODIPINE BESYLATE 5 MG/1
5 TABLET ORAL DAILY
Status: DISCONTINUED | OUTPATIENT
Start: 2022-10-03 | End: 2022-10-07 | Stop reason: HOSPADM

## 2022-10-03 RX ORDER — MORPHINE SULFATE 15 MG/1
15 TABLET ORAL
Status: DISCONTINUED | OUTPATIENT
Start: 2022-10-03 | End: 2022-10-07 | Stop reason: HOSPADM

## 2022-10-03 RX ORDER — EPINEPHRINE 1 MG/ML
0.3 INJECTION, SOLUTION, CONCENTRATE INTRAVENOUS AS NEEDED
Status: ACTIVE | OUTPATIENT
Start: 2022-10-03 | End: 2022-10-03

## 2022-10-03 RX ORDER — ENOXAPARIN SODIUM 100 MG/ML
40 INJECTION SUBCUTANEOUS DAILY
Status: DISCONTINUED | OUTPATIENT
Start: 2022-10-03 | End: 2022-10-07 | Stop reason: HOSPADM

## 2022-10-03 RX ADMIN — SULFAMETHOXAZOLE AND TRIMETHOPRIM 1 TABLET: 800; 160 TABLET ORAL at 10:03

## 2022-10-03 RX ADMIN — ACETAMINOPHEN 650 MG: 325 TABLET, FILM COATED ORAL at 09:56

## 2022-10-03 RX ADMIN — SODIUM CHLORIDE 45 ML/HR: 9 INJECTION, SOLUTION INTRAVENOUS at 20:59

## 2022-10-03 RX ADMIN — SODIUM CHLORIDE 705 MG: 9 INJECTION, SOLUTION INTRAVENOUS at 10:40

## 2022-10-03 RX ADMIN — LISINOPRIL 20 MG: 20 TABLET ORAL at 09:55

## 2022-10-03 RX ADMIN — OLANZAPINE 5 MG: 5 TABLET, FILM COATED ORAL at 18:48

## 2022-10-03 RX ADMIN — SODIUM CHLORIDE 500 ML: 9 INJECTION, SOLUTION INTRAVENOUS at 09:49

## 2022-10-03 RX ADMIN — ONDANSETRON 8 MG: 2 INJECTION INTRAMUSCULAR; INTRAVENOUS at 16:47

## 2022-10-03 RX ADMIN — ENOXAPARIN SODIUM 40 MG: 100 INJECTION SUBCUTANEOUS at 09:56

## 2022-10-03 RX ADMIN — PANTOPRAZOLE SODIUM 40 MG: 40 TABLET, DELAYED RELEASE ORAL at 09:55

## 2022-10-03 RX ADMIN — PREDNISONE 115 MG: 5 TABLET ORAL at 18:49

## 2022-10-03 RX ADMIN — VINCRISTINE SULFATE: 1 INJECTION, SOLUTION INTRAVENOUS at 12:51

## 2022-10-03 RX ADMIN — SODIUM CHLORIDE 45 ML/HR: 9 INJECTION, SOLUTION INTRAVENOUS at 10:42

## 2022-10-03 RX ADMIN — ONDANSETRON 8 MG: 2 INJECTION INTRAMUSCULAR; INTRAVENOUS at 09:55

## 2022-10-03 RX ADMIN — PROCHLORPERAZINE EDISYLATE 10 MG: 5 INJECTION INTRAMUSCULAR; INTRAVENOUS at 07:50

## 2022-10-03 RX ADMIN — SODIUM CHLORIDE, PRESERVATIVE FREE 10 ML: 5 INJECTION INTRAVENOUS at 07:53

## 2022-10-03 RX ADMIN — ETOPOSIDE 75.2 MG: 20 INJECTION INTRAVENOUS at 12:50

## 2022-10-03 RX ADMIN — DIPHENHYDRAMINE HYDROCHLORIDE 50 MG: 50 INJECTION, SOLUTION INTRAMUSCULAR; INTRAVENOUS at 09:56

## 2022-10-03 RX ADMIN — PREDNISONE 115 MG: 5 TABLET ORAL at 09:55

## 2022-10-03 RX ADMIN — FLUOXETINE HYDROCHLORIDE 40 MG: 20 CAPSULE ORAL at 09:55

## 2022-10-03 RX ADMIN — SODIUM CHLORIDE, PRESERVATIVE FREE 10 ML: 5 INJECTION INTRAVENOUS at 21:01

## 2022-10-03 RX ADMIN — ALPRAZOLAM 1 MG: 0.5 TABLET ORAL at 16:47

## 2022-10-03 RX ADMIN — AMLODIPINE BESYLATE 5 MG: 5 TABLET ORAL at 09:55

## 2022-10-03 NOTE — PROGRESS NOTES
Pt had three episodes this shift that she appeared more drowsy with a low B/P. Those times all revolved around medications   0750 Compazine 10mg  0956 Benadryl 50mg  1647 Xanax 1mg  With the Xanax being the most profound, that the pt was not able to follow a conversation appropriately.

## 2022-10-03 NOTE — PROGRESS NOTES
Comprehensive Nutrition Assessment    Type and Reason for Visit: Initial, Positive nutrition screen    Nutrition Recommendations/Plan:   Continue regular diet order  Provide Ensure Enlive TID (1050 kcal, 132 g carbs, 60 g P) to aid in meeting kcal/protein needs. Malnutrition Assessment:  Malnutrition Status:  Severe malnutrition (10/03/22 1503)    Context:  Chronic illness     Findings of the 6 clinical characteristics of malnutrition:   Energy Intake:  75% or less est energy requirements for 1 month or longer  Weight Loss:  Greater than 5% over 1 month     Body Fat Loss:  No significant body fat loss,     Muscle Mass Loss:  Mild muscle mass loss, Clavicles (pectoralis &deltoids), Temples (temporalis)  Fluid Accumulation:  No significant fluid accumulation,     Strength:  Not performed     Nutrition Assessment:    Pt is a 78year old female admitted with Diffuse large B cell lymphoma ([facility] [address] [C83.30]. She  has a past medical history of Adverse effect of anesthesia, Anxiety, COPD (chronic obstructive pulmonary disease) ([facility] [address] (2022), Depression, GERD (gastroesophageal reflux disease), Hernia, femoral, Hypertension, Joint pain, Nausea & vomiting, and TMJ arthritis. BPA for wt loss 14-23#. Per documentation, patient has  lost 26# (13.8%) x 1 month, clinically significant for timeframe. Patient familiar to services. NKFA. Patient has previously accepted Chocolate Ensure Enlive TID. Voiced acceptance of ONS during this admission. Endorses recent weight loss following last chemotherapy - states appetite has been poor for roughly 1 month. Reports N/V/D. No diarrhea today. No chewing/swallowing problems.  Nutrition Focused Physical Exam not done in entirety 2/2 patient wishing to rest.     Wt Readings from Last 10 Encounters:   10/03/22 73.6 kg (162 lb 4.1 oz)   09/22/22 76.5 kg (168 lb 9.6 oz)   09/19/22 81.6 kg (180 lb)   09/15/22 85.6 kg (188 lb 11.4 oz)   09/01/22 80.7 kg (178 lb)   08/29/22 77.7 kg (171 lb 3.2 oz) 08/24/22 82.1 kg (181 lb)   08/21/22 85 kg (187 lb 6.3 oz)   08/17/22 79.5 kg (175 lb 3.2 oz)   08/05/22 80.7 kg (178 lb)       Nutrition Related Findings:      Wound Type: None  Last Bowel Movement Date: 10/02/22  Abdominal Assessment: Nausea  Appetite: Poor  Bowel Sounds: Active   Edema:No data recorded    Nutr. Labs:  Lab Results   Component Value Date/Time    GFR est AA >60 10/03/2022 06:44 AM    GFR est non-AA >60 10/03/2022 06:44 AM    Creatinine 0.70 10/03/2022 06:44 AM    BUN 9 10/03/2022 06:44 AM    Sodium 137 10/03/2022 06:44 AM    Potassium 3.2 (L) 10/03/2022 06:44 AM    Chloride 103 10/03/2022 06:44 AM    CO2 26 10/03/2022 06:44 AM       Lab Results   Component Value Date/Time    Glucose 152 (H) 10/03/2022 06:44 AM    Glucose (POC) 98 05/04/2022 01:37 PM       No results found for: HBA1C, WSA4TWRF, JEH9UGHH, ZIW8KCRB    Nutr. Meds:  Norvasc, Lovenox, heparin, solu-cortef, zofran PRN, Protonix, miralax PRN, deltasone      Current Nutrition Intake & Therapies:  Average Meal Intake: Unable to assess  Average Supplement Intake: None ordered  ADULT DIET Regular    Anthropometric Measures:  Height: 5' 2.99\" (160 cm)  Ideal Body Weight (IBW): 115 lbs (52 kg)     Current Body Wt:  73.6 kg (162 lb 4.1 oz), 141.1 % IBW. Standing scale  Current BMI (kg/m2): 28.8  Usual Body Weight: 90.7 kg (200 lb)  % Weight Change (Calculated): -18.9  Weight Adjustment: No adjustment                 BMI Category: Overweight (BMI 25.0-29. 9)    Estimated Daily Nutrient Needs:  Energy Requirements Based On: Kcal/kg  Weight Used for Energy Requirements: Current  Energy (kcal/day): 5111-1492 (30-35 kcal/kg)  Weight Used for Protein Requirements: Current  Protein (g/day): 110 (1.5 g/kg)  Method Used for Fluid Requirements: 1 ml/kcal  Fluid (ml/day): 1026-9423    Nutrition Diagnosis:   Severe malnutrition, In context of chronic illness related to inadequate protein-energy intake, increased demand for energy/nutrients, catabolic illness as evidenced by poor intake prior to admission, weight loss greater than or equal to 5% in 1 month, nausea, vomiting, diarrhea, Criteria as identified in malnutrition assessment    Nutrition Interventions:   Food and/or Nutrient Delivery: Continue current diet, Start oral nutrition supplement  Nutrition Education/Counseling: No recommendations at this time  Coordination of Nutrition Care: Continue to monitor while inpatient, Interdisciplinary rounds  Plan of Care discussed with: IDR team    Goals:     Goals: PO intake 50% or greater, by next RD assessment       Nutrition Monitoring and Evaluation:   Behavioral-Environmental Outcomes: None identified  Food/Nutrient Intake Outcomes: Food and nutrient intake, Supplement intake  Physical Signs/Symptoms Outcomes: Biochemical data, Weight, Skin, Meal time behavior    Discharge Planning:    Continue oral nutrition supplement    Jacque Osborn MS, RD  Contact: Ext: [phone], or via [employer])

## 2022-10-03 NOTE — PROGRESS NOTES
Care Management Readmission Assessment        NAME:   Cheryl Rahman   :     1955   MRN:     [id number]             RUR Score/Risk Level:       RUR:  18%  Risk Level: Moderate    Assessment:  Via phone with patient    Reason for Readmission:  Ms. Ирина Conde is a 78 y.o. female with history that includes  diffuse large B-cell lymphoma of lymph nodes of neck, depression, anxiety, HTN, and neuropathy   who was emergently admitted for:  lymphoma    Patient Active Problem List   Diagnosis Code    Tobacco dependence F17.200    Diffuse large B-cell lymphoma of lymph nodes of neck (HCC) C83.31    High risk medication use Z79.899    Encounter for antineoplastic chemotherapy Z51.11    Encounter for screening for other viral diseases Z11.59    Renal lesion N28.9    Metastatic cancer to bone ([facility] [address] C79.51    Diffuse large B cell lymphoma ([facility] [address] C83.30    Cancer related pain G89.3    Insomnia due to medical condition G47.01    Other secondary hypertension I15.8    Generalized edema R60.1    Chronic renal disease, stage III N18.30    Episode of recurrent major depressive disorder (HCC) F33.9    Neuropathy due to chemotherapeutic drug ([facility] [address] G62.0, T45.1X5A    Chemotherapy induced nausea and vomiting R11.2, T45.1X5A    Diarrhea of presumed infectious origin R19.7    Port-A-Cath in place Z95.828    Other fatigue R53.83    Physical debility R53.81    Bilious vomiting with nausea R11.14    Poor appetite R63.0    Anxiety about health F41. 8    Thrush B37.0    Hypokalemia E87.6    Generalized pain R52    Mild malnutrition (HCC) E44.1    Acute respiratory failure with hypoxia (HCC) J96.01       Has any pertinent information changed since previous Care Management Assessment? Living arrangements:   Pt lives at home with her son. Pt reports 4 steps to enter the residence. ADLs:   Pt is independent with ADLs and ambulation. Pt denies problems with either. DME:   None   Ph[facility]acy:   [facility]      Insurer:  Payor: Mely Martin / Plan: [address] HMO / Product Type: Managed Care Medicare /     PCP: Tara Luna MD   Name of Practice:   [facility]   Current patient: Yes   Approximate date of last visit: 9/16/22   Access to virtual PCP visits:  Unknown    Is a Care Conference indicated:   No    Did you attend your follow up appointment(s):  Yes  If not, why not:  N/A    Resources/supports as identified by patient/family:  Pt has appropriate support system         Top Challenges facing patient (as identified by patient/family and CM): Finances/Medication cost?  None identified  Transportation? None identified       Support system or lack thereof? None identified     Living arrangements? None identified         Self-care/ADLs/Cognition? None identified       Advance Directive:  Full Code, does not have an advance directive. Plan for utilizing home health:  None identified             Transition of Care Plan:      Home with outpatient follow-up    Additional information:  Pt admitted on 10/3/22 for lymphoma. CM completed initial assessment via phone. Pt lives at home with her son Leigha Engel). Pt has hx of HH through [facility] but Pt is not currently open. Pt has no hx home O2 and DME. Pt is independent with ADLs and ambulation. Pt denies problems with either. Pt is retired. Pt is able to drive. Discharge plan is for Pt to return home.  Family will transport Pt home.   _____________________________________  Arpita Wallroseann, [address] [place] [address] Drive Management  10/3/2022   10:11 AM     Readmission Assessment  Number of days since last admission?: 8-30 days  Previous disposition: Home with Family  Who is being interviewed?: Patient  What was the patient's/caregiver's perception as to why they think they needed to return back to the hospital?: Other (Comment) (planned readmission)  Did you visit your Primary Care Physician after you left the hospital, before you returned this time?: No  Why weren't you able to visit your PCP?: Other (Comment) (saw specialist)  Did you see a specialist, such as Cardiac, Pulmonary, Orthopedic Physician, etc. after you left the hospital?: Yes  Who advised the patient to return to the hospital?: Physician  Does the patient report anything that got in the way of taking their medications?: No  In our efforts to provide the best possible care to you and others like you, can you think of anything that we could have done to help you after you left the hospital the first time, so that you might not have needed to return so soon?: Other (Comment) (no)        Care Management Interventions  PCP Verified by CM: Yes Bela Scherer MD)  Mode of Transport at Discharge:  Other (see comment) (family)  Transition of Care Consult (CM Consult): Discharge Planning  MyChart Signup: No  Discharge Durable Medical Equipment: No  Physical Therapy Consult: No  Occupational Therapy Consult: No  Speech Therapy Consult: No  Support Systems: Child(bethany), Friend/Neighbor  Confirm Follow Up Transport: Family  Discharge Location  Patient Expects to be Discharged to[de-identified] Home

## 2022-10-03 NOTE — H&P
[facility] at [address], [address] [facility] [address] [facility]  Claritza Galvez: [phone]  F: [phone] Patient ID  Name: Judithe Dance  YOB: 1955  MRN: [id number]  Referring Provider:   No referring provider defined for this encounter. 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 This summary highlights focused aspects of patient's ongoing care and may have a recurring section in notes with either updates or remain unchanged as a longitudinal care summary.    -------------------------------------------------------------------------------------------------------------------------------------------------------------------------------------------------------  DIAGNOSIS:  Diffuse Large B-Cell Lymphoma     ORIGINAL STAGING:  Stage IV     SITES OF DISEASE:  Left Cervical Lymph Node,Bone Disease, Stage IV    CURRENT TREATMENT:  C1,D1 on 5/11/22 DA-R-EPOCH  C2  C3 delayed due to appetite issues and patient anxiety to restart therapy     Plan Name: OP R-CHOP   Treatment goal Curative   Provider Guevara Morin MD   Status Inactive   Start Date    End Date    Treatment plan weight/height/BSA Weight type: Documented (effective on 4/27/2022), 94.1 kg (entered on 4/27/2022), 160 cm (entered on 4/27/2022), BSA 2.05 m2   Most recent weight/height/BSA Weight: 162 lb 4.1 oz (73.6 kg)    Height: 5' 3\" (1.6 m)    BSA (calculated - sq m): 1.81 sq meters      Treatment on clinical trial? [This plan is not part of a research study]   Reason for stopping treatment MD Discretion   Summary of Treatment Plan Medications DOXOrubicin (ADRIAMYCIN) chemo syringe 51.2 mg, 25 mg/m2 = 51.2 mg, IntraVENous, ONCE, 0 of 6 cycles  Dose modification: 25 mg/m2 (original dose 50 mg/m2, Cycle 1, Reason: Per Protocol, Comment: dose split into 2 syringes due to volume)    cyclophosphamide (CYTOXAN) 1,538 mg in 0.9% sodium chloride 250 mL chemo infusion, 750 mg/m2 = 1,538 mg, IntraVENous, ONCE, 0 of 6 cycles    vinCRIStine (VINCASAR PFS) 2 mg in 0.9% sodium chloride 50 mL chemo IVPB, 2 mg (100 % of original dose 2 mg), IntraVENous, ONCE, 0 of 6 cycles  Dose modification: 2 mg (original dose 2 mg, Cycle 1, Reason: Other)    riTUXimab-pvvr (RUXIENCE) 769 mg in 0.9% sodium chloride 769 mL infusion, 375 mg/m2 = 769 mg, IntraVENous, ONCE TITRATE, 0 of 1 cycle       Plan Name: [facility],    Treatment goal Curative   Provider Wallie Goodpasture, MD   Status Active   Start Date 5/11/2022   End Date 10/30/2022 (Planned)   Treatment plan weight/height/BSA Weight type: Documented (effective on 8/17/2022), 79.5 kg (entered on 8/17/2022), 160 cm (entered on 8/5/2022), BSA 1.88 m2   Most recent weight/height/BSA Weight: 162 lb 4.1 oz (73.6 kg)    Height: 5' 3\" (1.6 m)    BSA (calculated - sq m): 1.81 sq meters      Treatment on clinical trial? [This plan is not part of a research study]   Reason for stopping treatment [Plan is still active]   Summary of Treatment Plan Medications cyclophosphamide (CYTOXAN) 1,537.6 mg in 0.9% sodium chloride 250 mL, overfill volume 25 mL chemo infusion, 750 mg/m2 = 1,538 mg, IntraVENous, ONCE, 5 of 6 cycles  Dose modification: 900 mg/m2 (original dose 750 mg/m2, Cycle 2, Reason: Per Protocol, Comment: ANC above 0.5), 600 mg/m2 (original dose 750 mg/m2, Cycle 3, Reason: Dose Not Tolerated), 600 mg/m2 (80 % of original dose 750 mg/m2, Cycle 4, Reason: Dose Not Tolerated)  Administration: 1,537.6 mg (5/15/2022), 1,809 mg (6/10/2022), 1,128 mg (8/22/2022), 1,128 mg (9/16/2022)    etoposide (VEPESID) 102.6 mg in 0.9% sodium chloride 450 mL chemo infusion, 50 mg/m2 = 102.6 mg, IntraVENous, EVERY 24 HOURS, 5 of 6 cycles  Dose modification: 60 mg/m2 (original dose 50 mg/m2, Cycle 2, Reason: Per Protocol, Comment: ANC above 0.5), 40 mg/m2 (original dose 50 mg/m2, Cycle 3, Reason: Dose Not Tolerated), 40 mg/m2 (80 % of original dose 50 mg/m2, Cycle 4, Reason: Dose Not Tolerated)  Administration: 102.6 mg (5/11/2022), 102.6 mg (5/12/2022), 102.6 mg (5/13/2022), 102.6 mg (5/14/2022), 120.6 mg (6/6/2022), 120.6 mg (6/7/2022), 120.6 mg (6/8/2022), 120.6 mg (6/9/2022), 75.2 mg (8/18/2022), 75.2 mg (8/19/2022), 75.2 mg (8/20/2022), 75.2 mg (8/21/2022), 75.2 mg (9/12/2022), 75.2 mg (9/13/2022), 75.2 mg (9/14/2022), 75.2 mg (9/15/2022)    vinCRIStine (VINCASAR PFS) 0.8 mg, DOXOrubicin (ADRIAMYCIN) 20.6 mg in 0.9% sodium chloride 250 mL, overfill volume 25 mL chemo infusion, , IntraVENous, EVERY 24 HOURS, 5 of 6 cycles  Administration:  (5/11/2022),  (5/12/2022),  (5/13/2022),  (5/14/2022),  (6/6/2022),  (6/7/2022),  (6/8/2022),  (6/9/2022),  (8/18/2022),  (8/19/2022),  (8/20/2022),  (8/21/2022),  (9/12/2022),  (9/13/2022),  (9/14/2022),  (9/15/2022)    riTUXimab-pvvr (RUXIENCE) 769 mg in 0.9% sodium chloride 769 mL infusion, 375 mg/m2 = 769 mg, IntraVENous, ONCE TITRATE, 1 of 2 cycles  Administration: 769 mg (5/11/2022)    riTUXimab-pvvr (RUXIENCE) 754 mg in 0.9% sodium chloride 250 mL RAPID infusion, 375 mg/m2 = 754 mg, IntraVENous, ONCE TITRATE, 4 of 4 cycles  Administration: 754 mg (6/6/2022), 705 mg (8/18/2022), 705 mg (9/12/2022)         PRIOR TREATMENT:  n/a     GOALS OF CARE:  Curative Intent     PATHOLOGY:  Specimen #:[id number] Collect: 4/22/2022      ==========================================================================                    * * *SURGICAL PATHOLOGY REPORT* * *   ==========================================================================   * * *PROCEDURES/ADDENDA* * *   ADDENDUM   Date Ordered: 4/26/2022     Status: Signed Out   Date Complete: 4/26/2022     By: Kennon Hodgkin.  Keny Garcia MD   Date Reported: 4/26/2022   Addendum Diagnosis   Lymph node, left posterior cervical lymph node, excision:   In situ hybridization for Jesús-Barr viral RNA: Negative   CPT: 73046  Addendum Comment   * * *CLINICAL HISTORY* * *   Cervical lymphadenopathy, rule out lymphoma   ==========================================================================   * * *FINAL PATHOLOGIC DIAGNOSIS* * *        Lymph node, left posterior cervical lymph node, excision:        Large B cell lymphoma. See comment. * * *Comment* * *   The histologic section has fragments of lymphoid tissue with diffuse and   focal follicular architecture. Diffuse areas are comprised of large,   atypical lymphocytes with centroblastic morphology and increased mitotic   activity. Immunohistochemical stains confirm the malignant cells are CD20   positive B cells that coexpress CD10 and BCL6 without MUM1. CD23   highlights rare follicular dendritic networks associated with lymphoid   follicles with germinal centers and express CD10 and BCL6 without BCL2. CyclinD1 and CD56 stains are negative. Concurrent flow cytometric analysis   confirms a clonal CD10 positive B-cell population comprised of medium to   large cells. The Ki-67 proliferation index is 30%. The overall findings favor the diagnosis of diffuse large B-cell lymphoma,   germinal center subtype. Molecular genetic studies are pending for further   characterization will be reported in an addendum. Flow cytometry:   Consistent with CD10-positive B-cell lymphoproliferative disorder. Comments: Flow cytometry shows monoclonal B-cells (36% of total cells)   with CD10 expression without CD5, consistent with a CD10-positive B-cell   lymphoproliferative disorder. The main differential diagnosis includes   follicular lymphoma, Burkitt lymphoma and large B-cell lymphoma. Please   correlate the result with morphologic findings and clinical information.    Flow Differential (%) and Population Analysis:   Lymphocytes: 93.7%   T-cells (39% of lymphoid cells) show a CD4/CD8 ratio of 3.3 without overt phenotypic abnormality. NK-cells (1% of lymphoid cells) are unremarkable. Mature B-cells (56% of lymphoid cells) include large forms based on   forward scattered pattern and show: CD5 neg, CD10+, CD11c+dim, CD19+,   CD20+ with surface lambda light chain restriction. Markers Performed: CD2, CD3, CD4, CD5, CD7, CD8, CD10, CD11c, CD19, CD20,   CD23, CD34, CD38, CD45, CD56, Kappa, Lambda (17 Markers)   The Technical Component Processing of this test was completed at   [facility], [address] [place] [address] / [id number] /   [phone] / Curtis Kiah # [id number]. Interpretation by Stephen Hardy M.D. The   complete scanned report is available in Epic. CPT: S9627782, T5250657, [id number], [id number], [id number], [id number]   jjw2/2022   *Electronically Signed Out By Sada Melchor. Tobi Lopez MD*   ==========================================================================   * * *Gross Description* * *   Specimen #1, received fresh labeled with the patient's name,  and left   posterior cervical lymph node, consists of two paper towels containing a   1.7 x 1.5 x 0.5 cm aggregate of pale pink-tan, fleshy soft tissue   fragments. The specimen is handled according to the flow cytometry   protocol as follows: 7          Two air-dried touch prep slides - one Diff Quik stained, one   rapid-fixed in 95% alcohol   7          Representative sections in B plus fixative (1A)   7          One piece held in RPMI for possible flow cytometry analysis   7          Remaining tissue fixed in formalin for permanents (1B)   Dr. Tobi Lopez will determine if flow cytometry is necessary. 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 F who presents as a direct hospital admission for Cycle #5 DA-R-EPOCH. Patient had scans on Friday that fortunately did not show any clear active lymphoma in the chest/abdomen/pelvis.    Patient reports dealing with nausea that she attributes to being \"car sick. \" She is doing better after taking compazine. She does not report any fevers,chills. Due to the nausea she has not taken as much oral pain medications or anxiolytics. She feels that she fell behind in  pain control today. She reports chronic back pain but no acute issues. . Patient reports decreased oral intake secondary to the nausea but still eating and drinking. Patient denies any bowel or bladder problems. Patient reports uncontrolled pain. Patient denies any shortness of breath.    -     Past Medical History:   Diagnosis Date    Adverse effect of anesthesia     PHLEGM IN THROAT POST OP & NEEDED X2 BREATHING TREATMENTS    Anxiety     COPD (chronic obstructive pulmonary disease) ([facility]) 2022    emphysema    Depression     GERD (gastroesophageal reflux disease)     Hernia, femoral     since childhood    Hypertension     Joint pain     Nausea & vomiting     TMJ arthritis        Current Facility-Administered Medications:     riTUXimab-pvvr (RUXIENCE) 705 mg in 0.9% sodium chloride 250 mL RAPID infusion, 375 mg/m2 (Treatment Plan Recorded), IntraVENous, ONCE TITR, Kamilla Alcala MD    etoposide (VEPESID) 75.2 mg in 0.9% sodium chloride 250 mL, overfill volume 25 mL chemo infusion, 40 mg/m2 (Treatment Plan Recorded), IntraVENous, Q24H, Kamilla Alcala MD    ALPRAZolam Evetta Ann) tablet 1 mg, 1 mg, Oral, BID PRN, Schoeneweis, Ann, NP    amLODIPine (NORVASC) tablet 5 mg, 5 mg, Oral, DAILY, Schoeneweis, Ann, NP    FLUoxetine (PROzac) capsule 40 mg, 40 mg, Oral, DAILY, Schoeneweis, Ann, NP    morphine IR (MS IR) tablet 15 mg, 15 mg, Oral, Q6H PRN, Schoeneweis, Ann, NP    nicotine (NICODERM CQ) 21 mg/24 hr patch 1 Patch, 1 Patch, TransDERmal, Q24H, Schoeneweis, Ann, NP    trimethoprim-sulfamethoxazole (BACTRIM DS, SEPTRA DS) 160-800 mg per tablet 1 Tablet, 1 Tablet, Oral, Q MON, WED & FRI, Schoeneweis, Ann, NP    sodium chloride (NS) flush 5-40 mL, 5-40 mL, IntraVENous, Q8H, Schoeneweis, Ann, NP    sodium chloride (NS) flush 5-40 mL, 5-40 mL, IntraVENous, PRN, Schoeneweis, Ann, NP, 10 mL at 10/03/22 0753    acetaminophen (TYLENOL) tablet 650 mg, 650 mg, Oral, Q6H PRN **OR** acetaminophen (TYLENOL) suppository 650 mg, 650 mg, Rectal, Q6H PRN, Schoeneweis, Ann, NP    polyethylene glycol (MIRALAX) packet 17 g, 17 g, Oral, DAILY PRN, Schoeneweis, Ann, NP    enoxaparin (LOVENOX) injection 40 mg, 40 mg, SubCUTAneous, DAILY, Schoeneweis, Ann, NP    hydrALAZINE (APRESOLINE) 20 mg/mL injection 10 mg, 10 mg, IntraVENous, Q6H PRN, Schoeneweis, Ann, NP    zolpidem (AMBIEN) tablet 5 mg, 5 mg, Oral, QHS PRN, Schoeneweis, Ann, NP    prochlorperazine (COMPAZINE) injection 10 mg, 10 mg, IntraVENous, Q6H PRN, John Mattson MD, 10 mg at 10/03/22 0750    lisinopriL (PRINIVIL, ZESTRIL) tablet 20 mg, 20 mg, Oral, DAILY, Schoeneweis, Ann, NP    sodium chloride 0.9 % bolus infusion 500 mL, 500 mL, IntraVENous, ONCE, David Dalal MD    acetaminophen (TYLENOL) tablet 650 mg, 650 mg, Oral, ONCE, David Dalal MD    diphenhydrAMINE (BENADRYL) injection 50 mg, 50 mg, IntraVENous, ONCE, John Mattson MD    pantoprazole (PROTONIX) tablet 40 mg, 40 mg, Oral, ACB, David Dalal MD    0.9% sodium chloride infusion, 45 mL/hr, IntraVENous, CONTINUOUS, John Mattson MD    ondansetron [facility]) injection 8 mg, 8 mg, IntraVENous, Q8H, John Mattson MD    LORazepam (ATIVAN) 2 mg/mL injection 0.5 mg, 0.5 mg, IntraVENous, Q6H PRN, John Mattson MD    predniSONE (DELTASONE) tablet 115 mg, 115 mg, Oral, BID WITH MEALS, John Mattson MD    vinCRIStine (VINCASAR PFS) 0.8 mg, DOXOrubicin (ADRIAMYCIN) 15 mg in 0.9% sodium chloride 250 mL, overfill volume 25 mL chemo infusion, , IntraVENous, Q24H, John Mattson MD    [START ON 10/7/2022] cyclophosphamide (CYTOXAN) 1,128 mg in 0.9% sodium chloride 250 mL, overfill volume 25 mL chemo infusion, 600 mg/m2 (Treatment Plan Recorded), IntraVENous, ONCE, Jeremy Stauffer MD    OLANZapine (ZyPREXA) tablet 5 mg, 5 mg, Oral, QPM, Jeremy Stauffer MD    sodium chloride (NS) flush 10 mL, 10 mL, InterCATHeter, PRN, Jeremy Stauffer MD    heparin (porcine) pf 300-500 Units, 300-500 Units, InterCATHeter, PRN, Jeremy Stauffer MD    meperidine (DEMEROL) injection 25 mg, 25 mg, IntraVENous, Q20MIN PRN, Jeremy Stauffer MD    sodium chloride 0.9 % bolus infusion 500 mL, 500 mL, IntraVENous, ONCE PRN, Jeremy Stauffer MD    diphenhydrAMINE (BENADRYL) injection 25 mg, 25 mg, IntraVENous, PRN, Jeremy Stauffer MD    acetaminophen (TYLENOL) tablet 650 mg, 650 mg, Oral, PRN, Jeremy Stauffer MD    [START ON 10/6/2022] meperidine (pf) (DEMEROL) 50 mg/mL injection 25 mg, 25 mg, IntraVENous, Q20MIN PRN, Jeremy Stauffer MD    ondansetron [facility]) injection 8 mg, 8 mg, IntraVENous, PRN, Jeremy Stauffer MD    sodium chloride 0.9 % bolus infusion 500 mL, 500 mL, IntraVENous, ONCE PRN, Jeremy Stauffer MD    EPINEPHrine HCl (PF) (ADRENALIN) 1 mg/mL (1 mL) injection 0.3 mg, 0.3 mg, SubCUTAneous, PRN, Jeremy Stauffer MD    hydrocortisone Sod Succ (PF) (SOLU-CORTEF) injection 100 mg, 100 mg, IntraVENous, PRN, Jeremy Stauffer MD    diphenhydrAMINE (BENADRYL) injection 50 mg, 50 mg, IntraVENous, PRN, Jeremy Stauffer MD    albuterol (PROVENTIL VENTOLIN) nebulizer solution 2.5 mg, 2.5 mg, Nebulization, PRN, Jeremy Stauffer MD    Allergies   Allergen Reactions    Oxycodone Nausea Only     Review of Systems provided by:patient  General: denies fever, denies fatigue, denies chills, and reports appetite loss  Eyes: denies any acute vision loss and denies any eye pain  HEENT: denies epistaxis and denies trouble swallowing  Cardio: denies any chest pain and denies any leg swelling  Resp: denies any shortness of breath. denies any hemoptysis.   Abdomen: denies any abdominal pain, denies any vomiting, denies any diarrhea, and reports nausea  MSK: denies any myalgias and reports arthralgias  Skin: denies any rash and denies any itching  Lymph: denies any lymph node enlargement and denies any lymph node tenderness  Neuro: denies any headache and denies any tremor  : Denies any dysuria. Denies any hematuria. Psych: denies depression and denies anxiety        Objective:      Visit Vitals  BP (!) 108/53 (BP 1 Location: Right upper arm, BP Patient Position: At rest)   Pulse 79   Temp 98.2 °F (36.8 °C)   Resp 16   Ht 5' 3\" (1.6 m)   Wt 162 lb 4.1 oz (73.6 kg)   SpO2 90%   BMI 28.74 kg/m²     ECO- Restricted in physically strenuous activity but ambulatory and able to carry out work of a light or sedentary nature, e.g., light house work, office work. Physical Exam  Constitutional: No acute distress. and Non-toxic appearance. HENT: Normocephalic and atraumatic head. Eyes: Normal Conjunctivae. Anicteric sclerae. Cardiovascular: S1,S2 auscultated. No pitting edema. Pulmonary: Normal Respiratory Effort. No wheezing. No rhonchi. No rales. Abdominal: Normal bowel sounds. Soft Abdomen to palpation. No abdominal tenderness. Skin: No jaundice. No rash. Musculoskeletal: No muscle pain on palpation. No temporal muscle wasting on inspection. Neurological: Alert and oriented. No tremor on inspection. Psychiatric: mood normal. normal speech rate. normal affect.     Results:      I personally reviewed Epic EHR labs/results below:   Lab Results   Component Value Date/Time    WBC 6.9 10/03/2022 06:44 AM    HGB 14.2 10/03/2022 06:44 AM    HCT 43.0 10/03/2022 06:44 AM    PLATELET 665  06:44 AM    MCV 95.1 10/03/2022 06:44 AM     Lab Results   Component Value Date/Time    Sodium 137 10/03/2022 06:44 AM    Potassium 3.2 (L) 10/03/2022 06:44 AM    Chloride 103 10/03/2022 06:44 AM    CO2 26 10/03/2022 06:44 AM    Anion gap 8 10/03/2022 06:44 AM    Glucose 152 (H) 10/03/2022 06:44 AM    BUN 9 10/03/2022 06:44 AM    Creatinine 0.70 10/03/2022 06:44 AM    BUN/Creatinine ratio 13 10/03/2022 06:44 AM    GFR est AA >60 10/03/2022 06:44 AM    GFR est non-AA >60 10/03/2022 06:44 AM    Calcium 9.0 10/03/2022 06:44 AM    Bilirubin, total 0.3 10/03/2022 06:44 AM    Alk. phosphatase 71 10/03/2022 06:44 AM    Protein, total 6.4 10/03/2022 06:44 AM    Albumin 3.7 10/03/2022 06:44 AM    Globulin 2.7 10/03/2022 06:44 AM    A-G Ratio 1.4 10/03/2022 06:44 AM    ALT (SGPT) 39 10/03/2022 06:44 AM    AST (SGOT) 47 (H) 10/03/2022 06:44 AM       Assessment and Recommendations: Active Hospital Problems    Diagnosis    Poor appetite  -She reports mostly eating but recently has had some nausea. Overall she reports doing well. She responded to Compazine this morning. Port-A-Cath in place  -Port will be used to implement therapy. Cancer related pain  -Continue her home pain management medication regimen. Metastatic cancer to bone [facility])  -Patient will need PET CT scan whole body to assess for any occult bone disease. Diffuse large B-cell lymphoma of lymph nodes of neck (HCC)  -Discussed with patient that she needs to continue with cycle 5 and cycle 6 despite recent encouraging CT scan showing no clear lymphadenopathy in the CT chest abdomen pelvis region. We discussed the importance that she follow through PET/CT imaging after cycle 6. Proceed with cycle 5 dose adjusted R-EPOCH today with dosing maintained from Cycle #4. Tobacco dependence  -Continue nicotine patch while hospitalized. Continue to recommend smoking cessation. FOLLOW-UP  Daily.     Signed By:   Erin Simmons MD

## 2022-10-03 NOTE — ROUTINE PROCESS
Bedside and Verbal shift change report given to DEANNA Alejandra (oncoming nurse) by DEANNA Moreno(offgoing nurse). Report included the following information SBAR and Kardex.

## 2022-10-04 LAB
ALBUMIN SERPL-MCNC: 2.9 G/DL (ref 3.5–5)
ALBUMIN/GLOB SERPL: 1.2 {RATIO} (ref 1.1–2.2)
ALP SERPL-CCNC: 56 U/L (ref 45–117)
ALT SERPL-CCNC: 67 U/L (ref 12–78)
ANION GAP SERPL CALC-SCNC: 4 MMOL/L (ref 5–15)
AST SERPL-CCNC: 64 U/L (ref 15–37)
BASOPHILS # BLD: 0 K/UL (ref 0–0.1)
BASOPHILS NFR BLD: 0 % (ref 0–1)
BILIRUB SERPL-MCNC: 0.3 MG/DL (ref 0.2–1)
BUN SERPL-MCNC: 9 MG/DL (ref 6–20)
BUN/CREAT SERPL: 15 (ref 12–20)
CALCIUM SERPL-MCNC: 8.6 MG/DL (ref 8.5–10.1)
CHLORIDE SERPL-SCNC: 110 MMOL/L (ref 97–108)
CO2 SERPL-SCNC: 26 MMOL/L (ref 21–32)
CREAT SERPL-MCNC: 0.59 MG/DL (ref 0.55–1.02)
DIFFERENTIAL METHOD BLD: ABNORMAL
EOSINOPHIL # BLD: 0 K/UL (ref 0–0.4)
EOSINOPHIL NFR BLD: 0 % (ref 0–7)
ERYTHROCYTE [DISTWIDTH] IN BLOOD BY AUTOMATED COUNT: 15.6 % (ref 11.5–14.5)
GLOBULIN SER CALC-MCNC: 2.5 G/DL (ref 2–4)
GLUCOSE SERPL-MCNC: 130 MG/DL (ref 65–100)
HCT VFR BLD AUTO: 37.1 % (ref 35–47)
HGB BLD-MCNC: 12.2 G/DL (ref 11.5–16)
IMM GRANULOCYTES # BLD AUTO: 0 K/UL
IMM GRANULOCYTES NFR BLD AUTO: 0 %
LYMPHOCYTES # BLD: 0.2 K/UL (ref 0.8–3.5)
LYMPHOCYTES NFR BLD: 9 % (ref 12–49)
MCH RBC QN AUTO: 30.7 PG (ref 26–34)
MCHC RBC AUTO-ENTMCNC: 32.9 G/DL (ref 30–36.5)
MCV RBC AUTO: 93.5 FL (ref 80–99)
METAMYELOCYTES NFR BLD MANUAL: 3 %
MONOCYTES # BLD: 0.3 K/UL (ref 0–1)
MONOCYTES NFR BLD: 11 % (ref 5–13)
NEUTS BAND NFR BLD MANUAL: 6 % (ref 0–6)
NEUTS SEG # BLD: 2.1 K/UL (ref 1.8–8)
NEUTS SEG NFR BLD: 71 % (ref 32–75)
NRBC # BLD: 0 K/UL (ref 0–0.01)
NRBC BLD-RTO: 0 PER 100 WBC
PLATELET # BLD AUTO: 213 K/UL (ref 150–400)
PMV BLD AUTO: 9.4 FL (ref 8.9–12.9)
POTASSIUM SERPL-SCNC: 4 MMOL/L (ref 3.5–5.1)
PROT SERPL-MCNC: 5.4 G/DL (ref 6.4–8.2)
RBC # BLD AUTO: 3.97 M/UL (ref 3.8–5.2)
RBC MORPH BLD: ABNORMAL
SODIUM SERPL-SCNC: 140 MMOL/L (ref 136–145)
WBC # BLD AUTO: 2.7 K/UL (ref 3.6–11)
WBC MORPH BLD: ABNORMAL

## 2022-10-04 PROCEDURE — 80053 COMPREHEN METABOLIC PANEL: CPT

## 2022-10-04 PROCEDURE — 74011636637 HC RX REV CODE- 636/637: Performed by: INTERNAL MEDICINE

## 2022-10-04 PROCEDURE — 99233 SBSQ HOSP IP/OBS HIGH 50: CPT | Performed by: INTERNAL MEDICINE

## 2022-10-04 PROCEDURE — 74011250637 HC RX REV CODE- 250/637: Performed by: INTERNAL MEDICINE

## 2022-10-04 PROCEDURE — 77010033678 HC OXYGEN DAILY

## 2022-10-04 PROCEDURE — 74011250636 HC RX REV CODE- 250/636: Performed by: NURSE PRACTITIONER

## 2022-10-04 PROCEDURE — 74011250636 HC RX REV CODE- 250/636: Performed by: INTERNAL MEDICINE

## 2022-10-04 PROCEDURE — 85025 COMPLETE CBC W/AUTO DIFF WBC: CPT

## 2022-10-04 PROCEDURE — 36415 COLL VENOUS BLD VENIPUNCTURE: CPT

## 2022-10-04 PROCEDURE — 74011250637 HC RX REV CODE- 250/637: Performed by: NURSE PRACTITIONER

## 2022-10-04 PROCEDURE — 74011000250 HC RX REV CODE- 250: Performed by: NURSE PRACTITIONER

## 2022-10-04 PROCEDURE — 65270000029 HC RM PRIVATE

## 2022-10-04 RX ORDER — GUAIFENESIN 600 MG/1
600 TABLET, EXTENDED RELEASE ORAL EVERY 12 HOURS
Status: DISCONTINUED | OUTPATIENT
Start: 2022-10-04 | End: 2022-10-07 | Stop reason: HOSPADM

## 2022-10-04 RX ADMIN — SODIUM CHLORIDE, PRESERVATIVE FREE 10 ML: 5 INJECTION INTRAVENOUS at 05:05

## 2022-10-04 RX ADMIN — SODIUM CHLORIDE 45 ML/HR: 9 INJECTION, SOLUTION INTRAVENOUS at 12:31

## 2022-10-04 RX ADMIN — ONDANSETRON 8 MG: 2 INJECTION INTRAMUSCULAR; INTRAVENOUS at 09:37

## 2022-10-04 RX ADMIN — PANTOPRAZOLE SODIUM 40 MG: 40 TABLET, DELAYED RELEASE ORAL at 06:41

## 2022-10-04 RX ADMIN — ENOXAPARIN SODIUM 40 MG: 100 INJECTION SUBCUTANEOUS at 09:37

## 2022-10-04 RX ADMIN — ALPRAZOLAM 1 MG: 0.5 TABLET ORAL at 04:00

## 2022-10-04 RX ADMIN — LISINOPRIL 20 MG: 20 TABLET ORAL at 09:38

## 2022-10-04 RX ADMIN — GUAIFENESIN 600 MG: 600 TABLET ORAL at 14:24

## 2022-10-04 RX ADMIN — ETOPOSIDE 75.2 MG: 20 INJECTION INTRAVENOUS at 14:06

## 2022-10-04 RX ADMIN — MORPHINE SULFATE 15 MG: 15 TABLET ORAL at 09:34

## 2022-10-04 RX ADMIN — OLANZAPINE 5 MG: 5 TABLET, FILM COATED ORAL at 18:07

## 2022-10-04 RX ADMIN — PREDNISONE 115 MG: 5 TABLET ORAL at 09:38

## 2022-10-04 RX ADMIN — ONDANSETRON 8 MG: 2 INJECTION INTRAMUSCULAR; INTRAVENOUS at 18:07

## 2022-10-04 RX ADMIN — SODIUM CHLORIDE, PRESERVATIVE FREE 10 ML: 5 INJECTION INTRAVENOUS at 14:13

## 2022-10-04 RX ADMIN — MORPHINE SULFATE 15 MG: 15 TABLET ORAL at 16:03

## 2022-10-04 RX ADMIN — ONDANSETRON 8 MG: 2 INJECTION INTRAMUSCULAR; INTRAVENOUS at 00:09

## 2022-10-04 RX ADMIN — SODIUM CHLORIDE, PRESERVATIVE FREE 10 ML: 5 INJECTION INTRAVENOUS at 21:00

## 2022-10-04 RX ADMIN — VINCRISTINE SULFATE: 1 INJECTION, SOLUTION INTRAVENOUS at 14:03

## 2022-10-04 RX ADMIN — PREDNISONE 115 MG: 5 TABLET ORAL at 18:07

## 2022-10-04 RX ADMIN — FLUOXETINE HYDROCHLORIDE 40 MG: 20 CAPSULE ORAL at 09:38

## 2022-10-04 RX ADMIN — ALPRAZOLAM 1 MG: 0.5 TABLET ORAL at 20:18

## 2022-10-04 RX ADMIN — AMLODIPINE BESYLATE 5 MG: 5 TABLET ORAL at 09:37

## 2022-10-04 RX ADMIN — GUAIFENESIN 600 MG: 600 TABLET ORAL at 20:18

## 2022-10-04 NOTE — PROGRESS NOTES
Bedside and Verbal shift change report given to Shant Ramirez RN  (oncoming nurse) by Ronald Waters RN  (offgoing nurse). Report included the following information SBAR, Kardex, Procedure Summary, MAR, and Recent Results.

## 2022-10-04 NOTE — PROGRESS NOTES
Problem: Pain  Goal: *Control of Pain  Outcome: Progressing Towards Goal  Goal: *PALLIATIVE CARE:  Alleviation of Pain  Outcome: Progressing Towards Goal     Problem: Patient Education: Go to Patient Education Activity  Goal: Patient/Family Education  Outcome: Progressing Towards Goal     Problem: Falls - Risk of  Goal: *Absence of Falls  Description: Document Clearence Skates Fall Risk and appropriate interventions in the flowsheet.   Outcome: Progressing Towards Goal  Note: Fall Risk Interventions:  Mobility Interventions: Bed/chair exit alarm, Communicate number of staff needed for ambulation/transfer, Patient to call before getting OOB, Strengthening exercises (ROM-active/passive), Utilize walker, cane, or other assistive device, Utilize gait belt for transfers/ambulation    Mentation Interventions: Adequate sleep, hydration, pain control, Bed/chair exit alarm, More frequent rounding, Reorient patient, Room close to nurse's station, Self-releasing belt, Toileting rounds    Medication Interventions: Assess postural VS orthostatic hypotension, Bed/chair exit alarm, Evaluate medications/consider consulting pharmacy, Teach patient to arise slowly    Elimination Interventions: Bed/chair exit alarm, Call light in reach, Stay With Me (per policy), Toilet paper/wipes in reach    History of Falls Interventions: Bed/chair exit alarm, Consult care management for discharge planning, Door open when patient unattended, Evaluate medications/consider consulting pharmacy, Room close to nurse's station, Utilize gait belt for transfer/ambulation, Assess for delayed presentation/identification of injury for 48 hrs (comment for end date), Vital signs minimum Q4HRs X 24 hrs (comment for end date)         Problem: Patient Education: Go to Patient Education Activity  Goal: Patient/Family Education  Outcome: Progressing Towards Goal     Problem: Nutrition Deficit  Goal: *Optimize nutritional status  Outcome: Progressing Towards Goal

## 2022-10-04 NOTE — PROGRESS NOTES
Problem: Pain  Goal: *Control of Pain  Outcome: Progressing Towards Goal  Goal: *PALLIATIVE CARE:  Alleviation of Pain  Outcome: Progressing Towards Goal     Problem: Patient Education: Go to Patient Education Activity  Goal: Patient/Family Education  Outcome: Progressing Towards Goal     Problem: Falls - Risk of  Goal: *Absence of Falls  Description: Document Kyung Skill Fall Risk and appropriate interventions in the flowsheet.   Outcome: Progressing Towards Goal  Note: Fall Risk Interventions:  Mobility Interventions: Bed/chair exit alarm, Patient to call before getting OOB, Strengthening exercises (ROM-active/passive), Utilize walker, cane, or other assistive device, Utilize gait belt for transfers/ambulation    Mentation Interventions: Adequate sleep, hydration, pain control, More frequent rounding, Reorient patient, Room close to nurse's station, Self-releasing belt, Toileting rounds, Update white board    Medication Interventions: Assess postural VS orthostatic hypotension, Bed/chair exit alarm, Evaluate medications/consider consulting pharmacy, Patient to call before getting OOB, Teach patient to arise slowly    Elimination Interventions: Bed/chair exit alarm, Call light in reach, Toilet paper/wipes in reach, Toileting schedule/hourly rounds    History of Falls Interventions: Bed/chair exit alarm, Door open when patient unattended, Utilize gait belt for transfer/ambulation         Problem: Patient Education: Go to Patient Education Activity  Goal: Patient/Family Education  Outcome: Progressing Towards Goal     Problem: Nutrition Deficit  Goal: *Optimize nutritional status  Outcome: Progressing Towards Goal

## 2022-10-04 NOTE — PROGRESS NOTES
10/4/2022  Case Management Progress Note    9:37 AM  Patient is 78year old female admitted 10/3 with diffuse large b cell lymphoma--planned admission for chemotherapy  Patient's RUR is 20% red/high risk for readmission, patient is currently a planned readmission to the hospital  Covid test: none this admission, not indicated  Chart reviewed  Per chart patient will be here until Friday when her chemotherapy finishes. She has had home health through [facility] in the past but is not currently open. She does have excellent family support at home. Plan remains for her to return home with family assistance at the time her treatment finishes. Will continue to follow and assist as needed.      Transition of Care Plan   Continue medical management/treatment  Home with family assistance when treatment finishes  Family will transport at discharge  Follow up outpatient as indicated  CM will continue to follow    CHIVO De

## 2022-10-04 NOTE — PROGRESS NOTES
Visit charted 10/4/2022  Spiritual Care Partner Volunteer visited patient at [address] in 5 Med Surg on 10/3/2022   Documented by:  Cata Ng., MS., [facility]  Page a  [phone]- Yobany Gifford ([id number])

## 2022-10-04 NOTE — PROGRESS NOTES
[facility] [place] [address]  [address] [facility], [facility] [place]: [phone]  F: [phone] Patient ID  Name: Luana Yadav  YOB: 1955  MRN: [id number]  Referring Provider:   No referring provider defined for this encounter. 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 This summary highlights focused aspects of patient's ongoing care and may have a recurring section in notes with either updates or remain unchanged as a longitudinal care summary.    -------------------------------------------------------------------------------------------------------------------------------------------------------------------------------------------------------  DIAGNOSIS:  Diffuse Large B-Cell Lymphoma     ORIGINAL STAGING:  Stage IV     SITES OF DISEASE:  Left Cervical Lymph Node,Bone Disease, Stage IV    CURRENT TREATMENT:  C1,D1 on 5/11/22 DA-R-EPOCH  C2  C3 delayed due to appetite issues and patient anxiety to restart therapy     Plan Name: OP R-CHOP   Treatment goal Curative   Provider Ferrell Boeck, MD   Status Inactive   Start Date    End Date    Treatment plan weight/height/BSA Weight type: Documented (effective on 4/27/2022), 94.1 kg (entered on 4/27/2022), 160 cm (entered on 4/27/2022), BSA 2.05 m2   Most recent weight/height/BSA Weight: 162 lb 4.1 oz (73.6 kg)    Height: 5' 2.99\" (1.6 m)    BSA (calculated - sq m): 1.81 sq meters      Treatment on clinical trial? [This plan is not part of a research study]   Reason for stopping treatment MD Discretion   Summary of Treatment Plan Medications DOXOrubicin (ADRIAMYCIN) chemo syringe 51.2 mg, 25 mg/m2 = 51.2 mg, IntraVENous, ONCE, 0 of 6 cycles  Dose modification: 25 mg/m2 (original dose 50 mg/m2, Cycle 1, Reason: Per Protocol, Comment: dose split into 2 syringes due to volume)    cyclophosphamide (CYTOXAN) 1,538 mg in 0.9% sodium chloride 250 mL chemo infusion, 750 mg/m2 = 1,538 mg, IntraVENous, ONCE, 0 of 6 cycles    vinCRIStine (VINCASAR PFS) 2 mg in 0.9% sodium chloride 50 mL chemo IVPB, 2 mg (100 % of original dose 2 mg), IntraVENous, ONCE, 0 of 6 cycles  Dose modification: 2 mg (original dose 2 mg, Cycle 1, Reason: Other)    riTUXimab-pvvr (RUXIENCE) 769 mg in 0.9% sodium chloride 769 mL infusion, 375 mg/m2 = 769 mg, IntraVENous, ONCE TITRATE, 0 of 1 cycle       Plan Name: Formerly named [facility],    Treatment goal Curative   Provider Tatiana Oconnell MD   Status Active   Start Date 5/11/2022   End Date 10/30/2022 (Planned)   Treatment plan weight/height/BSA Weight type: Documented (effective on 8/17/2022), 79.5 kg (entered on 8/17/2022), 160 cm (entered on 8/5/2022), BSA 1.88 m2   Most recent weight/height/BSA Weight: 162 lb 4.1 oz (73.6 kg)    Height: 5' 2.99\" (1.6 m)    BSA (calculated - sq m): 1.81 sq meters      Treatment on clinical trial? [This plan is not part of a research study]   Reason for stopping treatment [Plan is still active]   Summary of Treatment Plan Medications cyclophosphamide (CYTOXAN) 1,537.6 mg in 0.9% sodium chloride 250 mL, overfill volume 25 mL chemo infusion, 750 mg/m2 = 1,538 mg, IntraVENous, ONCE, 5 of 6 cycles  Dose modification: 900 mg/m2 (original dose 750 mg/m2, Cycle 2, Reason: Per Protocol, Comment: ANC above 0.5), 600 mg/m2 (original dose 750 mg/m2, Cycle 3, Reason: Dose Not Tolerated), 600 mg/m2 (80 % of original dose 750 mg/m2, Cycle 4, Reason: Dose Not Tolerated)  Administration: 1,537.6 mg (5/15/2022), 1,809 mg (6/10/2022), 1,128 mg (8/22/2022), 1,128 mg (9/16/2022)    etoposide (VEPESID) 102.6 mg in 0.9% sodium chloride 450 mL chemo infusion, 50 mg/m2 = 102.6 mg, IntraVENous, EVERY 24 HOURS, 5 of 6 cycles  Dose modification: 60 mg/m2 (original dose 50 mg/m2, Cycle 2, Reason: Per Protocol, Comment: ANC above 0.5), 40 mg/m2 (original dose 50 mg/m2, Cycle 3, Reason: Dose Not Tolerated), 40 mg/m2 (80 % of original dose 50 mg/m2, Cycle 4, Reason: Dose Not Tolerated)  Administration: 102.6 mg (5/11/2022), 102.6 mg (5/12/2022), 102.6 mg (5/13/2022), 102.6 mg (5/14/2022), 120.6 mg (6/6/2022), 120.6 mg (6/7/2022), 120.6 mg (6/8/2022), 120.6 mg (6/9/2022), 75.2 mg (8/18/2022), 75.2 mg (8/19/2022), 75.2 mg (8/20/2022), 75.2 mg (8/21/2022), 75.2 mg (9/12/2022), 75.2 mg (9/13/2022), 75.2 mg (9/14/2022), 75.2 mg (9/15/2022), 75.2 mg (10/3/2022)    vinCRIStine (VINCASAR PFS) 0.8 mg, DOXOrubicin (ADRIAMYCIN) 20.6 mg in 0.9% sodium chloride 250 mL, overfill volume 25 mL chemo infusion, , IntraVENous, EVERY 24 HOURS, 5 of 6 cycles  Administration:  (5/11/2022),  (5/12/2022),  (5/13/2022),  (5/14/2022),  (6/6/2022),  (6/7/2022),  (6/8/2022),  (6/9/2022),  (8/18/2022),  (8/19/2022),  (8/20/2022),  (8/21/2022),  (9/12/2022),  (9/13/2022),  (9/14/2022),  (9/15/2022),  (10/3/2022)    riTUXimab-pvvr (RUXIENCE) 769 mg in 0.9% sodium chloride 769 mL infusion, 375 mg/m2 = 769 mg, IntraVENous, ONCE TITRATE, 1 of 2 cycles  Administration: 769 mg (5/11/2022)    riTUXimab-pvvr (RUXIENCE) 754 mg in 0.9% sodium chloride 250 mL RAPID infusion, 375 mg/m2 = 754 mg, IntraVENous, ONCE TITRATE, 4 of 4 cycles  Administration: 754 mg (6/6/2022), 705 mg (8/18/2022), 705 mg (9/12/2022), 705 mg (10/3/2022)         PRIOR TREATMENT:  n/a     GOALS OF CARE:  Curative Intent     PATHOLOGY:  Specimen #:[id number] Collect: 4/22/2022      ==========================================================================                    * * *SURGICAL PATHOLOGY REPORT* * *   ==========================================================================   * * *PROCEDURES/ADDENDA* * *   ADDENDUM   Date Ordered: 4/26/2022     Status: Signed Out   Date Complete: 4/26/2022     By: Saranya Holland.  Jalil Stevens MD   Date Reported: 4/26/2022   Addendum Diagnosis Lymph node, left posterior cervical lymph node, excision:   In situ hybridization for Jesús-Barr viral RNA: Negative   CPT: 04156  Addendum Comment   * * *CLINICAL HISTORY* * *   Cervical lymphadenopathy, rule out lymphoma   ==========================================================================   * * *FINAL PATHOLOGIC DIAGNOSIS* * *        Lymph node, left posterior cervical lymph node, excision:        Large B cell lymphoma. See comment. * * *Comment* * *   The histologic section has fragments of lymphoid tissue with diffuse and   focal follicular architecture. Diffuse areas are comprised of large,   atypical lymphocytes with centroblastic morphology and increased mitotic   activity. Immunohistochemical stains confirm the malignant cells are CD20   positive B cells that coexpress CD10 and BCL6 without MUM1. CD23   highlights rare follicular dendritic networks associated with lymphoid   follicles with germinal centers and express CD10 and BCL6 without BCL2. CyclinD1 and CD56 stains are negative. Concurrent flow cytometric analysis   confirms a clonal CD10 positive B-cell population comprised of medium to   large cells. The Ki-67 proliferation index is 30%. The overall findings favor the diagnosis of diffuse large B-cell lymphoma,   germinal center subtype. Molecular genetic studies are pending for further   characterization will be reported in an addendum. Flow cytometry:   Consistent with CD10-positive B-cell lymphoproliferative disorder. Comments: Flow cytometry shows monoclonal B-cells (36% of total cells)   with CD10 expression without CD5, consistent with a CD10-positive B-cell   lymphoproliferative disorder. The main differential diagnosis includes   follicular lymphoma, Burkitt lymphoma and large B-cell lymphoma. Please   correlate the result with morphologic findings and clinical information.    Flow Differential (%) and Population Analysis:   Lymphocytes: 93.7%   T-cells (39% of lymphoid cells) show a CD4/CD8 ratio of 3.3 without overt   phenotypic abnormality. NK-cells (1% of lymphoid cells) are unremarkable. Mature B-cells (56% of lymphoid cells) include large forms based on   forward scattered pattern and show: CD5 neg, CD10+, CD11c+dim, CD19+,   CD20+ with surface lambda light chain restriction. Markers Performed: CD2, CD3, CD4, CD5, CD7, CD8, CD10, CD11c, CD19, CD20,   CD23, CD34, CD38, CD45, CD56, Kappa, Lambda (17 Markers)   The Technical Component Processing of this test was completed at   [facility], [address] [place] [address], [facility] / [id number] /   [phone] / Samra Erwin # [id number]. Interpretation by Nicolas Fernandez M.D. The   complete scanned report is available in Epic. CPT: M1806237, N4616187, [id number], [id number], [id number], [id number]   j2/2022   *Electronically Signed Out By Vonda Lang. Yvonne Leigh MD*   ==========================================================================   * * *Gross Description* * *   Specimen #1, received fresh labeled with the patient's name,  and left   posterior cervical lymph node, consists of two paper towels containing a   1.7 x 1.5 x 0.5 cm aggregate of pale pink-tan, fleshy soft tissue   fragments. The specimen is handled according to the flow cytometry   protocol as follows: 7          Two air-dried touch prep slides - one Diff Quik stained, one   rapid-fixed in 95% alcohol   7          Representative sections in B plus fixative (1A)   7          One piece held in RPMI for possible flow cytometry analysis   7          Remaining tissue fixed in formalin for permanents (1B)   Dr. Yvonne Leigh will determine if flow cytometry is necessary. 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 F who presents as a direct hospital admission for Cycle #5 DA-R-EPOCH.  Patient had scans on Friday that fortunately did not show any clear active lymphoma in the chest/abdomen/pelvis. Patient reports dealing with nausea that she attributes to being \"car sick. \" She is doing better after taking compazine. She does not report any fevers,chills. Due to the nausea she has not taken as much oral pain medications or anxiolytics. She feels that she fell behind in th pain control today. She reports chronic back pain but no acute issues. . Patient reports decreased oral intake secondary to the nausea but still eating and drinking. Patient denies any bowel or bladder problems. Patient reports uncontrolled pain. Patient denies any shortness of breath. Interval History:     Had a good night; slept well. Feeling better today. Denies N/V. Encouraged to be out of bed today. Past Medical History:   Diagnosis Date    Adverse effect of anesthesia     PHLEGM IN THROAT POST OP & NEEDED X2 BREATHING TREATMENTS    Anxiety     COPD (chronic obstructive pulmonary disease) ([facility] [address]     emphysema    Depression     GERD (gastroesophageal reflux disease)     Hernia, femoral     since childhood    Hypertension     Joint pain     Nausea & vomiting     TMJ arthritis        Objective:      Visit Vitals  /67 (BP 1 Location: Right upper arm, BP Patient Position: Lying left side)   Pulse 74   Temp 97.2 °F (36.2 °C)   Resp 18   Ht 5' 2.99\" (1.6 m)   Wt 162 lb 4.1 oz (73.6 kg)   SpO2 90%   BMI 28.75 kg/m²     ECO- Restricted in physically strenuous activity but ambulatory and able to carry out work of a light or sedentary nature, e.g., light house work, office work.   Physical Exam    Results:        Lab Results   Component Value Date/Time    WBC 2.7 (L) 10/04/2022 05:08 AM    HGB 12.2 10/04/2022 05:08 AM    HCT 37.1 10/04/2022 05:08 AM    PLATELET 026  05:08 AM    MCV 93.5 10/04/2022 05:08 AM     Lab Results   Component Value Date/Time    Sodium 140 10/04/2022 05:08 AM    Potassium 4.0 10/04/2022 05:08 AM    Chloride 110 (H) 10/04/2022 05:08 AM    CO2 26 10/04/2022 05:08 AM Anion gap 4 (L) 10/04/2022 05:08 AM    Glucose 130 (H) 10/04/2022 05:08 AM    BUN 9 10/04/2022 05:08 AM    Creatinine 0.59 10/04/2022 05:08 AM    BUN/Creatinine ratio 15 10/04/2022 05:08 AM    GFR est AA >60 10/03/2022 06:44 AM    GFR est non-AA >60 10/03/2022 06:44 AM    Calcium 8.6 10/04/2022 05:08 AM    Bilirubin, total 0.3 10/04/2022 05:08 AM    Alk. phosphatase 56 10/04/2022 05:08 AM    Protein, total 5.4 (L) 10/04/2022 05:08 AM    Albumin 2.9 (L) 10/04/2022 05:08 AM    Globulin 2.5 10/04/2022 05:08 AM    A-G Ratio 1.2 10/04/2022 05:08 AM    ALT (SGPT) 67 10/04/2022 05:08 AM    AST (SGOT) 64 (H) 10/04/2022 05:08 AM       Assessment and Recommendations: Active Hospital Problems    Diagnosis    Poor appetite  -She reports mostly eating but recently has had some nausea. Overall she reports doing well. She responded to Compazine on admission. Port-A-Cath in place  -Port will be used to implement therapy. Cancer related pain  -Continue her home pain management medication regimen. Metastatic cancer to bone [facility])  -Patient will need PET CT scan whole body to assess for any occult bone disease. Diffuse large B-cell lymphoma of lymph nodes of neck (HCC)  -Discussed with patient that she needs to continue with cycle 5 and cycle 6 despite recent encouraging CT scan showing no clear lymphadenopathy in the CT chest abdomen pelvis region. We discussed the importance that she follow through PET/CT imaging after cycle 6.  -proceed with C5 d2 dose adjusted R-EPOCH today with dosing maintained from Cycle #4. Tobacco dependence  -Continue nicotine patch while hospitalized. Continue to recommend smoking cessation. Plan reviewed with Dr Eriberto Torres.

## 2022-10-04 NOTE — PROGRESS NOTES
Bedside and Verbal shift change report given to Jimbo Vazquez RN (oncoming nurse) by Prema Andrade RN (offgoing nurse). Report included the following information SBAR, Kardex, Intake/Output, MAR, and Recent Results.

## 2022-10-05 LAB
ALBUMIN SERPL-MCNC: 2.9 G/DL (ref 3.5–5)
ALBUMIN/GLOB SERPL: 1.3 {RATIO} (ref 1.1–2.2)
ALP SERPL-CCNC: 54 U/L (ref 45–117)
ALT SERPL-CCNC: 139 U/L (ref 12–78)
ANION GAP SERPL CALC-SCNC: 5 MMOL/L (ref 5–15)
AST SERPL-CCNC: 107 U/L (ref 15–37)
BASOPHILS # BLD: 0 K/UL (ref 0–0.1)
BASOPHILS NFR BLD: 0 % (ref 0–1)
BILIRUB SERPL-MCNC: 0.4 MG/DL (ref 0.2–1)
BUN SERPL-MCNC: 12 MG/DL (ref 6–20)
BUN/CREAT SERPL: 21 (ref 12–20)
CALCIUM SERPL-MCNC: 8 MG/DL (ref 8.5–10.1)
CHLORIDE SERPL-SCNC: 112 MMOL/L (ref 97–108)
CO2 SERPL-SCNC: 26 MMOL/L (ref 21–32)
CREAT SERPL-MCNC: 0.57 MG/DL (ref 0.55–1.02)
DIFFERENTIAL METHOD BLD: ABNORMAL
EOSINOPHIL # BLD: 0 K/UL (ref 0–0.4)
EOSINOPHIL NFR BLD: 0 % (ref 0–7)
ERYTHROCYTE [DISTWIDTH] IN BLOOD BY AUTOMATED COUNT: 15.6 % (ref 11.5–14.5)
GLOBULIN SER CALC-MCNC: 2.3 G/DL (ref 2–4)
GLUCOSE SERPL-MCNC: 167 MG/DL (ref 65–100)
HCT VFR BLD AUTO: 31.9 % (ref 35–47)
HGB BLD-MCNC: 10.6 G/DL (ref 11.5–16)
IMM GRANULOCYTES # BLD AUTO: 0.1 K/UL (ref 0–0.04)
IMM GRANULOCYTES NFR BLD AUTO: 2 % (ref 0–0.5)
LYMPHOCYTES # BLD: 0.2 K/UL (ref 0.8–3.5)
LYMPHOCYTES NFR BLD: 4 % (ref 12–49)
MCH RBC QN AUTO: 31.3 PG (ref 26–34)
MCHC RBC AUTO-ENTMCNC: 33.2 G/DL (ref 30–36.5)
MCV RBC AUTO: 94.1 FL (ref 80–99)
MONOCYTES # BLD: 0.2 K/UL (ref 0–1)
MONOCYTES NFR BLD: 6 % (ref 5–13)
NEUTS SEG # BLD: 3.6 K/UL (ref 1.8–8)
NEUTS SEG NFR BLD: 89 % (ref 32–75)
NRBC # BLD: 0 K/UL (ref 0–0.01)
NRBC BLD-RTO: 0 PER 100 WBC
PLATELET # BLD AUTO: 173 K/UL (ref 150–400)
PMV BLD AUTO: 9.9 FL (ref 8.9–12.9)
POTASSIUM SERPL-SCNC: 3.9 MMOL/L (ref 3.5–5.1)
PROT SERPL-MCNC: 5.2 G/DL (ref 6.4–8.2)
RBC # BLD AUTO: 3.39 M/UL (ref 3.8–5.2)
SODIUM SERPL-SCNC: 143 MMOL/L (ref 136–145)
WBC # BLD AUTO: 4.1 K/UL (ref 3.6–11)

## 2022-10-05 PROCEDURE — 74011250636 HC RX REV CODE- 250/636: Performed by: NURSE PRACTITIONER

## 2022-10-05 PROCEDURE — 74011250637 HC RX REV CODE- 250/637: Performed by: INTERNAL MEDICINE

## 2022-10-05 PROCEDURE — 74011250636 HC RX REV CODE- 250/636: Performed by: INTERNAL MEDICINE

## 2022-10-05 PROCEDURE — 74011000250 HC RX REV CODE- 250: Performed by: NURSE PRACTITIONER

## 2022-10-05 PROCEDURE — 74011250637 HC RX REV CODE- 250/637: Performed by: NURSE PRACTITIONER

## 2022-10-05 PROCEDURE — 80053 COMPREHEN METABOLIC PANEL: CPT

## 2022-10-05 PROCEDURE — 99232 SBSQ HOSP IP/OBS MODERATE 35: CPT | Performed by: INTERNAL MEDICINE

## 2022-10-05 PROCEDURE — 65270000029 HC RM PRIVATE

## 2022-10-05 PROCEDURE — 85025 COMPLETE CBC W/AUTO DIFF WBC: CPT

## 2022-10-05 PROCEDURE — 36415 COLL VENOUS BLD VENIPUNCTURE: CPT

## 2022-10-05 PROCEDURE — 74011636637 HC RX REV CODE- 636/637: Performed by: INTERNAL MEDICINE

## 2022-10-05 PROCEDURE — 94761 N-INVAS EAR/PLS OXIMETRY MLT: CPT

## 2022-10-05 RX ORDER — FUROSEMIDE 20 MG/1
20 TABLET ORAL DAILY
Status: DISCONTINUED | OUTPATIENT
Start: 2022-10-05 | End: 2022-10-07 | Stop reason: HOSPADM

## 2022-10-05 RX ADMIN — MORPHINE SULFATE 15 MG: 15 TABLET ORAL at 09:41

## 2022-10-05 RX ADMIN — ALPRAZOLAM 1 MG: 0.5 TABLET ORAL at 20:24

## 2022-10-05 RX ADMIN — SODIUM CHLORIDE 45 ML/HR: 9 INJECTION, SOLUTION INTRAVENOUS at 10:20

## 2022-10-05 RX ADMIN — FUROSEMIDE 20 MG: 20 TABLET ORAL at 10:14

## 2022-10-05 RX ADMIN — ALPRAZOLAM 1 MG: 0.5 TABLET ORAL at 09:42

## 2022-10-05 RX ADMIN — ENOXAPARIN SODIUM 40 MG: 100 INJECTION SUBCUTANEOUS at 09:48

## 2022-10-05 RX ADMIN — ONDANSETRON 8 MG: 2 INJECTION INTRAMUSCULAR; INTRAVENOUS at 16:07

## 2022-10-05 RX ADMIN — SULFAMETHOXAZOLE AND TRIMETHOPRIM 1 TABLET: 800; 160 TABLET ORAL at 09:48

## 2022-10-05 RX ADMIN — VINCRISTINE SULFATE: 1 INJECTION, SOLUTION INTRAVENOUS at 14:06

## 2022-10-05 RX ADMIN — MORPHINE SULFATE 15 MG: 15 TABLET ORAL at 02:13

## 2022-10-05 RX ADMIN — SODIUM CHLORIDE, PRESERVATIVE FREE 10 ML: 5 INJECTION INTRAVENOUS at 05:05

## 2022-10-05 RX ADMIN — FLUOXETINE HYDROCHLORIDE 40 MG: 20 CAPSULE ORAL at 09:48

## 2022-10-05 RX ADMIN — ONDANSETRON 8 MG: 2 INJECTION INTRAMUSCULAR; INTRAVENOUS at 00:23

## 2022-10-05 RX ADMIN — ZOLPIDEM TARTRATE 5 MG: 5 TABLET ORAL at 20:33

## 2022-10-05 RX ADMIN — AMLODIPINE BESYLATE 5 MG: 5 TABLET ORAL at 09:48

## 2022-10-05 RX ADMIN — ONDANSETRON 8 MG: 2 INJECTION INTRAMUSCULAR; INTRAVENOUS at 09:35

## 2022-10-05 RX ADMIN — MORPHINE SULFATE 15 MG: 15 TABLET ORAL at 16:05

## 2022-10-05 RX ADMIN — SODIUM CHLORIDE, PRESERVATIVE FREE 10 ML: 5 INJECTION INTRAVENOUS at 22:00

## 2022-10-05 RX ADMIN — PREDNISONE 115 MG: 5 TABLET ORAL at 09:48

## 2022-10-05 RX ADMIN — SODIUM CHLORIDE, PRESERVATIVE FREE 10 ML: 5 INJECTION INTRAVENOUS at 15:31

## 2022-10-05 RX ADMIN — OLANZAPINE 5 MG: 5 TABLET, FILM COATED ORAL at 17:25

## 2022-10-05 RX ADMIN — MORPHINE SULFATE 15 MG: 15 TABLET ORAL at 22:09

## 2022-10-05 RX ADMIN — PREDNISONE 115 MG: 5 TABLET ORAL at 16:11

## 2022-10-05 RX ADMIN — GUAIFENESIN 600 MG: 600 TABLET ORAL at 09:48

## 2022-10-05 RX ADMIN — PROCHLORPERAZINE EDISYLATE 10 MG: 5 INJECTION INTRAMUSCULAR; INTRAVENOUS at 02:13

## 2022-10-05 RX ADMIN — ETOPOSIDE 75.2 MG: 20 INJECTION INTRAVENOUS at 14:05

## 2022-10-05 RX ADMIN — LISINOPRIL 20 MG: 20 TABLET ORAL at 09:48

## 2022-10-05 RX ADMIN — GUAIFENESIN 600 MG: 600 TABLET ORAL at 20:24

## 2022-10-05 NOTE — PROGRESS NOTES
10/5/2022  Case Management Progress Note    9:29 AM  Patient is 78year old female admitted 10/3 with diffuse large b cell lymphoma--planned admission for chemotherapy  Patient's RUR is 20% red/high risk for readmission, patient is currently a planned readmission to the hospital  Covid test: none this admission, not indicated  Chart reviewed  Patient is a planned admission for chemotherapy, will finish treatment on Friday and discharge home afterwards. Patient has a history of home health through [facility] but is not currently open. She does have excellent family support at home. Will continue to follow and assist as needed.      Transition of Care Plan   Continue medical management/treatment  Home with family assistance when ready   Family will transport at discharge  Follow up outpatient as indicated  CM will continue to follow and assist    CHIVO De

## 2022-10-05 NOTE — PROGRESS NOTES
Bedside and Verbal shift change report given to DEANNA Dougherty (oncoming nurse) by Yvonne Dupont RN (offgoing nurse). Report included the following information SBAR, Kardex, Intake/Output, MAR, and Recent Results.

## 2022-10-05 NOTE — PROGRESS NOTES
[facility] at [address], [address] [place] [address]  Tobin Rank: [phone]  F: [phone] Patient ID  Name: Rashid aC  YOB: 1955  MRN: [id number]  Referring Provider:   No referring provider defined for this encounter. 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 This summary highlights focused aspects of patient's ongoing care and may have a recurring section in notes with either updates or remain unchanged as a longitudinal care summary.    -------------------------------------------------------------------------------------------------------------------------------------------------------------------------------------------------------  DIAGNOSIS:  Diffuse Large B-Cell Lymphoma     ORIGINAL STAGING:  Stage IV     SITES OF DISEASE:  Left Cervical Lymph Node,Bone Disease, Stage IV    CURRENT TREATMENT:  C1,D1 on 5/11/22 DA-R-EPOCH  C2  C3 delayed due to appetite issues and patient anxiety to restart therapy     Plan Name: OP R-CHOP   Treatment goal Curative   Provider Jane Kulkarni MD   Status Inactive   Start Date    End Date    Treatment plan weight/height/BSA Weight type: Documented (effective on 4/27/2022), 94.1 kg (entered on 4/27/2022), 160 cm (entered on 4/27/2022), BSA 2.05 m2   Most recent weight/height/BSA Weight: 169 lb 5 oz (76.8 kg)    Height: 5' 3\" (1.6 m)    BSA (calculated - sq m): 1.85 sq meters      Treatment on clinical trial? [This plan is not part of a research study]   Reason for stopping treatment MD Discretion   Summary of Treatment Plan Medications DOXOrubicin (ADRIAMYCIN) chemo syringe 51.2 mg, 25 mg/m2 = 51.2 mg, IntraVENous, ONCE, 0 of 6 cycles  Dose modification: 25 mg/m2 (original dose 50 mg/m2, Cycle 1, Reason: Per Protocol, Comment: dose split into 2 syringes due to volume)    cyclophosphamide (CYTOXAN) 1,538 mg in 0.9% sodium chloride 250 mL chemo infusion, 750 mg/m2 = 1,538 mg, IntraVENous, ONCE, 0 of 6 cycles    vinCRIStine (VINCASAR PFS) 2 mg in 0.9% sodium chloride 50 mL chemo IVPB, 2 mg (100 % of original dose 2 mg), IntraVENous, ONCE, 0 of 6 cycles  Dose modification: 2 mg (original dose 2 mg, Cycle 1, Reason: Other)    riTUXimab-pvvr (RUXIENCE) 769 mg in 0.9% sodium chloride 769 mL infusion, 375 mg/m2 = 769 mg, IntraVENous, ONCE TITRATE, 0 of 1 cycle       Plan Name: [facility],    Treatment goal Curative   Provider Bridgette Dhillon MD   Status Active   Start Date 5/11/2022   End Date 10/30/2022 (Planned)   Treatment plan weight/height/BSA Weight type: Documented (effective on 8/17/2022), 79.5 kg (entered on 8/17/2022), 160 cm (entered on 8/5/2022), BSA 1.88 m2   Most recent weight/height/BSA Weight: 169 lb 5 oz (76.8 kg)    Height: 5' 3\" (1.6 m)    BSA (calculated - sq m): 1.85 sq meters      Treatment on clinical trial? [This plan is not part of a research study]   Reason for stopping treatment [Plan is still active]   Summary of Treatment Plan Medications cyclophosphamide (CYTOXAN) 1,537.6 mg in 0.9% sodium chloride 250 mL, overfill volume 25 mL chemo infusion, 750 mg/m2 = 1,538 mg, IntraVENous, ONCE, 5 of 6 cycles  Dose modification: 900 mg/m2 (original dose 750 mg/m2, Cycle 2, Reason: Per Protocol, Comment: ANC above 0.5), 600 mg/m2 (original dose 750 mg/m2, Cycle 3, Reason: Dose Not Tolerated), 600 mg/m2 (80 % of original dose 750 mg/m2, Cycle 4, Reason: Dose Not Tolerated)  Administration: 1,537.6 mg (5/15/2022), 1,809 mg (6/10/2022), 1,128 mg (8/22/2022), 1,128 mg (9/16/2022)    etoposide (VEPESID) 102.6 mg in 0.9% sodium chloride 450 mL chemo infusion, 50 mg/m2 = 102.6 mg, IntraVENous, EVERY 24 HOURS, 5 of 6 cycles  Dose modification: 60 mg/m2 (original dose 50 mg/m2, Cycle 2, Reason: Per Protocol, Comment: ANC above 0.5), 40 mg/m2 (original dose 50 mg/m2, Cycle 3, Reason: Dose Not Tolerated), 40 mg/m2 (80 % of original dose 50 mg/m2, Cycle 4, Reason: Dose Not Tolerated)  Administration: 102.6 mg (5/11/2022), 102.6 mg (5/12/2022), 102.6 mg (5/13/2022), 102.6 mg (5/14/2022), 120.6 mg (6/6/2022), 120.6 mg (6/7/2022), 120.6 mg (6/8/2022), 120.6 mg (6/9/2022), 75.2 mg (8/18/2022), 75.2 mg (8/19/2022), 75.2 mg (8/20/2022), 75.2 mg (8/21/2022), 75.2 mg (9/12/2022), 75.2 mg (9/13/2022), 75.2 mg (9/14/2022), 75.2 mg (9/15/2022), 75.2 mg (10/3/2022), 75.2 mg (10/4/2022)    vinCRIStine (VINCASAR PFS) 0.8 mg, DOXOrubicin (ADRIAMYCIN) 20.6 mg in 0.9% sodium chloride 250 mL, overfill volume 25 mL chemo infusion, , IntraVENous, EVERY 24 HOURS, 5 of 6 cycles  Administration:  (5/11/2022),  (5/12/2022),  (5/13/2022),  (5/14/2022),  (6/6/2022),  (6/7/2022),  (6/8/2022),  (6/9/2022),  (8/18/2022),  (8/19/2022),  (8/20/2022),  (8/21/2022),  (9/12/2022),  (9/13/2022),  (9/14/2022),  (9/15/2022),  (10/3/2022),  (10/4/2022)    riTUXimab-pvvr (RUXIENCE) 769 mg in 0.9% sodium chloride 769 mL infusion, 375 mg/m2 = 769 mg, IntraVENous, ONCE TITRATE, 1 of 2 cycles  Administration: 769 mg (5/11/2022)    riTUXimab-pvvr (RUXIENCE) 754 mg in 0.9% sodium chloride 250 mL RAPID infusion, 375 mg/m2 = 754 mg, IntraVENous, ONCE TITRATE, 4 of 4 cycles  Administration: 754 mg (6/6/2022), 705 mg (8/18/2022), 705 mg (9/12/2022), 705 mg (10/3/2022)         PRIOR TREATMENT:  n/a     GOALS OF CARE:  Curative Intent     PATHOLOGY:  Specimen #:[id number] Collect: 4/22/2022      ==========================================================================                    * * *SURGICAL PATHOLOGY REPORT* * *   ==========================================================================   * * *PROCEDURES/ADDENDA* * *   ADDENDUM   Date Ordered: 4/26/2022     Status: Signed Out   Date Complete: 4/26/2022     By: Kennon Hodgkin.  Keny Garcia MD   Date Reported: 4/26/2022   Addendum Diagnosis   Lymph node, left posterior cervical lymph node, excision:   In situ hybridization for Jesús-Barr viral RNA: Negative   CPT: 09745  Addendum Comment   * * *CLINICAL HISTORY* * *   Cervical lymphadenopathy, rule out lymphoma   ==========================================================================   * * *FINAL PATHOLOGIC DIAGNOSIS* * *        Lymph node, left posterior cervical lymph node, excision:        Large B cell lymphoma. See comment. * * *Comment* * *   The histologic section has fragments of lymphoid tissue with diffuse and   focal follicular architecture. Diffuse areas are comprised of large,   atypical lymphocytes with centroblastic morphology and increased mitotic   activity. Immunohistochemical stains confirm the malignant cells are CD20   positive B cells that coexpress CD10 and BCL6 without MUM1. CD23   highlights rare follicular dendritic networks associated with lymphoid   follicles with germinal centers and express CD10 and BCL6 without BCL2. CyclinD1 and CD56 stains are negative. Concurrent flow cytometric analysis   confirms a clonal CD10 positive B-cell population comprised of medium to   large cells. The Ki-67 proliferation index is 30%. The overall findings favor the diagnosis of diffuse large B-cell lymphoma,   germinal center subtype. Molecular genetic studies are pending for further   characterization will be reported in an addendum. Flow cytometry:   Consistent with CD10-positive B-cell lymphoproliferative disorder. Comments: Flow cytometry shows monoclonal B-cells (36% of total cells)   with CD10 expression without CD5, consistent with a CD10-positive B-cell   lymphoproliferative disorder. The main differential diagnosis includes   follicular lymphoma, Burkitt lymphoma and large B-cell lymphoma. Please   correlate the result with morphologic findings and clinical information.    Flow Differential (%) and Population Analysis:   Lymphocytes: 93.7%   T-cells (39% of lymphoid cells) show a CD4/CD8 ratio of 3.3 without overt   phenotypic abnormality. NK-cells (1% of lymphoid cells) are unremarkable. Mature B-cells (56% of lymphoid cells) include large forms based on   forward scattered pattern and show: CD5 neg, CD10+, CD11c+dim, CD19+,   CD20+ with surface lambda light chain restriction. Markers Performed: CD2, CD3, CD4, CD5, CD7, CD8, CD10, CD11c, CD19, CD20,   CD23, CD34, CD38, CD45, CD56, Kappa, Lambda (17 Markers)   The Technical Component Processing of this test was completed at   [facility], [address] [place] [address], [place], [place] / [id number] /   [phone] / Edil Gorman # [id number]. Interpretation by João Mayes M.D. The   complete scanned report is available in Epic. CPT: O934793, [id number], [id number], [id number], [id number], [id number]   [facility]2/2022   *Electronically Signed Out By Rupert Gonzalez. Jean-Claude Barakat MD*   ==========================================================================   * * *Gross Description* * *   Specimen #1, received fresh labeled with the patient's name,  and left   posterior cervical lymph node, consists of two paper towels containing a   1.7 x 1.5 x 0.5 cm aggregate of pale pink-tan, fleshy soft tissue   fragments. The specimen is handled according to the flow cytometry   protocol as follows: 7          Two air-dried touch prep slides - one Diff Quik stained, one   rapid-fixed in 95% alcohol   7          Representative sections in B plus fixative (1A)   7          One piece held in RPMI for possible flow cytometry analysis   7          Remaining tissue fixed in formalin for permanents (1B)   Dr. Jean-Claude Barakat will determine if flow cytometry is necessary. 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 F who presents as a direct hospital admission for Cycle #5 DA-R-EPOCH.  Patient had scans on Friday that fortunately did not show any clear active lymphoma in the chest/abdomen/pelvis. Patient reports dealing with nausea that she attributes to being \"car sick. \" She is doing better after taking compazine. She does not report any fevers,chills. Due to the nausea she has not taken as much oral pain medications or anxiolytics. She feels that she fell behind in th pain control today. She reports chronic back pain but no acute issues. . Patient reports decreased oral intake secondary to the nausea but still eating and drinking. Patient denies any bowel or bladder problems. Patient reports uncontrolled pain. Patient denies any shortness of breath. Interval History:     Little nausea this am; having some facial swelling. Was up in the chair during meals yesterday. Additional of mucinex has helped. Past Medical History:   Diagnosis Date    Adverse effect of anesthesia     PHLEGM IN THROAT POST OP & NEEDED X2 BREATHING TREATMENTS    Anxiety     COPD (chronic obstructive pulmonary disease) ([facility] [address]     emphysema    Depression     GERD (gastroesophageal reflux disease)     Hernia, femoral     since childhood    Hypertension     Joint pain     Nausea & vomiting     TMJ arthritis        Objective:      Visit Vitals  BP (!) 156/87 (BP 1 Location: Right upper arm, BP Patient Position: At rest)   Pulse 78   Temp 97.6 °F (36.4 °C)   Resp 16   Ht 5' 3\" (1.6 m)   Wt 169 lb 5 oz (76.8 kg)   SpO2 94%   BMI 29.99 kg/m²     ECO- Restricted in physically strenuous activity but ambulatory and able to carry out work of a light or sedentary nature, e.g., light house work, office work.   Physical Exam  General: no distress  Respiratory: crackles bases; normal respiratory effort  CV: no peripheral edema  Skin: no rashes; no ecchymoses; no petechiae  Psych: alert, oriented, normal mood/affect     Results:        Lab Results   Component Value Date/Time    WBC 4.1 10/05/2022 12:25 AM    HGB 10.6 (L) 10/05/2022 12:25 AM    HCT 31.9 (L) 10/05/2022 12:25 AM    PLATELET 966 10/05/2022 12:25 AM    MCV 94.1 10/05/2022 12:25 AM     Lab Results   Component Value Date/Time    Sodium 143 10/05/2022 12:25 AM    Potassium 3.9 10/05/2022 12:25 AM    Chloride 112 (H) 10/05/2022 12:25 AM    CO2 26 10/05/2022 12:25 AM    Anion gap 5 10/05/2022 12:25 AM    Glucose 167 (H) 10/05/2022 12:25 AM    BUN 12 10/05/2022 12:25 AM    Creatinine 0.57 10/05/2022 12:25 AM    BUN/Creatinine ratio 21 (H) 10/05/2022 12:25 AM    GFR est AA >60 10/03/2022 06:44 AM    GFR est non-AA >60 10/03/2022 06:44 AM    Calcium 8.0 (L) 10/05/2022 12:25 AM    Bilirubin, total 0.4 10/05/2022 12:25 AM    Alk. phosphatase 54 10/05/2022 12:25 AM    Protein, total 5.2 (L) 10/05/2022 12:25 AM    Albumin 2.9 (L) 10/05/2022 12:25 AM    Globulin 2.3 10/05/2022 12:25 AM    A-G Ratio 1.3 10/05/2022 12:25 AM    ALT (SGPT) 139 (H) 10/05/2022 12:25 AM    AST (SGOT) 107 (H) 10/05/2022 12:25 AM       Assessment and Recommendations: Active Hospital Problems    Diagnosis    Poor appetite  -She reports mostly eating but recently has had some nausea. Overall she reports doing well. Continue antiemetics prn      Port-A-Cath in place  -Port will be used to implement therapy. Cancer related pain  -Continue her home pain management medication regimen. Metastatic cancer to bone [facility])  -Patient will need PET CT scan whole body to assess for any occult bone disease. Diffuse large B-cell lymphoma of lymph nodes of neck (HCC)  -Discussed with patient that she needs to continue with cycle 5 and cycle 6 despite recent encouraging CT scan showing no clear lymphadenopathy in the CT chest abdomen pelvis region. We discussed the importance that she follow through PET/CT imaging after cycle 6.  -proceed with C5 d3 dose adjusted R-EPOCH today with dosing maintained from Cycle #4. Tobacco dependence  -Continue nicotine patch while hospitalized. Continue to recommend smoking cessation.        Edema : will add lasix 20 mg po    Plan reviewed with Dr Yonas Fuentes.

## 2022-10-05 NOTE — ROUTINE PROCESS
Bedside and Verbal shift change report given to Morena (oncoming nurse) by Amarilis Flowers RN  (offgoing nurse). Report included the following information SBAR, Kardex, Procedure Summary, MAR, and Recent Results.

## 2022-10-06 ENCOUNTER — APPOINTMENT (OUTPATIENT)
Dept: GENERAL RADIOLOGY | Age: 67
DRG: 846 | End: 2022-10-06
Attending: NURSE PRACTITIONER
Payer: MEDICARE

## 2022-10-06 LAB
ALBUMIN SERPL-MCNC: 2.9 G/DL (ref 3.5–5)
ALBUMIN/GLOB SERPL: 1.4 {RATIO} (ref 1.1–2.2)
ALP SERPL-CCNC: 54 U/L (ref 45–117)
ALT SERPL-CCNC: 321 U/L (ref 12–78)
ANION GAP SERPL CALC-SCNC: 2 MMOL/L (ref 5–15)
AST SERPL-CCNC: 189 U/L (ref 15–37)
B PERT DNA SPEC QL NAA+PROBE: NOT DETECTED
BASOPHILS # BLD: 0 K/UL (ref 0–0.1)
BASOPHILS NFR BLD: 0 % (ref 0–1)
BILIRUB SERPL-MCNC: 0.2 MG/DL (ref 0.2–1)
BORDETELLA PARAPERTUSSIS PCR, BORPAR: NOT DETECTED
BUN SERPL-MCNC: 12 MG/DL (ref 6–20)
BUN/CREAT SERPL: 21 (ref 12–20)
C PNEUM DNA SPEC QL NAA+PROBE: NOT DETECTED
CALCIUM SERPL-MCNC: 7.9 MG/DL (ref 8.5–10.1)
CHLORIDE SERPL-SCNC: 110 MMOL/L (ref 97–108)
CO2 SERPL-SCNC: 30 MMOL/L (ref 21–32)
CREAT SERPL-MCNC: 0.57 MG/DL (ref 0.55–1.02)
DIFFERENTIAL METHOD BLD: ABNORMAL
EOSINOPHIL # BLD: 0 K/UL (ref 0–0.4)
EOSINOPHIL NFR BLD: 0 % (ref 0–7)
ERYTHROCYTE [DISTWIDTH] IN BLOOD BY AUTOMATED COUNT: 15.2 % (ref 11.5–14.5)
FLUAV SUBTYP SPEC NAA+PROBE: NOT DETECTED
FLUBV RNA SPEC QL NAA+PROBE: NOT DETECTED
GLOBULIN SER CALC-MCNC: 2.1 G/DL (ref 2–4)
GLUCOSE SERPL-MCNC: 165 MG/DL (ref 65–100)
HADV DNA SPEC QL NAA+PROBE: NOT DETECTED
HCOV 229E RNA SPEC QL NAA+PROBE: NOT DETECTED
HCOV HKU1 RNA SPEC QL NAA+PROBE: NOT DETECTED
HCOV NL63 RNA SPEC QL NAA+PROBE: NOT DETECTED
HCOV OC43 RNA SPEC QL NAA+PROBE: NOT DETECTED
HCT VFR BLD AUTO: 32.6 % (ref 35–47)
HGB BLD-MCNC: 10.7 G/DL (ref 11.5–16)
HMPV RNA SPEC QL NAA+PROBE: NOT DETECTED
HPIV1 RNA SPEC QL NAA+PROBE: NOT DETECTED
HPIV2 RNA SPEC QL NAA+PROBE: NOT DETECTED
HPIV3 RNA SPEC QL NAA+PROBE: NOT DETECTED
HPIV4 RNA SPEC QL NAA+PROBE: NOT DETECTED
IMM GRANULOCYTES # BLD AUTO: 0.1 K/UL (ref 0–0.04)
IMM GRANULOCYTES NFR BLD AUTO: 2 % (ref 0–0.5)
LYMPHOCYTES # BLD: 0.2 K/UL (ref 0.8–3.5)
LYMPHOCYTES NFR BLD: 6 % (ref 12–49)
M PNEUMO DNA SPEC QL NAA+PROBE: NOT DETECTED
MCH RBC QN AUTO: 31 PG (ref 26–34)
MCHC RBC AUTO-ENTMCNC: 32.8 G/DL (ref 30–36.5)
MCV RBC AUTO: 94.5 FL (ref 80–99)
MONOCYTES # BLD: 0.2 K/UL (ref 0–1)
MONOCYTES NFR BLD: 7 % (ref 5–13)
NEUTS SEG # BLD: 2.6 K/UL (ref 1.8–8)
NEUTS SEG NFR BLD: 85 % (ref 32–75)
NRBC # BLD: 0 K/UL (ref 0–0.01)
NRBC BLD-RTO: 0 PER 100 WBC
PLATELET # BLD AUTO: 157 K/UL (ref 150–400)
PMV BLD AUTO: 10 FL (ref 8.9–12.9)
POTASSIUM SERPL-SCNC: 3.6 MMOL/L (ref 3.5–5.1)
PROT SERPL-MCNC: 5 G/DL (ref 6.4–8.2)
RBC # BLD AUTO: 3.45 M/UL (ref 3.8–5.2)
RBC MORPH BLD: ABNORMAL
RSV RNA SPEC QL NAA+PROBE: NOT DETECTED
RV+EV RNA SPEC QL NAA+PROBE: NOT DETECTED
SARS-COV-2 PCR, COVPCR: DETECTED
SODIUM SERPL-SCNC: 142 MMOL/L (ref 136–145)
WBC # BLD AUTO: 3.1 K/UL (ref 3.6–11)

## 2022-10-06 PROCEDURE — 65270000029 HC RM PRIVATE

## 2022-10-06 PROCEDURE — 36415 COLL VENOUS BLD VENIPUNCTURE: CPT

## 2022-10-06 PROCEDURE — 71046 X-RAY EXAM CHEST 2 VIEWS: CPT

## 2022-10-06 PROCEDURE — 74011250637 HC RX REV CODE- 250/637: Performed by: INTERNAL MEDICINE

## 2022-10-06 PROCEDURE — 80053 COMPREHEN METABOLIC PANEL: CPT

## 2022-10-06 PROCEDURE — 94761 N-INVAS EAR/PLS OXIMETRY MLT: CPT

## 2022-10-06 PROCEDURE — 74011250636 HC RX REV CODE- 250/636: Performed by: INTERNAL MEDICINE

## 2022-10-06 PROCEDURE — 87070 CULTURE OTHR SPECIMN AEROBIC: CPT

## 2022-10-06 PROCEDURE — 74011250637 HC RX REV CODE- 250/637: Performed by: NURSE PRACTITIONER

## 2022-10-06 PROCEDURE — 74011000250 HC RX REV CODE- 250: Performed by: NURSE PRACTITIONER

## 2022-10-06 PROCEDURE — 74011250636 HC RX REV CODE- 250/636: Performed by: NURSE PRACTITIONER

## 2022-10-06 PROCEDURE — 0202U NFCT DS 22 TRGT SARS-COV-2: CPT

## 2022-10-06 PROCEDURE — 74011636637 HC RX REV CODE- 636/637: Performed by: INTERNAL MEDICINE

## 2022-10-06 PROCEDURE — 85025 COMPLETE CBC W/AUTO DIFF WBC: CPT

## 2022-10-06 PROCEDURE — 94664 DEMO&/EVAL PT USE INHALER: CPT

## 2022-10-06 PROCEDURE — 94640 AIRWAY INHALATION TREATMENT: CPT

## 2022-10-06 PROCEDURE — 87077 CULTURE AEROBIC IDENTIFY: CPT

## 2022-10-06 PROCEDURE — 86738 MYCOPLASMA ANTIBODY: CPT

## 2022-10-06 PROCEDURE — 99232 SBSQ HOSP IP/OBS MODERATE 35: CPT | Performed by: INTERNAL MEDICINE

## 2022-10-06 RX ORDER — ALBUTEROL SULFATE 90 UG/1
2 AEROSOL, METERED RESPIRATORY (INHALATION)
Status: DISCONTINUED | OUTPATIENT
Start: 2022-10-06 | End: 2022-10-07 | Stop reason: HOSPADM

## 2022-10-06 RX ORDER — DOXYCYCLINE HYCLATE 100 MG
100 TABLET ORAL EVERY 12 HOURS
Status: DISCONTINUED | OUTPATIENT
Start: 2022-10-06 | End: 2022-10-07 | Stop reason: HOSPADM

## 2022-10-06 RX ORDER — ALBUTEROL SULFATE 0.83 MG/ML
2.5 SOLUTION RESPIRATORY (INHALATION)
Status: DISCONTINUED | OUTPATIENT
Start: 2022-10-06 | End: 2022-10-07 | Stop reason: HOSPADM

## 2022-10-06 RX ORDER — IPRATROPIUM BROMIDE AND ALBUTEROL SULFATE 2.5; .5 MG/3ML; MG/3ML
3 SOLUTION RESPIRATORY (INHALATION)
Status: DISCONTINUED | OUTPATIENT
Start: 2022-10-06 | End: 2022-10-06

## 2022-10-06 RX ADMIN — ONDANSETRON 8 MG: 2 INJECTION INTRAMUSCULAR; INTRAVENOUS at 09:04

## 2022-10-06 RX ADMIN — PREDNISONE 115 MG: 5 TABLET ORAL at 09:02

## 2022-10-06 RX ADMIN — MORPHINE SULFATE 15 MG: 15 TABLET ORAL at 04:06

## 2022-10-06 RX ADMIN — FLUOXETINE HYDROCHLORIDE 40 MG: 20 CAPSULE ORAL at 09:02

## 2022-10-06 RX ADMIN — ALPRAZOLAM 1 MG: 0.5 TABLET ORAL at 09:03

## 2022-10-06 RX ADMIN — ETOPOSIDE 75.2 MG: 20 INJECTION INTRAVENOUS at 15:52

## 2022-10-06 RX ADMIN — ALPRAZOLAM 1 MG: 0.5 TABLET ORAL at 21:06

## 2022-10-06 RX ADMIN — ONDANSETRON 8 MG: 2 INJECTION INTRAMUSCULAR; INTRAVENOUS at 01:59

## 2022-10-06 RX ADMIN — MORPHINE SULFATE 15 MG: 15 TABLET ORAL at 18:13

## 2022-10-06 RX ADMIN — PANTOPRAZOLE SODIUM 40 MG: 40 TABLET, DELAYED RELEASE ORAL at 06:50

## 2022-10-06 RX ADMIN — AMLODIPINE BESYLATE 5 MG: 5 TABLET ORAL at 09:03

## 2022-10-06 RX ADMIN — ONDANSETRON 8 MG: 2 INJECTION INTRAMUSCULAR; INTRAVENOUS at 18:13

## 2022-10-06 RX ADMIN — VINCRISTINE SULFATE: 1 INJECTION, SOLUTION INTRAVENOUS at 15:52

## 2022-10-06 RX ADMIN — LISINOPRIL 20 MG: 20 TABLET ORAL at 09:03

## 2022-10-06 RX ADMIN — PREDNISONE 115 MG: 5 TABLET ORAL at 18:13

## 2022-10-06 RX ADMIN — ALBUTEROL SULFATE 2 PUFF: 90 AEROSOL, METERED RESPIRATORY (INHALATION) at 16:31

## 2022-10-06 RX ADMIN — SODIUM CHLORIDE, PRESERVATIVE FREE 10 ML: 5 INJECTION INTRAVENOUS at 06:52

## 2022-10-06 RX ADMIN — GUAIFENESIN 600 MG: 600 TABLET ORAL at 09:03

## 2022-10-06 RX ADMIN — MORPHINE SULFATE 15 MG: 15 TABLET ORAL at 10:25

## 2022-10-06 RX ADMIN — OLANZAPINE 5 MG: 5 TABLET, FILM COATED ORAL at 18:13

## 2022-10-06 RX ADMIN — ENOXAPARIN SODIUM 40 MG: 100 INJECTION SUBCUTANEOUS at 09:04

## 2022-10-06 RX ADMIN — FUROSEMIDE 20 MG: 20 TABLET ORAL at 09:03

## 2022-10-06 RX ADMIN — GUAIFENESIN 600 MG: 600 TABLET ORAL at 21:06

## 2022-10-06 RX ADMIN — ZOLPIDEM TARTRATE 5 MG: 5 TABLET ORAL at 21:06

## 2022-10-06 RX ADMIN — DOXYCYCLINE HYCLATE 100 MG: 100 TABLET, COATED ORAL at 21:06

## 2022-10-06 RX ADMIN — PROCHLORPERAZINE EDISYLATE 10 MG: 5 INJECTION INTRAMUSCULAR; INTRAVENOUS at 10:25

## 2022-10-06 NOTE — PROGRESS NOTES
Bedside and Verbal shift change report given to Chris Kwan RN and [address], RN (oncoming nurse) by Kristi Contreras RN (offgoing nurse). Report included the following information SBAR, Kardex, ED Summary, Intake/Output, MAR, and Recent Results.

## 2022-10-06 NOTE — PROGRESS NOTES
[facility] [place] at [address], [address] [facility]  Mikhail Quarles: [phone]  F: [phone] Patient ID  Name: Milli Barrientos  YOB: 1955  MRN: [id number]  Referring Provider:   No referring provider defined for this encounter. 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 This summary highlights focused aspects of patient's ongoing care and may have a recurring section in notes with either updates or remain unchanged as a longitudinal care summary.    -------------------------------------------------------------------------------------------------------------------------------------------------------------------------------------------------------  DIAGNOSIS:  Diffuse Large B-Cell Lymphoma     ORIGINAL STAGING:  Stage IV     SITES OF DISEASE:  Left Cervical Lymph Node,Bone Disease, Stage IV    CURRENT TREATMENT:  C1,D1 on 5/11/22 DA-R-EPOCH  C2  C3 delayed due to appetite issues and patient anxiety to restart therapy     Plan Name: OP R-CHOP   Treatment goal Curative   Provider Bridgette Dhillon MD   Status Inactive   Start Date    End Date    Treatment plan weight/height/BSA Weight type: Documented (effective on 4/27/2022), 94.1 kg (entered on 4/27/2022), 160 cm (entered on 4/27/2022), BSA 2.05 m2   Most recent weight/height/BSA Weight: 169 lb 5 oz (76.8 kg)    Height: 5' 3\" (1.6 m)    BSA (calculated - sq m): 1.85 sq meters      Treatment on clinical trial? [This plan is not part of a research study]   Reason for stopping treatment MD Discretion   Summary of Treatment Plan Medications DOXOrubicin (ADRIAMYCIN) chemo syringe 51.2 mg, 25 mg/m2 = 51.2 mg, IntraVENous, ONCE, 0 of 6 cycles  Dose modification: 25 mg/m2 (original dose 50 mg/m2, Cycle 1, Reason: Per Protocol, Comment: dose split into 2 syringes due to volume)    cyclophosphamide (CYTOXAN) 1,538 mg in 0.9% sodium chloride 250 mL chemo infusion, 750 mg/m2 = 1,538 mg, IntraVENous, ONCE, 0 of 6 cycles    vinCRIStine (VINCASAR PFS) 2 mg in 0.9% sodium chloride 50 mL chemo IVPB, 2 mg (100 % of original dose 2 mg), IntraVENous, ONCE, 0 of 6 cycles  Dose modification: 2 mg (original dose 2 mg, Cycle 1, Reason: Other)    riTUXimab-pvvr (RUXIENCE) 769 mg in 0.9% sodium chloride 769 mL infusion, 375 mg/m2 = 769 mg, IntraVENous, ONCE TITRATE, 0 of 1 cycle       Plan Name: [facility] Ander Porter K[facility],    Treatment goal Curative   Provider Agus Barton MD   Status Active   Start Date 5/11/2022   End Date 10/30/2022 (Planned)   Treatment plan weight/height/BSA Weight type: Documented (effective on 8/17/2022), 79.5 kg (entered on 8/17/2022), 160 cm (entered on 8/5/2022), BSA 1.88 m2   Most recent weight/height/BSA Weight: 169 lb 5 oz (76.8 kg)    Height: 5' 3\" (1.6 m)    BSA (calculated - sq m): 1.85 sq meters      Treatment on clinical trial? [This plan is not part of a research study]   Reason for stopping treatment [Plan is still active]   Summary of Treatment Plan Medications cyclophosphamide (CYTOXAN) 1,537.6 mg in 0.9% sodium chloride 250 mL, overfill volume 25 mL chemo infusion, 750 mg/m2 = 1,538 mg, IntraVENous, ONCE, 5 of 6 cycles  Dose modification: 900 mg/m2 (original dose 750 mg/m2, Cycle 2, Reason: Per Protocol, Comment: ANC above 0.5), 600 mg/m2 (original dose 750 mg/m2, Cycle 3, Reason: Dose Not Tolerated), 600 mg/m2 (80 % of original dose 750 mg/m2, Cycle 4, Reason: Dose Not Tolerated)  Administration: 1,537.6 mg (5/15/2022), 1,809 mg (6/10/2022), 1,128 mg (8/22/2022), 1,128 mg (9/16/2022)    etoposide (VEPESID) 102.6 mg in 0.9% sodium chloride 450 mL chemo infusion, 50 mg/m2 = 102.6 mg, IntraVENous, EVERY 24 HOURS, 5 of 6 cycles  Dose modification: 60 mg/m2 (original dose 50 mg/m2, Cycle 2, Reason: Per Protocol, Comment: ANC above 0.5), 40 mg/m2 (original dose 50 mg/m2, Cycle 3, Reason: Dose Not Tolerated), 40 mg/m2 (80 % of original dose 50 mg/m2, Cycle 4, Reason: Dose Not Tolerated)  Administration: 102.6 mg (5/11/2022), 102.6 mg (5/12/2022), 102.6 mg (5/13/2022), 102.6 mg (5/14/2022), 120.6 mg (6/6/2022), 120.6 mg (6/7/2022), 120.6 mg (6/8/2022), 120.6 mg (6/9/2022), 75.2 mg (8/18/2022), 75.2 mg (8/19/2022), 75.2 mg (8/20/2022), 75.2 mg (8/21/2022), 75.2 mg (9/12/2022), 75.2 mg (9/13/2022), 75.2 mg (9/14/2022), 75.2 mg (9/15/2022), 75.2 mg (10/3/2022), 75.2 mg (10/4/2022), 75.2 mg (10/5/2022)    vinCRIStine (VINCASAR PFS) 0.8 mg, DOXOrubicin (ADRIAMYCIN) 20.6 mg in 0.9% sodium chloride 250 mL, overfill volume 25 mL chemo infusion, , IntraVENous, EVERY 24 HOURS, 5 of 6 cycles  Administration:  (5/11/2022),  (5/12/2022),  (5/13/2022),  (5/14/2022),  (6/6/2022),  (6/7/2022),  (6/8/2022),  (6/9/2022),  (8/18/2022),  (8/19/2022),  (8/20/2022),  (8/21/2022),  (9/12/2022),  (9/13/2022),  (9/14/2022),  (9/15/2022),  (10/3/2022),  (10/4/2022),  (10/5/2022)    riTUXimab-pvvr (RUXIENCE) 769 mg in 0.9% sodium chloride 769 mL infusion, 375 mg/m2 = 769 mg, IntraVENous, ONCE TITRATE, 1 of 2 cycles  Administration: 769 mg (5/11/2022)    riTUXimab-pvvr (RUXIENCE) 754 mg in 0.9% sodium chloride 250 mL RAPID infusion, 375 mg/m2 = 754 mg, IntraVENous, ONCE TITRATE, 4 of 4 cycles  Administration: 754 mg (6/6/2022), 705 mg (8/18/2022), 705 mg (9/12/2022), 705 mg (10/3/2022)         PRIOR TREATMENT:  n/a     GOALS OF CARE:  Curative Intent     PATHOLOGY:  Specimen #:[id number] Collect: 4/22/2022      ==========================================================================                    * * *SURGICAL PATHOLOGY REPORT* * *   ==========================================================================   * * *PROCEDURES/ADDENDA* * *   ADDENDUM   Date Ordered: 4/26/2022     Status: Signed Out   Date Complete: 4/26/2022     By: Epifania Osgood Mario Rice MD   Date Reported: 4/26/2022   Addendum Diagnosis   Lymph node, left posterior cervical lymph node, excision:   In situ hybridization for Jesús-Barr viral RNA: Negative   CPT: 52771  Addendum Comment   * * *CLINICAL HISTORY* * *   Cervical lymphadenopathy, rule out lymphoma   ==========================================================================   * * *FINAL PATHOLOGIC DIAGNOSIS* * *        Lymph node, left posterior cervical lymph node, excision:        Large B cell lymphoma. See comment. * * *Comment* * *   The histologic section has fragments of lymphoid tissue with diffuse and   focal follicular architecture. Diffuse areas are comprised of large,   atypical lymphocytes with centroblastic morphology and increased mitotic   activity. Immunohistochemical stains confirm the malignant cells are CD20   positive B cells that coexpress CD10 and BCL6 without MUM1. CD23   highlights rare follicular dendritic networks associated with lymphoid   follicles with germinal centers and express CD10 and BCL6 without BCL2. CyclinD1 and CD56 stains are negative. Concurrent flow cytometric analysis   confirms a clonal CD10 positive B-cell population comprised of medium to   large cells. The Ki-67 proliferation index is 30%. The overall findings favor the diagnosis of diffuse large B-cell lymphoma,   germinal center subtype. Molecular genetic studies are pending for further   characterization will be reported in an addendum. Flow cytometry:   Consistent with CD10-positive B-cell lymphoproliferative disorder. Comments: Flow cytometry shows monoclonal B-cells (36% of total cells)   with CD10 expression without CD5, consistent with a CD10-positive B-cell   lymphoproliferative disorder. The main differential diagnosis includes   follicular lymphoma, Burkitt lymphoma and large B-cell lymphoma. Please   correlate the result with morphologic findings and clinical information.    Flow Differential (%) and Population Analysis:   Lymphocytes: 93.7%   T-cells (39% of lymphoid cells) show a CD4/CD8 ratio of 3.3 without overt   phenotypic abnormality. NK-cells (1% of lymphoid cells) are unremarkable. Mature B-cells (56% of lymphoid cells) include large forms based on   forward scattered pattern and show: CD5 neg, CD10+, CD11c+dim, CD19+,   CD20+ with surface lambda light chain restriction. Markers Performed: CD2, CD3, CD4, CD5, CD7, CD8, CD10, CD11c, CD19, CD20,   CD23, CD34, CD38, CD45, CD56, Kappa, Lambda (17 Markers)   The Technical Component Processing of this test was completed at   [facility], [address] [place] [address], [facility], [facility] / [id number] /   [phone] / [facility] # [id number]. Interpretation by Rima Dobson M.D. The   complete scanned report is available in Epic. CPT: E2581479, Q4573159, [id number], W0436606, [id number], [id number]   jj2/2022   *Electronically Signed Out By Nga Sun MD*   ==========================================================================   * * *Gross Description* * *   Specimen #1, received fresh labeled with the patient's name,  and left   posterior cervical lymph node, consists of two paper towels containing a   1.7 x 1.5 x 0.5 cm aggregate of pale pink-tan, fleshy soft tissue   fragments. The specimen is handled according to the flow cytometry   protocol as follows: 7          Two air-dried touch prep slides - one Diff Quik stained, one   rapid-fixed in 95% alcohol   7          Representative sections in B plus fixative (1A)   7          One piece held in RPMI for possible flow cytometry analysis   7          Remaining tissue fixed in formalin for permanents (1B)   Dr. Ashok Sun will determine if flow cytometry is necessary. 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 F who presents as a direct hospital admission for Cycle #5 DA-R-EPOCH.  Patient had scans on Friday that fortunately did not show any clear active lymphoma in the chest/abdomen/pelvis. Patient reports dealing with nausea that she attributes to being \"car sick. \" She is doing better after taking compazine. She does not report any fevers,chills. Due to the nausea she has not taken as much oral pain medications or anxiolytics. She feels that she fell behind in th pain control today. She reports chronic back pain but no acute issues. . Patient reports decreased oral intake secondary to the nausea but still eating and drinking. Patient denies any bowel or bladder problems. Patient reports uncontrolled pain. Patient denies any shortness of breath. Interval History:     Has chest congestion this am; productive cough; pt reports yellow tinged. Having some nausea. Was up in the chair and walked in the hallway yesterday. Past Medical History:   Diagnosis Date    Adverse effect of anesthesia     PHLEGM IN THROAT POST OP & NEEDED X2 BREATHING TREATMENTS    Anxiety     COPD (chronic obstructive pulmonary disease) ([facility] [address]     emphysema    Depression     GERD (gastroesophageal reflux disease)     Hernia, femoral     since childhood    Hypertension     Joint pain     Nausea & vomiting     TMJ arthritis        Objective:      Visit Vitals  BP (!) 170/80   Pulse 68   Temp 97.8 °F (36.6 °C)   Resp 16   Ht 5' 3\" (1.6 m)   Wt 169 lb 5 oz (76.8 kg)   SpO2 94%   BMI 29.99 kg/m²     ECO- Restricted in physically strenuous activity but ambulatory and able to carry out work of a light or sedentary nature, e.g., light house work, office work.   Physical Exam  General: no distress  Respiratory: left with rhonchi; rt crackles in base; normal respiratory effort  CV: no peripheral edema  Skin: no rashes; no ecchymoses; no petechiae  Psych: alert, oriented, normal mood/affect     Results:        Lab Results   Component Value Date/Time    WBC 3.1 (L) 10/06/2022 03:22 AM    HGB 10.7 (L) 10/06/2022 03:22 AM    HCT 32.6 (L) 10/06/2022 03:22 AM    PLATELET 557 03/56/0508 03:22 AM    MCV 94.5 10/06/2022 03:22 AM     Lab Results   Component Value Date/Time    Sodium 142 10/06/2022 03:22 AM    Potassium 3.6 10/06/2022 03:22 AM    Chloride 110 (H) 10/06/2022 03:22 AM    CO2 30 10/06/2022 03:22 AM    Anion gap 2 (L) 10/06/2022 03:22 AM    Glucose 165 (H) 10/06/2022 03:22 AM    BUN 12 10/06/2022 03:22 AM    Creatinine 0.57 10/06/2022 03:22 AM    BUN/Creatinine ratio 21 (H) 10/06/2022 03:22 AM    GFR est AA >60 10/03/2022 06:44 AM    GFR est non-AA >60 10/03/2022 06:44 AM    Calcium 7.9 (L) 10/06/2022 03:22 AM    Bilirubin, total 0.2 10/06/2022 03:22 AM    Alk. phosphatase 54 10/06/2022 03:22 AM    Protein, total 5.0 (L) 10/06/2022 03:22 AM    Albumin 2.9 (L) 10/06/2022 03:22 AM    Globulin 2.1 10/06/2022 03:22 AM    A-G Ratio 1.4 10/06/2022 03:22 AM    ALT (SGPT) 321 (H) 10/06/2022 03:22 AM    AST (SGOT) 189 (H) 10/06/2022 03:22 AM       Assessment and Recommendations: Active Hospital Problems    Diagnosis    Poor appetite  -She reports mostly eating but recently has had some nausea. Overall she reports doing well. Continue antiemetics prn      Port-A-Cath in place  -Port will be used to implement therapy. Cancer related pain  -Continue her home pain management medication regimen. Metastatic cancer to bone [facility])  -Patient will need PET CT scan whole body to assess for any occult bone disease. Diffuse large B-cell lymphoma of lymph nodes of neck (HCC)  -Discussed with patient that she needs to continue with cycle 5 and cycle 6 despite recent encouraging CT scan showing no clear lymphadenopathy in the CT chest abdomen pelvis region. We discussed the importance that she follow through PET/CT imaging after cycle 6.  -proceed with C5 d4 dose adjusted R-EPOCH today with dosing maintained from Cycle #4. Tobacco dependence  -Continue nicotine patch while hospitalized. Continue to recommend smoking cessation.        Edema : on lasix 20 mg po  Chest congestion: already on Mucinex; encourage incentive inspirometer ; will add CXR      Plan reviewed with Dr Oskar Deal.

## 2022-10-06 NOTE — PROGRESS NOTES
Problem: Pain  Goal: *Control of Pain  Outcome: Progressing Towards Goal  Goal: *PALLIATIVE CARE:  Alleviation of Pain  Outcome: Progressing Towards Goal     Problem: Falls - Risk of  Goal: *Absence of Falls  Description: Document Laurie Fall Risk and appropriate interventions in the flowsheet.   Outcome: Progressing Towards Goal  Note: Fall Risk Interventions:  Mobility Interventions: Assess mobility with egress test, Communicate number of staff needed for ambulation/transfer    Mentation Interventions: Adequate sleep, hydration, pain control    Medication Interventions: Patient to call before getting OOB    Elimination Interventions: Bed/chair exit alarm, Call light in reach, Patient to call for help with toileting needs    History of Falls Interventions: Bed/chair exit alarm         Problem: Nutrition Deficit  Goal: *Optimize nutritional status  Outcome: Progressing Towards Goal

## 2022-10-06 NOTE — CONSULTS
Name: [facility]: [address]   : 1955 Admit Date: 10/3/2022   Phone: [phone]  Room: [facility]/01   PCP: Genna Apley, MD  MRN: [id number]   Date: 10/6/2022  Code: Full Code          Chart and notes reviewed. Data reviewed. I review the patient's current medications in the medical record at each encounter. I have evaluated and examined the patient. HPI:    3:29 PM       History was obtained from patient. I was asked by Verónica Kincaid MD to see Indy Martines in consultation for a chief complaint of cough and abnormal CXR. History of Present Illness:  Ms. Rich Thorpe is a 79 yo woman with a history of diffuse large B cell lymphoma, COPD (no PFTs for review), and HTN admitted for chemotherapy and we are consulted for a cough and abnormal CXR. She describes about one day of cough that is productive of white sputum with some chest tightness. No shortness of breath, wheezing, fevers. CXR showed two nodular opacities that were not noted on recent CT chest. She describes being treated for frequent bronchitis/COPD exacerbations in the past. She is not on inhalers at baseline.     Labs: WBC 3.1, Hgb 10.7, , cr 0.57    Images reviewed:  CT chest 2022: no mediastinal or hilar LAD, no nodules/mass/airspace disease      Past Medical History:   Diagnosis Date    Adverse effect of anesthesia     PHLEGM IN THROAT POST OP & NEEDED X2 BREATHING TREATMENTS    Anxiety     COPD (chronic obstructive pulmonary disease) ([address]     emphysema    Depression     GERD (gastroesophageal reflux disease)     Hernia, femoral     since childhood    Hypertension     Joint pain     Nausea & vomiting     TMJ arthritis        Past Surgical History:   Procedure Laterality Date    HX CERVICAL FUSION  2012    C5-C6    HX CHOLECYSTECTOMY  2020    HX COLONOSCOPY      HX ENDOSCOPY      HX HYSTERECTOMY  2019    HX TONSILLECTOMY      AS A CHILD    HX WISDOM TEETH EXTRACTION      TEENAGER    IR INSERT TUNL CVC W PORT OVER 5 YEARS  4/29/2022       Family History   Problem Relation Age of Onset    Heart Disease Mother 46    Heart Surgery Mother         HEART TRANSPLANT    Elevated Lipids Mother     Hypertension Mother     COPD Father     Emphysema Father     Sudden Death Brother 46    Other Maternal Grandmother         BRAIN TUMOR    No Known Problems Son     No Known Problems Daughter        Social History     Tobacco Use    Smoking status: Some Days     Packs/day: 0.25     Years: 40.00     Pack years: 10.00     Types: Cigarettes    Smokeless tobacco: Never   Substance Use Topics    Alcohol use: Not Currently     Alcohol/week: 0.0 standard drinks       Allergies   Allergen Reactions    Oxycodone Nausea Only       Current Facility-Administered Medications   Medication Dose Route Frequency    doxycycline (VIBRAMYCIN) 100 mg in 0.9% sodium chloride (MBP/ADV) 100 mL MBP  100 mg IntraVENous Q12H    furosemide (LASIX) tablet 20 mg  20 mg Oral DAILY    guaiFENesin ER (MUCINEX) tablet 600 mg  600 mg Oral Q12H    etoposide (VEPESID) 75.2 mg in 0.9% sodium chloride 250 mL, overfill volume 25 mL chemo infusion  40 mg/m2 (Treatment Plan Recorded) IntraVENous Q24H    ALPRAZolam (XANAX) tablet 1 mg  1 mg Oral BID PRN    amLODIPine (NORVASC) tablet 5 mg  5 mg Oral DAILY    FLUoxetine (PROzac) capsule 40 mg  40 mg Oral DAILY    morphine IR (MS IR) tablet 15 mg  15 mg Oral Q6H PRN    nicotine (NICODERM CQ) 21 mg/24 hr patch 1 Patch  1 Patch TransDERmal Q24H    trimethoprim-sulfamethoxazole (BACTRIM DS, SEPTRA DS) 160-800 mg per tablet 1 Tablet  1 Tablet Oral Q MON, WED & FRI    sodium chloride (NS) flush 5-40 mL  5-40 mL IntraVENous Q8H    sodium chloride (NS) flush 5-40 mL  5-40 mL IntraVENous PRN    acetaminophen (TYLENOL) tablet 650 mg  650 mg Oral Q6H PRN    Or    acetaminophen (TYLENOL) suppository 650 mg  650 mg Rectal Q6H PRN    polyethylene glycol (MIRALAX) packet 17 g  17 g Oral DAILY PRN    enoxaparin (LOVENOX) injection 40 mg  40 mg SubCUTAneous DAILY    hydrALAZINE (APRESOLINE) 20 mg/mL injection 10 mg  10 mg IntraVENous Q6H PRN    zolpidem (AMBIEN) tablet 5 mg  5 mg Oral QHS PRN    prochlorperazine (COMPAZINE) injection 10 mg  10 mg IntraVENous Q6H PRN    lisinopriL (PRINIVIL, ZESTRIL) tablet 20 mg  20 mg Oral DAILY    pantoprazole (PROTONIX) tablet 40 mg  40 mg Oral ACB    0.9% sodium chloride infusion  45 mL/hr IntraVENous CONTINUOUS    ondansetron (ZOFRAN) injection 8 mg  8 mg IntraVENous Q8H    LORazepam (ATIVAN) 2 mg/mL injection 0.5 mg  0.5 mg IntraVENous Q6H PRN    predniSONE (DELTASONE) tablet 115 mg  115 mg Oral BID WITH MEALS    vinCRIStine (VINCASAR PFS) 0.8 mg, DOXOrubicin (ADRIAMYCIN) 15 mg in 0.9% sodium chloride 250 mL, overfill volume 25 mL chemo infusion   IntraVENous Q24H    [START ON 10/7/2022] cyclophosphamide (CYTOXAN) 1,128 mg in 0.9% sodium chloride 250 mL, overfill volume 25 mL chemo infusion  600 mg/m2 (Treatment Plan Recorded) IntraVENous ONCE    OLANZapine (ZyPREXA) tablet 5 mg  5 mg Oral QPM    sodium chloride (NS) flush 10 mL  10 mL InterCATHeter PRN    heparin (porcine) pf 300-500 Units  300-500 Units InterCATHeter PRN    diphenhydrAMINE (BENADRYL) injection 25 mg  25 mg IntraVENous PRN    acetaminophen (TYLENOL) tablet 650 mg  650 mg Oral PRN    meperidine (pf) (DEMEROL) 50 mg/mL injection 25 mg  25 mg IntraVENous Q20MIN PRN    ondansetron (ZOFRAN) injection 8 mg  8 mg IntraVENous PRN    diphenhydrAMINE (BENADRYL) injection 50 mg  50 mg IntraVENous PRN    albuterol (PROVENTIL VENTOLIN) nebulizer solution 2.5 mg  2.5 mg Nebulization PRN         REVIEW OF SYSTEMS   12 point ROS negative except as stated in the HPI. Physical Exam:   Visit Vitals  BP (!) 147/77   Pulse 67   Temp 98.2 °F (36.8 °C)   Resp 17   Ht 5' 3\" (1.6 m)   Wt 77.6 kg (171 lb 1.2 oz)   SpO2 91%   BMI 30.30 kg/m²       General:  Alert, cooperative, no distress, appears stated age.    Head:  Normocephalic, without obvious abnormality, atraumatic. Eyes:  Conjunctivae/corneas clear. Nose: Nares normal. Septum midline. Throat: Lips, mucosa, and tongue normal.    Neck: Supple, symmetrical, trachea midline   Lungs:   Scattered rhonchi to clear with cough   Chest wall:  No tenderness or deformity. Heart:  Regular rate and rhythm, S1, S2 normal, no murmur, click, rub or gallop. Abdomen:   Soft, non-tender. Bowel sounds normal.   Extremities: Extremities normal, atraumatic, no cyanosis or edema. Pulses: 2+ and symmetric all extremities. Skin: Skin color, texture, turgor normal. No rashes or lesions       Neurologic: Grossly nonfocal       Lab Results   Component Value Date/Time    Sodium 142 10/06/2022 03:22 AM    Potassium 3.6 10/06/2022 03:22 AM    Chloride 110 (H) 10/06/2022 03:22 AM    CO2 30 10/06/2022 03:22 AM    BUN 12 10/06/2022 03:22 AM    Creatinine 0.57 10/06/2022 03:22 AM    Glucose 165 (H) 10/06/2022 03:22 AM    Calcium 7.9 (L) 10/06/2022 03:22 AM    Phosphorus 4.0 06/06/2022 06:59 AM       Lab Results   Component Value Date/Time    WBC 3.1 (L) 10/06/2022 03:22 AM    HGB 10.7 (L) 10/06/2022 03:22 AM    PLATELET 593 56/39/5380 03:22 AM    MCV 94.5 10/06/2022 03:22 AM       Lab Results   Component Value Date/Time    Alk.  phosphatase 54 10/06/2022 03:22 AM    Protein, total 5.0 (L) 10/06/2022 03:22 AM    Albumin 2.9 (L) 10/06/2022 03:22 AM    Globulin 2.1 10/06/2022 03:22 AM       No results found for: IRON, FE, TIBC, IBCT, PSAT, FERR    Lab Results   Component Value Date/Time    TSH 2.950 03/03/2022 12:47 PM        No results found for: PH, PHI, PCO2, PCO2I, PO2, PO2I, HCO3, HCO3I, FIO2, FIO2I    No results found for: CPK, RCK1, RCK2, RCK3, RCK4, CKNDX, CKND1, TROPT, TROIQ, BNPP, BNP     No results found for: CULT    Lab Results   Component Value Date/Time    Hepatitis B surface Ag <0.10 04/27/2022 12:23 PM       No results found for: VANCT, CPK    No results found for: COLOR, APPRN, SPGRU, HELGA, PROTU, GLUCU, KETU, BILU, BLDU, UROU, MAURICIO, LEUKU, WBCU, RBCU, UEPI, BACTU, CASTS, UCRY    IMPRESSION  Cough  Abnormal CXR  ? COPD  Diffuse B lymphoma     PLAN  Goal sat 88% or higher, doing well on RA  Will do a 7 day course of doxy  Check RVP, PNA labs, sputum culture  Will need a repeat CXR in 4-6 weeks  Can hold off on a repeat CT unless she fails to improve as she just had one last week  Will schedule ines      Thank you for allowing us to participate in the care of this patient. We will be happy to follow along in his/her progress with you.     Eugene Reddy MD

## 2022-10-06 NOTE — PROGRESS NOTES
10/6/2022  Case Management Progress Note    9:26 AM  Patient is 78year old female admitted 10/3 with diffuse large b cell lymphoma--planned admission for chemotherapy  Patient's RUR is 19% yellow/moderate risk for readmission, patient is currently a planned readmission to the hospital  Covid test: none this admission, not indicated  Chart reviewed  Patient is admitted for chemotherapy treatment that will finish on Friday. She does not have any noted needs for discharge at this time, has good family support at home. Plan remains for her to discharge home tomorrow after treatment finishes. Will continue to follow.      Transition of Care Plan   Continue medical management/treatment  Home tomorrow after chemotherapy   Family will transport at discharge  Follow up outpatient as indicated  CM will continue to follow    CHIVO Kang

## 2022-10-06 NOTE — PROGRESS NOTES
Physician Progress Note      Maicol Alonso  [facility] #:                  [id number]  :                       1955  ADMIT DATE:       10/3/2022 6:20 AM  DISCH DATE:  RESPONDING  PROVIDER #:        Oswaldo Kovacs MD          QUERY TEXT:    Good afternoon  Patient admitted for chemotherapy. If possible, please document in progress notes and discharge summary if you are evaluating and /or treating any of the following: The medical record reflects the following:  Risk Factors: Diffuse large B-cell lymphoma, cancer related pain, poor appetite  Clinical Indicators: Malnutrition Status:  Severe malnutrition (10/03/22 1503)  Context:  Chronic illness  Findings of the 6 clinical characteristics of malnutrition:  Energy Intake:  75% or less est energy requirements for 1 month or longer  Weight Loss:  Greater than 5% over 1 month  Body Fat Loss:  No significant body fat loss,  Muscle Mass Loss:  Mild muscle mass loss, Clavicles (pectoralis &deltoids), Temples (temporalis)  Fluid Accumulation:  No significant fluid accumulation  Treatment: 1. Continue regular diet order  2. Provide Ensure Enlive TID (1050 kcal, 132 g carbs, 60 g P) to aid in meeting kcal/protein needs    Thank you  Justice Porter RN [facility]  [phone]    ASPEN Criteria:  https://aspenjournals. onlinelibrary. hollis. com/doi/full/10.1177/6028297764708752  Options provided:  -- Protein calorie malnutrition severe  -- Protein calorie malnutrition not present  -- Other - I will add my own diagnosis  -- Disagree - Not applicable / Not valid  -- Disagree - Clinically unable to determine / Unknown  -- Refer to Clinical Documentation Reviewer    PROVIDER RESPONSE TEXT:    Patient has a low albumin but I'm not certain that she meets any criteria for protein calorie malnutrition of severe extent.     Query created by: Merlin Rodriguez on 10/4/2022 12:00 PM      Electronically signed by:  Oswaldo Kovacs MD 10/6/2022 1:07 PM

## 2022-10-07 VITALS
RESPIRATION RATE: 16 BRPM | WEIGHT: 173.5 LBS | HEIGHT: 63 IN | TEMPERATURE: 98.2 F | DIASTOLIC BLOOD PRESSURE: 87 MMHG | OXYGEN SATURATION: 93 % | SYSTOLIC BLOOD PRESSURE: 146 MMHG | BODY MASS INDEX: 30.74 KG/M2 | HEART RATE: 76 BPM

## 2022-10-07 PROBLEM — U07.1 COVID-19: Status: ACTIVE | Noted: 2022-10-07

## 2022-10-07 LAB
ALBUMIN SERPL-MCNC: 2.9 G/DL (ref 3.5–5)
ALBUMIN/GLOB SERPL: 1.4 {RATIO} (ref 1.1–2.2)
ALP SERPL-CCNC: 49 U/L (ref 45–117)
ALT SERPL-CCNC: 359 U/L (ref 12–78)
ANION GAP SERPL CALC-SCNC: 5 MMOL/L (ref 5–15)
AST SERPL-CCNC: 139 U/L (ref 15–37)
BASOPHILS # BLD: 0 K/UL (ref 0–0.1)
BASOPHILS NFR BLD: 0 % (ref 0–1)
BILIRUB SERPL-MCNC: 0.3 MG/DL (ref 0.2–1)
BUN SERPL-MCNC: 11 MG/DL (ref 6–20)
BUN/CREAT SERPL: 19 (ref 12–20)
CALCIUM SERPL-MCNC: 7.9 MG/DL (ref 8.5–10.1)
CHLORIDE SERPL-SCNC: 107 MMOL/L (ref 97–108)
CO2 SERPL-SCNC: 30 MMOL/L (ref 21–32)
CREAT SERPL-MCNC: 0.59 MG/DL (ref 0.55–1.02)
CRP SERPL-MCNC: <0.29 MG/DL (ref 0–0.6)
D DIMER PPP FEU-MCNC: 2.12 MG/L FEU (ref 0–0.65)
DIFFERENTIAL METHOD BLD: ABNORMAL
EOSINOPHIL # BLD: 0 K/UL (ref 0–0.4)
EOSINOPHIL NFR BLD: 0 % (ref 0–7)
ERYTHROCYTE [DISTWIDTH] IN BLOOD BY AUTOMATED COUNT: 14.8 % (ref 11.5–14.5)
GLOBULIN SER CALC-MCNC: 2.1 G/DL (ref 2–4)
GLUCOSE SERPL-MCNC: 150 MG/DL (ref 65–100)
HCT VFR BLD AUTO: 32.2 % (ref 35–47)
HGB BLD-MCNC: 10.7 G/DL (ref 11.5–16)
IMM GRANULOCYTES # BLD AUTO: 0 K/UL (ref 0–0.04)
IMM GRANULOCYTES NFR BLD AUTO: 1 % (ref 0–0.5)
LYMPHOCYTES # BLD: 0.1 K/UL (ref 0.8–3.5)
LYMPHOCYTES NFR BLD: 2 % (ref 12–49)
MCH RBC QN AUTO: 30.8 PG (ref 26–34)
MCHC RBC AUTO-ENTMCNC: 33.2 G/DL (ref 30–36.5)
MCV RBC AUTO: 92.8 FL (ref 80–99)
MONOCYTES # BLD: 0.1 K/UL (ref 0–1)
MONOCYTES NFR BLD: 2 % (ref 5–13)
NEUTS SEG # BLD: 3.9 K/UL (ref 1.8–8)
NEUTS SEG NFR BLD: 95 % (ref 32–75)
NRBC # BLD: 0 K/UL (ref 0–0.01)
NRBC BLD-RTO: 0 PER 100 WBC
PLATELET # BLD AUTO: 118 K/UL (ref 150–400)
PMV BLD AUTO: 10.3 FL (ref 8.9–12.9)
POTASSIUM SERPL-SCNC: 3.3 MMOL/L (ref 3.5–5.1)
PROT SERPL-MCNC: 5 G/DL (ref 6.4–8.2)
RBC # BLD AUTO: 3.47 M/UL (ref 3.8–5.2)
RBC MORPH BLD: ABNORMAL
SODIUM SERPL-SCNC: 142 MMOL/L (ref 136–145)
WBC # BLD AUTO: 4.1 K/UL (ref 3.6–11)

## 2022-10-07 PROCEDURE — 85379 FIBRIN DEGRADATION QUANT: CPT

## 2022-10-07 PROCEDURE — 74011636637 HC RX REV CODE- 636/637: Performed by: INTERNAL MEDICINE

## 2022-10-07 PROCEDURE — 74011250637 HC RX REV CODE- 250/637: Performed by: INTERNAL MEDICINE

## 2022-10-07 PROCEDURE — 74011250637 HC RX REV CODE- 250/637: Performed by: PHYSICIAN ASSISTANT

## 2022-10-07 PROCEDURE — 74011250636 HC RX REV CODE- 250/636: Performed by: NURSE PRACTITIONER

## 2022-10-07 PROCEDURE — 36415 COLL VENOUS BLD VENIPUNCTURE: CPT

## 2022-10-07 PROCEDURE — 94640 AIRWAY INHALATION TREATMENT: CPT

## 2022-10-07 PROCEDURE — 74011250636 HC RX REV CODE- 250/636: Performed by: INTERNAL MEDICINE

## 2022-10-07 PROCEDURE — 80053 COMPREHEN METABOLIC PANEL: CPT

## 2022-10-07 PROCEDURE — 74011000250 HC RX REV CODE- 250: Performed by: INTERNAL MEDICINE

## 2022-10-07 PROCEDURE — 94761 N-INVAS EAR/PLS OXIMETRY MLT: CPT

## 2022-10-07 PROCEDURE — 74011000250 HC RX REV CODE- 250: Performed by: NURSE PRACTITIONER

## 2022-10-07 PROCEDURE — 85025 COMPLETE CBC W/AUTO DIFF WBC: CPT

## 2022-10-07 PROCEDURE — 74011250637 HC RX REV CODE- 250/637: Performed by: NURSE PRACTITIONER

## 2022-10-07 PROCEDURE — 86140 C-REACTIVE PROTEIN: CPT

## 2022-10-07 PROCEDURE — 99238 HOSP IP/OBS DSCHRG MGMT 30/<: CPT | Performed by: INTERNAL MEDICINE

## 2022-10-07 RX ORDER — DOXYCYCLINE HYCLATE 100 MG
100 TABLET ORAL EVERY 12 HOURS
Qty: 10 TABLET | Refills: 0 | Status: SHIPPED | OUTPATIENT
Start: 2022-10-07

## 2022-10-07 RX ORDER — FLUCONAZOLE 100 MG/1
100 TABLET ORAL
Qty: 5 TABLET | Refills: 0 | Status: SHIPPED | OUTPATIENT
Start: 2022-10-07 | End: 2022-10-14

## 2022-10-07 RX ORDER — POTASSIUM CHLORIDE 750 MG/1
20 TABLET, FILM COATED, EXTENDED RELEASE ORAL
Status: COMPLETED | OUTPATIENT
Start: 2022-10-07 | End: 2022-10-07

## 2022-10-07 RX ORDER — ALBUTEROL SULFATE 90 UG/1
2 AEROSOL, METERED RESPIRATORY (INHALATION)
Qty: 18 G | Refills: 0 | Status: SHIPPED | OUTPATIENT
Start: 2022-10-07 | End: 2022-10-27

## 2022-10-07 RX ADMIN — PANTOPRAZOLE SODIUM 40 MG: 40 TABLET, DELAYED RELEASE ORAL at 07:08

## 2022-10-07 RX ADMIN — LISINOPRIL 20 MG: 20 TABLET ORAL at 09:34

## 2022-10-07 RX ADMIN — ONDANSETRON 8 MG: 2 INJECTION INTRAMUSCULAR; INTRAVENOUS at 00:55

## 2022-10-07 RX ADMIN — ENOXAPARIN SODIUM 40 MG: 100 INJECTION SUBCUTANEOUS at 09:34

## 2022-10-07 RX ADMIN — POTASSIUM CHLORIDE 20 MEQ: 750 TABLET, FILM COATED, EXTENDED RELEASE ORAL at 09:33

## 2022-10-07 RX ADMIN — PREDNISONE 115 MG: 5 TABLET ORAL at 07:08

## 2022-10-07 RX ADMIN — TIOTROPIUM BROMIDE AND OLODATEROL 2 PUFF: 3.124; 2.736 SPRAY, METERED RESPIRATORY (INHALATION) at 08:14

## 2022-10-07 RX ADMIN — ALPRAZOLAM 1 MG: 0.5 TABLET ORAL at 07:29

## 2022-10-07 RX ADMIN — PREDNISONE 115 MG: 5 TABLET ORAL at 13:28

## 2022-10-07 RX ADMIN — SULFAMETHOXAZOLE AND TRIMETHOPRIM 1 TABLET: 800; 160 TABLET ORAL at 11:20

## 2022-10-07 RX ADMIN — AMLODIPINE BESYLATE 5 MG: 5 TABLET ORAL at 09:33

## 2022-10-07 RX ADMIN — MORPHINE SULFATE 15 MG: 15 TABLET ORAL at 07:29

## 2022-10-07 RX ADMIN — ALBUTEROL SULFATE 2 PUFF: 90 AEROSOL, METERED RESPIRATORY (INHALATION) at 08:06

## 2022-10-07 RX ADMIN — SODIUM CHLORIDE, PRESERVATIVE FREE 10 ML: 5 INJECTION INTRAVENOUS at 00:56

## 2022-10-07 RX ADMIN — GUAIFENESIN 600 MG: 600 TABLET ORAL at 09:34

## 2022-10-07 RX ADMIN — ONDANSETRON 8 MG: 2 INJECTION INTRAMUSCULAR; INTRAVENOUS at 09:34

## 2022-10-07 RX ADMIN — DOXYCYCLINE HYCLATE 100 MG: 100 TABLET, COATED ORAL at 09:33

## 2022-10-07 RX ADMIN — CYCLOPHOSPHAMIDE 1128 MG: 2 INJECTION, POWDER, FOR SOLUTION INTRAVENOUS; ORAL at 14:37

## 2022-10-07 RX ADMIN — SODIUM CHLORIDE 45 ML/HR: 9 INJECTION, SOLUTION INTRAVENOUS at 05:37

## 2022-10-07 RX ADMIN — FLUOXETINE HYDROCHLORIDE 40 MG: 20 CAPSULE ORAL at 09:32

## 2022-10-07 RX ADMIN — FUROSEMIDE 20 MG: 20 TABLET ORAL at 09:33

## 2022-10-07 NOTE — PROGRESS NOTES
Problem: Pain  Goal: *Control of Pain  10/7/2022 1728 by Alis Crespo RN  Outcome: Resolved/Met  10/7/2022 1526 by Alis Crespo RN  Outcome: Progressing Towards Goal  Goal: *PALLIATIVE CARE:  Alleviation of Pain  10/7/2022 1728 by Alis Crespo RN  Outcome: Resolved/Met  10/7/2022 1526 by Alis Crespo RN  Outcome: Progressing Towards Goal     Problem: Patient Education: Go to Patient Education Activity  Goal: Patient/Family Education  Outcome: Resolved/Met     Problem: Falls - Risk of  Goal: *Absence of Falls  Description: Document Anne Nap Fall Risk and appropriate interventions in the flowsheet.   10/7/2022 1728 by Alis Crespo RN  Outcome: Resolved/Met  10/7/2022 1526 by Alis Crespo RN  Outcome: Progressing Towards Goal  Note: Fall Risk Interventions:  Mobility Interventions: Communicate number of staff needed for ambulation/transfer, Patient to call before getting OOB    Mentation Interventions: Bed/chair exit alarm, Adequate sleep, hydration, pain control    Medication Interventions: Patient to call before getting OOB, Teach patient to arise slowly    Elimination Interventions: Bed/chair exit alarm, Call light in reach    History of Falls Interventions: Bed/chair exit alarm         Problem: Patient Education: Go to Patient Education Activity  Goal: Patient/Family Education  Outcome: Resolved/Met     Problem: Nutrition Deficit  Goal: *Optimize nutritional status  10/7/2022 1728 by Alis Crespo RN  Outcome: Resolved/Met  10/7/2022 1526 by Alis Crespo RN  Outcome: Progressing Towards Goal

## 2022-10-07 NOTE — PROGRESS NOTES
10/07/22      Medicare pt has received Medicare 2nd [facility] letter copy attached to door. Patient unable to sign letter due to Contact Precautions tried calling patient twice no answer goes to voice mail.

## 2022-10-07 NOTE — PROGRESS NOTES
Name: [facility]: [address]   : 1955 Admit Date: 10/3/2022   Phone: [phone]  Room: [facility]/01   PCP: Eliane Scanlon MD  MRN: [id number]   Date: 10/7/2022  Code: Full Code          Chart and notes reviewed. Data reviewed. I review the patient's current medications in the medical record at each encounter. I have evaluated and examined the patient. History of Present Illness:  Ms. Lela Sagastume is a 77 yo woman with a history of diffuse large B cell lymphoma, COPD (no PFTs for review), and HTN admitted for chemotherapy and we are consulted for a cough and abnormal CXR. She describes about one day of cough that is productive of white sputum with some chest tightness. No shortness of breath, wheezing, fevers. CXR showed two nodular opacities that were not noted on recent CT chest. She describes being treated for frequent bronchitis/COPD exacerbations in the past. She is not on inhalers at baseline. Labs: WBC 3.1, Hgb 10.7, , cr 0.57    Images reviewed:  CT chest 2022: no mediastinal or hilar LAD, no nodules/mass/airspace disease    Interval history  Afebrile  BP elevated  Sats 91% on RA  K 3.3  RVP with COVID PCR +    ROS: Reports feeling slightly better than yesterday, but still with cough and sputum production. Inhalers are helpful. Reports SOB. Denies fever or chills. Denies CP.     Past Medical History:   Diagnosis Date    Adverse effect of anesthesia     PHLEGM IN THROAT POST OP & NEEDED X2 BREATHING TREATMENTS    Anxiety     COPD (chronic obstructive pulmonary disease) ([facility] [address]     emphysema    Depression     GERD (gastroesophageal reflux disease)     Hernia, femoral     since childhood    Hypertension     Joint pain     Nausea & vomiting     TMJ arthritis        Past Surgical History:   Procedure Laterality Date    HX CERVICAL FUSION  2012    C5-C6    HX CHOLECYSTECTOMY  2020    HX COLONOSCOPY      HX ENDOSCOPY      HX HYSTERECTOMY  2019    HX TONSILLECTOMY AS A CHILD    HX WISDOM TEETH EXTRACTION      TEENAGER    IR INSERT TUNL CVC W PORT OVER 5 YEARS  4/29/2022       Family History   Problem Relation Age of Onset    Heart Disease Mother 46    Heart Surgery Mother         HEART TRANSPLANT    Elevated Lipids Mother     Hypertension Mother     COPD Father     Emphysema Father     Sudden Death Brother 46    Other Maternal Grandmother         BRAIN TUMOR    No Known Problems Son     No Known Problems Daughter        Social History     Tobacco Use    Smoking status: Some Days     Packs/day: 0.25     Years: 40.00     Pack years: 10.00     Types: Cigarettes    Smokeless tobacco: Never   Substance Use Topics    Alcohol use: Not Currently     Alcohol/week: 0.0 standard drinks       Allergies   Allergen Reactions    Oxycodone Nausea Only       Current Facility-Administered Medications   Medication Dose Route Frequency    albuterol (PROVENTIL HFA, VENTOLIN HFA, PROAIR HFA) inhaler 2 Puff  2 Puff Inhalation Q6H PRN    Or    albuterol (PROVENTIL VENTOLIN) nebulizer solution 2.5 mg  2.5 mg Nebulization Q6H PRN    tiotropium-olodateroL (STIOLTO RESPIMAT) 2.5-2.5 mcg/actuation inhaler 2 Puff  2 Puff Inhalation DAILY    doxycycline (VIBRA-TABS) tablet 100 mg  100 mg Oral Q12H    furosemide (LASIX) tablet 20 mg  20 mg Oral DAILY    guaiFENesin ER (MUCINEX) tablet 600 mg  600 mg Oral Q12H    etoposide (VEPESID) 75.2 mg in 0.9% sodium chloride 250 mL, overfill volume 25 mL chemo infusion  40 mg/m2 (Treatment Plan Recorded) IntraVENous Q24H    ALPRAZolam (XANAX) tablet 1 mg  1 mg Oral BID PRN    amLODIPine (NORVASC) tablet 5 mg  5 mg Oral DAILY    FLUoxetine (PROzac) capsule 40 mg  40 mg Oral DAILY    morphine IR (MS IR) tablet 15 mg  15 mg Oral Q6H PRN    nicotine (NICODERM CQ) 21 mg/24 hr patch 1 Patch  1 Patch TransDERmal Q24H    trimethoprim-sulfamethoxazole (BACTRIM DS, SEPTRA DS) 160-800 mg per tablet 1 Tablet  1 Tablet Oral Q MON, WED & FRI    sodium chloride (NS) flush 5-40 mL 5-40 mL IntraVENous Q8H    sodium chloride (NS) flush 5-40 mL  5-40 mL IntraVENous PRN    acetaminophen (TYLENOL) tablet 650 mg  650 mg Oral Q6H PRN    Or    acetaminophen (TYLENOL) suppository 650 mg  650 mg Rectal Q6H PRN    polyethylene glycol (MIRALAX) packet 17 g  17 g Oral DAILY PRN    enoxaparin (LOVENOX) injection 40 mg  40 mg SubCUTAneous DAILY    hydrALAZINE (APRESOLINE) 20 mg/mL injection 10 mg  10 mg IntraVENous Q6H PRN    zolpidem (AMBIEN) tablet 5 mg  5 mg Oral QHS PRN    prochlorperazine (COMPAZINE) injection 10 mg  10 mg IntraVENous Q6H PRN    lisinopriL (PRINIVIL, ZESTRIL) tablet 20 mg  20 mg Oral DAILY    pantoprazole (PROTONIX) tablet 40 mg  40 mg Oral ACB    0.9% sodium chloride infusion  45 mL/hr IntraVENous CONTINUOUS    ondansetron (ZOFRAN) injection 8 mg  8 mg IntraVENous Q8H    LORazepam (ATIVAN) 2 mg/mL injection 0.5 mg  0.5 mg IntraVENous Q6H PRN    predniSONE (DELTASONE) tablet 115 mg  115 mg Oral BID WITH MEALS    vinCRIStine (VINCASAR PFS) 0.8 mg, DOXOrubicin (ADRIAMYCIN) 15 mg in 0.9% sodium chloride 250 mL, overfill volume 25 mL chemo infusion   IntraVENous Q24H    cyclophosphamide (CYTOXAN) 1,128 mg in 0.9% sodium chloride 250 mL, overfill volume 25 mL chemo infusion  600 mg/m2 (Treatment Plan Recorded) IntraVENous ONCE    sodium chloride (NS) flush 10 mL  10 mL InterCATHeter PRN    heparin (porcine) pf 300-500 Units  300-500 Units InterCATHeter PRN    diphenhydrAMINE (BENADRYL) injection 25 mg  25 mg IntraVENous PRN    acetaminophen (TYLENOL) tablet 650 mg  650 mg Oral PRN    meperidine (pf) (DEMEROL) 50 mg/mL injection 25 mg  25 mg IntraVENous Q20MIN PRN    ondansetron (ZOFRAN) injection 8 mg  8 mg IntraVENous PRN    diphenhydrAMINE (BENADRYL) injection 50 mg  50 mg IntraVENous PRN         REVIEW OF SYSTEMS   12 point ROS negative except as stated in the HPI.       Physical Exam:   Visit Vitals  BP (!) 164/77 (BP 1 Location: Right lower arm, BP Patient Position: At rest;Supine) Pulse 67   Temp 97.4 °F (36.3 °C)   Resp 18   Ht 5' 3\" (1.6 m)   Wt 78.7 kg (173 lb 8 oz)   SpO2 91%   BMI 30.73 kg/m²       General:  Alert, cooperative, no distress, appears stated age. Head:  Normocephalic, without obvious abnormality, atraumatic. Eyes:  Conjunctivae/corneas clear. Nose: Nares normal. Septum midline. Throat: Lips, mucosa, and tongue normal.    Neck: Supple, symmetrical, trachea midline   Lungs:   Scattered rhonchi to clear with cough   Chest wall:  No tenderness or deformity. Heart:  Regular rate and rhythm, S1, S2 normal, no murmur, click, rub or gallop. Abdomen:   Soft, non-tender. Bowel sounds normal.   Extremities: Extremities normal, atraumatic, no cyanosis or edema. Pulses: 2+ and symmetric all extremities. Skin: Skin color, texture, turgor normal. No rashes or lesions   Neurologic: Grossly nonfocal       Lab Results   Component Value Date/Time    Sodium 142 10/07/2022 12:57 AM    Potassium 3.3 (L) 10/07/2022 12:57 AM    Chloride 107 10/07/2022 12:57 AM    CO2 30 10/07/2022 12:57 AM    BUN 11 10/07/2022 12:57 AM    Creatinine 0.59 10/07/2022 12:57 AM    Glucose 150 (H) 10/07/2022 12:57 AM    Calcium 7.9 (L) 10/07/2022 12:57 AM    Phosphorus 4.0 06/06/2022 06:59 AM       Lab Results   Component Value Date/Time    WBC 4.1 10/07/2022 12:57 AM    HGB 10.7 (L) 10/07/2022 12:57 AM    PLATELET 149 (L) 56/25/0345 12:57 AM    MCV 92.8 10/07/2022 12:57 AM       Lab Results   Component Value Date/Time    Alk.  phosphatase 49 10/07/2022 12:57 AM    Protein, total 5.0 (L) 10/07/2022 12:57 AM    Albumin 2.9 (L) 10/07/2022 12:57 AM    Globulin 2.1 10/07/2022 12:57 AM       No results found for: IRON, FE, TIBC, IBCT, PSAT, FERR    Lab Results   Component Value Date/Time    TSH 2.950 03/03/2022 12:47 PM        No results found for: PH, PHI, PCO2, PCO2I, PO2, PO2I, HCO3, HCO3I, FIO2, FIO2I    No results found for: CPK, RCK1, RCK2, RCK3, RCK4, CKNDX, CKND1, TROPT, TROIQ, BNPP, BNP     Lab Results   Component Value Date/Time    Culture result: PENDING 10/06/2022 06:21 PM       Lab Results   Component Value Date/Time    Hepatitis B surface Ag <0.10 04/27/2022 12:23 PM       No results found for: VANCT, CPK    No results found for: COLOR, APPRN, SPGRU, HELGA, PROTU, GLUCU, KETU, BILU, BLDU, UROU, MAURICIO, LEUKU, WBCU, RBCU, UEPI, BACTU, CASTS, UCRY    IMPRESSION  COVID +  Cough  Abnormal CXR  ?  COPD  Diffuse B lymphoma   Elevated LFTs    PLAN  Goal sat 88% or higher, doing well currently on RA  Will do a 7 day course of doxy  Fu PNA labs, sputum culture  High dose prednisone per Hematology  Not a candidate for baricitinib  Check d-dimer and CRP  Replete K  Will need a repeat CXR in 4-6 weeks  Can hold off on a repeat CT unless she fails to improve as she just had one last week  Continue Stiolto and albuterol      [place], [place]

## 2022-10-07 NOTE — PROGRESS NOTES
10/7/2022  Case Management Progress Note    9:37 AM  Patient is 78year old female admitted 10/3 with diffuse large b cell lymphoma--planned admission for chemotherapy  Patient's RUR is 19% yellow/moderate risk for readmission, patient is currently a planned readmission to the hospital  Covid test: none this admission, not indicated  Chart reviewed  Per chart patient is now covid positive. Plan was for her to finish chemotherapy and discharge home today, however not yet clear whether she may need to stay longer for covid treatment. Patient has excellent support at home and plan remains for her to discharge home with family assistance when ready. Will continue to follow and assist with discharge planning as needed.      Transition of Care Plan   Continue medical management/treatment  Home with family when ready   Family will transport at discharge  Follow up outpatient as indicated  CM will continue to follow    CHIVO Galeas

## 2022-10-07 NOTE — PROGRESS NOTES
[facility] at [address], [address] [place] [address]  Tobin Rank: [phone]  F: [phone] Patient ID  Name: Rashid Ca  YOB: 1955  MRN: [id number]  Referring Provider:   No referring provider defined for this encounter. 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 This summary highlights focused aspects of patient's ongoing care and may have a recurring section in notes with either updates or remain unchanged as a longitudinal care summary.    -------------------------------------------------------------------------------------------------------------------------------------------------------------------------------------------------------  DIAGNOSIS:  Diffuse Large B-Cell Lymphoma     ORIGINAL STAGING:  Stage IV     SITES OF DISEASE:  Left Cervical Lymph Node,Bone Disease, Stage IV    CURRENT TREATMENT:  C1,D1 on 5/11/22 DA-R-EPOCH  C2  C3 delayed due to appetite issues and patient anxiety to restart therapy     Plan Name: OP R-CHOP   Treatment goal Curative   Provider Jane Kulkarni MD   Status Inactive   Start Date    End Date    Treatment plan weight/height/BSA Weight type: Documented (effective on 4/27/2022), 94.1 kg (entered on 4/27/2022), 160 cm (entered on 4/27/2022), BSA 2.05 m2   Most recent weight/height/BSA Weight: 173 lb 8 oz (78.7 kg)    Height: 5' 3\" (1.6 m)    BSA (calculated - sq m): 1.86 sq meters      Treatment on clinical trial? [This plan is not part of a research study]   Reason for stopping treatment MD Discretion   Summary of Treatment Plan Medications DOXOrubicin (ADRIAMYCIN) chemo syringe 51.2 mg, 25 mg/m2 = 51.2 mg, IntraVENous, ONCE, 0 of 6 cycles  Dose modification: 25 mg/m2 (original dose 50 mg/m2, Cycle 1, Reason: Per Protocol, Comment: dose split into 2 syringes due to volume)    cyclophosphamide (CYTOXAN) 1,538 mg in 0.9% sodium chloride 250 mL chemo infusion, 750 mg/m2 = 1,538 mg, IntraVENous, ONCE, 0 of 6 cycles    vinCRIStine (VINCASAR PFS) 2 mg in 0.9% sodium chloride 50 mL chemo IVPB, 2 mg (100 % of original dose 2 mg), IntraVENous, ONCE, 0 of 6 cycles  Dose modification: 2 mg (original dose 2 mg, Cycle 1, Reason: Other)    riTUXimab-pvvr (RUXIENCE) 769 mg in 0.9% sodium chloride 769 mL infusion, 375 mg/m2 = 769 mg, IntraVENous, ONCE TITRATE, 0 of 1 cycle       Plan Name: [facility],    Treatment goal Curative   Provider Tatiana Oconnell MD   Status Active   Start Date 5/11/2022   End Date 10/30/2022 (Planned)   Treatment plan weight/height/BSA Weight type: Documented (effective on 8/17/2022), 79.5 kg (entered on 8/17/2022), 160 cm (entered on 8/5/2022), BSA 1.88 m2   Most recent weight/height/BSA Weight: 173 lb 8 oz (78.7 kg)    Height: 5' 3\" (1.6 m)    BSA (calculated - sq m): 1.86 sq meters      Treatment on clinical trial? [This plan is not part of a research study]   Reason for stopping treatment [Plan is still active]   Summary of Treatment Plan Medications cyclophosphamide (CYTOXAN) 1,537.6 mg in 0.9% sodium chloride 250 mL, overfill volume 25 mL chemo infusion, 750 mg/m2 = 1,538 mg, IntraVENous, ONCE, 5 of 6 cycles  Dose modification: 900 mg/m2 (original dose 750 mg/m2, Cycle 2, Reason: Per Protocol, Comment: ANC above 0.5), 600 mg/m2 (original dose 750 mg/m2, Cycle 3, Reason: Dose Not Tolerated), 600 mg/m2 (80 % of original dose 750 mg/m2, Cycle 4, Reason: Dose Not Tolerated)  Administration: 1,537.6 mg (5/15/2022), 1,809 mg (6/10/2022), 1,128 mg (8/22/2022), 1,128 mg (9/16/2022), 1,128 mg (10/7/2022)    etoposide (VEPESID) 102.6 mg in 0.9% sodium chloride 450 mL chemo infusion, 50 mg/m2 = 102.6 mg, IntraVENous, EVERY 24 HOURS, 5 of 6 cycles  Dose modification: 60 mg/m2 (original dose 50 mg/m2, Cycle 2, Reason: Per Protocol, Comment: ANC above 0.5), 40 mg/m2 (original dose 50 mg/m2, Cycle 3, Reason: Dose Not Tolerated), 40 mg/m2 (80 % of original dose 50 mg/m2, Cycle 4, Reason: Dose Not Tolerated)  Administration: 102.6 mg (5/11/2022), 102.6 mg (5/12/2022), 102.6 mg (5/13/2022), 102.6 mg (5/14/2022), 120.6 mg (6/6/2022), 120.6 mg (6/7/2022), 120.6 mg (6/8/2022), 120.6 mg (6/9/2022), 75.2 mg (8/18/2022), 75.2 mg (8/19/2022), 75.2 mg (8/20/2022), 75.2 mg (8/21/2022), 75.2 mg (9/12/2022), 75.2 mg (9/13/2022), 75.2 mg (9/14/2022), 75.2 mg (9/15/2022), 75.2 mg (10/3/2022), 75.2 mg (10/4/2022), 75.2 mg (10/5/2022), 75.2 mg (10/6/2022)    vinCRIStine (VINCASAR PFS) 0.8 mg, DOXOrubicin (ADRIAMYCIN) 20.6 mg in 0.9% sodium chloride 250 mL, overfill volume 25 mL chemo infusion, , IntraVENous, EVERY 24 HOURS, 5 of 6 cycles  Administration:  (5/11/2022),  (5/12/2022),  (5/13/2022),  (5/14/2022),  (6/6/2022),  (6/7/2022),  (6/8/2022),  (6/9/2022),  (8/18/2022),  (8/19/2022),  (8/20/2022),  (8/21/2022),  (9/12/2022),  (9/13/2022),  (9/14/2022),  (9/15/2022),  (10/3/2022),  (10/4/2022),  (10/5/2022),  (10/6/2022)    riTUXimab-pvvr (RUXIENCE) 769 mg in 0.9% sodium chloride 769 mL infusion, 375 mg/m2 = 769 mg, IntraVENous, ONCE TITRATE, 1 of 2 cycles  Administration: 769 mg (5/11/2022)    riTUXimab-pvvr (RUXIENCE) 754 mg in 0.9% sodium chloride 250 mL RAPID infusion, 375 mg/m2 = 754 mg, IntraVENous, ONCE TITRATE, 4 of 4 cycles  Administration: 754 mg (6/6/2022), 705 mg (8/18/2022), 705 mg (9/12/2022), 705 mg (10/3/2022)         PRIOR TREATMENT:  n/a     GOALS OF CARE:  Curative Intent     PATHOLOGY:  Specimen #:[id number] Collect: 4/22/2022      ==========================================================================                    * * *SURGICAL PATHOLOGY REPORT* * *   ==========================================================================   * * *PROCEDURES/ADDENDA* * *   ADDENDUM   Date Ordered: 4/26/2022 Status: Signed Out   Date Complete: 4/26/2022     By: Blele Buchanan. Sterling Goldberg, MD   Date Reported: 4/26/2022   Addendum Diagnosis   Lymph node, left posterior cervical lymph node, excision:   In situ hybridization for Jesús-Barr viral RNA: Negative   CPT: 19524  Addendum Comment   * * *CLINICAL HISTORY* * *   Cervical lymphadenopathy, rule out lymphoma   ==========================================================================   * * *FINAL PATHOLOGIC DIAGNOSIS* * *        Lymph node, left posterior cervical lymph node, excision:        Large B cell lymphoma. See comment. * * *Comment* * *   The histologic section has fragments of lymphoid tissue with diffuse and   focal follicular architecture. Diffuse areas are comprised of large,   atypical lymphocytes with centroblastic morphology and increased mitotic   activity. Immunohistochemical stains confirm the malignant cells are CD20   positive B cells that coexpress CD10 and BCL6 without MUM1. CD23   highlights rare follicular dendritic networks associated with lymphoid   follicles with germinal centers and express CD10 and BCL6 without BCL2. CyclinD1 and CD56 stains are negative. Concurrent flow cytometric analysis   confirms a clonal CD10 positive B-cell population comprised of medium to   large cells. The Ki-67 proliferation index is 30%. The overall findings favor the diagnosis of diffuse large B-cell lymphoma,   germinal center subtype. Molecular genetic studies are pending for further   characterization will be reported in an addendum. Flow cytometry:   Consistent with CD10-positive B-cell lymphoproliferative disorder. Comments: Flow cytometry shows monoclonal B-cells (36% of total cells)   with CD10 expression without CD5, consistent with a CD10-positive B-cell   lymphoproliferative disorder. The main differential diagnosis includes   follicular lymphoma, Burkitt lymphoma and large B-cell lymphoma.  Please   correlate the result with morphologic findings and clinical information. Flow Differential (%) and Population Analysis:   Lymphocytes: 93.7%   T-cells (39% of lymphoid cells) show a CD4/CD8 ratio of 3.3 without overt   phenotypic abnormality. NK-cells (1% of lymphoid cells) are unremarkable. Mature B-cells (56% of lymphoid cells) include large forms based on   forward scattered pattern and show: CD5 neg, CD10+, CD11c+dim, CD19+,   CD20+ with surface lambda light chain restriction. Markers Performed: CD2, CD3, CD4, CD5, CD7, CD8, CD10, CD11c, CD19, CD20,   CD23, CD34, CD38, CD45, CD56, Kappa, Lambda (17 Markers)   The Technical Component Processing of this test was completed at   [facility], [address] [place] [address], [place], [place] / [address] /   [phone] / [facility] # [id number]. Interpretation by Bridgette Dash M.D. The   complete scanned report is available in Epic. CPT: Y4554553, O2650687, M269234, [id number], [id number], [id number]   [facility]2/2022   *Electronically Signed Out By Mich Holm. Brock Rosa MD*   ==========================================================================   * * *Gross Description* * *   Specimen #1, received fresh labeled with the patient's name,  and left   posterior cervical lymph node, consists of two paper towels containing a   1.7 x 1.5 x 0.5 cm aggregate of pale pink-tan, fleshy soft tissue   fragments. The specimen is handled according to the flow cytometry   protocol as follows: 7          Two air-dried touch prep slides - one Diff Quik stained, one   rapid-fixed in 95% alcohol   7          Representative sections in B plus fixative (1A)   7          One piece held in RPMI for possible flow cytometry analysis   7          Remaining tissue fixed in formalin for permanents (1B)   Dr. Brock Rosa will determine if flow cytometry is necessary. 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 Reports having nausea meds at home. Past Medical History:   Diagnosis Date    Adverse effect of anesthesia     PHLEGM IN THROAT POST OP & NEEDED X2 BREATHING TREATMENTS    Anxiety     COPD (chronic obstructive pulmonary disease) ([address]     emphysema    Depression     GERD (gastroesophageal reflux disease)     Hernia, femoral     since childhood    Hypertension     Joint pain     Nausea & vomiting     TMJ arthritis        Objective:      Visit Vitals  BP (!) 152/81 (BP 1 Location: Left upper arm)   Pulse 80   Temp 98.1 °F (36.7 °C)   Resp 16   Ht 5' 3\" (1.6 m)   Wt 173 lb 8 oz (78.7 kg)   SpO2 97%   BMI 30.73 kg/m²     ECO- Restricted in physically strenuous activity but ambulatory and able to carry out work of a light or sedentary nature, e.g., light house work, office work. Physical Exam  Constitutional: No acute distress. and Non-toxic appearance. HENT: Normocephalic and atraumatic head. Alopecia. Skin: No jaundice. No rash. Neurological: Alert and oriented. No tremor on inspection. Psychiatric: mood normal. normal speech rate. normal affect. Due to this being a TeleHealth evaluation (During [facility] in the [place]- public health emergency), many elements of the physical examination are unable to be assessed. Evaluation of the following organ systems was limited: Vitals/Constitutional/EENT/Resp/CV/GI//MS/Neuro/Skin/Heme-Lymph-Imm.     Results:        Lab Results   Component Value Date/Time    WBC 4.1 10/07/2022 12:57 AM    HGB 10.7 (L) 10/07/2022 12:57 AM    HCT 32.2 (L) 10/07/2022 12:57 AM    PLATELET 894 (L)  12:57 AM    MCV 92.8 10/07/2022 12:57 AM     Lab Results   Component Value Date/Time    Sodium 142 10/07/2022 12:57 AM    Potassium 3.3 (L) 10/07/2022 12:57 AM    Chloride 107 10/07/2022 12:57 AM    CO2 30 10/07/2022 12:57 AM    Anion gap 5 10/07/2022 12:57 AM    Glucose 150 (H) 10/07/2022 12:57 AM    BUN 11 10/07/2022 12:57 AM    Creatinine 0.59 10/07/2022 12:57 AM    BUN/Creatinine ratio 19 10/07/2022 12:57 AM    GFR est AA >60 10/03/2022 06:44 AM    GFR est non-AA >60 10/03/2022 06:44 AM    Calcium 7.9 (L) 10/07/2022 12:57 AM    Bilirubin, total 0.3 10/07/2022 12:57 AM    Alk. phosphatase 49 10/07/2022 12:57 AM    Protein, total 5.0 (L) 10/07/2022 12:57 AM    Albumin 2.9 (L) 10/07/2022 12:57 AM    Globulin 2.1 10/07/2022 12:57 AM    A-G Ratio 1.4 10/07/2022 12:57 AM    ALT (SGPT) 359 (H) 10/07/2022 12:57 AM    AST (SGOT) 139 (H) 10/07/2022 12:57 AM       Assessment and Recommendations: Active Hospital Problems    Diagnosis    Poor appetite  -feels ready for home. Notes that she always has issues once home for several days. Port-A-Cath in place  -Port will be used to implement therapy. Cancer related pain  -Continue her home pain management medication regimen. No changes in her prescriptions. Metastatic cancer to bone [facility])  -needs PET/CT after C6 by about one month follow-up. Diffuse large B-cell lymphoma of lymph nodes of neck (HCC)  -Discussed with patient that she needs to continue with cycle 6 despite recent encouraging CT scan showing no clear lymphadenopathy in the CT chest abdomen pelvis region. We discussed the importance that she follow through PET/CT imaging after cycle 6.  -conclude with C5 d5 dose adjusted R-EPOCH today with dosing maintained from Cycle #4.  -needs Neulasta on Monday. Tobacco dependence  -hae continued nicotine patch while hospitalized. Continue to recommend smoking cessation. Please contact us if any new questions or concerns. Non-Urgent Matters: Call our clinic at [phone] during open clinic hours. Please note that Admittor Messages may not be immediately returned. Urgent Matters: Call hospital  ([address], or [facility]) at night or on weekends for questions that cannot wait until clinic opens. Emergency: Call 9-1-1.     Candy Galdamez MD  Hematology/Medical Oncology Physician  [facility] [address] [place] [address]  P: [phone]         The patient was evaluated through a synchronous (real-time) audio-video encounter. The patient (or guardian if applicable) is aware that this is a billable service. Verbal consent to proceed has been obtained within the past 12 months. The visit was conducted pursuant to the emergency declaration under the [address] [place] [address] and the "[employer] and [employer] General Act. Patient identification was verified, and a caregiver  was present when appropriate. The patient was located in a state where the provider was credentialed to provide care.

## 2022-10-07 NOTE — PROGRESS NOTES
Comprehensive Nutrition Assessment    Type and Reason for Visit: Reassess    Nutrition Recommendations/Plan:   Continue regular diet order  Provide Ensure Enlive TID (1050 kcal, 132 g carbs, 60 g P) to aid in meeting kcal/protein needs. Malnutrition Assessment:  Malnutrition Status:  Severe malnutrition (10/07/22 1517)    Context:  Chronic illness     Findings of the 6 clinical characteristics of malnutrition:   Energy Intake:  75% or less est energy requirements for 1 month or longer  Weight Loss:  Greater than 5% over 1 month     Body Fat Loss:  No significant body fat loss,     Muscle Mass Loss:  Mild muscle mass loss, Clavicles (pectoralis &deltoids), Temples (temporalis)  Fluid Accumulation:  No significant fluid accumulation,     Strength:  Not performed     Nutrition Assessment:     10/7: Follow up. Patient completing round of chemo today, with plans to discharge. However, is now + for COVID-19. PO intake overall has improved but had nausea over last two days. Continues to enjoy ONS. No other complaints at this time. Weight trending upwards. Patient Vitals for the past 168 hrs:   % Diet Eaten   10/05/22 1127 1 - 25%   10/05/22 0827 51 - 75%   10/05/22 0316 0%   10/04/22 1957 1 - 25%   10/04/22 1258 51 - 75%   10/04/22 0933 51 - 75%   10/03/22 2235 1 - 25%     Last 3 Recorded Weights in this Encounter    10/05/22 0806 10/06/22 0735 10/07/22 0715   Weight: 76.8 kg (169 lb 5 oz) 77.6 kg (171 lb 1.2 oz) 78.7 kg (173 lb 8 oz)       Nutrition Related Findings:      Wound Type: None  Last Bowel Movement Date: 10/05/22  Stool Appearance: Formed  Abdominal Assessment: Intact  Appetite: Fair  Bowel Sounds: Active   Edema:LLE: Trace (10/7/2022  9:30 AM)  LUE: Trace (10/7/2022  9:30 AM)  RLE: Trace (10/7/2022  9:30 AM)  RUE: Trace (10/7/2022  9:30 AM)      Nutr.  Labs:  Lab Results   Component Value Date/Time    GFR est AA >60 10/03/2022 06:44 AM    GFR est non-AA >60 10/03/2022 06:44 AM    Creatinine 0.59 10/07/2022 12:57 AM    BUN 11 10/07/2022 12:57 AM    Sodium 142 10/07/2022 12:57 AM    Potassium 3.3 (L) 10/07/2022 12:57 AM    Chloride 107 10/07/2022 12:57 AM    CO2 30 10/07/2022 12:57 AM       Lab Results   Component Value Date/Time    Glucose 150 (H) 10/07/2022 12:57 AM    Glucose (POC) 98 05/04/2022 01:37 PM       No results found for: HBA1C, LGK0HCJZ, YKL1THHM, IJC8WPNH    Nutr. Meds:  Norvasc, vibra-tabs, Lovenox, lasix, heparin, lisinopril, zofran PRN, Protonix, miralax PRN, compazine PRN, trimethoprim-sulfamethoxazole      Current Nutrition Intake & Therapies:  Average Meal Intake: 51-75%  Average Supplement Intake: %  ADULT DIET Regular  ADULT ORAL NUTRITION SUPPLEMENT Dinner, Lunch, Breakfast; Standard High Calorie/High Protein    Anthropometric Measures:  Height: 5' 3\" (160 cm)  Ideal Body Weight (IBW): 115 lbs (52 kg)     Current Body Wt:  73.6 kg (162 lb 4.1 oz), 141.1 % IBW. Standing scale  Current BMI (kg/m2): 28.8  Usual Body Weight: 90.7 kg (200 lb)  % Weight Change (Calculated): -18.9  Weight Adjustment: No adjustment                 BMI Category: Overweight (BMI 25.0-29. 9)    Estimated Daily Nutrient Needs:  Energy Requirements Based On: Kcal/kg  Weight Used for Energy Requirements: Admission  Energy (kcal/day): 5257-6544 (30-35 kcal/kg)  Weight Used for Protein Requirements: Admission  Protein (g/day): 110 (1.5 g/kg)  Method Used for Fluid Requirements: 1 ml/kcal  Fluid (ml/day): 8621-0719    Nutrition Diagnosis:   Severe malnutrition, In context of chronic illness related to inadequate protein-energy intake, increased demand for energy/nutrients, catabolic illness as evidenced by poor intake prior to admission, weight loss greater than or equal to 5% in 1 month, nausea, vomiting, diarrhea, Criteria as identified in malnutrition assessment    Nutrition Interventions:   Food and/or Nutrient Delivery: Continue current diet, Continue oral nutrition supplement  Nutrition Education/Counseling: No recommendations at this time  Coordination of Nutrition Care: Continue to monitor while inpatient, Interdisciplinary rounds  Plan of Care discussed with: IDR team    Goals:  Previous Goal Met: Goal(s) achieved  Goals: Meet at least 75% of estimated needs, by next RD assessment       Nutrition Monitoring and Evaluation:   Behavioral-Environmental Outcomes: None identified  Food/Nutrient Intake Outcomes: Food and nutrient intake, Supplement intake  Physical Signs/Symptoms Outcomes: Biochemical data, Hemodynamic status, Weight    Discharge Planning:    Continue oral nutrition supplement    Nighat Garcia MS, RD  Contact: Ext: [phone], or via [employer]

## 2022-10-07 NOTE — PROGRESS NOTES
Problem: Pain  Goal: *Control of Pain  Outcome: Progressing Towards Goal  Goal: *PALLIATIVE CARE:  Alleviation of Pain  Outcome: Progressing Towards Goal     Problem: Falls - Risk of  Goal: *Absence of Falls  Description: Document Laurie Fall Risk and appropriate interventions in the flowsheet.   Outcome: Progressing Towards Goal  Note: Fall Risk Interventions:  Mobility Interventions: Communicate number of staff needed for ambulation/transfer, Patient to call before getting OOB    Mentation Interventions: Bed/chair exit alarm, Adequate sleep, hydration, pain control    Medication Interventions: Patient to call before getting OOB, Teach patient to arise slowly    Elimination Interventions: Bed/chair exit alarm, Call light in reach    History of Falls Interventions: Bed/chair exit alarm         Problem: Nutrition Deficit  Goal: *Optimize nutritional status  Outcome: Progressing Towards Goal

## 2022-10-07 NOTE — DISCHARGE SUMMARY
Discharge Summary    Date: 10/7/2022  Patient Name: Delaney Mims    YOB: 1955     Age: 78 y.o. Admit Date: 10/3/2022  Discharge Date: 10/7/2022  Discharge Condition: Stable    Discharge Diagnosis  Active Problems:    Tobacco dependence    Diffuse large B-cell lymphoma of lymph nodes of neck (HCC)    Metastatic cancer to bone (HCC)    Cancer related pain    Port-A-Cath in place    Poor appetite    COVID-19  Resolved Problems:    * No resolved hospital problems. [facility] Stay  Narrative of Hospital Course:  18WT F admitted for C5 DA-R-EPOCH. Tolerated treatment well but had a persistent cough since admission. Tested positive for COVID on 10/6/22 while receiving and finishing her chemotherapy. She will follow-up on Monday; she needs her Neulasta shot and we will have to plan the injection with another option if she is not allowed to take it in [employer]. Consultants:  IP CONSULT TO PULMONOLOGY    Surgeries/procedures Performed:      Treatments:    Antibiotics    Other    Discharge Plan:  Home    Hospital/Incidental Findings Requiring Follow Up:    Patient Instructions:    Diet: Regular Diet    Activity:Activity as Tolerated  For number of days (if applicable): 25      Other Instructions:    Provider Follow-Up:   Orders Placed This Encounter      FOLLOW UP VISIT Appointment in: Other (Specify) With Dr. Alejandro Mcclure on Monday          Order Comments: With Dr. Alejandro Mcclure on Monday          Standing Status: Standing          Number of Occurrences: 1          Order Specific Question: Appointment in          Answer: Other (Specify)       Significant Diagnostic Studies:    Recent Labs:  Admission on 10/03/2022  No results displayed because visit has over 200 results. ------------    Radiology last 7 days:  XR CHEST PA LAT    Result Date: 10/6/2022  EXAM:  XR CHEST PA LAT INDICATION:   congestion COMPARISON: 9/30/2022 chest CT and 9/12/2002 radiographs.  FINDINGS: PA and lateral radiographs of the chest were obtained. Nodular densities measuring 1.2 cm in the peripheral and medial right lung base, not definitively seen on prior studies. No evidence of significant pulmonary edema. No pleural effusion or pneumothorax. Heart, [place], mediastinum are within normal limits. Right jugular access Central venous port projects over the upper cavoatrial junction. No acute osseous abnormalities. No findings to suggest significant pulmonary edema. At least 2 nodular densities in the right lower lobe, likely new since prior studies. Consider chest CT for further evaluation to exclude mass lesions. XR CHEST PA LAT    Result Date: 9/12/2022  EXAM: XR CHEST PA LAT INDICATION: unexplained low O2 sats COMPARISON: 8/18/2022. FINDINGS: PA and lateral radiographs of the chest demonstrate clear lungs. The cardiac and mediastinal contours and pulmonary vascularity are normal. There is a stable moderate L1 compression deformity. . A Port-A-Cath tip terminates at the caval atrial junction. No acute cardiopulmonary process seen. Stable lumbar compression fracture, for which DXA is recommended    XR CHEST PA LAT    Result Date: 8/18/2022  EXAM: XR CHEST PA LAT INDICATION: Port check COMPARISON: CT chest abdomen pelvis 6/28/2022 TECHNIQUE: PA and lateral chest views FINDINGS: Right pectoral infusion port terminates near the cavoatrial junction. Cervical spine hardware. Cholecystectomy clips. Cardiac mediastinal silhouette within normal limits. Lungs and pleural spaces grossly clear. Moderate L1 compression fracture is progressed in appearance compared to 6/28/2022 CT. Correlate with clinical history and point tenderness. 1.  No acute findings. 2.  Moderate L1 compression fracture is progressed in appearance compared to 6/28/2022 CT. Correlate with clinical history and point tenderness.     CT CHEST ABD PELV W CONT    Result Date: 9/30/2022  EXAM: CT CHEST ABD PELV W CONT INDICATION: Lymphoma, follow-up COMPARISON: CT June 28, 2022 IV CONTRAST: 100 mL of Isovue-370. ORAL CONTRAST: Given TECHNIQUE: Following the uneventful intravenous administration of contrast, thin axial images were obtained through the chest, abdomen and pelvis. Coronal and sagittal reformats were generated. CT dose reduction was achieved through use of a standardized protocol tailored for this examination and automatic exposure control for dose modulation. FINDINGS: CHEST WALL: There is a right chest portacatheter. In the posterior third of the lateral right breast, a 9 mm mass is not significantly changed. THYROID: No nodule. MEDIASTINUM: No mass or lymphadenopathy. ELIZABETH: No mass or lymphadenopathy. THORACIC AORTA: No dissection or aneurysm. MAIN PULMONARY ARTERY: Normal in caliber. TRACHEA/BRONCHI: Patent. ESOPHAGUS: No wall thickening or dilatation. HEART: Normal in size. PLEURA: No effusion or pneumothorax. LUNGS: No nodule, mass, or airspace disease. LIVER: No mass. BILIARY TREE: Status post cholecystectomy. CBD is not dilated. SPLEEN: within normal limits. PANCREAS: No mass or ductal dilatation. ADRENALS: Unremarkable. KIDNEYS: No mass, calculus, or hydronephrosis. STOMACH: Unremarkable. SMALL BOWEL: No dilatation or wall thickening. There is a right lower quadrant [place] abdominal wall hernia containing loops of small bowel, similar to prior study. COLON: No dilatation or wall thickening. APPENDIX: Not identified PERITONEUM: No ascites or pneumoperitoneum. RETROPERITONEUM: No lymphadenopathy or aortic aneurysm. REPRODUCTIVE ORGANS: Status post hysterectomy URINARY BLADDER: No mass or calculus. BONES: Chronic L1 compression fracture, with increased loss of height ABDOMINAL WALL: No mass or hernia. ADDITIONAL COMMENTS: N/A     1. No evidence of metastatic lymphadenopathy or other acute process within the chest, abdomen, or pelvis. 2. Chronic L1 compression fracture, with increased loss of height in comparison to CT June 28, 2022.  3. Chronic [place] right lower quadrant abdominal wall hernia containing loops of small bowel. No bowel obstruction or perforation. CTA CHEST W OR W WO CONT    Result Date: 9/12/2022  EXAM:  CTA CHEST W OR W WO CONT INDICATION: Unexplained hypoxia. Evaluate for PE COMPARISON: 6/80/3037 TECHNIQUE: Helical thin section chest CT following uneventful intravenous administration of nonionic contrast 100 mL of isovue 370 according to departmental PE protocol. Coronal and sagittal reformats were performed. 3D post processing was performed. CT dose reduction was achieved through the use of a standardized protocol tailored for this examination and automatic exposure control for dose modulation. FINDINGS: This is a limited quality study for the evaluation of pulmonary embolism to the proximal arterial level. Limitations of most pronounced in the lung bases secondary to patient. No large pulmonary embolus is identified. THYROID: No nodule. MEDIASTINUM: No mass or lymphadenopathy. ELIZABETH: No mass or lymphadenopathy. THORACIC AORTA: No aneurysm. HEART: Normal in size. ESOPHAGUS: No wall thickening or dilatation. TRACHEA/BRONCHI: Patent. PLEURA: No effusion or pneumothorax. LUNGS: No nodule, mass, or airspace disease. UPPER ABDOMEN: Cholecystectomy BONES: L1 compression fracture with increase loss of height when compared to 6/20/2022     1. No evidence of pulmonary embolus. Limitations as above. 2.  No acute cardiopulmonary process. 3.  L1 compression fracture with increased height loss when compared to the prior study        Pending Labs     Order Current Status    MYCOPLASMA AB, IGG/IGM In process    CULTURE, RESPIRATORY/SPUTUM/BRONCH W GRAM STAIN Preliminary result        Discharge Medications   Current Discharge Medication List    START taking these medications    fluconazole (DIFLUCAN) 100 mg tablet  Take 1 Tablet by mouth daily as needed for PRN Reason (Other) (for any thrush as needed.) for up to 7 days.  FDA advises cautious prescribing of oral fluconazole in pregnancy. Qty: 5 Tablet Refills: 0  Start date: 10/7/2022, End date: 10/14/2022    doxycycline (VIBRA-TABS) 100 mg tablet  Take 1 Tablet by mouth every twelve (12) hours. Qty: 10 Tablet Refills: 0  Start date: 10/7/2022    albuterol (PROVENTIL HFA, VENTOLIN HFA, PROAIR HFA) 90 mcg/actuation inhaler  Take 2 Puffs by inhalation every six (6) hours as needed for Wheezing or Shortness of Breath for up to 20 days. Qty: 18 g Refills: 0  Start date: 10/7/2022, End date: 10/27/2022      CONTINUE these medications which have NOT CHANGED    morphine IR (MS IR) 15 mg tablet  Take 1 Tablet by mouth every six (6) hours as needed for Pain for up to 15 days. Max Daily Amount: 60 mg.  Qty: 60 Tablet Refills: 0  Start date: 10/4/2022, End date: 10/19/2022  Associated Diagnoses:Diffuse large B-cell lymphoma of lymph nodes of multiple regions [facility]); Right hip pain    ALPRAZolam (XANAX) 1 mg tablet  Take 1 Tablet by mouth two (2) times daily as needed for Anxiety for up to 15 days. Max Daily Amount: 2 mg. Qty: 30 Tablet Refills: 0  Start date: 10/4/2022, End date: 10/19/2022  Associated Diagnoses:Anxiety; Diffuse large B-cell lymphoma of lymph nodes of multiple regions (HCC)    nicotine (NICODERM CQ) 21 mg/24 hr  1 Patch by TransDERmal route every twenty-four (24) hours for 30 days. Qty: 30 Patch Refills: 0    ondansetron (ZOFRAN ODT) 8 mg disintegrating tablet  Take 1 Tablet by mouth every eight (8) hours as needed for Nausea or Vomiting. Qty: 50 Tablet Refills: 1  Associated Diagnoses:Diffuse large B-cell lymphoma of lymph nodes of neck ([address] Bilious vomiting with nausea    prochlorperazine (COMPAZINE) 10 mg tablet  Take 1 Tablet by mouth every six (6) hours as needed for Nausea (do not take if groggy; take if nausea despite zofran).   Qty: 50 Tablet Refills: 1  Associated Diagnoses:Diffuse large B-cell lymphoma of lymph nodes of neck (HCC)    trimethoprim-sulfamethoxazole (BACTRIM DS, SEPTRA DS) 160-800 mg per tablet  Take 1 Tablet by mouth every Monday, Wednesday, Friday. Indications: prophylaxis treatment of cancer related infections  Qty: 15 Tablet Refills: 2    FLUoxetine (PROzac) 40 mg capsule  TAKE 1 CAPSULE BY MOUTH EVERY MORNING  Qty: 90 Capsule Refills: 1  Associated Diagnoses:Current moderate episode of major depressive disorder, unspecified whether recurrent (HCC)    amLODIPine-benazepril (LOTREL) 5-20 mg per capsule  TAKE 1 CAPSULE BY MOUTH EVERY MORNING  Qty: 90 Capsule Refills: 1  Associated Diagnoses:Essential hypertension          Time Spent on Discharge:  20 minutes were spent in patient examination, evaluation, counseling as well as medication reconciliation, prescriptions for required medications, discharge plan, and follow up.     Electronically signed by Mounika Padron MD on 10/7/22 at 4:37 PM

## 2022-10-07 NOTE — ROUTINE PROCESS
Bedside and Verbal shift change report given to [facility] B,RN(oncoming nurse) by Lilia Johnson (offgoing nurse). Report included the following information SBAR and Kardex.

## 2022-10-07 NOTE — PROGRESS NOTES
Problem: Pain  Goal: *Control of Pain  Outcome: Progressing Towards Goal  Goal: *PALLIATIVE CARE:  Alleviation of Pain  Outcome: Progressing Towards Goal     Problem: Patient Education: Go to Patient Education Activity  Goal: Patient/Family Education  Outcome: Progressing Towards Goal     Problem: Falls - Risk of  Goal: *Absence of Falls  Outcome: Progressing Towards Goal  Note: Fall Risk Interventions:  Mobility Interventions: Assess mobility with egress test, Communicate number of staff needed for ambulation/transfer    Mentation Interventions: Adequate sleep, hydration, pain control    Medication Interventions: Evaluate medications/consider consulting pharmacy    Elimination Interventions: Bed/chair exit alarm, Call light in reach, Patient to call for help with toileting needs    History of Falls Interventions: Bed/chair exit alarm         Problem: Patient Education: Go to Patient Education Activity  Goal: Patient/Family Education  Outcome: Progressing Towards Goal     Problem: Nutrition Deficit  Goal: *Optimize nutritional status  Outcome: Progressing Towards Goal

## 2022-10-08 LAB
BACTERIA SPEC CULT: NORMAL
GRAM STN SPEC: NORMAL
SERVICE CMNT-IMP: NORMAL

## 2022-10-10 ENCOUNTER — HOSPITAL ENCOUNTER (OUTPATIENT)
Dept: INFUSION THERAPY | Age: 67
End: 2022-10-10

## 2022-10-10 ENCOUNTER — TELEPHONE (OUTPATIENT)
Dept: ONCOLOGY | Age: 67
End: 2022-10-10

## 2022-10-10 ENCOUNTER — DOCUMENTATION ONLY (OUTPATIENT)
Dept: ONCOLOGY | Age: 67
End: 2022-10-10

## 2022-10-10 PROBLEM — C85.90 LYMPHOMA (HCC): Status: ACTIVE | Noted: 2022-10-10

## 2022-10-10 PROBLEM — U07.1 COVID: Status: ACTIVE | Noted: 2022-01-01

## 2022-10-10 LAB
M PNEUMO IGG SER IA-ACNC: <100 U/ML (ref 0–99)
M PNEUMO IGM SER IA-ACNC: <770 U/ML (ref 0–769)

## 2022-10-10 NOTE — ED NOTES
TRANSFER - OUT REPORT:    Verbal report given to Sasha HUERTA(name) on Luana Yadav  being transferred to Med Surg(unit) for routine progression of care       Report consisted of patients Situation, Background, Assessment and   Recommendations(SBAR). Information from the following report(s) SBAR, Kardex, ED Summary, Intake/Output, MAR and Recent Results was reviewed with the receiving nurse. Lines:   Venous Access Device rubin cath accessed with 1/5 inch funez needle 10/10/22 Upper chest (subclavicular area, right (Active)   Criteria for Appropriate Use Limited/no vessel suitable for conventional peripheral access 10/10/22 1433   Site Assessment Clean, dry, & intact 10/10/22 1433   Date of Last Dressing Change 10/10/22 10/10/22 1433   Dressing Status Clean, dry, & intact 10/10/22 1433   Dressing Type Disk with Chlorhexadine gluconate (CHG); Transparent 10/10/22 1433   Positive Blood Return (Medial Site) Yes 10/10/22 1433        Opportunity for questions and clarification was provided.       Patient transported with:   O2 @ 2 liters   IV

## 2022-10-10 NOTE — ED NOTES
Harjeet HUERTA attempted to establish US IV access. Unsuccesful at this time. [facility] team consulted at this time to establish IV access.

## 2022-10-10 NOTE — PROGRESS NOTES
Saw patient by the doorway with COVID precautions. The Zoom meeting would not work. I consented her verbally for filgrastim use. Full consult note to follow.

## 2022-10-10 NOTE — ED NOTES
Unable to access pt port due to not having correct needle. Have spoken with other floors attempting to locate correct needle. Waiting on one to be sent down to ED.

## 2022-10-10 NOTE — PROGRESS NOTES
Pt arrived to floor at approx 615pm.  Oriented to floor, VS taken, pt came up on RA was satting at 88%. Place 02 NC 3L on pt now satting between 91-93%. Physical assessment and swallow assessment done. New Guerrier and Sasha did CHG bath. Port with blood access, IV abx running at time of arrival. Dual skin assessment done with Johnnie at change of shift/bedside report. Pain 8/10, reviewed orders, provided pt with pain med. No other admission assessment done at this time due to time of arrival.    Bedside shift change report given to Janeen ''ERICH''  (oncoming nurse) by CAPRICE (offgoing nurse). Report included the following information SBAR, Kardex, OR Summary, and Procedure Summary.      Sasha HUERTA

## 2022-10-10 NOTE — ED TRIAGE NOTES
Pt to ED sent by cancer center for injection patient is to receive after chemo to boost immunity. Unable to receive injection at oncology center r/t covid. Pt tested positive 4 days ago, denies any complications r/t covid.  Received chemo last week

## 2022-10-10 NOTE — PROGRESS NOTES
George cath accessed with 1.5 inch funez, tolerated well. Dressed with large tegaderm.   Primary RN Benny Watts made aware    Genaro Chaudhary RN

## 2022-10-10 NOTE — H&P
[facility] [address] [place] [address]   Office [phone]  Fax [phone]       Admission H&P     Name: Mariely Lucas MRN: [id number]  Sex: Female   YOB: 1955  Age: 78 y.o. PCP: Brittni Garcia MD     Source of Information: patient, medical records    Chief complaint: COVID-19    History of Present Illness  Mariely Lucas is a 78 y.o. female with PMH of diffuse large B-cell lymphoma w/ bone mets, COPD, tobacco dependence, insomnia, MDD, anxiety, and drug-induced who presents to the ER complaining of:      A one-day h/o increased work of breathing and wheezing after being diagnosed with COVID-19 four days ago. The patient is currently getting scheduled injections of filgrastim for her B cell lymphoma at the hospital. She recently was discharged from the hospital after completing a course of chemotherapy infusions. She is being admitted due to concern for her infection. She has been using albuterol every six hours since yesterday. She has had some cough but it is non-productive. She also started doxycycline yesterday, and currently has taken only one dose. She otherwise does not take any medications for her COPD, and is on no steroids. She endorses significant fatigue and has had some nausea but this is c/w prior chemo infusions. She otherwise has no symptoms and denies fever, chills, diarrhea, light-headedness, congestion, sore throat, or loss of taste or smell. COVID Questions (Last 2 weeks): COVID Vaccine. Has received vaccine x2  Experiencing any of the following symptoms: no fever, no chills, yes cough, yes SOB, no diarrhea, no URI symptoms. Any Sick contacts with fever, cough, diarrhea, SOB, URI symptoms. No  Traveled out of state or out of country. No  Recent ED visits or hospitalizations.  Yes      ER Vitals/Labs/Imaging:   Vitals:  Patient Vitals for the past 8 hrs:   Temp Pulse Resp BP SpO2   10/10/22 1605 -- 89 -- -- 98 %   10/10/22 1532 -- 88 30 -- 96 %   10/10/22 1531 -- 88 30 -- 95 %   10/10/22 1525 -- 88 29 -- 94 %   10/10/22 1523 -- 88 30 -- 93 %   10/10/22 1508 -- -- -- -- 93 %   10/10/22 1453 -- -- -- -- 95 %   10/10/22 1438 -- -- -- -- 97 %   10/10/22 1423 -- -- -- -- 96 %   10/10/22 1408 -- 89 -- (!) 137/91 --   10/10/22 1338 -- -- -- -- 95 %   10/10/22 1300 -- 89 -- -- 96 %   10/10/22 1240 -- 87 -- -- 91 %   10/10/22 1155 -- -- -- -- 93 %   10/10/22 1145 -- 84 26 -- 94 %   10/10/22 1140 -- 82 30 (!) 149/81 --   10/10/22 0955 97.8 °F (36.6 °C) 89 18 92/66 92 %       Labs:   Recent Results (from the past 8 hour(s))   METABOLIC PANEL, COMPREHENSIVE    Collection Time: 10/10/22  2:39 PM   Result Value Ref Range    Sodium 137 136 - 145 mmol/L    Potassium 2.9 (L) 3.5 - 5.1 mmol/L    Chloride 100 97 - 108 mmol/L    CO2 31 21 - 32 mmol/L    Anion gap 6 5 - 15 mmol/L    Glucose 96 65 - 100 mg/dL    BUN 9 6 - 20 MG/DL    Creatinine 0.37 (L) 0.55 - 1.02 MG/DL    BUN/Creatinine ratio 24 (H) 12 - 20      eGFR >60 >60 ml/min/1.73m2    Calcium 8.2 (L) 8.5 - 10.1 MG/DL    Bilirubin, total 0.7 0.2 - 1.0 MG/DL    ALT (SGPT) 312 (H) 12 - 78 U/L    AST (SGOT) 75 (H) 15 - 37 U/L    Alk. phosphatase 44 (L) 45 - 117 U/L    Protein, total 5.2 (L) 6.4 - 8.2 g/dL    Albumin 2.7 (L) 3.5 - 5.0 g/dL    Globulin 2.5 2.0 - 4.0 g/dL    A-G Ratio 1.1 1.1 - 2.2     CBC WITH AUTOMATED DIFF    Collection Time: 10/10/22  2:39 PM   Result Value Ref Range    WBC 4.5 3.6 - 11.0 K/uL    RBC 3.34 (L) 3.80 - 5.20 M/uL    HGB 10.4 (L) 11.5 - 16.0 g/dL    HCT 30.9 (L) 35.0 - 47.0 %    MCV 92.5 80.0 - 99.0 FL    MCH 31.1 26.0 - 34.0 PG    MCHC 33.7 30.0 - 36.5 g/dL    RDW 15.2 (H) 11.5 - 14.5 %    PLATELET 62 (L) 262 - 400 K/uL    MPV 11.9 8.9 - 12.9 FL    NRBC 0.0 0  WBC    ABSOLUTE NRBC 0.00 0.00 - 0.01 K/uL    NEUTROPHILS 95 (H) 32 - 75 %    LYMPHOCYTES 3 (L) 12 - 49 %    MONOCYTES 0 (L) 5 - 13 %    EOSINOPHILS 0 0 - 7 %    BASOPHILS 0 0 - 1 %    IMMATURE GRANULOCYTES 2 (H) 0.0 - 0.5 %    ABS.  NEUTROPHILS 4. 3 1.8 - 8.0 K/UL    ABS. LYMPHOCYTES 0.1 (L) 0.8 - 3.5 K/UL    ABS. MONOCYTES 0.0 0.0 - 1.0 K/UL    ABS. EOSINOPHILS 0.0 0.0 - 0.4 K/UL    ABS. BASOPHILS 0.0 0.0 - 0.1 K/UL    ABS. IMM. GRANS. 0.1 (H) 0.00 - 0.04 K/UL    DF SMEAR SCANNED      RBC COMMENTS NORMOCYTIC, NORMOCHROMIC     LACTIC ACID    Collection Time: 10/10/22  2:39 PM   Result Value Ref Range    Lactic acid 0.5 0.4 - 2.0 MMOL/L   PROCALCITONIN    Collection Time: 10/10/22  2:39 PM   Result Value Ref Range    Procalcitonin <0.05 ng/mL   D DIMER    Collection Time: 10/10/22  4:45 PM   Result Value Ref Range    D-dimer 1.30 (H) 0.00 - 0.65 mg/L FEU     Imaging:   CXR Results  (Last 48 hours)                 10/10/22 1321  XR CHEST PORT Final result    Impression:  1. Slight interstitial prominence which is accentuated by technique. Findings   may represent vascular congestion and/or atypical infectious process. Narrative:  INDICATION: . covid, fatigue   Additional history:   COMPARISON: Previous chest xray, 10/10/2022. LIMITATIONS: Portable technique. Korin Stands FINDINGS: Single frontal view of the chest.    .   Lines/tubes/surgical: A port in the right chest has a catheter which projects to   terminate in the superior cava atrial junction. Heart/mediastinum: Calcifications in the aortic arch. Lungs/pleura: Interstitial prominence which is accentuated by technique. No   visualized pleural effusion or pneumothorax. Additional Comments: Partially imaged cervical fixation. .              CT Results  (Last 48 hours)      None             EKG: Rhythm strip with NSR    ED summary/course: CBC, CMP collected. O2 provided prn. Review of Systems   Constitutional:  Positive for fatigue. Negative for chills, diaphoresis and fever. HENT:  Negative for sinus pressure and sore throat. Respiratory:  Positive for cough, shortness of breath and wheezing. Negative for chest tightness and stridor.     Cardiovascular:  Negative for chest pain and palpitations. Gastrointestinal:  Positive for nausea. Negative for constipation, diarrhea and vomiting. Genitourinary:  Negative for dysuria and frequency. Musculoskeletal:  Negative for back pain and myalgias. Skin:  Negative for pallor and rash. Neurological:  Negative for dizziness and headaches. Psychiatric/Behavioral:  Negative for agitation and confusion. Home Medications   Prior to Admission medications    Medication Sig Start Date End Date Taking? Authorizing Provider   fluconazole (DIFLUCAN) 100 mg tablet Take 1 Tablet by mouth daily as needed for PRN Reason (Other) (for any thrush as needed.) for up to 7 days. FDA advises cautious prescribing of oral fluconazole in pregnancy. 10/7/22 10/14/22  Elyssa Rao MD   doxycycline (VIBRA-TABS) 100 mg tablet Take 1 Tablet by mouth every twelve (12) hours. 10/7/22   Elyssa Rao MD   albuterol (PROVENTIL HFA, VENTOLIN HFA, PROAIR HFA) 90 mcg/actuation inhaler Take 2 Puffs by inhalation every six (6) hours as needed for Wheezing or Shortness of Breath for up to 20 days. 10/7/22 10/27/22  Elyssa Rao MD   morphine IR (MS IR) 15 mg tablet Take 1 Tablet by mouth every six (6) hours as needed for Pain for up to 15 days. Max Daily Amount: 60 mg. 10/4/22 10/19/22  Dom Gonzalez MD   ALPRAZolam Norbert Files) 1 mg tablet Take 1 Tablet by mouth two (2) times daily as needed for Anxiety for up to 15 days. Max Daily Amount: 2 mg. 10/4/22 10/19/22  Dom Gonzalez MD   nicotine (NICODERM CQ) 21 mg/24 hr 1 Patch by TransDERmal route every twenty-four (24) hours for 30 days. 9/14/22 10/14/22  Irene Sims MD   ondansetron (ZOFRAN ODT) 8 mg disintegrating tablet Take 1 Tablet by mouth every eight (8) hours as needed for Nausea or Vomiting. 9/2/22   Elyssa Rao MD   prochlorperazine (COMPAZINE) 10 mg tablet Take 1 Tablet by mouth every six (6) hours as needed for Nausea (do not take if groggy; take if nausea despite zofran). 9/2/22   Balwinder Martin MD   trimethoprim-sulfamethoxazole (BACTRIM DS, SEPTRA DS) 160-800 mg per tablet Take 1 Tablet by mouth every Monday, Wednesday, Friday.  Indications: prophylaxis treatment of cancer related infections 8/24/22   Schoeneweis, Ann, NP   FLUoxetine (PROzac) 40 mg capsule TAKE 1 CAPSULE BY MOUTH EVERY MORNING 10/11/21   Tawanda Head MD   amLODIPine-benazepril (LOTREL) 5-20 mg per capsule TAKE 1 CAPSULE BY MOUTH EVERY MORNING 10/11/21   Tawanda Head MD       Allergies  Allergies   Allergen Reactions    Oxycodone Nausea Only       Past Medical History  Past Medical History:   Diagnosis Date    Adverse effect of anesthesia     PHLEGM IN THROAT POST OP & NEEDED X2 BREATHING TREATMENTS    Anxiety     COPD (chronic obstructive pulmonary disease) ([facility] [address] 2022    emphysema    Depression     GERD (gastroesophageal reflux disease)     Hernia, femoral     since childhood    Hypertension     Joint pain     Nausea & vomiting     TMJ arthritis         Previous Hospitalization(s)  Past Surgical History:   Procedure Laterality Date    HX CERVICAL FUSION  2012    C5-C6    HX CHOLECYSTECTOMY  2020    HX COLONOSCOPY      HX ENDOSCOPY      HX HYSTERECTOMY  2019    HX TONSILLECTOMY      AS A CHILD    HX WISDOM TEETH EXTRACTION      TEENAGER    IR INSERT TUNL CVC W PORT OVER 5 YEARS  4/29/2022        Family History  Family History   Problem Relation Age of Onset    Heart Disease Mother 46    Heart Surgery Mother         HEART TRANSPLANT    Elevated Lipids Mother     Hypertension Mother     COPD Father     Emphysema Father     Sudden Death Brother 46    Other Maternal Grandmother         BRAIN TUMOR    No Known Problems Son     No Known Problems Daughter         Social History  Social History     Socioeconomic History    Marital status:      Spouse name: Not on file    Number of children: Not on file    Years of education: Not on file    Highest education level: Not on file   Occupational History    Not on file   Tobacco Use    Smoking status: Some Days     Packs/day: 0.25     Years: 40.00     Pack years: 10.00     Types: Cigarettes    Smokeless tobacco: Never   Vaping Use    Vaping Use: Never used   Substance and Sexual Activity    Alcohol use: Not Currently     Alcohol/week: 0.0 standard drinks    Drug use: Never    Sexual activity: Not Currently     Partners: Male   Other Topics Concern    Not on file   Social History Narrative    Not on file     Social Determinants of Health     Financial Resource Strain: Not on file   Food Insecurity: Not on file   Transportation Needs: Not on file   Physical Activity: Not on file   Stress: Not on file   Social Connections: Not on file   Intimate Partner Violence: Not on file   Housing Stability: Not on file          Patient resides    Independently    x  With family care      Assisted living      SNF      Ambulates  x  Independently      With cane       Assisted walker      Alcohol history   x  None     Social     Chronic     Smoking history    None     Former smoker   x  Current smoker     Drug history  x  None     Former drug user     Current drug user     Code status    Full code   x  DNR/DNI     Partial      Code status discussed with the patient/caregivers. Vital Signs  Visit Vitals  BP (!) 137/91   Pulse 89   Temp 97.8 °F (36.6 °C)   Resp 30   SpO2 98%       Physical Exam  Constitutional:       General: She is not in acute distress. Appearance: Normal appearance. Cardiovascular:      Rate and Rhythm: Normal rate and regular rhythm. Pulses: Normal pulses. Heart sounds: Normal heart sounds. No murmur heard. No friction rub. Pulmonary:      Effort: Pulmonary effort is normal. Tachypnea present. Breath sounds: Examination of the right-upper field reveals wheezing. Examination of the left-upper field reveals wheezing. Examination of the right-middle field reveals wheezing and rhonchi.  Examination of the left-middle field reveals wheezing and rhonchi. Wheezing and rhonchi present. No decreased breath sounds or rales. Abdominal:      General: Abdomen is flat. Palpations: Abdomen is soft. Musculoskeletal:      Cervical back: Neck supple. No tenderness. Skin:     General: Skin is warm and dry. Capillary Refill: Capillary refill takes less than 2 seconds. Neurological:      Mental Status: She is alert. Laboratory Data  Recent Results (from the past 8 hour(s))   METABOLIC PANEL, COMPREHENSIVE    Collection Time: 10/10/22  2:39 PM   Result Value Ref Range    Sodium 137 136 - 145 mmol/L    Potassium 2.9 (L) 3.5 - 5.1 mmol/L    Chloride 100 97 - 108 mmol/L    CO2 31 21 - 32 mmol/L    Anion gap 6 5 - 15 mmol/L    Glucose 96 65 - 100 mg/dL    BUN 9 6 - 20 MG/DL    Creatinine 0.37 (L) 0.55 - 1.02 MG/DL    BUN/Creatinine ratio 24 (H) 12 - 20      eGFR >60 >60 ml/min/1.73m2    Calcium 8.2 (L) 8.5 - 10.1 MG/DL    Bilirubin, total 0.7 0.2 - 1.0 MG/DL    ALT (SGPT) 312 (H) 12 - 78 U/L    AST (SGOT) 75 (H) 15 - 37 U/L    Alk. phosphatase 44 (L) 45 - 117 U/L    Protein, total 5.2 (L) 6.4 - 8.2 g/dL    Albumin 2.7 (L) 3.5 - 5.0 g/dL    Globulin 2.5 2.0 - 4.0 g/dL    A-G Ratio 1.1 1.1 - 2.2     CBC WITH AUTOMATED DIFF    Collection Time: 10/10/22  2:39 PM   Result Value Ref Range    WBC 4.5 3.6 - 11.0 K/uL    RBC 3.34 (L) 3.80 - 5.20 M/uL    HGB 10.4 (L) 11.5 - 16.0 g/dL    HCT 30.9 (L) 35.0 - 47.0 %    MCV 92.5 80.0 - 99.0 FL    MCH 31.1 26.0 - 34.0 PG    MCHC 33.7 30.0 - 36.5 g/dL    RDW 15.2 (H) 11.5 - 14.5 %    PLATELET 62 (L) 226 - 400 K/uL    MPV 11.9 8.9 - 12.9 FL    NRBC 0.0 0  WBC    ABSOLUTE NRBC 0.00 0.00 - 0.01 K/uL    NEUTROPHILS 95 (H) 32 - 75 %    LYMPHOCYTES 3 (L) 12 - 49 %    MONOCYTES 0 (L) 5 - 13 %    EOSINOPHILS 0 0 - 7 %    BASOPHILS 0 0 - 1 %    IMMATURE GRANULOCYTES 2 (H) 0.0 - 0.5 %    ABS. NEUTROPHILS 4.3 1.8 - 8.0 K/UL    ABS. LYMPHOCYTES 0.1 (L) 0.8 - 3.5 K/UL    ABS. MONOCYTES 0.0 0.0 - 1.0 K/UL    ABS. EOSINOPHILS 0.0 0.0 - 0.4 K/UL    ABS. BASOPHILS 0.0 0.0 - 0.1 K/UL    ABS. IMM. GRANS. 0.1 (H) 0.00 - 0.04 K/UL    DF SMEAR SCANNED      RBC COMMENTS NORMOCYTIC, NORMOCHROMIC     LACTIC ACID    Collection Time: 10/10/22  2:39 PM   Result Value Ref Range    Lactic acid 0.5 0.4 - 2.0 MMOL/L   PROCALCITONIN    Collection Time: 10/10/22  2:39 PM   Result Value Ref Range    Procalcitonin <0.05 ng/mL   D DIMER    Collection Time: 10/10/22  4:45 PM   Result Value Ref Range    D-dimer 1.30 (H) 0.00 - 0.65 mg/L FEU           Assessment and Plan     Jessica Shah is a 78 y.o. female with PMH of diffuse large B-cell lymphoma w/ bone mets, COPD, tobacco dependence, insomnia, MDD, anxiety, and drug-induced who is admitted for filgrastim injections and management of COVID-19 infection. Melina Mckeon COVID-19 in s/o COPD: Patient with apparently stable COPD, on no home meds or oxygen at baseline. Over the last day the patient has been using albuterol every 6 hours for wheezing. Currently around 89-90% O2 on RA but with significant wheezing on exam. Patient is at high risk for decompensation. LA and procal negative. - Admit to telemetry  - Duo-nebs scheduled q4  - Brovana-Pulmicort bid  - Decadron 6 daily x5 days IV, additional 5 po  - Doxycycline 100 bid x5 days  - Supplemental O2 prn for sats <88%  - Daily CBC, CMP, D-dimer, LD, Ferritin     DLBCL: Follows with Dr. Elbert Fontenot. Currently undergoing DA-R-EPOCH chemotherapy and filgrastim injections. - Oncology consulted, appreciate rec's  - Daily filgrastim per onc  - TMP-SMX on MWF for infection ppx  - Zofran and compazine q6-8 hrs prn nausea  - Morphine 15 mg q6 hr prn    Thrombocytopenia: POA PLT 62. Likely 2/2 chemotherapy.    - With-hold VTE prophylaxis  - Daily CBC  - Oncology consulted, appreciate rec's     Hypertension: POA BP was 92/66.   - Continue home Amplodipine-benazepril 5-20 daily   - Will continue to monitor at this time and readjust as BP's trend    Hypokalemia: POA K 2.9.  - Replete prn  - Follow on daily BMP    MDD/RENETTA: Chronic, stable. - Home Xanax 1mg bid  - Home Prozac 40mg daily    Tobacco dependence: About 40 pack years. Currently smokes about 5 cigarettes per day. - Nicotine patch 21 daily      FEN/GI - Regular diet. No IVF. Activity - Ambulate as tolerated  DVT prophylaxis - SCDs  GI prophylaxis - Not indicated at this time  Fall prophylaxis - Not indicated at this time. Disposition - Admit to Telemetry. Plan to d/c to Home. Code Status - Full, discussed with patient / caregivers. Next of Kin Name and Contact Paresh Perera (Daughter) [phone]       Patient Reina Arm will be discussed with Dr. Maria Fernanda Esparza.     4:42 PM, 10/10/22  Linda Roman MD  Family Medicine Resident       For Billing    Chief Complaint   Patient presents with    Medication Problem       Hospital Problems  Date Reviewed: 9/23/2022            Codes Class Noted POA    Lymphoma [facility]) ICD-10-CM: C85.90  ICD-9-CM: 202.80  10/10/2022 Unknown        COVID ICD-10-CM: U07.1  ICD-9-CM: 079.89  10/10/2022 Unknown

## 2022-10-10 NOTE — PROGRESS NOTES
Patient called in saying she felt more fatigue,achiness all over, and worsening cough amid her COVID infection last week. She does not think she can come today to [facility] to receive her essential Neulasta shot after her 5th cycle of chemotherapy. She has to come to the ER for admission for her to receive Neupogen daily as an alternative. Simply without growth factor support she is at high risk for life-threatening illness. I called and spoke to ER NP Sylvia Murcia about patient and asked her or the hopsitalist to contact me about any questions regarding this need for hospitalization. Unfortunately, there does not seem to be an alternative if she cannot receive Neulasta as an outpatinet. She needs evaluated for admission due to her worsening course with COVID regardless. Please contact us if any new questions or concerns. Non-Urgent Matters: Call our clinic at [phone] during open clinic hours. Please note that TouchMail Messages may not be immediately returned. Urgent Matters: Call hospital  ([address] , or [facility]) at night or on weekends for questions that cannot wait until clinic opens. Emergency: Call 9-1-1.     Christiano Mcallister MD  Hematology/Medical Oncology Physician  [facility]  P: [phone]

## 2022-10-10 NOTE — TELEPHONE ENCOUNTER
Patient called and stated that she is not feeling well at all and that she has Covid and wanted to know if she can wait till tomorrow to come in and get her shot.   [phone]

## 2022-10-10 NOTE — ED PROVIDER NOTES
Ms. Rich Thorpe is a 74yo female who presents to the ER with complaints of COVID-19. She said that she was diagnosed with COVID-19 on Friday last week. She recently completed a course of chemotherapy for her lymphoma. She spoke to her oncologist recommended that she come to the ER for admission for further infusions. She said that she has been fatigued. She reports a cough. She reports pain in her chest and abdomen when she coughs. She denies any changes with her urine or bowel movements. She denies any other complaints.        Past Medical History:   Diagnosis Date    Adverse effect of anesthesia     PHLEGM IN THROAT POST OP & NEEDED X2 BREATHING TREATMENTS    Anxiety     COPD (chronic obstructive pulmonary disease) ([facility] [address] 2022    emphysema    Depression     GERD (gastroesophageal reflux disease)     Hernia, femoral     since childhood    Hypertension     Joint pain     Nausea & vomiting     TMJ arthritis        Past Surgical History:   Procedure Laterality Date    HX CERVICAL FUSION  2012    C5-C6    HX CHOLECYSTECTOMY  2020    HX COLONOSCOPY      HX ENDOSCOPY      HX HYSTERECTOMY  2019    HX TONSILLECTOMY      AS A CHILD    HX WISDOM TEETH EXTRACTION      TEENAGER    IR INSERT TUNL CVC W PORT OVER 5 YEARS  4/29/2022         Family History:   Problem Relation Age of Onset    Heart Disease Mother 46    Heart Surgery Mother         HEART TRANSPLANT    Elevated Lipids Mother     Hypertension Mother     COPD Father     Emphysema Father     Sudden Death Brother 46    Other Maternal Grandmother         BRAIN TUMOR    No Known Problems Son     No Known Problems Daughter        Social History     Socioeconomic History    Marital status:      Spouse name: Not on file    Number of children: Not on file    Years of education: Not on file    Highest education level: Not on file   Occupational History    Not on file   Tobacco Use    Smoking status: Some Days     Packs/day: 0.25     Years: 40.00     Pack years: 10.00 Types: Cigarettes    Smokeless tobacco: Never   Vaping Use    Vaping Use: Never used   Substance and Sexual Activity    Alcohol use: Not Currently     Alcohol/week: 0.0 standard drinks    Drug use: Never    Sexual activity: Not Currently     Partners: Male   Other Topics Concern    Not on file   Social History Narrative    Not on file     Social Determinants of Health     Financial Resource Strain: Not on file   Food Insecurity: Not on file   Transportation Needs: Not on file   Physical Activity: Not on file   Stress: Not on file   Social Connections: Not on file   Intimate Partner Violence: Not on file   Housing Stability: Not on file         ALLERGIES: Oxycodone    Review of Systems   Constitutional:  Positive for fatigue. Negative for chills and fever. HENT:  Negative for rhinorrhea and sore throat. Respiratory:  Positive for cough. Negative for shortness of breath. Cardiovascular:  Negative for chest pain. Gastrointestinal:  Negative for abdominal pain, diarrhea, nausea and vomiting. Genitourinary:  Negative for dysuria and urgency. Musculoskeletal:  Negative for arthralgias and back pain. Skin:  Negative for rash. Neurological:  Negative for dizziness, weakness and light-headedness. Vitals:    10/10/22 1338 10/10/22 1408 10/10/22 1423 10/10/22 1438   BP:  (!) 137/91     Pulse:  89     Resp:       Temp:       SpO2: 95%  96% 97%            Physical Exam     Vital signs reviewed. Nursing notes reviewed.     Const:  No acute distress, well developed, well nourished  Head:  Atraumatic, normocephalic  Eyes:  PERRL, conjunctiva normal, no scleral icterus  Neck:  Supple, trachea midline  Cardiovascular: Regular rate  Resp:  No resp distress, no increased work of breathing, coughing during the exam  Abd:  Soft, non-tender, non-distended  MSK:  No pedal edema, normal ROM  Neuro:  Alert and oriented x3, no cranial nerve defect  Skin:  Warm, dry, intact  Psych: normal mood and affect, behavior is normal, judgement and thought content is normal        MDM     Amount and/or Complexity of Data Reviewed  Clinical lab tests: ordered  Tests in the radiology section of CPT®: ordered and reviewed  Review and summarize past medical records: yes    Patient Progress  Patient progress: stable         Procedures    Perfect Serve Consult for Admission  3:15 PM    ED Room Number: ER14/14  Patient Name and age:  Nidia Booker 78 y.o.  female  Working Diagnosis:   1. COVID    2. Lymphoma, unspecified body region, unspecified lymphoma type ([facility] [address]        COVID-19 Suspicion:  no  Sepsis present:  no  Reassessment needed: no  Code Status:  Full Code  Readmission: no  Isolation Requirements:  no  Recommended Level of Care:  med/surg  Department:[facility] ED - [phone]  Other:  sent in by oncology for admission. Needs IV infusions for a few days. Note is in from the oncologist.      Ms. Elana Trent is a 74yo female with known covid-19 who presents to the ER with fatigue. She was unable to get the IV infusion from her oncologist, so she was sent in for admission. Labs are pending. There have been issues accessing her port and then getting an IV. Pt. Will be evaluated for admission by the family medicine team.   Pt. Signed out to Dr. Sarath Aparicio, who will follow-up on her labs.

## 2022-10-11 NOTE — PROGRESS NOTES
Bedside shift change report given to Desean Davis (oncoming nurse) by Joo Rico   (offgoing nurse). Report included the following information SBAR.

## 2022-10-11 NOTE — PROGRESS NOTES
10/11/22      Medicare Outpatient Observation Notice (MOON)/ [place] Outpatient Observation Notice (Alexis Tian) provided to patient received letter left attached outside of patients door. Patient unable to sign due to Contact Precautions tried calling patient to advised did not answer.

## 2022-10-11 NOTE — PROGRESS NOTES
26- FP made aware that patient did not have o2 on when RN went into room and oxygen was increased for patient to recover sats

## 2022-10-11 NOTE — CONSULTS
[facility] [place] [address]  [address] [facility], [address] [facility] [address] [facility]  Kimber Galen: [phone]  F: [phone] Patient ID  Name: Reina Stone  YOB: 1955  MRN: [id number]  Referring Provider:   No referring provider defined for this encounter. 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 This summary highlights focused aspects of patient's ongoing care and may have a recurring section in notes with either updates or remain unchanged as a longitudinal care summary.    -------------------------------------------------------------------------------------------------------------------------------------------------------------------------------------------------------  DIAGNOSIS:  Diffuse Large B-Cell Lymphoma     ORIGINAL STAGING:  Stage IV     SITES OF DISEASE:  Left Cervical Lymph Node,Bone Disease, Stage IV    CURRENT TREATMENT:  C1,D1 on 5/11/22 DA-R-EPOCH  C2  C3 delayed due to appetite issues and patient anxiety to restart therapy     Plan Name: OP R-CHOP   Treatment goal Curative   Provider Jt Childress MD   Status Inactive   Start Date    End Date    Treatment plan weight/height/BSA Weight type: Documented (effective on 4/27/2022), 94.1 kg (entered on 4/27/2022), 160 cm (entered on 4/27/2022), BSA 2.05 m2   Most recent weight/height/BSA Weight: 173 lb 8 oz (78.7 kg)    Height: 5' 3\" (1.6 m)    BSA (calculated - sq m): 1.86 sq meters      Treatment on clinical trial? [This plan is not part of a research study]   Reason for stopping treatment MD Discretion   Summary of Treatment Plan Medications DOXOrubicin (ADRIAMYCIN) chemo syringe 51.2 mg, 25 mg/m2 = 51.2 mg, IntraVENous, ONCE, 0 of 6 cycles  Dose modification: 25 mg/m2 (original dose 50 mg/m2, Cycle 1, Reason: Per Protocol, Comment: dose split into 2 syringes due to volume)    cyclophosphamide (CYTOXAN) 1,538 mg in 0.9% sodium chloride 250 mL chemo infusion, 750 mg/m2 = 1,538 mg, IntraVENous, ONCE, 0 of 6 cycles    vinCRIStine (VINCASAR PFS) 2 mg in 0.9% sodium chloride 50 mL chemo IVPB, 2 mg (100 % of original dose 2 mg), IntraVENous, ONCE, 0 of 6 cycles  Dose modification: 2 mg (original dose 2 mg, Cycle 1, Reason: Other)    riTUXimab-pvvr (RUXIENCE) 769 mg in 0.9% sodium chloride 769 mL infusion, 375 mg/m2 = 769 mg, IntraVENous, ONCE TITRATE, 0 of 1 cycle       Plan Name: [facility],    Treatment goal Curative   Provider Ferrell Boeck, MD   Status Active   Start Date 5/11/2022   End Date 10/30/2022 (Planned)   Treatment plan weight/height/BSA Weight type: Documented (effective on 8/17/2022), 79.5 kg (entered on 8/17/2022), 160 cm (entered on 8/5/2022), BSA 1.88 m2   Most recent weight/height/BSA Weight: 173 lb 8 oz (78.7 kg)    Height: 5' 3\" (1.6 m)    BSA (calculated - sq m): 1.86 sq meters      Treatment on clinical trial? [This plan is not part of a research study]   Reason for stopping treatment [Plan is still active]   Summary of Treatment Plan Medications cyclophosphamide (CYTOXAN) 1,537.6 mg in 0.9% sodium chloride 250 mL, overfill volume 25 mL chemo infusion, 750 mg/m2 = 1,538 mg, IntraVENous, ONCE, 5 of 6 cycles  Dose modification: 900 mg/m2 (original dose 750 mg/m2, Cycle 2, Reason: Per Protocol, Comment: ANC above 0.5), 600 mg/m2 (original dose 750 mg/m2, Cycle 3, Reason: Dose Not Tolerated), 600 mg/m2 (80 % of original dose 750 mg/m2, Cycle 4, Reason: Dose Not Tolerated)  Administration: 1,537.6 mg (5/15/2022), 1,809 mg (6/10/2022), 1,128 mg (8/22/2022), 1,128 mg (9/16/2022), 1,128 mg (10/7/2022)    etoposide (VEPESID) 102.6 mg in 0.9% sodium chloride 450 mL chemo infusion, 50 mg/m2 = 102.6 mg, IntraVENous, EVERY 24 HOURS, 5 of 6 cycles  Dose modification: 60 mg/m2 (original dose 50 mg/m2, Cycle 2, Reason: Per Protocol, Comment: ANC above 0.5), 40 mg/m2 (original dose 50 mg/m2, Cycle 3, Reason: Dose Not Tolerated), 40 mg/m2 (80 % of original dose 50 mg/m2, Cycle 4, Reason: Dose Not Tolerated)  Administration: 102.6 mg (5/11/2022), 102.6 mg (5/12/2022), 102.6 mg (5/13/2022), 102.6 mg (5/14/2022), 120.6 mg (6/6/2022), 120.6 mg (6/7/2022), 120.6 mg (6/8/2022), 120.6 mg (6/9/2022), 75.2 mg (8/18/2022), 75.2 mg (8/19/2022), 75.2 mg (8/20/2022), 75.2 mg (8/21/2022), 75.2 mg (9/12/2022), 75.2 mg (9/13/2022), 75.2 mg (9/14/2022), 75.2 mg (9/15/2022), 75.2 mg (10/3/2022), 75.2 mg (10/4/2022), 75.2 mg (10/5/2022), 75.2 mg (10/6/2022)    vinCRIStine (VINCASAR PFS) 0.8 mg, DOXOrubicin (ADRIAMYCIN) 20.6 mg in 0.9% sodium chloride 250 mL, overfill volume 25 mL chemo infusion, , IntraVENous, EVERY 24 HOURS, 5 of 6 cycles  Administration:  (5/11/2022),  (5/12/2022),  (5/13/2022),  (5/14/2022),  (6/6/2022),  (6/7/2022),  (6/8/2022),  (6/9/2022),  (8/18/2022),  (8/19/2022),  (8/20/2022),  (8/21/2022),  (9/12/2022),  (9/13/2022),  (9/14/2022),  (9/15/2022),  (10/3/2022),  (10/4/2022),  (10/5/2022),  (10/6/2022)    riTUXimab-pvvr (RUXIENCE) 769 mg in 0.9% sodium chloride 769 mL infusion, 375 mg/m2 = 769 mg, IntraVENous, ONCE TITRATE, 1 of 2 cycles  Administration: 769 mg (5/11/2022)    riTUXimab-pvvr (RUXIENCE) 754 mg in 0.9% sodium chloride 250 mL RAPID infusion, 375 mg/m2 = 754 mg, IntraVENous, ONCE TITRATE, 4 of 4 cycles  Administration: 754 mg (6/6/2022), 705 mg (8/18/2022), 705 mg (9/12/2022), 705 mg (10/3/2022)         PRIOR TREATMENT:  n/a     GOALS OF CARE:  Curative Intent     PATHOLOGY:  Specimen #:[id number] Collect: 4/22/2022      ==========================================================================                    * * *SURGICAL PATHOLOGY REPORT* * *   ==========================================================================   * * *PROCEDURES/ADDENDA* * *   ADDENDUM   Date Ordered: 4/26/2022 Status: Signed Out   Date Complete: 4/26/2022     By: Orin Carrillo. Anny Luo MD   Date Reported: 4/26/2022   Addendum Diagnosis   Lymph node, left posterior cervical lymph node, excision:   In situ hybridization for Jesús-Barr viral RNA: Negative   CPT: 29487  Addendum Comment   * * *CLINICAL HISTORY* * *   Cervical lymphadenopathy, rule out lymphoma   ==========================================================================   * * *FINAL PATHOLOGIC DIAGNOSIS* * *        Lymph node, left posterior cervical lymph node, excision:        Large B cell lymphoma. See comment. * * *Comment* * *   The histologic section has fragments of lymphoid tissue with diffuse and   focal follicular architecture. Diffuse areas are comprised of large,   atypical lymphocytes with centroblastic morphology and increased mitotic   activity. Immunohistochemical stains confirm the malignant cells are CD20   positive B cells that coexpress CD10 and BCL6 without MUM1. CD23   highlights rare follicular dendritic networks associated with lymphoid   follicles with germinal centers and express CD10 and BCL6 without BCL2. CyclinD1 and CD56 stains are negative. Concurrent flow cytometric analysis   confirms a clonal CD10 positive B-cell population comprised of medium to   large cells. The Ki-67 proliferation index is 30%. The overall findings favor the diagnosis of diffuse large B-cell lymphoma,   germinal center subtype. Molecular genetic studies are pending for further   characterization will be reported in an addendum. Flow cytometry:   Consistent with CD10-positive B-cell lymphoproliferative disorder. Comments: Flow cytometry shows monoclonal B-cells (36% of total cells)   with CD10 expression without CD5, consistent with a CD10-positive B-cell   lymphoproliferative disorder. The main differential diagnosis includes   follicular lymphoma, Burkitt lymphoma and large B-cell lymphoma.  Please   correlate the result with morphologic findings and clinical information. Flow Differential (%) and Population Analysis:   Lymphocytes: 93.7%   T-cells (39% of lymphoid cells) show a CD4/CD8 ratio of 3.3 without overt   phenotypic abnormality. NK-cells (1% of lymphoid cells) are unremarkable. Mature B-cells (56% of lymphoid cells) include large forms based on   forward scattered pattern and show: CD5 neg, CD10+, CD11c+dim, CD19+,   CD20+ with surface lambda light chain restriction. Markers Performed: CD2, CD3, CD4, CD5, CD7, CD8, CD10, CD11c, CD19, CD20,   CD23, CD34, CD38, CD45, CD56, Kappa, Lambda (17 Markers)   The Technical Component Processing of this test was completed at   [facility], [address] [place] [address], [place], [place] / [address] /   [phone] / Angelique Glover # [id number]. Interpretation by Alta Mustafa M.D. The   complete scanned report is available in Epic. CPT: C975041, R2082403, [id number], [id number], [id number], 1450936   [facility]2/2022   *Electronically Signed Out By Ambrosio Hendrix. Thomas Wilson MD*   ==========================================================================   * * *Gross Description* * *   Specimen #1, received fresh labeled with the patient's name,  and left   posterior cervical lymph node, consists of two paper towels containing a   1.7 x 1.5 x 0.5 cm aggregate of pale pink-tan, fleshy soft tissue   fragments. The specimen is handled according to the flow cytometry   protocol as follows: 7          Two air-dried touch prep slides - one Diff Quik stained, one   rapid-fixed in 95% alcohol   7          Representative sections in B plus fixative (1A)   7          One piece held in RPMI for possible flow cytometry analysis   7          Remaining tissue fixed in formalin for permanents (1B)   Dr. Thomas Wilson will determine if flow cytometry is necessary. 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  F who presents as an inpatient consultation for Lymphoma, COVID Positive, need for growth factor support. Patient called in today saying that she was too sick to take her Neulasta shot. She reports shortness of breath. Referred her to the ER. She did not think she could make it to the [facility] location for her neulasta shot and reported general decline since discharge last Friday which was Day 5 of her 5th cycle of treatment. .   Patient overall reports feeling worse. She reports a declines over the weekend. Past Medical History:   Diagnosis Date    Adverse effect of anesthesia     PHLEGM IN THROAT POST OP & NEEDED X2 BREATHING TREATMENTS    Anxiety     COPD (chronic obstructive pulmonary disease) ([facility] [address]     emphysema    Depression     GERD (gastroesophageal reflux disease)     Hernia, femoral     since childhood    Hypertension     Joint pain     Nausea & vomiting     TMJ arthritis      Review of Systems Provided by:  Patient  Review of Systems: A complete review of systems was obtained, reviewed. Pertinent findings reviewed above. Objective:      Visit Vitals  BP (!) 154/72 (BP 1 Location: Right upper arm, BP Patient Position: At rest)   Pulse 72   Temp 98 °F (36.7 °C)   Resp 20   SpO2 93%     ECO- Restricted in physically strenuous activity but ambulatory and able to carry out work of a light or sedentary nature, e.g., light house work, office work. Physical Exam  Constitutional: No acute distress but appears acutely ill though Non-toxic appearance. HENT: Normocephalic and atraumatic head. Alopecia. Skin: No jaundice. No rash. Neurological: Alert and oriented. No tremor on inspection. Psychiatric: mood normal. normal speech rate. normal affect. Due to this being a TeleHealth evaluation (During [facility]- public health emergency), many elements of the physical examination are unable to be assessed. Evaluation of the following organ systems was limited: Vitals/Constitutional/EENT/Resp/CV/GI//MS/Neuro/Skin/Heme-Lymph-Imm.  I had to abort the telehealth connection since it would not work so I spoke with and inspected Ms. Tiffany Perdue from the [address] [place] [address] while wearing an N-95 mask. Results:        Lab Results   Component Value Date/Time    WBC 4.5 10/10/2022 02:39 PM    HGB 10.4 (L) 10/10/2022 02:39 PM    HCT 30.9 (L) 10/10/2022 02:39 PM    PLATELET 62 (L) 02/91/9750 02:39 PM    MCV 92.5 10/10/2022 02:39 PM     Lab Results   Component Value Date/Time    Sodium 137 10/10/2022 02:39 PM    Potassium 2.9 (L) 10/10/2022 02:39 PM    Chloride 100 10/10/2022 02:39 PM    CO2 31 10/10/2022 02:39 PM    Anion gap 6 10/10/2022 02:39 PM    Glucose 96 10/10/2022 02:39 PM    BUN 9 10/10/2022 02:39 PM    Creatinine 0.37 (L) 10/10/2022 02:39 PM    BUN/Creatinine ratio 24 (H) 10/10/2022 02:39 PM    GFR est AA >60 10/03/2022 06:44 AM    GFR est non-AA >60 10/03/2022 06:44 AM    Calcium 8.2 (L) 10/10/2022 02:39 PM    Bilirubin, total 0.7 10/10/2022 02:39 PM    Alk. phosphatase 44 (L) 10/10/2022 02:39 PM    Protein, total 5.2 (L) 10/10/2022 02:39 PM    Albumin 2.7 (L) 10/10/2022 02:39 PM    Globulin 2.5 10/10/2022 02:39 PM    A-G Ratio 1.1 10/10/2022 02:39 PM    ALT (SGPT) 312 (H) 10/10/2022 02:39 PM    AST (SGOT) 75 (H) 10/10/2022 02:39 PM       Assessment and Recommendations: Active Hospital Problems    Diagnosis    COVID-19 infection  -suspected onset last Monday. Tested positive later in the week for worsening cough. -cannot go to [facility] outpatient; didn't think she could go to [address] [place] [address] but also has had general decline over the weekend.  -appreciate family medicine service in helping to manage patient's care. Metastatic cancer to bone [facility])  -reports diffuse achiness which  is different than her usual pain complaints. Diffuse large B-cell lymphoma of lymph nodes of neck (HCC)  -I discussed side effects of filgrastim with patinet and we reviewed similar side effect solo garcia to [employer].   We discussed risk of pain at injection site, bone pain, rare risk of anaphylaxis. -unclear if any issue if growth factor administered during a covid infection but patient is at high risk for sepsis. filgrastim-aafi (NIVESTYM) injection syringe 420 mcg *Partial syringe dose*, 5 mcg/kg, SubCUTAneous, DAILY, Jaja Sadler MD, 420 mcg at 10/10/22 1715      Thrombocytopenia-  PLT's at 62 likely secondary to chemo effect. Corresponded with Dr. Jodee Ch that consider holding any anticoagulation if PLT's are less than 50K. Please contact us if any new questions or concerns. Non-Urgent Matters: Call our clinic at [phone] during open clinic hours. Please note that [employer] Messages may not be immediately returned. Urgent Matters: Call hospital  ([address], or [facility]) at night or on weekends for questions that cannot wait until clinic opens. Emergency: Call 9-1-1. Maxime Haywood MD  Hematology/Medical Oncology Physician  [address]  P: [phone]         The patient was evaluated through a synchronous (real-time) audio-video encounter initially then converted to in person from the doorway. The patient (or guardian if applicable) is aware that this is a billable service. Verbal consent to proceed has been obtained within the past 12 months. The visit was conducted pursuant to the emergency declaration under the [facility], [address] [place] [address] [facility] authority and the [employer] Act. Patient identification was verified, and a caregiver  was present when appropriate. The patient was located in a state where the provider was credentialed to provide care.

## 2022-10-11 NOTE — PROGRESS NOTES
[facility] Progress Note: Antimicrobial Stewardship    Consult for antibiotic dosing of Vancomycin and Zosyn by Dr. Llanes Record  Indication: HAP - immunocompromised patient in the setting of chemotherapy (last chemo 10/3-10/7)  Day of Therapy: 1    Plan:  Vancomycin therapy:   Start with loading dose of vancomycin 2000 mg IV (25 mg/kg, max 2.5 gm) x 1 now   Follow with maintenance dose of vancomycin 1250 mg IV every 12 hours    Dose calculated to approximate a   Target AUC/AL of 400-600  Trough n/a  Plan:  Regimen above predicts  per Bayesian kinetics calculations. There is also a separate consult for baricitinib dosing from [facility]. Given patient's immunocompromised status and chemo and rituximab last week, holding off until heme/onc can weigh in. Pharmacy to follow daily and will make changes to dose and/or frequency based on clinical status. Non-Kinetic Antimicrobial Dosing:   Zosyn  Recommendation: Zosyn 4.5 gm IV x 1 now followed by Zosyn 3.375 gm IV Q8H extended-infusion for CrCl 78.5 mL/min  Dose administration notes: initial dosing    Other Antimicrobial  (not dosed by pharmacist)   Doxy 100 mg IV Q12H  Bactrim MWF (ppx)  Fluconazole PRN thrush   Cultures     10/10 Blood x 2 - ngtd   Serum Creatinine     Lab Results   Component Value Date/Time    Creatinine 0.39 (L) 10/10/2022 11:50 PM       Creatinine Clearance Estimated Creatinine Clearance: 78.5 mL/min (A) (by C-G formula based on SCr of 0.39 mg/dL (L)). Procalcitonin    Lab Results   Component Value Date/Time    Procalcitonin <0.05 10/10/2022 02:39 PM        Temp   97.9 °F (36.6 °C)    WBC   Lab Results   Component Value Date/Time    WBC 13.3 (H) 10/10/2022 11:50 PM       For Antifungals, Metronidazole and Nafcillin: Lab Results   Component Value Date/Time    ALT (SGPT) 309 (H) 10/10/2022 11:50 PM    AST (SGOT) 76 (H) 10/10/2022 11:50 PM    Alk.  phosphatase 46 10/10/2022 11:50 PM    Bilirubin, total 0.5 10/10/2022 11:50 PM Pharmacist: Signed [address] [place]

## 2022-10-11 NOTE — PROGRESS NOTES
10/11/2022   CARE MANAGEMENT NOTE:    Reason for Readmission:    COVID; also stage IV lymphoma         RUR Score/Risk Level:    N/A     PCP: First and Last name:  Dr. Devyn Fletcher   Name of Practice:    Are you a current patient: Yes/No:    Approximate date of last visit: 9/16/22   Can you participate in a virtual visit with your PCP:     Is a Care Conference indicated:  No      Did you attend your follow up appointment (s): If not, why not:  Pt was discharged from [facility] on 10/7 and readmitted on 10/10         Resources/supports as identified by patient/family:   Supportive family       Top Challenges facing patient (as identified by patient/family and CM):    Covid PNA complicated by metastatic cancer and immune compromised state    Finances/Medication cost?    Pt has [employer] for RX drug coverage and she obtains medications from Hers or lack thereof? Son, and dtr   Living arrangements? Pt lives with her son [phone])        Self-care/ADLs/Cognition? PTA, pt was ambulatory and indepn with ADLs         Current Advanced Directive/Advance Care Plan:  Full Code           Plan for utilizing home health:   Pt has used [facility] in the past    Readmission Assessment:  Pt was hospitalized at [facility] for a planned admission for chemo treatment from 10/3 - 10/7. Reportedly, pt resides with her son ([phone]). Her dtr Denita Zheng may be reached at ([phone]  She is ambulatory and indepn with ADLs. Pt had [facility] in the past for homecare. DME none. PCP is Dr. Devyn Fletcher whom she saw on 9/16/22. Transition of Care Plan:    Based on readmission, the patient's previous Plan of Care   has been evaluated and/or modified.  The current Transition of Care Plan is:    Hem/onc, Pulmonary are following for medical management   Plan is for pt to return home with her son  Home oxygen eval prior to discharge  Outpatient follow up  Family will transport pt home    CM will continue to follow pt until discharged.   Steve

## 2022-10-11 NOTE — PROGRESS NOTES
[facility] at [address], [address] [facility]  Nadia Flatness: [phone]  F: [phone] Patient ID  Name: Bridgette Keyes  YOB: 1955  MRN: [id number]  Referring Provider:   No referring provider defined for this encounter. 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 This summary highlights focused aspects of patient's ongoing care and may have a recurring section in notes with either updates or remain unchanged as a longitudinal care summary.    -------------------------------------------------------------------------------------------------------------------------------------------------------------------------------------------------------  DIAGNOSIS:  Diffuse Large B-Cell Lymphoma     ORIGINAL STAGING:  Stage IV     SITES OF DISEASE:  Left Cervical Lymph Node,Bone Disease, Stage IV    CURRENT TREATMENT:  C1,D1 on 5/11/22 DA-R-EPOCH  C2  C3 delayed due to appetite issues and patient anxiety to restart therapy     Plan Name: OP R-CHOP   Treatment goal Curative   Provider Michael Grant MD   Status Inactive   Start Date    End Date    Treatment plan weight/height/BSA Weight type: Documented (effective on 4/27/2022), 94.1 kg (entered on 4/27/2022), 160 cm (entered on 4/27/2022), BSA 2.05 m2   Most recent weight/height/BSA Weight: 173 lb 8 oz (78.7 kg)    Height: 5' 3\" (1.6 m)    BSA (calculated - sq m): 1.86 sq meters      Treatment on clinical trial? [This plan is not part of a research study]   Reason for stopping treatment MD Discretion   Summary of Treatment Plan Medications DOXOrubicin (ADRIAMYCIN) chemo syringe 51.2 mg, 25 mg/m2 = 51.2 mg, IntraVENous, ONCE, 0 of 6 cycles  Dose modification: 25 mg/m2 (original dose 50 mg/m2, Cycle 1, Reason: Per Protocol, Comment: dose split into 2 syringes due to volume)    cyclophosphamide (CYTOXAN) 1,538 mg in 0.9% sodium chloride 250 mL chemo infusion, 750 mg/m2 = 1,538 mg, IntraVENous, ONCE, 0 of 6 cycles    vinCRIStine (VINCASAR PFS) 2 mg in 0.9% sodium chloride 50 mL chemo IVPB, 2 mg (100 % of original dose 2 mg), IntraVENous, ONCE, 0 of 6 cycles  Dose modification: 2 mg (original dose 2 mg, Cycle 1, Reason: Other)    riTUXimab-pvvr (RUXIENCE) 769 mg in 0.9% sodium chloride 769 mL infusion, 375 mg/m2 = 769 mg, IntraVENous, ONCE TITRATE, 0 of 1 cycle       Plan Name: [facility],    Treatment goal Curative   Provider Toribio Gibson MD   Status Active   Start Date 5/11/2022   End Date 10/30/2022 (Planned)   Treatment plan weight/height/BSA Weight type: Documented (effective on 8/17/2022), 79.5 kg (entered on 8/17/2022), 160 cm (entered on 8/5/2022), BSA 1.88 m2   Most recent weight/height/BSA Weight: 173 lb 8 oz (78.7 kg)    Height: 5' 3\" (1.6 m)    BSA (calculated - sq m): 1.86 sq meters      Treatment on clinical trial? [This plan is not part of a research study]   Reason for stopping treatment [Plan is still active]   Summary of Treatment Plan Medications cyclophosphamide (CYTOXAN) 1,537.6 mg in 0.9% sodium chloride 250 mL, overfill volume 25 mL chemo infusion, 750 mg/m2 = 1,538 mg, IntraVENous, ONCE, 5 of 6 cycles  Dose modification: 900 mg/m2 (original dose 750 mg/m2, Cycle 2, Reason: Per Protocol, Comment: ANC above 0.5), 600 mg/m2 (original dose 750 mg/m2, Cycle 3, Reason: Dose Not Tolerated), 600 mg/m2 (80 % of original dose 750 mg/m2, Cycle 4, Reason: Dose Not Tolerated)  Administration: 1,537.6 mg (5/15/2022), 1,809 mg (6/10/2022), 1,128 mg (8/22/2022), 1,128 mg (9/16/2022), 1,128 mg (10/7/2022)    etoposide (VEPESID) 102.6 mg in 0.9% sodium chloride 450 mL chemo infusion, 50 mg/m2 = 102.6 mg, IntraVENous, EVERY 24 HOURS, 5 of 6 cycles  Dose modification: 60 mg/m2 (original dose 50 mg/m2, Cycle 2, Reason: Per Protocol, Comment: ANC above 0.5), 40 mg/m2 (original dose 50 mg/m2, Cycle 3, Reason: Dose Not Tolerated), 40 mg/m2 (80 % of original dose 50 mg/m2, Cycle 4, Reason: Dose Not Tolerated)  Administration: 102.6 mg (5/11/2022), 102.6 mg (5/12/2022), 102.6 mg (5/13/2022), 102.6 mg (5/14/2022), 120.6 mg (6/6/2022), 120.6 mg (6/7/2022), 120.6 mg (6/8/2022), 120.6 mg (6/9/2022), 75.2 mg (8/18/2022), 75.2 mg (8/19/2022), 75.2 mg (8/20/2022), 75.2 mg (8/21/2022), 75.2 mg (9/12/2022), 75.2 mg (9/13/2022), 75.2 mg (9/14/2022), 75.2 mg (9/15/2022), 75.2 mg (10/3/2022), 75.2 mg (10/4/2022), 75.2 mg (10/5/2022), 75.2 mg (10/6/2022)    vinCRIStine (VINCASAR PFS) 0.8 mg, DOXOrubicin (ADRIAMYCIN) 20.6 mg in 0.9% sodium chloride 250 mL, overfill volume 25 mL chemo infusion, , IntraVENous, EVERY 24 HOURS, 5 of 6 cycles  Administration:  (5/11/2022),  (5/12/2022),  (5/13/2022),  (5/14/2022),  (6/6/2022),  (6/7/2022),  (6/8/2022),  (6/9/2022),  (8/18/2022),  (8/19/2022),  (8/20/2022),  (8/21/2022),  (9/12/2022),  (9/13/2022),  (9/14/2022),  (9/15/2022),  (10/3/2022),  (10/4/2022),  (10/5/2022),  (10/6/2022)    riTUXimab-pvvr (RUXIENCE) 769 mg in 0.9% sodium chloride 769 mL infusion, 375 mg/m2 = 769 mg, IntraVENous, ONCE TITRATE, 1 of 2 cycles  Administration: 769 mg (5/11/2022)    riTUXimab-pvvr (RUXIENCE) 754 mg in 0.9% sodium chloride 250 mL RAPID infusion, 375 mg/m2 = 754 mg, IntraVENous, ONCE TITRATE, 4 of 4 cycles  Administration: 754 mg (6/6/2022), 705 mg (8/18/2022), 705 mg (9/12/2022), 705 mg (10/3/2022)         PRIOR TREATMENT:  n/a     GOALS OF CARE:  Curative Intent     PATHOLOGY:  Specimen #:[id number] Collect: 4/22/2022      ==========================================================================                    * * *SURGICAL PATHOLOGY REPORT* * *   ==========================================================================   * * *PROCEDURES/ADDENDA* * *   ADDENDUM   Date Ordered: 4/26/2022 Status: Signed Out   Date Complete: 4/26/2022     By: Joaquín Hummel MD   Date Reported: 4/26/2022   Addendum Diagnosis   Lymph node, left posterior cervical lymph node, excision:   In situ hybridization for Jesús-Barr viral RNA: Negative   CPT: 30053  Addendum Comment   * * *CLINICAL HISTORY* * *   Cervical lymphadenopathy, rule out lymphoma   ==========================================================================   * * *FINAL PATHOLOGIC DIAGNOSIS* * *        Lymph node, left posterior cervical lymph node, excision:        Large B cell lymphoma. See comment. * * *Comment* * *   The histologic section has fragments of lymphoid tissue with diffuse and   focal follicular architecture. Diffuse areas are comprised of large,   atypical lymphocytes with centroblastic morphology and increased mitotic   activity. Immunohistochemical stains confirm the malignant cells are CD20   positive B cells that coexpress CD10 and BCL6 without MUM1. CD23   highlights rare follicular dendritic networks associated with lymphoid   follicles with germinal centers and express CD10 and BCL6 without BCL2. CyclinD1 and CD56 stains are negative. Concurrent flow cytometric analysis   confirms a clonal CD10 positive B-cell population comprised of medium to   large cells. The Ki-67 proliferation index is 30%. The overall findings favor the diagnosis of diffuse large B-cell lymphoma,   germinal center subtype. Molecular genetic studies are pending for further   characterization will be reported in an addendum. Flow cytometry:   Consistent with CD10-positive B-cell lymphoproliferative disorder. Comments: Flow cytometry shows monoclonal B-cells (36% of total cells)   with CD10 expression without CD5, consistent with a CD10-positive B-cell   lymphoproliferative disorder. The main differential diagnosis includes   follicular lymphoma, Burkitt lymphoma and large B-cell lymphoma.  Please   correlate the result with morphologic findings and clinical information. Flow Differential (%) and Population Analysis:   Lymphocytes: 93.7%   T-cells (39% of lymphoid cells) show a CD4/CD8 ratio of 3.3 without overt   phenotypic abnormality. NK-cells (1% of lymphoid cells) are unremarkable. Mature B-cells (56% of lymphoid cells) include large forms based on   forward scattered pattern and show: CD5 neg, CD10+, CD11c+dim, CD19+,   CD20+ with surface lambda light chain restriction. Markers Performed: CD2, CD3, CD4, CD5, CD7, CD8, CD10, CD11c, CD19, CD20,   CD23, CD34, CD38, CD45, CD56, Kappa, Lambda (17 Markers)   The Technical Component Processing of this test was completed at   [facility], [address] [place] [address], [place], [place] / [id number] /   [phone] / Cristobal Hands # [id number]. Interpretation by Lexi Garcia M.D. The   complete scanned report is available in Epic. CPT: J3223363, P682941, X3450171, [id number], [id number], [id number]   [facility]2/2022   *Electronically Signed Out By Chacon Frandy. Kevin Robledo MD*   ==========================================================================   * * *Gross Description* * *   Specimen #1, received fresh labeled with the patient's name,  and left   posterior cervical lymph node, consists of two paper towels containing a   1.7 x 1.5 x 0.5 cm aggregate of pale pink-tan, fleshy soft tissue   fragments. The specimen is handled according to the flow cytometry   protocol as follows: 7          Two air-dried touch prep slides - one Diff Quik stained, one   rapid-fixed in 95% alcohol   7          Representative sections in B plus fixative (1A)   7          One piece held in RPMI for possible flow cytometry analysis   7          Remaining tissue fixed in formalin for permanents (1B)   Dr. Kevin Robledo will determine if flow cytometry is necessary. 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  F who presents as an inpatient consultation for Lymphoma, COVID Positive, need for growth factor support. Patient called in today saying that she was too sick to take her Neulasta shot. She reports shortness of breath. Referred her to the ER. She did not think she could make it to the [facility] location for her neulasta shot and reported general decline since discharge last Friday which was Day 5 of her 5th cycle of treatment. .   Patient overall reports feeling worse. She reports a declines over the weekend. Interval History:   Feeling poorly: appetite down; thrush noted. Denies pain with swallowing; just \"dry\" throat. Breathing stable. Some nausea controlled with medication. Bowels normal.   Shares son home with similar symptoms but has not tested for COVID. Past Medical History:   Diagnosis Date    Adverse effect of anesthesia     PHLEGM IN THROAT POST OP & NEEDED X2 BREATHING TREATMENTS    Anxiety     COPD (chronic obstructive pulmonary disease) ([facility] [address]     emphysema    Depression     GERD (gastroesophageal reflux disease)     Hernia, femoral     since childhood    Hypertension     Joint pain     Nausea & vomiting     TMJ arthritis      Review of Systems Provided by:  Patient  Review of Systems: A complete review of systems was obtained, reviewed. Pertinent findings reviewed above. Objective:      Visit Vitals  /70 (BP 1 Location: Right upper arm, BP Patient Position: At rest)   Pulse 87   Temp 97.9 °F (36.6 °C)   Resp 20   SpO2 96%     ECO- Restricted in physically strenuous activity but ambulatory and able to carry out work of a light or sedentary nature, e.g., light house work, office work.   General: no distress  Respiratory: normal respiratory effort  CV: no peripheral edema  Skin: no rashes; no ecchymoses; no petechiae  Psych: alert, oriented, normal mood/affect     Results:        Lab Results   Component Value Date/Time    WBC 13.3 (H) 10/10/2022 11:50 PM    HGB 10.3 (L) 10/10/2022 11:50 PM    HCT 30.3 (L) 10/10/2022 11:50 PM    PLATELET 58 (L) 70/33/9492 11:50 PM    MCV 91.5 10/10/2022 11:50 PM     Lab Results   Component Value Date/Time    Sodium 138 10/10/2022 11:50 PM    Potassium 3.3 (L) 10/10/2022 11:50 PM    Chloride 103 10/10/2022 11:50 PM    CO2 28 10/10/2022 11:50 PM    Anion gap 7 10/10/2022 11:50 PM    Glucose 127 (H) 10/10/2022 11:50 PM    BUN 9 10/10/2022 11:50 PM    Creatinine 0.39 (L) 10/10/2022 11:50 PM    BUN/Creatinine ratio 23 (H) 10/10/2022 11:50 PM    GFR est AA >60 10/03/2022 06:44 AM    GFR est non-AA >60 10/03/2022 06:44 AM    Calcium 8.3 (L) 10/10/2022 11:50 PM    Bilirubin, total 0.5 10/10/2022 11:50 PM    Alk. phosphatase 46 10/10/2022 11:50 PM    Protein, total 5.3 (L) 10/10/2022 11:50 PM    Albumin 2.8 (L) 10/10/2022 11:50 PM    Globulin 2.5 10/10/2022 11:50 PM    A-G Ratio 1.1 10/10/2022 11:50 PM    ALT (SGPT) 309 (H) 10/10/2022 11:50 PM    AST (SGOT) 76 (H) 10/10/2022 11:50 PM       Assessment and Recommendations: Active Hospital Problems    Diagnosis    COVID-19 infection  -suspected onset last Monday/ worsening cough. 10/6 Tested positive  -management per primary team      Metastatic cancer to bone [facility])  -reports diffuse achiness which  is different than her usual pain complaints. Diffuse large B-cell lymphoma of lymph nodes of neck ([facility] [address]  -completed C5 da-REPOCH on 10/7/22 due for C6 on 10/24.  -unclear if any issue if growth factor administered during a covid infection but patient is at high risk for sepsis. -filgrastim-aafi (NIVESTYM) injection syringe 420 mcg x 3 days; will cont to monitor CBC with understanding counts will altered due to steroids. Thrombocytopenia-   likely secondary to chemo effect. Recommend holding any anticoagulation if PLT's are less than 50K. Continue to monitor daily      Thrush: diflucan daily   Nutrition: poor oral intake: encouraged fluids and to eat soft foods; added supplemental drinks TID.      Plan reviewed with Dr Eric

## 2022-10-11 NOTE — ROUTINE PROCESS
Bedside and Verbal shift change report given to Earnest Baldwin (oncoming nurse) by Irvin Vigil RN  (offgoing nurse). Report included the following information SBAR, Kardex, Procedure Summary, MAR, and Recent Results.

## 2022-10-11 NOTE — PROGRESS NOTES
[address] [address]   Office [phone]  Fax [phone]          Subjective / Objective     24 Hour Events: Patient desat to 88%, found to be off of NC, placed back on 2L with good response. Subjective  Patient denies significant chest pain, difficulty breathing, or pain with breathing this morning. Overall, feels about the same or a bit better than yesterday. Visit Vitals  /70 (BP 1 Location: Right upper arm, BP Patient Position: At rest)   Pulse 87   Temp 97.9 °F (36.6 °C)   Resp 20   SpO2 96%     General: No acute distress. Sleeping but able to awaken for brief history. On 4L O2 NC  Head: Normocephalic. Atraumatic. Neck: Normal ROM. No stiffness. Respiratory: Mild exp wheezing L>R. No increased work of breathing. On 4L NC. Slightly diminished airflow throughout  Cardiovascular: RRR. Normal S1,S2. No m/r/g.   GI: + bowel sounds. Nontender. No rebound tenderness or guarding. Nondistended. Extremities:  no LE edema. Skin: Warm, dry. I/O:  Date 10/10/22 0700 - 10/11/22 0659 10/11/22 0700 - 10/12/22 0659   Shift [id number] 0669-2193 24 Hour Total 0266-2487 4697-1292 24 Hour Total   INTAKE   P.O.  120 120        P. O.  120 120      I. V.  0 0        I.V.  0 0      Blood  0 0        Autotransfused  0 0      Other  0 0        Other  0 0      Shift Total  120 120      OUTPUT   Urine           Urine Occurrence(s)  1 x 1 x      Emesis/NG output           Emesis Occurrence(s)  0 x 0 x      Stool           Stool Occurrence(s)  0 x 0 x      Shift Total         NET  120 120      Weight (kg)             CBC:  Recent Labs     10/10/22  2350 10/10/22  1439   WBC 13.3* 4.5   HGB 10.3* 10.4*   HCT 30.3* 30.9*   PLT 58* 62*       Metabolic Panel:  Recent Labs     10/10/22  2350 10/10/22  1439    137   K 3.3* 2.9*    100   CO2 28 31   BUN 9 9   CREA 0.39* 0.37*   * 96   CA 8.3* 8.2*   ALB 2.8* 2.7*   * 312*           Assessment and Plan       Ozzie Mistry is a 78 y.o. female with PMH of diffuse large B-cell lymphoma w/ bone mets, COPD, tobacco dependence, insomnia, MDD, and anxiety who is admitted for filgrastim injections and management of COVID-19 infection with symptoms starting 10/7. Currently on steroids, requiring oxygen supplementation, and on abx for bacterial PNA. AHRF in setting of COVID-19 with superimposed bacterial PNA: Hx of COPD with no home O2. Using albuterol more frequently recently. LA and procal negative. Elevated inflammatory markers, with O2 requirement, meets criteria for baracitinib but unable to receive due to chemotherapy and elevated LFT's at baseline, based on multi-specialist decision-making. CT concerning for bacterial PNA. Adding further abx coverage today:  - Doxycycline 100 bid x5 days (10/10-)  - Vanc and Zosyn (10/11-)  - Duo-nebs scheduled q4  - Brovana-Pulmicort bid  - Decadron 6 daily x5 days IV, additional 5 po  - Supplemental O2 prn for sats <88%  - Daily CBC, CMP, D-dimer, LD, Ferritin  - Pulm consulted and following, appreciate rec's     DLBCL: Follows with Dr. Everett Randall. Currently undergoing DA-R-EPOCH chemotherapy and filgrastim injections. - Oncology consulted, appreciate rec's  - Daily filgrastim per onc  - TMP-SMX on MWF for infection ppx  - Zofran and compazine q6-8 hrs prn nausea  - Morphine 15 mg q6 hr prn     Thrombocytopenia: POA PLT 62. Likely 2/2 chemotherapy. - Hold any AC with plt <50  - Daily CBC  - Oncology consulted, appreciate rec's      Hypertension: POA BP was 92/66.   - Continue home Amplodipine-benazepril 5-20 daily   - Will continue to monitor at this time and readjust as BP's trend     Hypokalemia: POA K 2.9. 3.3 today  - Replete prn  - Follow on daily BMP     MDD/RENETTA: Chronic, stable. - Home Xanax 1mg bid  - Home Prozac 40mg daily     Tobacco dependence: About 40 pack years. Currently smokes about 5 cigarettes per day. - Nicotine patch 21 daily     FEN/GI - Regular diet. No IVF.   Activity - Ambulate as tolerated  DVT prophylaxis - SCDs  GI prophylaxis - Not indicated at this time  Fall prophylaxis - Not indicated at this time. Disposition - Admit to Telemetry. Plan to d/c to Home. Code Status - Full, discussed with patient / caregivers.   Next of Kin Name and Contact Priyank Up (Daughter) [phone]     Subhash Sim DO  Family Medicine Resident       For Billing    Chief Complaint   Patient presents with    Medication Problem       Hospital Problems  Date Reviewed: 9/23/2022            Codes Class Noted POA    Lymphoma [facility]) ICD-10-CM: C85.90  ICD-9-CM: 202.80  10/10/2022 Unknown        COVID ICD-10-CM: U07.1  ICD-9-CM: 079.89  10/10/2022 Unknown

## 2022-10-11 NOTE — CONSULTS
[facility]     Name: Radha Sanchez MRN: [id number]   : 1955 Hospital: [address]   Date: 10/11/2022        Impression Plan   Acute respiratory failure  Hypoxia  COVID 19 PNA with questionable superimposed bacterial PNA  Lymphoma s/p recent R-CHOP  Hx of tobacco abuse               CT chest not necessarily consistent with COVID 19 PNA and concerning for bacterial PNA  Wean O2 to keep sats above 90%  Continue dexamethasone  Continue doxy  Add zosyn and vanc  OOB into chair  Prognosis guarded           Radiology  ( personally reviewed) CT chest reviewed: bilateral peripheral infiltrates, no PE   ABG No results for input(s): PHI, PO2I, PCO2I in the last 72 hours. Subjective     Cc: shortness of breath    79 yo with PMHx of lymphoma presenting with increasing SOB for the last 3 days. Was admitted last week for R-CHOP chemotherapy. Her son was COVID 19 positive last week. Pt is COVID 19 positive in ER. She has had the first series of mRNA vaccine but none of the boosters. Currently on 4 liters of O2 and dyspnic. WBC 13, neutrophil predominant. Bilateral infiltrates on CT chest.     Review of Systems:  A comprehensive review of systems was negative except for that written in the HPI.     Past Medical History:   Diagnosis Date    Adverse effect of anesthesia     PHLEGM IN THROAT POST OP & NEEDED X2 BREATHING TREATMENTS    Anxiety     COPD (chronic obstructive pulmonary disease) ([address]     emphysema    Depression     GERD (gastroesophageal reflux disease)     Hernia, femoral     since childhood    Hypertension     Joint pain     Nausea & vomiting     TMJ arthritis       Past Surgical History:   Procedure Laterality Date    HX CERVICAL FUSION      C5-C6    HX CHOLECYSTECTOMY      HX COLONOSCOPY      HX ENDOSCOPY      HX HYSTERECTOMY  2019    HX TONSILLECTOMY      AS A CHILD    HX WISDOM TEETH EXTRACTION      TEENAGER    IR INSERT TUNL CVC W PORT OVER 5 YEARS 4/29/2022      Prior to Admission medications    Medication Sig Start Date End Date Taking? Authorizing Provider   fluconazole (DIFLUCAN) 100 mg tablet Take 1 Tablet by mouth daily as needed for PRN Reason (Other) (for any thrush as needed.) for up to 7 days. FDA advises cautious prescribing of oral fluconazole in pregnancy. 10/7/22 10/14/22  Chasidy Ramos MD   doxycycline (VIBRA-TABS) 100 mg tablet Take 1 Tablet by mouth every twelve (12) hours. 10/7/22   Chasidy Ramos MD   albuterol (PROVENTIL HFA, VENTOLIN HFA, PROAIR HFA) 90 mcg/actuation inhaler Take 2 Puffs by inhalation every six (6) hours as needed for Wheezing or Shortness of Breath for up to 20 days. 10/7/22 10/27/22  Chasidy Ramos MD   morphine IR (MS IR) 15 mg tablet Take 1 Tablet by mouth every six (6) hours as needed for Pain for up to 15 days. Max Daily Amount: 60 mg. 10/4/22 10/19/22  Lashonda Patricia MD   ALPRAZolam Latrelle Bible) 1 mg tablet Take 1 Tablet by mouth two (2) times daily as needed for Anxiety for up to 15 days. Max Daily Amount: 2 mg. 10/4/22 10/19/22  Lashonda Patricia MD   nicotine (NICODERM CQ) 21 mg/24 hr 1 Patch by TransDERmal route every twenty-four (24) hours for 30 days. 9/14/22 10/14/22  Светлана Lechuga MD   ondansetron (ZOFRAN ODT) 8 mg disintegrating tablet Take 1 Tablet by mouth every eight (8) hours as needed for Nausea or Vomiting. 9/2/22   Chasidy Ramos MD   prochlorperazine (COMPAZINE) 10 mg tablet Take 1 Tablet by mouth every six (6) hours as needed for Nausea (do not take if groggy; take if nausea despite zofran). 9/2/22   Chasidy Ramos MD   trimethoprim-sulfamethoxazole (BACTRIM DS, SEPTRA DS) 160-800 mg per tablet Take 1 Tablet by mouth every Monday, Wednesday, Friday.  Indications: prophylaxis treatment of cancer related infections 8/24/22   Schoeneweis, Ann, NP   FLUoxetine (PROzac) 40 mg capsule TAKE 1 CAPSULE BY MOUTH EVERY MORNING 10/11/21   Rashid Ch MD   amLODIPine-benazepril (Gaye Dick) 5-20 mg per capsule TAKE 1 CAPSULE BY MOUTH EVERY MORNING 10/11/21   Kyle Lopez MD     Current Facility-Administered Medications   Medication Dose Route Frequency    melatonin tablet 6 mg  6 mg Oral NOW    potassium bicarb-citric acid (EFFER-K) tablet 20 mEq  20 mEq Oral NOW    potassium chloride SR (KLOR-CON 10) tablet 20 mEq  20 mEq Oral NOW    lisinopriL (PRINIVIL, ZESTRIL) tablet 20 mg  20 mg Oral DAILY    ipratropium-albuterol (COMBIVENT RESPIMAT) 20 mcg-100 mcg inhalation spray  1 Puff Inhalation Q6H RT    sodium chloride (NS) flush 5-40 mL  5-40 mL IntraVENous Q8H    filgrastim-aafi (NIVESTYM) injection syringe 420 mcg *Partial syringe dose*  5 mcg/kg SubCUTAneous DAILY    doxycycline (VIBRAMYCIN) 100 mg in 0.9% sodium chloride (MBP/ADV) 100 mL MBP  100 mg IntraVENous Q12H    dexamethasone (PF) (DECADRON) 10 mg/mL injection 6 mg  6 mg IntraVENous Q24H    amLODIPine (NORVASC) tablet 5 mg  5 mg Oral DAILY    FLUoxetine (PROzac) capsule 40 mg  40 mg Oral DAILY    trimethoprim-sulfamethoxazole (BACTRIM DS, SEPTRA DS) 160-800 mg per tablet 1 Tablet  1 Tablet Oral Q MON, WED & FRI    mometasone-formoterol (DULERA) 200mcg-5mcg/puff  2 Puff Inhalation BID RT    nicotine (NICODERM CQ) 21 mg/24 hr patch 1 Patch  1 Patch TransDERmal DAILY     Allergies   Allergen Reactions    Oxycodone Nausea Only      Social History     Tobacco Use    Smoking status: Some Days     Packs/day: 0.25     Years: 40.00     Pack years: 10.00     Types: Cigarettes    Smokeless tobacco: Never   Substance Use Topics    Alcohol use: Not Currently     Alcohol/week: 0.0 standard drinks      Family History   Problem Relation Age of Onset    Heart Disease Mother 46    Heart Surgery Mother         HEART TRANSPLANT    Elevated Lipids Mother     Hypertension Mother     COPD Father     Emphysema Father     Sudden Death Brother 46    Other Maternal Grandmother         BRAIN TUMOR    No Known Problems Son     No Known Problems Daughter Laboratory: I have personally reviewed the critical care flowsheet and labs. Recent Labs     10/10/22  2350 10/10/22  1439   WBC 13.3* 4.5   HGB 10.3* 10.4*   HCT 30.3* 30.9*   PLT 58* 62*     Recent Labs     10/10/22  2350 10/10/22  1439    137   K 3.3* 2.9*    100   CO2 28 31   * 96   BUN 9 9   CREA 0.39* 0.37*   CA 8.3* 8.2*   ALB 2.8* 2.7*   * 312*       Objective:     Mode Rate Tidal Volume Pressure FiO2 PEEP                    Vital Signs:     TMAX(24)      Intake/Output:   Last shift:         Last 3 shifts: No intake/output data recorded. [id number]  Intake/Output Summary (Last 24 hours) at 10/11/2022 0850  Last data filed at 10/10/2022 2200  Gross per 24 hour   Intake 120 ml   Output --   Net 120 ml     EXAM:   GENERAL: well developed and in mild distress, HEENT:  PERRL, EOMI, no alar flaring or epistaxis, oral mucosa moist without cyanosis, NECK:  no jugular vein distention, no retractions, no thyromegaly or masses, LUNGS: scant rales bilaterally , HEART:  Regular rate and rhythm with no MGR; no edema is present, ABDOMEN:  soft with no tenderness, bowel sounds present, EXTREMITIES:  warm with no cyanosis, SKIN:  no jaundice or ecchymosis, and NEUROLOGIC:  alert and oriented, grossly non-focal    Jose Butler MD  [facility]

## 2022-10-11 NOTE — PROGRESS NOTES
1530  Bedside shift change report given to Marycarmen Gonzalez RN (oncoming nurse) by Lesia Erickson RN (offgoing nurse). Report included the following information SBAR, Kardex, Intake/Output, MAR, and Recent Results.

## 2022-10-12 NOTE — DISCHARGE INSTRUCTIONS
HOME DISCHARGE INSTRUCTIONS    Judithe Dance / [id number] : 1955    Admission date: 10/10/2022 Discharge date: 10/12/2022 9:45 AM     Please bring this form with you to show your care provider at your follow-up appointment. Primary care provider:  Heather Alcala MD    Discharging provider:  Joann Vazquez DO  - Family Medicine Resident  Mateo Pettit MD - Family Medicine Attending      You have been admitted to the hospital with the following diagnoses:    ACUTE DIAGNOSES:  COVID [U07.1]  Lymphoma ([facility] [address] [C85.90]  Acute respiratory failure with hypoxia ([facility] [J96.01]      MEDICATION CHANGES  START      STOP      CHANGES      No other changes were made to your medications, please take all your other home medicines as previously prescribed. . . . . . . . . . . . . . . . . . . . . . . . . . . . . . . . . . . . . . . . . . . . . . . . . . . . . . . . . . . . . . . . . . . . . . . . Cloteal Campbell FOLLOW-UP CARE RECOMMENDATIONS:    Appointments  [unfilled]       Follow-up tests needed: ***    Pending test results: At the time of your discharge the following test results are still pending: ***. Please make sure you review these results with your outpatient follow-up provider(s). DIET/what to eat:  {diet:15422}    ACTIVITY:  {discharge activity:86744}    Wound care: ***    Equipment needed:  ***    Specific symptoms to watch for: chest pain, shortness of breath, fever, chills, nausea, vomiting, diarrhea, change in mentation, falling, weakness, bleeding. What to do if new or unexpected symptoms occur? If you experience any of the above symptoms (or should other concerns or questions arise after discharge) please call your primary care physician. Return to the emergency room if you cannot get hold of your doctor. It is very important that you keep your follow-up appointment(s).   Please bring discharge papers, medication list (and/or medication bottles) to your follow-up appointments for review by your outpatient provider(s). Please check the list of medications and be sure it includes every medication (even non-prescription medications) that your provider wants you to take. It is important that you take the medication exactly as they are prescribed. Keep your medication in the bottles provided by the pharmacist and keep a list of the medication names, dosages, and times to be taken in your wallet. Do not take other medications without consulting your doctor. If you have any questions about your medications or other instructions, please talk to your nurse or care provider before you leave the hospital.     Information obtained by:     I understand that if any problems occur once I am at home I am to contact my physician. These instructions were explained to me and I had the opportunity to ask questions. I understand and acknowledge receipt of the instructions indicated above.                                                                                                                                                Physician's or R.N.'s Signature                                                                  Date/Time                                                                                                                                              Patient or Representative Signature                                                          Date/Time

## 2022-10-12 NOTE — PROGRESS NOTES
[facility]     Name: Jannine Epley MRN: [id number]   : 1955 Hospital: [address]   Date: 10/12/2022        Impression Plan   Acute hypoxic respiratory failure  COVID 19 PNA with questionable superimposed bacterial PNA  Lymphoma s/p recent R-CHOP  Thrombocytopenia  Hx of tobacco abuse               CT chest not necessarily consistent with COVID 19 PNA and concerning for bacterial PNA  Wean O2 to keep sats above 90%  Continue dexamethasone  Continue doxy, zosyn, and vanc  OOB into chair  Holding anticoagulation           Radiology  (personally reviewed) CT chest reviewed: bilateral peripheral infiltrates, no PE       Subjective     Cc: shortness of breath    79 yo with PMHx of lymphoma presenting with increasing SOB for the last 3 days. Was admitted last week for R-CHOP chemotherapy. Her son was COVID 19 positive last week. Pt is COVID 19 positive in ER. She has had the first series of mRNA vaccine but none of the boosters. Currently on 4 liters of O2 and dyspnic. WBC 13, neutrophil predominant. Bilateral infiltrates on CT chest.     Interval history  Afebrile  BP stable  Sats 93% on 3L  Hgb 9.0 - worse  Plt 34 - worse  D-dimer 1.01 - better  CRP 6.36 - better  Ferritin 992 - better  Blood cx - no growth x 2 days    Review of Systems: Feeling better this morning. SOB improved. Still with cough. Denies CP/tightness. Denies fever or chills. Reports loose stools. A comprehensive review of systems was negative except for that written in the HPI.     Past Medical History:   Diagnosis Date    Adverse effect of anesthesia     PHLEGM IN THROAT POST OP & NEEDED X2 BREATHING TREATMENTS    Anxiety     COPD (chronic obstructive pulmonary disease) ([facility] [address]     emphysema    Depression     GERD (gastroesophageal reflux disease)     Hernia, femoral     since childhood    Hypertension     Joint pain     Nausea & vomiting     TMJ arthritis       Past Surgical History:   Procedure Laterality Date    HX CERVICAL FUSION  2012    C5-C6    HX CHOLECYSTECTOMY  2020    HX COLONOSCOPY      HX ENDOSCOPY      HX HYSTERECTOMY  2019    HX TONSILLECTOMY      AS A CHILD    HX WISDOM TEETH EXTRACTION      TEENAGER    IR INSERT TUNL CVC W PORT OVER 5 YEARS  4/29/2022      Prior to Admission medications    Medication Sig Start Date End Date Taking? Authorizing Provider   fluconazole (DIFLUCAN) 100 mg tablet Take 1 Tablet by mouth daily as needed for PRN Reason (Other) (for any thrush as needed.) for up to 7 days. FDA advises cautious prescribing of oral fluconazole in pregnancy. 10/7/22 10/14/22  Katlin Ceballos MD   doxycycline (VIBRA-TABS) 100 mg tablet Take 1 Tablet by mouth every twelve (12) hours. 10/7/22   Katlin Ceballos MD   albuterol (PROVENTIL HFA, VENTOLIN HFA, PROAIR HFA) 90 mcg/actuation inhaler Take 2 Puffs by inhalation every six (6) hours as needed for Wheezing or Shortness of Breath for up to 20 days. 10/7/22 10/27/22  Katlin Ceballos MD   morphine IR (MS IR) 15 mg tablet Take 1 Tablet by mouth every six (6) hours as needed for Pain for up to 15 days. Max Daily Amount: 60 mg. 10/4/22 10/19/22  Pily Giraldo MD   ALPRAZolam Cordella Rafaeller) 1 mg tablet Take 1 Tablet by mouth two (2) times daily as needed for Anxiety for up to 15 days. Max Daily Amount: 2 mg. 10/4/22 10/19/22  Pily Giraldo MD   nicotine (NICODERM CQ) 21 mg/24 hr 1 Patch by TransDERmal route every twenty-four (24) hours for 30 days. 9/14/22 10/14/22  Cinthya Ruvalcaba MD   ondansetron (ZOFRAN ODT) 8 mg disintegrating tablet Take 1 Tablet by mouth every eight (8) hours as needed for Nausea or Vomiting. 9/2/22   Katlin Ceballos MD   prochlorperazine (COMPAZINE) 10 mg tablet Take 1 Tablet by mouth every six (6) hours as needed for Nausea (do not take if groggy; take if nausea despite zofran).  9/2/22   Katlin Ceballos MD   trimethoprim-sulfamethoxazole (BACTRIM DS, SEPTRA DS) 160-800 mg per tablet Take 1 Tablet by mouth every Monday, Wednesday, Friday.  Indications: prophylaxis treatment of cancer related infections 8/24/22   Schoeneweis, Onnie Och, NP   FLUoxetine (PROzac) 40 mg capsule TAKE 1 CAPSULE BY MOUTH EVERY MORNING 10/11/21   Tawanda Head MD   amLODIPine-benazepril (LOTREL) 5-20 mg per capsule TAKE 1 CAPSULE BY MOUTH EVERY MORNING 10/11/21   Tawanda Head MD     Current Facility-Administered Medications   Medication Dose Route Frequency    [START ON 10/13/2022] Vancomycin random level - 10/13 with AM labs   Other ONCE    lisinopriL (PRINIVIL, ZESTRIL) tablet 20 mg  20 mg Oral DAILY    ipratropium-albuterol (COMBIVENT RESPIMAT) 20 mcg-100 mcg inhalation spray  1 Puff Inhalation Q6H RT    piperacillin-tazobactam (ZOSYN) 3.375 g in 0.9% sodium chloride (MBP/ADV) 100 mL MBP  3.375 g IntraVENous Q8H    vancomycin (VANCOCIN) 1,250 mg in 0.9% sodium chloride 250 mL ([id number])  1,250 mg IntraVENous Q12H    filgrastim-aafi (NIVESTYM) injection syringe 420 mcg  5 mcg/kg SubCUTAneous QPM    fluconazole (DIFLUCAN) tablet 100 mg  100 mg Oral DAILY    sodium chloride (NS) flush 5-40 mL  5-40 mL IntraVENous Q8H    doxycycline (VIBRAMYCIN) 100 mg in 0.9% sodium chloride (MBP/ADV) 100 mL MBP  100 mg IntraVENous Q12H    dexamethasone (PF) (DECADRON) 10 mg/mL injection 6 mg  6 mg IntraVENous Q24H    amLODIPine (NORVASC) tablet 5 mg  5 mg Oral DAILY    FLUoxetine (PROzac) capsule 40 mg  40 mg Oral DAILY    trimethoprim-sulfamethoxazole (BACTRIM DS, SEPTRA DS) 160-800 mg per tablet 1 Tablet  1 Tablet Oral Q MON, WED & FRI    mometasone-formoterol (DULERA) 200mcg-5mcg/puff  2 Puff Inhalation BID RT    nicotine (NICODERM CQ) 21 mg/24 hr patch 1 Patch  1 Patch TransDERmal DAILY     Allergies   Allergen Reactions    Oxycodone Nausea Only      Social History     Tobacco Use    Smoking status: Some Days     Packs/day: 0.25     Years: 40.00     Pack years: 10.00     Types: Cigarettes    Smokeless tobacco: Never   Substance Use Topics    Alcohol use: Not Currently     Alcohol/week: 0.0 standard drinks      Family History   Problem Relation Age of Onset    Heart Disease Mother 46    Heart Surgery Mother         HEART TRANSPLANT    Elevated Lipids Mother     Hypertension Mother     COPD Father     Emphysema Father     Sudden Death Brother 46    Other Maternal Grandmother         BRAIN TUMOR    No Known Problems Son     No Known Problems Daughter           Laboratory: I have personally reviewed the flowsheet and labs.      Recent Labs     10/12/22  0123 10/11/22  1410 10/10/22  2350   WBC 4.3 7.4 13.3*   HGB 9.0* 10.5* 10.3*   HCT 27.3* 31.1* 30.3*   PLT 34* 40* 58*       Recent Labs     10/12/22  0123 10/10/22  2350 10/10/22  1439    138 137   K 3.8 3.3* 2.9*   * 103 100   CO2 26 28 31   * 127* 96   BUN 11 9 9   CREA 0.44* 0.39* 0.37*   CA 8.0* 8.3* 8.2*   ALB 2.5* 2.8* 2.7*   * 309* 312*         Objective:   Visit Vitals  /75 (BP 1 Location: Right upper arm, BP Patient Position: At rest)   Pulse 78   Temp 98.2 °F (36.8 °C)   Resp 18   SpO2 93%       Intake/Output Summary (Last 24 hours) at 10/12/2022 3270  Last data filed at 10/11/2022 1146  Gross per 24 hour   Intake 480 ml   Output --   Net 480 ml       EXAM:   GENERAL: well developed and in no acute distress, HEENT:  anicteric, EOMI, no alar flaring or epistaxis, oral mucosa moist without cyanosis, NECK:  no jugular vein distention, no retractions, no thyromegaly or masses, LUNGS: scant rales bilaterally , HEART:  Regular rate and rhythm with no MGR; no edema is present, ABDOMEN:  soft with no tenderness, bowel sounds present, EXTREMITIES:  warm with no cyanosis, SKIN:  no jaundice or ecchymosis, and NEUROLOGIC:  alert and oriented, grossly non-focal    ROBIN Araiza  [facility] [place]

## 2022-10-12 NOTE — PROGRESS NOTES
Pt weaned from 3 to 2LPM O2, unable to reduce rate further this shift. No BM since orders placed to r/o cdiff, hat in place, pt aware we need stool sample. Verbal shift change report given to Tiffanie (oncoming nurse) by Emir Rodriguez (offgoing nurse). Report included the following information SBAR, Kardex, Intake/Output, MAR, and Recent Results.

## 2022-10-12 NOTE — PROGRESS NOTES
[address] [facility] [address] [facility] [place]    Office [phone]  Fax [phone]          Subjective / Objective     24 Hour Events: NAEO    Subjective  Patient was off of O2 and ambulating on her own to the bathroom this morning comfortably. Patient reports feeling much better this morning. Still with cough that occasionally causes pain but significantly reduced compared to yesterday. Feels stronger and with more energy. Eating and drinking better. Reports she wants to use less oxygen today. Visit Vitals  BP (!) 94/55 (BP 1 Location: Right arm, BP Patient Position: At rest)   Pulse 72   Temp 98.1 °F (36.7 °C)   Resp 18   SpO2 90%     General: No acute distress. Off of O2 during examination  Head: Normocephalic. Atraumatic. Neck: Normal ROM. No stiffness. Mouth: minimal tan colored plaque like thrush on tongue, less than day prior  Respiratory: Wheezing. No increased work of breathing. No crackles or ronchi. Cough with deep breathing  Cardiovascular: RRR. Normal S1,S2. No m/r/g.   GI: + bowel sounds. Nontender. No rebound tenderness or guarding. Nondistended. Extremities:  no LE edema. Skin: Warm, dry. I/O:  Date 10/11/22 0700 - 10/12/22 0659 10/12/22 0700 - 10/13/22 0659   Shift 2757-2365 7148-3295 24 Hour Total 1742-8601 9749-5809 24 Hour Total   INTAKE   P.O. 80  80        P. O. 80  80      I.V. 400  400        Volume (vancomycin (VANCOCIN) 2000 mg in  ml infusion) 0  0        Volume (piperacillin-tazobactam (ZOSYN) 4.5 g in 0.9% sodium chloride (MBP/ADV) 100 mL MBP) 100  100        Volume (potassium chloride 10 mEq in 100 ml IVPB) 100  100        Volume (doxycycline (VIBRAMYCIN) 100 mg in 0.9% sodium chloride (MBP/ADV) 100 mL MBP) 200  200      Shift Total 480  480      OUTPUT   Urine           Urine Occurrence(s) 1 x 1 x 2 x      Emesis/NG output           Emesis Occurrence(s) 0 x  0 x      Stool           Stool Occurrence(s) 1 x  1 x      Shift Total           480      Weight (kg)             CBC:  Recent Labs     10/12/22  0123 10/11/22  1410 10/10/22  2350   WBC 4.3 7.4 13.3*   HGB 9.0* 10.5* 10.3*   HCT 27.3* 31.1* 30.3*   PLT 34* 40* 58*       Metabolic Panel:  Recent Labs     10/12/22  0123 10/10/22  2350 10/10/22  1439    138 137   K 3.8 3.3* 2.9*   * 103 100   CO2 26 28 31   BUN 11 9 9   CREA 0.44* 0.39* 0.37*   * 127* 96   CA 8.0* 8.3* 8.2*   ALB 2.5* 2.8* 2.7*   * 309* 312*           Assessment and Plan       Milli Barrientos is a 78 y.o. 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 Currently on steroids and on abx for bacterial PNA with decreased O2 requirement today and down trending inflammatory labs. Receiving last of 3 filgrastim injections today. Will coordinate with Pulm and heme/onc specialists for plans regarding further cancer treatment. AHRF in setting of COVID-19 with suspected superimposed bacterial PNA: Improving Hx of COPD with no home O2. LA and procal negative. Inflammatory markers down trending, with lessened O2 requirmement today. - Doxycycline 100 bid x5 days (10/10-)  - Vanc and Zosyn (10/11-)  - Duo-nebs scheduled q4  - Brovana-Pulmicort bid  - Decadron 6 daily x5 days IV, additional 5 po  - Supplemental O2 prn for sats <88%  - Daily CBC, CMP, D-dimer, LD, Ferritin (downtrending today)  - Pulm consulted and following, appreciate rec's     DLBCL: Follows with Dr. Eriberto Torres. Currently undergoing DA-R-EPOCH chemotherapy and filgrastim injections.    - Oncology consulted, appreciate rec's  - Daily filgrastim per onc (last day today)  - Zofran and compazine q6-8 hrs prn nausea  - Morphine 15 mg q4 hr prn    Thrush/Mouth Pain: Improving. eating and drinking much better compared to yesterday  - Diflucan 100mg daily  - Magic Mouthwash as needed before meals for discomfort     Thrombocytopenia: POA PLT 62. Likely 2/2 chemotherapy. - Hold any AC with plt <50  - Daily CBC  - Oncology consulted, appreciate rec's      Hypertension: POA BP was 92/66.   - Continue home Amplodipine-benazepril 5-20 daily      Hypokalemia: POA K 2.9. 3.8 today  - Replete prn  - Follow on daily BMP     MDD/RENETTA: Chronic, stable. - Home Xanax 1mg bid  - Home Prozac 40mg daily     Tobacco dependence: About 40 pack years. Currently smokes about 5 cigarettes per day. - Nicotine patch 21 daily     FEN/GI - Regular diet. No IVF. Activity - Ambulate as tolerated  DVT prophylaxis - SCDs  GI prophylaxis - Not indicated at this time  Fall prophylaxis - Not indicated at this time. Disposition - Admit to Telemetry. Plan to d/c to Home. Code Status - Full, discussed with patient / caregivers.   Next of Kin Name and Contact Magui Rodrigues (Daughter) [phone]     Ottoinel Ma DO  Family Medicine Resident       For Billing    Chief Complaint   Patient presents with    Medication Problem       Hospital Problems  Date Reviewed: 9/23/2022            Codes Class Noted POA    Lymphoma [facility]) ICD-10-CM: C85.90  ICD-9-CM: 202.80  10/10/2022 Unknown        COVID ICD-10-CM: U07.1  ICD-9-CM: 079.89  10/10/2022 Unknown        Acute respiratory failure with hypoxia ([facility] [address] ICD-10-CM: J96.01  ICD-9-CM: 518.81  9/13/2022 Unknown Color consistent with ethnicity/race, warm, dry intact, resilient.

## 2022-10-12 NOTE — PROGRESS NOTES
10/12/22 1120   Resting (Room Air)   SpO2 89   HR 88   During Walk (Room Air)   SpO2 86   HR 96   Comments placed on 2L NC   During Walk (On O2)   SpO2 90   HR 99   O2 Device Nasal cannula   O2 Flow Rate (l/min) 2 l/min   Walk/Assistance Device Walker   After Walk   SpO2 92   HR 92   O2 Device Nasal cannula   O2 Flow Rate (l/min) 2 l/min   Comments walked in room with RW.  returned to bed on 2L NC.    Does the Patient Qualify for Home O2 Yes   Liter Flow on Exertion 2   Does the Patient Need Portable Oxygen Tanks Yes

## 2022-10-12 NOTE — PROGRESS NOTES
10/12/2022   CARE MANAGEMENT NOTE:  CM reviewed EMR for clinical updates. READMISSION:  Pt had a scheduled admission for chemo from 10/3 - 10/7. Pt was readmitted to [facility] on 68/21 with COVID complicated by metastatic stage IV lymphoma. Reportedly,pt resides with her son [phone]). Dtr Ed Read [phone]) is another family contact. RUR 24%    Transition Plan of Care:  Hem/onc, Pulmonary are following for medical management   Plan is for pt to return home with her son  Home oxygen eval prior to discharge  Outpatient follow up  Family will transport pt home    CM will continue to follow pt until discharged.   Steve

## 2022-10-12 NOTE — PROGRESS NOTES
Cancer [place] at [address], [address] [facility] [place]: [phone]  F: [phone] Patient ID  Name: Luana Yadav  YOB: 1955  MRN: [id number]  Referring Provider:   No referring provider defined for this encounter. 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 This summary highlights focused aspects of patient's ongoing care and may have a recurring section in notes with either updates or remain unchanged as a longitudinal care summary.    -------------------------------------------------------------------------------------------------------------------------------------------------------------------------------------------------------  DIAGNOSIS:  Diffuse Large B-Cell Lymphoma     ORIGINAL STAGING:  Stage IV     SITES OF DISEASE:  Left Cervical Lymph Node,Bone Disease, Stage IV    CURRENT TREATMENT:  C1,D1 on 5/11/22 DA-R-EPOCH  C2  C3 delayed due to appetite issues and patient anxiety to restart therapy     Plan Name: OP R-CHOP   Treatment goal Curative   Provider Ferrell Boeck, MD   Status Inactive   Start Date    End Date    Treatment plan weight/height/BSA Weight type: Documented (effective on 4/27/2022), 94.1 kg (entered on 4/27/2022), 160 cm (entered on 4/27/2022), BSA 2.05 m2   Most recent weight/height/BSA Weight: 173 lb 8 oz (78.7 kg)    Height: 5' 3\" (1.6 m)    BSA (calculated - sq m): 1.86 sq meters      Treatment on clinical trial? [This plan is not part of a research study]   Reason for stopping treatment MD Discretion   Summary of Treatment Plan Medications DOXOrubicin (ADRIAMYCIN) chemo syringe 51.2 mg, 25 mg/m2 = 51.2 mg, IntraVENous, ONCE, 0 of 6 cycles  Dose modification: 25 mg/m2 (original dose 50 mg/m2, Cycle 1, Reason: Per Protocol, Comment: dose split into 2 syringes due to volume)    cyclophosphamide (CYTOXAN) 1,538 mg in 0.9% sodium chloride 250 mL chemo infusion, 750 mg/m2 = 1,538 mg, IntraVENous, ONCE, 0 of 6 cycles    vinCRIStine (VINCASAR PFS) 2 mg in 0.9% sodium chloride 50 mL chemo IVPB, 2 mg (100 % of original dose 2 mg), IntraVENous, ONCE, 0 of 6 cycles  Dose modification: 2 mg (original dose 2 mg, Cycle 1, Reason: Other)    riTUXimab-pvvr (RUXIENCE) 769 mg in 0.9% sodium chloride 769 mL infusion, 375 mg/m2 = 769 mg, IntraVENous, ONCE TITRATE, 0 of 1 cycle       [facility] [address] [facility],    Treatment goal Curative   Provider Phuong Carreon MD   Status Active   Start Date 5/11/2022   End Date 10/30/2022 (Planned)   Treatment plan weight/height/BSA Weight type: Documented (effective on 8/17/2022), 79.5 kg (entered on 8/17/2022), 160 cm (entered on 8/5/2022), BSA 1.88 m2   Most recent weight/height/BSA Weight: 173 lb 8 oz (78.7 kg)    Height: 5' 3\" (1.6 m)    BSA (calculated - sq m): 1.86 sq meters      Treatment on clinical trial? [This plan is not part of a research study]   Reason for stopping treatment [Plan is still active]   Summary of Treatment Plan Medications cyclophosphamide (CYTOXAN) 1,537.6 mg in 0.9% sodium chloride 250 mL, overfill volume 25 mL chemo infusion, 750 mg/m2 = 1,538 mg, IntraVENous, ONCE, 5 of 6 cycles  Dose modification: 900 mg/m2 (original dose 750 mg/m2, Cycle 2, Reason: Per Protocol, Comment: ANC above 0.5), 600 mg/m2 (original dose 750 mg/m2, Cycle 3, Reason: Dose Not Tolerated), 600 mg/m2 (80 % of original dose 750 mg/m2, Cycle 4, Reason: Dose Not Tolerated)  Administration: 1,537.6 mg (5/15/2022), 1,809 mg (6/10/2022), 1,128 mg (8/22/2022), 1,128 mg (9/16/2022), 1,128 mg (10/7/2022)    etoposide (VEPESID) 102.6 mg in 0.9% sodium chloride 450 mL chemo infusion, 50 mg/m2 = 102.6 mg, IntraVENous, EVERY 24 HOURS, 5 of 6 cycles  Dose modification: 60 mg/m2 (original dose 50 mg/m2, Cycle 2, Reason: Per Protocol, Comment: ANC above 0.5), 40 mg/m2 (original dose 50 mg/m2, Cycle 3, Reason: Dose Not Tolerated), 40 mg/m2 (80 % of original dose 50 mg/m2, Cycle 4, Reason: Dose Not Tolerated)  Administration: 102.6 mg (5/11/2022), 102.6 mg (5/12/2022), 102.6 mg (5/13/2022), 102.6 mg (5/14/2022), 120.6 mg (6/6/2022), 120.6 mg (6/7/2022), 120.6 mg (6/8/2022), 120.6 mg (6/9/2022), 75.2 mg (8/18/2022), 75.2 mg (8/19/2022), 75.2 mg (8/20/2022), 75.2 mg (8/21/2022), 75.2 mg (9/12/2022), 75.2 mg (9/13/2022), 75.2 mg (9/14/2022), 75.2 mg (9/15/2022), 75.2 mg (10/3/2022), 75.2 mg (10/4/2022), 75.2 mg (10/5/2022), 75.2 mg (10/6/2022)    vinCRIStine (VINCASAR PFS) 0.8 mg, DOXOrubicin (ADRIAMYCIN) 20.6 mg in 0.9% sodium chloride 250 mL, overfill volume 25 mL chemo infusion, , IntraVENous, EVERY 24 HOURS, 5 of 6 cycles  Administration:  (5/11/2022),  (5/12/2022),  (5/13/2022),  (5/14/2022),  (6/6/2022),  (6/7/2022),  (6/8/2022),  (6/9/2022),  (8/18/2022),  (8/19/2022),  (8/20/2022),  (8/21/2022),  (9/12/2022),  (9/13/2022),  (9/14/2022),  (9/15/2022),  (10/3/2022),  (10/4/2022),  (10/5/2022),  (10/6/2022)    riTUXimab-pvvr (RUXIENCE) 769 mg in 0.9% sodium chloride 769 mL infusion, 375 mg/m2 = 769 mg, IntraVENous, ONCE TITRATE, 1 of 2 cycles  Administration: 769 mg (5/11/2022)    riTUXimab-pvvr (RUXIENCE) 754 mg in 0.9% sodium chloride 250 mL RAPID infusion, 375 mg/m2 = 754 mg, IntraVENous, ONCE TITRATE, 4 of 4 cycles  Administration: 754 mg (6/6/2022), 705 mg (8/18/2022), 705 mg (9/12/2022), 705 mg (10/3/2022)         PRIOR TREATMENT:  n/a     GOALS OF CARE:  Curative Intent     PATHOLOGY:  Specimen #:[id number] Collect: 4/22/2022      ==========================================================================                    * * *SURGICAL PATHOLOGY REPORT* * *   ==========================================================================   * * *PROCEDURES/ADDENDA* * *   ADDENDUM   Date Ordered: 4/26/2022 Status: Signed Out   Date Complete: 4/26/2022     By: Tayler Thompson. Megan Pat MD   Date Reported: 4/26/2022   Addendum Diagnosis   Lymph node, left posterior cervical lymph node, excision:   In situ hybridization for Jesús-Barr viral RNA: Negative   CPT: 32003  Addendum Comment   * * *CLINICAL HISTORY* * *   Cervical lymphadenopathy, rule out lymphoma   ==========================================================================   * * *FINAL PATHOLOGIC DIAGNOSIS* * *        Lymph node, left posterior cervical lymph node, excision:        Large B cell lymphoma. See comment. * * *Comment* * *   The histologic section has fragments of lymphoid tissue with diffuse and   focal follicular architecture. Diffuse areas are comprised of large,   atypical lymphocytes with centroblastic morphology and increased mitotic   activity. Immunohistochemical stains confirm the malignant cells are CD20   positive B cells that coexpress CD10 and BCL6 without MUM1. CD23   highlights rare follicular dendritic networks associated with lymphoid   follicles with germinal centers and express CD10 and BCL6 without BCL2. CyclinD1 and CD56 stains are negative. Concurrent flow cytometric analysis   confirms a clonal CD10 positive B-cell population comprised of medium to   large cells. The Ki-67 proliferation index is 30%. The overall findings favor the diagnosis of diffuse large B-cell lymphoma,   germinal center subtype. Molecular genetic studies are pending for further   characterization will be reported in an addendum. Flow cytometry:   Consistent with CD10-positive B-cell lymphoproliferative disorder. Comments: Flow cytometry shows monoclonal B-cells (36% of total cells)   with CD10 expression without CD5, consistent with a CD10-positive B-cell   lymphoproliferative disorder. The main differential diagnosis includes   follicular lymphoma, Burkitt lymphoma and large B-cell lymphoma.  Please   correlate the result with morphologic findings and clinical information. Flow Differential (%) and Population Analysis:   Lymphocytes: 93.7%   T-cells (39% of lymphoid cells) show a CD4/CD8 ratio of 3.3 without overt   phenotypic abnormality. NK-cells (1% of lymphoid cells) are unremarkable. Mature B-cells (56% of lymphoid cells) include large forms based on   forward scattered pattern and show: CD5 neg, CD10+, CD11c+dim, CD19+,   CD20+ with surface lambda light chain restriction. Markers Performed: CD2, CD3, CD4, CD5, CD7, CD8, CD10, CD11c, CD19, CD20,   CD23, CD34, CD38, CD45, CD56, Kappa, Lambda (17 Markers)   The Technical Component Processing of this test was completed at   [facility], [address] [place] [address], [place], [place] / [address] /   [phone] / Ravinder Calix # [id number]. Interpretation by Juan Navarrete M.D. The   complete scanned report is available in Epic. CPT: X9446515, [id number], [id number], [id number], [id number], [id number]   [facility]2/2022   *Electronically Signed Out By Manpreet Cerrato. Joan Mustafa MD*   ==========================================================================   * * *Gross Description* * *   Specimen #1, received fresh labeled with the patient's name,  and left   posterior cervical lymph node, consists of two paper towels containing a   1.7 x 1.5 x 0.5 cm aggregate of pale pink-tan, fleshy soft tissue   fragments. The specimen is handled according to the flow cytometry   protocol as follows: 7          Two air-dried touch prep slides - one Diff Quik stained, one   rapid-fixed in 95% alcohol   7          Representative sections in B plus fixative (1A)   7          One piece held in RPMI for possible flow cytometry analysis   7          Remaining tissue fixed in formalin for permanents (1B)   Dr. Joan Mustafa will determine if flow cytometry is necessary. 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  F who presents as an inpatient consultation for Lymphoma, COVID Positive, need for growth factor support. Patient called in today saying that she was too sick to take her Neulasta shot. She reports shortness of breath. Referred her to the ER. She did not think she could make it to the [facility] location for her neulasta shot and reported general decline since discharge last Friday which was Day 5 of her 5th cycle of treatment. .   Patient overall reports feeling worse. She reports a declines over the weekend. Interval History:     Feeling better; thrush improved; drinking fluids/ensures appetite remains poor. Up moving in the room some; was up in the chair this am. Having some liquid diarrhea. Past Medical History:   Diagnosis Date    Adverse effect of anesthesia     PHLEGM IN THROAT POST OP & NEEDED X2 BREATHING TREATMENTS    Anxiety     COPD (chronic obstructive pulmonary disease) ([address]     emphysema    Depression     GERD (gastroesophageal reflux disease)     Hernia, femoral     since childhood    Hypertension     Joint pain     Nausea & vomiting     TMJ arthritis      Review of Systems Provided by:  Patient  Review of Systems: A complete review of systems was obtained, reviewed. Pertinent findings reviewed above. Objective:      Visit Vitals  /67 (BP 1 Location: Right upper arm, BP Patient Position: At rest)   Pulse 88   Temp 98.1 °F (36.7 °C)   Resp 18   SpO2 92%     ECO- Restricted in physically strenuous activity but ambulatory and able to carry out work of a light or sedentary nature, e.g., light house work, office work.   General: no distress  Respiratory: normal respiratory effort  CV: no peripheral edema  Skin: no rashes; no ecchymoses; no petechiae  Psych: alert, oriented, normal mood/affect     Results:        Lab Results   Component Value Date/Time    WBC 4.3 10/12/2022 01:23 AM    HGB 9.0 (L) 10/12/2022 01:23 AM    HCT 27.3 (L) 10/12/2022 01:23 AM    PLATELET 34 (LL) 10/32/2575 01:23 AM    MCV 95.1 10/12/2022 01:23 AM     Lab Results   Component Value Date/Time    Sodium 141 10/12/2022 01:23 AM    Potassium 3.8 10/12/2022 01:23 AM    Chloride 109 (H) 10/12/2022 01:23 AM    CO2 26 10/12/2022 01:23 AM    Anion gap 6 10/12/2022 01:23 AM    Glucose 146 (H) 10/12/2022 01:23 AM    BUN 11 10/12/2022 01:23 AM    Creatinine 0.44 (L) 10/12/2022 01:23 AM    BUN/Creatinine ratio 25 (H) 10/12/2022 01:23 AM    GFR est AA >60 10/03/2022 06:44 AM    GFR est non-AA >60 10/03/2022 06:44 AM    Calcium 8.0 (L) 10/12/2022 01:23 AM    Bilirubin, total 0.5 10/12/2022 01:23 AM    Alk. phosphatase 41 (L) 10/12/2022 01:23 AM    Protein, total 4.6 (L) 10/12/2022 01:23 AM    Albumin 2.5 (L) 10/12/2022 01:23 AM    Globulin 2.1 10/12/2022 01:23 AM    A-G Ratio 1.2 10/12/2022 01:23 AM    ALT (SGPT) 228 (H) 10/12/2022 01:23 AM    AST (SGOT) 50 (H) 10/12/2022 01:23 AM       Assessment and Recommendations: Active Hospital Problems    Diagnosis    COVID-19 infection  -suspected onset last Monday/ worsening cough. 10/6 Tested positive  -management per primary team/pulmonary;   10/11 pulmonary broadened  abx coverage with concern of bacterial PNA. Metastatic cancer to bone [facility])  -reports diffuse achiness which  is different than her usual pain complaints. Diffuse large B-cell lymphoma of lymph nodes of neck ([facility] [address]  -completed C5 da-REPOCH on 10/7/22 due for C6 on 10/24.  -unclear if any issue if growth factor administered during a covid infection but patient is at high risk for sepsis. -filgrastim-aafi (NIVESTYM) injection syringe 420 mcg x 3 days; will cont to monitor CBC with understanding counts will altered due to steroids. Thrombocytopenia-   likely secondary to chemo effect. Recommend holding any anticoagulation if PLT's are less than 50K. Continue to monitor daily      Thrush: diflucan daily; improved this am   Nutrition: poor oral intake: encouraged fluids and to eat soft foods; added supplemental drinks TID.      Plan reviewed with Dr Oskar Deal

## 2022-10-13 NOTE — PROGRESS NOTES
[facility] [place] at [address], [address] [facility]  Julia Siler: [phone]  F: [phone] Patient ID  Name: Deric Treviño  YOB: 1955  MRN: [id number]  Referring Provider:   No referring provider defined for this encounter. Primary Care Provider:   Alberto Valdivia MD        Deric Treviño is a 78 y.o. female who is seen by synchronous (real-time) audio-video technology on 10/10/2022 for follow up of lymphoma. 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 This summary highlights focused aspects of patient's ongoing care and may have a recurring section in notes with either updates or remain unchanged as a longitudinal care summary.    -------------------------------------------------------------------------------------------------------------------------------------------------------------------------------------------------------  DIAGNOSIS:  Diffuse Large B-Cell Lymphoma     ORIGINAL STAGING:  Stage IV     SITES OF DISEASE:  Left Cervical Lymph Node,Bone Disease, Stage IV    CURRENT TREATMENT:  C1,D1 on 5/11/22 DA-R-EPOCH  C2  C3 delayed due to appetite issues and patient anxiety to restart therapy     Plan Name: OP R-CHOP   Treatment goal Curative   Provider Beatris Hendrickson MD   Status Inactive   Start Date    End Date    Treatment plan weight/height/BSA Weight type: Documented (effective on 4/27/2022), 94.1 kg (entered on 4/27/2022), 160 cm (entered on 4/27/2022), BSA 2.05 m2   Most recent weight/height/BSA Weight: 173 lb 8 oz (78.7 kg)    Height: 5' 3\" (1.6 m)    BSA (calculated - sq m): 1.86 sq meters      Treatment on clinical trial? [This plan is not part of a research study]   Reason for stopping treatment MD Discretion   Summary of Treatment Plan Medications DOXOrubicin (ADRIAMYCIN) chemo syringe 51.2 mg, 25 mg/m2 = 51.2 mg, IntraVENous, ONCE, 0 of 6 cycles  Dose modification: 25 mg/m2 (original dose 50 mg/m2, Cycle 1, Reason: Per Protocol, Comment: dose split into 2 syringes due to volume)    cyclophosphamide (CYTOXAN) 1,538 mg in 0.9% sodium chloride 250 mL chemo infusion, 750 mg/m2 = 1,538 mg, IntraVENous, ONCE, 0 of 6 cycles    vinCRIStine (VINCASAR PFS) 2 mg in 0.9% sodium chloride 50 mL chemo IVPB, 2 mg (100 % of original dose 2 mg), IntraVENous, ONCE, 0 of 6 cycles  Dose modification: 2 mg (original dose 2 mg, Cycle 1, Reason: Other)    riTUXimab-pvvr (RUXIENCE) 769 mg in 0.9% sodium chloride 769 mL infusion, 375 mg/m2 = 769 mg, IntraVENous, ONCE TITRATE, 0 of 1 cycle       Plan Name: [address] [place] [address]   Treatment goal Curative   Provider Jt Childress MD   Status Active   Start Date 5/11/2022   End Date 10/30/2022 (Planned)   Treatment plan weight/height/BSA Weight type: Documented (effective on 8/17/2022), 79.5 kg (entered on 8/17/2022), 160 cm (entered on 8/5/2022), BSA 1.88 m2   Most recent weight/height/BSA Weight: 173 lb 8 oz (78.7 kg)    Height: 5' 3\" (1.6 m)    BSA (calculated - sq m): 1.86 sq meters      Treatment on clinical trial? [This plan is not part of a research study]   Reason for stopping treatment [Plan is still active]   Summary of Treatment Plan Medications cyclophosphamide (CYTOXAN) 1,537.6 mg in 0.9% sodium chloride 250 mL, overfill volume 25 mL chemo infusion, 750 mg/m2 = 1,538 mg, IntraVENous, ONCE, 5 of 6 cycles  Dose modification: 900 mg/m2 (original dose 750 mg/m2, Cycle 2, Reason: Per Protocol, Comment: ANC above 0.5), 600 mg/m2 (original dose 750 mg/m2, Cycle 3, Reason: Dose Not Tolerated), 600 mg/m2 (80 % of original dose 750 mg/m2, Cycle 4, Reason: Dose Not Tolerated)  Administration: 1,537.6 mg (5/15/2022), 1,809 mg (6/10/2022), 1,128 mg (8/22/2022), 1,128 mg (9/16/2022), 1,128 mg (10/7/2022)    etoposide (VEPESID) 102.6 mg in 0.9% sodium chloride 450 mL chemo infusion, 50 mg/m2 = 102.6 mg, IntraVENous, EVERY 24 HOURS, 5 of 6 cycles  Dose modification: 60 mg/m2 (original dose 50 mg/m2, Cycle 2, Reason: Per Protocol, Comment: ANC above 0.5), 40 mg/m2 (original dose 50 mg/m2, Cycle 3, Reason: Dose Not Tolerated), 40 mg/m2 (80 % of original dose 50 mg/m2, Cycle 4, Reason: Dose Not Tolerated)  Administration: 102.6 mg (5/11/2022), 102.6 mg (5/12/2022), 102.6 mg (5/13/2022), 102.6 mg (5/14/2022), 120.6 mg (6/6/2022), 120.6 mg (6/7/2022), 120.6 mg (6/8/2022), 120.6 mg (6/9/2022), 75.2 mg (8/18/2022), 75.2 mg (8/19/2022), 75.2 mg (8/20/2022), 75.2 mg (8/21/2022), 75.2 mg (9/12/2022), 75.2 mg (9/13/2022), 75.2 mg (9/14/2022), 75.2 mg (9/15/2022), 75.2 mg (10/3/2022), 75.2 mg (10/4/2022), 75.2 mg (10/5/2022), 75.2 mg (10/6/2022)    vinCRIStine (VINCASAR PFS) 0.8 mg, DOXOrubicin (ADRIAMYCIN) 20.6 mg in 0.9% sodium chloride 250 mL, overfill volume 25 mL chemo infusion, , IntraVENous, EVERY 24 HOURS, 5 of 6 cycles  Administration:  (5/11/2022),  (5/12/2022),  (5/13/2022),  (5/14/2022),  (6/6/2022),  (6/7/2022),  (6/8/2022),  (6/9/2022),  (8/18/2022),  (8/19/2022),  (8/20/2022),  (8/21/2022),  (9/12/2022),  (9/13/2022),  (9/14/2022),  (9/15/2022),  (10/3/2022),  (10/4/2022),  (10/5/2022),  (10/6/2022)    riTUXimab-pvvr (RUXIENCE) 769 mg in 0.9% sodium chloride 769 mL infusion, 375 mg/m2 = 769 mg, IntraVENous, ONCE TITRATE, 1 of 2 cycles  Administration: 769 mg (5/11/2022)    riTUXimab-pvvr (RUXIENCE) 754 mg in 0.9% sodium chloride 250 mL RAPID infusion, 375 mg/m2 = 754 mg, IntraVENous, ONCE TITRATE, 4 of 4 cycles  Administration: 754 mg (6/6/2022), 705 mg (8/18/2022), 705 mg (9/12/2022), 705 mg (10/3/2022)         PRIOR TREATMENT:  n/a     GOALS OF CARE:  Curative Intent     PATHOLOGY:  Specimen #:[id number] Collect: 4/22/2022      ==========================================================================                    * * *SURGICAL PATHOLOGY REPORT* * * ==========================================================================   * * *PROCEDURES/ADDENDA* * *   ADDENDUM   Date Ordered: 4/26/2022     Status: Signed Out   Date Complete: 4/26/2022     By: Justina Minaya. Bina Murcia MD   Date Reported: 4/26/2022   Addendum Diagnosis   Lymph node, left posterior cervical lymph node, excision:   In situ hybridization for Jesús-Barr viral RNA: Negative   CPT: 74547  Addendum Comment   * * *CLINICAL HISTORY* * *   Cervical lymphadenopathy, rule out lymphoma   ==========================================================================   * * *FINAL PATHOLOGIC DIAGNOSIS* * *        Lymph node, left posterior cervical lymph node, excision:        Large B cell lymphoma. See comment. * * *Comment* * *   The histologic section has fragments of lymphoid tissue with diffuse and   focal follicular architecture. Diffuse areas are comprised of large,   atypical lymphocytes with centroblastic morphology and increased mitotic   activity. Immunohistochemical stains confirm the malignant cells are CD20   positive B cells that coexpress CD10 and BCL6 without MUM1. CD23   highlights rare follicular dendritic networks associated with lymphoid   follicles with germinal centers and express CD10 and BCL6 without BCL2. CyclinD1 and CD56 stains are negative. Concurrent flow cytometric analysis   confirms a clonal CD10 positive B-cell population comprised of medium to   large cells. The Ki-67 proliferation index is 30%. The overall findings favor the diagnosis of diffuse large B-cell lymphoma,   germinal center subtype. Molecular genetic studies are pending for further   characterization will be reported in an addendum. Flow cytometry:   Consistent with CD10-positive B-cell lymphoproliferative disorder. Comments: Flow cytometry shows monoclonal B-cells (36% of total cells)   with CD10 expression without CD5, consistent with a CD10-positive B-cell   lymphoproliferative disorder.  The main differential diagnosis includes   follicular lymphoma, Burkitt lymphoma and large B-cell lymphoma. Please   correlate the result with morphologic findings and clinical information. Flow Differential (%) and Population Analysis:   Lymphocytes: 93.7%   T-cells (39% of lymphoid cells) show a CD4/CD8 ratio of 3.3 without overt   phenotypic abnormality. NK-cells (1% of lymphoid cells) are unremarkable. Mature B-cells (56% of lymphoid cells) include large forms based on   forward scattered pattern and show: CD5 neg, CD10+, CD11c+dim, CD19+,   CD20+ with surface lambda light chain restriction. Markers Performed: CD2, CD3, CD4, CD5, CD7, CD8, CD10, CD11c, CD19, CD20,   CD23, CD34, CD38, CD45, CD56, Kappa, Lambda (17 Markers)   The Technical Component Processing of this test was completed at   [facility], [address] [place] [address] / [id number] /   [phone] / Christopher Booker # [id number]. Interpretation by Arron Carter M.D. The   complete scanned report is available in Epic. CPT: M2790950, H546149, M3372168, [id number], W124380, 1857121   j2/2022   *Electronically Signed Out By Mamadou Curiel. Ondina Ambrose MD*   ==========================================================================   * * *Gross Description* * *   Specimen #1, received fresh labeled with the patient's name,  and left   posterior cervical lymph node, consists of two paper towels containing a   1.7 x 1.5 x 0.5 cm aggregate of pale pink-tan, fleshy soft tissue   fragments. The specimen is handled according to the flow cytometry   protocol as follows: 7          Two air-dried touch prep slides - one Diff Quik stained, one   rapid-fixed in 95% alcohol   7          Representative sections in B plus fixative (1A)   7          One piece held in RPMI for possible flow cytometry analysis   7          Remaining tissue fixed in formalin for permanents (1B)   Dr. Ondina Ambrose will determine if flow cytometry is necessary.    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 F who presents as an inpatient consultation for Lymphoma, COVID Positive, need for growth factor support. Patient called in today saying that she was too sick to take her Neulasta shot. She reports shortness of breath. Referred her to the ER. She did not think she could make it to the [facility] location for her neulasta shot and reported general decline since discharge last Friday which was Day 5 of her 5th cycle of treatment. .   Patient overall reports feeling worse. She reports a declines over the weekend. Interval History:     States she has a mild productive cough, but no fevers. Has body aches all over. States she has diarrhea, but this may be decreasing slightly. Is drinking plenty of fluids, but only eating a little bit. Oral thrush much better, no mouth sores. Past Medical History:   Diagnosis Date    Adverse effect of anesthesia     PHLEGM IN THROAT POST OP & NEEDED X2 BREATHING TREATMENTS    Anxiety     COPD (chronic obstructive pulmonary disease) ([facility] [address] 2022    emphysema    Depression     GERD (gastroesophageal reflux disease)     Hernia, femoral     since childhood    Hypertension     Joint pain     Nausea & vomiting     TMJ arthritis      Review of Systems Provided by:  Patient  Review of Systems: A complete review of systems was obtained, reviewed. Pertinent findings reviewed above. Objective:      Visit Vitals  /67 (BP 1 Location: Right upper arm, BP Patient Position: At rest)   Pulse 84   Temp 98.3 °F (36.8 °C)   Resp 18   SpO2 97%       Due to this being a TeleHealth evaluation, many elements of the physical examination are unable to be assessed. Constitutional: Appears well-developed and well-nourished in no apparent distress; Has Alopecia.   Mental status: Alert and awake, Oriented to person/place/time, Able to follow commands  Eyes: EOM normal, Sclera normal, No visible ocular discharge  HENT: Normocephalic, atraumatic; Mouth/Throat: Moist mucous membranes (prior thrush resolved), External Ears normal  Neck: No visualized mass  Pulmonary/Chest: Respiratory effort normal, No visualized signs of difficulty breathing or respiratory distress   Musculoskeletal: Normal range of motion of neck  Neurological: No facial asymmetry (Cranial nerve 7 motor function), No gaze palsy  Skin: No significant exanthematous lesions or discoloration noted on facial skin  Psychiatric: Normal affect, normal judgment/insight. No hallucinations       Results:        Lab Results   Component Value Date/Time    WBC 0.6 (LL) 10/13/2022 04:57 AM    HGB 8.2 (L) 10/13/2022 04:57 AM    HCT 25.7 (L) 10/13/2022 04:57 AM    PLATELET 20 (LL) 00/78/5615 04:57 AM    MCV 96.3 10/13/2022 04:57 AM     Lab Results   Component Value Date/Time    Sodium 141 10/13/2022 04:57 AM    Potassium 3.5 10/13/2022 04:57 AM    Chloride 107 10/13/2022 04:57 AM    CO2 28 10/13/2022 04:57 AM    Anion gap 6 10/13/2022 04:57 AM    Glucose 124 (H) 10/13/2022 04:57 AM    BUN 12 10/13/2022 04:57 AM    Creatinine 0.95 10/13/2022 04:57 AM    BUN/Creatinine ratio 13 10/13/2022 04:57 AM    GFR est AA >60 10/03/2022 06:44 AM    GFR est non-AA >60 10/03/2022 06:44 AM    Calcium 8.6 10/13/2022 04:57 AM    Bilirubin, total 0.6 10/13/2022 04:57 AM    Alk. phosphatase 40 (L) 10/13/2022 04:57 AM    Protein, total 4.9 (L) 10/13/2022 04:57 AM    Albumin 2.5 (L) 10/13/2022 04:57 AM    Globulin 2.4 10/13/2022 04:57 AM    A-G Ratio 1.0 (L) 10/13/2022 04:57 AM    ALT (SGPT) 202 (H) 10/13/2022 04:57 AM    AST (SGOT) 48 (H) 10/13/2022 04:57 AM       Assessment and Recommendations: Active Hospital Problems    Diagnosis    COVID-19 infection  -suspected onset last Monday/ worsening cough. 10/6 Tested positive  -management per primary team/pulmonary;   10/11 pulmonary broadened  abx coverage with concern of bacterial PNA.        Metastatic cancer to bone ([facility] [address]  -reports diffuse achiness which  is different than her usual pain complaints. Diffuse large B-cell lymphoma of lymph nodes of neck ([facility] [address]  -completed C5 da-REPOCH on 10/7/22 due for C6 on 10/24.  -unclear if any issue if growth factor administered during a covid infection but patient is at high risk for sepsis. -filgrastim-aafi (NIVESTYM) injection syringe 420 mcg x 5-7 days at least until Monday, 10/17/22    Thrombocytopenia-   secondary to chemo effect. Recommend holding any anticoagulation while PLT's are less than 50K. Continue to monitor daily      Thrush: Improving on diflucan daily   Nutrition: poor oral intake: encouraged fluids and to eat soft foods; added supplemental drinks TID. Total physician time spent on this encounter was 30 minutes. Ariel Velasco, was evaluated through a synchronous (real-time) audio-video encounter. The patient (or guardian if applicable) is aware that this is a billable service, which includes applicable co-pays. This Virtual Visit was conducted with patient's (and/or legal guardian's) consent. The visit was conducted pursuant to the emergency declaration under the [address] [place] [address] authority and the [employer] General Act. Patient identification was verified, and a caregiver was present when appropriate. The patient was located at: Facility (Appt Department): [address] [place] [address]  [phone]  The provider was located at:  Facility (Appt Department): [address] [place] [address]  [phone]

## 2022-10-13 NOTE — PROGRESS NOTES
[facility]     Name: Dunia Baird MRN: [id number]   : 1955 Hospital: [address]   Date: 10/13/2022        Impression Plan   Acute hypoxic respiratory failure  COVID 19 PNA with questionable superimposed bacterial PNA  Lymphoma s/p recent R-CHOP  Pancytopenia  Hx of tobacco abuse               CT chest not necessarily consistent with COVID 19 PNA and concerning for bacterial PNA  Wean O2 to keep sats above 90%  Continue dexamethasone  Combivent and Dulera  Continue doxy, switch zosyn to po augmentin, stop vanc. Discussed with pharmacy. Nivestym per hematology  OOB into chair  Holding anticoagulation    Discussed with family practice and hematology. Radiology  (personally reviewed) CT chest reviewed: bilateral peripheral infiltrates, no PE       Subjective     Cc: shortness of breath    79 yo with PMHx of lymphoma presenting with increasing SOB for the last 3 days. Was admitted last week for R-CHOP chemotherapy. Her son was COVID 19 positive last week. Pt is COVID 19 positive in ER. She has had the first series of mRNA vaccine but none of the boosters. Currently on 4 liters of O2 and dyspnic. WBC 13, neutrophil predominant. Bilateral infiltrates on CT chest.     Interval history  Afebrile  BP soft but stable  Sats 92% on 2L  WBC 0.6 - trending down  Hgb 8.2 - worse  Plt 20 - worse  D-dimer 0.85 - better  CRP 3.78 - better  Ferritin 612 - better  Blood cx - no growth x 3 days    Review of Systems: Feeling better this morning. SOB improved. Still with cough. Denies CP/tightness. Denies fever or chills. A comprehensive review of systems was negative except for that written in the HPI.     Past Medical History:   Diagnosis Date    Adverse effect of anesthesia     PHLEGM IN THROAT POST OP & NEEDED X2 BREATHING TREATMENTS    Anxiety     COPD (chronic obstructive pulmonary disease) ([facility] [address]     emphysema    Depression     GERD (gastroesophageal reflux disease) Hernia, femoral     since childhood    Hypertension     Joint pain     Nausea & vomiting     TMJ arthritis       Past Surgical History:   Procedure Laterality Date    HX CERVICAL FUSION  2012    C5-C6    HX CHOLECYSTECTOMY  2020    HX COLONOSCOPY      HX ENDOSCOPY      HX HYSTERECTOMY  2019    HX TONSILLECTOMY      AS A CHILD    HX WISDOM TEETH EXTRACTION      TEENAGER    IR INSERT TUNL CVC W PORT OVER 5 YEARS  4/29/2022      Prior to Admission medications    Medication Sig Start Date End Date Taking? Authorizing Provider   fluconazole (DIFLUCAN) 100 mg tablet Take 1 Tablet by mouth daily as needed for PRN Reason (Other) (for any thrush as needed.) for up to 7 days. FDA advises cautious prescribing of oral fluconazole in pregnancy. 10/7/22 10/14/22  Lindy Fenton MD   doxycycline (VIBRA-TABS) 100 mg tablet Take 1 Tablet by mouth every twelve (12) hours. 10/7/22   Lindy Fenton MD   albuterol (PROVENTIL HFA, VENTOLIN HFA, PROAIR HFA) 90 mcg/actuation inhaler Take 2 Puffs by inhalation every six (6) hours as needed for Wheezing or Shortness of Breath for up to 20 days. 10/7/22 10/27/22  Lindy Fenton MD   morphine IR (MS IR) 15 mg tablet Take 1 Tablet by mouth every six (6) hours as needed for Pain for up to 15 days. Max Daily Amount: 60 mg. 10/4/22 10/19/22  Kriss Abdi MD   ALPRAZolam Cindia Muss) 1 mg tablet Take 1 Tablet by mouth two (2) times daily as needed for Anxiety for up to 15 days. Max Daily Amount: 2 mg. 10/4/22 10/19/22  Kriss Abdi MD   nicotine (NICODERM CQ) 21 mg/24 hr 1 Patch by TransDERmal route every twenty-four (24) hours for 30 days. 9/14/22 10/14/22  Roger Portillo MD   ondansetron (ZOFRAN ODT) 8 mg disintegrating tablet Take 1 Tablet by mouth every eight (8) hours as needed for Nausea or Vomiting.  9/2/22   Lindy Fenton MD   prochlorperazine (COMPAZINE) 10 mg tablet Take 1 Tablet by mouth every six (6) hours as needed for Nausea (do not take if groggy; take if nausea despite zofran). 9/2/22   Rebecca Merlos MD   trimethoprim-sulfamethoxazole (BACTRIM DS, SEPTRA DS) 160-800 mg per tablet Take 1 Tablet by mouth every Monday, Wednesday, Friday.  Indications: prophylaxis treatment of cancer related infections 8/24/22   Schoeneweis, Orysia Sevin, NP   FLUoxetine (PROzac) 40 mg capsule TAKE 1 CAPSULE BY MOUTH EVERY MORNING 10/11/21   Cara Mayberry MD   amLODIPine-benazepril (LOTREL) 5-20 mg per capsule TAKE 1 CAPSULE BY MOUTH EVERY MORNING 10/11/21   Cara Mayberry MD     Current Facility-Administered Medications   Medication Dose Route Frequency    amoxicillin-clavulanate (AUGMENTIN) 875-125 mg per tablet 1 Tablet  1 Tablet Oral BID WITH MEALS    L.acidophilus-paracasei-S.thermophil-bifidobacter (RISAQUAD) 8 billion cell capsule  1 Capsule Oral DAILY    lisinopriL (PRINIVIL, ZESTRIL) tablet 20 mg  20 mg Oral DAILY    ipratropium-albuterol (COMBIVENT RESPIMAT) 20 mcg-100 mcg inhalation spray  1 Puff Inhalation Q6H RT    filgrastim-aafi (NIVESTYM) injection syringe 420 mcg  5 mcg/kg SubCUTAneous QPM    fluconazole (DIFLUCAN) tablet 100 mg  100 mg Oral DAILY    sodium chloride (NS) flush 5-40 mL  5-40 mL IntraVENous Q8H    doxycycline (VIBRAMYCIN) 100 mg in 0.9% sodium chloride (MBP/ADV) 100 mL MBP  100 mg IntraVENous Q12H    dexamethasone (PF) (DECADRON) 10 mg/mL injection 6 mg  6 mg IntraVENous Q24H    amLODIPine (NORVASC) tablet 5 mg  5 mg Oral DAILY    FLUoxetine (PROzac) capsule 40 mg  40 mg Oral DAILY    trimethoprim-sulfamethoxazole (BACTRIM DS, SEPTRA DS) 160-800 mg per tablet 1 Tablet  1 Tablet Oral Q MON, WED & FRI    mometasone-formoterol (DULERA) 200mcg-5mcg/puff  2 Puff Inhalation BID RT    nicotine (NICODERM CQ) 21 mg/24 hr patch 1 Patch  1 Patch TransDERmal DAILY     Allergies   Allergen Reactions    Oxycodone Nausea Only      Social History     Tobacco Use    Smoking status: Some Days     Packs/day: 0.25     Years: 40.00     Pack years: 10.00     Types: Cigarettes Smokeless tobacco: Never   Substance Use Topics    Alcohol use: Not Currently     Alcohol/week: 0.0 standard drinks      Family History   Problem Relation Age of Onset    Heart Disease Mother 46    Heart Surgery Mother         HEART TRANSPLANT    Elevated Lipids Mother     Hypertension Mother     COPD Father     Emphysema Father     Sudden Death Brother 46    Other Maternal Grandmother         BRAIN TUMOR    No Known Problems Son     No Known Problems Daughter           Laboratory: I have personally reviewed the flowsheet and labs.      Recent Labs     10/13/22  0457 10/12/22  0123 10/11/22  1410   WBC 0.6* 4.3 7.4   HGB 8.2* 9.0* 10.5*   HCT 25.7* 27.3* 31.1*   PLT 20* 34* 40*       Recent Labs     10/13/22  0457 10/12/22  0123 10/10/22  2350    141 138   K 3.5 3.8 3.3*    109* 103   CO2 28 26 28   * 146* 127*   BUN 12 11 9   CREA 0.95 0.44* 0.39*   CA 8.6 8.0* 8.3*   ALB 2.5* 2.5* 2.8*   * 228* 309*         Objective:   Visit Vitals  BP (!) 101/58 (BP 1 Location: Right upper arm, BP Patient Position: At rest)   Pulse 75   Temp 98.5 °F (36.9 °C)   Resp 18   SpO2 92%       Intake/Output Summary (Last 24 hours) at 10/13/2022 0932  Last data filed at 10/13/2022 0142  Gross per 24 hour   Intake 1100 ml   Output --   Net 1100 ml       EXAM:   GENERAL: well developed and in no acute distress, HEENT:  anicteric, EOMI, no alar flaring or epistaxis, oral mucosa moist without cyanosis, NECK:  no jugular vein distention, no retractions, no thyromegaly or masses, LUNGS: scant rales bilaterally, HEART:  Regular rate and rhythm with no MGR; no edema is present, ABDOMEN:  soft with no tenderness, bowel sounds present, EXTREMITIES:  warm with no cyanosis, SKIN:  no jaundice or ecchymosis, and NEUROLOGIC:  alert and oriented, grossly non-focal    Wilburt Brain, PA  [facility]

## 2022-10-13 NOTE — PROGRESS NOTES
Bedside and Verbal shift change report given to DEANNA Ko (oncoming nurse) by Vincent Diane (offgoing nurse). Report included the following information SBAR and Kardex.

## 2022-10-13 NOTE — PROGRESS NOTES
Vancomycin Pharmacy Dosing Note    SCr doubled from yesterday. Vancomycin random level drawn 10/13 at 0457 = 27 mcg/mL. [facility] calculates AUC to be 1074, which is above target goal of 400-600. Will change to pharmacy dosing by levels and order a random level tomorrow with AM labs to see if level is less than 15 mcg/mL for re-dosing. Zosyn dosing remains appropriate based on CrCl, may consider changing to cefepime/Flagyl, already has risk of nephrotoxicity with prophylactic Bactrim.     Thank you,  Lawanda Ortiz, PHARMD

## 2022-10-13 NOTE — PROGRESS NOTES
[address], Ericka Wong 34   Office [phone]  Fax [phone]          Subjective / Objective     24 Hour Events: NAEO    Subjective  Patient reports feeling better with lessened cough. Reports diarrhea that has been ongoing for the past couple days. Not watery or foul smelling. Visit Vitals  BP (!) 101/58 (BP 1 Location: Right upper arm, BP Patient Position: At rest)   Pulse 75   Temp 98.5 °F (36.9 °C)   Resp 18   SpO2 92%     General: No acute distress. Head: Normocephalic. Atraumatic. Neck: Normal ROM. No stiffness. Mouth: No tongue plaque  Respiratory: End exp wheezing. Ronchi present. No increased work of breathing. Cough with deep breathing  Cardiovascular: RRR. Normal S1,S2. No m/r/g.   GI: + bowel sounds. Nontender. No rebound tenderness or guarding. Nondistended. Extremities:  no LE edema. Skin: Warm, dry. I/O:  Date 10/12/22 0700 - 10/13/22 0659 10/13/22 0700 - 10/14/22 0659   Shift 2456-7433 5264-0866 24 Hour Total 3009-3568 3013-6589 24 Hour Total   INTAKE   P.O. 240 200 440        P. O. 240 200 440      I.V. 450 450 900        I.V.  0 0        Volume (piperacillin-tazobactam (ZOSYN) 3.375 g in 0.9% sodium chloride (MBP/ADV) 100 mL MBP) 100 100 200        Volume (vancomycin (VANCOCIN) 1,250 mg in 0.9% sodium chloride 250 mL (Zcmf2Ids)) 250 250 500        Volume (doxycycline (VIBRAMYCIN) 100 mg in 0.9% sodium chloride (MBP/ADV) 100 mL MBP) 100 100 200      Shift Total       OUTPUT   Urine           Urine Occurrence(s)  1 x 1 x      Emesis/NG output           Emesis Occurrence(s)  9 x 9 x      Stool           Stool Occurrence(s)  9 x 9 x      Shift Total          551 3123      Weight (kg)               CBC:  Recent Labs     10/13/22  0457 10/12/22  0123 10/11/22  1410   WBC 0.6* 4.3 7.4   HGB 8.2* 9.0* 10.5*   HCT 25.7* 27.3* 31.1*   PLT 20* 34* 40*         Metabolic Panel:  Recent Labs     10/13/22  0457 10/12/22  0123 10/10/22  2010    141 138   K 3.5 3.8 3.3*    109* 103   CO2 28 26 28   BUN 12 11 9   CREA 0.95 0.44* 0.39*   * 146* 127*   CA 8.6 8.0* 8.3*   ALB 2.5* 2.5* 2.8*   * 228* 309*             Assessment and Plan       Lucius Helms is a 78 y.o. 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 Currently on steroids and on abx for bacterial PNA with decreased O2 requirement today and down trending inflammatory labs. AHRF in setting of COVID-19 with suspected superimposed bacterial PNA: Improving Hx of COPD with no home O2. LA and procal negative. Inflammatory markers down trending, with lessened O2 requirmement today. - Doxycycline 100 bid x5 days (10/10-)  - Vanc and Zosyn (10/11-), likely transition to PO Augmentin  - Duo-nebs scheduled q4  - Brovana-Pulmicort bid  - Decadron 6 daily x5 days IV (day 4), additional 5 po  - Supplemental O2 prn for sats <88%  - Daily CBC, CMP, D-dimer, LD, Ferritin (downtrending today)  - Pulm consulted and following, appreciate rec's     DLBCL: Follows with Dr. Ashly Moran. Currently undergoing DA-R-EPOCH chemotherapy and filgrastim injections. WBC now at 0.6 today  - Oncology consulted, appreciate rec's  - S/p filgrastim injections x3  - Zofran and compazine q6-8 hrs prn nausea  - Morphine 15 mg q4 hr prn    Thrush/Mouth Pain: resolved. eating and drinking much better compared to yesterday  - Diflucan 100mg daily  - Magic Mouthwash as needed before meals for discomfort     Thrombocytopenia: POA PLT 62. Continues to downtrend, at 20 today. Likely 2/2 chemotherapy.   - Hold any AC with plt <50  - Daily CBC  - Oncology consulted, appreciate rec's      Hypertension: POA BP was 92/66.   - Continue home Amplodipine-benazepril 5-20 daily      Hypokalemia: POA K 2.9.   - Replete prn  - Follow on daily BMP MDD/RENETTA: Chronic, stable. - Home Xanax 1mg bid  - Home Prozac 40mg daily     Tobacco dependence: About 40 pack years. Currently smokes about 5 cigarettes per day. - Nicotine patch 21 daily     FEN/GI - Regular diet. No IVF. Activity - Ambulate as tolerated  DVT prophylaxis - SCDs  GI prophylaxis - Not indicated at this time  Fall prophylaxis - Not indicated at this time. Disposition - Admit to Telemetry. Plan to d/c to Home. Code Status - Full, discussed with patient / caregivers.   Next of Kin Name and Contact Tayler Medina (Daughter) [phone]     Abner Jacobs DO  Family Medicine Resident       For Billing    Chief Complaint   Patient presents with    Medication Problem       Hospital Problems  Date Reviewed: 9/23/2022            Codes Class Noted POA    Lymphoma [facility]) ICD-10-CM: C85.90  ICD-9-CM: 202.80  10/10/2022 Unknown        COVID ICD-10-CM: U07.1  ICD-9-CM: 079.89  10/10/2022 Unknown        Acute respiratory failure with hypoxia ([facility] [address] ICD-10-CM: J96.01  ICD-9-CM: 518.81  9/13/2022 Unknown

## 2022-10-13 NOTE — PROGRESS NOTES
10/13/2022   CARE MANAGEMENT NOTE:  CM reviewed EMR for clinical updates. READMISSION:  Pt had a scheduled admission for chemo from 10/3 - 10/7. Pt was readmitted to [facility] on 10/35 with COVID complicated by metastatic stage IV lymphoma. Reportedly,pt resides with her son ([phone]). Dtr Сергей Saleem ([phone]) is another family contact. RUR 25%     Transition Plan of Care:  Hem/onc, Pulmonary are following for medical management   Plan is for pt to return home with her son  RT home oxygen eval on 10/12 - pt was 89% on room air and 86% with activity (meets criteria 2L)  Outpatient follow up  Family will transport pt home     CM will continue to follow pt until discharged.   Steve

## 2022-10-13 NOTE — PROGRESS NOTES
Problem: Falls - Risk of  Goal: *Absence of Falls  Outcome: Progressing Towards Goal  Note: Fall Risk Interventions:            Medication Interventions: Patient to call before getting OOB         History of Falls Interventions: Bed/chair exit alarm         Problem: Patient Education: Go to Patient Education Activity  Goal: Patient/Family Education  Outcome: Progressing Towards Goal     Problem: Risk for Spread of Infection  Goal: Prevent transmission of infectious organism to others  Outcome: Progressing Towards Goal     Problem: Falls - Risk of  Goal: *Absence of Falls  10/13/2022 0224 by Lucy Sam RN  Outcome: Progressing Towards Goal  Note: Fall Risk Interventions:            Medication Interventions: Patient to call before getting OOB         History of Falls Interventions: Bed/chair exit alarm      10/13/2022 0223 by Lucy Sam RN  Outcome: Progressing Towards Goal  Note: Fall Risk Interventions:            Medication Interventions: Patient to call before getting OOB         History of Falls Interventions: Bed/chair exit alarm         Problem: Patient Education: Go to Patient Education Activity  Goal: Patient/Family Education  10/13/2022 0224 by Belinda Treviño RN  Outcome: Progressing Towards Goal  10/13/2022 0223 by Lucy Sam RN  Outcome: Progressing Towards Goal     Problem: Risk for Spread of Infection  Goal: Prevent transmission of infectious organism to others  10/13/2022 0224 by Belinda Treviño RN  Outcome: Progressing Towards Goal  10/13/2022 0223 by Lucy Sam RN  Outcome: Progressing Towards Goal     Problem: Patient Education:  Go to Education Activity  Goal: Patient/Family Education  10/13/2022 0224 by Belinda Treviño RN  Outcome: Progressing Towards Goal  10/13/2022 0223 by Lucy Sam RN  Outcome: Progressing Towards Goal     Problem: Pain  Goal: *Control of Pain  Outcome: Progressing Towards Goal  Goal: *PALLIATIVE CARE:  Alleviation of Pain  Outcome: Progressing Towards Goal     Problem: Patient Education: Go to Patient Education Activity  Goal: Patient/Family Education  Outcome: Progressing Towards Goal     Problem: Airway Clearance - Ineffective  Goal: Achieve or maintain patent airway  Outcome: Progressing Towards Goal     Problem: Gas Exchange - Impaired  Goal: Absence of hypoxia  Outcome: Progressing Towards Goal  Goal: Promote optimal lung function  Outcome: Progressing Towards Goal     Problem:  Body Temperature -  Risk of, Imbalanced  Goal: Ability to maintain a body temperature within defined limits  Outcome: Progressing Towards Goal  Goal: Will regain or maintain usual level of consciousness  Outcome: Progressing Towards Goal  Goal: Complications related to the disease process, condition or treatment will be avoided or minimized  Outcome: Progressing Towards Goal     Problem: Nutrition Deficits  Goal: Optimize nutrtional status  Outcome: Progressing Towards Goal     Problem: Risk for Fluid Volume Deficit  Goal: Maintain normal heart rhythm  Outcome: Progressing Towards Goal  Goal: Maintain absence of muscle cramping  Outcome: Progressing Towards Goal  Goal: Maintain normal serum potassium, sodium, calcium, phosphorus, and pH  Outcome: Progressing Towards Goal     Problem: Isolation Precautions - Risk of Spread of Infection  Goal: Prevent transmission of infectious organism to others  Outcome: Progressing Towards Goal     Problem: Fatigue  Goal: Verbalize increase energy and improved vitality  Outcome: Progressing Towards Goal     Problem: Loneliness or Risk for Loneliness  Goal: Demonstrate positive use of time alone when socialization is not possible  Outcome: Progressing Towards Goal     Problem: Patient Education: Go to Patient Education Activity  Goal: Patient/Family Education  Outcome: Progressing Towards Goal

## 2022-10-14 NOTE — PROGRESS NOTES
[address] Dosing Services: Antimicrobial Stewardship Daily Doc 10/14/2022    Consult for antibiotic dosing of Vancomycin + Cefepime by Dr. Mario Moon  Indication: HAP in setting of COVID-19 infection; immunosuppressed   Day of Therapy: 1    Ht Readings from Last 1 Encounters:   10/14/22 160 cm (62.99\")        Wt Readings from Last 1 Encounters:   10/14/22 78.7 kg (173 lb 8 oz)      Vancomycin therapy:  Current maintenance dose: Initial dosing  Last level: Previous therapeutic level on 1250 mg Q12 hrs w/ baseline renal fxn  Dose calculated to approximate a           a. Target AUC/AL of N/A          b. Random level of 15-20 mcg/mL     Plan: CXR worsening 10/14, patient not clinically improving- pulm concerned for developing HAP and escalated abx. Noted therapetuic levels earlier this admission on 1250 mg Q12 hrs however pt w/ apparent developing BULL - Scr increased to 1.33 mg/dL this AM and uptrending daily. Will re-load w/ 1750 mg (~23 mg/kg) conservatively and draw random level in AM ~16 hrs post dose to determine clearance. Dose administration notes:   Doses given appropriately as scheduled    Date Dose & Interval Measured (mcg/mL) Extrapolated (mcg/mL)   ? ? ? ?   ? ? ? ?   ? ? ? ? Non-Kinetic Antimicrobial Dosing Regimen:   Current Regimen:  Cefepime- pharmacy to dose  Recommendation: Dose as 2 grams Q12 hrs over 240 mins per EI protocol for CrCl 30-60 ml/min  Dose administration notes:   Doses given appropriately as scheduled    Other Antimicrobial   (not dosed by pharmacist) Doxy 100 mg BID x 5 days   Cultures 10/10 Blood (paired): NGTD, Prelim  10/12 C. Diff: Negative  10/6 Sputum: Moderate normal Resp payal   Serum Creatinine Lab Results   Component Value Date/Time    Creatinine 1.33 (H) 10/14/2022 03:32 AM         Creatinine Clearance Estimated Creatinine Clearance: 41.3 mL/min (A) (based on SCr of 1.33 mg/dL (H)).      Temp Temp: 98.9 °F (37.2 °C)       WBC Lab Results   Component Value Date/Time    WBC 0.2 (LL) 10/14/2022 03:32 AM        Procalcitonin Lab Results   Component Value Date/Time    Procalcitonin <0.05 10/10/2022 02:39 PM        For Antifungals, Metronidazole and Nafcillin: Lab Results   Component Value Date/Time    ALT (SGPT) 242 (H) 10/14/2022 03:32 AM    AST (SGOT) 88 (H) 10/14/2022 03:32 AM    Alk.  phosphatase 45 10/14/2022 03:32 AM    Bilirubin, total 0.6 10/14/2022 03:32 AM        Pharmacist Asad Marrufo, PHARMD

## 2022-10-14 NOTE — CONSENT
Informed Consent for Blood Component Transfusion Note    I have discussed with the patient the rationale for blood component transfusion; its benefits in treating or preventing fatigue, organ damage, or death; and its risk which includes mild transfusion reactions, rare risk of blood borne infection, or more serious but rare reactions. I have discussed the alternatives to transfusion, including the risk and consequences of not receiving transfusion. The patient had an opportunity to ask questions and had agreed to proceed with transfusion of blood components.     Electronically signed by Galina Rodgers NP on 10/14/22 at 3:16 PM

## 2022-10-14 NOTE — ROUTINE PROCESS
Bedside and Verbal shift change report given to DEANNA Ko(oncoming nurse) by Leora Boyd (offgoing nurse). Report included the following information SBAR and Kardex.

## 2022-10-14 NOTE — PROGRESS NOTES
[facility]     Name: Lucius Helms MRN: [id number]   : 1955 Hospital: [address]   Date: 10/14/2022        Impression Plan   Acute hypoxic respiratory failure  COVID 19 PNA with questionable superimposed bacterial PNA  Lymphoma s/p recent R-CHOP  Pancytopenia  Hx of tobacco abuse               CT chest not necessarily consistent with COVID 19 PNA and concerning for bacterial PNA  Wean O2 to keep sats above 90%  Continue dexamethasone  Combivent and Dulera  Switch to Vanc and cefepime since pt is worsening  CXR orderered  Barcitinib contraindicated since pt is immunocompromised and with possible bacterial infection. Nivestym per hematology  OOB into chair  Holding anticoagulation           Addendum: CXR with increased infiltrates bilaterally. Suspect this to be a component of COVID 19 PNA superimposed on bacterial PNA. O2 requirement remains 6 liters. Will continue current abx and dexamethasone. Will see as needed over the weekend. Please page with questions. Radiology  (personally reviewed) CT chest reviewed: bilateral peripheral infiltrates, no PE       Subjective     Cc: shortness of breath    77 yo with PMHx of lymphoma presenting with increasing SOB for the last 3 days. Was admitted last week for R-CHOP chemotherapy. Her son was COVID 19 positive last week. Pt is COVID 19 positive in ER. She has had the first series of mRNA vaccine but none of the boosters. Currently on 4 liters of O2 and dyspnic. WBC 13, neutrophil predominant. Bilateral infiltrates on CT chest.     Interval history:    Review of Systems: Up to 6 liters this AM, appears dyspnic but states that she feels better. A comprehensive review of systems was negative except for that written in the HPI.     Past Medical History:   Diagnosis Date    Adverse effect of anesthesia     PHLEGM IN THROAT POST OP & NEEDED X2 BREATHING TREATMENTS    Anxiety     COPD (chronic obstructive pulmonary disease) (HCC) 2022    emphysema    Depression     GERD (gastroesophageal reflux disease)     Hernia, femoral     since childhood    Hypertension     Joint pain     Nausea & vomiting     TMJ arthritis       Past Surgical History:   Procedure Laterality Date    HX CERVICAL FUSION  2012    C5-C6    HX CHOLECYSTECTOMY  2020    HX COLONOSCOPY      HX ENDOSCOPY      HX HYSTERECTOMY  2019    HX TONSILLECTOMY      AS A CHILD    HX WISDOM TEETH EXTRACTION      TEENAGER    IR INSERT TUNL CVC W PORT OVER 5 YEARS  4/29/2022      Prior to Admission medications    Medication Sig Start Date End Date Taking? Authorizing Provider   fluconazole (DIFLUCAN) 100 mg tablet Take 1 Tablet by mouth daily as needed for PRN Reason (Other) (for any thrush as needed.) for up to 7 days. FDA advises cautious prescribing of oral fluconazole in pregnancy. 10/7/22 10/14/22  Regina Rodriguez MD   doxycycline (VIBRA-TABS) 100 mg tablet Take 1 Tablet by mouth every twelve (12) hours. 10/7/22   Regina Rodriguez MD   albuterol (PROVENTIL HFA, VENTOLIN HFA, PROAIR HFA) 90 mcg/actuation inhaler Take 2 Puffs by inhalation every six (6) hours as needed for Wheezing or Shortness of Breath for up to 20 days. 10/7/22 10/27/22  Regina Rodriguez MD   morphine IR (MS IR) 15 mg tablet Take 1 Tablet by mouth every six (6) hours as needed for Pain for up to 15 days. Max Daily Amount: 60 mg. 10/4/22 10/19/22  Jabier Crooks MD   ALPRAZolam Marilee Rider) 1 mg tablet Take 1 Tablet by mouth two (2) times daily as needed for Anxiety for up to 15 days. Max Daily Amount: 2 mg. 10/4/22 10/19/22  Jabier Crooks MD   nicotine (NICODERM CQ) 21 mg/24 hr 1 Patch by TransDERmal route every twenty-four (24) hours for 30 days. 9/14/22 10/14/22  Aren Krishnamurthy MD   ondansetron (ZOFRAN ODT) 8 mg disintegrating tablet Take 1 Tablet by mouth every eight (8) hours as needed for Nausea or Vomiting.  9/2/22   Regina Rodriguez MD   prochlorperazine (COMPAZINE) 10 mg tablet Take 1 Tablet by mouth every six (6) hours as needed for Nausea (do not take if groggy; take if nausea despite zofran). 9/2/22   Noah Kearns MD   trimethoprim-sulfamethoxazole (BACTRIM DS, SEPTRA DS) 160-800 mg per tablet Take 1 Tablet by mouth every Monday, Wednesday, Friday.  Indications: prophylaxis treatment of cancer related infections 8/24/22   Schoeneweis, Delories Mayans, NP   FLUoxetine (PROzac) 40 mg capsule TAKE 1 CAPSULE BY MOUTH EVERY MORNING 10/11/21   Dewey Sibley MD   amLODIPine-benazepril (LOTREL) 5-20 mg per capsule TAKE 1 CAPSULE BY MOUTH EVERY MORNING 10/11/21   Dewey Sibley MD     Current Facility-Administered Medications   Medication Dose Route Frequency    potassium chloride 10 mEq in 100 ml IVPB  10 mEq IntraVENous Q1H    amoxicillin-clavulanate (AUGMENTIN) 875-125 mg per tablet 1 Tablet  1 Tablet Oral BID WITH MEALS    L.acidophilus-paracasei-S.thermophil-bifidobacter (RISAQUAD) 8 billion cell capsule  1 Capsule Oral DAILY    lisinopriL (PRINIVIL, ZESTRIL) tablet 20 mg  20 mg Oral DAILY    ipratropium-albuterol (COMBIVENT RESPIMAT) 20 mcg-100 mcg inhalation spray  1 Puff Inhalation Q6H RT    filgrastim-aafi (NIVESTYM) injection syringe 420 mcg  5 mcg/kg SubCUTAneous QPM    fluconazole (DIFLUCAN) tablet 100 mg  100 mg Oral DAILY    sodium chloride (NS) flush 5-40 mL  5-40 mL IntraVENous Q8H    doxycycline (VIBRAMYCIN) 100 mg in 0.9% sodium chloride (MBP/ADV) 100 mL MBP  100 mg IntraVENous Q12H    dexamethasone (PF) (DECADRON) 10 mg/mL injection 6 mg  6 mg IntraVENous Q24H    amLODIPine (NORVASC) tablet 5 mg  5 mg Oral DAILY    FLUoxetine (PROzac) capsule 40 mg  40 mg Oral DAILY    trimethoprim-sulfamethoxazole (BACTRIM DS, SEPTRA DS) 160-800 mg per tablet 1 Tablet  1 Tablet Oral Q MON, WED & FRI    mometasone-formoterol (DULERA) 200mcg-5mcg/puff  2 Puff Inhalation BID RT    nicotine (NICODERM CQ) 21 mg/24 hr patch 1 Patch  1 Patch TransDERmal DAILY     Allergies   Allergen Reactions    Oxycodone Nausea Only Social History     Tobacco Use    Smoking status: Some Days     Packs/day: 0.25     Years: 40.00     Pack years: 10.00     Types: Cigarettes    Smokeless tobacco: Never   Substance Use Topics    Alcohol use: Not Currently     Alcohol/week: 0.0 standard drinks      Family History   Problem Relation Age of Onset    Heart Disease Mother 46    Heart Surgery Mother         HEART TRANSPLANT    Elevated Lipids Mother     Hypertension Mother     COPD Father     Emphysema Father     Sudden Death Brother 46    Other Maternal Grandmother         BRAIN TUMOR    No Known Problems Son     No Known Problems Daughter           Laboratory: I have personally reviewed the flowsheet and labs.      Recent Labs     10/14/22  0332 10/13/22  0457 10/12/22  0123   WBC 0.2* 0.6* 4.3   HGB 8.7* 8.2* 9.0*   HCT 26.5* 25.7* 27.3*   PLT 15* 20* 34*       Recent Labs     10/14/22  0332 10/13/22  0457 10/12/22  0123    141 141   K 3.1* 3.5 3.8    107 109*   CO2 26 28 26   GLU 94 124* 146*   BUN 16 12 11   CREA 1.33* 0.95 0.44*   CA 8.1* 8.6 8.0*   ALB 2.6* 2.5* 2.5*   * 202* 228*         Objective:   Visit Vitals  /69 (BP 1 Location: Right upper arm, BP Patient Position: At rest)   Pulse 86   Temp 98.9 °F (37.2 °C)   Resp 18   Ht 5' 2.99\" (1.6 m)   SpO2 93%   BMI 30.74 kg/m²       Intake/Output Summary (Last 24 hours) at 10/14/2022 0911  Last data filed at 10/14/2022 0700  Gross per 24 hour   Intake 2180 ml   Output --   Net 2180 ml       EXAM:   GENERAL: awake, alert, tired appearing, mildy dyspnic HEENT:  anicteric, EOMI, no alar flaring or epistaxis, oral mucosa moist without cyanosis, NECK:  no jugular vein distention, no retractions, no thyromegaly or masses, LUNGS: scant rales bilaterally, HEART:  Regular rate and rhythm with no MGR; no edema is present, ABDOMEN:  soft with no tenderness, bowel sounds present, EXTREMITIES:  warm with no cyanosis, SKIN:  no jaundice or ecchymosis, and NEUROLOGIC:  alert and oriented, grossly non-focal    Nohemy Parada MD  [facility]

## 2022-10-14 NOTE — PROGRESS NOTES
[facility] [place] at [address], [address] [place] [address] [id number]  [facility] Million: [phone]  F: [phone] Patient ID  Name: Dunia Baird  YOB: 1955  MRN: [id number]  Referring Provider:   No referring provider defined for this encounter. 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 This summary highlights focused aspects of patient's ongoing care and may have a recurring section in notes with either updates or remain unchanged as a longitudinal care summary.    -------------------------------------------------------------------------------------------------------------------------------------------------------------------------------------------------------  DIAGNOSIS:  Diffuse Large B-Cell Lymphoma     ORIGINAL STAGING:  Stage IV     SITES OF DISEASE:  Left Cervical Lymph Node,Bone Disease, Stage IV    CURRENT TREATMENT:  C1,D1 on 5/11/22 DA-R-EPOCH  C2  C3 delayed due to appetite issues and patient anxiety to restart therapy     Plan Name: OP R-CHOP   Treatment goal Curative   Provider Marci Hernandez MD   Status Inactive   Start Date    End Date    Treatment plan weight/height/BSA Weight type: Documented (effective on 4/27/2022), 94.1 kg (entered on 4/27/2022), 160 cm (entered on 4/27/2022), BSA 2.05 m2   Most recent weight/height/BSA Weight: 173 lb 8 oz (78.7 kg)    Height: 5' 2.99\" (1.6 m)    BSA (calculated - sq m): 1.86 sq meters      Treatment on clinical trial? [This plan is not part of a research study]   Reason for stopping treatment MD Discretion   Summary of Treatment Plan Medications DOXOrubicin (ADRIAMYCIN) chemo syringe 51.2 mg, 25 mg/m2 = 51.2 mg, IntraVENous, ONCE, 0 of 6 cycles  Dose modification: 25 mg/m2 (original dose 50 mg/m2, Cycle 1, Reason: Per Protocol, Comment: dose split into 2 syringes due to volume)    cyclophosphamide (CYTOXAN) 1,538 mg in 0.9% sodium chloride 250 mL chemo infusion, 750 mg/m2 = 1,538 mg, IntraVENous, ONCE, 0 of 6 cycles    vinCRIStine (VINCASAR PFS) 2 mg in 0.9% sodium chloride 50 mL chemo IVPB, 2 mg (100 % of original dose 2 mg), IntraVENous, ONCE, 0 of 6 cycles  Dose modification: 2 mg (original dose 2 mg, Cycle 1, Reason: Other)    riTUXimab-pvvr (RUXIENCE) 769 mg in 0.9% sodium chloride 769 mL infusion, 375 mg/m2 = 769 mg, IntraVENous, ONCE TITRATE, 0 of 1 cycle       Plan Name: [facility],    Treatment goal Curative   Provider Portillo Lorenz MD   Status Active   Start Date 5/11/2022   End Date 10/30/2022 (Planned)   Treatment plan weight/height/BSA Weight type: Documented (effective on 8/17/2022), 79.5 kg (entered on 8/17/2022), 160 cm (entered on 8/5/2022), BSA 1.88 m2   Most recent weight/height/BSA Weight: 173 lb 8 oz (78.7 kg)    Height: 5' 2.99\" (1.6 m)    BSA (calculated - sq m): 1.86 sq meters      Treatment on clinical trial? [This plan is not part of a research study]   Reason for stopping treatment [Plan is still active]   Summary of Treatment Plan Medications cyclophosphamide (CYTOXAN) 1,537.6 mg in 0.9% sodium chloride 250 mL, overfill volume 25 mL chemo infusion, 750 mg/m2 = 1,538 mg, IntraVENous, ONCE, 5 of 6 cycles  Dose modification: 900 mg/m2 (original dose 750 mg/m2, Cycle 2, Reason: Per Protocol, Comment: ANC above 0.5), 600 mg/m2 (original dose 750 mg/m2, Cycle 3, Reason: Dose Not Tolerated), 600 mg/m2 (80 % of original dose 750 mg/m2, Cycle 4, Reason: Dose Not Tolerated)  Administration: 1,537.6 mg (5/15/2022), 1,809 mg (6/10/2022), 1,128 mg (8/22/2022), 1,128 mg (9/16/2022), 1,128 mg (10/7/2022)    etoposide (VEPESID) 102.6 mg in 0.9% sodium chloride 450 mL chemo infusion, 50 mg/m2 = 102.6 mg, IntraVENous, EVERY 24 HOURS, 5 of 6 cycles  Dose modification: 60 mg/m2 (original dose 50 mg/m2, Cycle 2, Reason: Per Protocol, Comment: ANC above 0.5), 40 mg/m2 (original dose 50 mg/m2, Cycle 3, Reason: Dose Not Tolerated), 40 mg/m2 (80 % of original dose 50 mg/m2, Cycle 4, Reason: Dose Not Tolerated)  Administration: 102.6 mg (5/11/2022), 102.6 mg (5/12/2022), 102.6 mg (5/13/2022), 102.6 mg (5/14/2022), 120.6 mg (6/6/2022), 120.6 mg (6/7/2022), 120.6 mg (6/8/2022), 120.6 mg (6/9/2022), 75.2 mg (8/18/2022), 75.2 mg (8/19/2022), 75.2 mg (8/20/2022), 75.2 mg (8/21/2022), 75.2 mg (9/12/2022), 75.2 mg (9/13/2022), 75.2 mg (9/14/2022), 75.2 mg (9/15/2022), 75.2 mg (10/3/2022), 75.2 mg (10/4/2022), 75.2 mg (10/5/2022), 75.2 mg (10/6/2022)    vinCRIStine (VINCASAR PFS) 0.8 mg, DOXOrubicin (ADRIAMYCIN) 20.6 mg in 0.9% sodium chloride 250 mL, overfill volume 25 mL chemo infusion, , IntraVENous, EVERY 24 HOURS, 5 of 6 cycles  Administration:  (5/11/2022),  (5/12/2022),  (5/13/2022),  (5/14/2022),  (6/6/2022),  (6/7/2022),  (6/8/2022),  (6/9/2022),  (8/18/2022),  (8/19/2022),  (8/20/2022),  (8/21/2022),  (9/12/2022),  (9/13/2022),  (9/14/2022),  (9/15/2022),  (10/3/2022),  (10/4/2022),  (10/5/2022),  (10/6/2022)    riTUXimab-pvvr (RUXIENCE) 769 mg in 0.9% sodium chloride 769 mL infusion, 375 mg/m2 = 769 mg, IntraVENous, ONCE TITRATE, 1 of 2 cycles  Administration: 769 mg (5/11/2022)    riTUXimab-pvvr (RUXIENCE) 754 mg in 0.9% sodium chloride 250 mL RAPID infusion, 375 mg/m2 = 754 mg, IntraVENous, ONCE TITRATE, 4 of 4 cycles  Administration: 754 mg (6/6/2022), 705 mg (8/18/2022), 705 mg (9/12/2022), 705 mg (10/3/2022)         PRIOR TREATMENT:  n/a     GOALS OF CARE:  Curative Intent     PATHOLOGY:  Specimen #:[id number] Collect: 4/22/2022      ==========================================================================                    * * *SURGICAL PATHOLOGY REPORT* * *   ==========================================================================   * * *PROCEDURES/ADDENDA* * *   ADDENDUM   Date Ordered: 4/26/2022 Status: Signed Out   Date Complete: 4/26/2022     By: Lauryn Lorenzana MD   Date Reported: 4/26/2022   Addendum Diagnosis   Lymph node, left posterior cervical lymph node, excision:   In situ hybridization for Jesús-Barr viral RNA: Negative   CPT: 51578  Addendum Comment   * * *CLINICAL HISTORY* * *   Cervical lymphadenopathy, rule out lymphoma   ==========================================================================   * * *FINAL PATHOLOGIC DIAGNOSIS* * *        Lymph node, left posterior cervical lymph node, excision:        Large B cell lymphoma. See comment. * * *Comment* * *   The histologic section has fragments of lymphoid tissue with diffuse and   focal follicular architecture. Diffuse areas are comprised of large,   atypical lymphocytes with centroblastic morphology and increased mitotic   activity. Immunohistochemical stains confirm the malignant cells are CD20   positive B cells that coexpress CD10 and BCL6 without MUM1. CD23   highlights rare follicular dendritic networks associated with lymphoid   follicles with germinal centers and express CD10 and BCL6 without BCL2. CyclinD1 and CD56 stains are negative. Concurrent flow cytometric analysis   confirms a clonal CD10 positive B-cell population comprised of medium to   large cells. The Ki-67 proliferation index is 30%. The overall findings favor the diagnosis of diffuse large B-cell lymphoma,   germinal center subtype. Molecular genetic studies are pending for further   characterization will be reported in an addendum. Flow cytometry:   Consistent with CD10-positive B-cell lymphoproliferative disorder. Comments: Flow cytometry shows monoclonal B-cells (36% of total cells)   with CD10 expression without CD5, consistent with a CD10-positive B-cell   lymphoproliferative disorder. The main differential diagnosis includes   follicular lymphoma, Burkitt lymphoma and large B-cell lymphoma.  Please   correlate the result with morphologic findings and clinical information. Flow Differential (%) and Population Analysis:   Lymphocytes: 93.7%   T-cells (39% of lymphoid cells) show a CD4/CD8 ratio of 3.3 without overt   phenotypic abnormality. NK-cells (1% of lymphoid cells) are unremarkable. Mature B-cells (56% of lymphoid cells) include large forms based on   forward scattered pattern and show: CD5 neg, CD10+, CD11c+dim, CD19+,   CD20+ with surface lambda light chain restriction. Markers Performed: CD2, CD3, CD4, CD5, CD7, CD8, CD10, CD11c, CD19, CD20,   CD23, CD34, CD38, CD45, CD56, Kappa, Lambda (17 Markers)   The Technical Component Processing of this test was completed at   [facility], [address] [place] [address], [place], [place] / [address] /   [phone] / Samra Erwin # [id number]. Interpretation by Nicolas Fernandez M.D. The   complete scanned report is available in Epic. CPT: O1755940, T6547686, G0825280, [id number], [id number], [id number]   [facility]/2022   *Electronically Signed Out By Vonda Lang. Yvonne Leigh MD*   ==========================================================================   * * *Gross Description* * *   Specimen #1, received fresh labeled with the patient's name,  and left   posterior cervical lymph node, consists of two paper towels containing a   1.7 x 1.5 x 0.5 cm aggregate of pale pink-tan, fleshy soft tissue   fragments. The specimen is handled according to the flow cytometry   protocol as follows: 7          Two air-dried touch prep slides - one Diff Quik stained, one   rapid-fixed in 95% alcohol   7          Representative sections in B plus fixative (1A)   7          One piece held in RPMI for possible flow cytometry analysis   7          Remaining tissue fixed in formalin for permanents (1B)   Dr. Yvonne Leigh will determine if flow cytometry is necessary. 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  F who presents as an inpatient consultation for Lymphoma, COVID Positive, need for growth factor support. Patient called in today saying that she was too sick to take her Neulasta shot. She reports shortness of breath. Referred her to the ER. She did not think she could make it to the [facility] location for her neulasta shot and reported general decline since discharge last Friday which was Day 5 of her 5th cycle of treatment. .   Patient overall reports feeling worse. She reports a declines over the weekend. Interval History:   Appetite remains poor;taking fluids and ensures. Thrush improved. Diarrhea continues with 3-4 stools today. Cough improved. Denies SOB. Denies any signs of bleeding     Discussed the possible need for plt transfusion. All questions answered regarding transfusion.      Past Medical History:   Diagnosis Date    Adverse effect of anesthesia     PHLEGM IN THROAT POST OP & NEEDED X2 BREATHING TREATMENTS    Anxiety     COPD (chronic obstructive pulmonary disease) (HCC)     emphysema    Depression     GERD (gastroesophageal reflux disease)     Hernia, femoral     since childhood    Hypertension     Joint pain     Nausea & vomiting     TMJ arthritis          Objective:      Visit Vitals  /70 (BP 1 Location: Right upper arm, BP Patient Position: At rest)   Pulse 90   Temp 98.5 °F (36.9 °C)   Resp 18   Ht 5' 2.99\" (1.6 m)   Wt 173 lb 8 oz (78.7 kg)   SpO2 90%   BMI 30.74 kg/m²     ECO-3  General: ill appearing, no acute distress  Respiratory: normal respiratory effort: O2 in use: 6L  CV: no peripheral edema  Skin: no rashes; no ecchymoses; no petechiae  Psych: alert, oriented, normal mood/affect       Results:        Lab Results   Component Value Date/Time    WBC 0.2 (LL) 10/14/2022 03:32 AM    HGB 8.7 (L) 10/14/2022 03:32 AM    HCT 26.5 (L) 10/14/2022 03:32 AM    PLATELET 15 (LL)  03:32 AM    MCV 94.6 10/14/2022 03:32 AM     Lab Results   Component Value Date/Time    Sodium 138 10/14/2022 03:32 AM Potassium 3.1 (L) 10/14/2022 03:32 AM    Chloride 105 10/14/2022 03:32 AM    CO2 26 10/14/2022 03:32 AM    Anion gap 7 10/14/2022 03:32 AM    Glucose 94 10/14/2022 03:32 AM    BUN 16 10/14/2022 03:32 AM    Creatinine 1.33 (H) 10/14/2022 03:32 AM    BUN/Creatinine ratio 12 10/14/2022 03:32 AM    GFR est AA >60 10/03/2022 06:44 AM    GFR est non-AA >60 10/03/2022 06:44 AM    Calcium 8.1 (L) 10/14/2022 03:32 AM    Bilirubin, total 0.6 10/14/2022 03:32 AM    Alk. phosphatase 45 10/14/2022 03:32 AM    Protein, total 5.4 (L) 10/14/2022 03:32 AM    Albumin 2.6 (L) 10/14/2022 03:32 AM    Globulin 2.8 10/14/2022 03:32 AM    A-G Ratio 0.9 (L) 10/14/2022 03:32 AM    ALT (SGPT) 242 (H) 10/14/2022 03:32 AM    AST (SGOT) 88 (H) 10/14/2022 03:32 AM       Assessment and Recommendations:      COVID-19 infection  -suspected onset last Monday/ worsening cough. 10/6 Tested positive  -management per primary team/pulmonary;   10/14 pulmonary : abx changed to vanc and cefepime    2. Metastatic cancer to bone [facility])  -reports diffuse achiness which  is different than her usual pain complaints. 3.Diffuse large B-cell lymphoma of lymph nodes of neck (HCC)  -completed C5 da-REPOCH on 10/7/22 due for C6 on 10/24.  -unclear if any issue if growth factor administered during a covid infection but patient is at high risk for sepsis. -filgrastim-aafi (NIVESTYM) injection syringe 420 mcg x 5-7 days at least until Monday, 10/17/22  - will plan to delay C6 and cont to monitor pt's current medical condition    4. Thrombocytopenia-   secondary to chemo effect. Recommend holding any anticoagulation while PLT's are less than 50K. Continue to monitor daily. Transfuse 1 unit plts for plt < 10k or for signs of bleeding  -plts 15K today ; possible need for transfusion in am     5. Thrush: Improving on diflucan daily    6. Nutrition: poor oral intake: encouraged fluids and to eat soft foods; added supplemental drinks TID.      Plan reviewed with Dr Paulino Si

## 2022-10-14 NOTE — PROGRESS NOTES
10/14/2022   CARE MANAGEMENT NOTE:  CM reviewed EMR for clinical updates. READMISSION:  Pt had a scheduled admission for chemo from 10/3 - 10/7. Pt was readmitted to [facility] on 86/29 with COVID complicated by metastatic stage IV lymphoma. Reportedly,pt resides with her son [phone]). Dtr Alejo Denise [phone]) is another family contact. RUR 26%     Transition Plan of Care:  Hem/onc, Pulmonary are following for medical management   Plan is for pt to return home with her son  RT home oxygen eval on 10/12 - pt was 89% on room air and 86% with activity (meets criteria 2L). Updated sats will be needed prior to discharge  Outpatient follow up  Family will transport pt home     CM will continue to follow pt until discharged.   Steve

## 2022-10-14 NOTE — PROGRESS NOTES
[address] [address]   Office [phone]  Fax [phone]          Subjective / Objective     24 Hour Events: NAEO    Subjective  Patient reports feeling better with more energy today but with persistent cough. Continues to report diarrhea that has been ongoing for the past couple days. Not watery or foul smelling. One bout overnight    Visit Vitals  /69 (BP 1 Location: Right upper arm, BP Patient Position: At rest)   Pulse 86   Temp 98.9 °F (37.2 °C)   Resp 18   Ht 5' 2.99\" (1.6 m)   SpO2 93%   BMI 30.74 kg/m²     General: No acute distress. Calmly resting but appears chronically ill  Head: Normocephalic. Atraumatic. Neck: Normal ROM. No stiffness. Mouth: No tongue plaque  Respiratory: End exp wheezing. Ronchi present. No increased work of breathing. Cough with deep breathing  Cardiovascular: RRR. Normal S1,S2. No m/r/g.   GI: + bowel sounds. Nontender. No rebound tenderness or guarding. Nondistended. Extremities:  no LE edema. Skin: Warm, dry. I/O:  Date 10/13/22 0700 - 10/14/22 0659 10/14/22 0700 - 10/15/22 0659   Shift 0161-9623 3215-3901 24 Hour Total 6336-3657 3663-4361 24 Hour Total   INTAKE   P.O.  500  500     P. O.  500  500   I.V.  0 0        I.V.  0 0      Shift Total  500  500   OUTPUT   Urine           Urine Occurrence(s) 3 x  3 x      Shift Total          824 0962 500  500   Weight (kg)               CBC:  Recent Labs     10/14/22  0332 10/13/22  0457 10/12/22  0123   WBC 0.2* 0.6* 4.3   HGB 8.7* 8.2* 9.0*   HCT 26.5* 25.7* 27.3*   PLT 15* 20* 34*         Metabolic Panel:  Recent Labs     10/14/22  0332 10/13/22  0457 10/12/22  0123    141 141   K 3.1* 3.5 3.8    107 109*   CO2 26 28 26   BUN 16 12 11   CREA 1.33* 0.95 0.44*   GLU 94 124* 146*   CA 8.1* 8.6 8.0*   ALB 2.6* 2.5* 2.5*   * 202* 228*           Assessment and Plan       Rashid Ca is a 78 y.o. 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 Currently on steroids and on abx for bacterial PNA. Patient is currently on 6L mask but persistently takes off her oxygen and was not utilizing any during assessment this morning. Pancytopenia is worsening with plt 15 and wbc 0.2 today    AHRF in setting of COVID-19 with suspected superimposed bacterial PNA: Improving Hx of COPD with no home O2. Currently on 6L mask but not wearing this during examination. LA and procal negative. Inflammatory markers elevated today  - Doxycycline 100 bid x5 days (10/10-)  - S/p vanc/zosyn. Now on PO Augmentin day 2  - Duo-nebs scheduled q4  - Brovana-Pulmicort bid  - Decadron 6 daily x5 days IV (day 5), additional 5 po  - Supplemental O2 prn for sats <88%  - Daily CBC, CMP, D-dimer, LD, Ferritin   - Pulm consulted and following, appreciate rec's     DLBCL with Pancytopenia: Pancytopenia worsening. Follows with Dr. Esau Moya. Currently undergoing DA-R-EPOCH chemotherapy and filgrastim injections. WBC now at 0.2 today and platelets down to 15.  - Oncology consulted, appreciate rec's  - Filgrastim injections daily until 10/17  - Zofran and compazine q6-8 hrs prn nausea  - Morphine 15 mg q4 hr prn    Thrush/Mouth Pain: resolved. eating and drinking much better compared to yesterday  - Diflucan 100mg daily  - Magic Mouthwash as needed before meals for discomfort     Pancytopenia: POA PLT 62. Plt continues to downtrend, at 15 today. Likely 2/2 chemotherapy. - Hold any AC with plt <50  - Daily CBC  - Oncology consulted, appreciate rec's      Hypertension: POA BP was 92/66.   - Continue home Amplodipine-benazepril 5-20 daily      Hypokalemia: POA K 2.9.   - Replete prn  - Follow on daily BMP     MDD/RENETTA: Chronic, stable.   - Home Xanax 1mg bid  - Home Prozac 40mg daily     Tobacco dependence: About 40 pack years. Currently smokes about 5 cigarettes per day. - Nicotine patch 21 daily     FEN/GI - Regular diet. No IVF. Activity - Ambulate as tolerated  DVT prophylaxis - SCDs  GI prophylaxis - Not indicated at this time  Fall prophylaxis - Not indicated at this time. Disposition - Admit to Telemetry. Plan to d/c to Home. Code Status - Full, discussed with patient / caregivers.   Next of Kin Name and Contact Nicole Anna (Daughter) [phone]     Courtney Alaniz DO  Family Medicine Resident       For Billing    Chief Complaint   Patient presents with    Medication Problem       Hospital Problems  Date Reviewed: 9/23/2022            Codes Class Noted POA    Lymphoma [facility]) ICD-10-CM: C85.90  ICD-9-CM: 202.80  10/10/2022 Unknown        COVID ICD-10-CM: U07.1  ICD-9-CM: 079.89  10/10/2022 Unknown        Acute respiratory failure with hypoxia ([facility] [address] ICD-10-CM: J96.01  ICD-9-CM: 518.81  9/13/2022 Unknown

## 2022-10-14 NOTE — PROGRESS NOTES
Problem: Falls - Risk of  Goal: *Absence of Falls  Outcome: Progressing Towards Goal  Note: Fall Risk Interventions:            Medication Interventions: Patient to call before getting OOB, Teach patient to arise slowly, Bed/chair exit alarm    Elimination Interventions: Bed/chair exit alarm, Call light in reach, Stay With Me (per policy)    History of Falls Interventions: Bed/chair exit alarm         Problem: Patient Education: Go to Patient Education Activity  Goal: Patient/Family Education  Outcome: Progressing Towards Goal     Problem: Risk for Spread of Infection  Goal: Prevent transmission of infectious organism to others  Outcome: Progressing Towards Goal     Problem: Patient Education:  Go to Education Activity  Goal: Patient/Family Education  Outcome: Progressing Towards Goal     Problem: Pain  Goal: *Control of Pain  Outcome: Progressing Towards Goal  Goal: *PALLIATIVE CARE:  Alleviation of Pain  Outcome: Progressing Towards Goal     Problem: Patient Education: Go to Patient Education Activity  Goal: Patient/Family Education  Outcome: Progressing Towards Goal     Problem: Airway Clearance - Ineffective  Goal: Achieve or maintain patent airway  Outcome: Progressing Towards Goal     Problem: Gas Exchange - Impaired  Goal: Absence of hypoxia  Outcome: Progressing Towards Goal  Goal: Promote optimal lung function  Outcome: Progressing Towards Goal     Problem: Breathing Pattern - Ineffective  Goal: Ability to achieve and maintain a regular respiratory rate  Outcome: Progressing Towards Goal     Problem:  Body Temperature -  Risk of, Imbalanced  Goal: Ability to maintain a body temperature within defined limits  Outcome: Progressing Towards Goal  Goal: Will regain or maintain usual level of consciousness  Outcome: Progressing Towards Goal  Goal: Complications related to the disease process, condition or treatment will be avoided or minimized  Outcome: Progressing Towards Goal     Problem: Isolation Precautions - Risk of Spread of Infection  Goal: Prevent transmission of infectious organism to others  Outcome: Progressing Towards Goal     Problem: Nutrition Deficits  Goal: Optimize nutrtional status  Outcome: Progressing Towards Goal     Problem: Risk for Fluid Volume Deficit  Goal: Maintain normal heart rhythm  Outcome: Progressing Towards Goal  Goal: Maintain absence of muscle cramping  Outcome: Progressing Towards Goal  Goal: Maintain normal serum potassium, sodium, calcium, phosphorus, and pH  Outcome: Progressing Towards Goal     Problem: Patient Education: Go to Patient Education Activity  Goal: Patient/Family Education  Outcome: Progressing Towards Goal     Problem: Fatigue  Goal: Verbalize increase energy and improved vitality  Outcome: Progressing Towards Goal

## 2022-10-14 NOTE — PROGRESS NOTES
Comprehensive Nutrition Assessment    Type and Reason for Visit: Initial, RD nutrition re-screen/LOS    Nutrition Recommendations/Plan:   Continue regular diet  Provide Ensure Enlive TID (1050 kcal, 132 g carbs, 60 g P) to aid in meeting kcal/protein needs. Malnutrition Assessment:  Malnutrition Status:  Insufficient data (10/14/22 1158)  - did not perform Nutrition Focused Physical Exam 2/2 droplet+ precautions   Context:  Acute illness     Findings of the 6 clinical characteristics of malnutrition:   Energy Intake:  Mild decrease in energy intake (specify) (x 4 days)  Weight Loss:  No significant weight loss     Body Fat Loss:  Unable to assess,     Muscle Mass Loss:  Unable to assess,    Fluid Accumulation:  No significant fluid accumulation,     Strength:  Not performed     Nutrition Assessment:     Pt is a 78year old female admitted with COVID [U07.1]  Lymphoma ([facility] [C85.90]  [facility] [J96.01]. She  has a past medical history of Adverse effect of anesthesia, Anxiety, COPD (chronic obstructive pulmonary disease) ([facility] (2022), Depression, GERD (gastroesophageal reflux disease), Hernia, femoral, Hypertension, Joint pain, Nausea & vomiting, and TMJ arthritis. RD screen for LOS. Patient did not answer phone when RD called into room. Chart reviewed. Patient familiar to services. Usually with intake ~25-50% of all meals and ONS intake of ~75% of TID servings. Noted no documented BM x 2 days. NKFA. Noted to have some thrush earlier in admission but this is now resolved, expect PO intake to somewhat improve. No hx of chewing/swallowing difficulties. Weight stable from last admission with no noted edema; however, is 15# (7.9%) loss from weight last month, clinically significant for timeframe. ONS already ordered TID for patient.      Patient Vitals for the past 168 hrs:   % Diet Eaten   10/13/22 1800 1 - 25%   10/13/22 1322 1 - 25%   10/13/22 0932 1 - 25%   10/12/22 0800 51 - 75%   10/11/22 1146 1 - 25%   10/10/22 2200 1 - 25%       Wt Readings from Last 10 Encounters:   10/14/22 78.7 kg (173 lb 8 oz)   10/07/22 78.7 kg (173 lb 8 oz)   09/22/22 76.5 kg (168 lb 9.6 oz)   09/19/22 81.6 kg (180 lb)   09/15/22 85.6 kg (188 lb 11.4 oz)   09/01/22 80.7 kg (178 lb)   08/29/22 77.7 kg (171 lb 3.2 oz)   08/24/22 82.1 kg (181 lb)   08/21/22 85 kg (187 lb 6.3 oz)   08/17/22 79.5 kg (175 lb 3.2 oz)         Nutrition Related Findings:      Wound Type: None  Last Bowel Movement Date: 10/12/22 (no bm today)  Stool Appearance: Loose  Abdominal Assessment: Intact, Obese  Bowel Sounds: Active   Edema:No data recorded    Nutr. Labs:    Lab Results   Component Value Date/Time    GFR est AA >60 10/03/2022 06:44 AM    GFR est non-AA >60 10/03/2022 06:44 AM    Creatinine 1.33 (H) 10/14/2022 03:32 AM    BUN 16 10/14/2022 03:32 AM    Sodium 138 10/14/2022 03:32 AM    Potassium 3.1 (L) 10/14/2022 03:32 AM    Chloride 105 10/14/2022 03:32 AM    CO2 26 10/14/2022 03:32 AM       Lab Results   Component Value Date/Time    Glucose 94 10/14/2022 03:32 AM    Glucose (POC) 98 05/04/2022 01:37 PM       No results found for: HBA1C, CDV3SMOE, VYN7ALTG, MKH0MEAU    Nutr. Meds:  Norvasc, decadron, diflucan, risaquad, lisinopril, imodium, magic mouthwash, zofran PRN, compazine PRN, bactrim, vancomycin    Current Nutrition Intake & Therapies:  Average Meal Intake: 1-25%  Average Supplement Intake: 51-75%  ADULT DIET Regular  ADULT ORAL NUTRITION SUPPLEMENT Breakfast, Lunch, Dinner; Standard High Calorie/High Protein    Anthropometric Measures:  Height: 5' 2.99\" (160 cm)  Ideal Body Weight (IBW): 115 lbs (52 kg)     Current Body Wt:  78.7 kg (173 lb 8 oz), 150.9 % IBW. Not specified  Current BMI (kg/m2): 30.7        Weight Adjustment: No adjustment                 BMI Category: Obese class 1 (BMI 30.0-34. 9)    Estimated Daily Nutrient Needs:  Energy Requirements Based On: Kcal/kg  Weight Used for Energy Requirements: Current  Energy (kcal/day): 6051-4020 (25-30 kcal)  Weight Used for Protein Requirements: Current  Protein (g/day):  (1.2-1.5 g/kg)  Method Used for Fluid Requirements: 1 ml/kcal  Fluid (ml/day): 3448-8495    Nutrition Diagnosis:   Increased nutrient needs related to catabolic illness as evidenced by  (cancer, COVID-19)    Nutrition Interventions:   Food and/or Nutrient Delivery: Continue current diet, Continue oral nutrition supplement  Nutrition Education/Counseling: No recommendations at this time  Coordination of Nutrition Care: Continue to monitor while inpatient, Interdisciplinary rounds       Goals:     Goals: Meet at least 75% of estimated needs, by next RD assessment       Nutrition Monitoring and Evaluation:   Behavioral-Environmental Outcomes: None identified  Food/Nutrient Intake Outcomes: Food and nutrient intake, Supplement intake  Physical Signs/Symptoms Outcomes: Biochemical data, Hemodynamic status, Weight    Discharge Planning:    Continue oral nutrition supplement, Continue current diet    Kalyn Charlton MS, RD  Contact: Ext: [phone], or via [employer]

## 2022-10-15 NOTE — PROGRESS NOTES
[address] [facility] Ericka Luque    Office [phone]  Fax [phone]          Subjective / Objective     24 Hour Events: LARRY    Subjective  Patient reports feeling better today and that her cough has improved. Continues to report loose stools that has been ongoing for several days. Not watery or foul smelling. 2 episodes yesterday. Visit Vitals  BP (!) 89/55 (BP 1 Location: Right upper arm, BP Patient Position: At rest)   Pulse 79   Temp 98 °F (36.7 °C)   Resp 15   Ht 5' 2.99\" (1.6 m)   Wt 173 lb 8 oz (78.7 kg)   SpO2 (!) 81%   BMI 30.74 kg/m²     General: No acute distress. Calmly resting but appears chronically ill  Head: Normocephalic. Atraumatic. Neck: Normal ROM. No stiffness. Mouth: No tongue plaque  Respiratory: End exp wheezing. Ronchi present. No increased work of breathing. Cough with deep breathing  Cardiovascular: RRR. Normal S1,S2. No m/r/g.   GI: + bowel sounds. Nontender. No rebound tenderness or guarding. Nondistended. Extremities:  no LE edema. Skin: Warm, dry. I/O:  Date 10/14/22 0700 - 10/15/22 0659 10/15/22 0700 - 10/16/22 0659   Shift 9030-2330 5748-0905 24 Hour Total 5014-3747 5967-9398 24 Hour Total   INTAKE   P.O. 3307 483 9806        P. O. 9731 442 8753      I. V.(mL/kg/hr)  0(0) 0(0)        I.V.  0 0      Shift Total(mL/kg) 5712(91.6) 200(2.5) 0002(84.2)      OUTPUT   Urine(mL/kg/hr)           Urine Occurrence(s) 5 x 1 x 6 x      Emesis/NG output           Emesis Occurrence(s)  0 x 0 x      Stool           Stool Occurrence(s) 3 x 0 x 3 x      Shift Total(mL/kg)         NET 0680 755 9991      Weight (kg) 78.7 78.7 78.7 78.7 78.7 78.7       CBC:  Recent Labs     10/15/22  0424 10/14/22  0332 10/13/22  0457   WBC 0.2* 0.2* 0.6*   HGB 7.7* 8.7* 8.2*   HCT 23.8* 26.5* 25.7*   PLT 19* 15* 20*       Metabolic Panel:  Recent Labs     10/15/22  0424 10/14/22  0332 10/13/22  0457    138 141   K 3.8 3.1* 3.5    105 107   CO2 27 26 28   BUN 19 16 12   CREA 1.04* 1.33* 0.95   * 94 124*   CA 8.9 8.1* 8.6   ALB 2.2* 2.6* 2.5*   * 242* 202*         Assessment and Plan       Radha Sanchez is a 78 y.o. 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 Currently on steroids and on abx for bacterial PNA. Patient is currently on 6L mask but persistently takes off her oxygen. Had off yesterday while in room and off for several minutes today. Plt improved from to 19 from 15. AHRF in setting of COVID-19 with suspected superimposed bacterial PNA: Improving Hx of COPD with no home O2. Currently on 6L mask but not wearing on 10/14 nor on 10/15's examination. LA and procal negative. Inflammatory markers elevated today. s/p zosyn. - Cefepime 2 g BID (10/14-), Doxycycline 100 BID (10/10-), Vancomycin (10/10-)  - Duo-nebs scheduled q4  - Brovana-Pulmicort bid  - Pulm following - continue Decadron 6 daily IV (10/9-)  - Supplemental O2 prn for sats <88%  - Daily CBC, CMP, D-dimer, LD, Ferritin      DLBCL with Pancytopenia: Pancytopenia worsening. Follows with Dr. Neal Garcia. Currently undergoing DA-R-EPOCH chemotherapy and filgrastim injections. WBC now at 0.2 today and platelets up to 19  - Oncology consulted, appreciate rec's  - Filgrastim injections daily until 10/17  - Zofran and compazine q6-8 hrs prn nausea  - Morphine 15 mg q4 hr PRN    Thrush/Mouth Pain: resolved. eating and drinking much better compared to yesterday  - Diflucan 100 mg daily  - Magic Mouthwash as needed before meals for discomfort     Pancytopenia: POA PLT 62. Likely 2/2 chemotherapy.  Plt now 19 from 15 on 10/14.   - Hold any AC with plt <50  - Daily CBC  - Oncology consulted, appreciate rec's      Hypertension: POA BP was 92/66.   - Continue home Amplodipine-benazepril 5-20 daily      Hypokalemia: POA K 2.9.   - Replete PRN  - Follow on daily BMP     MDD/RENETTA: Chronic, stable. - Home Xanax 1mg bid  - Home Prozac 40mg daily     Tobacco dependence: About 40 pack years. Currently smokes about 5 cigarettes per day.   - Nicotine patch 21 daily      Danya Mixon MD  Family Medicine Resident       For Billing    Chief Complaint   Patient presents with    Medication Problem       Hospital Problems  Date Reviewed: 9/23/2022            Codes Class Noted POA    Lymphoma [facility]) ICD-10-CM: C85.90  ICD-9-CM: 202.80  10/10/2022 Unknown        COVID ICD-10-CM: U07.1  ICD-9-CM: 079.89  10/10/2022 Unknown        Acute respiratory failure with hypoxia ([facility] ICD-10-CM: J96.01  ICD-9-CM: 518.81  9/13/2022 Unknown

## 2022-10-15 NOTE — PROGRESS NOTES
[facility] [place] at [address], [address] [facility] [address] [facility]  [place] : [phone]  F: [phone] Patient ID  Name: Adalberto Mccloud  YOB: 1955  MRN: [id number]  Referring Provider:   No referring provider defined for this encounter. 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 This summary highlights focused aspects of patient's ongoing care and may have a recurring section in notes with either updates or remain unchanged as a longitudinal care summary.    -------------------------------------------------------------------------------------------------------------------------------------------------------------------------------------------------------  DIAGNOSIS:  Diffuse Large B-Cell Lymphoma     ORIGINAL STAGING:  Stage IV     SITES OF DISEASE:  Left Cervical Lymph Node,Bone Disease, Stage IV    CURRENT TREATMENT:  C1,D1 on 5/11/22 DA-R-EPOCH  C2  C3 delayed due to appetite issues and patient anxiety to restart therapy     Plan Name: OP R-CHOP   Treatment goal Curative   Provider Yelena Scott MD   Status Inactive   Start Date    End Date    Treatment plan weight/height/BSA Weight type: Documented (effective on 4/27/2022), 94.1 kg (entered on 4/27/2022), 160 cm (entered on 4/27/2022), BSA 2.05 m2   Most recent weight/height/BSA Weight: 173 lb 8 oz (78.7 kg)    Height: 5' 2.99\" (1.6 m)    BSA (calculated - sq m): 1.86 sq meters      Treatment on clinical trial? [This plan is not part of a research study]   Reason for stopping treatment MD Discretion   Summary of Treatment Plan Medications DOXOrubicin (ADRIAMYCIN) chemo syringe 51.2 mg, 25 mg/m2 = 51.2 mg, IntraVENous, ONCE, 0 of 6 cycles  Dose modification: 25 mg/m2 (original dose 50 mg/m2, Cycle 1, Reason: Per Protocol, Comment: dose split into 2 syringes due to volume)    cyclophosphamide (CYTOXAN) 1,538 mg in 0.9% sodium chloride 250 mL chemo infusion, 750 mg/m2 = 1,538 mg, IntraVENous, ONCE, 0 of 6 cycles    vinCRIStine (VINCASAR PFS) 2 mg in 0.9% sodium chloride 50 mL chemo IVPB, 2 mg (100 % of original dose 2 mg), IntraVENous, ONCE, 0 of 6 cycles  Dose modification: 2 mg (original dose 2 mg, Cycle 1, Reason: Other)    riTUXimab-pvvr (RUXIENCE) 769 mg in 0.9% sodium chloride 769 mL infusion, 375 mg/m2 = 769 mg, IntraVENous, ONCE TITRATE, 0 of 1 cycle       Plan Name: [address] [facility],    Treatment goal Curative   Provider Jessi Padilla MD   Status Active   Start Date 5/11/2022   End Date 10/30/2022 (Planned)   Treatment plan weight/height/BSA Weight type: Documented (effective on 8/17/2022), 79.5 kg (entered on 8/17/2022), 160 cm (entered on 8/5/2022), BSA 1.88 m2   Most recent weight/height/BSA Weight: 173 lb 8 oz (78.7 kg)    Height: 5' 2.99\" (1.6 m)    BSA (calculated - sq m): 1.86 sq meters      Treatment on clinical trial? [This plan is not part of a research study]   Reason for stopping treatment [Plan is still active]   Summary of Treatment Plan Medications cyclophosphamide (CYTOXAN) 1,537.6 mg in 0.9% sodium chloride 250 mL, overfill volume 25 mL chemo infusion, 750 mg/m2 = 1,538 mg, IntraVENous, ONCE, 5 of 6 cycles  Dose modification: 900 mg/m2 (original dose 750 mg/m2, Cycle 2, Reason: Per Protocol, Comment: ANC above 0.5), 600 mg/m2 (original dose 750 mg/m2, Cycle 3, Reason: Dose Not Tolerated), 600 mg/m2 (80 % of original dose 750 mg/m2, Cycle 4, Reason: Dose Not Tolerated)  Administration: 1,537.6 mg (5/15/2022), 1,809 mg (6/10/2022), 1,128 mg (8/22/2022), 1,128 mg (9/16/2022), 1,128 mg (10/7/2022)    etoposide (VEPESID) 102.6 mg in 0.9% sodium chloride 450 mL chemo infusion, 50 mg/m2 = 102.6 mg, IntraVENous, EVERY 24 HOURS, 5 of 6 cycles  Dose modification: 60 mg/m2 (original dose 50 mg/m2, Cycle 2, Reason: Per Protocol, Comment: ANC above 0.5), 40 mg/m2 (original dose 50 mg/m2, Cycle 3, Reason: Dose Not Tolerated), 40 mg/m2 (80 % of original dose 50 mg/m2, Cycle 4, Reason: Dose Not Tolerated)  Administration: 102.6 mg (5/11/2022), 102.6 mg (5/12/2022), 102.6 mg (5/13/2022), 102.6 mg (5/14/2022), 120.6 mg (6/6/2022), 120.6 mg (6/7/2022), 120.6 mg (6/8/2022), 120.6 mg (6/9/2022), 75.2 mg (8/18/2022), 75.2 mg (8/19/2022), 75.2 mg (8/20/2022), 75.2 mg (8/21/2022), 75.2 mg (9/12/2022), 75.2 mg (9/13/2022), 75.2 mg (9/14/2022), 75.2 mg (9/15/2022), 75.2 mg (10/3/2022), 75.2 mg (10/4/2022), 75.2 mg (10/5/2022), 75.2 mg (10/6/2022)    vinCRIStine (VINCASAR PFS) 0.8 mg, DOXOrubicin (ADRIAMYCIN) 20.6 mg in 0.9% sodium chloride 250 mL, overfill volume 25 mL chemo infusion, , IntraVENous, EVERY 24 HOURS, 5 of 6 cycles  Administration:  (5/11/2022),  (5/12/2022),  (5/13/2022),  (5/14/2022),  (6/6/2022),  (6/7/2022),  (6/8/2022),  (6/9/2022),  (8/18/2022),  (8/19/2022),  (8/20/2022),  (8/21/2022),  (9/12/2022),  (9/13/2022),  (9/14/2022),  (9/15/2022),  (10/3/2022),  (10/4/2022),  (10/5/2022),  (10/6/2022)    riTUXimab-pvvr (RUXIENCE) 769 mg in 0.9% sodium chloride 769 mL infusion, 375 mg/m2 = 769 mg, IntraVENous, ONCE TITRATE, 1 of 2 cycles  Administration: 769 mg (5/11/2022)    riTUXimab-pvvr (RUXIENCE) 754 mg in 0.9% sodium chloride 250 mL RAPID infusion, 375 mg/m2 = 754 mg, IntraVENous, ONCE TITRATE, 4 of 4 cycles  Administration: 754 mg (6/6/2022), 705 mg (8/18/2022), 705 mg (9/12/2022), 705 mg (10/3/2022)         PRIOR TREATMENT:  n/a     GOALS OF CARE:  Curative Intent     PATHOLOGY:  Specimen #:[id number] Collect: 4/22/2022      ==========================================================================                    * * *SURGICAL PATHOLOGY REPORT* * *   ==========================================================================   * * *PROCEDURES/ADDENDA* * *   ADDENDUM   Date Ordered: 4/26/2022 Status: Signed Out   Date Complete: 4/26/2022     By: Marlin Alvarez. Roxy Maloney MD   Date Reported: 4/26/2022   Addendum Diagnosis   Lymph node, left posterior cervical lymph node, excision:   In situ hybridization for Jesús-Barr viral RNA: Negative   CPT: 44456  Addendum Comment   * * *CLINICAL HISTORY* * *   Cervical lymphadenopathy, rule out lymphoma   ==========================================================================   * * *FINAL PATHOLOGIC DIAGNOSIS* * *        Lymph node, left posterior cervical lymph node, excision:        Large B cell lymphoma. See comment. * * *Comment* * *   The histologic section has fragments of lymphoid tissue with diffuse and   focal follicular architecture. Diffuse areas are comprised of large,   atypical lymphocytes with centroblastic morphology and increased mitotic   activity. Immunohistochemical stains confirm the malignant cells are CD20   positive B cells that coexpress CD10 and BCL6 without MUM1. CD23   highlights rare follicular dendritic networks associated with lymphoid   follicles with germinal centers and express CD10 and BCL6 without BCL2. CyclinD1 and CD56 stains are negative. Concurrent flow cytometric analysis   confirms a clonal CD10 positive B-cell population comprised of medium to   large cells. The Ki-67 proliferation index is 30%. The overall findings favor the diagnosis of diffuse large B-cell lymphoma,   germinal center subtype. Molecular genetic studies are pending for further   characterization will be reported in an addendum. Flow cytometry:   Consistent with CD10-positive B-cell lymphoproliferative disorder. Comments: Flow cytometry shows monoclonal B-cells (36% of total cells)   with CD10 expression without CD5, consistent with a CD10-positive B-cell   lymphoproliferative disorder. The main differential diagnosis includes   follicular lymphoma, Burkitt lymphoma and large B-cell lymphoma.  Please   correlate the result with morphologic findings and clinical information. Flow Differential (%) and Population Analysis:   Lymphocytes: 93.7%   T-cells (39% of lymphoid cells) show a CD4/CD8 ratio of 3.3 without overt   phenotypic abnormality. NK-cells (1% of lymphoid cells) are unremarkable. Mature B-cells (56% of lymphoid cells) include large forms based on   forward scattered pattern and show: CD5 neg, CD10+, CD11c+dim, CD19+,   CD20+ with surface lambda light chain restriction. Markers Performed: CD2, CD3, CD4, CD5, CD7, CD8, CD10, CD11c, CD19, CD20,   CD23, CD34, CD38, CD45, CD56, Kappa, Lambda (17 Markers)   The Technical Component Processing of this test was completed at   [facility], [address] [place] [address], [place], [place] / [address] /   [phone] / Rosita Sheets # [id number]. Interpretation by Giovanni Adam M.D. The   complete scanned report is available in Epic. CPT: B9932030, A2621514, E8406017, [id number], [id number], [id number]   [facility]/2022   *Electronically Signed Out By Marlin Alvarez. Roxy Maloney MD*   ==========================================================================   * * *Gross Description* * *   Specimen #1, received fresh labeled with the patient's name,  and left   posterior cervical lymph node, consists of two paper towels containing a   1.7 x 1.5 x 0.5 cm aggregate of pale pink-tan, fleshy soft tissue   fragments. The specimen is handled according to the flow cytometry   protocol as follows: 7          Two air-dried touch prep slides - one Diff Quik stained, one   rapid-fixed in 95% alcohol   7          Representative sections in B plus fixative (1A)   7          One piece held in RPMI for possible flow cytometry analysis   7          Remaining tissue fixed in formalin for permanents (1B)   Dr. Roxy Maloney will determine if flow cytometry is necessary. 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  F who presents as an inpatient consultation for Lymphoma, COVID Positive, need for growth factor support. Patient called in today saying that she was too sick to take her Neulasta shot. She reports shortness of breath. Referred her to the ER. She did not think she could make it to the [facility] location for her neulasta shot and reported general decline since discharge last Friday which was Day 5 of her 5th cycle of treatment. .   Patient overall reports feeling worse. She reports a declines over the weekend. Interval History:   Pt remains fatigued. Denies any mouth sores or pain currently. Does have diarrhea. No fevers.      Past Medical History:   Diagnosis Date    Adverse effect of anesthesia     PHLEGM IN THROAT POST OP & NEEDED X2 BREATHING TREATMENTS    Anxiety     COPD (chronic obstructive pulmonary disease) ([facility])     emphysema    Depression     GERD (gastroesophageal reflux disease)     Hernia, femoral     since childhood    Hypertension     Joint pain     Nausea & vomiting     TMJ arthritis          Objective:      Visit Vitals  BP 96/63 (BP 1 Location: Right upper arm, BP Patient Position: At rest)   Pulse 82   Temp 97.9 °F (36.6 °C)   Resp 18   Ht 5' 2.99\" (1.6 m)   Wt 173 lb 8 oz (78.7 kg)   SpO2 94%   BMI 30.74 kg/m²     ECO-3  General: ill appearing, no acute distress, somnolent  Respiratory: normal respiratory effort: O2 in use: 6L  CV: no peripheral edema  Skin: no rashes; no ecchymoses; no petechiae  Psych: alert, oriented, normal mood/affect       Results:        Lab Results   Component Value Date/Time    WBC 0.2 (LL) 10/15/2022 04:24 AM    HGB 7.7 (L) 10/15/2022 04:24 AM    HCT 23.8 (L) 10/15/2022 04:24 AM    PLATELET 19 (LL)  04:24 AM    MCV 93.7 10/15/2022 04:24 AM     Lab Results   Component Value Date/Time    Sodium 139 10/15/2022 04:24 AM    Potassium 3.8 10/15/2022 04:24 AM    Chloride 107 10/15/2022 04:24 AM    CO2 27 10/15/2022 04:24 AM    Anion gap 5 10/15/2022 04:24 AM    Glucose 132 (H) 10/15/2022 04:24 AM    BUN 19 10/15/2022 04:24 AM    Creatinine 1.04 (H) 10/15/2022 04:24 AM    BUN/Creatinine ratio 18 10/15/2022 04:24 AM    GFR est AA >60 10/03/2022 06:44 AM    GFR est non-AA >60 10/03/2022 06:44 AM    Calcium 8.9 10/15/2022 04:24 AM    Bilirubin, total 0.3 10/15/2022 04:24 AM    Alk. phosphatase 39 (L) 10/15/2022 04:24 AM    Protein, total 4.9 (L) 10/15/2022 04:24 AM    Albumin 2.2 (L) 10/15/2022 04:24 AM    Globulin 2.7 10/15/2022 04:24 AM    A-G Ratio 0.8 (L) 10/15/2022 04:24 AM    ALT (SGPT) 215 (H) 10/15/2022 04:24 AM    AST (SGOT) 62 (H) 10/15/2022 04:24 AM       Assessment and Recommendations:      COVID-19 infection  -suspected onset last Monday/ worsening cough. 10/6 Tested positive  -management per primary team/pulmonary;   10/14 pulmonary : abx changed to vanc and cefepime    2. Metastatic cancer to bone [facility])  -reports diffuse achiness which  is different than her usual pain complaints. 3.Diffuse large B-cell lymphoma of lymph nodes of neck (HCC)  -completed C5 da-REPOCH on 10/7/22 due for C6 on 10/24.  -unclear if any issue if growth factor administered during a covid infection but patient is at high risk for sepsis. -filgrastim-aafi (NIVESTYM) injection syringe 420 mcg x 5-7 days at least until Monday, 10/17/22  - will plan to delay C6 and cont to monitor pt's current medical condition    4. Thrombocytopenia-   secondary to chemo effect. Recommend holding any anticoagulation while PLT's are less than 50K. Continue to monitor daily. Transfuse 1 unit plts for plt < 10k or for signs of bleeding  -plts 19K today -  no need for transfusion today.   -Daily CBC with diff    5. Thrush: Improvied on diflucan daily    6. Nutrition: poor oral intake: encouraged fluids and to eat soft foods; on supplemental drinks TID.

## 2022-10-15 NOTE — PROGRESS NOTES
Discussed code status with patient during morning rounds. Patient resting comfortably in bed and alert and oriented. Discussed resuscitation options, patient opts for DNR and DNI. Order placed in chart.      Lucy Courtney MD

## 2022-10-15 NOTE — PROGRESS NOTES
Problem: Falls - Risk of  Goal: *Absence of Falls  Outcome: Progressing Towards Goal  Note: Fall Risk Interventions:            Medication Interventions: Patient to call before getting OOB, Teach patient to arise slowly, Bed/chair exit alarm    Elimination Interventions: Bed/chair exit alarm, Call light in reach, Stay With Me (per policy), Toileting schedule/hourly rounds    History of Falls Interventions: Bed/chair exit alarm         Problem: Patient Education: Go to Patient Education Activity  Goal: Patient/Family Education  Outcome: Progressing Towards Goal     Problem: Risk for Spread of Infection  Goal: Prevent transmission of infectious organism to others  Outcome: Progressing Towards Goal     Problem: Patient Education:  Go to Education Activity  Goal: Patient/Family Education  Outcome: Progressing Towards Goal     Problem: Pain  Goal: *Control of Pain  Outcome: Progressing Towards Goal  Goal: *PALLIATIVE CARE:  Alleviation of Pain  Outcome: Progressing Towards Goal     Problem: Airway Clearance - Ineffective  Goal: Achieve or maintain patent airway  Outcome: Progressing Towards Goal     Problem: Gas Exchange - Impaired  Goal: Absence of hypoxia  Outcome: Progressing Towards Goal  Goal: Promote optimal lung function  Outcome: Progressing Towards Goal     Problem: Breathing Pattern - Ineffective  Goal: Ability to achieve and maintain a regular respiratory rate  Outcome: Progressing Towards Goal     Problem:  Body Temperature -  Risk of, Imbalanced  Goal: Ability to maintain a body temperature within defined limits  Outcome: Progressing Towards Goal  Goal: Will regain or maintain usual level of consciousness  Outcome: Progressing Towards Goal  Goal: Complications related to the disease process, condition or treatment will be avoided or minimized  Outcome: Progressing Towards Goal     Problem: Isolation Precautions - Risk of Spread of Infection  Goal: Prevent transmission of infectious organism to others  Outcome: Progressing Towards Goal     Problem: Loneliness or Risk for Loneliness  Goal: Demonstrate positive use of time alone when socialization is not possible  Outcome: Progressing Towards Goal     Problem: Fatigue  Goal: Verbalize increase energy and improved vitality  Outcome: Progressing Towards Goal     Problem: Patient Education: Go to Patient Education Activity  Goal: Patient/Family Education  Outcome: Progressing Towards Goal     Problem: Nutrition Deficit  Goal: *Optimize nutritional status  Outcome: Progressing Towards Goal

## 2022-10-15 NOTE — PROGRESS NOTES
Verbal shift change report given to Tiffanie (oncoming nurse) by Floyd Sams (offgoing nurse). Report included the following information SBAR, Kardex, Intake/Output, MAR, and Recent Results.

## 2022-10-15 NOTE — PROGRESS NOTES
Pt's O2 sats dropping down as low as 80% on 4LPM. Requiring 6 to stay above 90%, overnight and this am. MD notified. Pt in no apparent distress.

## 2022-10-15 NOTE — PROGRESS NOTES
[facility] Dosing Services: Antimicrobial Stewardship Daily Doc    Consult for antibiotic dosing of vancomycin/cefepime by Dr. Shelly Mascorro  Indication:  HAP in setting of COVID-19 infection; immunosuppressed due to chemo  Day of Therapy: 2 of restart (previously on from 10/11-10/13)    Ht Readings from Last 1 Encounters:   10/14/22 160 cm (62.99\")        Wt Readings from Last 1 Encounters:   10/14/22 78.7 kg (173 lb 8 oz)        Vancomycin therapy:  Current maintenance dose: dosing by levels due to BULL  Dose calculated to approximate a           a. Target AUC/AL of N/A          b. Trough of </=15 mcg/mL     Plan: Renal function improving (unclear urine output), will start patient on scheduled regimen of 1250mg q24h, predicts AUC of 541. Recommend level within 48 hours (not yet ordered). Remains neutropenic, AUC=0, afebrile. Dose administration notes:   Doses given appropriately as scheduled    Date Dose & Interval Measured (mcg/mL) Extrapolated (mcg/mL)   ?10/13 @ 1327 ?1250mg q12h ?27-supratherapeutic AUC ?   ?10/ 15 @ 424 ?by level,  1750mg LD given 10/14 @ 1051 ?17.7 (~17 hours post LD)  ? ? ? ? ? Non-Kinetic Antimicrobial Dosing Regimen:   Current Regimen:  cefepime 2g IV every 12 hours  Recommendation: continue current regimen   Dose administration notes:   Doses given appropriately as scheduled    Other Antimicrobial   (not dosed by pharmacist) Completed 5 days doxy on 10/14  Bactrim MWF-prophylaxis   Fluconazole 100mg daily-d4, thrush   Cultures 10/10 Blood (paired): NG (final)  10/12 C. Diff: Negative  10/6 Sputum: Moderate normal Resp payal    Serum Creatinine Lab Results   Component Value Date/Time    Creatinine 1.04 (H) 10/15/2022 04:24 AM         Creatinine Clearance Estimated Creatinine Clearance: 52.8 mL/min (A) (based on SCr of 1.04 mg/dL (H)).      Temp Temp: 98 °F (36.7 °C)       WBC Lab Results   Component Value Date/Time    WBC 0.2 (LL) 10/15/2022 04:24 AM        Procalcitonin Lab Results Component Value Date/Time    Procalcitonin <0.05 10/10/2022 02:39 PM        For Antifungals, Metronidazole and Nafcillin: Lab Results   Component Value Date/Time    ALT (SGPT) 215 (H) 10/15/2022 04:24 AM    AST (SGOT) 62 (H) 10/15/2022 04:24 AM    Alk.  phosphatase 39 (L) 10/15/2022 04:24 AM    Bilirubin, total 0.3 10/15/2022 04:24 AM        Dillon Cervantes

## 2022-10-15 NOTE — PROGRESS NOTES
Bedside and Verbal shift change report given to DEANNA Eaton (oncoming nurse) by Grey Servin (offgoing nurse). Report included the following information SBAR and Kardex.

## 2022-10-16 NOTE — PROGRESS NOTES
Problem: Falls - Risk of  Goal: *Absence of Falls  Outcome: Progressing Towards Goal  Note: Fall Risk Interventions:            Medication Interventions: Patient to call before getting OOB, Teach patient to arise slowly, Bed/chair exit alarm    Elimination Interventions: Call light in reach, Bed/chair exit alarm, Stay With Me (per policy), Patient to call for help with toileting needs    History of Falls Interventions: Bed/chair exit alarm         Problem: Patient Education: Go to Patient Education Activity  Goal: Patient/Family Education  Outcome: Progressing Towards Goal     Problem: Pain  Goal: *Control of Pain  Outcome: Progressing Towards Goal  Goal: *PALLIATIVE CARE:  Alleviation of Pain  Outcome: Progressing Towards Goal     Problem: Patient Education: Go to Patient Education Activity  Goal: Patient/Family Education  Outcome: Progressing Towards Goal

## 2022-10-16 NOTE — PROGRESS NOTES
Pt very drowsy this am, tremors to BUEs worse than usual, and with increased WOB. Still requiring 6LPM to stay above 90% O2 saturation. Pt's only complaint is feeling sob, denies feeling any worsening sx. MD notified of the above. New orders received for ABGs. MD in to assess pt at this time.

## 2022-10-16 NOTE — PROGRESS NOTES
Verbal shift change report given to Tiffanie (oncoming nurse) by Sunday Octavia (offgoing nurse). Report included the following information SBAR, Kardex, Intake/Output, MAR, and Recent Results.

## 2022-10-16 NOTE — PROGRESS NOTES
[address] [facility] [address]   Office [phone]  Fax [phone]          Subjective / Objective     24 Hour Events: LARRY    Subjective  Patient reports feeling well today but with continued cough and pain when she coughs. Continues to report loose stools 2-3 times per day. Visit Vitals  BP (!) 93/59 (BP 1 Location: Right upper arm, BP Patient Position: At rest)   Pulse 74   Temp 97.6 °F (36.4 °C)   Resp 17   Ht 5' 2.99\" (1.6 m)   Wt 173 lb 8 oz (78.7 kg)   SpO2 92%   BMI 30.74 kg/m²     General: No acute distress. Cooperative and responsive. Calmly resting but appears chronically ill  Head: Normocephalic. Atraumatic. Neck: Normal ROM. No stiffness. Mouth: No tongue plaque  Respiratory: Significant Ronchi present all throughout. No increased work of breathing at rest. Cough with deep breathing  Cardiovascular: RRR. Normal S1,S2. No m/r/g.   GI: + bowel sounds. Nontender. No rebound tenderness or guarding. Nondistended. Extremities: no LE edema. Skin: Warm, dry. I/O:  Date 10/15/22 0700 - 10/16/22 0659 10/16/22 0700 - 10/17/22 0659   Shift 7816-9881 7661-6580 24 Hour Total 6518-7096 7963-1544 24 Hour Total   INTAKE   P.O. 360 100 460        P. O. 360 100 460      I. V.(mL/kg/hr) 350(0.4) 0(0) 350(0.2)        I.V.  0 0        Volume (cefepime (MAXIPIME) 2 g in 0.9% sodium chloride (MBP/ADV) 100 mL MBP) 100  100        Volume (vancomycin (VANCOCIN) 1,250 mg in 0.9% sodium chloride 250 mL ([id number])) 250  250      Shift Total(mL/kg) 710(9) 100(1.3) 810(10.3)      OUTPUT   Urine(mL/kg/hr)           Urine Occurrence(s) 2 x 1 x 3 x 1 x  1 x   Emesis/NG output           Emesis Occurrence(s)  0 x 0 x      Stool           Stool Occurrence(s) 2 x 1 x 3 x 1 x  1 x   Shift Total(mL/kg)          100 810      Weight (kg) 78.7 78.7 78.7 78.7 78.7 78.7         CBC:  Recent Labs     10/16/22  0236 10/15/22  0424 10/14/22  0332   WBC 1.0* 0.2* 0.2*   HGB 7.4* 7.7* 8.7*   HCT 23.1* 23.8* 26.5* PLT 33* 19* 15*         Metabolic Panel:  Recent Labs     10/16/22  0236 10/15/22  0424 10/14/22  0332    139 138   K 4.1 3.8 3.1*    107 105   CO2 26 27 26   BUN 21* 19 16   CREA 1.19* 1.04* 1.33*   * 132* 94   CA 8.7 8.9 8.1*   ALB 2.2* 2.2* 2.6*   * 215* 242*           Assessment and Plan       Valerio Edwards is a 78 y.o. 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 Currently on steroids and on abx for bacterial PNA. Plt improved from to 33 from 19. Overall, patient has not made significant clinical improvement as she continues to require 6LNC. AHRF in setting of COVID-19 with superimposed bacterial PNA: Hx of COPD with no home O2. Currently on 6L mask but not wearing on 10/14 nor on 10/15's examination. LA and procal negative. Inflammatory markers elevated today. s/p zosyn. - Cefepime 2 g BID (10/14-), Doxycycline 100 BID (10/10-), Vancomycin (10/10-)  - Duo-nebs scheduled q4  - Brovana-Pulmicort bid  - Pulm following - continue Decadron 6 daily IV (10/9-)  - Supplemental O2 prn for sats <88%  - Daily CBC, CMP, D-dimer, LD, Ferritin      DLBCL with Pancytopenia: Pancytopenia stable with slight today. Follows with Dr. Be Nelson. Currently undergoing DA-R-EPOCH chemotherapy and filgrastim injections. WBC now at 1 today and platelets up to 33  - Oncology consulted, appreciate rec's  - Filgrastim injections daily until at least 10/17  - Zofran and compazine q6-8 hrs prn nausea  - Morphine 15 mg q4 hr PRN    Thrush/Mouth Pain: resolved. eating and drinking well per patient  - Holding Diflucan 100 mg daily  - Magic Mouthwash as needed before meals for discomfort     Pancytopenia: POA PLT 62. Likely 2/2 chemotherapy.   - Hold any AC with plt <50  - Daily CBC  - Oncology consulted, appreciate rec's   - TT plt below 10     Hypertension: POA BP was 92/66.   - Continue home Amplodipine-benazepril 5-20 daily      Hypokalemia: POA K 2.9.   - Replete PRN  - Follow on daily BMP     MDD/RENETTA: Chronic, stable. - Home Xanax 1mg bid  - Home Prozac 40mg daily     Tobacco dependence: About 40 pack years. Currently smokes about 5 cigarettes per day.   - Nicotine patch 21 daily    Leida Suresh DO  Family Medicine Resident       For Billing    Chief Complaint   Patient presents with    Medication Problem       Hospital Problems  Date Reviewed: 9/23/2022            Codes Class Noted POA    Lymphoma ([facility] [email or web address] 75.) ICD-10-CM: C85.90  ICD-9-CM: 202.80  10/10/2022 Unknown        COVID ICD-10-CM: U07.1  ICD-9-CM: 079.89  10/10/2022 Unknown        Acute respiratory failure with hypoxia ([facility] [email or web address]] 75.) ICD-10-CM: J96.01  ICD-9-CM: 518.81  9/13/2022 Unknown

## 2022-10-16 NOTE — PROGRESS NOTES
Ultrasound IV by Tracey Santos RN :  Procedure Note    Patient meets criteria for US IV insertion. Ultrasound IV education provided to patient. Opportunities for questions given. Ultrasound used for PIV placement:  20 gauge  BD Nexiva  R forearm location. 1 X Attempt(s). Flushed with ease; vigorous blood return. Procedure tolerated well. Primary RN aware of IV placement and added to LDA.       Tracey Santos RN

## 2022-10-16 NOTE — ROUTINE PROCESS
Bedside and Verbal shift change report given to DEANNA Eaton (oncoming nurse) by Steven Zacarias (offgoing nurse). Report included the following information SBAR and Kardex.

## 2022-10-17 NOTE — PROGRESS NOTES
TRANSFER - OUT REPORT:    Verbal report given to DEANNA Lopez(name) on Bridgette Keyes  being transferred to ICU(unit) for change in patient condition(needs high level of care due to o2 desaturation)       Report consisted of patients Situation, Background, Assessment and   Recommendations(SBAR). Information from the following report(s) SBAR, Kardex, ED Summary, Procedure Summary, Intake/Output, MAR, Recent Results, and Cardiac Rhythm NSR-[facility] [place] Sen  was reviewed with the receiving nurse. Lines:   Venous Access Device RIGHT CHEST SHAREE CATH 10/17/22 Upper chest (subclavicular area, right (Active)   Central Line Being Utilized Yes 10/17/22 0852   Criteria for Appropriate Use Limited/no vessel suitable for conventional peripheral access 10/17/22 0852   Site Assessment Clean, dry, & intact 10/17/22 0852   Date of Last Dressing Change 10/17/22 10/17/22 0852   Dressing Status Clean, dry, & intact 10/17/22 0852   Dressing Type Transparent;Disk with Chlorhexadine gluconate (CHG) 10/17/22 0852   Action Taken Open ports on tubing capped 10/17/22 0852   Date Accessed (Medial Site) 10/17/22 10/17/22 0852   Access Time (Medial Site) 0330 10/17/22 0329   Access Needle Size (Site #1) 20 G 10/17/22 0329   Access Needle Length (Medial Site) 1 inch 10/17/22 0329   Positive Blood Return (Medial Site) Yes 10/17/22 4861   Action Taken (Medial Site) Flushed; Infusing 10/17/22 0852   Alcohol Cap Used Yes 10/17/22 0852       Peripheral IV 10/16/22 Anterior;Right Forearm (Active)   Site Assessment Clean, dry, & intact 10/17/22 0852   Phlebitis Assessment 0 10/17/22 0852   Infiltration Assessment 0 10/17/22 0852   Dressing Status Clean, dry, & intact 10/17/22 0852   Dressing Type Transparent 10/17/22 0852   Hub Color/Line Status Patent; Flushed;End cap changed 10/17/22 0852   Action Taken Open ports on tubing capped 10/17/22 0852   Alcohol Cap Used Yes 10/17/22 8553        Opportunity for questions and clarification was provided.       Patient transported with:   Monitor  O2 @ 15 liters  Patient-specific medications from Pharmacy  Registered Nurse  Tech      Patient's personal belongings sent with patient including purse, cell phone and . Telemetry box returned to tele room.

## 2022-10-17 NOTE — PROGRESS NOTES
10/17/2022   CARE MANAGEMENT NOTE:  CM reviewed EMR for clinical updates. READMISSION:  Pt had a scheduled admission for chemo from 10/3 - 10/7. Pt was readmitted to [facility] on 01/48 with COVID complicated by metastatic stage IV lymphoma. Reportedly,pt resides with her son [phone]). Dtr Shannan Suarez ([phone]) is another family contact. RUR 26%; LOS 6 days     Transition Plan of Care:  Hem/onc, Pulmonary are following for medical management   Plan is for pt to return home with her son  Desat to 88% on 6L so pt was placed on 15 L HF  Outpatient follow up  Family will transport pt home     CM will continue to follow pt until discharged.   Steve

## 2022-10-17 NOTE — ROUTINE PROCESS
Bedside and Verbal shift change report given to  DEANNA Pollack(oncoming nurse) by Reg Bello (offgoing nurse). Report included the following information SBAR and Kardex.

## 2022-10-17 NOTE — PROGRESS NOTES
[facility] [place] at [address], [address] [facility] [phone]  F: [phone] Patient ID  Name: Eri Conner  YOB: 1955  MRN: [id number]  Referring Provider:   No referring provider defined for this encounter. 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 This summary highlights focused aspects of patient's ongoing care and may have a recurring section in notes with either updates or remain unchanged as a longitudinal care summary.    -------------------------------------------------------------------------------------------------------------------------------------------------------------------------------------------------------  DIAGNOSIS:  Diffuse Large B-Cell Lymphoma     ORIGINAL STAGING:  Stage IV     SITES OF DISEASE:  Left Cervical Lymph Node,Bone Disease, Stage IV    CURRENT TREATMENT:  C1,D1 on 5/11/22 DA-R-EPOCH  C2  C3 delayed due to appetite issues and patient anxiety to restart therapy     Plan Name: OP R-CHOP   Treatment goal Curative   Provider Portillo Lorenz MD   Status Inactive   Start Date    End Date    Treatment plan weight/height/BSA Weight type: Documented (effective on 4/27/2022), 94.1 kg (entered on 4/27/2022), 160 cm (entered on 4/27/2022), BSA 2.05 m2   Most recent weight/height/BSA Weight: 173 lb 8 oz (78.7 kg)    Height: 5' 2.99\" (1.6 m)    BSA (calculated - sq m): 1.86 sq meters      Treatment on clinical trial? [This plan is not part of a research study]   Reason for stopping treatment MD Discretion   Summary of Treatment Plan Medications DOXOrubicin (ADRIAMYCIN) chemo syringe 51.2 mg, 25 mg/m2 = 51.2 mg, IntraVENous, ONCE, 0 of 6 cycles  Dose modification: 25 mg/m2 (original dose 50 mg/m2, Cycle 1, Reason: Per Protocol, Comment: dose split into 2 syringes due to volume)    cyclophosphamide (CYTOXAN) 1,538 mg in 0.9% sodium chloride 250 mL chemo infusion, 750 mg/m2 = 1,538 mg, IntraVENous, ONCE, 0 of 6 cycles    vinCRIStine (VINCASAR PFS) 2 mg in 0.9% sodium chloride 50 mL chemo IVPB, 2 mg (100 % of original dose 2 mg), IntraVENous, ONCE, 0 of 6 cycles  Dose modification: 2 mg (original dose 2 mg, Cycle 1, Reason: Other)    riTUXimab-pvvr (RUXIENCE) 769 mg in 0.9% sodium chloride 769 mL infusion, 375 mg/m2 = 769 mg, IntraVENous, ONCE TITRATE, 0 of 1 cycle       Plan Name: [facility],    Treatment goal Curative   Provider Yelena Scott MD   Status Active   Start Date 5/11/2022   End Date 10/30/2022 (Planned)   Treatment plan weight/height/BSA Weight type: Documented (effective on 8/17/2022), 79.5 kg (entered on 8/17/2022), 160 cm (entered on 8/5/2022), BSA 1.88 m2   Most recent weight/height/BSA Weight: 173 lb 8 oz (78.7 kg)    Height: 5' 2.99\" (1.6 m)    BSA (calculated - sq m): 1.86 sq meters      Treatment on clinical trial? [This plan is not part of a research study]   Reason for stopping treatment [Plan is still active]   Summary of Treatment Plan Medications cyclophosphamide (CYTOXAN) 1,537.6 mg in 0.9% sodium chloride 250 mL, overfill volume 25 mL chemo infusion, 750 mg/m2 = 1,538 mg, IntraVENous, ONCE, 5 of 6 cycles  Dose modification: 900 mg/m2 (original dose 750 mg/m2, Cycle 2, Reason: Per Protocol, Comment: ANC above 0.5), 600 mg/m2 (original dose 750 mg/m2, Cycle 3, Reason: Dose Not Tolerated), 600 mg/m2 (80 % of original dose 750 mg/m2, Cycle 4, Reason: Dose Not Tolerated)  Administration: 1,537.6 mg (5/15/2022), 1,809 mg (6/10/2022), 1,128 mg (8/22/2022), 1,128 mg (9/16/2022), 1,128 mg (10/7/2022)    etoposide (VEPESID) 102.6 mg in 0.9% sodium chloride 450 mL chemo infusion, 50 mg/m2 = 102.6 mg, IntraVENous, EVERY 24 HOURS, 5 of 6 cycles  Dose modification: 60 mg/m2 (original dose 50 mg/m2, Cycle 2, Reason: Per Protocol, Comment: ANC above 0.5), 40 mg/m2 (original dose 50 mg/m2, Cycle 3, Reason: Dose Not Tolerated), 40 mg/m2 (80 % of original dose 50 mg/m2, Cycle 4, Reason: Dose Not Tolerated)  Administration: 102.6 mg (5/11/2022), 102.6 mg (5/12/2022), 102.6 mg (5/13/2022), 102.6 mg (5/14/2022), 120.6 mg (6/6/2022), 120.6 mg (6/7/2022), 120.6 mg (6/8/2022), 120.6 mg (6/9/2022), 75.2 mg (8/18/2022), 75.2 mg (8/19/2022), 75.2 mg (8/20/2022), 75.2 mg (8/21/2022), 75.2 mg (9/12/2022), 75.2 mg (9/13/2022), 75.2 mg (9/14/2022), 75.2 mg (9/15/2022), 75.2 mg (10/3/2022), 75.2 mg (10/4/2022), 75.2 mg (10/5/2022), 75.2 mg (10/6/2022)    vinCRIStine (VINCASAR PFS) 0.8 mg, DOXOrubicin (ADRIAMYCIN) 20.6 mg in 0.9% sodium chloride 250 mL, overfill volume 25 mL chemo infusion, , IntraVENous, EVERY 24 HOURS, 5 of 6 cycles  Administration:  (5/11/2022),  (5/12/2022),  (5/13/2022),  (5/14/2022),  (6/6/2022),  (6/7/2022),  (6/8/2022),  (6/9/2022),  (8/18/2022),  (8/19/2022),  (8/20/2022),  (8/21/2022),  (9/12/2022),  (9/13/2022),  (9/14/2022),  (9/15/2022),  (10/3/2022),  (10/4/2022),  (10/5/2022),  (10/6/2022)    riTUXimab-pvvr (RUXIENCE) 769 mg in 0.9% sodium chloride 769 mL infusion, 375 mg/m2 = 769 mg, IntraVENous, ONCE TITRATE, 1 of 2 cycles  Administration: 769 mg (5/11/2022)    riTUXimab-pvvr (RUXIENCE) 754 mg in 0.9% sodium chloride 250 mL RAPID infusion, 375 mg/m2 = 754 mg, IntraVENous, ONCE TITRATE, 4 of 4 cycles  Administration: 754 mg (6/6/2022), 705 mg (8/18/2022), 705 mg (9/12/2022), 705 mg (10/3/2022)         PRIOR TREATMENT:  n/a     GOALS OF CARE:  Curative Intent     PATHOLOGY:  Specimen #:[id number] Collect: 4/22/2022      ==========================================================================                    * * *SURGICAL PATHOLOGY REPORT* * *   ==========================================================================   * * *PROCEDURES/ADDENDA* * *   ADDENDUM   Date Ordered: 4/26/2022 Status: Signed Out   Date Complete: 4/26/2022     By: Joe Glez. Yarelis Boateng MD   Date Reported: 4/26/2022   Addendum Diagnosis   Lymph node, left posterior cervical lymph node, excision:   In situ hybridization for Jesús-Barr viral RNA: Negative   CPT: 68063  Addendum Comment   * * *CLINICAL HISTORY* * *   Cervical lymphadenopathy, rule out lymphoma   ==========================================================================   * * *FINAL PATHOLOGIC DIAGNOSIS* * *        Lymph node, left posterior cervical lymph node, excision:        Large B cell lymphoma. See comment. * * *Comment* * *   The histologic section has fragments of lymphoid tissue with diffuse and   focal follicular architecture. Diffuse areas are comprised of large,   atypical lymphocytes with centroblastic morphology and increased mitotic   activity. Immunohistochemical stains confirm the malignant cells are CD20   positive B cells that coexpress CD10 and BCL6 without MUM1. CD23   highlights rare follicular dendritic networks associated with lymphoid   follicles with germinal centers and express CD10 and BCL6 without BCL2. CyclinD1 and CD56 stains are negative. Concurrent flow cytometric analysis   confirms a clonal CD10 positive B-cell population comprised of medium to   large cells. The Ki-67 proliferation index is 30%. The overall findings favor the diagnosis of diffuse large B-cell lymphoma,   germinal center subtype. Molecular genetic studies are pending for further   characterization will be reported in an addendum. Flow cytometry:   Consistent with CD10-positive B-cell lymphoproliferative disorder. Comments: Flow cytometry shows monoclonal B-cells (36% of total cells)   with CD10 expression without CD5, consistent with a CD10-positive B-cell   lymphoproliferative disorder. The main differential diagnosis includes   follicular lymphoma, Burkitt lymphoma and large B-cell lymphoma.  Please   correlate the result with morphologic findings and clinical information. Flow Differential (%) and Population Analysis:   Lymphocytes: 93.7%   T-cells (39% of lymphoid cells) show a CD4/CD8 ratio of 3.3 without overt   phenotypic abnormality. NK-cells (1% of lymphoid cells) are unremarkable. Mature B-cells (56% of lymphoid cells) include large forms based on   forward scattered pattern and show: CD5 neg, CD10+, CD11c+dim, CD19+,   CD20+ with surface lambda light chain restriction. Markers Performed: CD2, CD3, CD4, CD5, CD7, CD8, CD10, CD11c, CD19, CD20,   CD23, CD34, CD38, CD45, CD56, Kappa, Lambda (17 Markers)   The Technical Component Processing of this test was completed at   [facility], [facility], [facility] / [id number] /   [phone] / [facility] # [id number]. Interpretation by Иван Holbrook M.D. The   complete scanned report is available in Epic. CPT: J4020732, Q9289964, [id number], [id number], [id number], [id number]   [facility]2/2022   *Electronically Signed Out By Federico Trivedi. Fabi Carrasquillo MD*   ==========================================================================   * * *Gross Description* * *   Specimen #1, received fresh labeled with the patient's name,  and left   posterior cervical lymph node, consists of two paper towels containing a   1.7 x 1.5 x 0.5 cm aggregate of pale pink-tan, fleshy soft tissue   fragments. The specimen is handled according to the flow cytometry   protocol as follows: 7          Two air-dried touch prep slides - one Diff Quik stained, one   rapid-fixed in 95% alcohol   7          Representative sections in B plus fixative (1A)   7          One piece held in RPMI for possible flow cytometry analysis   7          Remaining tissue fixed in formalin for permanents (1B)   Dr. Fabi Carrasquillo will determine if flow cytometry is necessary. 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  F who presents as an inpatient consultation for Lymphoma, COVID Positive, need for growth factor support. Patient called in today saying that she was too sick to take her Neulasta shot. She reports shortness of breath. Referred her to the ER. She did not think she could make it to the [facility] location for her neulasta shot and reported general decline since discharge last Friday which was Day 5 of her 5th cycle of treatment. .   Patient overall reports feeling worse. She reports a declines over the weekend. Interval History:     10/17 Transferring to higher level of care due to oxygen requirement     Pt reports continued fatigue and generalized body aches. Denies SOB while lying in the bed but gets SOB with exertion. Appetite remains poor; states has reflux after eating. Continues with diarrhea; 3-4 per day.      Past Medical History:   Diagnosis Date    Adverse effect of anesthesia     PHLEGM IN THROAT POST OP & NEEDED X2 BREATHING TREATMENTS    Anxiety     COPD (chronic obstructive pulmonary disease) ([facility])     emphysema    Depression     GERD (gastroesophageal reflux disease)     Hernia, femoral     since childhood    Hypertension     Joint pain     Nausea & vomiting     TMJ arthritis          Objective:      Visit Vitals  /80 (BP 1 Location: Left upper arm)   Pulse 80   Temp 99 °F (37.2 °C)   Resp 19   Ht 5' 2.99\" (1.6 m)   Wt 173 lb 8 oz (78.7 kg)   SpO2 93%   BMI 30.74 kg/m²     ECO-3  General: ill appearing, no acute distress, somnolent  Respiratory: normal respiratory effort: O2 on midflow 15 L   CV: slight LE peripheral edema  Skin: no rashes; no ecchymoses; no petechiae  Psych: alert, oriented, normal mood/affect       Results:        Lab Results   Component Value Date/Time    WBC 6.2 10/17/2022 03:07 AM    HGB 7.7 (L) 10/17/2022 03:07 AM    HCT 23.8 (L) 10/17/2022 03:07 AM    PLATELET 51 (L)  03:07 AM    MCV 93.7 10/17/2022 03:07 AM     Lab Results   Component Value Date/Time    Sodium 141 10/17/2022 03:07 AM    Potassium 3.8 10/17/2022 03:07 AM    Chloride 109 (H) 10/17/2022 03:07 AM    CO2 26 10/17/2022 03:07 AM    Anion gap 6 10/17/2022 03:07 AM    Glucose 110 (H) 10/17/2022 03:07 AM    BUN 21 (H) 10/17/2022 03:07 AM    Creatinine 1.09 (H) 10/17/2022 03:07 AM    BUN/Creatinine ratio 19 10/17/2022 03:07 AM    GFR est AA >60 10/03/2022 06:44 AM    GFR est non-AA >60 10/03/2022 06:44 AM    Calcium 8.6 10/17/2022 03:07 AM    Bilirubin, total 0.4 10/17/2022 03:07 AM    Alk. phosphatase 56 10/17/2022 03:07 AM    Protein, total 4.5 (L) 10/17/2022 03:07 AM    Albumin 2.2 (L) 10/17/2022 03:07 AM    Globulin 2.3 10/17/2022 03:07 AM    A-G Ratio 1.0 (L) 10/17/2022 03:07 AM    ALT (SGPT) 130 (H) 10/17/2022 03:07 AM    AST (SGOT) 43 (H) 10/17/2022 03:07 AM       Assessment and Recommendations:      COVID-19 infection  -suspected onset last Monday/ worsening cough. 10/6 Tested positive  -management per primary team/pulmonary;   10/14 pulmonary : abx changed to vanc and cefepime  Today requiring mid flow oxygen at 15L ; CTA pending ; have resumed prophylactic dosing of Lovenox today with recovery of plt ct to 51K. 2.Metastatic cancer to bone [facility])  -reports diffuse achiness which  is different than her usual pain complaints. 3.Diffuse large B-cell lymphoma of lymph nodes of neck (HCC)  -completed C5 da-REPOCH on 10/7/22 due for C6 on 10/24.  -unclear if any issue if growth factor administered during a covid infection but patient is at high risk for sepsis. -filgrastim-aafi (NIVESTYM) injection syringe 420 mcg; received total 6 doses; stopped today with improved   - will plan to delay C6 and cont to monitor pt's current medical condition until at least 11/7     4. Thrombocytopenia-   secondary to chemo effect. Continue to monitor daily. Transfuse 1 unit plts for plt < 10k or for signs of bleeding  --plt 51k today ; will resume prophylactic anticoagulation   -Daily CBC with diff    5.  Thrush: Improvied on diflucan daily; now on hold per primary team    6. Nutrition: poor oral intake: encouraged fluids and to eat soft foods; on supplemental drinks TID. 7. Diarrhea   10/12 C-diff negative   Scheduled imodium TID x 1 day and then BID; continue to monitor       Plan reviewed with Dr Bryn Fine; covering physician for Dr Silvia Peguero.

## 2022-10-17 NOTE — PROGRESS NOTES
Problem: Falls - Risk of  Goal: *Absence of Falls  Outcome: Progressing Towards Goal  Note: Fall Risk Interventions:  Mobility Interventions: Bed/chair exit alarm, Communicate number of staff needed for ambulation/transfer, Patient to call before getting OOB         Medication Interventions: Bed/chair exit alarm, Patient to call before getting OOB, Teach patient to arise slowly    Elimination Interventions: Bed/chair exit alarm, Call light in reach    History of Falls Interventions: Bed/chair exit alarm         Problem: Patient Education: Go to Patient Education Activity  Goal: Patient/Family Education  Outcome: Progressing Towards Goal     Problem: Risk for Spread of Infection  Goal: Prevent transmission of infectious organism to others  Outcome: Progressing Towards Goal     Problem: Patient Education:  Go to Education Activity  Goal: Patient/Family Education  Outcome: Progressing Towards Goal     Problem: Pain  Goal: *Control of Pain  Outcome: Progressing Towards Goal  Goal: *PALLIATIVE CARE:  Alleviation of Pain  Outcome: Progressing Towards Goal     Problem: Patient Education: Go to Patient Education Activity  Goal: Patient/Family Education  Outcome: Progressing Towards Goal     Problem:  Body Temperature -  Risk of, Imbalanced  Goal: Ability to maintain a body temperature within defined limits  Outcome: Progressing Towards Goal  Goal: Will regain or maintain usual level of consciousness  Outcome: Progressing Towards Goal  Goal: Complications related to the disease process, condition or treatment will be avoided or minimized  Outcome: Progressing Towards Goal     Problem: Risk for Fluid Volume Deficit  Goal: Maintain normal heart rhythm  Outcome: Progressing Towards Goal  Goal: Maintain absence of muscle cramping  Outcome: Progressing Towards Goal  Goal: Maintain normal serum potassium, sodium, calcium, phosphorus, and pH  Outcome: Progressing Towards Goal     Problem: Nutrition Deficit  Goal: *Optimize nutritional status  Outcome: Progressing Towards Goal

## 2022-10-17 NOTE — ROUTINE PROCESS
1900 Bedside and Verbal shift change report given to [address]  (oncoming nurse) by Surya Puente RN  (offgoing nurse). Report included the following information SBAR, Kardex, Intake/Output, MAR, Accordion, Recent Results, Med Rec Status, and Cardiac Rhythm normal sinus . Shift Summary: Patient had uneventful shift, she rested well on 11 liters mid flow, patient does desat when moving are transferring from bed to [facility], she takes a moment to recover but she is able to recover oxygen levels. Patient does have congested cough it is not productive    0700 Bedside and Verbal shift change report given to  [address] [place] [address] Drive  (oncoming nurse) by Travis Garcia RN  (offgoing nurse). Report included the following information SBAR, Procedure Summary, Intake/Output, MAR, Accordion, Recent Results, Med Rec Status, and Cardiac Rhythm normal sinus  .

## 2022-10-17 NOTE — PROGRESS NOTES
[address] [facility] Ericka Luque [address]   Office [phone]  Fax [phone]          Subjective / Objective     24 Hour Events: Desat to 88% on 6L --> placed on 15L HFNC by RT. Wheezing was auscultated --> Combivent Respimat q4hrs. CXR with increased b/l pulm infiltrates    Subjective  Patient reports cough is at least the same or perhaps worsened. Feels overall worse over the past 24 hours. Pleuritic chest pain with deep breaths and coughing. Visit Vitals  /73 (BP 1 Location: Left upper arm, BP Patient Position: Lying left side)   Pulse 69   Temp 98.6 °F (37 °C)   Resp 18   Ht 5' 2.99\" (1.6 m)   Wt 173 lb 8 oz (78.7 kg)   SpO2 92%   BMI 30.74 kg/m²     General: Patient appears weak and ill today. No increased work of breathing and calm on 15L HFNC  Head: Normocephalic. Atraumatic. Neck: Normal ROM. No stiffness. Mouth: No tongue plaque  Respiratory: Significant Ronchi present all throughout. End-expiratory wheezing. No increased work of breathing at rest. Cough with deep breathing  Cardiovascular: RRR. Normal S1,S2. No m/r/g.   GI: + bowel sounds. Nontender. No rebound tenderness or guarding. Nondistended. Extremities: no LE edema. Skin: Warm, dry. I/O:  Date 10/16/22 0700 - 10/17/22 0659 10/17/22 0700 - 10/18/22 0659   Shift [id number] 1900-0659 24 Hour Total 8953-2225 4864-9928 24 Hour Total   INTAKE   P.O. 597 150 747        P. O. 597 150 747      I.V.(mL/kg/hr) 350(0.4) 100(0.1) 450(0.2)        I.V.  0 0        Volume (cefepime (MAXIPIME) 2 g in 0.9% sodium chloride (MBP/ADV) 100 mL MBP) 100 100 200        Volume (vancomycin (VANCOCIN) 1,250 mg in 0.9% sodium chloride 250 mL ([id number])) 250  250      Shift Total(mL/kg) 947(12) 250(3.2) 3656(03.1)      OUTPUT   Urine(mL/kg/hr)           Urine Occurrence(s) 2 x 4 x 6 x      Emesis/NG output           Emesis Occurrence(s)  0 x 0 x      Stool           Stool Occurrence(s) 1 x 1 x 2 x      Shift Total(mL/kg)          448 3043 Weight (kg) 78.7 78.7 78.7 78.7 78.7 78.7         CBC:  Recent Labs     10/17/22  0307 10/16/22  0236 10/15/22  0424   WBC 6.2 1.0* 0.2*   HGB 7.7* 7.4* 7.7*   HCT 23.8* 23.1* 23.8*   PLT 51* 33* 19*       Metabolic Panel:  Recent Labs     10/17/22  0307 10/16/22  0236 10/15/22  0424    139 139   K 3.8 4.1 3.8   * 108 107   CO2 26 26 27   BUN 21* 21* 19   CREA 1.09* 1.19* 1.04*   * 122* 132*   CA 8.6 8.7 8.9   ALB 2.2* 2.2* 2.2*   * 163* 215*           Assessment and Plan       Ozzie Mistry is a 78 y.o. 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 Currently on steroids and on abx for bacterial PNA. Plt improved to 51 today. Overall, patient has declined clinically with increased O2 requirement now on 15L HFNC. AHRF in setting of COVID-19 with superimposed bacterial PNA: Hx of COPD with no home O2. On 15L HFNC. LA and procal negative. Inflammatory markers elevated today. CXR with Increasing bilateral pulmonary infiltrates. CTA Chest today with concern for PE. Not currently on [facility] with increased O2 requirement, pleuritic chest pain, and elevated D-dimer.  - Cefepime 2 g BID (10/14-), Vancomycin (10/10-), S/p 5 days Doxycycline 100 BID (10/10-10/15)  - Combivent respimat 20mcg-100mcg q4hrs  - Dulera 200mcg-5mcg 2 puffs BID  - Pulm following - continue Decadron 6 daily IV (10/9-). Consider doubling dose to 12mg daily  - CTA chest  - Awaiting BNP  - Supplemental O2 prn for sats <88%. Wean O2 as tolerated  - Daily CBC, CMP, D-dimer, LD, Ferritin      DLBCL with Pancytopenia: Pancytopenia improved. Follows with Dr. Betty Purvis. Currently undergoing DA-R-EPOCH chemotherapy and filgrastim injections.  WBC now at 6.2 today and platelets up to 51  - Oncology consulted, appreciate rec's  - Filgrastim injections daily until at least 10/17  - Zofran and compazine q6-8 hrs prn nausea  - Morphine 15 mg q4 hr PRN    Thrush/Mouth Pain: resolved. eating and drinking well per patient  - Holding Diflucan 100 mg daily  - Magic Mouthwash as needed before meals for discomfort     Pancytopenia: POA PLT 62. Likely 2/2 chemotherapy. - Hold any AC with plt <50  - Daily CBC  - Oncology consulted, appreciate rec's   - TT plt below 10     Hypertension: POA BP was 92/66.   - Continue home Amplodipine-benazepril 5-20 daily      Hypokalemia: POA K 2.9.   - Replete PRN  - Follow on daily BMP     MDD/RENETTA: Chronic, stable. - Home Xanax 1mg bid  - Home Prozac 40mg daily     Tobacco dependence: About 40 pack years. Currently smokes about 5 cigarettes per day.   - Nicotine patch 21 daily    Zoey Reardon DO  Family Medicine Resident       For Billing    Chief Complaint   Patient presents with    Medication Problem       Hospital Problems  Date Reviewed: 9/23/2022            Codes Class Noted POA    Lymphoma ([facility] ICD-10-CM: C85.90  ICD-9-CM: 202.80  10/10/2022 Unknown        COVID ICD-10-CM: U07.1  ICD-9-CM: 079.89  10/10/2022 Unknown        Acute respiratory failure with hypoxia ([facility] ICD-10-CM: J96.01  ICD-9-CM: 518.81  9/13/2022 Unknown

## 2022-10-17 NOTE — PROGRESS NOTES
[facility]     Name: Medina Garcia MRN: [id number]   : 1955 Hospital: [address]   Date: 10/17/2022        Impression Plan   Acute hypoxic respiratory failure  COVID 19 PNA with questionable superimposed bacterial PNA  Lymphoma s/p recent R-CHOP  Pancytopenia  Hx of tobacco abuse               CTA chest ordered per primary team  Wean O2 to keep sats above 90%  Will switch Dex to Solu-Medrol  Combivent and Dulera  Continue Vanc/Cefepime  Follow d-dimer  Barcitinib contraindicated since pt is immunocompromised and with possible bacterial infection. Nivestym per hematology  OOB into chair  Holding anticoagulation               Radiology  (personally reviewed) CT chest reviewed: bilateral peripheral infiltrates, no PE       Subjective     Cc: shortness of breath    79 yo with PMHx of lymphoma presenting with increasing SOB for the last 3 days. Was admitted last week for R-CHOP chemotherapy. Her son was COVID 19 positive last week. Pt is COVID 19 positive in ER. She has had the first series of mRNA vaccine but none of the boosters. Currently on 4 liters of O2 and dyspnic. WBC 13, neutrophil predominant. Bilateral infiltrates on CT chest.     Interval history:    TMax 99  Sats 92% on 15L this AM--became more hypoxic overnight  CRP 3.91--decreased  Ferritin 834--increased  LFTs elevated, ALT trending down  Blood cultures negative x 5 days    ROS:  Feels about the same. No changes in her breathing. No sputum production. No fever/chills. A comprehensive review of systems was negative except for that written in the HPI.     Past Medical History:   Diagnosis Date    Adverse effect of anesthesia     PHLEGM IN THROAT POST OP & NEEDED X2 BREATHING TREATMENTS    Anxiety     COPD (chronic obstructive pulmonary disease) ([address]     emphysema    Depression     GERD (gastroesophageal reflux disease)     Hernia, femoral     since childhood    Hypertension     Joint pain     Nausea & vomiting     TMJ arthritis       Past Surgical History:   Procedure Laterality Date    HX CERVICAL FUSION  2012    C5-C6    HX CHOLECYSTECTOMY  2020    HX COLONOSCOPY      HX ENDOSCOPY      HX HYSTERECTOMY  2019    HX TONSILLECTOMY      AS A CHILD    HX WISDOM TEETH EXTRACTION      TEENAGER    IR INSERT TUNL CVC W PORT OVER 5 YEARS  4/29/2022      Prior to Admission medications    Medication Sig Start Date End Date Taking? Authorizing Provider   doxycycline (VIBRA-TABS) 100 mg tablet Take 1 Tablet by mouth every twelve (12) hours. 10/7/22   Lindy Fenton MD   albuterol (PROVENTIL HFA, VENTOLIN HFA, PROAIR HFA) 90 mcg/actuation inhaler Take 2 Puffs by inhalation every six (6) hours as needed for Wheezing or Shortness of Breath for up to 20 days. 10/7/22 10/27/22  Lindy Fenton MD   morphine IR (MS IR) 15 mg tablet Take 1 Tablet by mouth every six (6) hours as needed for Pain for up to 15 days. Max Daily Amount: 60 mg. 10/4/22 10/19/22  Kriss Abdi MD   ALPRAZolam Cindia Muss) 1 mg tablet Take 1 Tablet by mouth two (2) times daily as needed for Anxiety for up to 15 days. Max Daily Amount: 2 mg. 10/4/22 10/19/22  Kriss Abdi MD   ondansetron (ZOFRAN ODT) 8 mg disintegrating tablet Take 1 Tablet by mouth every eight (8) hours as needed for Nausea or Vomiting. 9/2/22   Lindy Fenton MD   prochlorperazine (COMPAZINE) 10 mg tablet Take 1 Tablet by mouth every six (6) hours as needed for Nausea (do not take if groggy; take if nausea despite zofran). 9/2/22   Lindy Fenton MD   trimethoprim-sulfamethoxazole (BACTRIM DS, SEPTRA DS) 160-800 mg per tablet Take 1 Tablet by mouth every Monday, Wednesday, Friday.  Indications: prophylaxis treatment of cancer related infections 8/24/22   Schoeneweis, Ann, NP   FLUoxetine (PROzac) 40 mg capsule TAKE 1 CAPSULE BY MOUTH EVERY MORNING 10/11/21   Mary Blas MD   amLODIPine-benazepril (LOTREL) 5-20 mg per capsule TAKE 1 CAPSULE BY MOUTH EVERY MORNING 10/11/21   Enriqueta Smith MD     Current Facility-Administered Medications   Medication Dose Route Frequency    ipratropium-albuterol (COMBIVENT RESPIMAT) 20 mcg-100 mcg inhalation spray  1 Puff Inhalation Q4H RT    iopamidoL (ISOVUE-370) 76 % injection 100 mL  100 mL IntraVENous RAD ONCE    loperamide (IMODIUM) capsule 2 mg  2 mg Oral TID    [START ON 10/18/2022] loperamide (IMODIUM) capsule 2 mg  2 mg Oral BID    enoxaparin (LOVENOX) injection 40 mg  40 mg SubCUTAneous Q24H    vancomycin (VANCOCIN) 1,250 mg in 0.9% sodium chloride 250 mL ([id number])  1,250 mg IntraVENous Q24H    pantoprazole (PROTONIX) 40 mg in 0.9% sodium chloride 10 mL injection  40 mg IntraVENous DAILY    cefepime (MAXIPIME) 2 g in 0.9% sodium chloride (MBP/ADV) 100 mL MBP  2 g IntraVENous Q12H    L.acidophilus-paracasei-S.thermophil-bifidobacter (RISAQUAD) 8 billion cell capsule  1 Capsule Oral DAILY    [Held by provider] lisinopriL (PRINIVIL, ZESTRIL) tablet 20 mg  20 mg Oral DAILY    [Held by provider] fluconazole (DIFLUCAN) tablet 100 mg  100 mg Oral DAILY    sodium chloride (NS) flush 5-40 mL  5-40 mL IntraVENous Q8H    dexamethasone (PF) (DECADRON) 10 mg/mL injection 6 mg  6 mg IntraVENous Q24H    [Held by provider] amLODIPine (NORVASC) tablet 5 mg  5 mg Oral DAILY    FLUoxetine (PROzac) capsule 40 mg  40 mg Oral DAILY    trimethoprim-sulfamethoxazole (BACTRIM DS, SEPTRA DS) 160-800 mg per tablet 1 Tablet  1 Tablet Oral Q MON, WED & FRI    mometasone-formoterol (DULERA) 200mcg-5mcg/puff  2 Puff Inhalation BID RT    nicotine (NICODERM CQ) 21 mg/24 hr patch 1 Patch  1 Patch TransDERmal DAILY     Allergies   Allergen Reactions    Oxycodone Nausea Only      Social History     Tobacco Use    Smoking status: Some Days     Packs/day: 0.25     Years: 40.00     Pack years: 10.00     Types: Cigarettes    Smokeless tobacco: Never   Substance Use Topics    Alcohol use: Not Currently     Alcohol/week: 0.0 standard drinks      Family History   Problem Relation Age of Onset    Heart Disease Mother 46    Heart Surgery Mother         HEART TRANSPLANT    Elevated Lipids Mother     Hypertension Mother     COPD Father     Emphysema Father     Sudden Death Brother 46    Other Maternal Grandmother         BRAIN TUMOR    No Known Problems Son     No Known Problems Daughter           Laboratory: I have personally reviewed the flowsheet and labs.      Recent Labs     10/17/22  0307 10/16/22  0236 10/15/22  0424   WBC 6.2 1.0* 0.2*   HGB 7.7* 7.4* 7.7*   HCT 23.8* 23.1* 23.8*   PLT 51* 33* 19*       Recent Labs     10/17/22  0307 10/16/22  0236 10/15/22  0424    139 139   K 3.8 4.1 3.8   * 108 107   CO2 26 26 27   * 122* 132*   BUN 21* 21* 19   CREA 1.09* 1.19* 1.04*   CA 8.6 8.7 8.9   ALB 2.2* 2.2* 2.2*   * 163* 215*         Objective:   Visit Vitals  /80 (BP 1 Location: Left upper arm)   Pulse 80   Temp 99 °F (37.2 °C)   Resp 19   Ht 5' 2.99\" (1.6 m)   Wt 78.7 kg (173 lb 8 oz)   SpO2 92%   BMI 30.74 kg/m²       Intake/Output Summary (Last 24 hours) at 10/17/2022 1233  Last data filed at 10/17/2022 1105  Gross per 24 hour   Intake 1307 ml   Output --   Net 1307 ml       EXAM:   GENERAL: awake, alert, tired appearing, mildy dyspnic HEENT:  anicteric, EOMI, no alar flaring or epistaxis, oral mucosa moist without cyanosis, NECK:  no jugular vein distention, no retractions, no thyromegaly or masses, LUNGS: scattered rales HEART:  Regular rate and rhythm with no MGR; no edema is present, ABDOMEN:  soft with no tenderness, bowel sounds present, EXTREMITIES:  warm with no cyanosis, SKIN:  no jaundice or ecchymosis, and NEUROLOGIC:  alert and oriented, grossly non-focal    Derald Ripa, NP  [facility]

## 2022-10-17 NOTE — PROGRESS NOTES
[facility] IN REPORT:    Verbal report received from [address] (name) on Bridgette Keyes  being received from [address] (unit) for routine progression of care      Report consisted of patients Situation, Background, Assessment and   Recommendations(SBAR). Information from the following report(s) SBAR, ED Summary, Intake/Output, MAR, Recent Results, Med Rec Status, and Cardiac Rhythm NSR  was reviewed with the receiving nurse. Opportunity for questions and clarification was provided. Assessment completed upon patients arrival to unit and care assumed. 1600 Patient assessed, see flowsheet. Patient is alert and oriented x4, pupils are round and reactive, patient is afebrile, and pulses are felt in all extremities. Heart sounds heard, lung sounds clear and diminished with dyspnea on exertion. Bowel sounds active, appetite poor. Patient can move all extremities spontaneously and with purpose, but weak. Patient on 15 L midflow, satting well. Patient was given full CHG bath, linens changed, gown changed, and bed pad changed. Primary Nurse Janet Vieyra RN and DEANNA Youssef performed a dual skin assessment on this patient No impairment noted  Mian score is see flowsheet. 1654 Immodium given. 1701 15 mg morphine given PO for pain 8/10.     1900 Bedside and Verbal shift change report given to Trent Antonio RN (oncoming nurse) by DEANNA Hdez (offgoing nurse). Report included the following information SBAR, ED Summary, Intake/Output, MAR, Recent Results, Med Rec Status, and Cardiac Rhythm NSR .

## 2022-10-17 NOTE — PROGRESS NOTES
[facility] Dosing Services: Antimicrobial Stewardship Daily Doc    Consult for antibiotic dosing of vancomycin by Dr. Leonel Sosa  Indication: HAP in settin gof COVID-19 infection; immunosuppressed due to chemotherapy  Day of Therapy: 4 of restart (previously received vancomycin from 10/11/2022 to 10/13/2022)    Vancomycin therapy:  Current maintenance dose: vancomycin 1,250 mg IV every 24 hours   Dose calculated to approximate a target AUC/AL of 400-600  Assessment/Plan: Afebrile; WBCs WNL; ANC WNL (s/p filgrastim). SCr stable. Level 23.8 on 10/17/2022 - extrapolates to therapeutic AUC of 557. Continue current regimen. Other Antimicrobial   (not dosed by pharmacist) Cefepime 2 grams IV every 12 hours  Fluconazole 100 mg PO daily (currently held)  Bactrim DS 1 tablet PO every Mon, Wed, Fri   Cultures 10/12/2022 Stool (for C Diff): negative (final)  10/10/2022 Blood: no growth x 5 days (final)  10/06/2022 Sputum: moderate normal payal (final)  10/06/2022 Respiratory viral panel: COVID-19 detected (final)   Serum Creatinine Lab Results   Component Value Date/Time    Creatinine 1.09 (H) 10/17/2022 03:07 AM       Creatinine Clearance Estimated Creatinine Clearance: 50.4 mL/min (A) (based on SCr of 1.09 mg/dL (H)). Temp Temp: 98.6 °F (37 °C)       WBC Lab Results   Component Value Date/Time    WBC 6.2 10/17/2022 03:07 AM      Procalcitonin Lab Results   Component Value Date/Time    Procalcitonin <0.05 10/10/2022 02:39 PM      For Antifungals, Metronidazole and Nafcillin: Lab Results   Component Value Date/Time    ALT (SGPT) 130 (H) 10/17/2022 03:07 AM    AST (SGOT) 43 (H) 10/17/2022 03:07 AM    Alk.  phosphatase 56 10/17/2022 03:07 AM    Bilirubin, total 0.4 10/17/2022 03:07 AM        Pharmacist Brigette Spivey

## 2022-10-18 NOTE — PROGRESS NOTES
Ultrasound IV by Jasvir Mendieta :  Procedure Note    Patient meets criteria for US IV insertion. Ultrasound IV education provided to patient. Opportunities for questions given. Ultrasound used for PIV placement:  20 gauge 6 cm Arrow Endurance Extended Dwell(may stay in place for 29 days)  Left cephalic location. 1 X Attempt(s). Flushed with ease; vigorous blood return. Procedure tolerated well. Primary RN aware of IV placement and added to LDA.       Jasvir Mendieta

## 2022-10-18 NOTE — ACP (ADVANCE CARE PLANNING)
Primary Decision Maker: Helder Peñaloza - Daughter - [phone]  Primary Decision Maker: Vikram Lindsay - [phone]  Advance Care Planning 9/14/2022   Patient's Healthcare Decision Maker is: Legal Next of Kin   Confirm Advance Directive None   Patient Would Like to Complete Advance Directive No   Does the patient have other document types Not on file      Pt does not have AMD on file, declined to complete in the past, verbalized understanding that in absence of assigned medical POA, son and dtr are legal NOK and surrogate decision makers.

## 2022-10-18 NOTE — PROGRESS NOTES
[address] Dosing Services: Antimicrobial Stewardship Daily Doc 10/18/2022    Consult for antibiotic dosing of vancomycin by Dr. Johanna Porter  Indication: HAP in settin gof COVID-19 infection; immunosuppressed due to chemotherapy  Day of Therapy: 5 of restart (previously received vancomycin from 10/11/2022 to 10/13/2022)    Vancomycin therapy:  Current maintenance dose: vancomycin 1,250 mg IV every 24 hours   Dose calculated to approximate a target AUC/AL of 400-600  Assessment/Plan: Level 23.8 on 10/17/2022 - extrapolates to therapeutic AUC of 557. Creatinine slight increase today 10/18 however does not meet criteria for BULL. Will continue current dose and repeat creatinine in AM. Consider surveillance level in 2-3 days if vancomycin continues and Scr does not trend towards baseline. Other Antimicrobial   (not dosed by pharmacist) Cefepime 2 grams IV every 12 hours (CrCl 30-60 ml/min)  Fluconazole 100 mg PO daily (50% dose reduction for CrCl <50 ml/min- Indication oral thrush)  Bactrim DS 1 tablet PO every Mon, Wed, Fri (prophylactic dosing- serum K is WNL)   Cultures 10/12/2022 Stool (for C Diff): negative (final)  10/10/2022 Blood: no growth x 5 days (final)  10/06/2022 Sputum: moderate normal payal (final)  10/06/2022 Respiratory viral panel: COVID-19 detected (final)   Serum Creatinine Lab Results   Component Value Date/Time    Creatinine 1.12 (H) 10/18/2022 05:05 AM       Creatinine Clearance Estimated Creatinine Clearance: 49.1 mL/min (A) (based on SCr of 1.12 mg/dL (H)). Temp Temp: 97.7 °F (36.5 °C)       WBC Lab Results   Component Value Date/Time    WBC 10.6 10/18/2022 05:05 AM      Procalcitonin Lab Results   Component Value Date/Time    Procalcitonin <0.05 10/10/2022 02:39 PM      For Antifungals, Metronidazole and Nafcillin: Lab Results   Component Value Date/Time    ALT (SGPT) 122 (H) 10/18/2022 05:05 AM    AST (SGOT) 55 (H) 10/18/2022 05:05 AM    Alk.  phosphatase 74 10/18/2022 05:05 AM Bilirubin, total 0.3 10/18/2022 05:05 AM        Thanks,  Pharmacist Dori Aguayo, PHARMD

## 2022-10-18 NOTE — PROGRESS NOTES
[facility] Dosing Services: IV to PO Conversion    The pharmacist has determined that this patient meets P & T approved criteria for conversion from IV to oral therapy for the following medication:Pantoprazole    The pharmacist has written the following order for the patient: Pantoprazole 40 mg ACB    The pharmacist will continue to monitor the patient's status and advise the physician if conversion back to IV therapy is recommended.     Signed INDIGO Dolan Contact information:  [phone]

## 2022-10-18 NOTE — PROGRESS NOTES
[address] [facility]   Office [phone]  Fax [phone]          Subjective / Objective     24 Hour Events: Transferred to higher level of care considering the increased O2 requirement    Subjective  Patient reports she \"feels terrible\". Persistent cough, pain, and fatigue that is all worsening. Notes dry mouth that improves with drinking water but no pain in the mouth. Reports eating and drinking okay with no significant discomfort. 2 loose BM yesterday (notes that this has improved). Visit Vitals  /70   Pulse 83   Temp 98.5 °F (36.9 °C)   Resp 27   Ht 5' 2.99\" (1.6 m)   Wt 173 lb 8 oz (78.7 kg)   SpO2 (!) 89%   BMI 30.74 kg/m²     General: Patient appears weak and ill today. Moves slowly and poorly around in bed. Increased lethargy. Shaking when not underneath blanket. On 11L HFNC  Head: Normocephalic. Atraumatic. Mouth: No tongue plaque  Respiratory: Significant Ronchi present all throughout lung fields b/l. Mild end-expiratory wheezing. No increased work of breathing at rest. Cough with deep breathing  Cardiovascular: RRR. Normal S1,S2. No m/r/g.   GI: + bowel sounds. Nontender. No rebound tenderness or guarding. Nondistended. Extremities: no LE edema. Skin: Warm, dry. I/O:  Date 10/17/22 0700 - 10/18/22 0659 10/18/22 0700 - 10/19/22 0659   Shift 5006-1023 4414-9254 24 Hour Total 0800-2062 5969-1919 24 Hour Total   INTAKE   P.O. 120 120 240        P. O. 120 120 240      I. V.(mL/kg/hr) 350(0.4) 100(0.1) 450(0.2)        I.V. 0  0        Volume (cefepime (MAXIPIME) 2 g in 0.9% sodium chloride (MBP/ADV) 100 mL MBP) 100 100 200        Volume (vancomycin (VANCOCIN) 1,250 mg in 0.9% sodium chloride 250 mL ([id number])) 250  250      Blood 0  0        Autotransfused 0  0      Other 0  0        Other 0  0      Shift Total(mL/kg) 470(6) 220(2.8) 690(8.8)      OUTPUT   Urine(mL/kg/hr)           Urine Occurrence(s) 0 x 2 x 2 x      Emesis/NG output           Emesis Occurrence(s) 0 x  0 x      Stool           Stool Occurrence(s) 0 x 1 x 1 x      Shift Total(mL/kg)          220 690      Weight (kg) 78.7 78.7 78.7 78.7 78.7 78.7       CBC:  Recent Labs     10/18/22  0505 10/17/22  0307 10/16/22  0236   WBC 10.6 6.2 1.0*   HGB 8.1* 7.7* 7.4*   HCT 24.9* 23.8* 23.1*   PLT 72* 51* 33*     Metabolic Panel:  Recent Labs     10/18/22  0505 10/17/22  0307 10/16/22  0236    141 139   K 4.4 3.8 4.1   * 109* 108   CO2 28 26 26   BUN 19 21* 21*   CREA 1.12* 1.09* 1.19*   * 110* 122*   CA 8.5 8.6 8.7   ALB 2.2* 2.2* 2.2*   * 130* 163*         Assessment and Plan       Delaney Mims is a 78 y.o. 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 Pt has worsened clinically over the past few days with O2 demands at 11L HFNC. Plt improved to 72 today. Now on solumedrol 60mg q8hrs with recent CTA showing severe COVID PNA with no PE. Pulm and Heme/onc consulted and following, much appreciated. AHRF in setting of COVID-19 with superimposed bacterial PNA: Hx of COPD with no home O2. On 11L HFNC. LA and procal negative. Inflammatory markers elevated but with downtrend in CRP and D-dimer today. CXR with Increasing bilateral pulmonary infiltrates. BNP 2,422. CTA Chest on 10/17 with no PE but severe COVID PNA and trace b/l pleural effusions. Continuing antibiotic coverage for bacterial PNA for now considering immunocompromised status but imaging reflects COVID PNA and no focal consolidation.  - Day 8 of Abx: Cefepime 2 g BID (10/14-), Vancomycin (10/10-), S/p 5 days Doxycycline 100 BID (10/10-10/15)  - Combivent respimat 20mcg-100mcg q4hrs  - Dulera 200mcg-5mcg 2 puffs BID  - Pulm following:  -  Solumedrol 60mg q8hrs  - BNP elevated at 2,422 with trace b/l pleural effusions on CT.  Will discuss diuresis with pulm   - Supplemental O2 prn for sats <88%. Wean O2 as tolerated  - Daily CBC, CMP, D-dimer, LD, Ferritin    DLBCL with Pancytopenia: Pancytopenia improved. Follows with Dr. Haroon Rachel. WBC now at 10.6 and plt 72  - Oncology consulted, appreciate rec's  - s/p 1 wk daily Filgrastim injections  - Zofran and compazine q6-8 hrs prn nausea  - Morphine 15 mg q4 hr PRN    Thrush/Mouth Pain: resolved. eating and drinking well per patient  - Continue Diflucan 100 mg daily  - Magic Mouthwash as needed before meals for discomfort     Pancytopenia: POA PLT 62. Likely 2/2 chemotherapy. - Continuing AC with lovenox 40mg daily  - Hold any AC with plt <50  - Daily CBC  - Oncology consulted, appreciate rec's   - TT plt below 10    Diarrhea: Improved  - Immodium 2mg BID  - C. Diff negative on 10/12    Elevated Cr: Likely 2/2 poor PO intake. 1.12 today. Stable the past 4 days  - Encouraged PO fluid consumption  - Monitor daily BMP     Hypertension: POA BP was 92/66.   - Continue home Amplodipine-benazepril 5-20 daily      Hypokalemia: POA K 2.9. Improved  - Replete PRN  - Follow on daily BMP     MDD/RENETTA: Chronic, stable. - Home Xanax 1mg bid  - Home Prozac 40mg daily     Tobacco dependence: About 40 pack years. Currently smokes about 5 cigarettes per day.   - Nicotine patch 21 daily    Darrel Agudelo DO  Family Medicine Resident       For Billing    Chief Complaint   Patient presents with    Medication Problem       Hospital Problems  Date Reviewed: 9/23/2022            Codes Class Noted POA    Lymphoma ([facility] ICD-10-CM: C85.90  ICD-9-CM: 202.80  10/10/2022 Unknown        COVID ICD-10-CM: U07.1  ICD-9-CM: 079.89  10/10/2022 Unknown        [facility] ICD-10-CM: J96.01  ICD-9-CM: 518.81  9/13/2022 Unknown

## 2022-10-18 NOTE — PROGRESS NOTES
1900 - Bedside and Verbal shift change report given to Kari Marie RN (oncoming nurse) by Mateo Patrick RN (offgoing nurse). Report included the following information SBAR, Procedure Summary, Intake/Output, MAR, Recent Results, and Cardiac Rhythm Sinus Rhythm . Primary Nurse Lisandro Burks and Mateo Patrick, RN performed a dual skin assessment on this patient No impairment noted  Mian score is 18  2000 - Initial assessment complete, see flow sheet. 0014 - Reassessment complete, see flow sheet. 3143 - Reassessment complete, see flow sheet. 0700 - Bedside and Verbal shift change report given to NERIS HUI RN (oncoming nurse) by Paz Burton (offgoing nurse). Report included the following information SBAR, Procedure Summary, Intake/Output, MAR, Recent Results, and Cardiac Rhythm Sinus Rhythm .

## 2022-10-18 NOTE — PROGRESS NOTES
0700 Bedside and Verbal shift change report given to [address], RN (oncoming nurse) by Niki Stokes (offgoing nurse). Report included the following information SBAR, Kardex, ED Summary, Procedure Summary, Intake/Output, MAR, Recent Results, Med Rec Status, Cardiac Rhythm NSR, Alarm Parameters , Quality Measures, and Dual Neuro Assessment. Primary Nurse Jenny Cantor RN and Niki Stokes RN performed a dual skin assessment on this patient No impairment noted  Mian score, see flowsheet. 0830 Pt assessed, see flowsheet. All flowsheet documentation and medication administration will be complete by student RN, Mattie Carlson under the supervision and guidance of this RN. Pt A&OX4, moves all extremities. Increased from 12L [place] to 15L, SATs 88% - instructing pt to take deep breaths in through her nose, RR 30, diminished bilaterally. NSR on monitor, BP stable. Trace edema throughout upper and lower bilateral extremities. BS active, poor appetite. Voids via bedside commode. 7024 Sats 85% on 15 L. Called RT for HFNC    0855 Rt at bedside - pt placed on HFNC. 40L 100 FIO2. SATs 87%. 0856 increased HFNL to 50L, 100% FIO2, SATs 91%. 1245 Updated pts son and daughter via phone. All questions answered to satisfaction     [facility], Hematology NP at bedside. Updated. 1332 Bladder scan for 289ml. Notified Family practice. Okay to straight cath if greater than 350ml or with discomfort. Also consulting palliative care - Pt with flat affect, refusing to be active in care, wants to go home. 1800 Pt voided via bedside commode 325ml. 1900 Bedside and Verbal shift change report given to Evelyne Kaye RN (oncoming nurse) by [address], RN (offgoing nurse). Report included the following information SBAR, Kardex, ED Summary, Procedure Summary, Intake/Output, MAR, Recent Results, Med Rec Status, Cardiac Rhythm NSR, Alarm Parameters , Quality Measures, and Dual Neuro Assessment.

## 2022-10-18 NOTE — PROGRESS NOTES
[facility] [place] at [address], [address] [facility]  [place]: [phone]  F: [phone] Patient ID  Name: Lucius Helms  YOB: 1955  MRN: [id number]  Referring Provider:   No referring provider defined for this encounter. 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 This summary highlights focused aspects of patient's ongoing care and may have a recurring section in notes with either updates or remain unchanged as a longitudinal care summary.    -------------------------------------------------------------------------------------------------------------------------------------------------------------------------------------------------------  DIAGNOSIS:  Diffuse Large B-Cell Lymphoma     ORIGINAL STAGING:  Stage IV     SITES OF DISEASE:  Left Cervical Lymph Node,Bone Disease, Stage IV    CURRENT TREATMENT:  C1,D1 on 5/11/22 DA-R-EPOCH  C2  C3 delayed due to appetite issues and patient anxiety to restart therapy     Plan Name: OP R-CHOP   Treatment goal Curative   Provider Marsha Gayle MD   Status Inactive   Start Date    End Date    Treatment plan weight/height/BSA Weight type: Documented (effective on 4/27/2022), 94.1 kg (entered on 4/27/2022), 160 cm (entered on 4/27/2022), BSA 2.05 m2   Most recent weight/height/BSA Weight: 173 lb 8 oz (78.7 kg)    Height: 5' 2.99\" (1.6 m)    BSA (calculated - sq m): 1.86 sq meters      Treatment on clinical trial? [This plan is not part of a research study]   Reason for stopping treatment MD Discretion   Summary of Treatment Plan Medications DOXOrubicin (ADRIAMYCIN) chemo syringe 51.2 mg, 25 mg/m2 = 51.2 mg, IntraVENous, ONCE, 0 of 6 cycles  Dose modification: 25 mg/m2 (original dose 50 mg/m2, Cycle 1, Reason: Per Protocol, Comment: dose split into 2 syringes due to volume)    cyclophosphamide (CYTOXAN) 1,538 mg in 0.9% sodium chloride 250 mL chemo infusion, 750 mg/m2 = 1,538 mg, IntraVENous, ONCE, 0 of 6 cycles    vinCRIStine (VINCASAR PFS) 2 mg in 0.9% sodium chloride 50 mL chemo IVPB, 2 mg (100 % of original dose 2 mg), IntraVENous, ONCE, 0 of 6 cycles  Dose modification: 2 mg (original dose 2 mg, Cycle 1, Reason: Other)    riTUXimab-pvvr (RUXIENCE) 769 mg in 0.9% sodium chloride 769 mL infusion, 375 mg/m2 = 769 mg, IntraVENous, ONCE TITRATE, 0 of 1 cycle       Plan Name: [facility],    Treatment goal Curative   Provider Jennie Seaman MD   Status Active   Start Date 5/11/2022   End Date 10/30/2022 (Planned)   Treatment plan weight/height/BSA Weight type: Documented (effective on 8/17/2022), 79.5 kg (entered on 8/17/2022), 160 cm (entered on 8/5/2022), BSA 1.88 m2   Most recent weight/height/BSA Weight: 173 lb 8 oz (78.7 kg)    Height: 5' 2.99\" (1.6 m)    BSA (calculated - sq m): 1.86 sq meters      Treatment on clinical trial? [This plan is not part of a research study]   Reason for stopping treatment [Plan is still active]   Summary of Treatment Plan Medications cyclophosphamide (CYTOXAN) 1,537.6 mg in 0.9% sodium chloride 250 mL, overfill volume 25 mL chemo infusion, 750 mg/m2 = 1,538 mg, IntraVENous, ONCE, 5 of 6 cycles  Dose modification: 900 mg/m2 (original dose 750 mg/m2, Cycle 2, Reason: Per Protocol, Comment: ANC above 0.5), 600 mg/m2 (original dose 750 mg/m2, Cycle 3, Reason: Dose Not Tolerated), 600 mg/m2 (80 % of original dose 750 mg/m2, Cycle 4, Reason: Dose Not Tolerated)  Administration: 1,537.6 mg (5/15/2022), 1,809 mg (6/10/2022), 1,128 mg (8/22/2022), 1,128 mg (9/16/2022), 1,128 mg (10/7/2022)    etoposide (VEPESID) 102.6 mg in 0.9% sodium chloride 450 mL chemo infusion, 50 mg/m2 = 102.6 mg, IntraVENous, EVERY 24 HOURS, 5 of 6 cycles  Dose modification: 60 mg/m2 (original dose 50 mg/m2, Cycle 2, Reason: Per Protocol, Comment: ANC above 0.5), 40 mg/m2 (original dose 50 mg/m2, Cycle 3, Reason: Dose Not Tolerated), 40 mg/m2 (80 % of original dose 50 mg/m2, Cycle 4, Reason: Dose Not Tolerated)  Administration: 102.6 mg (5/11/2022), 102.6 mg (5/12/2022), 102.6 mg (5/13/2022), 102.6 mg (5/14/2022), 120.6 mg (6/6/2022), 120.6 mg (6/7/2022), 120.6 mg (6/8/2022), 120.6 mg (6/9/2022), 75.2 mg (8/18/2022), 75.2 mg (8/19/2022), 75.2 mg (8/20/2022), 75.2 mg (8/21/2022), 75.2 mg (9/12/2022), 75.2 mg (9/13/2022), 75.2 mg (9/14/2022), 75.2 mg (9/15/2022), 75.2 mg (10/3/2022), 75.2 mg (10/4/2022), 75.2 mg (10/5/2022), 75.2 mg (10/6/2022)    vinCRIStine (VINCASAR PFS) 0.8 mg, DOXOrubicin (ADRIAMYCIN) 20.6 mg in 0.9% sodium chloride 250 mL, overfill volume 25 mL chemo infusion, , IntraVENous, EVERY 24 HOURS, 5 of 6 cycles  Administration:  (5/11/2022),  (5/12/2022),  (5/13/2022),  (5/14/2022),  (6/6/2022),  (6/7/2022),  (6/8/2022),  (6/9/2022),  (8/18/2022),  (8/19/2022),  (8/20/2022),  (8/21/2022),  (9/12/2022),  (9/13/2022),  (9/14/2022),  (9/15/2022),  (10/3/2022),  (10/4/2022),  (10/5/2022),  (10/6/2022)    riTUXimab-pvvr (RUXIENCE) 769 mg in 0.9% sodium chloride 769 mL infusion, 375 mg/m2 = 769 mg, IntraVENous, ONCE TITRATE, 1 of 2 cycles  Administration: 769 mg (5/11/2022)    riTUXimab-pvvr (RUXIENCE) 754 mg in 0.9% sodium chloride 250 mL RAPID infusion, 375 mg/m2 = 754 mg, IntraVENous, ONCE TITRATE, 4 of 4 cycles  Administration: 754 mg (6/6/2022), 705 mg (8/18/2022), 705 mg (9/12/2022), 705 mg (10/3/2022)         PRIOR TREATMENT:  n/a     GOALS OF CARE:  Curative Intent     PATHOLOGY:  Specimen #:[id number] Collect: 4/22/2022      ==========================================================================                    * * *SURGICAL PATHOLOGY REPORT* * *   ==========================================================================   * * *PROCEDURES/ADDENDA* * *   ADDENDUM   Date Ordered: 4/26/2022 Status: Signed Out   Date Complete: 4/26/2022     By: Belle Buchanan. Sterling Goldberg, MD   Date Reported: 4/26/2022   Addendum Diagnosis   Lymph node, left posterior cervical lymph node, excision:   In situ hybridization for Jesús-Barr viral RNA: Negative   CPT: 11871  Addendum Comment   * * *CLINICAL HISTORY* * *   Cervical lymphadenopathy, rule out lymphoma   ==========================================================================   * * *FINAL PATHOLOGIC DIAGNOSIS* * *        Lymph node, left posterior cervical lymph node, excision:        Large B cell lymphoma. See comment. * * *Comment* * *   The histologic section has fragments of lymphoid tissue with diffuse and   focal follicular architecture. Diffuse areas are comprised of large,   atypical lymphocytes with centroblastic morphology and increased mitotic   activity. Immunohistochemical stains confirm the malignant cells are CD20   positive B cells that coexpress CD10 and BCL6 without MUM1. CD23   highlights rare follicular dendritic networks associated with lymphoid   follicles with germinal centers and express CD10 and BCL6 without BCL2. CyclinD1 and CD56 stains are negative. Concurrent flow cytometric analysis   confirms a clonal CD10 positive B-cell population comprised of medium to   large cells. The Ki-67 proliferation index is 30%. The overall findings favor the diagnosis of diffuse large B-cell lymphoma,   germinal center subtype. Molecular genetic studies are pending for further   characterization will be reported in an addendum. Flow cytometry:   Consistent with CD10-positive B-cell lymphoproliferative disorder. Comments: Flow cytometry shows monoclonal B-cells (36% of total cells)   with CD10 expression without CD5, consistent with a CD10-positive B-cell   lymphoproliferative disorder. The main differential diagnosis includes   follicular lymphoma, Burkitt lymphoma and large B-cell lymphoma.  Please   correlate the result with morphologic findings and clinical information. Flow Differential (%) and Population Analysis:   Lymphocytes: 93.7%   T-cells (39% of lymphoid cells) show a CD4/CD8 ratio of 3.3 without overt   phenotypic abnormality. NK-cells (1% of lymphoid cells) are unremarkable. Mature B-cells (56% of lymphoid cells) include large forms based on   forward scattered pattern and show: CD5 neg, CD10+, CD11c+dim, CD19+,   CD20+ with surface lambda light chain restriction. Markers Performed: CD2, CD3, CD4, CD5, CD7, CD8, CD10, CD11c, CD19, CD20,   CD23, CD34, CD38, CD45, CD56, Kappa, Lambda (17 Markers)   The Technical Component Processing of this test was completed at   [facility], [address] [place] [address], [place], [place] / [id number] /   [phone] / [facility] # [id number]. Interpretation by Linda Erickson M.D. The   complete scanned report is available in Epic. CPT: E5313754, [id number], [id number], [id number], [id number], [id number]   [facility]2/2022   *Electronically Signed Out By Tristian Haskins. Kevin Montoya MD*   ==========================================================================   * * *Gross Description* * *   Specimen #1, received fresh labeled with the patient's name,  and left   posterior cervical lymph node, consists of two paper towels containing a   1.7 x 1.5 x 0.5 cm aggregate of pale pink-tan, fleshy soft tissue   fragments. The specimen is handled according to the flow cytometry   protocol as follows: 7          Two air-dried touch prep slides - one Diff Quik stained, one   rapid-fixed in 95% alcohol   7          Representative sections in B plus fixative (1A)   7          One piece held in RPMI for possible flow cytometry analysis   7          Remaining tissue fixed in formalin for permanents (1B)   Dr. Kevin Montoya will determine if flow cytometry is necessary. 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  F who presents as an inpatient consultation for Lymphoma, COVID Positive, need for growth factor support. Patient called in today saying that she was too sick to take her Neulasta shot. She reports shortness of breath. Referred her to the ER. She did not think she could make it to the [facility] location for her neulasta shot and reported general decline since discharge last Friday which was Day 5 of her 5th cycle of treatment. .   Patient overall reports feeling worse. She reports a declines over the weekend. Interval History:     10/17 Transferred to higher level of care due to oxygen requirement     Remains in ICU. Awake but somnolent;  \" I just want to go home\". Continues with generalized pain. Requesting something for her nerves. No family at bedside. Objective:      Visit Vitals  /60   Pulse 77   Temp 97.9 °F (36.6 °C)   Resp 25   Ht 5' 2.99\" (1.6 m)   Wt 173 lb 8 oz (78.7 kg)   SpO2 93%   BMI 30.74 kg/m²     ECOG: 3-4  General: ill appearing, no acute distress, somnolent  Respiratory: normal respiratory effort: O2 on high flow 50L  CV: slight LE peripheral edema  Skin: no rashes; no ecchymoses; no petechiae  Psych: alert, oriented, flat  mood/affect       Results:        Lab Results   Component Value Date/Time    WBC 10.6 10/18/2022 05:05 AM    HGB 8.1 (L) 10/18/2022 05:05 AM    HCT 24.9 (L) 10/18/2022 05:05 AM    PLATELET 72 (L) 61/32/2156 05:05 AM    MCV 93.3 10/18/2022 05:05 AM     Lab Results   Component Value Date/Time    Sodium 144 10/18/2022 05:05 AM    Potassium 4.4 10/18/2022 05:05 AM    Chloride 110 (H) 10/18/2022 05:05 AM    CO2 28 10/18/2022 05:05 AM    Anion gap 6 10/18/2022 05:05 AM    Glucose 111 (H) 10/18/2022 05:05 AM    BUN 19 10/18/2022 05:05 AM    Creatinine 1.12 (H) 10/18/2022 05:05 AM    BUN/Creatinine ratio 17 10/18/2022 05:05 AM    GFR est AA >60 10/03/2022 06:44 AM    GFR est non-AA >60 10/03/2022 06:44 AM    Calcium 8.5 10/18/2022 05:05 AM    Bilirubin, total 0.3 10/18/2022 05:05 AM    Alk. phosphatase 74 10/18/2022 05:05 AM    Protein, total 4.2 (L) 10/18/2022 05:05 AM    Albumin 2.2 (L) 10/18/2022 05:05 AM    Globulin 2.0 10/18/2022 05:05 AM    A-G Ratio 1.1 10/18/2022 05:05 AM    ALT (SGPT) 122 (H) 10/18/2022 05:05 AM    AST (SGOT) 55 (H) 10/18/2022 05:05 AM       10/17/22 CTA  Chest  IMPRESSION  Diffuse, severe COVID 19 pneumonia. No pulmonary embolism. Assessment and Recommendations:      COVID-19 infection  -suspected onset last Monday/ worsening cough. 10/6 Tested positive  -management per primary team/pulmonary;   10/14 pulmonary : abx changed to vanc and cefepime  10/18 CTA imaging (10/17) with diffuse severe COVID PNA. requiring Hi flow oxygen at 50 L; discussed with pulmonary ; cont High dose steroids; abx coverage  -concern about pt's current condition     2. Metastatic cancer to bone [facility])  -reports diffuse achiness which  is different than her usual pain complaints. 3.Diffuse large B-cell lymphoma of lymph nodes of neck (HCC)  -completed C5 da-REPOCH on 10/7/22 due for C6 on 10/24.  -unclear if any issue if growth factor administered during a covid infection but patient is at high risk for sepsis. -filgrastim-aafi (NIVESTYM) injection syringe 420 mcg; received total 6 doses; last dose on 10/16   - will plan to delay C6 and cont to monitor pt's current medical condition until at least 11/7     4. Thrombocytopenia-   secondary to chemo effect. --Continue to monitor daily. Transfuse 1 unit plts for plt < 10k or for signs of bleeding  --counts continue to improve   -Daily CBC with diff    5. Thrush: Improvied on diflucan daily; now on hold per primary team    6. Nutrition: poor oral intake: encouraged fluids and to eat soft foods; on supplemental drinks TID. Declining food ; only taking in sips of H2O    7. Diarrhea   10/12 C-diff negative   diarrhea improved       Plan reviewed with Dr Hieu Mascorro; covering physician for Dr Ashly Moran.

## 2022-10-18 NOTE — PROGRESS NOTES
Transition of care note:    RUR 26%    LOS 7 days,GLOS 5.4  Pt is a Readmission  Scheduled admission from 10/3 to 10/7 for chemotherapy  Readmitted with Covid infection complicated by metastatic stage IV lymphoma    Diffuse Large B-Cell Lymphoma  Pt was transferred to ICU due to having higher oxygenation needs.     Una Karimi

## 2022-10-18 NOTE — PROGRESS NOTES
[facility]     Name: Luana Yadav MRN: [id number]   : 1955 Hospital: [address]   Date: 10/18/2022        Impression Plan   Acute hypoxic respiratory failure  COVID 19 PNA with questionable superimposed bacterial PNA  Lymphoma s/p recent R-CHOP  Pancytopenia  Hx of tobacco abuse               Wean O2 to keep sats above 90%; on midflow, 11-12L  Continue high dose IV Solu-Medrol  Combivent and Dulera  Continue Vanc/Cefepime  Follow d-dimer and CRP  Barcitinib contraindicated since pt is immunocompromised and with possible bacterial infection. Nivestym per hematology - stopped yesterday due to increasing WBC  OOB into chair  Holding anticoagulation               Radiology  (personally reviewed) CT chest reviewed: bilateral peripheral infiltrates, no PE    10/17 chest CTA: Heterogeneous bilateral airspace opacities worsened since 2022 consistent with diffuse, severe COVID 19 pneumonia. . Trace bilateral pleural effusions. No evidence of PE. Subjective     Cc: shortness of breath    77 yo with PMHx of lymphoma presenting with increasing SOB for the last 3 days. Was admitted last week for R-CHOP chemotherapy. Her son was COVID 19 positive last week. Pt is COVID 19 positive in ER. She has had the first series of mRNA vaccine but none of the boosters. Currently on 4 liters of O2 and dyspnic. WBC 13, neutrophil predominant. Bilateral infiltrates on CT chest.     Interval history:  Afebrile  BP stable  Sats 89% on 12L  WBC 10.6  Hgb 8.1  D-dimer 1.01  CRP 3.07 - better  Ferritin 1050 - better  LFTs elevated, ALT trending down  Blood cultures negative x 5 days  Cdiff neg    ROS:  Feels poorly today. She is chilled. Remains SOB. Frequent coughing that is largely unproductive. A comprehensive review of systems was negative except for that written in the HPI.     Past Medical History:   Diagnosis Date    Adverse effect of anesthesia     PHLEGM IN THROAT POST OP & NEEDED X2 BREATHING TREATMENTS    Anxiety     COPD (chronic obstructive pulmonary disease) ([facility] [address] 2022    emphysema    Depression     GERD (gastroesophageal reflux disease)     Hernia, femoral     since childhood    Hypertension     Joint pain     Nausea & vomiting     TMJ arthritis       Past Surgical History:   Procedure Laterality Date    HX CERVICAL FUSION  2012    C5-C6    HX CHOLECYSTECTOMY  2020    HX COLONOSCOPY      HX ENDOSCOPY      HX HYSTERECTOMY  2019    HX TONSILLECTOMY      AS A CHILD    HX WISDOM TEETH EXTRACTION      TEENAGER    IR INSERT TUNL CVC W PORT OVER 5 YEARS  4/29/2022      Prior to Admission medications    Medication Sig Start Date End Date Taking? Authorizing Provider   doxycycline (VIBRA-TABS) 100 mg tablet Take 1 Tablet by mouth every twelve (12) hours. 10/7/22   Asia Monique MD   albuterol (PROVENTIL HFA, VENTOLIN HFA, PROAIR HFA) 90 mcg/actuation inhaler Take 2 Puffs by inhalation every six (6) hours as needed for Wheezing or Shortness of Breath for up to 20 days. 10/7/22 10/27/22  Asia Monique MD   morphine IR (MS IR) 15 mg tablet Take 1 Tablet by mouth every six (6) hours as needed for Pain for up to 15 days. Max Daily Amount: 60 mg. 10/4/22 10/19/22  Norberto Salas MD   ALPRAZolam Jennett Dubin) 1 mg tablet Take 1 Tablet by mouth two (2) times daily as needed for Anxiety for up to 15 days. Max Daily Amount: 2 mg. 10/4/22 10/19/22  Norberto Salas MD   ondansetron (ZOFRAN ODT) 8 mg disintegrating tablet Take 1 Tablet by mouth every eight (8) hours as needed for Nausea or Vomiting. 9/2/22   Asia Monique MD   prochlorperazine (COMPAZINE) 10 mg tablet Take 1 Tablet by mouth every six (6) hours as needed for Nausea (do not take if groggy; take if nausea despite zofran). 9/2/22   Asia Monique MD   trimethoprim-sulfamethoxazole (BACTRIM DS, SEPTRA DS) 160-800 mg per tablet Take 1 Tablet by mouth every Monday, Wednesday, Friday.  Indications: prophylaxis treatment of cancer related infections 8/24/22   Schoeneweis, Blondie Danes, NP   FLUoxetine (PROzac) 40 mg capsule TAKE 1 CAPSULE BY MOUTH EVERY MORNING 10/11/21   Trisha Nagy MD   amLODIPine-benazepril (LOTREL) 5-20 mg per capsule TAKE 1 CAPSULE BY MOUTH EVERY MORNING 10/11/21   Trisha Nagy MD     Current Facility-Administered Medications   Medication Dose Route Frequency    ipratropium-albuterol (COMBIVENT RESPIMAT) 20 mcg-100 mcg inhalation spray  1 Puff Inhalation Q4H RT    loperamide (IMODIUM) capsule 2 mg  2 mg Oral BID    enoxaparin (LOVENOX) injection 40 mg  40 mg SubCUTAneous Q24H    methylPREDNISolone (PF) (SOLU-MEDROL) injection 60 mg  60 mg IntraVENous Q8H    vancomycin (VANCOCIN) 1,250 mg in 0.9% sodium chloride 250 mL ([id number])  1,250 mg IntraVENous Q24H    pantoprazole (PROTONIX) 40 mg in 0.9% sodium chloride 10 mL injection  40 mg IntraVENous DAILY    cefepime (MAXIPIME) 2 g in 0.9% sodium chloride (MBP/ADV) 100 mL MBP  2 g IntraVENous Q12H    L.acidophilus-paracasei-S.thermophil-bifidobacter (RISAQUAD) 8 billion cell capsule  1 Capsule Oral DAILY    [Held by provider] lisinopriL (PRINIVIL, ZESTRIL) tablet 20 mg  20 mg Oral DAILY    fluconazole (DIFLUCAN) tablet 100 mg  100 mg Oral DAILY    sodium chloride (NS) flush 5-40 mL  5-40 mL IntraVENous Q8H    [Held by provider] amLODIPine (NORVASC) tablet 5 mg  5 mg Oral DAILY    FLUoxetine (PROzac) capsule 40 mg  40 mg Oral DAILY    trimethoprim-sulfamethoxazole (BACTRIM DS, SEPTRA DS) 160-800 mg per tablet 1 Tablet  1 Tablet Oral Q MON, WED & FRI    mometasone-formoterol (DULERA) 200mcg-5mcg/puff  2 Puff Inhalation BID RT    nicotine (NICODERM CQ) 21 mg/24 hr patch 1 Patch  1 Patch TransDERmal DAILY     Allergies   Allergen Reactions    Oxycodone Nausea Only      Social History     Tobacco Use    Smoking status: Some Days     Packs/day: 0.25     Years: 40.00     Pack years: 10.00     Types: Cigarettes    Smokeless tobacco: Never   Substance Use Topics    Alcohol use: Not Currently     Alcohol/week: 0.0 standard drinks      Family History   Problem Relation Age of Onset    Heart Disease Mother 46    Heart Surgery Mother         HEART TRANSPLANT    Elevated Lipids Mother     Hypertension Mother     COPD Father     Emphysema Father     Sudden Death Brother 46    Other Maternal Grandmother         BRAIN TUMOR    No Known Problems Son     No Known Problems Daughter           Laboratory: I have personally reviewed the flowsheet and labs.      Recent Labs     10/18/22  0505 10/17/22  0307 10/16/22  0236   WBC 10.6 6.2 1.0*   HGB 8.1* 7.7* 7.4*   HCT 24.9* 23.8* 23.1*   PLT 72* 51* 33*       Recent Labs     10/18/22  0505 10/17/22  0307 10/16/22  0236    141 139   K 4.4 3.8 4.1   * 109* 108   CO2 28 26 26   * 110* 122*   BUN 19 21* 21*   CREA 1.12* 1.09* 1.19*   CA 8.5 8.6 8.7   ALB 2.2* 2.2* 2.2*   * 130* 163*         Objective:   Visit Vitals  /70   Pulse 83   Temp 98.5 °F (36.9 °C)   Resp 27   Ht 5' 2.99\" (1.6 m)   Wt 78.7 kg (173 lb 8 oz)   SpO2 (!) 89%   BMI 30.74 kg/m²       Intake/Output Summary (Last 24 hours) at 10/18/2022 0827  Last data filed at 10/17/2022 2200  Gross per 24 hour   Intake 690 ml   Output --   Net 690 ml       EXAM:   GENERAL: awake, alert, tired appearing, mildy dyspnic HEENT:  anicteric, EOMI, no alar flaring or epistaxis, oral mucosa moist without cyanosis, NECK:  no jugular vein distention, no retractions, no thyromegaly or masses, LUNGS: diminished, scattered rales HEART:  Regular rate and rhythm with no MGR; no edema is present, ABDOMEN:  soft with no tenderness, bowel sounds present, EXTREMITIES:  warm with no cyanosis, SKIN:  no jaundice or ecchymosis, and NEUROLOGIC:  alert and oriented, grossly non-focal    ROBIN Dee  [facility]

## 2022-10-19 NOTE — PROGRESS NOTES
Bedside and Verbal shift change report given to [facility] and Yany Mariscal RN (oncoming nurse) by Sandra Navarrete RN (offgoing nurse). Report included the following information SBAR, ED Summary, Intake/Output, MAR, Recent Results, Med Rec Status, and Cardiac Rhythm NSR/Sinus Jeremi Knee . Primary Nurse Desire Kovacs RN and Anat Topete RN performed a dual skin assessment on this patient. No impairment noted. Mian score is 18    07:00 - Report received. 08:00 - Patient encouraged turn and turned to left side. 09:00 - Assessment completed, see flowsheet. Patient complains of aching pain - medicated with PO Morphine. Patient turned self to her back due to discomfort being on her left side. 10:30 - Family friend arrived at bedside. 10:45 - Patient given several cups of ice as friend brought sweet tea for the patient. Spoon also provided as friend brought chocolate pudding. States that those are some of her favorite treats. 11:40 - Daughter arrives at bedside. Case management and palliative Dr Marcy Bunn made aware. 12:00 - Assessment completed, see flowsheet. Patient assisted to bedside commode. During transfer, patient noted to become more dyspneic and SpO2 decreased to 79% with exertion. Patient voided and stooled and was transferred back to bed. SpO2 increased back to 92% several minutes after transfer. After transfer back to bed, patient turned herself to the right side. 12:50 - Informed palliative physician, Dr. Marcy Bunn, that patient has been in pain frequently and has been having breakthrough pain despite Q4 PO morphine. Dr. Torsten Arredondo, at bedside to speak with patient and family. 13:30 - Palliative, Dr. Marcy Bunn, speaking with daughter outside of room regarding plan of care. Per daughter, would like to keep patient as DNR/DNI and continue current treatment. Scheduled IV morphine added with PRN IV morphine and ativan also ordered. PO Ativan and Morphine discontinued.  Palliative plans to meet with daughter tomorrow at 6 am to continue discussion. 14:00 - Patient turned herself to her left side. 15:30 - Patient asleep with a HR of 52 and a SpO2 of 88%. Labored breathing noted. Oncology NP speaking with daughter outside the room. Patient SpO2 increased to 94% once patient woke up when daughter and Onc NP entered room. 16:00 - Assessment completed, see flowsheet. Patient turned to her back, boosted up in bed, and HOB placed to patient comfort. Daughter, Lucio Mejia, and family friend remain at bedside. 17:45 - Patient has eaten a salad, pudding, and drank tea today per family at bedside. Patient SpO2 currently at 99% on current settings. 18:00 - Daughter left bedside. Patient in and out of sleep.  18:20 - Patient noted to have an SpO2 of 85% on the monitor. Patient asleep in room with Hi Flow NC out of nose. Hi Flow NC replaced in nostrils and SpO2 increased to 91%. 19:00 - Bedside and Verbal shift change report given to Holly Vogel (oncoming nurse) by Manuel Rincon and NERIS HUI RN (offgoing nurse).  Report included the following information SBAR, ED Summary, Intake/Output, Recent Results, and Cardiac Rhythm Normal Sinus      Tiffanie Arora RN, Yvonne Gonzales

## 2022-10-19 NOTE — PROGRESS NOTES
Problem: Falls - Risk of  Goal: *Absence of Falls  Description: Document Erika Embs Fall Risk and appropriate interventions in the flowsheet. Outcome: Progressing Towards Goal  Note: Fall Risk Interventions:  Mobility Interventions: Bed/chair exit alarm, Communicate number of staff needed for ambulation/transfer, Patient to call before getting OOB, PT Consult for mobility concerns, Utilize walker, cane, or other assistive device, Utilize gait belt for transfers/ambulation         Medication Interventions: Bed/chair exit alarm, Patient to call before getting OOB, Teach patient to arise slowly, Utilize gait belt for transfers/ambulation    Elimination Interventions: Bed/chair exit alarm, Patient to call for help with toileting needs, Stay With Me (per policy), Toilet paper/wipes in reach, Toileting schedule/hourly rounds    History of Falls Interventions: Bed/chair exit alarm, Consult care management for discharge planning, Room close to nurse's station, Utilize gait belt for transfer/ambulation         Problem: Patient Education: Go to Patient Education Activity  Goal: Patient/Family Education  Outcome: Progressing Towards Goal     Problem: Risk for Spread of Infection  Goal: Prevent transmission of infectious organism to others  Description: Prevent the transmission of infectious organisms to other patients, staff members, and visitors.   Outcome: Progressing Towards Goal     Problem: Patient Education:  Go to Education Activity  Goal: Patient/Family Education  Outcome: Progressing Towards Goal     Problem: Pain  Goal: *Control of Pain  Outcome: Progressing Towards Goal     Problem: Airway Clearance - Ineffective  Goal: Achieve or maintain patent airway  Outcome: Progressing Towards Goal     Problem: Gas Exchange - Impaired  Goal: Absence of hypoxia  Outcome: Progressing Towards Goal  Goal: Promote optimal lung function  Outcome: Progressing Towards Goal     Problem: Breathing Pattern - Ineffective  Goal: Ability to achieve and maintain a regular respiratory rate  Outcome: Progressing Towards Goal     Problem: Body Temperature -  Risk of, Imbalanced  Goal: Ability to maintain a body temperature within defined limits  Outcome: Progressing Towards Goal  Goal: Will regain or maintain usual level of consciousness  Outcome: Progressing Towards Goal  Goal: Complications related to the disease process, condition or treatment will be avoided or minimized  Outcome: Progressing Towards Goal     Problem: Isolation Precautions - Risk of Spread of Infection  Goal: Prevent transmission of infectious organism to others  Outcome: Progressing Towards Goal     Problem: Risk for Fluid Volume Deficit  Goal: Maintain normal heart rhythm  Outcome: Progressing Towards Goal  Goal: Maintain absence of muscle cramping  Outcome: Progressing Towards Goal  Goal: Maintain normal serum potassium, sodium, calcium, phosphorus, and pH  Outcome: Progressing Towards Goal     Problem: Loneliness or Risk for Loneliness  Goal: Demonstrate positive use of time alone when socialization is not possible  Outcome: Progressing Towards Goal     Problem: Pressure Injury - Risk of  Goal: *Prevention of pressure injury  Description: Document Mian Scale and appropriate interventions in the flowsheet. Outcome: Progressing Towards Goal  Note: Pressure Injury Interventions:  Sensory Interventions: Assess changes in LOC, Assess need for specialty bed, Float heels, Maintain/enhance activity level, Minimize linen layers, Pressure redistribution bed/mattress (bed type), Turn and reposition approx.  every two hours (pillows and wedges if needed)    Moisture Interventions: Minimize layers, Absorbent underpads, Assess need for specialty bed, Apply protective barrier, creams and emollients, Check for incontinence Q2 hours and as needed    Activity Interventions: Pressure redistribution bed/mattress(bed type), PT/OT evaluation    Mobility Interventions: HOB 30 degrees or less, Pressure redistribution bed/mattress (bed type), PT/OT evaluation, Turn and reposition approx.  every two hours(pillow and wedges)    Nutrition Interventions: Document food/fluid/supplement intake, Discuss nutritional consult with provider, Offer support with meals,snacks and hydration    Friction and Shear Interventions: Apply protective barrier, creams and emollients, HOB 30 degrees or less, Minimize layers

## 2022-10-19 NOTE — PROGRESS NOTES
[address] [facility] Ericka Luque [address]   Office [phone]  Fax [phone]          Subjective / Objective     24 Hour Events: NAEO. Now requiring 50L HFNC in the ICU    Subjective  Patient reports she continues to feel terrible. Persistent cough, pain, and fatigue that is all worsening. Continually removes nasal canula and says she feels this is accidental and out of her control. She reports feeling motivated this AM to continue to fight infection. She reports she has been eating and drinking okay. No loose bm overnight. Visit Vitals  BP (!) 77/64 (BP 1 Location: Right upper arm, BP Patient Position: At rest)   Pulse 70   Temp 97.9 °F (36.6 °C)   Resp 22   Ht 5' 2.99\" (1.6 m)   Wt 173 lb 8 oz (78.7 kg)   SpO2 90%   BMI 30.74 kg/m²     General: Patient appears weak and ill today. Moves slowly and poorly around in bed. Increased lethargy. Shaking when not underneath blanket. On 50L HFNC  Head: Normocephalic. Atraumatic. Mouth: No tongue plaque  Respiratory: Significant Ronchi present all throughout lung fields b/l. Mild end-expiratory wheezing. Increased work of breathing. Cough with deep breathing  Cardiovascular: RRR. Normal S1,S2. No m/r/g.   GI: + bowel sounds. Nontender. No rebound tenderness or guarding. Nondistended. Extremities: no LE edema. Skin: Warm, dry.     I/O:  Date 10/18/22 0700 - 10/19/22 0659 10/19/22 0700 - 10/20/22 0659   Shift 5353-1196 7117-6271 24 Hour Total 7132-3629 2723-2135 24 Hour Total   INTAKE   I.V.(mL/kg/hr) 350(0.4) 100(0.1) 450(0.2)        Volume (cefepime (MAXIPIME) 2 g in 0.9% sodium chloride (MBP/ADV) 100 mL MBP) 100 100 200        Volume (vancomycin (VANCOCIN) 1,250 mg in 0.9% sodium chloride 250 mL ([id number])) 250  250      Shift Total(mL/kg) 350(4.4) 100(1.3) 450(5.7)      OUTPUT   Urine(mL/kg/hr) 325(0.3) 250(0.3) 575(0.3)        Urine Voided 325 250 575        Urine Occurrence(s) 1 x  1 x      Shift Total(mL/kg) 325(4.1) 250(3.2) 575(7.3)      NET 25 -150 -125      Weight (kg) 78.7 78.7 78.7 78.7 78.7 78.7         CBC:  Recent Labs     10/19/22  0229 10/18/22  0505 10/17/22  0307   WBC 8.5 10.6 6.2   HGB 8.3* 8.1* 7.7*   HCT 25.8* 24.9* 23.8*   PLT 92* 72* 51*       Metabolic Panel:  Recent Labs     10/19/22  0229 10/18/22  0505 10/17/22  0307    144 141   K 3.5 4.4 3.8    110* 109*   CO2 25 28 26   BUN 24* 19 21*   CREA 1.15* 1.12* 1.09*   * 111* 110*   CA 8.6 8.5 8.6   ALB 2.3* 2.2* 2.2*   * 122* 130*           Assessment and Plan       Jyothi Beckett is a 78 y.o. 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 Pt has worsened clinically over the past few days, specifically within the past 24 hours, with O2 demands at 50L HFNC. Now on solumedrol 60mg q8hrs with recent CTA showing severe COVID PNA with no PE. Pulm and Heme/onc consulted and following, much appreciated. Spoke with daughter and close family friend yesterday regarding the poor prognosis at this point and answered questions and addressed concerns. Patient continues to be DNR, which was discussed during this conversation. AHRF in setting of COVID-19 with superimposed bacterial PNA: Hx of COPD with no home O2. On 11L HFNC. LA and procal negative. Inflammatory markers elevated but with downtrend in CRP and D-dimer today. CXR with Increasing bilateral pulmonary infiltrates. BNP 2,422. CTA Chest on 10/17 with no PE but severe COVID PNA and trace b/l pleural effusions.  Continuing antibiotic coverage for bacterial PNA for now considering immunocompromised status but imaging reflects COVID PNA and no focal consolidation.  - Day 9 of Abx: Cefepime 2 g BID (10/14-), Vancomycin (10/10-), S/p 5 days Doxycycline 100 BID (10/10-10/15)  - Combivent respimat 20mcg-100mcg q4hrs  - Dulera 200mcg-5mcg 2 puffs BID  - Pulm following:  -  Solumedrol 60mg q8hrs  - BNP elevated at 2,422 with trace b/l pleural effusions on CT.  - Supplemental O2 prn for sats <88%. Wean O2 as tolerated  - Daily CBC, CMP, D-dimer, LD, Ferritin  - Palliative consult    DLBCL with Pancytopenia: Pancytopenia improved. Follows with Dr. Lianet Hernandez. - Oncology consulted, appreciate rec's  - s/p 1 wk daily Filgrastim injections  - Zofran and compazine q6-8 hrs prn nausea  - Morphine 15 mg q4 hr PRN    Thrush/Mouth Pain: resolved. eating and drinking well per patient  - Continue Diflucan 100 mg daily  - Magic Mouthwash as needed before meals for discomfort     Pancytopenia: POA PLT 62. Likely 2/2 chemotherapy. - Continuing AC with lovenox 40mg daily  - Hold any AC with plt <50  - Daily CBC  - Oncology consulted, appreciate rec's   - TT plt below 10    Diarrhea: Improved  - Immodium 2mg BID  - C. Diff negative on 10/12    Elevated Cr: Likely 2/2 poor PO intake. 1.15 today. Stable the past 5 days  - Encouraged PO fluid consumption  - Monitor daily BMP     Hypertension: POA BP was 92/66.   - Continue home Amplodipine-benazepril 5-20 daily      Hypokalemia: POA K 2.9. Improved  - Replete PRN  - Follow on daily BMP     MDD/RENETTA: Chronic, stable. - Home Xanax 1mg bid  - Home Prozac 40mg daily     Tobacco dependence: About 40 pack years. Currently smokes about 5 cigarettes per day.   - Nicotine patch 21 daily    Elsi Landin DO  Family Medicine Resident       For Billing    Chief Complaint   Patient presents with   Theodoro Milder Medication Problem       Hospital Problems  Date Reviewed: 9/23/2022            Codes Class Noted POA    [facility] ICD-10-CM: C85.90  ICD-9-CM: 202.80  10/10/2022 Unknown        COVID ICD-10-CM: U07.1  ICD-9-CM: 079.89  10/10/2022 Unknown        Acute respiratory failure with hypoxia ([facility] ICD-10-CM: J96.01  ICD-9-CM: 518.81  9/13/2022 Unknown

## 2022-10-19 NOTE — PROGRESS NOTES
[facility]     Name: Lucius Helms MRN: [id number]   : 1955 Hospital: [address]   Date: 10/19/2022        Impression Plan   Acute hypoxic respiratory failure  COVID 19 PNA with questionable superimposed bacterial PNA  Lymphoma s/p recent R-CHOP  Pancytopenia  Hx of tobacco abuse               Wean O2 to keep sats above 90%; on HF 45L 80%  Continue high dose IV Solu-Medrol  Combivent and Dulera  Continue Vanc/Cefepime to complete a 7 day course  Follow d-dimer and CRP  Barcitinib contraindicated since pt is immunocompromised and with possible bacterial infection. Nivestym per hematology - stopped yesterday due to increasing WBC  OOB into chair  Holding anticoagulation               Radiology  (personally reviewed) CT chest reviewed: bilateral peripheral infiltrates, no PE    10/17 chest CTA: Heterogeneous bilateral airspace opacities worsened since 2022 consistent with diffuse, severe COVID 19 pneumonia. . Trace bilateral pleural effusions. No evidence of PE. Subjective     Cc: shortness of breath    79 yo with PMHx of lymphoma presenting with increasing SOB for the last 3 days. Was admitted last week for R-CHOP chemotherapy. Her son was COVID 19 positive last week. Pt is COVID 19 positive in ER. She has had the first series of mRNA vaccine but none of the boosters. Currently on 4 liters of O2 and dyspnic. WBC 13, neutrophil predominant. Bilateral infiltrates on CT chest.     Interval history:  Afebrile  Sats 90% on 45L 80%  WBC 8.5  Hgb 8.3  D-dimer 1.30  CRP 1.90 - better  Ferritin 1209  LFTs elevated, ALT trending down  Blood cultures negative x 5 days  Cdiff neg    ROS:  She feels short of breath and fatigued. Cough is less frequent, but dry. Now on HF. A comprehensive review of systems was negative except for that written in the HPI.     Past Medical History:   Diagnosis Date    Adverse effect of anesthesia     PHLEGM IN THROAT POST OP & NEEDED X2 BREATHING TREATMENTS    Anxiety     COPD (chronic obstructive pulmonary disease) ([facility] 2022    emphysema    Depression     GERD (gastroesophageal reflux disease)     Hernia, femoral     since childhood    Hypertension     Joint pain     Nausea & vomiting     TMJ arthritis       Past Surgical History:   Procedure Laterality Date    HX CERVICAL FUSION  2012    C5-C6    HX CHOLECYSTECTOMY  2020    HX COLONOSCOPY      HX ENDOSCOPY      HX HYSTERECTOMY  2019    HX TONSILLECTOMY      AS A CHILD    HX WISDOM TEETH EXTRACTION      TEENAGER    IR INSERT TUNL CVC W PORT OVER 5 YEARS  4/29/2022      Prior to Admission medications    Medication Sig Start Date End Date Taking? Authorizing Provider   doxycycline (VIBRA-TABS) 100 mg tablet Take 1 Tablet by mouth every twelve (12) hours. 10/7/22   Noah Kearns MD   albuterol (PROVENTIL HFA, VENTOLIN HFA, PROAIR HFA) 90 mcg/actuation inhaler Take 2 Puffs by inhalation every six (6) hours as needed for Wheezing or Shortness of Breath for up to 20 days. 10/7/22 10/27/22  Noah Kearns MD   morphine IR (MS IR) 15 mg tablet Take 1 Tablet by mouth every six (6) hours as needed for Pain for up to 15 days. Max Daily Amount: 60 mg. 10/4/22 10/19/22  Shimon Mcclain MD   ALPRAZolam [place]) 1 mg tablet Take 1 Tablet by mouth two (2) times daily as needed for Anxiety for up to 15 days. Max Daily Amount: 2 mg. 10/4/22 10/19/22  Shimon Mcclain MD   ondansetron (ZOFRAN ODT) 8 mg disintegrating tablet Take 1 Tablet by mouth every eight (8) hours as needed for Nausea or Vomiting. 9/2/22   Noah Kearns MD   prochlorperazine (COMPAZINE) 10 mg tablet Take 1 Tablet by mouth every six (6) hours as needed for Nausea (do not take if groggy; take if nausea despite zofran). 9/2/22   Noah Kearns MD   trimethoprim-sulfamethoxazole (BACTRIM DS, SEPTRA DS) 160-800 mg per tablet Take 1 Tablet by mouth every Monday, Wednesday, Friday.  Indications: prophylaxis treatment of cancer related infections 8/24/22   Schoeneweis, Gideon Mose, NP   FLUoxetine (PROzac) 40 mg capsule TAKE 1 CAPSULE BY MOUTH EVERY MORNING 10/11/21   Parish Fuller MD   amLODIPine-benazepril (LOTREL) 5-20 mg per capsule TAKE 1 CAPSULE BY MOUTH EVERY MORNING 10/11/21   Parish Fuller MD     Current Facility-Administered Medications   Medication Dose Route Frequency    vancomycin (VANCOCIN) 1,250 mg in 0.9% sodium chloride 250 mL ([id number])  1,250 mg IntraVENous Q24H    pantoprazole (PROTONIX) tablet 40 mg  40 mg Oral ACB    ipratropium-albuterol (COMBIVENT RESPIMAT) 20 mcg-100 mcg inhalation spray  1 Puff Inhalation Q4H RT    enoxaparin (LOVENOX) injection 40 mg  40 mg SubCUTAneous Q24H    methylPREDNISolone (PF) (SOLU-MEDROL) injection 60 mg  60 mg IntraVENous Q8H    cefepime (MAXIPIME) 2 g in 0.9% sodium chloride (MBP/ADV) 100 mL MBP  2 g IntraVENous Q12H    L.acidophilus-paracasei-S.thermophil-bifidobacter (RISAQUAD) 8 billion cell capsule  1 Capsule Oral DAILY    [Held by provider] lisinopriL (PRINIVIL, ZESTRIL) tablet 20 mg  20 mg Oral DAILY    fluconazole (DIFLUCAN) tablet 100 mg  100 mg Oral DAILY    sodium chloride (NS) flush 5-40 mL  5-40 mL IntraVENous Q8H    [Held by provider] amLODIPine (NORVASC) tablet 5 mg  5 mg Oral DAILY    FLUoxetine (PROzac) capsule 40 mg  40 mg Oral DAILY    trimethoprim-sulfamethoxazole (BACTRIM DS, SEPTRA DS) 160-800 mg per tablet 1 Tablet  1 Tablet Oral Q MON, WED & FRI    mometasone-formoterol (DULERA) 200mcg-5mcg/puff  2 Puff Inhalation BID RT    nicotine (NICODERM CQ) 21 mg/24 hr patch 1 Patch  1 Patch TransDERmal DAILY     Allergies   Allergen Reactions    Oxycodone Nausea Only      Social History     Tobacco Use    Smoking status: Some Days     Packs/day: 0.25     Years: 40.00     Pack years: 10.00     Types: Cigarettes    Smokeless tobacco: Never   Substance Use Topics    Alcohol use: Not Currently     Alcohol/week: 0.0 standard drinks      Family History   Problem Relation Age of Onset Heart Disease Mother 46    Heart Surgery Mother         HEART TRANSPLANT    Elevated Lipids Mother     Hypertension Mother     COPD Father     Emphysema Father     Sudden Death Brother 46    Other Maternal Grandmother         BRAIN TUMOR    No Known Problems Son     No Known Problems Daughter           Laboratory: I have personally reviewed the flowsheet and labs.      Recent Labs     10/19/22  0229 10/18/22  0505 10/17/22  0307   WBC 8.5 10.6 6.2   HGB 8.3* 8.1* 7.7*   HCT 25.8* 24.9* 23.8*   PLT 92* 72* 51*       Recent Labs     10/19/22  0229 10/18/22  0505 10/17/22  0307    144 141   K 3.5 4.4 3.8    110* 109*   CO2 25 28 26   * 111* 110*   BUN 24* 19 21*   CREA 1.15* 1.12* 1.09*   CA 8.6 8.5 8.6   ALB 2.3* 2.2* 2.2*   * 122* 130*         Objective:   Visit Vitals  BP (!) 77/64 (BP 1 Location: Right upper arm, BP Patient Position: At rest)   Pulse 70   Temp 97.9 °F (36.6 °C)   Resp 22   Ht 5' 2.99\" (1.6 m)   Wt 78.7 kg (173 lb 8 oz)   SpO2 90%   BMI 30.74 kg/m²       Intake/Output Summary (Last 24 hours) at 10/19/2022 0845  Last data filed at 10/19/2022 0437  Gross per 24 hour   Intake 450 ml   Output 575 ml   Net -125 ml       EXAM:   GENERAL: awake, alert, tired appearing, mildy dyspnic HEENT:  anicteric, EOMI, no alar flaring or epistaxis, oral mucosa moist without cyanosis, NECK:  no jugular vein distention, no retractions, no thyromegaly or masses, LUNGS: diminished, scattered rales HEART:  Regular rate and rhythm with no MGR; no edema is present, ABDOMEN:  soft with no tenderness, bowel sounds present, EXTREMITIES:  warm with no cyanosis, SKIN:  no jaundice or ecchymosis, and NEUROLOGIC:  alert and oriented, grossly non-focal    Ishan Benson MD  [facility]

## 2022-10-19 NOTE — PROGRESS NOTES
0700 Bedside and Verbal shift change report given to DEANNA Hdez and Mariana Maxwell RN (oncoming nurse) by Krupa Barfield RN (offgoing nurse). Report included the following information SBAR, ED Summary, Intake/Output, MAR, Recent Results, Med Rec Status, and Cardiac Rhythm NSR/sinus nay .

## 2022-10-19 NOTE — PROGRESS NOTES
[facility]  [address] [place] [address], [address] [place] [address]: [phone]  F: [phone] Patient ID  Name: Violeta Tracy  YOB: 1955  MRN: [id number]  Referring Provider:   No referring provider defined for this encounter. 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 This summary highlights focused aspects of patient's ongoing care and may have a recurring section in notes with either updates or remain unchanged as a longitudinal care summary.    -------------------------------------------------------------------------------------------------------------------------------------------------------------------------------------------------------  DIAGNOSIS:  Diffuse Large B-Cell Lymphoma     ORIGINAL STAGING:  Stage IV     SITES OF DISEASE:  Left Cervical Lymph Node,Bone Disease, Stage IV    CURRENT TREATMENT:  C1,D1 on 5/11/22 DA-R-EPOCH  C2  C3 delayed due to appetite issues and patient anxiety to restart therapy     Plan Name: OP R-CHOP   Treatment goal Curative   Provider Mati Lou MD   Status Inactive   Start Date    End Date    Treatment plan weight/height/BSA Weight type: Documented (effective on 4/27/2022), 94.1 kg (entered on 4/27/2022), 160 cm (entered on 4/27/2022), BSA 2.05 m2   Most recent weight/height/BSA Weight: 173 lb 8 oz (78.7 kg)    Height: 5' 2.99\" (1.6 m)    BSA (calculated - sq m): 1.86 sq meters      Treatment on clinical trial? [This plan is not part of a research study]   Reason for stopping treatment MD Discretion   Summary of Treatment Plan Medications DOXOrubicin (ADRIAMYCIN) chemo syringe 51.2 mg, 25 mg/m2 = 51.2 mg, IntraVENous, ONCE, 0 of 6 cycles  Dose modification: 25 mg/m2 (original dose 50 mg/m2, Cycle 1, Reason: Per Protocol, Comment: dose split into 2 syringes due to volume)    cyclophosphamide (CYTOXAN) 1,538 mg in 0.9% sodium chloride 250 mL chemo infusion, 750 mg/m2 = 1,538 mg, IntraVENous, ONCE, 0 of 6 cycles    vinCRIStine (VINCASAR PFS) 2 mg in 0.9% sodium chloride 50 mL chemo IVPB, 2 mg (100 % of original dose 2 mg), IntraVENous, ONCE, 0 of 6 cycles  Dose modification: 2 mg (original dose 2 mg, Cycle 1, Reason: Other)    riTUXimab-pvvr (RUXIENCE) 769 mg in 0.9% sodium chloride 769 mL infusion, 375 mg/m2 = 769 mg, IntraVENous, ONCE TITRATE, 0 of 1 cycle       Plan Name: [facility],    Treatment goal Curative   Provider Slade Chung MD   Status Active   Start Date 5/11/2022   End Date 10/30/2022 (Planned)   Treatment plan weight/height/BSA Weight type: Documented (effective on 8/17/2022), 79.5 kg (entered on 8/17/2022), 160 cm (entered on 8/5/2022), BSA 1.88 m2   Most recent weight/height/BSA Weight: 173 lb 8 oz (78.7 kg)    Height: 5' 2.99\" (1.6 m)    BSA (calculated - sq m): 1.86 sq meters      Treatment on clinical trial? [This plan is not part of a research study]   Reason for stopping treatment [Plan is still active]   Summary of Treatment Plan Medications cyclophosphamide (CYTOXAN) 1,537.6 mg in 0.9% sodium chloride 250 mL, overfill volume 25 mL chemo infusion, 750 mg/m2 = 1,538 mg, IntraVENous, ONCE, 5 of 6 cycles  Dose modification: 900 mg/m2 (original dose 750 mg/m2, Cycle 2, Reason: Per Protocol, Comment: ANC above 0.5), 600 mg/m2 (original dose 750 mg/m2, Cycle 3, Reason: Dose Not Tolerated), 600 mg/m2 (80 % of original dose 750 mg/m2, Cycle 4, Reason: Dose Not Tolerated)  Administration: 1,537.6 mg (5/15/2022), 1,809 mg (6/10/2022), 1,128 mg (8/22/2022), 1,128 mg (9/16/2022), 1,128 mg (10/7/2022)    etoposide (VEPESID) 102.6 mg in 0.9% sodium chloride 450 mL chemo infusion, 50 mg/m2 = 102.6 mg, IntraVENous, EVERY 24 HOURS, 5 of 6 cycles  Dose modification: 60 mg/m2 (original dose 50 mg/m2, Cycle 2, Reason: Per Protocol, Comment: ANC above 0.5), 40 mg/m2 (original dose 50 mg/m2, Cycle 3, Reason: Dose Not Tolerated), 40 mg/m2 (80 % of original dose 50 mg/m2, Cycle 4, Reason: Dose Not Tolerated)  Administration: 102.6 mg (5/11/2022), 102.6 mg (5/12/2022), 102.6 mg (5/13/2022), 102.6 mg (5/14/2022), 120.6 mg (6/6/2022), 120.6 mg (6/7/2022), 120.6 mg (6/8/2022), 120.6 mg (6/9/2022), 75.2 mg (8/18/2022), 75.2 mg (8/19/2022), 75.2 mg (8/20/2022), 75.2 mg (8/21/2022), 75.2 mg (9/12/2022), 75.2 mg (9/13/2022), 75.2 mg (9/14/2022), 75.2 mg (9/15/2022), 75.2 mg (10/3/2022), 75.2 mg (10/4/2022), 75.2 mg (10/5/2022), 75.2 mg (10/6/2022)    vinCRIStine (VINCASAR PFS) 0.8 mg, DOXOrubicin (ADRIAMYCIN) 20.6 mg in 0.9% sodium chloride 250 mL, overfill volume 25 mL chemo infusion, , IntraVENous, EVERY 24 HOURS, 5 of 6 cycles  Administration:  (5/11/2022),  (5/12/2022),  (5/13/2022),  (5/14/2022),  (6/6/2022),  (6/7/2022),  (6/8/2022),  (6/9/2022),  (8/18/2022),  (8/19/2022),  (8/20/2022),  (8/21/2022),  (9/12/2022),  (9/13/2022),  (9/14/2022),  (9/15/2022),  (10/3/2022),  (10/4/2022),  (10/5/2022),  (10/6/2022)    riTUXimab-pvvr (RUXIENCE) 769 mg in 0.9% sodium chloride 769 mL infusion, 375 mg/m2 = 769 mg, IntraVENous, ONCE TITRATE, 1 of 2 cycles  Administration: 769 mg (5/11/2022)    riTUXimab-pvvr (RUXIENCE) 754 mg in 0.9% sodium chloride 250 mL RAPID infusion, 375 mg/m2 = 754 mg, IntraVENous, ONCE TITRATE, 4 of 4 cycles  Administration: 754 mg (6/6/2022), 705 mg (8/18/2022), 705 mg (9/12/2022), 705 mg (10/3/2022)         PRIOR TREATMENT:  n/a     GOALS OF CARE:  Curative Intent     PATHOLOGY:  Specimen #:[id number] Collect: 4/22/2022      ==========================================================================                    * * *SURGICAL PATHOLOGY REPORT* * *   ==========================================================================   * * *PROCEDURES/ADDENDA* * *   ADDENDUM   Date Ordered: 4/26/2022 Status: Signed Out   Date Complete: 4/26/2022     By: Vonda Lang. Yvonne Leigh MD   Date Reported: 4/26/2022   Addendum Diagnosis   Lymph node, left posterior cervical lymph node, excision:   In situ hybridization for Jesús-Barr viral RNA: Negative   CPT: 07919  Addendum Comment   * * *CLINICAL HISTORY* * *   Cervical lymphadenopathy, rule out lymphoma   ==========================================================================   * * *FINAL PATHOLOGIC DIAGNOSIS* * *        Lymph node, left posterior cervical lymph node, excision:        Large B cell lymphoma. See comment. * * *Comment* * *   The histologic section has fragments of lymphoid tissue with diffuse and   focal follicular architecture. Diffuse areas are comprised of large,   atypical lymphocytes with centroblastic morphology and increased mitotic   activity. Immunohistochemical stains confirm the malignant cells are CD20   positive B cells that coexpress CD10 and BCL6 without MUM1. CD23   highlights rare follicular dendritic networks associated with lymphoid   follicles with germinal centers and express CD10 and BCL6 without BCL2. CyclinD1 and CD56 stains are negative. Concurrent flow cytometric analysis   confirms a clonal CD10 positive B-cell population comprised of medium to   large cells. The Ki-67 proliferation index is 30%. The overall findings favor the diagnosis of diffuse large B-cell lymphoma,   germinal center subtype. Molecular genetic studies are pending for further   characterization will be reported in an addendum. Flow cytometry:   Consistent with CD10-positive B-cell lymphoproliferative disorder. Comments: Flow cytometry shows monoclonal B-cells (36% of total cells)   with CD10 expression without CD5, consistent with a CD10-positive B-cell   lymphoproliferative disorder. The main differential diagnosis includes   follicular lymphoma, Burkitt lymphoma and large B-cell lymphoma.  Please   correlate the result with morphologic findings and clinical information. Flow Differential (%) and Population Analysis:   Lymphocytes: 93.7%   T-cells (39% of lymphoid cells) show a CD4/CD8 ratio of 3.3 without overt   phenotypic abnormality. NK-cells (1% of lymphoid cells) are unremarkable. Mature B-cells (56% of lymphoid cells) include large forms based on   forward scattered pattern and show: CD5 neg, CD10+, CD11c+dim, CD19+,   CD20+ with surface lambda light chain restriction. Markers Performed: CD2, CD3, CD4, CD5, CD7, CD8, CD10, CD11c, CD19, CD20,   CD23, CD34, CD38, CD45, CD56, Kappa, Lambda (17 Markers)   The Technical Component Processing of this test was completed at   [facility], [address] [place] [address], [place], [place] / [id number] /   [phone] / Lori Cornejo # [id number]. Interpretation by Sean Grimm M.D. The   complete scanned report is available in Epic. CPT: Z2541680, [id number], [id number], [id number], [id number], [id number]   [facility]2/2022   *Electronically Signed Out By Bandar Jean. Radha Lafleur MD*   ==========================================================================   * * *Gross Description* * *   Specimen #1, received fresh labeled with the patient's name,  and left   posterior cervical lymph node, consists of two paper towels containing a   1.7 x 1.5 x 0.5 cm aggregate of pale pink-tan, fleshy soft tissue   fragments. The specimen is handled according to the flow cytometry   protocol as follows: 7          Two air-dried touch prep slides - one Diff Quik stained, one   rapid-fixed in 95% alcohol   7          Representative sections in B plus fixative (1A)   7          One piece held in RPMI for possible flow cytometry analysis   7          Remaining tissue fixed in formalin for permanents (1B)   Dr. Radha Lafleur will determine if flow cytometry is necessary. 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  F who presents as an inpatient consultation for Lymphoma, COVID Positive, need for growth factor support. Patient called in today saying that she was too sick to take her Neulasta shot. She reports shortness of breath. Referred her to the ER. She did not think she could make it to the [facility] location for her neulasta shot and reported general decline since discharge last Friday which was Day 5 of her 5th cycle of treatment. .   Patient overall reports feeling worse. She reports a declines over the weekend. Interval History:     10/17 Transferred to higher level of care due to oxygen requirement     Remains in ICU. On Hi Flow 50 L     Met with pt's daughter, Leela Castañeda outside room. Teary, stating she can't believe after all her mom has been through with her cancer tx that this is happening. Does not want her mother to suffer. Pt sleeping but awakens easily; having some generalized pain. Breathing stable. Not eating. Denies diarrhea. Daughter, Leela Castañeda and friend, Alyssa Reynolds at bedside.        Objective:      Visit Vitals  BP (!) 147/73 (BP 1 Location: Right upper arm, BP Patient Position: At rest;Sitting)   Pulse (!) 58   Temp 97.8 °F (36.6 °C)   Resp 19   Ht 5' 2.99\" (1.6 m)   Wt 173 lb 8 oz (78.7 kg)   SpO2 95%   Breastfeeding No   BMI 30.74 kg/m²     ECOG: 3-4  General: ill appearing, no acute distress,  Respiratory: normal respiratory effort: O2 on high flow 50L  CV: slight LE peripheral edema  Skin: no rashes; no ecchymoses; no petechiae  Psych: alert, oriented, flat  mood/affect       Results:        Lab Results   Component Value Date/Time    WBC 8.5 10/19/2022 02:29 AM    HGB 8.3 (L) 10/19/2022 02:29 AM    HCT 25.8 (L) 10/19/2022 02:29 AM    PLATELET 92 (L) 92/86/8577 02:29 AM    MCV 93.5 10/19/2022 02:29 AM     Lab Results   Component Value Date/Time    Sodium 140 10/19/2022 02:29 AM    Potassium 3.5 10/19/2022 02:29 AM    Chloride 108 10/19/2022 02:29 AM    CO2 25 10/19/2022 02:29 AM    Anion gap 7 10/19/2022 02:29 AM    Glucose 134 (H) 10/19/2022 02:29 AM    BUN 24 (H) 10/19/2022 02:29 AM    Creatinine 1.15 (H) 10/19/2022 02:29 AM    BUN/Creatinine ratio 21 (H) 10/19/2022 02:29 AM    GFR est AA >60 10/03/2022 06:44 AM    GFR est non-AA >60 10/03/2022 06:44 AM    Calcium 8.6 10/19/2022 02:29 AM    Bilirubin, total 0.4 10/19/2022 02:29 AM    Alk. phosphatase 86 10/19/2022 02:29 AM    Protein, total 5.0 (L) 10/19/2022 02:29 AM    Albumin 2.3 (L) 10/19/2022 02:29 AM    Globulin 2.7 10/19/2022 02:29 AM    A-G Ratio 0.9 (L) 10/19/2022 02:29 AM    ALT (SGPT) 128 (H) 10/19/2022 02:29 AM    AST (SGOT) 66 (H) 10/19/2022 02:29 AM       10/17/22 CTA  Chest  IMPRESSION  Diffuse, severe COVID 19 pneumonia. No pulmonary embolism. Assessment and Recommendations:      COVID-19 infection  -suspected onset last Monday/ worsening cough. 10/6 Tested positive  -management per primary team/pulmonary;   10/14 pulmonary : abx changed to vanc and cefepime  10/18 CTA imaging (10/17) with diffuse severe COVID PNA. requiring Hi flow oxygen at 50 L;  cont High dose steroids; abx coverage  -management per pulmonary team.     2.Metastatic cancer to bone [facility])  -reports diffuse achiness which  is different than her usual pain complaints. --Pain meds per palliative     3. Diffuse large B-cell lymphoma of lymph nodes of neck ([facility] [address]  -completed C5 da-REPOCH on 10/7/22 due for C6 on 10/24.  -unclear if any issue if growth factor administered during a covid infection but patient is at high risk for sepsis. -filgrastim-aafi (NIVESTYM) injection syringe 420 mcg; received total 6 doses; last dose on 10/16   - any additional tx now on hold     4. Thrombocytopenia-   secondary to chemo effect. Improving   --Continue to monitor daily. Transfuse 1 unit plts for plt < 10k or for signs of bleeding  -Daily CBC with diff    5. Thrush: Improvied on diflucan daily    6. Nutrition: poor oral intake: encouraged fluids and to eat soft foods; on supplemental drinks TID.  Declining food ; only taking in sips of H2O  -reviewed with dietician     7. Diarrhea   10/12 C-diff : negative   diarrhea improved : RTC imodium stopped      Plan reviewed with Dr Caitie Leal; covering physician for Dr Arnol Hermosillo.

## 2022-10-19 NOTE — CONSULTS
Palliative Medicine Consult  Lemuel: [phone])    Patient Name: Mariely Lucas  YOB: 1955    Date of Initial Consult: 10/19/2022  Reason for Consult: Care Decisions  Requesting Provider: Dr. Jodee Ch   Primary Care Physician: Brittni Garcia MD     SUMMARY:   Mariely Lucas is a 78 y.o. with metastatic diffuse large B-cell lymphoma recently undergoing da-R-EPOCH chemotherapy and filgrastim injections, COPD, MDD, anxiety, tobacco use who was admitted on 10/10/2022 from home with a diagnosis of COVID-19 infection. Patient has declined significantly in the last 24 hours and is now requiring HFNC with evidence of hypoxia with activity/exertion. She is receiving high dose steroids and antibiotics. CTA performed on 10/17 demonstrated worsening of diffuse effects of COVID-19 pneumonia compared to CTA on 10/11. Current medical issues leading to Palliative Medicine involvement include: Care Decisions. Social:  Patient has two children. Patient does not have an AMD.     PALLIATIVE DIAGNOSES:   Encounter for Palliative Care  Goals of Care discussion  Code Status discussion  Physical Debility/Generalized weakness  Fatigue  Generalized pain     PLAN:   I spoke with Dr. Valeriy Ford, Oncology NP Yancy Delcid and bedside RNs prior to visiting with patient for coordination. I met with patient once her daughter had arrived. I introduced myself and the role of Palliative Medicine. I first spoke with patient to assess her symptoms and understanding of her condition. Patient is awake but appears very fatigue. She says she is feeling better than yesterday but does say she is still having some labored breathing. She reports generalized pain that feels like aching/soreness. I discussed that she is very sick right now and requiring special oxygen support that can only be completed in the hospital. Reviewed that per nursing her oxygen saturations have been dropping with movement or when she removes her oxygen.  Discussed that because of this we do need to talk to her about what treatments she would or would not want if her condition were to worsen. Attempted to discuss her wishes regarding BiPAP and intubation, but she did not engage in conversation about this. She mainly nodded at a few points as I talked to her. I did ask her who she trusts to help her with her medical decisions, and she stated, \"my daughter, Marlon Garcia, while tilting her head in the direction of her daughter. \" Patient gave me permission to leave the room to talk with her daughter and friend regarding her medical care. I next spoke with patient's daughter Marlon Garcia and patient's friend separately. We discussed patient's worsening respiratory status in detail. Discussed that she is critically ill and at high risk of rapid decline and even death. Discussed the importance of considering what interventions her mom would or would not want if her respiratory status worsened. Discussed BiPAP and intubation. At multiple points through our conversation Marlon Garcia states she doesn't believe her mother would want these interventions if they would contribute to suffering and would not help her mom recovery. We discussed that these interventions are temporary measures to help give patient's opportunity to improve but that they are not guarantees that a patient will recover. Discussed potential symptoms of discomfort she may experience with BiPAP and intubation. Daughter believes her mom wants to be as comfortable as possible. She acknowledges that her mom seems very tired right now. She has never had conversations with her mom about intubation, end of life goals as her mom did not discuss these things with her even while being treated for cancer. Marlon Garcia does say that her mom probably would not have continued with the chemo if she had not been able to have a dose reduction. She does think her mom has sense of the severity of her condition but is not ready to talk about it.  We did discuss what a transition into comfort measures would entail if she felt this is what her mom would want, including use of comfort medications and weaning of oxygen to prevent prolonging of death and to allow her to pass as naturally as possible. Daughter would like to use IV medications to help her mom's comfort and notes that she uses morphine and xanax at home. I let her know I would talk to the [facility] Medicine team to discuss initiating a low dose of IV medication such as morphine and ativan to take the place of her oral medications. For now Charis Lewis would like to continue the HFNC as it is. She not wish to fully transition to comfort measures or to wean oxygen at this time. Charis Lewis says it is very important to her to think about what her mother would want and to make decisions based on what she believes her mom wants. Charis Lewis recognizes that this [address] therapy is helping to sustain her mom right now and that if her mom declines further and her oxygen levels continue to fall on HFNC that we will need to decide if patient would want more interventions or a transition to comfort. Discussed that sometimes patients surprise us and make improvements that we aren't expecting but that right now her mom's condition is very concerning and that it is very possible she will not make significant improvement. Daughter asks to be called if there are any changes in her mom's status. She says that her brother is not in a place to be involved in any decision making for their mom, so she expects to help make decisions for her mom on her own (and this is consistent with what patient told me today). I updated Dr. Satish Kim, Oncology NP Minh Finney and bedside RN on outcome of my conversation with patient and family. I have placed orders for IV morphine (scheduled every 4 hours and prn) and IV ativan (prn only). Thank you for asking our team to participate in the care of Luana Yadav.  Charis Lewis agrees to meet with me tomorrow at 11 am.     GOALS OF CARE / TREATMENT PREFERENCES:     GOALS OF CARE:       TREATMENT PREFERENCES:   Code Status: DNR    Patient and family's personal goals include: daughter does not want her mom to suffer    Important upcoming milestones or family events: n/a    The patient identifies the following as important for living well: patient cannot state/participate d/t medical condition      Advance Care Planning:  [] The [facility] Interdisciplinary Team has updated the ACP Navigator with [address] and Patient Capacity      Primary Decision Maker: Mendoza Huang - [phone]  Advance Care Planning 10/3/2022   Patient's Healthcare Decision Maker is: -   Confirm Advance Directive None   Patient Would Like to Complete Advance Directive -   Does the patient have other document types -               Other:    As far as possible, the palliative care team has discussed with patient / health care proxy about goals of care / treatment preferences for patient. HISTORY:     History obtained from: chart, patient, daughter, friend    CHIEF COMPLAINT: better than yesterday    HPI/SUBJECTIVE:    The patient is:   [x] Verbal and participatory though limited by fatigue   [] Non-participatory due to:     10/19: Patient is awake but appears very fatigue. She says she is feeling better than yesterday but does say she is still having some labored breathing. She reports generalized pain that feels like aching/soreness.      Clinical Pain Assessment (nonverbal scale for severity on nonverbal patients):   Clinical Pain Assessment  Severity: 5  Location: all over  Character: aching  Duration: soreness  Effect: uncomfortable at rest  Factors: pain medication helps some  Frequency: constant     Activity (Movement): Lying quietly, normal position    Duration: for how long has pt been experiencing pain (e.g., 2 days, 1 month, years)  Frequency: how often pain is an issue (e.g., several times per day, once every few days, constant) FUNCTIONAL ASSESSMENT:     Palliative Performance Scale (PPS):  PPS: 30       PSYCHOSOCIAL/SPIRITUAL SCREENING:     Palliative IDT has assessed this patient for cultural preferences / practices and a referral made as appropriate to needs (Cultural Services, Patient Advocacy, Ethics, etc.)    Any spiritual / Yazdanism concerns:  [] Yes /  [x] No   If \"Yes\" to discuss with pastoral care during IDT     Does caregiver feel burdened by caring for their loved one:   [] Yes /  [x] No /  [] No Caregiver Present/Available [] No Caregiver [] Pt Lives at Facility  If \"Yes\" to discuss with social work during IDT    Anticipatory grief assessment:   [x] Normal  / [] Maladaptive     If \"Maladaptive\" to discuss with social work during IDT    ESAS Anxiety: Anxiety: 2    ESAS Depression:          REVIEW OF SYSTEMS:     Positive and pertinent negative findings in ROS are noted above in HPI. The following systems were [x] reviewed / [] unable to be reviewed as noted in HPI  Other findings are noted below. Systems: constitutional, ears/nose/mouth/throat, respiratory, gastrointestinal, genitourinary, musculoskeletal, integumentary, neurologic, psychiatric, endocrine. Positive findings noted below. Modified ESAS Completed by: provider   Fatigue: 5       Pain: 5   Anxiety: 2       Dyspnea: 5           Stool Occurrence(s): 1        PHYSICAL EXAM:     From RN flowsheet:  Wt Readings from Last 3 Encounters:   10/14/22 173 lb 8 oz (78.7 kg)   10/07/22 173 lb 8 oz (78.7 kg)   09/22/22 168 lb 9.6 oz (76.5 kg)     Blood pressure (!) 147/73, pulse (!) 58, temperature 97.8 °F (36.6 °C), resp. rate 19, height 5' 2.99\" (1.6 m), weight 173 lb 8 oz (78.7 kg), SpO2 95 %, not currently breastfeeding.     Pain Scale 1: Numeric (0 - 10)  Pain Intensity 1: 2  Pain Onset 1: chronic  Pain Location 1: Generalized  Pain Orientation 1: Left, Right  Pain Description 1: Aching  Pain Intervention(s) 1: Medication (see MAR)  Last bowel movement, if known: General: awake, appears frail, fatigued and lethargic  Eyes: lids normal, no drainage  Nose: nares normal, no drainage  Mouth: mmm, no drainage  Pulm: rate is normal, respirations are unlabored  Neuro: alert, responds to simple direct questions but declines to participate in more detailed conversation  Psych: calm without restlessness or agitation     HISTORY:     Active Problems:    Acute respiratory failure with hypoxia ([address] (9/13/2022)      Lymphoma ([address] (10/10/2022)      COVID (10/10/2022)    Past Medical History:   Diagnosis Date    Adverse effect of anesthesia     PHLEGM IN THROAT POST OP & NEEDED X2 BREATHING TREATMENTS    Anxiety     COPD (chronic obstructive pulmonary disease) ([address] 2022    emphysema    Depression     GERD (gastroesophageal reflux disease)     Hernia, femoral     since childhood    Hypertension     Joint pain     Nausea & vomiting     TMJ arthritis       Past Surgical History:   Procedure Laterality Date    HX CERVICAL FUSION  2012    C5-C6    HX CHOLECYSTECTOMY  2020    HX COLONOSCOPY      HX ENDOSCOPY      HX HYSTERECTOMY  2019    HX TONSILLECTOMY      AS A CHILD    HX WISDOM TEETH EXTRACTION      TEENAGER    IR INSERT TUNL CVC W PORT OVER 5 YEARS  4/29/2022      Family History   Problem Relation Age of Onset    Heart Disease Mother 46    Heart Surgery Mother         HEART TRANSPLANT    Elevated Lipids Mother     Hypertension Mother     COPD Father     Emphysema Father     Sudden Death Brother 46    Other Maternal Grandmother         BRAIN TUMOR    No Known Problems Son     No Known Problems Daughter       History reviewed, no pertinent family history.   Social History     Tobacco Use    Smoking status: Some Days     Packs/day: 0.25     Years: 40.00     Pack years: 10.00     Types: Cigarettes    Smokeless tobacco: Never   Substance Use Topics    Alcohol use: Not Currently     Alcohol/week: 0.0 standard drinks     Allergies   Allergen Reactions    Oxycodone Nausea Only      Current Facility-Administered Medications   Medication Dose Route Frequency    cefepime (MAXIPIME) 2 g in 0.9% sodium chloride (MBP/ADV) 100 mL MBP  2 g IntraVENous Q12H    vancomycin (VANCOCIN) 1,250 mg in 0.9% sodium chloride 250 mL ([id number])  1,250 mg IntraVENous Q24H    pantoprazole (PROTONIX) tablet 40 mg  40 mg Oral ACB    ipratropium-albuterol (COMBIVENT RESPIMAT) 20 mcg-100 mcg inhalation spray  1 Puff Inhalation Q4H RT    enoxaparin (LOVENOX) injection 40 mg  40 mg SubCUTAneous Q24H    methylPREDNISolone (PF) (SOLU-MEDROL) injection 60 mg  60 mg IntraVENous Q8H    lidocaine-prilocaine (EMLA) 2.5-2.5 % cream   Topical PRN    loperamide (IMODIUM) capsule 2 mg  2 mg Oral Q4H PRN    L.acidophilus-paracasei-S.thermophil-bifidobacter (RISAQUAD) 8 billion cell capsule  1 Capsule Oral DAILY    sodium chloride (OCEAN) 0.65 % nasal squeeze bottle 2 Spray  2 Spray Both Nostrils Q2H PRN    [Held by provider] lisinopriL (PRINIVIL, ZESTRIL) tablet 20 mg  20 mg Oral DAILY    morphine IR (MS IR) tablet 15 mg  15 mg Oral Q4H PRN    magic mouthwash (FIRST-MOUTHWASH BLM) oral suspension 5 mL  5 mL Oral Q4H PRN    fluconazole (DIFLUCAN) tablet 100 mg  100 mg Oral DAILY    sodium chloride (NS) flush 5-40 mL  5-40 mL IntraVENous Q8H    sodium chloride (NS) flush 5-40 mL  5-40 mL IntraVENous PRN    acetaminophen (TYLENOL) tablet 650 mg  650 mg Oral Q6H PRN    Or    acetaminophen (TYLENOL) suppository 650 mg  650 mg Rectal Q6H PRN    ondansetron (ZOFRAN) injection 4 mg  4 mg IntraVENous Q6H PRN    ALPRAZolam (XANAX) tablet 1 mg  1 mg Oral BID PRN    [Held by provider] amLODIPine (NORVASC) tablet 5 mg  5 mg Oral DAILY    FLUoxetine (PROzac) capsule 40 mg  40 mg Oral DAILY    ondansetron (ZOFRAN ODT) tablet 8 mg  8 mg Oral Q8H PRN    prochlorperazine (COMPAZINE) tablet 10 mg  10 mg Oral Q6H PRN    trimethoprim-sulfamethoxazole (BACTRIM DS, SEPTRA DS) 160-800 mg per tablet 1 Tablet  1 Tablet Oral Q MON, WED & FRI    mometasone-formoterol (DULERA) 200mcg-5mcg/puff  2 Puff Inhalation BID RT    nicotine (NICODERM CQ) 21 mg/24 hr patch 1 Patch  1 Patch TransDERmal DAILY          LAB AND IMAGING FINDINGS:     Lab Results   Component Value Date/Time    WBC 8.5 10/19/2022 02:29 AM    HGB 8.3 (L) 10/19/2022 02:29 AM    PLATELET 92 (L) 94/63/5237 02:29 AM     Lab Results   Component Value Date/Time    Sodium 140 10/19/2022 02:29 AM    Potassium 3.5 10/19/2022 02:29 AM    Chloride 108 10/19/2022 02:29 AM    CO2 25 10/19/2022 02:29 AM    BUN 24 (H) 10/19/2022 02:29 AM    Creatinine 1.15 (H) 10/19/2022 02:29 AM    Calcium 8.6 10/19/2022 02:29 AM    Phosphorus 4.0 06/06/2022 06:59 AM      Lab Results   Component Value Date/Time    Alk. phosphatase 86 10/19/2022 02:29 AM    Protein, total 5.0 (L) 10/19/2022 02:29 AM    Albumin 2.3 (L) 10/19/2022 02:29 AM    Globulin 2.7 10/19/2022 02:29 AM     No results found for: INR, PTMR, PTP, PT1, PT2, APTT, INREXT, INREXT   Lab Results   Component Value Date/Time    Ferritin 1,209 (H) 10/19/2022 02:29 AM      No results found for: PH, PCO2, PO2  No components found for: GLPOC   No results found for: CPK, CKMB             Total time: 80 mins  Counseling / coordination time, spent as noted above: 60 mins  > 50% counseling / coordination?: yes    Prolonged service was provided for  []30 min   []75 min in face to face time in the presence of the patient, spent as noted above. Time Start:   Time End:   Note: this can only be billed with 05568 (initial) or 19904 (follow up). If multiple start / stop times, list each separately.

## 2022-10-19 NOTE — PROGRESS NOTES
Transition of care note:     RUR 26%     LOS 8 days,GLOS 5.4  Pt is a Readmission  Scheduled admission from 10/3 to 10/7 for chemotherapy  Readmitted with Covid infection complicated by metastatic stage IV lymphoma     Diffuse Large B-Cell Lymphoma    Palliative met with pt & her family today and will meet again with pt/family as a follow-up visit.     Suzie Ellis

## 2022-10-20 NOTE — PROGRESS NOTES
I am continuing to follow pt for discharge needs. I appreciate Paliative Medicine's input today.     Helen Carballo

## 2022-10-20 NOTE — PROGRESS NOTES
Bedside and Verbal shift change report given to [facility] (oncoming nurse) by Ashley Harding (offgoing nurse). Report included the following information SBAR, Intake/Output, MAR, Recent Results, Cardiac Rhythm: NSR and Alarm Parameters . Primary Nurse Joss Vasquez RN and Leela Castañeda RN performed a dual skin assessment on this patient No impairment noted  Mian score is see flowsheets    See flowsheets for all assessments, see MAR for all medication administrations. 0300: pt desatted to the 70's, RN entered room to find pt sitting up in bed with oxygen around her neck. RN replaced oxygen and instructed pt to take deep breaths through her nose. Pt gave verbal understanding, but continued to breathe through her mouth. RN repeated instruction, again pt agreed but did not comply. After oxygen saturation returned to defined limits, RN asked pt orientation questions. Pt was unaware of where she was and also the year. Both of these things were answered correctly at the beginning of the shift. Pt also stated her head felt foggy. Pt has received several doses of morphine and ativan, but an ABG was also ordered per protocol. RT at bedside now. 0430: pt still in a significant amount of pain, but still decently sedated and confused. Due to this, the 0400 dose of morphine is being held. Notified family practice resident of this and the ABG results, and requested other pain management option or requested that they come speak with the pt and her daughter concerning comfort care or other options. 0500: spoke with family practice resident on the phone. Discussed current pt condition. MD suggested giving a lower dose of morphine before MD rounds. RN stated I would be more comfortable if pt did not receive any more sedative medication before being seen by an MD so she can be as alert as possible for plan of care discussions. MD agreed and stated they were doing pre-rounds shortly and would assess her then.     Bedside and Verbal shift change report given to [place] (oncoming nurse) by Chino Blue RN (offgoing nurse). Report included the following information SBAR, Intake/Output, MAR, Recent Results, Cardiac Rhythm: and Alarm Parameters .

## 2022-10-20 NOTE — PROGRESS NOTES
[address] [facility] [address]   Office [phone]  Fax [phone]          Subjective / Objective     24 Hour Events: NAEO. Bradycardia following IV morphine administration. Subjective  Patient is feeling quite anxious this morning. Asking for more morphine as well ativan and/or xanax for anxiety and to help her sleep. She is asking if she is going to die and exhibits insight into her poor prognosis which is obviously exacerbating the anxiety and worry. Persistent cough, pain, and fatigue that is all worsening. Visit Vitals  /77 (BP 1 Location: Right upper arm, BP Patient Position: At rest)   Pulse (!) 53   Temp 97.5 °F (36.4 °C)   Resp 18   Ht 5' 2.99\" (1.6 m)   Wt 173 lb 8 oz (78.7 kg)   SpO2 90%   Breastfeeding No   BMI 30.74 kg/m²     General: Patient appears fatigued, ill, and weak. Moves slowly and poorly around in bed. Increased lethargy. Shaking when not underneath blanket. On 40L HFNC  Mouth: No tongue plaque  Respiratory: Severe Ronchi. Increased work of breathing. Mild tachypnea Cough with deep breathing  Cardiovascular: RRR. Normal S1,S2. No m/r/g.   GI: Nontender. No rebound tenderness or guarding. Nondistended. Extremities: no LE edema.     I/O:  Date 10/19/22 0700 - 10/20/22 0659 10/20/22 0700 - 10/21/22 0659   Shift 7349-3610 7890-3692 24 Hour Total 2474-9160 4704-1187 24 Hour Total   INTAKE   I.V.(mL/kg/hr) 350(0.4) 100(0.1) 450(0.2)        Volume (vancomycin (VANCOCIN) 1,250 mg in 0.9% sodium chloride 250 mL ([id number])) 250  250        Volume (cefepime (MAXIPIME) 2 g in 0.9% sodium chloride (MBP/ADV) 100 mL MBP) 100 100 200      Shift Total(mL/kg) 350(4.4) 100(1.3) 450(5.7)      OUTPUT   Urine(mL/kg/hr) 250(0.3)  250(0.1)        Urine Voided 250  250        Urine Occurrence(s)  1 x 1 x      Stool           Stool Occurrence(s) 1 x  1 x      Shift Total(mL/kg) 250(3.2)  250(3.2)       100 200      Weight (kg) 78.7 78.7 78.7 78.7 78.7 78.7     CBC:  Recent Labs     10/20/22  0422 10/19/22  0229 10/18/22  0505   WBC 6.2 8.5 10.6   HGB 8.2* 8.3* 8.1*   HCT 25.2* 25.8* 24.9*   PLT 96* 92* 72*     Metabolic Panel:  Recent Labs     10/20/22  0422 10/19/22  0229 10/18/22  0505    140 144   K 3.5 3.5 4.4    108 110*   CO2 25 25 28   BUN 22* 24* 19   CREA 1.08* 1.15* 1.12*   * 134* 111*   CA 8.4* 8.6 8.5   ALB 2.1* 2.3* 2.2*   * 128* 122*         Assessment and Plan       Lucius Helms is a 78 y.o. female with PMH of diffuse large B-cell lymphoma w/ bone mets, COPD, tobacco dependence, insomnia, MDD, and anxiety who was admitted for filgrastim injections and management of COVID-19 infection with symptoms starting approximately 10/3. Pt has worsened clinically over the past 4 days, now in the ICU on 40L HFNC, on solumedrol 60mg q8hrs, with recent CTA showing severe COVID PNA with no PE. Palliative consulted and saw patient yesterday, much appreciated. Pt now on scheduled IV morphine and prn ativan q2hrs. However, both of these medications have led to bradycardia overnight although they have helped significantly with patient comfort. Dr. Karla Toure with palliative will be meeting with patient and daughter today to further clarify goals especially involving whether bipap is wanted if patient declines. It seems that a transition to more comfort care or inpatient hospice may be reasonable from what I have gleaned from my conversations with patient/family and from palliative encounter yesterday. Patient remains DNR and does not want to be intubated. Of note, patient has become bradycardic overnight after getting ativan and/or morphine despite these medications helping patient sleep and be mor comfortable. AHRF in setting of COVID-19 with superimposed bacterial PNA: worsening Hx of COPD with no home O2. On 11L HFNC. LA and procal negative. Inflammatory markers elevated but with downtrend in CRP and D-dimer today.  CXR with Increasing bilateral pulmonary infiltrates. BNP 2,422. CTA Chest on 10/17 with no PE but severe COVID PNA and trace b/l pleural effusions. Continuing antibiotic coverage for bacterial PNA for now considering immunocompromised status until 10/21.   - Cefepime 2 g BID (10/14-), Vancomycin (10/10-), S/p 5 days Doxycycline 100 BID (10/10-10/15)  - Combivent respimat 20mcg-100mcg q4hrs  - Dulera 200mcg-5mcg 2 puffs BID  - Pulm following:  -  Solumedrol 60mg q8hrs  - BNP elevated at 2,422 with trace b/l pleural effusions on CT.  - Supplemental O2 prn for sats <88%. Wean O2 as tolerated  - Daily CBC, CMP, D-dimer, LD, Ferritin  - Palliative Consult: will see again today at approx 11AM. Much appreciated   - IV morphine 2mg q4hrs   - IV Ativan 0.5mg q2hrs prn    DLBCL with Pancytopenia: Pancytopenia improved. Follows with Dr. Xena Mcwilliams. - Oncology consulted, appreciate rec's  - s/p 1 wk daily Filgrastim injections  - Zofran and compazine q6-8 hrs prn nausea    Thrush/Mouth Pain: resolved. eating and drinking well per patient  - Continue Diflucan 100 mg daily  - Magic Mouthwash as needed before meals for discomfort     Pancytopenia: POA PLT 62. Likely 2/2 chemotherapy. - Continuing AC with lovenox 40mg daily  - Hold any AC with plt <50  - Daily CBC  - Oncology consulted, appreciate rec's   - TT plt below 10    Diarrhea: Improved  - Imodium 2mg BID  - C. Diff negative on 10/12    Elevated Cr: Likely 2/2 poor PO intake. Stable  - Encouraged PO fluid consumption  - Monitor daily BMP     Hypertension: POA BP was 92/66.   - Continue home Amplodipine-benazepril 5-20 daily      Hypokalemia: POA K 2.9. Improved  - Replete PRN  - Follow on daily BMP     MDD/RENETTA: Chronic, stable. - Home Xanax 1mg bid  - Home Prozac 40mg daily     Tobacco dependence: About 40 pack years. Currently smokes about 5 cigarettes per day.   - Nicotine patch 21 daily    Meghan Parks DO  Family Medicine Resident       For Billing    Chief Complaint   Patient presents with    Medication Problem       Hospital Problems  Date Reviewed: 9/23/2022            Codes Class Noted POA    Lymphoma ([facility] ICD-10-CM: C85.90  ICD-9-CM: 202.80  10/10/2022 Unknown        COVID ICD-10-CM: U07.1  ICD-9-CM: 079.89  10/10/2022 Unknown        Acute respiratory failure with hypoxia ([facility] ICD-10-CM: J96.01  ICD-9-CM: 518.81  9/13/2022 Unknown

## 2022-10-20 NOTE — PROGRESS NOTES
[facility]     Name: Kenda Duane MRN: [id number]   : 1955 Hospital: [address]   Date: 10/20/2022        Impression Plan   Acute hypoxic respiratory failure  COVID 19 PNA with questionable superimposed bacterial PNA  Lymphoma s/p recent R-CHOP  Pancytopenia  Hx of tobacco abuse               Wean O2 to keep sats above 90%; on HF 45L 80%  Continue high dose IV Solu-Medrol  Combivent and Dulera  Continue Vanc/Cefepime to complete a 7 day course  Follow d-dimer and CRP  Barcitinib contraindicated since pt is immunocompromised and with possible bacterial infection. Nivestym per hematology - stopped yesterday due to increasing WBC  OOB into chair  Holding anticoagulation               Radiology  (personally reviewed) CT chest reviewed: bilateral peripheral infiltrates, no PE    10/17 chest CTA: Heterogeneous bilateral airspace opacities worsened since 2022 consistent with diffuse, severe COVID 19 pneumonia. . Trace bilateral pleural effusions. No evidence of PE. Subjective     Cc: shortness of breath    79 yo with PMHx of lymphoma presenting with increasing SOB for the last 3 days. Was admitted last week for R-CHOP chemotherapy. Her son was COVID 19 positive last week. Pt is COVID 19 positive in ER. She has had the first series of mRNA vaccine but none of the boosters. Currently on 4 liters of O2 and dyspnic. WBC 13, neutrophil predominant. Bilateral infiltrates on CT chest.     Interval history:  Afebrile  Sats 93% on 40L 80%  WBC 6.2  Hgb 8.2  D-dimer 1.24  CRP 1.01 - better  Ferritin 1209  LFTs mildly increased  Blood cultures negative x 5 days  Cdiff neg    ROS:  Awakes to voice. Nods \"no\" when asked if she is short of breath, but otherwise does not answer many questions. A comprehensive review of systems was negative except for that written in the HPI.     Past Medical History:   Diagnosis Date    Adverse effect of anesthesia     PHLEGM IN THROAT POST OP & NEEDED X2 BREATHING TREATMENTS    Anxiety     COPD (chronic obstructive pulmonary disease) ([facility] 2022    emphysema    Depression     GERD (gastroesophageal reflux disease)     Hernia, femoral     since childhood    Hypertension     Joint pain     Nausea & vomiting     TMJ arthritis       Past Surgical History:   Procedure Laterality Date    HX CERVICAL FUSION  2012    C5-C6    HX CHOLECYSTECTOMY  2020    HX COLONOSCOPY      HX ENDOSCOPY      HX HYSTERECTOMY  2019    HX TONSILLECTOMY      AS A CHILD    HX WISDOM TEETH EXTRACTION      TEENAGER    IR INSERT TUNL CVC W PORT OVER 5 YEARS  4/29/2022      Prior to Admission medications    Medication Sig Start Date End Date Taking? Authorizing Provider   doxycycline (VIBRA-TABS) 100 mg tablet Take 1 Tablet by mouth every twelve (12) hours. 10/7/22   Sly Chavez MD   albuterol (PROVENTIL HFA, VENTOLIN HFA, PROAIR HFA) 90 mcg/actuation inhaler Take 2 Puffs by inhalation every six (6) hours as needed for Wheezing or Shortness of Breath for up to 20 days. 10/7/22 10/27/22  Sly Chavez MD   morphine IR (MS IR) 15 mg tablet Take 1 Tablet by mouth every six (6) hours as needed for Pain for up to 15 days. Max Daily Amount: 60 mg. 10/4/22 10/19/22  Sita Maldonado MD   ALPRAZolam Leyla Nation) 1 mg tablet Take 1 Tablet by mouth two (2) times daily as needed for Anxiety for up to 15 days. Max Daily Amount: 2 mg. 10/4/22 10/19/22  Sita Maldonado MD   ondansetron (ZOFRAN ODT) 8 mg disintegrating tablet Take 1 Tablet by mouth every eight (8) hours as needed for Nausea or Vomiting. 9/2/22   Sly Chavez MD   prochlorperazine (COMPAZINE) 10 mg tablet Take 1 Tablet by mouth every six (6) hours as needed for Nausea (do not take if groggy; take if nausea despite zofran). 9/2/22   Sly Chavez MD   trimethoprim-sulfamethoxazole (BACTRIM DS, SEPTRA DS) 160-800 mg per tablet Take 1 Tablet by mouth every Monday, Wednesday, Friday.  Indications: prophylaxis treatment of cancer related infections 8/24/22   Schoeneweis, Sabra Conners, NP   FLUoxetine (PROzac) 40 mg capsule TAKE 1 CAPSULE BY MOUTH EVERY MORNING 10/11/21   Enriqueta Smith MD   amLODIPine-benazepril (LOTREL) 5-20 mg per capsule TAKE 1 CAPSULE BY MOUTH EVERY MORNING 10/11/21   Enriqueta Smith MD     Current Facility-Administered Medications   Medication Dose Route Frequency    ipratropium-albuterol (COMBIVENT RESPIMAT) 20 mcg-100 mcg inhalation spray  1 Puff Inhalation Q6H RT    cefepime (MAXIPIME) 2 g in 0.9% sodium chloride (MBP/ADV) 100 mL MBP  2 g IntraVENous Q12H    morphine injection 2 mg  2 mg IntraVENous Q4H    vancomycin (VANCOCIN) 1,250 mg in 0.9% sodium chloride 250 mL ([id number])  1,250 mg IntraVENous Q24H    pantoprazole (PROTONIX) tablet 40 mg  40 mg Oral ACB    enoxaparin (LOVENOX) injection 40 mg  40 mg SubCUTAneous Q24H    methylPREDNISolone (PF) (SOLU-MEDROL) injection 60 mg  60 mg IntraVENous Q8H    L.acidophilus-paracasei-S.thermophil-bifidobacter (RISAQUAD) 8 billion cell capsule  1 Capsule Oral DAILY    [Held by provider] lisinopriL (PRINIVIL, ZESTRIL) tablet 20 mg  20 mg Oral DAILY    fluconazole (DIFLUCAN) tablet 100 mg  100 mg Oral DAILY    sodium chloride (NS) flush 5-40 mL  5-40 mL IntraVENous Q8H    [Held by provider] amLODIPine (NORVASC) tablet 5 mg  5 mg Oral DAILY    FLUoxetine (PROzac) capsule 40 mg  40 mg Oral DAILY    trimethoprim-sulfamethoxazole (BACTRIM DS, SEPTRA DS) 160-800 mg per tablet 1 Tablet  1 Tablet Oral Q MON, WED & FRI    mometasone-formoterol (DULERA) 200mcg-5mcg/puff  2 Puff Inhalation BID RT    nicotine (NICODERM CQ) 21 mg/24 hr patch 1 Patch  1 Patch TransDERmal DAILY     Allergies   Allergen Reactions    Oxycodone Nausea Only      Social History     Tobacco Use    Smoking status: Some Days     Packs/day: 0.25     Years: 40.00     Pack years: 10.00     Types: Cigarettes    Smokeless tobacco: Never   Substance Use Topics    Alcohol use: Not Currently     Alcohol/week: 0.0 standard drinks      Family History   Problem Relation Age of Onset    Heart Disease Mother 46    Heart Surgery Mother         HEART TRANSPLANT    Elevated Lipids Mother     Hypertension Mother     COPD Father     Emphysema Father     Sudden Death Brother 46    Other Maternal Grandmother         BRAIN TUMOR    No Known Problems Son     No Known Problems Daughter           Laboratory: I have personally reviewed the flowsheet and labs.      Recent Labs     10/20/22  0422 10/19/22  0229 10/18/22  0505   WBC 6.2 8.5 10.6   HGB 8.2* 8.3* 8.1*   HCT 25.2* 25.8* 24.9*   PLT 96* 92* 72*       Recent Labs     10/20/22  0422 10/19/22  0229 10/18/22  0505    140 144   K 3.5 3.5 4.4    108 110*   CO2 25 25 28   * 134* 111*   BUN 22* 24* 19   CREA 1.08* 1.15* 1.12*   CA 8.4* 8.6 8.5   ALB 2.1* 2.3* 2.2*   * 128* 122*         Objective:   Visit Vitals  /77 (BP 1 Location: Right upper arm, BP Patient Position: At rest)   Pulse (!) 53   Temp 97.5 °F (36.4 °C)   Resp 18   Ht 5' 2.99\" (1.6 m)   Wt 78.7 kg (173 lb 8 oz)   SpO2 90%   Breastfeeding No   BMI 30.74 kg/m²       Intake/Output Summary (Last 24 hours) at 10/20/2022 8823  Last data filed at 10/20/2022 0000  Gross per 24 hour   Intake 450 ml   Output 250 ml   Net 200 ml       EXAM:   GENERAL: awake, alert, tired appearing, mildy dyspnic HEENT:  anicteric, EOMI, no alar flaring or epistaxis, oral mucosa moist without cyanosis, NECK:  no jugular vein distention, no retractions, no thyromegaly or masses, LUNGS: diminished, scattered rales HEART:  Regular rate and rhythm with no MGR; no edema is present, ABDOMEN:  soft with no tenderness, bowel sounds present, EXTREMITIES:  warm with no cyanosis, SKIN:  no jaundice or ecchymosis, and NEUROLOGIC:  alert and oriented, grossly non-focal    Pooja Zamarripa MD  [facility]

## 2022-10-20 NOTE — PROGRESS NOTES
0700 Bedside and Verbal shift change report given to Cindi Neves (oncoming nurse) by Shoshana Spurling, RN (offgoing nurse). Report included the following information SBAR, Kardex, ED Summary, Intake/Output, MAR, Recent Results, and Cardiac Rhythm sb,sr . Primary Nurse Alvaro Gale RN and Shoshana Spurling, RN performed a dual skin assessment on this patient No impairment noted  Mian score is 24   0745 Dr. Lydia Ya at bedside, assessed pt. No new orders. 0800 Pt assessed. A&Ox4. Generalized weakness. Breath sounds diminished throughout A&P. Nonproductive congested cough. High flow 70%/50L. Sat >90%. Requesting Morphine Sulfate and Ativan IV for air hunger, restlessnes, anxiety. SB, SR. BP stable. No edema, CP. Abd soft, round. BS active x4. No N&V, abd pain. Poor appetite. Voiding without difficulty. BPPP. See assessment. 0900 Pt bathed with CHG. OOB to chair. Tolerated well. 0930 Dr. Michelle Hoyos at bedside, assessed pt. Updated pt regarding condition and plan of care. No new orders. 1000 Family at bedside. 1200 Pt reassessed. Assessment unchanged. To BSC to void. Back to bed for a nap with 1 assist.  1400 Pt restless at times. Tolerating High flow. 1600 Pt reassessed. Assessment unchanged. OOB to BSC, voided, continent of loose brown BM. Sitting in recliner now. Family remains at bedside. 1830 Pt back to bed with 1 assist.    1900 Bedside and Verbal shift change report given to Delbert Lim RN (oncoming nurse) by State Lizandro RN (offgoing nurse). Report included the following information SBAR, Kardex, ED Summary, Intake/Output, MAR, Recent Results, and Cardiac Rhythm sr .

## 2022-10-20 NOTE — PROGRESS NOTES
[facility] [place] [address]  [address] [facility], [address] [facility] [address] [facility]  Ulisses Guardian: [phone]  F: [phone] Patient ID  Name: Jyothi Beckett  YOB: 1955  MRN: [id number]  Referring Provider:   No referring provider defined for this encounter. 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 This summary highlights focused aspects of patient's ongoing care and may have a recurring section in notes with either updates or remain unchanged as a longitudinal care summary.    -------------------------------------------------------------------------------------------------------------------------------------------------------------------------------------------------------  DIAGNOSIS:  Diffuse Large B-Cell Lymphoma     ORIGINAL STAGING:  Stage IV     SITES OF DISEASE:  Left Cervical Lymph Node,Bone Disease, Stage IV    CURRENT TREATMENT:  C1,D1 on 5/11/22 DA-R-EPOCH  C2  C3 delayed due to appetite issues and patient anxiety to restart therapy     Plan Name: OP R-CHOP   Treatment goal Curative   Provider Mounika Padron MD   Status Inactive   Start Date    End Date    Treatment plan weight/height/BSA Weight type: Documented (effective on 4/27/2022), 94.1 kg (entered on 4/27/2022), 160 cm (entered on 4/27/2022), BSA 2.05 m2   Most recent weight/height/BSA Weight: 173 lb 8 oz (78.7 kg)    Height: 5' 2.99\" (1.6 m)    BSA (calculated - sq m): 1.86 sq meters      Treatment on clinical trial? [This plan is not part of a research study]   Reason for stopping treatment MD Discretion   Summary of Treatment Plan Medications DOXOrubicin (ADRIAMYCIN) chemo syringe 51.2 mg, 25 mg/m2 = 51.2 mg, IntraVENous, ONCE, 0 of 6 cycles  Dose modification: 25 mg/m2 (original dose 50 mg/m2, Cycle 1, Reason: Per Protocol, Comment: dose split into 2 syringes due to volume)    cyclophosphamide (CYTOXAN) 1,538 mg in 0.9% sodium chloride 250 mL chemo infusion, 750 mg/m2 = 1,538 mg, IntraVENous, ONCE, 0 of 6 cycles    vinCRIStine (VINCASAR PFS) 2 mg in 0.9% sodium chloride 50 mL chemo IVPB, 2 mg (100 % of original dose 2 mg), IntraVENous, ONCE, 0 of 6 cycles  Dose modification: 2 mg (original dose 2 mg, Cycle 1, Reason: Other)    riTUXimab-pvvr (RUXIENCE) 769 mg in 0.9% sodium chloride 769 mL infusion, 375 mg/m2 = 769 mg, IntraVENous, ONCE TITRATE, 0 of 1 cycle       Plan Name: [facility],    Treatment goal Curative   Provider Michael Grant MD   Status Active   Start Date 5/11/2022   End Date 10/30/2022 (Planned)   Treatment plan weight/height/BSA Weight type: Documented (effective on 8/17/2022), 79.5 kg (entered on 8/17/2022), 160 cm (entered on 8/5/2022), BSA 1.88 m2   Most recent weight/height/BSA Weight: 173 lb 8 oz (78.7 kg)    Height: 5' 2.99\" (1.6 m)    BSA (calculated - sq m): 1.86 sq meters      Treatment on clinical trial? [This plan is not part of a research study]   Reason for stopping treatment [Plan is still active]   Summary of Treatment Plan Medications cyclophosphamide (CYTOXAN) 1,537.6 mg in 0.9% sodium chloride 250 mL, overfill volume 25 mL chemo infusion, 750 mg/m2 = 1,538 mg, IntraVENous, ONCE, 5 of 6 cycles  Dose modification: 900 mg/m2 (original dose 750 mg/m2, Cycle 2, Reason: Per Protocol, Comment: ANC above 0.5), 600 mg/m2 (original dose 750 mg/m2, Cycle 3, Reason: Dose Not Tolerated), 600 mg/m2 (80 % of original dose 750 mg/m2, Cycle 4, Reason: Dose Not Tolerated)  Administration: 1,537.6 mg (5/15/2022), 1,809 mg (6/10/2022), 1,128 mg (8/22/2022), 1,128 mg (9/16/2022), 1,128 mg (10/7/2022)    etoposide (VEPESID) 102.6 mg in 0.9% sodium chloride 450 mL chemo infusion, 50 mg/m2 = 102.6 mg, IntraVENous, EVERY 24 HOURS, 5 of 6 cycles  Dose modification: 60 mg/m2 (original dose 50 mg/m2, Cycle 2, Reason: Per Protocol, Comment: ANC above 0.5), 40 mg/m2 (original dose 50 mg/m2, Cycle 3, Reason: Dose Not Tolerated), 40 mg/m2 (80 % of original dose 50 mg/m2, Cycle 4, Reason: Dose Not Tolerated)  Administration: 102.6 mg (5/11/2022), 102.6 mg (5/12/2022), 102.6 mg (5/13/2022), 102.6 mg (5/14/2022), 120.6 mg (6/6/2022), 120.6 mg (6/7/2022), 120.6 mg (6/8/2022), 120.6 mg (6/9/2022), 75.2 mg (8/18/2022), 75.2 mg (8/19/2022), 75.2 mg (8/20/2022), 75.2 mg (8/21/2022), 75.2 mg (9/12/2022), 75.2 mg (9/13/2022), 75.2 mg (9/14/2022), 75.2 mg (9/15/2022), 75.2 mg (10/3/2022), 75.2 mg (10/4/2022), 75.2 mg (10/5/2022), 75.2 mg (10/6/2022)    vinCRIStine (VINCASAR PFS) 0.8 mg, DOXOrubicin (ADRIAMYCIN) 20.6 mg in 0.9% sodium chloride 250 mL, overfill volume 25 mL chemo infusion, , IntraVENous, EVERY 24 HOURS, 5 of 6 cycles  Administration:  (5/11/2022),  (5/12/2022),  (5/13/2022),  (5/14/2022),  (6/6/2022),  (6/7/2022),  (6/8/2022),  (6/9/2022),  (8/18/2022),  (8/19/2022),  (8/20/2022),  (8/21/2022),  (9/12/2022),  (9/13/2022),  (9/14/2022),  (9/15/2022),  (10/3/2022),  (10/4/2022),  (10/5/2022),  (10/6/2022)    riTUXimab-pvvr (RUXIENCE) 769 mg in 0.9% sodium chloride 769 mL infusion, 375 mg/m2 = 769 mg, IntraVENous, ONCE TITRATE, 1 of 2 cycles  Administration: 769 mg (5/11/2022)    riTUXimab-pvvr (RUXIENCE) 754 mg in 0.9% sodium chloride 250 mL RAPID infusion, 375 mg/m2 = 754 mg, IntraVENous, ONCE TITRATE, 4 of 4 cycles  Administration: 754 mg (6/6/2022), 705 mg (8/18/2022), 705 mg (9/12/2022), 705 mg (10/3/2022)         PRIOR TREATMENT:  n/a     GOALS OF CARE:  Curative Intent     PATHOLOGY:  Specimen #:[id number] Collect: 4/22/2022      ==========================================================================                    * * *SURGICAL PATHOLOGY REPORT* * *   ==========================================================================   * * *PROCEDURES/ADDENDA* * *   ADDENDUM   Date Ordered: 4/26/2022 Status: Signed Out   Date Complete: 4/26/2022     By: Edgar Rodríguez. Darrius Viveros MD   Date Reported: 4/26/2022   Addendum Diagnosis   Lymph node, left posterior cervical lymph node, excision:   In situ hybridization for Jesús-Barr viral RNA: Negative   CPT: 50782  Addendum Comment   * * *CLINICAL HISTORY* * *   Cervical lymphadenopathy, rule out lymphoma   ==========================================================================   * * *FINAL PATHOLOGIC DIAGNOSIS* * *        Lymph node, left posterior cervical lymph node, excision:        Large B cell lymphoma. See comment. * * *Comment* * *   The histologic section has fragments of lymphoid tissue with diffuse and   focal follicular architecture. Diffuse areas are comprised of large,   atypical lymphocytes with centroblastic morphology and increased mitotic   activity. Immunohistochemical stains confirm the malignant cells are CD20   positive B cells that coexpress CD10 and BCL6 without MUM1. CD23   highlights rare follicular dendritic networks associated with lymphoid   follicles with germinal centers and express CD10 and BCL6 without BCL2. CyclinD1 and CD56 stains are negative. Concurrent flow cytometric analysis   confirms a clonal CD10 positive B-cell population comprised of medium to   large cells. The Ki-67 proliferation index is 30%. The overall findings favor the diagnosis of diffuse large B-cell lymphoma,   germinal center subtype. Molecular genetic studies are pending for further   characterization will be reported in an addendum. Flow cytometry:   Consistent with CD10-positive B-cell lymphoproliferative disorder. Comments: Flow cytometry shows monoclonal B-cells (36% of total cells)   with CD10 expression without CD5, consistent with a CD10-positive B-cell   lymphoproliferative disorder. The main differential diagnosis includes   follicular lymphoma, Burkitt lymphoma and large B-cell lymphoma.  Please   correlate the result with morphologic findings and clinical information. Flow Differential (%) and Population Analysis:   Lymphocytes: 93.7%   T-cells (39% of lymphoid cells) show a CD4/CD8 ratio of 3.3 without overt   phenotypic abnormality. NK-cells (1% of lymphoid cells) are unremarkable. Mature B-cells (56% of lymphoid cells) include large forms based on   forward scattered pattern and show: CD5 neg, CD10+, CD11c+dim, CD19+,   CD20+ with surface lambda light chain restriction. Markers Performed: CD2, CD3, CD4, CD5, CD7, CD8, CD10, CD11c, CD19, CD20,   CD23, CD34, CD38, CD45, CD56, Kappa, Lambda (17 Markers)   The Technical Component Processing of this test was completed at   [facility], [address] [place] [address], [place], [place] / [address] /   [phone] / [facility] # [id number]. Interpretation by Jacquelin Whitman M.D. The   complete scanned report is available in Epic. CPT: W2501392, [id number], [id number], [id number], [id number], [id number]   [facility]/2022   *Electronically Signed Out By Antelmo Hatfield. Farzaneh Mauricio MD*   ==========================================================================   * * *Gross Description* * *   Specimen #1, received fresh labeled with the patient's name,  and left   posterior cervical lymph node, consists of two paper towels containing a   1.7 x 1.5 x 0.5 cm aggregate of pale pink-tan, fleshy soft tissue   fragments. The specimen is handled according to the flow cytometry   protocol as follows: 7          Two air-dried touch prep slides - one Diff Quik stained, one   rapid-fixed in 95% alcohol   7          Representative sections in B plus fixative (1A)   7          One piece held in RPMI for possible flow cytometry analysis   7          Remaining tissue fixed in formalin for permanents (1B)   Dr. Farzaneh Mauricio will determine if flow cytometry is necessary. 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  F who presents as an inpatient consultation for Lymphoma, COVID Positive, need for growth factor support. Patient called in today saying that she was too sick to take her Neulasta shot. She reports shortness of breath. Referred her to the ER. She did not think she could make it to the [facility] location for her neulasta shot and reported general decline since discharge last Friday which was Day 5 of her 5th cycle of treatment. .   Patient overall reports feeling worse. She reports a declines over the weekend. Interval History:     10/17 Transferred to ICU for higher level of care due to oxygen requirement. Today, pt remains in ICU on high flow 40L. Patient continues to have little to no appetite. No n/v. Prior diarrhea has resolved. Objective:      Visit Vitals  BP (!) 122/58   Pulse (!) 56   Temp 97.5 °F (36.4 °C)   Resp 20   Ht 5' 2.99\" (1.6 m)   Wt 173 lb 8 oz (78.7 kg)   SpO2 93%   Breastfeeding No   BMI 30.74 kg/m²     ECOG: 3-4  General: ill appearing, no acute distress,  Respiratory: normal respiratory effort: O2 on high flow 40L  CV: slight LE peripheral edema  Skin: no rashes; no ecchymoses; no petechiae  Psych: alert, oriented, flat mood/affect       Results:        Lab Results   Component Value Date/Time    WBC 6.2 10/20/2022 04:22 AM    HGB 8.2 (L) 10/20/2022 04:22 AM    HCT 25.2 (L) 10/20/2022 04:22 AM    PLATELET 96 (L) 09/16/9138 04:22 AM    MCV 93.7 10/20/2022 04:22 AM     Lab Results   Component Value Date/Time    Sodium 141 10/20/2022 04:22 AM    Potassium 3.5 10/20/2022 04:22 AM    Chloride 108 10/20/2022 04:22 AM    CO2 25 10/20/2022 04:22 AM    Anion gap 8 10/20/2022 04:22 AM    Glucose 135 (H) 10/20/2022 04:22 AM    BUN 22 (H) 10/20/2022 04:22 AM    Creatinine 1.08 (H) 10/20/2022 04:22 AM    BUN/Creatinine ratio 20 10/20/2022 04:22 AM    GFR est AA >60 10/03/2022 06:44 AM    GFR est non-AA >60 10/03/2022 06:44 AM    Calcium 8.4 (L) 10/20/2022 04:22 AM    Bilirubin, total 0.3 10/20/2022 04:22 AM    Alk.  phosphatase 91 10/20/2022 04:22 AM    Protein, total 4.5 (L) 10/20/2022 04:22 AM    Albumin 2.1 (L) 10/20/2022 04:22 AM    Globulin 2.4 10/20/2022 04:22 AM    A-G Ratio 0.9 (L) 10/20/2022 04:22 AM    ALT (SGPT) 141 (H) 10/20/2022 04:22 AM    AST (SGOT) 82 (H) 10/20/2022 04:22 AM       10/17/22 CTA  Chest  IMPRESSION  Diffuse, severe COVID 19 pneumonia. No pulmonary embolism. Assessment and Recommendations:      COVID-19 infection  Tested positive on 10/6/22.   10/14 pulmonary: abx changed to vanc and cefepime for superimposed bacterial PNA  10/17 chest CTA showed diffuse severe COVID PNA. Pulmonary following. Patient continues on high flow O2, today at 40L. CRP and d-dimer improving.   -- Continue high dose steroids and antibiotics. 2.Metastatic cancer to bone [facility])  Has had diffuse achiness which is different than her usual pain complaints. -- Pain meds per palliative     3. Diffuse large B-cell lymphoma of lymph nodes of neck ([facility] [address]  Completed C5 da-REPOCH on 10/7/22. Was due for C6 on 10/24, but holding treatment given hypoxic resp failure. Good response to treatment noted on scans. Pt received filgrastim-aafi (NIVESTYM) 420 mcg daily x 6 doses; last dose on 10/16. 4. Thrombocytopenia:  Secondary to chemo effect. Improving     5. Thrush: Improved on diflucan daily    6. Nutrition: Very poor oral intake. Pt declining food, only taking in sips of H2O.   -- Consider NG tube placement and if needed, could consider TPN    7. Diarrhea:  10/12 C-diff: negative. Now improved and Imodium stopped. 8. Goals of care:  I agree with palliative care discussing goals and code status with patient as she is now on high flow O2 in ICU. IT seems pt is DNR/DNI and would not want and likely would not tolerate Bipap IF she were to worsen. However, I feel it is premature to consider comfort care at this time. Her lymphoma is likely in remission and there are no plans for further chemotherapy at this time.  The COVID PNA, while severe, may still improve. I am covering for patient's primary Oncologist, Dr. Eriberto Torres.

## 2022-10-20 NOTE — PROGRESS NOTES
Comprehensive Nutrition Assessment    Type and Reason for Visit: Reassess    Nutrition Recommendations/Plan:   Continue regular diet as tolerated  Modify ONS - Dc Enlive, Provide Ensure Pudding BID to aid in kcal/protein intake (340 kcal, 60 g carbs, 8 g protein)  If poor intake continues, may require NGT/DHT placement or PN. Malnutrition Assessment:  Malnutrition Status:  Mild malnutrition (10/20/22 1159)    Context:  Acute illness     Findings of the 6 clinical characteristics of malnutrition:   Energy Intake:  50% or less of est energy requirements for 5 or more days  Weight Loss:  No significant weight loss     Body Fat Loss:  Unable to assess,     Muscle Mass Loss:  Unable to assess,    Fluid Accumulation:  No significant fluid accumulation,     Strength:  Not performed     Nutrition Assessment:     10/20: Follow up. Patient transferred to ICU, now day three, stepdown. Patient on 40 L [place] O2 - appetite incredibly poor. Has been essentially refusing all ONS and meals. Did alright with pudding that daughter provided. RD to modify ONS. Lack of recent meal/ONS documentation. Was up in chair at bedside today. No c/o nausea/vomiting at this time. RD to follow closely - can provide EN/PN recs as needed based on [address]. Patient Vitals for the past 168 hrs:   % Diet Eaten   10/20/22 0800 0%   10/17/22 0852 0%   10/16/22 1330 51 - 75%   10/16/22 0815 51 - 75%   10/15/22 0955 26 - 50%   10/14/22 1800 0%   10/14/22 1259 1 - 25%   10/14/22 0839 1 - 25%   10/13/22 1800 1 - 25%   10/13/22 1322 1 - 25%       Nutrition Related Findings:      Wound Type: None  Last Bowel Movement Date: 10/20/22  Stool Appearance: Loose  Abdominal Assessment: Soft  Appetite: Poor  Bowel Sounds: Active   Edema:Generalized: Trace (10/19/2022  8:00 PM)      Nutr.  Labs:  Lab Results   Component Value Date/Time    GFR est AA >60 10/03/2022 06:44 AM    GFR est non-AA >60 10/03/2022 06:44 AM    Creatinine 1.08 (H) 10/20/2022 04:22 AM    BUN 22 (H) 10/20/2022 04:22 AM    Sodium 141 10/20/2022 04:22 AM    Potassium 3.5 10/20/2022 04:22 AM    Chloride 108 10/20/2022 04:22 AM    CO2 25 10/20/2022 04:22 AM       Lab Results   Component Value Date/Time    Glucose 135 (H) 10/20/2022 04:22 AM    Glucose (POC) 98 05/04/2022 01:37 PM       No results found for: HBA1C, LQC0OPVN, OSA0XQGG, FVP4FDCS    No results found for: MG    Lab Results   Component Value Date/Time    Calcium 8.4 (L) 10/20/2022 04:22 AM    Phosphorus 4.0 06/06/2022 06:59 AM       Nutr. Meds:  Lovenox, diflucan, risaquad, imodium, magic mouthwash, solu-medrol, zofran PRN, Protonix, compazine, bactrim, vancomycin       Current Nutrition Intake & Therapies:  Average Meal Intake: 0%  Average Supplement Intake: Refusing to take  ADULT DIET Regular  ADULT ORAL NUTRITION SUPPLEMENT Breakfast, Lunch, Dinner; Standard High Calorie/High Protein    Anthropometric Measures:  Height: 5' 2.99\" (160 cm)  Ideal Body Weight (IBW): 115 lbs (52 kg)     Current Body Wt:  78.7 kg (173 lb 8 oz), 150.9 % IBW. Not specified  Current BMI (kg/m2): 30.7        Weight Adjustment: No adjustment                 BMI Category: Obese class 1 (BMI 30.0-34. 9)    Estimated Daily Nutrient Needs:  Energy Requirements Based On: Kcal/kg  Weight Used for Energy Requirements: Current  Energy (kcal/day): 8727-9637 (25-30 kcal)  Weight Used for Protein Requirements: Current  Protein (g/day):  (1.2-1.5 g/kg)  Method Used for Fluid Requirements: 1 ml/kcal  Fluid (ml/day): 0958-6165    Nutrition Diagnosis:   Increased nutrient needs related to catabolic illness as evidenced by  (cancer, COVID-19)  Inadequate protein intake related to inadequate protein-energy intake, impaired respiratory function, catabolic illness as evidenced by  (high O2 needs, COVID-19)      Nutrition Interventions:   Food and/or Nutrient Delivery: Continue current diet, Modify oral nutrition supplement (Consider EN/PN)  Nutrition Education/Counseling: No recommendations at this time  Coordination of Nutrition Care: Continue to monitor while inpatient, Interdisciplinary rounds  Plan of Care discussed with: nursing    Goals:     Goals: Meet at least 75% of estimated needs, by next RD assessment       Nutrition Monitoring and Evaluation:   Behavioral-Environmental Outcomes: None identified  Food/Nutrient Intake Outcomes: Food and nutrient intake  Physical Signs/Symptoms Outcomes: Biochemical data, Hemodynamic status, Weight, GI status, Meal time behavior (vs EN/PN)    Discharge Planning:     Too soon to determine    Boubacar Lorenzo MS, RD  Contact: Ext: [phone], or via [employer]

## 2022-10-20 NOTE — PROGRESS NOTES
Pulled Ativan and Morphine for primary RN, as RN was in [facility] room. Wasted Ativan per order.

## 2022-10-20 NOTE — CONSULTS
Palliative Medicine Consult  Lemuel: [phone] ([phone])    Patient Name: Jc Gaming  YOB: 1955    Date of Initial Consult: 10/19/2022  Reason for Consult: Care Decisions  Requesting Provider: Dr. Mauro Hendrickson   Primary Care Physician: Hussain Garcia MD     SUMMARY:   Jc Gaming is a 78 y.o. with metastatic diffuse large B-cell lymphoma recently undergoing da-R-EPOCH chemotherapy and filgrastim injections, COPD, MDD, anxiety, tobacco use who was admitted on 10/10/2022 from home with a diagnosis of COVID-19 infection. Patient has declined significantly in the last 24 hours and is now requiring HFNC with evidence of hypoxia with activity/exertion. She is receiving high dose steroids and antibiotics. CTA performed on 10/17 demonstrated worsening of diffuse effects of COVID-19 pneumonia compared to CTA on 10/11. Current medical issues leading to Palliative Medicine involvement include: Care Decisions. Social:  Patient has two children. Patient does not have an AMD.     PALLIATIVE DIAGNOSES:   Encounter for Palliative Care  Goals of Care discussion  Physical Debility/Generalized weakness  Fatigue  Generalized pain  Anxiety  Shortness of breath     PLAN:   I spoke with bedside RN Raul Akhtar before visiting patient. Patient is more alert this morning and has been able to sit up in bedside chair. Patient is still requiring 50L HFNC. I visited patient this morning. Her daughter, nephew and friend Fermin Niño were present. Patient was able to answer a few more questions today, though I do not think she is able to fully understand the complexity of her situation or able to make complex medical decisions on her own. She has been able to take a few bites of pudding today. She says the morphine and ativan are helping and requests for ativan to be increased to help her anxiety. I shared with her that she is very sick and requiring special oxygen support.  I attempted to talk with her about her feelings regarding BiPAP and intubation, but she was not able to engage in detailed conversation about this. I talked with her about code status, and patient did express that she would not want CPR. I spoke with patient's daughter Sunita separately. We discussed that it is good to see her mom more alert and interactive this morning. Still, she appears very fatigued and is critically ill and at high risk of further decline. She is still on the same level of oxygen as yesterday, and this is a significant amount. Sunita continues to feel that her mom would not want BiPAP or intubation. She doesn't think her mom is likely to say she is tired and wants to stop treatments, at least she is not likely to say those things in front of family or friends. We discussed trying to spend quality time with her today while she is alert, recognizing that she is likely to become more fatigued throughout the day. We discussed that it is possible she may improve, and that if she were to improve it would likely be a slow, gradual process. Discussed taking things one day at a time. Discussed giving it time to see if we are able to wean the HFNC over the next couple days or if she is going to continue to require this same level of oxygen support. Sunita discussed that the morphine seems to be helping her comfort, so we will continue this and add some scheduled ativan. I ordered ativan 0.5 mg IV every 8 hours scheduled based on patient's request and daughter's agreement and desire to help her mom be more comfortable. Daughter asked if more imaging would be done to evaluate her mom's lungs, and I let her know I would share this question with the Family Medicine team.  I communicated with Dr. Valeriy Ford to update her on my conversation with patient's daughter. Thank you for asking our team to participate in the care of Mariely Lucas. I let daughter Sunita know I would check in with her tomorrow before the weekend.      GOALS OF CARE / TREATMENT PREFERENCES: GOALS OF CARE:       TREATMENT PREFERENCES:   Code Status: DNR    Patient and family's personal goals include: daughter does not want her mom to suffer    Important upcoming milestones or family events: n/a    The patient identifies the following as important for living well: patient cannot state/participate d/t medical condition      Advance Care Planning:  [] The [facility] Interdisciplinary Team has updated the ACP Navigator with [address] and Patient Capacity      Primary Decision Maker: Bryon Duncan - Daughter - [phone]  Advance Care Planning 10/3/2022   Patient's Healthcare Decision Maker is: -   Confirm Advance Directive None   Patient Would Like to Complete Advance Directive -   Does the patient have other document types -               Other:    As far as possible, the palliative care team has discussed with patient / health care proxy about goals of care / treatment preferences for patient. HISTORY:     History obtained from: chart, patient, daughter, friend    CHIEF COMPLAINT: better than yesterday    HPI/SUBJECTIVE:    The patient is:   [x] Verbal and participatory though limited by fatigue   [] Non-participatory due to:     10/19: Patient is awake but appears very fatigue. She says she is feeling better than yesterday but does say she is still having some labored breathing. She reports generalized pain that feels like aching/soreness. 10/20: She has been able to take a few bites of pudding today. She says the morphine and ativan are helping and requests for ativan to be increased to help her anxiety.     Clinical Pain Assessment (nonverbal scale for severity on nonverbal patients):   Clinical Pain Assessment  Severity: 3  Location: all over  Character: aching  Duration: soreness  Effect: feels it at rest  Factors: pain medication is helping  Frequency: constant     Activity (Movement): Lying quietly, normal position    Duration: for how long has pt been experiencing pain (e.g., 2 days, 1 month, years)  Frequency: how often pain is an issue (e.g., several times per day, once every few days, constant)     FUNCTIONAL ASSESSMENT:     Palliative Performance Scale (PPS):  PPS: 30       PSYCHOSOCIAL/SPIRITUAL SCREENING:     Palliative IDT has assessed this patient for cultural preferences / practices and a referral made as appropriate to needs (Cultural Services, Patient Advocacy, Ethics, etc.)    Any spiritual / Amish concerns:  [] Yes /  [x] No   If \"Yes\" to discuss with pastoral care during IDT     Does caregiver feel burdened by caring for their loved one:   [] Yes /  [x] No /  [] No Caregiver Present/Available [] No Caregiver [] Pt Lives at [place] [id number]  If \"Yes\" to discuss with social work during IDT    Anticipatory grief assessment:   [x] Normal  / [] Maladaptive     If \"Maladaptive\" to discuss with social work during IDT    ESAS Anxiety: Anxiety: 2    ESAS Depression:          REVIEW OF SYSTEMS:     Positive and pertinent negative findings in ROS are noted above in HPI. The following systems were [x] reviewed / [] unable to be reviewed as noted in HPI  Other findings are noted below. Systems: constitutional, ears/nose/mouth/throat, respiratory, gastrointestinal, genitourinary, musculoskeletal, integumentary, neurologic, psychiatric, endocrine. Positive findings noted below. Modified ESAS Completed by: provider   Fatigue: 2       Pain: 3   Anxiety: 2       Dyspnea: 0           Stool Occurrence(s): 1        PHYSICAL EXAM:     From RN flowsheet:  Wt Readings from Last 3 Encounters:   10/14/22 173 lb 8 oz (78.7 kg)   10/07/22 173 lb 8 oz (78.7 kg)   09/22/22 168 lb 9.6 oz (76.5 kg)     Blood pressure (!) 158/67, pulse 72, temperature 98.2 °F (36.8 °C), resp. rate 25, height 5' 2.99\" (1.6 m), weight 173 lb 8 oz (78.7 kg), SpO2 96 %, not currently breastfeeding.     Pain Scale 1: Numeric (0 - 10)  Pain Intensity 1: 0  Pain Onset 1: chronic  Pain Location 1: Generalized  Pain Orientation 1: Left, Right  Pain Description 1: Aching  Pain Intervention(s) 1: Medication (see MAR)  Last bowel movement, if known:     General: awake, alert, appears frail, fatigued and lethargic, sitting up in bedside chair  Eyes: lids normal, no drainage  Nose: nares normal, no drainage  Mouth: mmm, no drainage  Pulm: rate is normal, respirations are unlabored  Neuro: alert, responds to simple direct questions, but patient is not able to participate in more detailed conversation, able to sit up in bedside chair on her own  Psych: calm without restlessness or agitation     HISTORY:     Active Problems:    Acute respiratory failure with hypoxia ([facility] (9/13/2022)      Lymphoma ([facility] [address] (10/10/2022)      COVID (10/10/2022)    Past Medical History:   Diagnosis Date    Adverse effect of anesthesia     PHLEGM IN THROAT POST OP & NEEDED X2 BREATHING TREATMENTS    Anxiety     COPD (chronic obstructive pulmonary disease) ([facility] [address] 2022    emphysema    Depression     GERD (gastroesophageal reflux disease)     Hernia, femoral     since childhood    Hypertension     Joint pain     Nausea & vomiting     TMJ arthritis       Past Surgical History:   Procedure Laterality Date    HX CERVICAL FUSION  2012    C5-C6    HX CHOLECYSTECTOMY  2020    HX COLONOSCOPY      HX ENDOSCOPY      HX HYSTERECTOMY  2019    HX TONSILLECTOMY      AS A CHILD    HX WISDOM TEETH EXTRACTION      TEENAGER    IR INSERT TUNL CVC W PORT OVER 5 YEARS  4/29/2022      Family History   Problem Relation Age of Onset    Heart Disease Mother 46    Heart Surgery Mother         HEART TRANSPLANT    Elevated Lipids Mother     Hypertension Mother     COPD Father     Emphysema Father     Sudden Death Brother 46    Other Maternal Grandmother         BRAIN TUMOR    No Known Problems Son     No Known Problems Daughter       History reviewed, no pertinent family history.   Social History     Tobacco Use    Smoking status: Some Days     Packs/day: 0.25     Years: 40.00     Pack years: 10.00     Types: Cigarettes    Smokeless tobacco: Never   Substance Use Topics    Alcohol use: Not Currently     Alcohol/week: 0.0 standard drinks     Allergies   Allergen Reactions    Oxycodone Nausea Only      Current Facility-Administered Medications   Medication Dose Route Frequency    ipratropium-albuterol (COMBIVENT RESPIMAT) 20 mcg-100 mcg inhalation spray  1 Puff Inhalation Q6H RT    LORazepam (ATIVAN) injection 0.5 mg  0.5 mg IntraVENous Q8H    cefepime (MAXIPIME) 2 g in 0.9% sodium chloride (MBP/ADV) 100 mL MBP  2 g IntraVENous Q12H    morphine injection 2 mg  2 mg IntraVENous Q4H    morphine injection 2 mg  2 mg IntraVENous Q2H PRN    LORazepam (ATIVAN) injection 0.5 mg  0.5 mg IntraVENous Q2H PRN    vancomycin (VANCOCIN) 1,250 mg in 0.9% sodium chloride 250 mL ([id number])  1,250 mg IntraVENous Q24H    pantoprazole (PROTONIX) tablet 40 mg  40 mg Oral ACB    enoxaparin (LOVENOX) injection 40 mg  40 mg SubCUTAneous Q24H    methylPREDNISolone (PF) (SOLU-MEDROL) injection 60 mg  60 mg IntraVENous Q8H    lidocaine-prilocaine (EMLA) 2.5-2.5 % cream   Topical PRN    loperamide (IMODIUM) capsule 2 mg  2 mg Oral Q4H PRN    L.acidophilus-paracasei-S.thermophil-bifidobacter (RISAQUAD) 8 billion cell capsule  1 Capsule Oral DAILY    sodium chloride (OCEAN) 0.65 % nasal squeeze bottle 2 Spray  2 Spray Both Nostrils Q2H PRN    [Held by provider] lisinopriL (PRINIVIL, ZESTRIL) tablet 20 mg  20 mg Oral DAILY    magic mouthwash (FIRST-MOUTHWASH BLM) oral suspension 5 mL  5 mL Oral Q4H PRN    fluconazole (DIFLUCAN) tablet 100 mg  100 mg Oral DAILY    sodium chloride (NS) flush 5-40 mL  5-40 mL IntraVENous Q8H    sodium chloride (NS) flush 5-40 mL  5-40 mL IntraVENous PRN    acetaminophen (TYLENOL) tablet 650 mg  650 mg Oral Q6H PRN    Or    acetaminophen (TYLENOL) suppository 650 mg  650 mg Rectal Q6H PRN    ondansetron (ZOFRAN) injection 4 mg  4 mg IntraVENous Q6H PRN    [Held by provider] amLODIPine (NORVASC) tablet 5 mg  5 mg Oral DAILY    FLUoxetine (PROzac) capsule 40 mg  40 mg Oral DAILY    ondansetron (ZOFRAN ODT) tablet 8 mg  8 mg Oral Q8H PRN    prochlorperazine (COMPAZINE) tablet 10 mg  10 mg Oral Q6H PRN    trimethoprim-sulfamethoxazole (BACTRIM DS, SEPTRA DS) 160-800 mg per tablet 1 Tablet  1 Tablet Oral Q MON, WED & FRI    mometasone-formoterol (DULERA) 200mcg-5mcg/puff  2 Puff Inhalation BID RT    nicotine (NICODERM CQ) 21 mg/24 hr patch 1 Patch  1 Patch TransDERmal DAILY          LAB AND IMAGING FINDINGS:     Lab Results   Component Value Date/Time    WBC 6.2 10/20/2022 04:22 AM    HGB 8.2 (L) 10/20/2022 04:22 AM    PLATELET 96 (L) 21/66/7751 04:22 AM     Lab Results   Component Value Date/Time    Sodium 141 10/20/2022 04:22 AM    Potassium 3.5 10/20/2022 04:22 AM    Chloride 108 10/20/2022 04:22 AM    CO2 25 10/20/2022 04:22 AM    BUN 22 (H) 10/20/2022 04:22 AM    Creatinine 1.08 (H) 10/20/2022 04:22 AM    Calcium 8.4 (L) 10/20/2022 04:22 AM    Phosphorus 4.0 06/06/2022 06:59 AM      Lab Results   Component Value Date/Time    Alk. phosphatase 91 10/20/2022 04:22 AM    Protein, total 4.5 (L) 10/20/2022 04:22 AM    Albumin 2.1 (L) 10/20/2022 04:22 AM    Globulin 2.4 10/20/2022 04:22 AM     No results found for: INR, PTMR, PTP, PT1, PT2, APTT, INREXT, INREXT   Lab Results   Component Value Date/Time    Ferritin 1,303 (H) 10/20/2022 04:22 AM      No results found for: PH, PCO2, PO2  No components found for: GLPOC   No results found for: CPK, CKMB             Total time: 40 mins  Counseling / coordination time, spent as noted above: 25 mins  > 50% counseling / coordination?: yes    Prolonged service was provided for  []30 min   []75 min in face to face time in the presence of the patient, spent as noted above. Time Start:   Time End:   Note: this can only be billed with 08829 (initial) or 09684 (follow up). If multiple start / stop times, list each separately.

## 2022-10-21 PROBLEM — J12.82 PNEUMONIA DUE TO COVID-19 VIRUS: Status: ACTIVE | Noted: 2022-01-01

## 2022-10-21 PROBLEM — J96.01 ACUTE HYPOXEMIC RESPIRATORY FAILURE (HCC): Status: ACTIVE | Noted: 2022-01-01

## 2022-10-21 PROBLEM — U07.1 PNEUMONIA DUE TO COVID-19 VIRUS: Status: ACTIVE | Noted: 2022-01-01

## 2022-10-21 NOTE — PROGRESS NOTES
Problem: Falls - Risk of  Goal: *Absence of Falls  Description: Document Neel Partida Fall Risk and appropriate interventions in the flowsheet. Outcome: Progressing Towards Goal  Note: Fall Risk Interventions:  Mobility Interventions: Bed/chair exit alarm, Patient to call before getting OOB         Medication Interventions: Bed/chair exit alarm, Patient to call before getting OOB    Elimination Interventions: Bed/chair exit alarm, Call light in reach    History of Falls Interventions: Bed/chair exit alarm         Problem: Patient Education: Go to Patient Education Activity  Goal: Patient/Family Education  Outcome: Progressing Towards Goal     Problem: Risk for Spread of Infection  Goal: Prevent transmission of infectious organism to others  Description: Prevent the transmission of infectious organisms to other patients, staff members, and visitors. Outcome: Progressing Towards Goal     Problem: Patient Education:  Go to Education Activity  Goal: Patient/Family Education  Outcome: Progressing Towards Goal     Problem: Pain  Goal: *Control of Pain  Outcome: Progressing Towards Goal  Goal: *PALLIATIVE CARE:  Alleviation of Pain  Outcome: Progressing Towards Goal     Problem: Patient Education: Go to Patient Education Activity  Goal: Patient/Family Education  Outcome: Progressing Towards Goal     Problem: Airway Clearance - Ineffective  Goal: Achieve or maintain patent airway  Outcome: Progressing Towards Goal     Problem: Gas Exchange - Impaired  Goal: Absence of hypoxia  Outcome: Progressing Towards Goal  Goal: Promote optimal lung function  Outcome: Progressing Towards Goal     Problem: Breathing Pattern - Ineffective  Goal: Ability to achieve and maintain a regular respiratory rate  Outcome: Progressing Towards Goal     Problem:  Body Temperature -  Risk of, Imbalanced  Goal: Ability to maintain a body temperature within defined limits  Outcome: Progressing Towards Goal  Goal: Will regain or maintain usual level of consciousness  Outcome: Progressing Towards Goal  Goal: Complications related to the disease process, condition or treatment will be avoided or minimized  Outcome: Progressing Towards Goal     Problem: Isolation Precautions - Risk of Spread of Infection  Goal: Prevent transmission of infectious organism to others  Outcome: Progressing Towards Goal     Problem: Nutrition Deficits  Goal: Optimize nutrtional status  Outcome: Progressing Towards Goal     Problem: Risk for Fluid Volume Deficit  Goal: Maintain normal heart rhythm  Outcome: Progressing Towards Goal  Goal: Maintain absence of muscle cramping  Outcome: Progressing Towards Goal  Goal: Maintain normal serum potassium, sodium, calcium, phosphorus, and pH  Outcome: Progressing Towards Goal     Problem: Loneliness or Risk for Loneliness  Goal: Demonstrate positive use of time alone when socialization is not possible  Outcome: Progressing Towards Goal     Problem: Fatigue  Goal: Verbalize increase energy and improved vitality  Outcome: Progressing Towards Goal     Problem: Patient Education: Go to Patient Education Activity  Goal: Patient/Family Education  Outcome: Progressing Towards Goal     Problem: Nutrition Deficit  Goal: *Optimize nutritional status  Outcome: Progressing Towards Goal     Problem: Pressure Injury - Risk of  Goal: *Prevention of pressure injury  Description: Document Mian Scale and appropriate interventions in the flowsheet.   Outcome: Progressing Towards Goal     Problem: Patient Education: Go to Patient Education Activity  Goal: Patient/Family Education  Outcome: Progressing Towards Goal

## 2022-10-21 NOTE — PROGRESS NOTES
Transition of care note:    RUR 26%  LOS 10 days,GLOS 5.4    I am continuing to follow pt for discharge needs. Medications are being adjusted. Palliative continues to follow pt.     Carlie Machado

## 2022-10-21 NOTE — PROGRESS NOTES
0700 Bedside and Verbal shift change report given to Nnamdi Estrada RN (oncoming nurse) by Cindi Arenas RN (offgoing nurse). Report included the following information SBAR, Kardex, ED Summary, Intake/Output, MAR, Recent Results, and Cardiac Rhythm sr . Primary Nurse Siobhan Wilde RN and Cindi Arenas RN performed a dual skin assessment on this patient No impairment noted  Mian score is 18.  0800 Pt assessed. A&Ox4. Generalized weakness. Breath sounds diminished throughout A&P. Shallow resp RR 20s. Sats >90%. Nonproductive congested cough. DIALLO. Sats while awake >95%, while sleeping 90-94%. High flow 75%, 55L. SB while sleeping. SR while awake. BP stable. No edema, CP. Abd soft, round. BS active x4. No N&V, abd pain. Poor appetite. Voiding without difficulty. BPPP. See assessment. Dgt spent the night, at bedside. Tearful due to the night pt had due to confusion. Pulling off 02. Desats in 76s. Recovers quickly with 02 back on. Going home for awhile. Will return. Kevin Angel at beside, assessed pt. Updated dgt regarding condition and plan of care. 0900 Pt oob to recliner with 1 assist. Sats maintained >90%. DIALLO. Performed deep breathing through nose and recovered quickly at rest. Pt frequently requesting Morphine and Ativan. Asked pt the reason why she is requesting meds frequently. Stated \"I can't breath. \". Discussed with pt her condition, plan of care. Asked pt does she want to fight to live. Stated \"of course I do\". Explained interventions such as IS, TCDB, OOB, nurtrition, breathing techniques, medication, rationales for each. Verbalized understanding. Taking pills without difficulty. Wrote on whiteboard pain med and antianxiety med schedule and next due. Pt verbalized understanding. Needs positive reinforcement and attitude, therapeutic comminication. High fived pt. Participating in care. Ordered PT and OT.   0930 Drank 240ml of Ensure. 1000 Pt remains in recliner. Calm, cooperative. Weaned High fow to 70%, 40L.  Sat 90-92%. RR 20s. 1145 To [facility] to void, medium loose brown BM. Back to chair. 1150 Sats >90%. Decreased High flow 70%/30L. Sat 90%. 1200 Pt reassessed. Assessment unchanged. Remains in chair. Friend at bedside visiting. Encouraged family and friend to bring in any food pt would like to encourage more calories, food intake. 1300 1/2 Ensure intake. Does not like the taste of Ensure pudding. Dr. Opal Padilla at bedside. Assesssed pt. Updated pt regarding cancer, plan of care. 1 Dr. Alexis Shukla at bedside, discussed plan of care for comfort and goal to control symptoms with oversedation. New orders. 1400 Pt c/o anxiety, requesting Morphine and Ativan IV.   1424 To BS, voided, continent of loose brown medium BM. Back to bed for a nap. Medicated with Morphine and Ativan IV for air hunger and anxiety. 1600 Pt reassessed. Had a coughing spell, High flow off, Sat 76%, RR high 20s-low 30s. Reapplied High flow 70%/45L. Recovered quickly. Sat 90%. Dr. Khai Pinon at bedside. Had a discussion about disease, prognosis, long recovery, quality of life, pain, anxiety management, possible rehab. Pt verbalized understanding and expressed that she wants to continue with treatment plan. Pt states she is too exhausted to get OOB. Placed pt in chair position in bed.   1630 Pt's friend at bedside. Provided food from [employer]. 1725 Sat 94%. Decreased High flow to 70%/30L. Sat 90%. No resp distress noted. Medicated with Morphine IR and Xanax po scheduled. Pt took in bites of Meatloaf, baked beans, potatoes. 1/2 of tea. 601 [facility] to [facility] with 1 assist. Voided, medium loose brown BM. DIALLO. Sat 84%. RR high 20s. Back to bed in chair position. Recovers quickly. At rest 88-90%. RR 20s. 1810 Sats 87%. Increased High flow to 70%/40L.   1815 Sat 88-91%. 1837 Pt requested Immodium po for diarrhea. Given. 1900 Bedside and Verbal shift change report given to Meaghan Perera RN (oncoming nurse) by Lon Cooper RN (offgoing nurse).  Report included the following information SBAR, Kardex, ED Summary, Intake/Output, MAR, Recent Results, and Cardiac Rhythm sr .

## 2022-10-21 NOTE — PROGRESS NOTES
Palliative Medicine Consult  Lemuel: [phone])    Patient Name: Felicia Donato  YOB: 1955    Date of Initial Consult: 10/19/2022  Reason for Consult: Care Decisions  Requesting Provider: Dr. Sherie Farr   Primary Care Physician: Bryson Gar MD     SUMMARY:   Felicia Donato is a 78 y.o. with metastatic diffuse large B-cell lymphoma recently undergoing da-[id number] chemotherapy and filgrastim injections, COPD, MDD, anxiety, tobacco use who was admitted on 10/10/2022 from home with a diagnosis of COVID-19 infection. Patient has declined significantly in the last 24 hours and is now requiring HFNC with evidence of hypoxia with activity/exertion. She is receiving high dose steroids and antibiotics. CTA performed on 10/17 demonstrated worsening of diffuse effects of COVID-19 pneumonia compared to CTA on 10/11. Current medical issues leading to Palliative Medicine involvement include: Care Decisions. Social:  Patient has two children. Patient does not have an AMD.     PALLIATIVE DIAGNOSES:   Encounter for Palliative Care  Goals of Care discussion  Physical Debility/Generalized weakness  Fatigue  Generalized pain  Anxiety  Shortness of breath     PLAN:   I spoke with bedside RN Ascencion Gordillo this morning. She expressed concern that patient's opioid and benzo may have contributed to delirium overnight, though patient has been requesting the medications because she says they help her shortness of breath. Discussed that we will continue to work to find a good balance of her medications for comfort/palliation of symptoms and that I will transition her from IV to oral scheduled palliative medications today. I communicated with Dr. Maria Teresa Casarez regarding the changes I made to patient's palliative medications. Discontinue scheduled IV morphine and ativan. Scheduled xanax 0.25 mg twice daily. Patient takes xanax at home. Scheduled morphine 5 mg SL every 6 hours. Patient takes morphine at home.   I reduced the prn frequency of morphine and ativan to every 3 hours prn. We will monitor patient's response to these changes and can reassess later to make further changes as needed. I spoke with bedside RN [facility] before visiting patient. Patient is more alert this morning and has been able to sit up in bedside chair. Patient has been able to have her oxygen weaned to [address]. Nursing has been encouraging patient to eat/drink as well. I met with patient this afternoon. Patient is awake and sitting in bedside chair. She states she is feeling a little better today. When I brought up the medication changes I made today, she did tell me that she liked having the previous IV medications as she found them helpful. We discussed that we have to work to find the best balance of medications that helps her symptoms without making her too drowsy. I recommended that we monitor how she does today with the oral medications (and reminded her prn meds are available) and consider further adjustments tomorrow if needed. She was agreeable to this. We discussed that she has been able to have her oxygen support decreased a little today. Discussed that any further improvements will likely take time and be a gradual process. She told me I could call her daughter to discuss her care. I spoke with patient's daughter Alejo Denise this afternoon. Discussed the above information with her. She was appreciative of the call. I let her know our team would return on Monday and plan to visit her mom and check in with her. Thank you for asking our team to participate in the care of Shoshana Burton. Our team will return on Monday and will plan to follow up with patient. Please call [phone] to reach the on-call physician.      GOALS OF CARE / TREATMENT PREFERENCES:     GOALS OF CARE:       TREATMENT PREFERENCES:   Code Status: DNR    Patient and family's personal goals include: daughter does not want her mom to suffer    Important upcoming milestones or family events: n/a    The patient identifies the following as important for living well: patient cannot state/participate d/t medical condition      Advance Care Planning:  [] The [place] [id number] Team has updated the ACP Navigator with [address] and Patient Capacity      Primary Decision Maker: Wiley Forbes - Daughter - [phone]  Advance Care Planning 10/3/2022   Patient's Healthcare Decision Maker is: -   Confirm Advance Directive None   Patient Would Like to Complete Advance Directive -   Does the patient have other document types -               Other:    As far as possible, the palliative care team has discussed with patient / health care proxy about goals of care / treatment preferences for patient. HISTORY:     History obtained from: chart, patient, daughter, friend    CHIEF COMPLAINT: better than yesterday    HPI/SUBJECTIVE:    The patient is:   [x] Verbal and participatory though limited by fatigue   [] Non-participatory due to:     10/19: Patient is awake but appears very fatigue. She says she is feeling better than yesterday but does say she is still having some labored breathing. She reports generalized pain that feels like aching/soreness. 10/20: She has been able to take a few bites of pudding today. She says the morphine and ativan are helping and requests for ativan to be increased to help her anxiety. 10/21: Patient is awake and sitting in bedside chair. She states she is feeling a little better today.     Clinical Pain Assessment (nonverbal scale for severity on nonverbal patients):   Clinical Pain Assessment  Severity: 2  Location: all over  Character: aching  Duration: days  Effect: soreness  Factors: pain medications helps  Frequency: constant     Activity (Movement): Lying quietly, normal position    Duration: for how long has pt been experiencing pain (e.g., 2 days, 1 month, years)  Frequency: how often pain is an issue (e.g., several times per day, once every few days, constant) FUNCTIONAL ASSESSMENT:     Palliative Performance Scale (PPS):  PPS: 40       PSYCHOSOCIAL/SPIRITUAL SCREENING:     Palliative IDT has assessed this patient for cultural preferences / practices and a referral made as appropriate to needs (Cultural Services, Patient Advocacy, Ethics, etc.)    Any spiritual / Islam concerns:  [] Yes /  [x] No   If \"Yes\" to discuss with pastoral care during IDT     Does caregiver feel burdened by caring for their loved one:   [] Yes /  [x] No /  [] No Caregiver Present/Available [] No Caregiver [] Pt Lives at Facility  If \"Yes\" to discuss with social work during IDT    Anticipatory grief assessment:   [x] Normal  / [] Maladaptive     If \"Maladaptive\" to discuss with social work during IDT    ESAS Anxiety: Anxiety: 2    ESAS Depression:          REVIEW OF SYSTEMS:     Positive and pertinent negative findings in ROS are noted above in HPI. The following systems were [x] reviewed / [] unable to be reviewed as noted in HPI  Other findings are noted below. Systems: constitutional, ears/nose/mouth/throat, respiratory, gastrointestinal, genitourinary, musculoskeletal, integumentary, neurologic, psychiatric, endocrine. Positive findings noted below. Modified ESAS Completed by: provider   Fatigue: 2       Pain: 2   Anxiety: 2       Dyspnea: 4           Stool Occurrence(s): 1        PHYSICAL EXAM:     From RN flowsheet:  Wt Readings from Last 3 Encounters:   10/14/22 173 lb 8 oz (78.7 kg)   10/07/22 173 lb 8 oz (78.7 kg)   09/22/22 168 lb 9.6 oz (76.5 kg)     Blood pressure 118/64, pulse 65, temperature 97.5 °F (36.4 °C), resp. rate 23, height 5' 2.99\" (1.6 m), weight 173 lb 8 oz (78.7 kg), SpO2 94 %, not currently breastfeeding.     Pain Scale 1: Numeric (0 - 10)  Pain Intensity 1: 0  Pain Onset 1: acute  Pain Location 1: Chest (lungs)  Pain Orientation 1: Left, Right  Pain Description 1: Aching  Pain Intervention(s) 1: MD notified (comment), Repositioned  Last bowel movement, if known: General: awake, alert, appears frail, fatigued but able to respond to questions, sitting up in bedside chair  Eyes: lids normal, no drainage  Nose: nares normal, no drainage  Mouth: mmm, no drainage  Pulm: rate 20s-30s, respirations appear labored, she is able to speak in sentences  Neuro: alert, responds to simple questions, able to sit up in bedside chair on her own  Psych: calm without restlessness or agitation     HISTORY:     Active Problems:    Acute respiratory failure with hypoxia ([facility] (9/13/2022)      Lymphoma ([facility] (10/10/2022)      COVID (10/10/2022)    Past Medical History:   Diagnosis Date    Adverse effect of anesthesia     PHLEGM IN THROAT POST OP & NEEDED X2 BREATHING TREATMENTS    Anxiety     COPD (chronic obstructive pulmonary disease) ([facility] 2022    emphysema    Depression     GERD (gastroesophageal reflux disease)     Hernia, femoral     since childhood    Hypertension     Joint pain     Nausea & vomiting     TMJ arthritis       Past Surgical History:   Procedure Laterality Date    HX CERVICAL FUSION  2012    C5-C6    HX CHOLECYSTECTOMY  2020    HX COLONOSCOPY      HX ENDOSCOPY      HX HYSTERECTOMY  2019    HX TONSILLECTOMY      AS A CHILD    HX WISDOM TEETH EXTRACTION      TEENAGER    IR INSERT TUNL CVC W PORT OVER 5 YEARS  4/29/2022      Family History   Problem Relation Age of Onset    Heart Disease Mother 46    Heart Surgery Mother         HEART TRANSPLANT    Elevated Lipids Mother     Hypertension Mother     COPD Father     Emphysema Father     Sudden Death Brother 46    Other Maternal Grandmother         BRAIN TUMOR    No Known Problems Son     No Known Problems Daughter       History reviewed, no pertinent family history.   Social History     Tobacco Use    Smoking status: Some Days     Packs/day: 0.25     Years: 40.00     Pack years: 10.00     Types: Cigarettes    Smokeless tobacco: Never   Substance Use Topics    Alcohol use: Not Currently     Alcohol/week: 0.0 standard drinks Allergies   Allergen Reactions    Oxycodone Nausea Only      Current Facility-Administered Medications   Medication Dose Route Frequency    ALPRAZolam (XANAX) tablet 0.25 mg  0.25 mg Oral BID    LORazepam (ATIVAN) injection 0.5 mg  0.5 mg IntraVENous Q3H PRN    morphine injection 2 mg  2 mg IntraVENous Q3H PRN    morphine (ROXANOL) 100 mg/5 mL (20 mg/mL) concentrated solution 5 mg  5 mg Oral Q6H    ipratropium-albuterol (COMBIVENT RESPIMAT) 20 mcg-100 mcg inhalation spray  1 Puff Inhalation Q6H RT    cefepime (MAXIPIME) 2 g in 0.9% sodium chloride (MBP/ADV) 100 mL MBP  2 g IntraVENous Q12H    vancomycin (VANCOCIN) 1,250 mg in 0.9% sodium chloride 250 mL ([id number])  1,250 mg IntraVENous Q24H    pantoprazole (PROTONIX) tablet 40 mg  40 mg Oral ACB    enoxaparin (LOVENOX) injection 40 mg  40 mg SubCUTAneous Q24H    methylPREDNISolone (PF) (SOLU-MEDROL) injection 60 mg  60 mg IntraVENous Q8H    lidocaine-prilocaine (EMLA) 2.5-2.5 % cream   Topical PRN    loperamide (IMODIUM) capsule 2 mg  2 mg Oral Q4H PRN    L.acidophilus-paracasei-S.thermophil-bifidobacter (RISAQUAD) 8 billion cell capsule  1 Capsule Oral DAILY    sodium chloride (OCEAN) 0.65 % nasal squeeze bottle 2 Spray  2 Spray Both Nostrils Q2H PRN    [Held by provider] lisinopriL (PRINIVIL, ZESTRIL) tablet 20 mg  20 mg Oral DAILY    magic mouthwash (FIRST-MOUTHWASH BLM) oral suspension 5 mL  5 mL Oral Q4H PRN    fluconazole (DIFLUCAN) tablet 100 mg  100 mg Oral DAILY    sodium chloride (NS) flush 5-40 mL  5-40 mL IntraVENous Q8H    sodium chloride (NS) flush 5-40 mL  5-40 mL IntraVENous PRN    acetaminophen (TYLENOL) tablet 650 mg  650 mg Oral Q6H PRN    Or    acetaminophen (TYLENOL) suppository 650 mg  650 mg Rectal Q6H PRN    ondansetron (ZOFRAN) injection 4 mg  4 mg IntraVENous Q6H PRN    [Held by provider] amLODIPine (NORVASC) tablet 5 mg  5 mg Oral DAILY    FLUoxetine (PROzac) capsule 40 mg  40 mg Oral DAILY    ondansetron (ZOFRAN ODT) tablet 8 mg  8 mg Oral Q8H PRN    prochlorperazine (COMPAZINE) tablet 10 mg  10 mg Oral Q6H PRN    trimethoprim-sulfamethoxazole (BACTRIM DS, SEPTRA DS) 160-800 mg per tablet 1 Tablet  1 Tablet Oral Q MON, WED & FRI    mometasone-formoterol (DULERA) 200mcg-5mcg/puff  2 Puff Inhalation BID RT    nicotine (NICODERM CQ) 21 mg/24 hr patch 1 Patch  1 Patch TransDERmal DAILY          LAB AND IMAGING FINDINGS:     Lab Results   Component Value Date/Time    WBC 6.3 10/21/2022 05:21 AM    HGB 8.1 (L) 10/21/2022 05:21 AM    PLATELET 732 (L) 96/41/6598 05:21 AM     Lab Results   Component Value Date/Time    Sodium 139 10/21/2022 05:21 AM    Potassium 3.8 10/21/2022 05:21 AM    Chloride 106 10/21/2022 05:21 AM    CO2 26 10/21/2022 05:21 AM    BUN 24 (H) 10/21/2022 05:21 AM    Creatinine 1.16 (H) 10/21/2022 05:21 AM    Calcium 8.7 10/21/2022 05:21 AM    Phosphorus 4.0 06/06/2022 06:59 AM      Lab Results   Component Value Date/Time    Alk. phosphatase 97 10/21/2022 05:21 AM    Protein, total 4.4 (L) 10/21/2022 05:21 AM    Albumin 2.3 (L) 10/21/2022 05:21 AM    Globulin 2.1 10/21/2022 05:21 AM     No results found for: INR, PTMR, PTP, PT1, PT2, APTT, INREXT, INREXT   Lab Results   Component Value Date/Time    Ferritin 1,460 (H) 10/21/2022 05:21 AM      Lab Results   Component Value Date/Time    pH 7.48 (H) 10/21/2022 03:26 AM    PCO2 30 (L) 10/21/2022 03:26 AM    PO2 49 (LL) 10/21/2022 03:26 AM     No components found for: GLPOC   No results found for: CPK, CKMB             Total time: 40 mins  Counseling / coordination time, spent as noted above: 35 mins  > 50% counseling / coordination?: yes    Prolonged service was provided for  []30 min   []75 min in face to face time in the presence of the patient, spent as noted above. Time Start:   Time End:   Note: this can only be billed with 56498 (initial) or 04579 (follow up). If multiple start / stop times, list each separately.

## 2022-10-21 NOTE — PROGRESS NOTES
[facility] [address] [place] [address]   Office [phone]  Fax [phone]          Subjective / Objective     24 Hour Events: No acute events. Patient c/o pain overnight, nursing held Morphine d/t bradycardia and concern for resp decline. Subjective: Sleeping in bed. On 55L high flow NC. Tachypneic. Temp (24hrs), Av °F (36.7 °C), Min:97.8 °F (36.6 °C), Max:98.2 °F (36.8 °C)     Physical Exam  Constitutional:       Comments: sleeping   Pulmonary:      Comments: Increased work of breathing. Mild tachypnea. 55L high flow saturations in the 90s. Skin:     General: Skin is warm and dry. Respiratory: O2 Flow Rate (L/min): 55 l/min O2 Device: Hi flow nasal cannula     I/O:  Date 10/20/22 0700 - 10/21/22 0659 10/21/22 0700 - 10/22/22 0659   Shift 9246-0404 9875-7094 24 Hour Total 0197-6970 3329-6511 24 Hour Total   INTAKE   P.O. 840  840        P. O. 840  840      I. V.(mL/kg/hr) 350(0.4) 100(0.1) 450(0.2)        Volume (vancomycin (VANCOCIN) 1,250 mg in 0.9% sodium chloride 250 mL ([id number])) 250  250        Volume (cefepime (MAXIPIME) 2 g in 0.9% sodium chloride (MBP/ADV) 100 mL MBP) 100 100 200      Shift Total(mL/kg) 1190(15.1) 100(1.3) 1290(16.4)      OUTPUT   Urine(mL/kg/hr) 100(0.1) 200(0.2) 300(0.2)        Urine Voided 100 200 300        Urine Occurrence(s) 1 x  1 x      Stool           Stool Occurrence(s) 1 x  1 x      Shift Total(mL/kg) 100(1.3) 200(2.5) 300(3.8)      NET 1090 -100 990      Weight (kg) 78.7 78.7 78.7 78.7 78.7 78.7       Inpatient Medications  Current Facility-Administered Medications   Medication Dose Route Frequency    ipratropium-albuterol (COMBIVENT RESPIMAT) 20 mcg-100 mcg inhalation spray  1 Puff Inhalation Q6H RT    LORazepam (ATIVAN) injection 0.5 mg  0.5 mg IntraVENous Q8H    cefepime (MAXIPIME) 2 g in 0.9% sodium chloride (MBP/ADV) 100 mL MBP  2 g IntraVENous Q12H    morphine injection 2 mg  2 mg IntraVENous Q4H    morphine injection 2 mg  2 mg IntraVENous Q2H PRN    LORazepam (ATIVAN) injection 0.5 mg  0.5 mg IntraVENous Q2H PRN    vancomycin (VANCOCIN) 1,250 mg in 0.9% sodium chloride 250 mL ([id number])  1,250 mg IntraVENous Q24H    pantoprazole (PROTONIX) tablet 40 mg  40 mg Oral ACB    enoxaparin (LOVENOX) injection 40 mg  40 mg SubCUTAneous Q24H    methylPREDNISolone (PF) (SOLU-MEDROL) injection 60 mg  60 mg IntraVENous Q8H    lidocaine-prilocaine (EMLA) 2.5-2.5 % cream   Topical PRN    loperamide (IMODIUM) capsule 2 mg  2 mg Oral Q4H PRN    L.acidophilus-paracasei-S.thermophil-bifidobacter (RISAQUAD) 8 billion cell capsule  1 Capsule Oral DAILY    sodium chloride (OCEAN) 0.65 % nasal squeeze bottle 2 Spray  2 Spray Both Nostrils Q2H PRN    [Held by provider] lisinopriL (PRINIVIL, ZESTRIL) tablet 20 mg  20 mg Oral DAILY    magic mouthwash (FIRST-MOUTHWASH BLM) oral suspension 5 mL  5 mL Oral Q4H PRN    fluconazole (DIFLUCAN) tablet 100 mg  100 mg Oral DAILY    sodium chloride (NS) flush 5-40 mL  5-40 mL IntraVENous Q8H    sodium chloride (NS) flush 5-40 mL  5-40 mL IntraVENous PRN    acetaminophen (TYLENOL) tablet 650 mg  650 mg Oral Q6H PRN    Or    acetaminophen (TYLENOL) suppository 650 mg  650 mg Rectal Q6H PRN    ondansetron (ZOFRAN) injection 4 mg  4 mg IntraVENous Q6H PRN    [Held by provider] amLODIPine (NORVASC) tablet 5 mg  5 mg Oral DAILY    FLUoxetine (PROzac) capsule 40 mg  40 mg Oral DAILY    ondansetron (ZOFRAN ODT) tablet 8 mg  8 mg Oral Q8H PRN    prochlorperazine (COMPAZINE) tablet 10 mg  10 mg Oral Q6H PRN    trimethoprim-sulfamethoxazole (BACTRIM DS, SEPTRA DS) 160-800 mg per tablet 1 Tablet  1 Tablet Oral Q MON, WED & FRI    mometasone-formoterol (DULERA) 200mcg-5mcg/puff  2 Puff Inhalation BID RT    nicotine (NICODERM CQ) 21 mg/24 hr patch 1 Patch  1 Patch TransDERmal DAILY         Allergies  Allergies   Allergen Reactions    Oxycodone Nausea Only         CBC:  Recent Labs     10/21/22  0521 10/20/22  0422 10/19/22  0229   WBC 6.3 6.2 8.5 HGB 8.1* 8.2* 8.3*   HCT 23.9* 25.2* 25.8*   * 96* 92*       Metabolic Panel:  Recent Labs     10/21/22  0521 10/20/22  0422 10/19/22  0229    141 140   K 3.8 3.5 3.5    108 108   CO2 26 25 25   BUN 24* 22* 24*   CREA 1.16* 1.08* 1.15*   * 135* 134*   CA 8.7 8.4* 8.6   ALB 2.3* 2.1* 2.3*   * 141* 128*            Assessment and Plan        Jessica Shah is a 78 y.o. female with PMH of diffuse large B-cell lymphoma w/ bone mets, COPD, tobacco dependence, insomnia, MDD, and anxiety who was admitted for filgrastim injections and COVID-19 infection. AHRF in setting of COVID-19 with superimposed bacterial PNA On 55L HFNC. CXR 10/17 increasing bilateral pulmonary infiltrates. CTA 10/17 no PE but severe COVID PNA and trace b/l pleural effusions. LA and procal negative. Inflammatory markers elevated but downtrending CRP and D-dimer. BNP 2,422.   - vanc and cefepime (10/14-10/21)  - Pulm following; Solumedrol 60mg q8hrs. Barcitinib contraindicated given immunocompromised. Wean O2 as tolerated for sat > 90%  - Palliative on consult; IV morphine 2mg q4hrs, IV Ativan 0.5mg q2hrs prn. Plan to meet with patient today to clarify goals of care. COPD not on home O2.  - Combivent respimat 20mcg-100mcg q4hrs  - Dulera 200mcg-5mcg 2 puffs BID     DLBCL with pancytopenia Follows with Dr. Elbert Fontenot. Completed C5 chemo on 10/7  - s/p 1 wk daily Filgrastim injections  - Zofran and compazine q6-8 hrs prn nausea  - heme/onc consulted, appreciate recs \"premature to consider comfort care at this time. Her lymphoma is likely in remission and there are no plans for further chemotherapy at this time. The COVID PNA, while severe, may still improve. Consider NG tube placement and if needed, could consider TPN\"     Thrush/Mouth Pain - resolved   - Continue Diflucan 100 mg daily  - Magic Mouthwash as needed before meals for discomfort     Pancytopenia Likely 2/2 chemotherapy.   - Continuing AC with lovenox 40mg daily  - Hold any AC with plt <50  - Transfuse if plt below 10  - Daily CBC  - Oncology consulted, appreciate rec's      Diarrhea  - Imodium 2mg BID  - C. Diff negative on 10/12     Elevated Cr Likely 2/2 poor PO intake. Stable  - Encouraged PO fluid consumption  - Monitor daily BMP     Hypertension POA BP was 92/66.   - Continue home Amplodipine-benazepril 5-20 daily      Hypokalemia POA K 2.9. Improved  - Replete PRN  - Follow on daily BMP     MDD/RENETTA Chronic, stable. - continue Xanax 1mg bid  - continue Prozac 40mg daily     Tobacco dependence About 40 pack years. Currently smokes about 5 cigarettes per day. - Nicotine patch 21 daily    FEN/GI - Regular diet. With supplementation. Activity - Ambulate as tolerated  DVT prophylaxis - Lovenox  GI prophylaxis - Not indicated at this time  Disposition - Plan to d/c to TBD.   Code Status - DNR    I appreciate the opportunity to participate in the care of this patient,  Alejandra Schlatter, MD  [facility] Medicine Resident       For Billing    Chief Complaint   Patient presents with    Medication Problem       Hospital Problems  Date Reviewed: 9/23/2022            Codes Class Noted POA    Lymphoma ([facility] ICD-10-CM: C85.90  ICD-9-CM: 202.80  10/10/2022 Unknown        COVID ICD-10-CM: U07.1  ICD-9-CM: 079.89  10/10/2022 Unknown        Acute respiratory failure with hypoxia ([facility] ICD-10-CM: J96.01  ICD-9-CM: 518.81  9/13/2022 Unknown

## 2022-10-21 NOTE — PROGRESS NOTES
Spoke with the patient at length at the bedside about prognosis. She understands that if she is to recover that her course would be prolonged and her quality of life would inevitably be less than prior to Rand. She expressed understanding and asked appropriate questions. She is not ready to move to comfort measures at this point. She tells me that she has been taking morphine 15mg about every 4-6 hours at home prior to hospitalization. She cannot tolerate the liquid morphine and was having nausea/vomiting with the liquid form. The PO form is preferable to multiple IV doses and will change it to morphine 15mg PO in pill form q8 PRN. She has been taking xanax 1mg about 2x a day at home, but will leave this dose at the 0.25 as we make further adjusments. Discussed with her that we need to balance appropriately treating her symptoms/preventing withdrawal and causing her to be overly sedated. Rich Brown RN at bedside and assisting with goals of care conversation.

## 2022-10-21 NOTE — PROGRESS NOTES
[facility]     Name: Delaney Mims MRN: [id number]   : 1955 Hospital: [address]   Date: 10/21/2022        Impression Plan   Acute hypoxic respiratory failure  COVID 19 PNA with questionable superimposed bacterial PNA  Lymphoma s/p recent R-CHOP  Pancytopenia  Hx of tobacco abuse               Wean O2 to keep sats above 90%; on HF 55L 75%  Continue high dose IV Solu-Medrol  Combivent and Dulera  Continue Vanc/Cefepime to complete a 7 day course  Follow d-dimer and CRP  Barcitinib contraindicated since pt is immunocompromised and with possible bacterial infection. Nivestym per hematology - stopped due to increasing WBC  OOB into chair  Holding anticoagulation  Will have a discussion with Ms. Darron Aguilar this afternoon. If she wishes to continue with current level of care, we need to consider alternative source of nutrition (would suggest TPN as I do not think she would tolerate an NG tube and is at high risk of it being dislodged and subsequent aspiration) as well as adjusting her regimen of sedating meds     CC time: 45 minutes           Radiology  (personally reviewed) CT chest reviewed: bilateral peripheral infiltrates, no PE    10/17 chest CTA: Heterogeneous bilateral airspace opacities worsened since 2022 consistent with diffuse, severe COVID 19 pneumonia. . Trace bilateral pleural effusions. No evidence of PE. Subjective     Cc: shortness of breath    79 yo with PMHx of lymphoma presenting with increasing SOB for the last 3 days. Was admitted last week for R-CHOP chemotherapy. Her son was COVID 19 positive last week. Pt is COVID 19 positive in ER. She has had the first series of mRNA vaccine but none of the boosters. Currently on 4 liters of O2 and dyspnic. WBC 13, neutrophil predominant.  Bilateral infiltrates on CT chest.     Interval history:  Afebrile  Sats 96% on 55L 75%  WBC 6.3  Hgb 8.1  D-dimer 1.21  CRP 0.63 - better  Ferritin 1460  LFTs mildly increased  Blood cultures negative x 5 days  Cdiff neg    ROS:  She had a rough night. She was agitated/delirious. She removed her O2 a number of times and rapidly desatted. She is currently comfortably sleeping with sats at goal onHF 55L 75%. Her daughter is at bedside and spoke to her at length about her mom's prognosis. She is critically ill. She has a chance of recovering from this, but I do not expect she will return to her baseline functional status given how severely ill she is. She will have a prolonged hospitalization followed by a prolonged rehab at best. I suspect she will need O2 indefinitely and that her quality of life will certainly be worse than prior to Radn. While there is a chance she will survive, we did discuss that she still is at risk of decline and that this could be rapid. Her daughter understands and is in the process of trying to figure out what her mom would wish long term. This morning the patient is not at a point that we reasonably could have these conversations, but she thinks it would be useful if her mom is more lucid for me to have a similar conversation with the patient without others present as she is concerned she is not expressing her true wishes in front of loved ones. We also discussed that I am concerned that the amount of sedating meds that she is on during the day is making her more prone to agitation/delirium and that we might need to adjust her regimen if Ms. Dominic Medrano wants to continue with current level of care. A comprehensive review of systems was negative except for that written in the HPI.     Past Medical History:   Diagnosis Date    Adverse effect of anesthesia     PHLEGM IN THROAT POST OP & NEEDED X2 BREATHING TREATMENTS    Anxiety     COPD (chronic obstructive pulmonary disease) ([facility] [address] 2022    emphysema    Depression     GERD (gastroesophageal reflux disease)     Hernia, femoral     since childhood    Hypertension     Joint pain     Nausea & vomiting     TMJ arthritis       Past Surgical History:   Procedure Laterality Date    HX CERVICAL FUSION  2012    C5-C6    HX CHOLECYSTECTOMY  2020    HX COLONOSCOPY      HX ENDOSCOPY      HX HYSTERECTOMY  2019    HX TONSILLECTOMY      AS A CHILD    HX WISDOM TEETH EXTRACTION      TEENAGER    IR INSERT TUNL CVC W PORT OVER 5 YEARS  4/29/2022      Prior to Admission medications    Medication Sig Start Date End Date Taking? Authorizing Provider   doxycycline (VIBRA-TABS) 100 mg tablet Take 1 Tablet by mouth every twelve (12) hours. 10/7/22   Mary Colon MD   albuterol (PROVENTIL HFA, VENTOLIN HFA, PROAIR HFA) 90 mcg/actuation inhaler Take 2 Puffs by inhalation every six (6) hours as needed for Wheezing or Shortness of Breath for up to 20 days. 10/7/22 10/27/22  Mary Colon MD   ondansetron (ZOFRAN ODT) 8 mg disintegrating tablet Take 1 Tablet by mouth every eight (8) hours as needed for Nausea or Vomiting. 9/2/22   Mary Colon MD   prochlorperazine (COMPAZINE) 10 mg tablet Take 1 Tablet by mouth every six (6) hours as needed for Nausea (do not take if groggy; take if nausea despite zofran). 9/2/22   Mary Colon MD   trimethoprim-sulfamethoxazole (BACTRIM DS, SEPTRA DS) 160-800 mg per tablet Take 1 Tablet by mouth every Monday, Wednesday, Friday.  Indications: prophylaxis treatment of cancer related infections 8/24/22   Schoeneweis, Ann, NP   FLUoxetine (PROzac) 40 mg capsule TAKE 1 CAPSULE BY MOUTH EVERY MORNING 10/11/21   Jimbo Saleem MD   amLODIPine-benazepril (LOTREL) 5-20 mg per capsule TAKE 1 CAPSULE BY MOUTH EVERY MORNING 10/11/21   Jimbo Saleem MD     Current Facility-Administered Medications   Medication Dose Route Frequency    ipratropium-albuterol (COMBIVENT RESPIMAT) 20 mcg-100 mcg inhalation spray  1 Puff Inhalation Q6H RT    LORazepam (ATIVAN) injection 0.5 mg  0.5 mg IntraVENous Q8H    cefepime (MAXIPIME) 2 g in 0.9% sodium chloride (MBP/ADV) 100 mL MBP  2 g IntraVENous Q12H    morphine injection 2 mg  2 mg IntraVENous Q4H    vancomycin (VANCOCIN) 1,250 mg in 0.9% sodium chloride 250 mL ([id number])  1,250 mg IntraVENous Q24H    pantoprazole (PROTONIX) tablet 40 mg  40 mg Oral ACB    enoxaparin (LOVENOX) injection 40 mg  40 mg SubCUTAneous Q24H    methylPREDNISolone (PF) (SOLU-MEDROL) injection 60 mg  60 mg IntraVENous Q8H    L.acidophilus-paracasei-S.thermophil-bifidobacter (RISAQUAD) 8 billion cell capsule  1 Capsule Oral DAILY    [Held by provider] lisinopriL (PRINIVIL, ZESTRIL) tablet 20 mg  20 mg Oral DAILY    fluconazole (DIFLUCAN) tablet 100 mg  100 mg Oral DAILY    sodium chloride (NS) flush 5-40 mL  5-40 mL IntraVENous Q8H    [Held by provider] amLODIPine (NORVASC) tablet 5 mg  5 mg Oral DAILY    FLUoxetine (PROzac) capsule 40 mg  40 mg Oral DAILY    trimethoprim-sulfamethoxazole (BACTRIM DS, SEPTRA DS) 160-800 mg per tablet 1 Tablet  1 Tablet Oral Q MON, WED & FRI    mometasone-formoterol (DULERA) 200mcg-5mcg/puff  2 Puff Inhalation BID RT    nicotine (NICODERM CQ) 21 mg/24 hr patch 1 Patch  1 Patch TransDERmal DAILY     Allergies   Allergen Reactions    Oxycodone Nausea Only      Social History     Tobacco Use    Smoking status: Some Days     Packs/day: 0.25     Years: 40.00     Pack years: 10.00     Types: Cigarettes    Smokeless tobacco: Never   Substance Use Topics    Alcohol use: Not Currently     Alcohol/week: 0.0 standard drinks      Family History   Problem Relation Age of Onset    Heart Disease Mother 46    Heart Surgery Mother         HEART TRANSPLANT    Elevated Lipids Mother     Hypertension Mother     COPD Father     Emphysema Father     Sudden Death Brother 46    Other Maternal Grandmother         BRAIN TUMOR    No Known Problems Son     No Known Problems Daughter           Laboratory: I have personally reviewed the flowsheet and labs.      Recent Labs     10/21/22  0521 10/20/22  0422 10/19/22  0229   WBC 6.3 6.2 8.5   HGB 8.1* 8.2* 8.3*   HCT 23.9* 25.2* 25.8*   * 96* 92*       Recent Labs     10/21/22  0521 10/20/22  0422 10/19/22  0229    141 140   K 3.8 3.5 3.5    108 108   CO2 26 25 25   * 135* 134*   BUN 24* 22* 24*   CREA 1.16* 1.08* 1.15*   CA 8.7 8.4* 8.6   ALB 2.3* 2.1* 2.3*   * 141* 128*         Objective:   Visit Vitals  /65   Pulse (!) 57   Temp 98.2 °F (36.8 °C)   Resp 16   Ht 5' 2.99\" (1.6 m)   Wt 78.7 kg (173 lb 8 oz)   SpO2 96%   Breastfeeding No   BMI 30.74 kg/m²       Intake/Output Summary (Last 24 hours) at 10/21/2022 0845  Last data filed at 10/21/2022 0000  Gross per 24 hour   Intake 950 ml   Output 300 ml   Net 650 ml       EXAM:   GENERAL: awake, alert, tired appearing, mildy dyspnic HEENT:  anicteric, EOMI, no alar flaring or epistaxis, oral mucosa moist without cyanosis, NECK:  no jugular vein distention, no retractions, no thyromegaly or masses, LUNGS: diminished, scattered rales HEART:  Regular rate and rhythm with no MGR; no edema is present, ABDOMEN:  soft with no tenderness, bowel sounds present, EXTREMITIES:  warm with no cyanosis, SKIN:  no jaundice or ecchymosis, and NEUROLOGIC:  alert and oriented, grossly non-focal    Chaim Hashimoto, MD  [facility] [place]

## 2022-10-21 NOTE — PROGRESS NOTES
[facility] [place] at [address], [address] Come: [phone]  F: [phone] Patient ID  Name: Jannine Epley  YOB: 1955  MRN: [id number]  Referring Provider:   No referring provider defined for this encounter. 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 She continues with pain, with some concern that her level of pain medication is contributing to her respiratory issues. Not very talkative with me this morning. Objective:      Visit Vitals  /65   Pulse (!) 57   Temp 98.2 °F (36.8 °C)   Resp 16   Ht 5' 2.99\" (1.6 m)   Wt 173 lb 8 oz (78.7 kg)   SpO2 96%   Breastfeeding No   BMI 30.74 kg/m²     ECOG: 3-4  General: ill appearing, no acute distress,  Respiratory: normal respiratory effort: O2 on high flow  CV: slight LE peripheral edema  Skin: no rashes; no ecchymoses; no petechiae  Psych: alert, oriented, flat mood/affect       Results:        Lab Results   Component Value Date/Time    WBC 6.3 10/21/2022 05:21 AM    HGB 8.1 (L) 10/21/2022 05:21 AM    HCT 23.9 (L) 10/21/2022 05:21 AM    PLATELET 364 (L) 37/36/0598 05:21 AM    MCV 91.2 10/21/2022 05:21 AM     Lab Results   Component Value Date/Time    Sodium 139 10/21/2022 05:21 AM    Potassium 3.8 10/21/2022 05:21 AM    Chloride 106 10/21/2022 05:21 AM    CO2 26 10/21/2022 05:21 AM    Anion gap 7 10/21/2022 05:21 AM    Glucose 131 (H) 10/21/2022 05:21 AM    BUN 24 (H) 10/21/2022 05:21 AM    Creatinine 1.16 (H) 10/21/2022 05:21 AM    BUN/Creatinine ratio 21 (H) 10/21/2022 05:21 AM    GFR est AA >60 10/03/2022 06:44 AM    GFR est non-AA >60 10/03/2022 06:44 AM    Calcium 8.7 10/21/2022 05:21 AM    Bilirubin, total 0.3 10/21/2022 05:21 AM    Alk.  phosphatase 97 10/21/2022 05:21 AM    Protein, total 4.4 (L) 10/21/2022 05:21 AM    Albumin 2.3 (L) 10/21/2022 05:21 AM    Globulin 2.1 10/21/2022 05:21 AM    A-G Ratio 1.1 10/21/2022 05:21 AM    ALT (SGPT) 184 (H) 10/21/2022 05:21 AM    AST (SGOT) 97 (H) 10/21/2022 05:21 AM       10/17/22 CTA  Chest  IMPRESSION  Diffuse, severe COVID 19 pneumonia. No pulmonary embolism. Assessment and Recommendations:      COVID-19 infection  Tested positive on 10/6/22.   10/14 pulmonary: abx changed to vanc and cefepime for superimposed bacterial PNA  10/17 chest CTA showed diffuse severe COVID PNA. Pulmonary following. Patient continues on high flow O2  -- Continue high dose steroids, antibiotics, supportive care    2. Diffuse large B-cell lymphoma of lymph nodes of neck ([facility] [address]  Management is with curative intent. Completed C5 da-REPOCH on 10/7/22. Was due for C6 on 10/24, but holding treatment given hypoxic resp failure. Good response to treatment noted on scans, it is quite possible she may be cured without completing her final cycle. Pt received filgrastim-aafi (NIVESTYM) 420 mcg daily x 6 doses; last dose on 10/16. 3. Thrombocytopenia, Anemia  Secondary to chemo effect. Improving     4. Thrush: Improved on diflucan daily    5. Nutrition: Very poor oral intake. Pt declining food, only taking in sips of H2O.   -- Consider NG tube placement vs TPN    6. Diarrhea:  10/12 C-diff: negative. Now improved and Imodium stopped. 7. Goals of care: With her cancer under control and hopefully now in remission, her current active issue is her respiratory failure secondary to [address]. I discussed with Dr. Nicolas García (pulmonary) this morning, and she is hopeful that the patient will eventually recover from this, though it likely will be a very long and slow recovery. It may be premature to consider comfort care at this time, assuming we can address her nutrition. We would recommend giving her more time and seeing how she responds to continued treatments.     Over the weekend, my partner is covering and available as needed at [phone].

## 2022-10-21 NOTE — PROGRESS NOTES
Spiritual Care Assessment/Progress Note  [address]      NAME: Indy Martines      MRN: [id number]  AGE: 78 y.o. SEX: female  [employer] Affiliation: No preference   Language: English     10/21/2022     Total Time (in minutes): 10     Spiritual Assessment begun in [facility] INTENSIVE CARE through conversation with:         []Patient        [] Family    [] Friend(s)        Reason for Consult: Initial/Spiritual assessment, critical care     Spiritual beliefs: (Please include comment if needed)     [] Identifies with a chen tradition:         [] Supported by a chen community:            [] Claims no spiritual orientation:           [] Seeking spiritual identity:                [] Adheres to an individual form of spirituality:           [x] Not able to assess:                           Identified resources for coping:      [] Prayer                               [] Music                  [] Guided Imagery     [] Family/friends                 [] Pet visits     [] Devotional reading                         [x] Unknown     [] Other:                                               Interventions offered during this visit: (See comments for more details)    Patient Interventions: Prayer (actual)           Plan of Care:     [] Support spiritual and/or cultural needs    [] Support AMD and/or advance care planning process      [] Support grieving process   [] Coordinate Rites and/or Rituals    [] Coordination with community clergy   [] No spiritual needs identified at this time   [] Detailed Plan of Care below (See Comments)  [] Make referral to Music Therapy  [] Make referral to Pet Therapy     [] Make referral to Addiction services  [] Make referral to [facility]  [] Make referral to Spiritual Care Partner  [] No future visits requested        [x] Contact Spiritual Care for further referrals     Comments:  initiated initial visit. Reviewed chart. Spoke to .  Unable to visit pat due to droplet plus precautions. Offered silent prayer outside of door. Advised [facility] to [facility] for any [facility] referrals.     [address], [address], [facility]  Staff   Paging service: [phone] (PRAY)

## 2022-10-22 NOTE — PROGRESS NOTES
0700 - Bedside shift change report given to Brittni Shah RN (oncoming nurse) by Muna Oliver RN (offgoing nurse). Report included the following information SBAR, Kardex, ED Summary, Intake/Output, MAR, Accordion, Recent Results, Med Rec Status, and Cardiac Rhythm NSR .     0740 - RN assisted pt to bedside commode. 46 - RN encouraged pt to sit in chair for breakfast, pt declined and states she wishes to get back in bed.     0800 - RN educated pt on how to use incentive spirometer. Pt stated she has been using it. RN asked pt if she would like to use it now. Pt declined. 1900 - Bedside shift change report given to Muna Oliver RN (oncoming nurse) by Brittni Shah RN (offgoing nurse). Report included the following information SBAR, Kardex, ED Summary, Intake/Output, MAR, Accordion, Recent Results, Med Rec Status, and Cardiac Rhythm NSR .

## 2022-10-22 NOTE — PROGRESS NOTES
[facility] [place]     Name: Ariel Velasco MRN: [id number]   : 1955 Hospital: [address]   Date: 10/22/2022        Impression Plan   Acute hypoxic respiratory failure  COVID 19 PNA with questionable superimposed bacterial PNA  Lymphoma s/p recent R-CHOP  Pancytopenia  Hx of tobacco abuse               Wean O2 to keep sats above 90%; on HF 55L 75%  Continue high dose IV Solu-Medrol  Combivent and Dulera  Completed a 7 day course of Vanc/Cefepime  Barcitinib was contraindicated since pt is immunocompromised   Nivestym per hematology - stopped due to increasing WBC  Repeat CXR on MOnday  OOB into chair  Ppx enox  Consider TPN for nutrtion  Plan discussed with nursing  Goals of care discussion held by Dr. Jen Haile on 10/21- continuing current level of care for now  Will see again on Monday. Pls call with questions    CC time: 33minutes           Radiology  (personally reviewed) CT chest reviewed: bilateral peripheral infiltrates, no PE    10/17 chest CTA: Heterogeneous bilateral airspace opacities worsened since 2022 consistent with diffuse, severe COVID 19 pneumonia. . Trace bilateral pleural effusions. No evidence of PE. Subjective     Cc: shortness of breath    79 yo with PMHx of lymphoma presenting with increasing SOB for the last 3 days. Was admitted last week for R-CHOP chemotherapy. Her son was COVID 19 positive last week. Pt is COVID 19 positive in ER. She has had the first series of mRNA vaccine but none of the boosters. Currently on 4 liters of O2 and dyspnic. WBC 13, neutrophil predominant. Bilateral infiltrates on CT chest.     Interval history:  Afebrile  Sats 100% on 50L and 100%  WBC wnl  Plts wnl  Blood cultures negative x 5 days  Cdiff neg    ROS:  Currently on 50L and 100%, sats 100%. Pt denies SOB. Sleepy and seems confused.  HF turned down to 40L while I was in the room    Past Medical History:   Diagnosis Date    Adverse effect of anesthesia PHLEGM IN THROAT POST OP & NEEDED X2 BREATHING TREATMENTS    Anxiety     COPD (chronic obstructive pulmonary disease) ([facility] [address] 2022    emphysema    Depression     GERD (gastroesophageal reflux disease)     Hernia, femoral     since childhood    Hypertension     Joint pain     Nausea & vomiting     TMJ arthritis       Past Surgical History:   Procedure Laterality Date    HX CERVICAL FUSION  2012    C5-C6    HX CHOLECYSTECTOMY  2020    HX COLONOSCOPY      HX ENDOSCOPY      HX HYSTERECTOMY  2019    HX TONSILLECTOMY      AS A CHILD    HX WISDOM TEETH EXTRACTION      TEENAGER    IR INSERT TUNL CVC W PORT OVER 5 YEARS  4/29/2022      Prior to Admission medications    Medication Sig Start Date End Date Taking? Authorizing Provider   doxycycline (VIBRA-TABS) 100 mg tablet Take 1 Tablet by mouth every twelve (12) hours. 10/7/22   Gil Raymond MD   albuterol (PROVENTIL HFA, VENTOLIN HFA, PROAIR HFA) 90 mcg/actuation inhaler Take 2 Puffs by inhalation every six (6) hours as needed for Wheezing or Shortness of Breath for up to 20 days. 10/7/22 10/27/22  Gil Raymond MD   ondansetron (ZOFRAN ODT) 8 mg disintegrating tablet Take 1 Tablet by mouth every eight (8) hours as needed for Nausea or Vomiting. 9/2/22   Gil Raymond MD   prochlorperazine (COMPAZINE) 10 mg tablet Take 1 Tablet by mouth every six (6) hours as needed for Nausea (do not take if groggy; take if nausea despite zofran). 9/2/22   Gil Raymond MD   trimethoprim-sulfamethoxazole (BACTRIM DS, SEPTRA DS) 160-800 mg per tablet Take 1 Tablet by mouth every Monday, Wednesday, Friday.  Indications: prophylaxis treatment of cancer related infections 8/24/22   Schoeneweis, Ann, NP   FLUoxetine (PROzac) 40 mg capsule TAKE 1 CAPSULE BY MOUTH EVERY MORNING 10/11/21   Enriqueta Smith MD   amLODIPine-benazepril (LOTREL) 5-20 mg per capsule TAKE 1 CAPSULE BY MOUTH EVERY MORNING 10/11/21   Enriqueta Smith MD     Current Facility-Administered Medications Medication Dose Route Frequency    ipratropium-albuterol (COMBIVENT RESPIMAT) 20 mcg-100 mcg inhalation spray  1 Puff Inhalation Q6HWA RT    ALPRAZolam (XANAX) tablet 0.25 mg  0.25 mg Oral BID    pantoprazole (PROTONIX) tablet 40 mg  40 mg Oral ACB    enoxaparin (LOVENOX) injection 40 mg  40 mg SubCUTAneous Q24H    methylPREDNISolone (PF) (SOLU-MEDROL) injection 60 mg  60 mg IntraVENous Q8H    L.acidophilus-paracasei-S.thermophil-bifidobacter (RISAQUAD) 8 billion cell capsule  1 Capsule Oral DAILY    [Held by provider] lisinopriL (PRINIVIL, ZESTRIL) tablet 20 mg  20 mg Oral DAILY    fluconazole (DIFLUCAN) tablet 100 mg  100 mg Oral DAILY    sodium chloride (NS) flush 5-40 mL  5-40 mL IntraVENous Q8H    [Held by provider] amLODIPine (NORVASC) tablet 5 mg  5 mg Oral DAILY    FLUoxetine (PROzac) capsule 40 mg  40 mg Oral DAILY    trimethoprim-sulfamethoxazole (BACTRIM DS, SEPTRA DS) 160-800 mg per tablet 1 Tablet  1 Tablet Oral Q MON, WED & FRI    mometasone-formoterol (DULERA) 200mcg-5mcg/puff  2 Puff Inhalation BID RT    nicotine (NICODERM CQ) 21 mg/24 hr patch 1 Patch  1 Patch TransDERmal DAILY     Allergies   Allergen Reactions    Oxycodone Nausea Only      Social History     Tobacco Use    Smoking status: Some Days     Packs/day: 0.25     Years: 40.00     Pack years: 10.00     Types: Cigarettes    Smokeless tobacco: Never   Substance Use Topics    Alcohol use: Not Currently     Alcohol/week: 0.0 standard drinks      Family History   Problem Relation Age of Onset    Heart Disease Mother 46    Heart Surgery Mother         HEART TRANSPLANT    Elevated Lipids Mother     Hypertension Mother     COPD Father     Emphysema Father     Sudden Death Brother 46    Other Maternal Grandmother         BRAIN TUMOR    No Known Problems Son     No Known Problems Daughter           Laboratory: I have personally reviewed the flowsheet and labs.      Recent Labs     10/22/22  0540 10/21/22  0521 10/20/22  0422   WBC 6.7 6.3 6.2   HGB 7. 9* 8.1* 8.2*   HCT 23.6* 23.9* 25.2*   * 127* 96*       Recent Labs     10/22/22  0540 10/21/22  0521 10/20/22  0422    139 141   K 4.0 3.8 3.5    106 108   CO2 25 26 25   * 131* 135*   BUN 26* 24* 22*   CREA 1.15* 1.16* 1.08*   CA 8.3* 8.7 8.4*   ALB 2.2* 2.3* 2.1*   * 184* 141*         Objective:   Visit Vitals  BP (!) 165/95   Pulse 98   Temp 98.1 °F (36.7 °C)   Resp 24   Ht 5' 2.99\" (1.6 m)   Wt 78.1 kg (172 lb 3.6 oz)   SpO2 96%   Breastfeeding No   BMI 30.52 kg/m²       Intake/Output Summary (Last 24 hours) at 10/22/2022 0912  Last data filed at 10/22/2022 0000  Gross per 24 hour   Intake 710 ml   Output 250 ml   Net 460 ml       EXAM:   GENERAL: awake, alert, tired appearing, HEENT:  anicteric, EOMI, no alar flaring or epistaxis, oral mucosa moist without cyanosis, NECK:  no jugular vein distention, no retractions, no thyromegaly or masses, LUNGS: diminished, scattered rales HEART:  Regular rate and rhythm with no MGR; no edema is present, ABDOMEN:  soft with no tenderness, bowel sounds present, EXTREMITIES:  warm with no cyanosis, SKIN:  no jaundice or ecchymosis, and NEUROLOGIC:  alert and oriented, grossly non-focal    Jono Razo MD  [facility]

## 2022-10-22 NOTE — PROGRESS NOTES
Provided update to daughter. Discussed plan of care, including starting PPN, repeat CXR Monday, continuing steroids. Daughter is hopeful given decreased oxygen demand today, but expressed worry over mother's mental health anthony anxiety. Daughter will continue to visit and planning to bring her mother's favorite tea. Other family members hesitant to visit given pt hospitalized for Covid, will retest to see if pt still testing positive. We discussed current scheduled and prn anxiety medications - appreciate palliative assistance. Will increase Prozac from 40mg to 60mg daily. Music therapy consult placed. Daughter appreciative of phone call. Selina Leos MD      ADDENDUM: Discussed retesting with infection control: Retesting is not recommended. Potentially can stop droplet precautions at 20 days from date of sx onset for severe illness.

## 2022-10-22 NOTE — PROGRESS NOTES
Attempted to reach Daughter Сергей Saleem to provided clinical update, including starting PPN. No answer, left hipaa compliant VM.      Jet Polo MD

## 2022-10-22 NOTE — PROGRESS NOTES
Problem: Falls - Risk of  Goal: *Absence of Falls  Description: Document Lettie Duverney Fall Risk and appropriate interventions in the flowsheet. Outcome: Progressing Towards Goal  Note: Fall Risk Interventions:  Mobility Interventions: Bed/chair exit alarm, Patient to call before getting OOB         Medication Interventions: Assess postural VS orthostatic hypotension, Patient to call before getting OOB    Elimination Interventions: Bed/chair exit alarm, Call light in reach    History of Falls Interventions: Bed/chair exit alarm         Problem: Patient Education: Go to Patient Education Activity  Goal: Patient/Family Education  Outcome: Progressing Towards Goal     Problem: Risk for Spread of Infection  Goal: Prevent transmission of infectious organism to others  Description: Prevent the transmission of infectious organisms to other patients, staff members, and visitors. Outcome: Progressing Towards Goal     Problem: Patient Education:  Go to Education Activity  Goal: Patient/Family Education  Outcome: Progressing Towards Goal     Problem: Pain  Goal: *Control of Pain  Outcome: Progressing Towards Goal  Goal: *PALLIATIVE CARE:  Alleviation of Pain  Outcome: Progressing Towards Goal     Problem: Patient Education: Go to Patient Education Activity  Goal: Patient/Family Education  Outcome: Progressing Towards Goal     Problem: Airway Clearance - Ineffective  Goal: Achieve or maintain patent airway  Outcome: Progressing Towards Goal     Problem: Gas Exchange - Impaired  Goal: Absence of hypoxia  Outcome: Progressing Towards Goal  Goal: Promote optimal lung function  Outcome: Progressing Towards Goal     Problem: Breathing Pattern - Ineffective  Goal: Ability to achieve and maintain a regular respiratory rate  Outcome: Progressing Towards Goal     Problem:  Body Temperature -  Risk of, Imbalanced  Goal: Ability to maintain a body temperature within defined limits  Outcome: Progressing Towards Goal  Goal: Will regain or maintain usual level of consciousness  Outcome: Progressing Towards Goal  Goal: Complications related to the disease process, condition or treatment will be avoided or minimized  Outcome: Progressing Towards Goal     Problem: Isolation Precautions - Risk of Spread of Infection  Goal: Prevent transmission of infectious organism to others  Outcome: Progressing Towards Goal     Problem: Nutrition Deficits  Goal: Optimize nutrtional status  Outcome: Progressing Towards Goal     Problem: Risk for Fluid Volume Deficit  Goal: Maintain normal heart rhythm  Outcome: Progressing Towards Goal  Goal: Maintain absence of muscle cramping  Outcome: Progressing Towards Goal  Goal: Maintain normal serum potassium, sodium, calcium, phosphorus, and pH  Outcome: Progressing Towards Goal     Problem: Loneliness or Risk for Loneliness  Goal: Demonstrate positive use of time alone when socialization is not possible  Outcome: Progressing Towards Goal     Problem: Fatigue  Goal: Verbalize increase energy and improved vitality  Outcome: Progressing Towards Goal     Problem: Patient Education: Go to Patient Education Activity  Goal: Patient/Family Education  Outcome: Progressing Towards Goal     Problem: Nutrition Deficit  Goal: *Optimize nutritional status  Outcome: Progressing Towards Goal     Problem: Pressure Injury - Risk of  Goal: *Prevention of pressure injury  Description: Document Mian Scale and appropriate interventions in the flowsheet.   Outcome: Progressing Towards Goal     Problem: Patient Education: Go to Patient Education Activity  Goal: Patient/Family Education  Outcome: Progressing Towards Goal

## 2022-10-22 NOTE — PROGRESS NOTES
Bedside and Verbal shift change report given to [address] [place] [address] (oncoming nurse) by Renee Ya (offgoing nurse). Report included the following information SBAR, Intake/Output, MAR, Recent Results, Cardiac Rhythm: NSR and Alarm Parameters . Primary Nurse Kathrine Barakat RN and Abdon Claros RN performed a dual skin assessment on this patient No impairment noted  Mian score is see flowsheets    See flowsheets for all assessments, see MAR for all medication administrations. Bedside and Verbal shift change report given to [facility] (oncoming nurse) by Maryann Alejo RN (offgoing nurse). Report included the following information SBAR, Intake/Output, MAR, Recent Results, Cardiac Rhythm: and Alarm Parameters .

## 2022-10-22 NOTE — PROGRESS NOTES
[address] [facility] [address] [place] [address]   Office [phone]  Fax [phone]          Subjective / Objective     24 Hour Events: No acute events. Patient requiring 50L high flow overnight. Was down to 30L during the day yesterday. Subjective: Awake and alert. Less dyspneic. C/o mild pain with breathing but says meds are helping. Temp (24hrs), Av °F (36.7 °C), Min:97.8 °F (36.6 °C), Max:98.2 °F (36.8 °C)     Physical Exam  Constitutional:       General: She is not in acute distress. Appearance: Normal appearance. She is ill-appearing. Comments: sleeping   HENT:      Head: Normocephalic and atraumatic. Right Ear: External ear normal.      Left Ear: External ear normal.   Cardiovascular:      Rate and Rhythm: Normal rate and regular rhythm. Pulmonary:      Breath sounds: Rhonchi present. Comments: Increased work of breathing. Mild tachypnea. 55L high flow saturations in the 90s. Musculoskeletal:      Cervical back: Normal range of motion. Skin:     General: Skin is warm and dry. Neurological:      Mental Status: She is alert. Mental status is at baseline. Psychiatric:         Mood and Affect: Mood normal.      Respiratory: O2 Flow Rate (L/min): 30 l/min O2 Device: Hi flow nasal cannula     I/O:  Date 10/21/22 07 - 10/22/22 0659 10/22/22 07 - 10/23/22 0659   Shift 1900-0659 24 Hour Total 1900-0659 24 Hour Total   INTAKE   P.O. 600  600        P. O. 600  600      I. V.(mL/kg/hr) 350(0.4) 100(0.1) 450(0.2)        Volume (vancomycin (VANCOCIN) 1,250 mg in 0.9% sodium chloride 250 mL (Cdsd1Wdl)) 250  250        Volume (cefepime (MAXIPIME) 2 g in 0.9% sodium chloride (MBP/ADV) 100 mL MBP) 100 100 200      Shift Total(mL/kg) 950(12.1) 100(1.3) 1050(13.4)      OUTPUT   Urine(mL/kg/hr)  250(0.3) 250(0.1)        Urine Voided  250 250        Urine Occurrence(s) 3 x  3 x      Stool           Stool Occurrence(s) 3 x  3 x      Shift Total(mL/kg)  250(3.2) 250(3.2)       -150 800      Weight (kg) 78.7 78.1 78.1 78.1 78.1 78.1         Inpatient Medications  Current Facility-Administered Medications   Medication Dose Route Frequency    ipratropium-albuterol (COMBIVENT RESPIMAT) 20 mcg-100 mcg inhalation spray  1 Puff Inhalation Q6HWA RT    ALPRAZolam (XANAX) tablet 0.25 mg  0.25 mg Oral BID    LORazepam (ATIVAN) injection 0.5 mg  0.5 mg IntraVENous Q3H PRN    morphine injection 2 mg  2 mg IntraVENous Q3H PRN    morphine IR (MS IR) tablet 15 mg  15 mg Oral Q8H PRN    pantoprazole (PROTONIX) tablet 40 mg  40 mg Oral ACB    enoxaparin (LOVENOX) injection 40 mg  40 mg SubCUTAneous Q24H    methylPREDNISolone (PF) (SOLU-MEDROL) injection 60 mg  60 mg IntraVENous Q8H    lidocaine-prilocaine (EMLA) 2.5-2.5 % cream   Topical PRN    loperamide (IMODIUM) capsule 2 mg  2 mg Oral Q4H PRN    L.acidophilus-paracasei-S.thermophil-bifidobacter (RISAQUAD) 8 billion cell capsule  1 Capsule Oral DAILY    sodium chloride (OCEAN) 0.65 % nasal squeeze bottle 2 Spray  2 Spray Both Nostrils Q2H PRN    [Held by provider] lisinopriL (PRINIVIL, ZESTRIL) tablet 20 mg  20 mg Oral DAILY    magic mouthwash (FIRST-MOUTHWASH BLM) oral suspension 5 mL  5 mL Oral Q4H PRN    fluconazole (DIFLUCAN) tablet 100 mg  100 mg Oral DAILY    sodium chloride (NS) flush 5-40 mL  5-40 mL IntraVENous Q8H    sodium chloride (NS) flush 5-40 mL  5-40 mL IntraVENous PRN    acetaminophen (TYLENOL) tablet 650 mg  650 mg Oral Q6H PRN    Or    acetaminophen (TYLENOL) suppository 650 mg  650 mg Rectal Q6H PRN    ondansetron (ZOFRAN) injection 4 mg  4 mg IntraVENous Q6H PRN    [Held by provider] amLODIPine (NORVASC) tablet 5 mg  5 mg Oral DAILY    FLUoxetine (PROzac) capsule 40 mg  40 mg Oral DAILY    ondansetron (ZOFRAN ODT) tablet 8 mg  8 mg Oral Q8H PRN    prochlorperazine (COMPAZINE) tablet 10 mg  10 mg Oral Q6H PRN    trimethoprim-sulfamethoxazole (BACTRIM DS, SEPTRA DS) 160-800 mg per tablet 1 Tablet  1 Tablet Oral Q MON, WED & FRI    mometasone-formoterol (DULERA) 200mcg-5mcg/puff  2 Puff Inhalation BID RT    nicotine (NICODERM CQ) 21 mg/24 hr patch 1 Patch  1 Patch TransDERmal DAILY         Allergies  Allergies   Allergen Reactions    Oxycodone Nausea Only         CBC:  Recent Labs     10/22/22  0540 10/21/22  0521 10/20/22  0422   WBC 6.7 6.3 6.2   HGB 7.9* 8.1* 8.2*   HCT 23.6* 23.9* 25.2*   * 127* 96*         Metabolic Panel:  Recent Labs     10/22/22  0540 10/21/22  0521 10/20/22  0422    139 141   K 4.0 3.8 3.5    106 108   CO2 25 26 25   BUN 26* 24* 22*   CREA 1.15* 1.16* 1.08*   * 131* 135*   CA 8.3* 8.7 8.4*   ALB 2.2* 2.3* 2.1*   * 184* 141*              Assessment and Plan        Jannine Epley is a 78 y.o. female with PMH of diffuse large B-cell lymphoma w/ bone mets, COPD, tobacco dependence, insomnia, MDD, and anxiety who was admitted for filgrastim injections and COVID-19 infection. AHRF in setting of COVID-19 with superimposed bacterial PNA On 55L HFNC. CXR 10/17 increasing bilateral pulmonary infiltrates. CTA 10/17 no PE but severe COVID PNA and trace b/l pleural effusions. LA and procal negative. Inflammatory markers elevated but downtrending CRP and D-dimer. BNP 2,422.   - vanc and cefepime (10/14-10/21)  - Pulm following; Solumedrol 60mg q8hrs. Wean O2 as tolerated for sat > 90%  - Palliative on consult; IV morphine and Ativan q3h prn. Scheduled xanax 0.25 mg bid. - Patient does not desire comfort measures at this time; plan to proceed with current level of care    Mild malnutrition per nutrition, If poor intake continues, may require NGT/DHT placement or PN. Has been essentially refusing all ONS and meals  - Plan to speak with patient and family to initiate TPN    COPD not on home O2.  - Combivent respimat 20mcg-100mcg q4hrs  - Dulera 200mcg-5mcg 2 puffs BID     DLBCL with pancytopenia Follows with Dr. Maddy Villela.  Completed C5 chemo on 10/7  - s/p 1 wk daily Filgrastim injections  - Zofran and compazine q6-8 hrs prn nausea  - heme/onc consulted, appreciate recs \"premature to consider comfort care at this time. Her lymphoma is likely in remission and there are no plans for further chemotherapy at this time. The COVID PNA, while severe, may still improve. Consider NG tube placement and if needed, could consider TPN\"     Thrush/Mouth Pain - resolved   - Continue Diflucan 100 mg daily  - Magic Mouthwash as needed before meals for discomfort     Pancytopenia Likely 2/2 chemotherapy. - Continuing AC with lovenox 40mg daily  - Hold any AC with plt <50  - Transfuse if plt below 10  - Daily CBC  - Oncology consulted, appreciate rec's      Diarrhea  - Imodium 2mg BID  - C. Diff negative on 10/12     Elevated Cr Likely 2/2 poor PO intake. Stable  - Encouraged PO fluid consumption  - Monitor daily BMP     Hypertension POA BP was 92/66.   - Continue home Amplodipine-benazepril 5-20 daily      Hypokalemia POA K 2.9. Improved  - Replete PRN  - Follow on daily BMP     MDD/RENETTA Chronic, stable. - continue Xanax 1mg bid  - continue Prozac 40mg daily     Tobacco dependence About 40 pack years. Currently smokes about 5 cigarettes per day. - Nicotine patch 21 daily    FEN/GI - Regular diet. With supplementation. Activity - Ambulate as tolerated  DVT prophylaxis - Lovenox  GI prophylaxis - Not indicated at this time  Disposition - Plan to d/c to TBD.   Code Status - DNR    I appreciate the opportunity to participate in the care of this patient,  Gardenia Alfaro MD  [facility] Medicine Resident       For Billing    Chief Complaint   Patient presents with    Medication Problem       Hospital Problems  Date Reviewed: 9/23/2022            Codes Class Noted POA    Pneumonia due to COVID-19 virus ICD-10-CM: U07.1, J12.82  ICD-9-CM: 480.8, 079.89  10/21/2022 Unknown        Acute hypoxemic respiratory failure ([facility] [address] ICD-10-CM: J96.01  ICD-9-CM: 518.81  10/21/2022 Unknown        [facility] ICD-10-CM: C85.90  ICD-9-CM: 202.80  10/10/2022 Unknown        COVID ICD-10-CM: U07.1  ICD-9-CM: 079.89  10/10/2022 Unknown        Acute respiratory failure with hypoxia [facility]) ICD-10-CM: J96.01  ICD-9-CM: 518.81  9/13/2022 Unknown

## 2022-10-23 NOTE — PROGRESS NOTES
0700: Bedside and Verbal shift change report given to Aaron Hobbs (oncoming nurse) by Aleyda Harrison (offgoing nurse). Report included the following information SBAR, Kardex, Intake/Output, MAR, Recent Results, Cardiac Rhythm SR/SB, and Alarm Parameters .     0800: assessment completed    0915: meds given per MAR     1100: meds given per MAR     1200: reassessment completed; chest port dressing changed; patient assisted to bedside commode to void- tolerated activity well, assisted back to bed after voiding    1245: attempted to call report on patient to [facility]; [facility] RN will call back    1330: report given to [facility] nurse     1400: patient transferred to [facility] with belongings

## 2022-10-23 NOTE — PROGRESS NOTES
[address] [facility] [address] [facility] [place] [address]   Office [phone]  Fax [phone]          Subjective / Objective     24 Hour Events: No acute events. Subjective: Awake and alert. Reports she is feeling better this morning but remains weak, with dyspnea, and cough. Pt reports she continues to be anxious and is asking for IV medication. Temp (24hrs), Av.7 °F (37.1 °C), Min:98.2 °F (36.8 °C), Max:99 °F (37.2 °C)     Physical Exam  Constitutional:       General: She is not in acute distress. Appearance: Normal appearance. She is ill-appearing. HENT:      Head: Normocephalic and atraumatic. Right Ear: External ear normal.      Left Ear: External ear normal.   Cardiovascular:      Rate and Rhythm: Normal rate and regular rhythm. Pulmonary:      Breath sounds: Rhonchi present. Comments: Increased work of breathing. Mild tachypnea. 40L high flow saturations in the low 90s  Abdominal:      General: There is no distension. Palpations: There is no mass. Musculoskeletal:         General: No swelling. Cervical back: Normal range of motion. Skin:     General: Skin is warm and dry. Neurological:      Mental Status: She is alert. Mental status is at baseline. Psychiatric:         Mood and Affect: Mood normal.      Respiratory: O2 Flow Rate (L/min): 45 l/min O2 Device: Hi flow nasal cannula     I/O:  Date 10/22/22 07 - 10/23/22 0659 10/23/22 0700 - 10/24/22 0659   Shift  24 Hour Total 2149-0192 9111-9689 24 Hour Total   INTAKE   P.O. 240  240        P. O. 240  240      I. V.(mL/kg/hr)  527.8(0.6) 527.8(0.3)        Volume (TPN ADULT - PERIPHERAL AA 4.25% D10% W/ ELECTROLYTES AND CA)  527.8 527.8      Shift Total(mL/kg) 240(3.1) 527. 8(6.8) 767.8(9.8)      OUTPUT   Urine(mL/kg/hr)  450(0.5) 450(0.2)        Urine Voided  450 450        Urine Occurrence(s) 2 x  2 x      Stool           Stool Occurrence(s) 2 x  2 x      Shift Total(mL/kg)  450(5.8) 450(5.8)  77.8 317.8      Weight (kg) 78.1 78.1 78.1 78.1 78.1 78.1         Inpatient Medications  Current Facility-Administered Medications   Medication Dose Route Frequency    ipratropium-albuterol (COMBIVENT RESPIMAT) 20 mcg-100 mcg inhalation spray  1 Puff Inhalation Q6HWA RT    TPN ADULT - PERIPHERAL AA 4.25% D10% W/ ELECTROLYTES AND CA   IntraVENous CONTINUOUS    FLUoxetine (PROzac) capsule 60 mg  60 mg Oral DAILY    LORazepam (ATIVAN) tablet 0.5 mg  0.5 mg Oral Q3H PRN    ALPRAZolam (XANAX) tablet 0.25 mg  0.25 mg Oral BID    morphine injection 2 mg  2 mg IntraVENous Q3H PRN    morphine IR (MS IR) tablet 15 mg  15 mg Oral Q8H PRN    pantoprazole (PROTONIX) tablet 40 mg  40 mg Oral ACB    enoxaparin (LOVENOX) injection 40 mg  40 mg SubCUTAneous Q24H    methylPREDNISolone (PF) (SOLU-MEDROL) injection 60 mg  60 mg IntraVENous Q8H    lidocaine-prilocaine (EMLA) 2.5-2.5 % cream   Topical PRN    loperamide (IMODIUM) capsule 2 mg  2 mg Oral Q4H PRN    L.acidophilus-paracasei-S.thermophil-bifidobacter (RISAQUAD) 8 billion cell capsule  1 Capsule Oral DAILY    sodium chloride (OCEAN) 0.65 % nasal squeeze bottle 2 Spray  2 Spray Both Nostrils Q2H PRN    [Held by provider] lisinopriL (PRINIVIL, ZESTRIL) tablet 20 mg  20 mg Oral DAILY    magic mouthwash (FIRST-MOUTHWASH BLM) oral suspension 5 mL  5 mL Oral Q4H PRN    fluconazole (DIFLUCAN) tablet 100 mg  100 mg Oral DAILY    sodium chloride (NS) flush 5-40 mL  5-40 mL IntraVENous Q8H    sodium chloride (NS) flush 5-40 mL  5-40 mL IntraVENous PRN    acetaminophen (TYLENOL) tablet 650 mg  650 mg Oral Q6H PRN    Or    acetaminophen (TYLENOL) suppository 650 mg  650 mg Rectal Q6H PRN    ondansetron (ZOFRAN) injection 4 mg  4 mg IntraVENous Q6H PRN    [Held by provider] amLODIPine (NORVASC) tablet 5 mg  5 mg Oral DAILY    ondansetron (ZOFRAN ODT) tablet 8 mg  8 mg Oral Q8H PRN    prochlorperazine (COMPAZINE) tablet 10 mg  10 mg Oral Q6H PRN    trimethoprim-sulfamethoxazole (BACTRIM DS, SEPTRA DS) 160-800 mg per tablet 1 Tablet  1 Tablet Oral Q MON, WED & FRI    mometasone-formoterol (DULERA) 200mcg-5mcg/puff  2 Puff Inhalation BID RT    nicotine (NICODERM CQ) 21 mg/24 hr patch 1 Patch  1 Patch TransDERmal DAILY         Allergies  Allergies   Allergen Reactions    Oxycodone Nausea Only         CBC:  Recent Labs     10/23/22  0024 10/22/22  0540 10/21/22  0521   WBC 7.8 6.7 6.3   HGB 7.9* 7.9* 8.1*   HCT 24.3* 23.6* 23.9*   * 136* 822*         Metabolic Panel:  Recent Labs     10/23/22  0024 10/22/22  0540 10/21/22  0521    139 139   K 4.1 4.0 3.8    107 106   CO2 28 25 26   BUN 28* 26* 24*   CREA 1.18* 1.15* 1.16*   * 122* 131*   CA 8.0* 8.3* 8.7   MG 1.9  --   --    PHOS 3.5  --   --    ALB 2.3* 2.2* 2.3*   * 218* 184*              Assessment and Plan        Violeta Tracy is a 78 y.o. female with PMH of diffuse large B-cell lymphoma w/ bone mets, COPD, tobacco dependence, insomnia, MDD, and anxiety who was admitted for filgrastim injections and COVID-19 infection. Patient has made no significant clinical improvement over the course of the past 3-4 days and continues to require HFNC at 40L while on high dose steroids. AHRF in setting of COVID-19: On 40L HFNC. CXR 10/17 increasing bilateral pulmonary infiltrates. CTA 10/17 no PE but severe COVID PNA and trace b/l pleural effusions. LA and procal negative. Inflammatory markers elevated. BNP 2,422. S/p vanc and cefepime (10/14-10/21)  - Pulm following; Solumedrol 60mg q8hrs. Wean O2 as tolerated for sat > 90%  - Palliative on consult; IV morphine and Ativan q3h prn. Scheduled xanax 0.25 mg bid. - Patient does not desire comfort measures at this time; plan to proceed with current level of care  - Do not intubate.  No Bipap    Mild malnutrition per nutrition  - PPN at 42mL/hr    COPD not on home O2.  - Combivent respimat 20mcg-100mcg q4hrs  - Dulera 200mcg-5mcg 2 puffs BID     DLBCL with pancytopenia Follows with Dr. Elbert Fontenot. Completed C5 chemo on 10/7  - s/p 1 wk daily Filgrastim injections  - Zofran and compazine q6-8 hrs prn nausea  - heme/onc consulted, appreciate recs \"premature to consider comfort care at this time. Her lymphoma is likely in remission and there are no plans for further chemotherapy at this time. The COVID PNA, while severe, may still improve. Consider NG tube placement and if needed, could consider TPN\"     Thrush/Mouth Pain - resolved   - Continue Diflucan 100 mg daily  - Magic Mouthwash as needed before meals for discomfort     Pancytopenia Likely 2/2 chemotherapy. Improved  - Continuing AC with lovenox 40mg daily  - Hold any AC with plt <50  - Transfuse if plt below 10  - Daily CBC  - Oncology consulted, appreciate rec's      Diarrhea  - Imodium 2mg BID  - C. Diff negative on 10/12     Elevated Cr Likely 2/2 poor PO intake. Stable  - Encouraged PO fluid consumption  - Monitor daily BMP     Hypertension POA BP was 92/66.   - Continue home Amplodipine-benazepril 5-20 daily      Hypokalemia POA K 2.9. Improved  - Replete PRN  - Follow on daily BMP     MDD/RENETTA Chronic, stable. - continue Xanax 1mg bid  - continue Prozac 40mg daily     Tobacco dependence About 40 pack years. Currently smokes about 5 cigarettes per day. - Nicotine patch 21 daily    FEN/GI - Regular diet. With supplementation. Activity - Ambulate as tolerated  DVT prophylaxis - Lovenox  GI prophylaxis - Not indicated at this time  Disposition - Plan to d/c to TBD.   Code Status - DNR    I appreciate the opportunity to participate in the care of this patient,  Bere Barba DO  Family Medicine Resident       For Billing    Chief Complaint   Patient presents with    Medication Problem       Hospital Problems  Date Reviewed: 9/23/2022            Codes Class Noted POA    Pneumonia due to COVID-19 virus ICD-10-CM: U07.1, J12.82  ICD-9-CM: 480.8, 079.89  10/21/2022 Unknown        * (Principal) Acute hypoxemic respiratory failure [facility]) ICD-10-CM: J96.01  ICD-9-CM: 518.81  10/21/2022 Unknown        Lymphoma ([facility] [address] ICD-10-CM: C85.90  ICD-9-CM: 202.80  10/10/2022 Unknown        COVID ICD-10-CM: U07.1  ICD-9-CM: 079.89  10/10/2022 Unknown        Acute respiratory failure with hypoxia [facility]) ICD-10-CM: J96.01  ICD-9-CM: 518.81  9/13/2022 Unknown        Diarrhea of presumed infectious origin ICD-10-CM: R19.7  ICD-9-CM: 009.3  6/13/2022 Yes

## 2022-10-23 NOTE — PROGRESS NOTES
1400  TRANSFER - IN REPORT:    Verbal report received from StephanieMijn [employer] (name) on Phylicia Gomez  being received from ICU (unit) for routine progression of care      Report consisted of patients Situation, Background, Assessment and   Recommendations(SBAR). Information from the following report(s) SBAR, Kardex, Intake/Output, MAR, Accordion, Recent Results, and Cardiac Rhythm NSR/ Sarah Estes  was reviewed with the receiving nurse. Opportunity for questions and clarification was provided. Assessment completed upon patients arrival to unit and care assumed. Initial assessment done. AOX4. Anxious. PRN ativan given. Call bell within reach. Visit Vitals  BP (!) 146/78 (BP 1 Location: Right upper arm, BP Patient Position: At rest)   Pulse 79   Temp 97.4 °F (36.3 °C)   Resp 23   Ht 5' 2.99\" (1.6 m)   Wt 83.2 kg (183 lb 8 oz)   SpO2 94%   Breastfeeding No   BMI 32.51 kg/m²     1430  Updated the daughter about the room change. 1900  Bedside and Verbal shift change report given to [facility]  (oncoming nurse) by Cherelle Sharp RN (offgoing nurse). Report included the following information SBAR, Kardex, Intake/Output, MAR, Accordion, Recent Results, and Cardiac Rhythm NSR .

## 2022-10-23 NOTE — PROGRESS NOTES
Bedside and Verbal shift change report given to [address] [facility] (oncoming nurse) by Marcel Dhillon RN (offgoing nurse). Report included the following information SBAR, Intake/Output, MAR, Recent Results, Cardiac Rhythm: NSR and Alarm Parameters . Primary Nurse Evette Bosworth, RN and Marcel Dhillon RN performed a dual skin assessment on this patient No impairment noted  Mian score is see flowsheets    See flowsheets for all assessments, see MAR for all medication administrations. Bedside and Verbal shift change report given to [address] [place] [address] (oncoming nurse) by Margaret Gregory RN (offgoing nurse). Report included the following information SBAR, Intake/Output, MAR, Recent Results, Cardiac Rhythm: and Alarm Parameters .

## 2022-10-23 NOTE — PROGRESS NOTES
Problem: Falls - Risk of  Goal: *Absence of Falls  Description: Document Nevin Denny Fall Risk and appropriate interventions in the flowsheet. Outcome: Progressing Towards Goal  Note: Fall Risk Interventions:  Mobility Interventions: Bed/chair exit alarm         Medication Interventions: Bed/chair exit alarm    Elimination Interventions: Bed/chair exit alarm, Call light in reach    History of Falls Interventions: Bed/chair exit alarm         Problem: Patient Education: Go to Patient Education Activity  Goal: Patient/Family Education  Outcome: Progressing Towards Goal     Problem: Risk for Spread of Infection  Goal: Prevent transmission of infectious organism to others  Description: Prevent the transmission of infectious organisms to other patients, staff members, and visitors. Outcome: Progressing Towards Goal     Problem: Patient Education:  Go to Education Activity  Goal: Patient/Family Education  Outcome: Progressing Towards Goal     Problem: Pain  Goal: *Control of Pain  Outcome: Progressing Towards Goal  Goal: *PALLIATIVE CARE:  Alleviation of Pain  Outcome: Progressing Towards Goal     Problem: Patient Education: Go to Patient Education Activity  Goal: Patient/Family Education  Outcome: Progressing Towards Goal     Problem: Airway Clearance - Ineffective  Goal: Achieve or maintain patent airway  Outcome: Progressing Towards Goal     Problem: Gas Exchange - Impaired  Goal: Absence of hypoxia  Outcome: Progressing Towards Goal  Goal: Promote optimal lung function  Outcome: Progressing Towards Goal     Problem: Breathing Pattern - Ineffective  Goal: Ability to achieve and maintain a regular respiratory rate  Outcome: Progressing Towards Goal     Problem:  Body Temperature -  Risk of, Imbalanced  Goal: Ability to maintain a body temperature within defined limits  Outcome: Progressing Towards Goal  Goal: Will regain or maintain usual level of consciousness  Outcome: Progressing Towards Goal  Goal: Complications related to the disease process, condition or treatment will be avoided or minimized  Outcome: Progressing Towards Goal     Problem: Isolation Precautions - Risk of Spread of Infection  Goal: Prevent transmission of infectious organism to others  Outcome: Progressing Towards Goal     Problem: Nutrition Deficits  Goal: Optimize nutrtional status  Outcome: Progressing Towards Goal     Problem: Risk for Fluid Volume Deficit  Goal: Maintain normal heart rhythm  Outcome: Progressing Towards Goal  Goal: Maintain absence of muscle cramping  Outcome: Progressing Towards Goal  Goal: Maintain normal serum potassium, sodium, calcium, phosphorus, and pH  Outcome: Progressing Towards Goal     Problem: Loneliness or Risk for Loneliness  Goal: Demonstrate positive use of time alone when socialization is not possible  Outcome: Progressing Towards Goal     Problem: Fatigue  Goal: Verbalize increase energy and improved vitality  Outcome: Progressing Towards Goal     Problem: Patient Education: Go to Patient Education Activity  Goal: Patient/Family Education  Outcome: Progressing Towards Goal     Problem: Nutrition Deficit  Goal: *Optimize nutritional status  Outcome: Progressing Towards Goal     Problem: Pressure Injury - Risk of  Goal: *Prevention of pressure injury  Description: Document Mian Scale and appropriate interventions in the flowsheet.   Outcome: Progressing Towards Goal     Problem: Patient Education: Go to Patient Education Activity  Goal: Patient/Family Education  Outcome: Progressing Towards Goal

## 2022-10-24 NOTE — PROGRESS NOTES
Problem: Falls - Risk of  Goal: *Absence of Falls  Description: Document Zayra Leija Fall Risk and appropriate interventions in the flowsheet.   10/23/2022 2219 by Nikkie Montaño RN  Outcome: Progressing Towards Goal  Note: Fall Risk Interventions:  Mobility Interventions: Bed/chair exit alarm, Communicate number of staff needed for ambulation/transfer, OT consult for ADLs, Patient to call before getting OOB, PT Consult for mobility concerns, PT Consult for assist device competence, Strengthening exercises (ROM-active/passive), Utilize walker, cane, or other assistive device         Medication Interventions: Assess postural VS orthostatic hypotension, Bed/chair exit alarm, Evaluate medications/consider consulting pharmacy, Patient to call before getting OOB, Teach patient to arise slowly    Elimination Interventions: Bed/chair exit alarm, Call light in reach, Patient to call for help with toileting needs, Toilet paper/wipes in reach, Toileting schedule/hourly rounds    History of Falls Interventions: Bed/chair exit alarm, Consult care management for discharge planning, Door open when patient unattended, Evaluate medications/consider consulting pharmacy, Investigate reason for fall, Room close to nurse's station      10/23/2022 2219 by Nikkie Montaño RN  Outcome: Progressing Towards Goal  Note: Fall Risk Interventions:  Mobility Interventions: Bed/chair exit alarm, Communicate number of staff needed for ambulation/transfer, OT consult for ADLs, Patient to call before getting OOB, PT Consult for mobility concerns, PT Consult for assist device competence, Strengthening exercises (ROM-active/passive), Utilize walker, cane, or other assistive device         Medication Interventions: Assess postural VS orthostatic hypotension, Bed/chair exit alarm, Evaluate medications/consider consulting pharmacy, Patient to call before getting OOB, Teach patient to arise slowly    Elimination Interventions: Bed/chair exit alarm, Call light in reach, Patient to call for help with toileting needs, Toilet paper/wipes in reach, Toileting schedule/hourly rounds    History of Falls Interventions: Bed/chair exit alarm, Consult care management for discharge planning, Door open when patient unattended, Evaluate medications/consider consulting pharmacy, Investigate reason for fall, Room close to nurse's station         Problem: Patient Education: Go to Patient Education Activity  Goal: Patient/Family Education  10/23/2022 2219 by Deb Edwards RN  Outcome: Progressing Towards Goal  10/23/2022 2219 by Deb Edwards RN  Outcome: Progressing Towards Goal     Problem: Risk for Spread of Infection  Goal: Prevent transmission of infectious organism to others  Description: Prevent the transmission of infectious organisms to other patients, staff members, and visitors.   10/23/2022 2219 by Deb Edwards RN  Outcome: Progressing Towards Goal  10/23/2022 2219 by Deb Edwards RN  Outcome: Progressing Towards Goal     Problem: Patient Education:  Go to Education Activity  Goal: Patient/Family Education  10/23/2022 2219 by Deb Edwards RN  Outcome: Progressing Towards Goal  10/23/2022 2219 by Deb Edwards RN  Outcome: Progressing Towards Goal     Problem: Pain  Goal: *Control of Pain  10/23/2022 2219 by Deb Edwards RN  Outcome: Progressing Towards Goal  10/23/2022 2219 by Deb Edwards RN  Outcome: Progressing Towards Goal  Goal: *PALLIATIVE CARE:  Alleviation of Pain  10/23/2022 2219 by Deb Edwards RN  Outcome: Progressing Towards Goal  10/23/2022 2219 by Deb Edwards RN  Outcome: Progressing Towards Goal     Problem: Patient Education: Go to Patient Education Activity  Goal: Patient/Family Education  10/23/2022 2219 by Deb Edwards RN  Outcome: Progressing Towards Goal  10/23/2022 2219 by Deb Edwards RN  Outcome: Progressing Towards Goal     Problem: Airway Clearance - Ineffective  Goal: Achieve or maintain patent airway  10/23/2022 2219 by Daya Cabrera RN  Outcome: Progressing Towards Goal  10/23/2022 2219 by Daya Cabrera RN  Outcome: Progressing Towards Goal     Problem: Gas Exchange - Impaired  Goal: Absence of hypoxia  10/23/2022 2219 by Daya Cabrera RN  Outcome: Progressing Towards Goal  10/23/2022 2219 by Daya Cabrera RN  Outcome: Progressing Towards Goal  Goal: Promote optimal lung function  10/23/2022 2219 by Daya Cabrera RN  Outcome: Progressing Towards Goal  10/23/2022 2219 by Daya Cabrera RN  Outcome: Progressing Towards Goal     Problem: Breathing Pattern - Ineffective  Goal: Ability to achieve and maintain a regular respiratory rate  10/23/2022 2219 by Daya Cabrera RN  Outcome: Progressing Towards Goal  10/23/2022 2219 by Daya Cabrera RN  Outcome: Progressing Towards Goal     Problem:  Body Temperature -  Risk of, Imbalanced  Goal: Ability to maintain a body temperature within defined limits  10/23/2022 2219 by Daya Cabrera RN  Outcome: Progressing Towards Goal  10/23/2022 2219 by Daya Cabrera RN  Outcome: Progressing Towards Goal  Goal: Will regain or maintain usual level of consciousness  10/23/2022 2219 by Daya Cabrera RN  Outcome: Progressing Towards Goal  10/23/2022 2219 by Daya Cabrera RN  Outcome: Progressing Towards Goal  Goal: Complications related to the disease process, condition or treatment will be avoided or minimized  10/23/2022 2219 by Daya Cabrera RN  Outcome: Progressing Towards Goal  10/23/2022 2219 by Daya Cabrera RN  Outcome: Progressing Towards Goal     Problem: Isolation Precautions - Risk of Spread of Infection  Goal: Prevent transmission of infectious organism to others  10/23/2022 2219 by Daya Cabrera RN  Outcome: Progressing Towards Goal  10/23/2022 2219 by Daya Cabrera RN  Outcome: Progressing Towards Goal     Problem: Nutrition Deficits  Goal: Optimize nutrtional status  10/23/2022 2219 by Daya Cabrera RN  Outcome: Progressing Towards Goal  10/23/2022 2219 by Jose David Santos RN  Outcome: Progressing Towards Goal     Problem: Risk for Fluid Volume Deficit  Goal: Maintain normal heart rhythm  10/23/2022 2219 by Jose David Santos RN  Outcome: Progressing Towards Goal  10/23/2022 2219 by Jose David Santos RN  Outcome: Progressing Towards Goal  Goal: Maintain absence of muscle cramping  10/23/2022 2219 by Jose David Santos RN  Outcome: Progressing Towards Goal  10/23/2022 2219 by Jose David Santos RN  Outcome: Progressing Towards Goal  Goal: Maintain normal serum potassium, sodium, calcium, phosphorus, and pH  10/23/2022 2219 by Jose David Santos RN  Outcome: Progressing Towards Goal  10/23/2022 2219 by Jose David Santos RN  Outcome: Progressing Towards Goal     Problem: Loneliness or Risk for Loneliness  Goal: Demonstrate positive use of time alone when socialization is not possible  10/23/2022 2219 by Jose David Santos RN  Outcome: Progressing Towards Goal  10/23/2022 2219 by Jose David Santos RN  Outcome: Progressing Towards Goal     Problem: Fatigue  Goal: Verbalize increase energy and improved vitality  10/23/2022 2219 by Jose David Santos RN  Outcome: Progressing Towards Goal  10/23/2022 2219 by Jose David Santos RN  Outcome: Progressing Towards Goal     Problem: Patient Education: Go to Patient Education Activity  Goal: Patient/Family Education  10/23/2022 2219 by Jose David Santos RN  Outcome: Progressing Towards Goal  10/23/2022 2219 by Jose David Santos RN  Outcome: Progressing Towards Goal     Problem: Nutrition Deficit  Goal: *Optimize nutritional status  10/23/2022 2219 by Jose David Santos RN  Outcome: Progressing Towards Goal  10/23/2022 2219 by Jose David Santos RN  Outcome: Progressing Towards Goal     Problem: Pressure Injury - Risk of  Goal: *Prevention of pressure injury  Description: Document Mian Scale and appropriate interventions in the flowsheet.   10/23/2022 2219 by Jose David Santos RN  Outcome: Progressing Towards Goal  Note: Pressure Injury Interventions:  Sensory Interventions: Assess changes in LOC, Assess need for specialty bed, Avoid rigorous massage over bony prominences, Check visual cues for pain, Discuss PT/OT consult with provider, Float heels, Keep linens dry and wrinkle-free, Maintain/enhance activity level, Minimize linen layers, Monitor skin under medical devices, Pad between skin to skin, Pressure redistribution bed/mattress (bed type), Turn and reposition approx. every two hours (pillows and wedges if needed)    Moisture Interventions: Absorbent underpads, Apply protective barrier, creams and emollients, Assess need for specialty bed, Internal/External urinary devices, Maintain skin hydration (lotion/cream), Moisture barrier, Minimize layers    Activity Interventions: Assess need for specialty bed, Increase time out of bed, Pressure redistribution bed/mattress(bed type), PT/OT evaluation    Mobility Interventions: Assess need for specialty bed, HOB 30 degrees or less, Pressure redistribution bed/mattress (bed type), PT/OT evaluation, Turn and reposition approx.  every two hours(pillow and wedges)    Nutrition Interventions: Document food/fluid/supplement intake    Friction and Shear Interventions: Apply protective barrier, creams and emollients, Foam dressings/transparent film/skin sealants, HOB 30 degrees or less, Lift team/patient mobility team, Minimize layers, Lift sheet, Transferring/repositioning devices             10/23/2022 2219 by Daya Cabrera RN  Outcome: Progressing Towards Goal     Problem: Patient Education: Go to Patient Education Activity  Goal: Patient/Family Education  10/23/2022 2219 by Daya Cabrera RN  Outcome: Progressing Towards Goal  10/23/2022 2219 by Daya Cabrera RN  Outcome: Progressing Towards Goal

## 2022-10-24 NOTE — PROGRESS NOTES
RRT RN Note    Pt rounded on for MEWS of 3. Pt is currently positive for Covid and has hx of lymphoma, COPD, smoking, more see notes. Per Primary RN, Clive Lucero, pt on high flow NC. Pt already receiving treatment for covid. RT and Primary RN titrating HFNC for sat goal >90%. No further interventions required at this time.

## 2022-10-24 NOTE — PROGRESS NOTES
1900-Bedside and Verbal shift change report given to Cass Green (oncoming nurse) by Lorie Horn (offgoing nurse). Report included the following information SBAR, Kardex, Intake/Output, MAR, Recent Results, Cardiac Rhythm nsr, Alarm Parameters , and Quality Measures. See flowsheets for all assessments. See MAR for all medication administrations. 0700-Bedside and Verbal shift change report given to Lindsey Snow (oncoming nurse) by Cass Green (offgoing nurse). Report included the following information SBAR, Kardex, Intake/Output, MAR, Recent Results, Cardiac Rhythm nsr, Alarm Parameters , and Quality Measures.

## 2022-10-24 NOTE — PROGRESS NOTES
Problem: Falls - Risk of  Goal: *Absence of Falls  Description: Document Jes Lyons Fall Risk and appropriate interventions in the flowsheet. Outcome: Progressing Towards Goal  Note: Fall Risk Interventions:  Mobility Interventions: Bed/chair exit alarm, Communicate number of staff needed for ambulation/transfer, OT consult for ADLs, Patient to call before getting OOB, PT Consult for mobility concerns, PT Consult for assist device competence, Strengthening exercises (ROM-active/passive), Utilize walker, cane, or other assistive device         Medication Interventions: Assess postural VS orthostatic hypotension, Bed/chair exit alarm, Evaluate medications/consider consulting pharmacy, Patient to call before getting OOB, Teach patient to arise slowly    Elimination Interventions: Bed/chair exit alarm, Call light in reach, Patient to call for help with toileting needs, Toilet paper/wipes in reach, Toileting schedule/hourly rounds    History of Falls Interventions: Bed/chair exit alarm, Consult care management for discharge planning, Door open when patient unattended, Evaluate medications/consider consulting pharmacy, Investigate reason for fall, Room close to nurse's station         Problem: Patient Education: Go to Patient Education Activity  Goal: Patient/Family Education  Outcome: Progressing Towards Goal     Problem: Risk for Spread of Infection  Goal: Prevent transmission of infectious organism to others  Description: Prevent the transmission of infectious organisms to other patients, staff members, and visitors.   Outcome: Progressing Towards Goal     Problem: Patient Education:  Go to Education Activity  Goal: Patient/Family Education  Outcome: Progressing Towards Goal     Problem: Pain  Goal: *Control of Pain  Outcome: Progressing Towards Goal  Goal: *PALLIATIVE CARE:  Alleviation of Pain  Outcome: Progressing Towards Goal     Problem: Patient Education: Go to Patient Education Activity  Goal: Patient/Family Education  Outcome: Progressing Towards Goal     Problem: Airway Clearance - Ineffective  Goal: Achieve or maintain patent airway  Outcome: Progressing Towards Goal     Problem: Gas Exchange - Impaired  Goal: Absence of hypoxia  Outcome: Progressing Towards Goal  Goal: Promote optimal lung function  Outcome: Progressing Towards Goal     Problem: Breathing Pattern - Ineffective  Goal: Ability to achieve and maintain a regular respiratory rate  Outcome: Progressing Towards Goal     Problem:  Body Temperature -  Risk of, Imbalanced  Goal: Ability to maintain a body temperature within defined limits  Outcome: Progressing Towards Goal  Goal: Will regain or maintain usual level of consciousness  Outcome: Progressing Towards Goal  Goal: Complications related to the disease process, condition or treatment will be avoided or minimized  Outcome: Progressing Towards Goal     Problem: Isolation Precautions - Risk of Spread of Infection  Goal: Prevent transmission of infectious organism to others  Outcome: Progressing Towards Goal     Problem: Nutrition Deficits  Goal: Optimize nutrtional status  Outcome: Progressing Towards Goal     Problem: Risk for Fluid Volume Deficit  Goal: Maintain normal heart rhythm  Outcome: Progressing Towards Goal  Goal: Maintain absence of muscle cramping  Outcome: Progressing Towards Goal  Goal: Maintain normal serum potassium, sodium, calcium, phosphorus, and pH  Outcome: Progressing Towards Goal     Problem: Loneliness or Risk for Loneliness  Goal: Demonstrate positive use of time alone when socialization is not possible  Outcome: Progressing Towards Goal     Problem: Fatigue  Goal: Verbalize increase energy and improved vitality  Outcome: Progressing Towards Goal     Problem: Patient Education: Go to Patient Education Activity  Goal: Patient/Family Education  Outcome: Progressing Towards Goal     Problem: Nutrition Deficit  Goal: *Optimize nutritional status  Outcome: Progressing Towards Goal Problem: Pressure Injury - Risk of  Goal: *Prevention of pressure injury  Description: Document Mian Scale and appropriate interventions in the flowsheet.   Outcome: Progressing Towards Goal     Problem: Patient Education: Go to Patient Education Activity  Goal: Patient/Family Education  Outcome: Progressing Towards Goal

## 2022-10-24 NOTE — PROGRESS NOTES
Brief ICU Nutrition Assessment    Type and Reason for Visit: Reassess    Nutrition Recommendations/Plan:   Brief follow up. Noted patient started on PPN per family practice. Please see below for recommended goal rate. Provide PPN per below:       10/24: D10, AA4.25% @ 63 mL/hour  10/25: D10, AA4.25% @ 83 mL/hour (goal)     PPN at goal rate 83 mL/hour with lipids 2x/week provides 1159 kcal (59% needs); 84 g protein (88% needs)            - Monitor K, Phos, Mg until stable x 3 days with nutrition, replace PRN       - Hold TPN at current rate if electrolytes are not stable    2.   Lipids: Check triglycerides and if <400, add lipids per below        - Lipids 2x/week per pharmacy    Lab Results   Component Value Date/Time    GFR est AA >60 10/03/2022 06:44 AM    GFR est non-AA >60 10/03/2022 06:44 AM    Creatinine 1.11 (H) 10/24/2022 12:04 AM    BUN 31 (H) 10/24/2022 12:04 AM    Sodium 134 (L) 10/24/2022 12:04 AM    Potassium 4.4 10/24/2022 12:04 AM    Chloride 101 10/24/2022 12:04 AM    CO2 28 10/24/2022 12:04 AM     Lab Results   Component Value Date/Time    Glucose 161 (H) 10/24/2022 12:04 AM    Glucose (POC) 98 05/04/2022 01:37 PM     Magnesium   Date Value Ref Range Status   10/24/2022 2.1 1.6 - 2.4 mg/dL Final   10/23/2022 1.9 1.6 - 2.4 mg/dL Final     Lab Results   Component Value Date/Time    Calcium 8.1 (L) 10/24/2022 12:04 AM    Phosphorus 3.0 10/24/2022 12:04 AM       Estimated Nutrition Needs:   Energy: 9158-5660 (25-30 kcal)  Wt used: Current  Protein:  (1.2-1.5 g/kg)  Wt used: Current   Fluid: [id number]       Electronically signed by Joesph Pedraza [address], [address]   Contact: [phone] or via [employer]

## 2022-10-24 NOTE — PROGRESS NOTES
Problem: Mobility Impaired (Adult and Pediatric)  Goal: *Acute Goals and Plan of Care (Insert Text)  Description: FUNCTIONAL STATUS PRIOR TO ADMISSION: Patient was independent and active without use of DME.    HOME SUPPORT PRIOR TO ADMISSION: The patient lived with 2 of her children but did not require assist.    Physical Therapy Goals  Initiated 10/24/2022  All goals to be completed with least restrictive supplemental oxygen and spo2 >/= 88%. 1.  Patient will move from supine to sit and sit to supine , scoot up and down, and roll side to side in bed with modified independence within 7 day(s). 2.  Patient will transfer from bed to chair and chair to bed with modified independence using the least restrictive device within 7 day(s). 3.  Patient will perform sit to stand with modified independence within 7 day(s). 4.  Patient will ambulate with modified independence for 50 feet with the least restrictive device within 7 day(s). 5.  Patient will ascend/descend 5 stairs with 1-2 handrail(s) with minimal assistance/contact guard assist within 7 day(s). Outcome: Not Met   PHYSICAL THERAPY EVALUATION  Patient: Moiz Fajardo (30 y.o. female)  Date: 10/24/2022  Primary Diagnosis: COVID [U07.1]  Lymphoma ([facility] [C85.90]  Acute [facility] failure with [facility] [J96.01]       Precautions:   Fall (droplet +)      ASSESSMENT  Based on the objective data described below, the patient presents with increased supplemental oxygen requirements from baseline (none), poor activity tolerance, dyspnea on exertion, impaired standing balance, impaired gait and overall reduced functional mobility from her independent baseline in the setting of hospital admission with + COVID-19. PMH notable for lymphoma, COPD. Patient received on 50L HFNC at 100% FiO2. When mobilizing, patient drops as low as 88% on this level of oxygen and has difficulty sequencing pursed lip breathing despite constant cuing.  She recovers within 2 minutes of seated rest break >90%. She performs bed mobility with SBA and use of bed rails. Hai for sit <> stand and short distance ambulation to bedside commode and bedside chair with HHAx1. Patient demonstrates anxious behaviors after mobilizing with increased dyspnea and respiratory rate. Recommendations TBD pending medical stability and progress with therapy. Current Level of Function Impacting Discharge (mobility/balance): minAx1 via HHA    Functional Outcome Measure: The patient scored Total Score: 9/28 on the Tinetti outcome measure which is indicative of high fall risk. Other factors to consider for discharge: none additional     Patient will benefit from skilled therapy intervention to address the above noted impairments. PLAN :  Recommendations and Planned Interventions: bed mobility training, transfer training, gait training, therapeutic exercises, patient and family training/education, and therapeutic activities      Frequency/Duration: Patient will be followed by physical therapy:  5 times a week to address goals. Recommendation for discharge: (in order for the patient to meet his/her long term goals)  To be determined: pending medical stability and progress with therapy    This discharge recommendation:  Has not yet been discussed the attending provider and/or case management    IF patient discharges home will need the following DME: to be determined (TBD)         SUBJECTIVE:   Patient stated Erin Tee it hurt? Patria Felty re: patient asking if therapy will be painful at start of session; therapists provided encouragement and emphasized we will take our time for all mobility     OBJECTIVE DATA SUMMARY:   Patient received supine in bed and was agreeable to participate in PT session. Patient was cleared by nursing to participate in PT session.      Vitals:    10/24/22 1037 10/24/22 1056 10/24/22 1207   BP: (!) 149/76 (P) 139/81 122/70   BP 1 Location:  (P) Right upper arm Right upper arm   BP Patient Position:  (P) Supine Sitting   Pulse:  (P) 78 79   Temp:   97.3 °F (36.3 °C)   Resp:   22   Height: 5' 3\" (1.6 m)     Weight: 83 kg (183 lb)     SpO2:  (P) 95% 98%         HISTORY:    Past Medical History:   Diagnosis Date    Adverse effect of anesthesia     PHLEGM IN THROAT POST OP & NEEDED X2 BREATHING TREATMENTS    Anxiety     COPD (chronic obstructive pulmonary disease) ([facility]) 2022    emphysema    Depression     GERD (gastroesophageal reflux disease)     Hernia, femoral     since childhood    Hypertension     Joint pain     Nausea & vomiting     TMJ arthritis      Past Surgical History:   Procedure Laterality Date    HX CERVICAL FUSION  2012    C5-C6    HX CHOLECYSTECTOMY  2020    HX COLONOSCOPY      HX ENDOSCOPY      HX HYSTERECTOMY  2019    HX TONSILLECTOMY      AS A CHILD    HX WISDOM TEETH EXTRACTION      TEENAGER    IR INSERT TUNL CVC W PORT OVER 5 YEARS  4/29/2022       Personal factors and/or comorbidities impacting plan of care: none additional    Home Situation  Home Environment: Private residence  # Steps to Enter: 5  Rails to Enter: Yes  Hand Rails : Bilateral  One/Two Story Residence: One story  Living Alone: No  Support Systems: Other Family Member(s)  Patient Expects to be Discharged to[de-identified] Home  Current DME Used/Available at Home: None  Tub or Shower Type: Tub/Shower combination    EXAMINATION/PRESENTATION/DECISION MAKING:   Critical Behavior:  Neurologic State: Alert  Orientation Level: Oriented X4  Cognition: Follows commands     Hearing: Auditory  Auditory Impairment: None  Skin:  all observed intact  Edema: defer to RN   Range Of Motion:  AROM: Generally decreased, functional                       Strength:    Strength: Generally decreased, functional                    Tone & Sensation:   Tone: Normal                              Coordination:  Coordination: Generally decreased, functional  Vision:      Functional Mobility:  Bed Mobility:  Rolling: Stand-by assistance; Adaptive equipment  Supine to Sit: Contact guard assistance;Assist x2     Scooting: Contact guard assistance  Transfers:  Sit to Stand: Minimum assistance;Assist x1  Stand to Sit: Minimum assistance;Assist x1  Stand Pivot Transfers: Minimum assistance;Assist x1     Bed to Chair: Minimum assistance;Assist x1              Balance:   Sitting: Impaired; Without support  Sitting - Static: Good (unsupported)  Sitting - Dynamic: Fair (occasional)  Standing: Impaired; With support  Standing - Static: Good  Standing - Dynamic : Fair;Constant support  Ambulation/Gait Training:  Distance (ft): 3 Feet (ft) (x2)  Assistive Device: Gait belt (HHAx1)  Ambulation - Level of Assistance: Minimal assistance;Assist x1        Gait Abnormalities: Trunk sway increased; Step to gait        Base of Support: Widened     Speed/Joelle: Pace decreased (<100 feet/min)                         Therapeutic Exercises:   - frequent review and demonstration of pursed lip breathing     COVID Education:  - pursed lip breathing  - sitting up in the chair over staying in the bed as much as tolerated during the day  - activity pacing       Functional Measure:  Tinetti test:    Sitting Balance: 1  Arises: 0  Attempts to Rise: 0  Immediate Standing Balance: 0  Standing Balance: 0  Nudged: 0  Eyes Closed: 0  Turn 360 Degrees - Continuous/Discontinuous: 0  Turn 360 Degrees - Steady/Unsteady: 0  Sitting Down: 1  Balance Score: 2 Balance total score  Indication of Gait: 1  R Step Length/Height: 1  L Step Length/Height: 1  R Foot Clearance: 1  L Foot Clearance: 1  Step Symmetry: 1  Step Continuity: 0  Path: 1  Trunk: 0  Walking Time: 0  Gait Score: 7 Gait total score  Total Score: 9/28 Overall total score         Tinetti Tool Score Risk of Falls  <19 = High Fall Risk  19-24 = Moderate Fall Risk  25-28 = Low Fall Risk  Tinetti ME. Performance-Oriented Assessment of Mobility Problems in Elderly Patients. [place] 66; [id number].  (Scoring Description: PT Bulletin Feb. 10, 46)    Older adults: (Raimundo et al, 2009; n = 1000 [facility] elderly evaluated with ABC, BRIAN, ADL, and IADL)  · Mean BRIAN score for males aged 69-68 years = 26.21(3.40)  · Mean BRIAN score for females age 69-68 years = 25.16(4.30)  · Mean BRIAN score for males over 80 years = 23.29(6.02)  · Mean BRIAN score for females over 80 years = 17.20(8.32)        Physical Therapy Evaluation Charge Determination   History Examination Presentation Decision-Making   MEDIUM  Complexity : 1-2 comorbidities / personal factors will impact the outcome/ POC  MEDIUM Complexity : 3 Standardized tests and measures addressing body structure, function, activity limitation and / or participation in recreation  MEDIUM Complexity : Evolving with changing characteristics  MEDIUM Complexity : FOTO score of 26-74      Based on the above components, the patient evaluation is determined to be of the following complexity level: MEDIUM    Pain Rating:  Patient without reports of pain during therapy      Activity Tolerance:   Fair and requires rest breaks      After treatment patient left in no apparent distress:   Sitting in chair, Heels elevated for pressure relief, Call bell within reach, and Bed / chair alarm activated    COMMUNICATION/EDUCATION:   The patients plan of care was discussed with: Occupational therapist, Registered nurse, and Physician. Fall prevention education was provided and the patient/caregiver indicated understanding. and Patient/family have participated as able in goal setting and plan of care.     Thank you for this referral.  Napoleon Brennan PT, DPT   Time Calculation: 33 mins

## 2022-10-24 NOTE — PROGRESS NOTES
[facility] at [address] [facility]  [facility]: [phone]  F: [phone] Patient ID  Name: Fernando Myers  YOB: 1955  MRN: [id number]  Referring Provider:   No referring provider defined for this encounter. 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 Appetite remains poor. Drinking Ensures. No complaints at present. Friend at bedside. Objective:      Visit Vitals  /70 (BP 1 Location: Right upper arm, BP Patient Position: Sitting)   Pulse 79   Temp 97.3 °F (36.3 °C)   Resp 22   Ht 5' 3\" (1.6 m)   Wt 183 lb (83 kg)   SpO2 98%   Breastfeeding No   BMI 32.42 kg/m²     ECOG: 3-4  General: ill appearing, no acute distress,  Respiratory: normal respiratory effort: O2 on high flow  CV: slight LE peripheral edema  Skin: no rashes; no ecchymoses; no petechiae  Psych: alert, oriented, flat mood/affect       Results:        Lab Results   Component Value Date/Time    WBC 9.6 10/24/2022 12:04 AM    HGB 8.3 (L) 10/24/2022 12:04 AM    HCT 24.9 (L) 10/24/2022 12:04 AM    PLATELET 877 24/89/4595 12:04 AM    MCV 91.5 10/24/2022 12:04 AM     Lab Results   Component Value Date/Time    Sodium 134 (L) 10/24/2022 12:04 AM    Potassium 4.4 10/24/2022 12:04 AM    Chloride 101 10/24/2022 12:04 AM    CO2 28 10/24/2022 12:04 AM    Anion gap 5 10/24/2022 12:04 AM    Glucose 161 (H) 10/24/2022 12:04 AM    BUN 31 (H) 10/24/2022 12:04 AM    Creatinine 1.11 (H) 10/24/2022 12:04 AM    BUN/Creatinine ratio 28 (H) 10/24/2022 12:04 AM    GFR est AA >60 10/03/2022 06:44 AM    GFR est non-AA >60 10/03/2022 06:44 AM    Calcium 8.1 (L) 10/24/2022 12:04 AM    Bilirubin, total 0.3 10/24/2022 12:04 AM    Alk.  phosphatase 120 (H) 10/24/2022 12:04 AM    Protein, total 4.6 (L) 10/24/2022 12:04 AM Albumin 2.2 (L) 10/24/2022 12:04 AM    Globulin 2.4 10/24/2022 12:04 AM    A-G Ratio 0.9 (L) 10/24/2022 12:04 AM    ALT (SGPT) 429 (H) 10/24/2022 12:04 AM    AST (SGOT) 148 (H) 10/24/2022 12:04 AM       10/17/22 CTA  Chest  IMPRESSION  Diffuse, severe COVID 19 pneumonia. No pulmonary embolism. 10/24/22 XR CHEST  IMPRESSION  1. Bilateral pneumonia consistent with Covid pneumonia has mildly increased at the left lung base. Assessment and Recommendations:      COVID-19 infection  Tested positive on 10/6/22.   10/14 pulmonary: abx changed to vanc and cefepime for superimposed bacterial PNA  10/17 chest CTA showed diffuse severe COVID PNA. Pulmonary following. Patient continues on high flow O2  -- Continue high dose steroids, antibiotics, supportive care    2. Diffuse large B-cell lymphoma of lymph nodes of neck ([facility]  Management is with curative intent. Completed C5 da-REPOCH on 10/7/22. Was due for C6 on 10/24, but holding treatment given hypoxic resp failure. Good response to treatment noted on scans, it is quite possible she may be cured without completing her final cycle. Pt received filgrastim-aafi (NIVESTYM) 420 mcg daily x 6 doses; last dose on 10/16. 3. Thrombocytopenia, Anemia  Secondary to chemo effect. Continues to improve. 4. Thrush: Improved on diflucan daily    5. Nutrition: Very poor oral intake. Eating only small amts. --  TPN in use. 6. Diarrhea:  10/12 C-diff: negative. Now improved and Imodium stopped. 7. Goals of care: With her cancer under control and hopefully now in remission, her current active issue is her respiratory failure secondary to [address]. Have reviewed with pulmonary team and they are hopeful that the patient will eventually recover from this, though it likely will be a very long and slow recovery. Recommend continuing to concentrate on her nutrition and treatment of respiratory issues.    -palliative team following       I personally saw and evaluated the patient and performed the key components of medical decision making. The history, physical exam, and documentation were performed by Deysi Mcneill NP. I reviewed and verified the above documentation and modified it as needed. She is slowly improving from Winda Backbone. Likely will be a long and slow recovery. Encouraged her to work on oral nutrition. Continue supportive care. Dr. Esau Perez will return tomorrow.

## 2022-10-24 NOTE — PROGRESS NOTES
[address] [facility] [address] [address]   Office [phone]  Fax [phone]          Subjective / Objective     24 Hour Events: No acute events. Subjective: Awake and alert. Reports persistent cough and fatigue. Continues to complain about discomfort of HFNC. Temp (24hrs), Av.1 °F (36.7 °C), Min:97.4 °F (36.3 °C), Max:99.4 °F (37.4 °C)     Physical Exam  Constitutional:       General: She is not in acute distress. Appearance: Normal appearance. She is ill-appearing. HENT:      Head: Normocephalic and atraumatic. Right Ear: External ear normal.      Left Ear: External ear normal.   Cardiovascular:      Rate and Rhythm: Normal rate and regular rhythm. Pulmonary:      Breath sounds: Rhonchi present. Comments: Increased work of breathing. Mild tachypnea. 50L high flow saturations in the low 90s  Abdominal:      General: There is no distension. Palpations: There is no mass. Musculoskeletal:         General: No swelling. Cervical back: Normal range of motion. Skin:     General: Skin is warm and dry. Neurological:      Mental Status: She is alert. Mental status is at baseline. Psychiatric:         Mood and Affect: Mood normal.      Respiratory: O2 Flow Rate (L/min): 50 l/min O2 Device: Hi flow nasal cannula     I/O:  Date 10/23/22 0700 - 10/24/22 0659 10/24/22 07 - 10/25/22 0659   Shift 1900-0659 24 Hour Total 1900-0659 24 Hour Total   INTAKE   I.V.(mL/kg/hr) 252(0.3) 415.8(0.4) 667.8(0.3)        Volume (TPN ADULT - PERIPHERAL AA 4.25% D10% W/ ELECTROLYTES AND CA) 252  252        Volume (TPN ADULT - PERIPHERAL AA 4.25% D10% W/ ELECTROLYTES AND CA)  415.8 415.8      Shift Total(mL/kg) 252(3) 415.8(5) 667. 8(8)      OUTPUT   Urine(mL/kg/hr)  600(0.6) 600(0.3)        Urine Voided  600 600        Urine Occurrence(s) 1 x 1 x 2 x      Stool           Stool Occurrence(s) 1 x 1 x 2 x      Shift Total(mL/kg)  600(7.2) 600(7.2)       -184.2 67.8 Weight (kg) 83.2 83.2 83.2 83 83 83         Inpatient Medications  Current Facility-Administered Medications   Medication Dose Route Frequency    heparin (porcine) pf 500 Units  500 Units InterCATHeter PRN    TPN ADULT - PERIPHERAL AA 4.25% D10% W/ ELECTROLYTES AND CA   IntraVENous CONTINUOUS    ipratropium-albuterol (COMBIVENT RESPIMAT) 20 mcg-100 mcg inhalation spray  1 Puff Inhalation Q6HWA RT    FLUoxetine (PROzac) capsule 60 mg  60 mg Oral DAILY    LORazepam (ATIVAN) tablet 0.5 mg  0.5 mg Oral Q3H PRN    ALPRAZolam (XANAX) tablet 0.25 mg  0.25 mg Oral BID    morphine injection 2 mg  2 mg IntraVENous Q3H PRN    morphine IR (MS IR) tablet 15 mg  15 mg Oral Q8H PRN    pantoprazole (PROTONIX) tablet 40 mg  40 mg Oral ACB    enoxaparin (LOVENOX) injection 40 mg  40 mg SubCUTAneous Q24H    methylPREDNISolone (PF) (SOLU-MEDROL) injection 60 mg  60 mg IntraVENous Q8H    lidocaine-prilocaine (EMLA) 2.5-2.5 % cream   Topical PRN    loperamide (IMODIUM) capsule 2 mg  2 mg Oral Q4H PRN    L.acidophilus-paracasei-S.thermophil-bifidobacter (RISAQUAD) 8 billion cell capsule  1 Capsule Oral DAILY    sodium chloride (OCEAN) 0.65 % nasal squeeze bottle 2 Spray  2 Spray Both Nostrils Q2H PRN    lisinopriL (PRINIVIL, ZESTRIL) tablet 20 mg  20 mg Oral DAILY    magic mouthwash (FIRST-MOUTHWASH BLM) oral suspension 5 mL  5 mL Oral Q4H PRN    fluconazole (DIFLUCAN) tablet 100 mg  100 mg Oral DAILY    sodium chloride (NS) flush 5-40 mL  5-40 mL IntraVENous Q8H    sodium chloride (NS) flush 5-40 mL  5-40 mL IntraVENous PRN    acetaminophen (TYLENOL) tablet 650 mg  650 mg Oral Q6H PRN    Or    acetaminophen (TYLENOL) suppository 650 mg  650 mg Rectal Q6H PRN    ondansetron (ZOFRAN) injection 4 mg  4 mg IntraVENous Q6H PRN    amLODIPine (NORVASC) tablet 5 mg  5 mg Oral DAILY    ondansetron (ZOFRAN ODT) tablet 8 mg  8 mg Oral Q8H PRN    prochlorperazine (COMPAZINE) tablet 10 mg  10 mg Oral Q6H PRN    trimethoprim-sulfamethoxazole (BACTRIM DS, SEPTRA DS) 160-800 mg per tablet 1 Tablet  1 Tablet Oral Q MON, WED & FRI    mometasone-formoterol (DULERA) 200mcg-5mcg/puff  2 Puff Inhalation BID RT    nicotine (NICODERM CQ) 21 mg/24 hr patch 1 Patch  1 Patch TransDERmal DAILY         Allergies  Allergies   Allergen Reactions    Oxycodone Nausea Only     CBC:  Recent Labs     10/24/22  0004 10/23/22  0024 10/22/22  0540   WBC 9.6 7.8 6.7   HGB 8.3* 7.9* 7.9*   HCT 24.9* 24.3* 23.6*    144* 050*       Metabolic Panel:  Recent Labs     10/24/22  0004 10/23/22  0024 10/22/22  0540   * 137 139   K 4.4 4.1 4.0    104 107   CO2 28 28 25   BUN 31* 28* 26*   CREA 1.11* 1.18* 1.15*   * 146* 122*   CA 8.1* 8.0* 8.3*   MG 2.1 1.9  --    PHOS 3.0 3.5  --    ALB 2.2* 2.3* 2.2*   * 343* 218*              Assessment and Plan     Jyothi Beckett is a 78 y.o. female with PMH of diffuse large B-cell lymphoma w/ bone mets, COPD, tobacco dependence, insomnia, MDD, and anxiety who was admitted for filgrastim injections and COVID-19 infection. Patient has made no significant clinical improvement over the course of the past 3-4 days and continues to require HFNC at 50L while on high dose steroids. Able to wean some during the day but continually needs 40-50L at night. AHRF in setting of COVID-19: On 40L HFNC. CXR 10/17 increasing bilateral pulmonary infiltrates. CTA 10/17 no PE but severe COVID PNA and trace b/l pleural effusions. LA and procal negative. Inflammatory markers elevated. BNP 2,422. S/p vanc and cefepime (10/14-10/21)  - Pulm following; Solumedrol 60mg q8hrs. Wean O2 as tolerated for sat > 90%  - Palliative on consult; IV morphine and Ativan q3h prn. Scheduled xanax 0.25 mg bid. - Patient does not desire comfort measures at this time; plan to proceed with current level of care  - Do not intubate.  No Bipap    Mild malnutrition per nutrition  - PPN at 63mL/hr peripherally    COPD not on home O2.  - Combivent respimat 20mcg-100mcg q4hrs  - Dulera 200mcg-5mcg 2 puffs BID     DLBCL with pancytopenia Follows with Dr. Lam Ferraro. Completed C5 chemo on 10/7  - s/p 1 wk daily Filgrastim injections  - Zofran and compazine q6-8 hrs prn nausea  - heme/onc consulted, appreciate recs     Thrush/Mouth Pain - resolved   - Continue Diflucan 100 mg daily  - Magic Mouthwash as needed before meals for discomfort     Pancytopenia Likely 2/2 chemotherapy. Improved  - Continuing AC with lovenox 40mg daily  - Hold any AC with plt <50  - Transfuse if plt below 10  - Daily CBC  - Oncology consulted, appreciate rec's      Diarrhea  - Imodium 2mg BID  - C. Diff negative on 10/12     Elevated Cr Likely 2/2 poor PO intake. Stable  - Encouraged PO fluid consumption  - Monitor daily BMP     Hypertension POA BP was 92/66.   - Continue home Amplodipine-benazepril 5-20 daily      Hypokalemia POA K 2.9. Improved  - Replete PRN  - Follow on daily BMP     MDD/RENETTA Chronic, stable. - continue Xanax 0.25mg bid  - continue Prozac 60mg daily     Tobacco dependence About 40 pack years. Currently smokes about 5 cigarettes per day. - Nicotine patch 21 daily    FEN/GI - Regular diet. With supplementation. Activity - Ambulate as tolerated  DVT prophylaxis - Lovenox  GI prophylaxis - Not indicated at this time  Disposition - Plan to d/c to TBD. Code Status - DNR.  No Bipap    I appreciate the opportunity to participate in the care of this patient,  Aaron Oliveira DO  Family Medicine Resident       For Billing    Chief Complaint   Patient presents with    Medication Problem       Hospital Problems  Date Reviewed: 9/23/2022            Codes Class Noted POA    Pneumonia due to COVID-19 virus ICD-10-CM: U07.1, J12.82  ICD-9-CM: 480.8, 079.89  10/21/2022 Unknown        * (Principal) Acute hypoxemic respiratory failure ([facility] ICD-10-CM: J96.01  ICD-9-CM: 518.81  10/21/2022 Unknown        Lymphoma ([facility] ICD-10-CM: C85.90  ICD-9-CM: 202.80  10/10/2022 Unknown        COVID ICD-10-CM: U07.1  ICD-9-CM: 079.89  10/10/2022 Unknown        Acute respiratory failure with hypoxia [facility]) ICD-10-CM: J96.01  ICD-9-CM: 518.81  9/13/2022 Unknown        Diarrhea of presumed infectious origin ICD-10-CM: R19.7  ICD-9-CM: 009.3  6/13/2022 Yes

## 2022-10-24 NOTE — PROGRESS NOTES
0700: Bedside and Verbal shift change report given to Lindsey Snow (oncoming nurse) by Maggie Land RN (offgoing nurse). Report included the following information SBAR, Kardex, Accordion, and Cardiac Rhythm NSR . This patient was assisted with Intentional Toileting every 2 hours during this shift as appropriate. Documentation of ambulation and output reflected on Flowsheet as appropriate. Purposeful hourly rounding was completed using AIDET and 5Ps. Outcomes of PHR documented as they occurred. Bed alarm in use as appropriate. Dual Suction and ambubag in place.

## 2022-10-24 NOTE — PROGRESS NOTES
Palliative Medicine Consult  Lemuel: [phone])    Patient Name: Say Wilson  YOB: 1955    Date of Initial Consult: 10/19/2022  Reason for Consult: Care Decisions  Requesting Provider: Dr. Pradip Archer   Primary Care Physician: Bret Nichols MD     SUMMARY:   Say Wilson is a 78 y.o. with metastatic diffuse large B-cell lymphoma recently undergoing da-R-EPOCH chemotherapy and filgrastim injections, COPD, MDD, anxiety, tobacco use who was admitted on 10/10/2022 from home with a diagnosis of COVID-19 infection. Patient has declined significantly in the last 24 hours and is now requiring HFNC with evidence of hypoxia with activity/exertion. She is receiving high dose steroids and antibiotics. CTA performed on 10/17 demonstrated worsening of diffuse effects of COVID-19 pneumonia compared to CTA on 10/11. Current medical issues leading to Palliative Medicine involvement include: Care Decisions. Social:  Patient has two children. Patient does not have an AMD.     PALLIATIVE DIAGNOSES:   Encounter for Palliative Care  Code status discussion   Physical Debility/Generalized weakness  Generalized pain  Chronic anxiety  Acute hypoxic respiratory failure due to covid-19 viral pneumonia  DLBCL- s/p 5 cycles da-REPOCH with curative intent     PLAN:   Pt assessed with family medicine resident Dr. Carty Courser. No family at bedside, but she was joined by her close friend at the end of the visit. Note pt's benzodiazepine has been escalated over the weekend, currently prescribed lorazepam 0.5 mg every 3 hours as needed in addition to scheduled alprazolam 0.25 mg BID. She also had told Dr. Avelino Cody that the liquid oral morphine is not tolerable, she has both tablet and IV options now, but was given only two doses of the oral 150 mg in the past 24 hrs. Elizabeth Greenberg asked if her lorazepam could be in IV form, pills are getting harder to take.   I recommended a SL formulation for ease of administration- changed to lorazepam 0.5 mg SL q3h prn and discontinued the scheduled Xanax. By escalating to IV form we further risk respiratory depression, she accepted this and will use the SL. This was a segue to talk about intubation preference, she didn't clearly answer before her close friend arrived, which limited conversation. Encouraged patient, with her friends support, to practice mindfulness and meditation. Called her daughter Trisha Brooks to check in and provide a medical update. I was thor with her that even though her room location has changed from the ICU to stepdown, her mother's pneumonia remains severe and she is critically ill. I explained supportive care and time. We talked about holding both hope for recovery and being realistic that she could die from this at the same time. Though hard to discuss, Trisha Brooks does want honest conversation. She shared it is very hard to visit her and see her go through this, the night when she experienced delirium was particularly tough. Talked to Trisha Brooks as well about her mother's code status- namely acceptance of intubation. She reports that her mother had shared with her she wouldn't want this, but is aware she had told the medical team otherwise, she isn't sure if her mother had understood correctly. I plan to revisit tomorrow to readdress this. If possible, may call Trisha Brooks from the room to support the conversation. Thank you for the opportunity to participate in the care of Milli Barrientos  Following with you.       GOALS OF CARE / TREATMENT PREFERENCES:     GOALS OF CARE:       TREATMENT PREFERENCES:   Code Status: DNR    Patient and family's personal goals include: daughter does not want her mom to suffer    Important upcoming milestones or family events: n/a    The patient identifies the following as important for living well: patient cannot state/participate d/t medical condition      Advance Care Planning:  [x] The [facility] Interdisciplinary Team has updated the ACP Navigator with Health Care Decision Maker and Patient Capacity      Primary Decision Maker: Raheel Salamanca - Daughter - [phone]    Advance Care Planning 10/22/2022   Patient's Healthcare Decision Maker is: -   Confirm Advance Directive Yes, on file   Patient Would Like to Complete Advance Directive -   Does the patient have other document types -               Other:    As far as possible, the palliative care team has discussed with patient / health care proxy about goals of care / treatment preferences for patient. HISTORY:     History obtained from: chart, patient, daughter, friend    CHIEF COMPLAINT: fatigue    HPI/SUBJECTIVE:    The patient is:   [x] Verbal and participatory though limited by fatigue   [] Non-participatory due to:     10/19: Patient is awake but appears very fatigue. She says she is feeling better than yesterday but does say she is still having some labored breathing. She reports generalized pain that feels like aching/soreness. 10/20: She has been able to take a few bites of pudding today. She says the morphine and ativan are helping and requests for ativan to be increased to help her anxiety. 10/21: Patient is awake and sitting in bedside chair. She states she is feeling a little better today. 10/24- I saw her just after working with therapy. She was up in a chair, oxygenating well on 50L / 744% FiO2 but certainly labored, able to speak only short sentences. She is quite fatigued. Reports general discomfort \"all over. \"  Alert and coherent.   Now on PPM.      Clinical Pain Assessment (nonverbal scale for severity on nonverbal patients):   Clinical Pain Assessment  Severity: 2  Location: all over  Character: aching  Duration: days  Effect: soreness  Factors: pain medications helps  Frequency: constant     Activity (Movement): Lying quietly, normal position    Duration: for how long has pt been experiencing pain (e.g., 2 days, 1 month, years)  Frequency: how often pain is an issue (e.g., several times per day, once every few days, constant)     FUNCTIONAL ASSESSMENT:     Palliative Performance Scale (PPS):  PPS: 40       PSYCHOSOCIAL/SPIRITUAL SCREENING:     Palliative IDT has assessed this patient for cultural preferences / practices and a referral made as appropriate to needs (Cultural Services, Patient Advocacy, Ethics, etc.)    Any spiritual / Roman Catholic concerns:  [] Yes /  [x] No   If \"Yes\" to discuss with pastoral care during IDT     Does caregiver feel burdened by caring for their loved one:   [] Yes /  [x] No /  [] No Caregiver Present/Available [] No Caregiver [] Pt [facility]  If \"Yes\" to discuss with social work during IDT    Anticipatory grief assessment:   [x] Normal  / [] Maladaptive     If \"Maladaptive\" to discuss with social work during IDT    ESAS Anxiety: Anxiety: 2    ESAS Depression:          REVIEW OF SYSTEMS:     Positive and pertinent negative findings in ROS are noted above in HPI. The following systems were [x] reviewed / [] unable to be reviewed as noted in HPI  Other findings are noted below. Systems: constitutional, ears/nose/mouth/throat, respiratory, gastrointestinal, genitourinary, musculoskeletal, integumentary, neurologic, psychiatric, endocrine. Positive findings noted below. Modified ESAS Completed by: provider   Fatigue: 2       Pain: 2   Anxiety: 2       Dyspnea: 4           Stool Occurrence(s): 1        PHYSICAL EXAM:     From RN flowsheet:  Wt Readings from Last 3 Encounters:   10/24/22 183 lb (83 kg)   10/07/22 173 lb 8 oz (78.7 kg)   09/22/22 168 lb 9.6 oz (76.5 kg)     Blood pressure 122/70, pulse 79, temperature 97.3 °F (36.3 °C), resp. rate 22, height 5' 3\" (1.6 m), weight 183 lb (83 kg), SpO2 98 %, not currently breastfeeding.     Pain Scale 1: Numeric (0 - 10)  Pain Intensity 1: 0  Pain Onset 1: chronic  Pain Location 1: Generalized  Pain Orientation 1: Left, Right  Pain Description 1: Aching  Pain Intervention(s) 1: Rest, Repositioned  Last bowel movement, if known:     General: awake, alert, appears frail, fatigued but able to respond to questions, sitting up in bedside chair  Eyes: lids normal, no drainage  Nose: nares normal, no drainage  Mouth: mmm, no drainage  Pulm: rate 20s-30s, respirations appear labored, she is able to speak in short sentences  Neuro: alert, responds to simple questions, able to sit up in bedside chair on her own  Psych: calm without restlessness or agitation     HISTORY:     Principal Problem:    Acute hypoxemic respiratory failure ([address] (10/21/2022)    Active Problems:    Diarrhea of presumed infectious origin (6/13/2022)      Acute respiratory failure with hypoxia ([address] (9/13/2022)      Lymphoma ([address] (10/10/2022)      COVID (10/10/2022)      Pneumonia due to COVID-19 virus (10/21/2022)    Past Medical History:   Diagnosis Date    Adverse effect of anesthesia     PHLEGM IN THROAT POST OP & NEEDED X2 BREATHING TREATMENTS    Anxiety     COPD (chronic obstructive pulmonary disease) ([address] 2022    emphysema    Depression     GERD (gastroesophageal reflux disease)     Hernia, femoral     since childhood    Hypertension     Joint pain     Nausea & vomiting     TMJ arthritis       Past Surgical History:   Procedure Laterality Date    HX CERVICAL FUSION  2012    C5-C6    HX CHOLECYSTECTOMY  2020    HX COLONOSCOPY      HX ENDOSCOPY      HX HYSTERECTOMY  2019    HX TONSILLECTOMY      AS A CHILD    HX WISDOM TEETH EXTRACTION      TEENAGER    IR INSERT TUNL CVC W PORT OVER 5 YEARS  4/29/2022      Family History   Problem Relation Age of Onset    Heart Disease Mother 46    Heart Surgery Mother         HEART TRANSPLANT    Elevated Lipids Mother     Hypertension Mother     COPD Father     Emphysema Father     Sudden Death Brother 46    Other Maternal Grandmother         BRAIN TUMOR    No Known Problems Son     No Known Problems Daughter       History reviewed, no pertinent family history.   Social History     Tobacco Use    Smoking status: Some Days     Packs/day: 0.25     Years: 40.00     Pack years: 10.00     Types: Cigarettes    Smokeless tobacco: Never   Substance Use Topics    Alcohol use: Not Currently     Alcohol/week: 0.0 standard drinks     Allergies   Allergen Reactions    Oxycodone Nausea Only      Current Facility-Administered Medications   Medication Dose Route Frequency    heparin (porcine) pf 500 Units  500 Units InterCATHeter PRN    TPN ADULT - PERIPHERAL AA 4.25% D10% W/ ELECTROLYTES AND CA   IntraVENous CONTINUOUS    TPN ADULT - PERIPHERAL AA 4.25% D10% W/ ELECTROLYTES AND CA   IntraVENous CONTINUOUS    ipratropium-albuterol (COMBIVENT RESPIMAT) 20 mcg-100 mcg inhalation spray  1 Puff Inhalation Q6HWA RT    FLUoxetine (PROzac) capsule 60 mg  60 mg Oral DAILY    LORazepam (ATIVAN) tablet 0.5 mg  0.5 mg Oral Q3H PRN    ALPRAZolam (XANAX) tablet 0.25 mg  0.25 mg Oral BID    morphine injection 2 mg  2 mg IntraVENous Q3H PRN    morphine IR (MS IR) tablet 15 mg  15 mg Oral Q8H PRN    pantoprazole (PROTONIX) tablet 40 mg  40 mg Oral ACB    enoxaparin (LOVENOX) injection 40 mg  40 mg SubCUTAneous Q24H    methylPREDNISolone (PF) (SOLU-MEDROL) injection 60 mg  60 mg IntraVENous Q8H    lidocaine-prilocaine (EMLA) 2.5-2.5 % cream   Topical PRN    loperamide (IMODIUM) capsule 2 mg  2 mg Oral Q4H PRN    L.acidophilus-paracasei-S.thermophil-bifidobacter (RISAQUAD) 8 billion cell capsule  1 Capsule Oral DAILY    sodium chloride (OCEAN) 0.65 % nasal squeeze bottle 2 Spray  2 Spray Both Nostrils Q2H PRN    lisinopriL (PRINIVIL, ZESTRIL) tablet 20 mg  20 mg Oral DAILY    magic mouthwash (FIRST-MOUTHWASH BLM) oral suspension 5 mL  5 mL Oral Q4H PRN    fluconazole (DIFLUCAN) tablet 100 mg  100 mg Oral DAILY    sodium chloride (NS) flush 5-40 mL  5-40 mL IntraVENous Q8H    sodium chloride (NS) flush 5-40 mL  5-40 mL IntraVENous PRN    acetaminophen (TYLENOL) tablet 650 mg  650 mg Oral Q6H PRN    Or    acetaminophen (TYLENOL) suppository 650 mg  650 mg Rectal Q6H PRN    ondansetron (ZOFRAN) injection 4 mg  4 mg IntraVENous Q6H PRN    amLODIPine (NORVASC) tablet 5 mg  5 mg Oral DAILY    ondansetron (ZOFRAN ODT) tablet 8 mg  8 mg Oral Q8H PRN    prochlorperazine (COMPAZINE) tablet 10 mg  10 mg Oral Q6H PRN    trimethoprim-sulfamethoxazole (BACTRIM DS, SEPTRA DS) 160-800 mg per tablet 1 Tablet  1 Tablet Oral Q MON, WED & FRI    mometasone-formoterol (DULERA) 200mcg-5mcg/puff  2 Puff Inhalation BID RT    nicotine (NICODERM CQ) 21 mg/24 hr patch 1 Patch  1 Patch TransDERmal DAILY          LAB AND IMAGING FINDINGS:     Lab Results   Component Value Date/Time    WBC 9.6 10/24/2022 12:04 AM    HGB 8.3 (L) 10/24/2022 12:04 AM    PLATELET 178 87/03/9746 12:04 AM     Lab Results   Component Value Date/Time    Sodium 134 (L) 10/24/2022 12:04 AM    Potassium 4.4 10/24/2022 12:04 AM    Chloride 101 10/24/2022 12:04 AM    CO2 28 10/24/2022 12:04 AM    BUN 31 (H) 10/24/2022 12:04 AM    Creatinine 1.11 (H) 10/24/2022 12:04 AM    Calcium 8.1 (L) 10/24/2022 12:04 AM    Magnesium 2.1 10/24/2022 12:04 AM    Phosphorus 3.0 10/24/2022 12:04 AM      Lab Results   Component Value Date/Time    Alk. phosphatase 120 (H) 10/24/2022 12:04 AM    Protein, total 4.6 (L) 10/24/2022 12:04 AM    Albumin 2.2 (L) 10/24/2022 12:04 AM    Globulin 2.4 10/24/2022 12:04 AM     No results found for: INR, PTMR, PTP, PT1, PT2, APTT, INREXT, INREXT   Lab Results   Component Value Date/Time    Ferritin 2,380 (H) 10/24/2022 12:04 AM      Lab Results   Component Value Date/Time    pH 7.48 (H) 10/21/2022 03:26 AM    PCO2 30 (L) 10/21/2022 03:26 AM    PO2 49 (LL) 10/21/2022 03:26 AM     No components found for: GLPOC   No results found for: CPK, CKMB             Total time:   Counseling / coordination time, spent as noted above:   > 50% counseling / coordination?:     Prolonged service was provided for  []30 min   []75 min in face to face time in the presence of the patient, spent as noted above.   Time Start: Time End:   Note: this can only be billed with 38140 (initial) or 79106 (follow up). If multiple start / stop times, list each separately.

## 2022-10-24 NOTE — PROGRESS NOTES
Problem: Self Care Deficits Care Plan (Adult)  Goal: *Acute Goals and Plan of Care (Insert Text)  Description: FUNCTIONAL STATUS PRIOR TO ADMISSION: Patient was independent and active without use of DME.    HOME SUPPORT PRIOR TO ADMISSION: The patient lived with 2 of her children but did not require assist.    Occupational Therapy Goals  Initiated 10/24/2022  1. Patient will perform grooming with independence within 7 day(s). 2.  Patient will perform upper body dressing and bathing with modified independence within 7 day(s). 3.  Patient will perform lower body dressing and bathing with supervision/set-up within 7 day(s). 4.  Patient will perform toilet transfers with supervision/set-up within 7 day(s). 5.  Patient will perform all aspects of toileting with supervision/set-up within 7 day(s). 6.  Patient will participate in upper extremity therapeutic exercise/activities with modified independence for 10 minutes within 7 day(s). 7.  Patient will utilize energy conservation techniques during functional activities with verbal cues within 7 day(s). Outcome: Progressing Towards Goal   OCCUPATIONAL THERAPY EVALUATION  Patient: Radha Sanchez (47 y.o. female)  Date: 10/24/2022  Primary Diagnosis: COVID [U07.1]  Lymphoma ([facility] [C85.90]  Acute respiratory failure with hypoxia ([facility] [J96.01]       Precautions:  Fall (droplet +)    ASSESSMENT  Based on the objective data described below, the patient presents with decreased activity tolerance, decreased endurance, increased supplemental O2 requirement, generalized weakness, and impaired balance following admission for respiratory failure in the setting of COVID-19. Patient with PMH for lymphoma and COPD but does not require home O2 at baseline. Patient was received on high flow, 50L at 100% with SpO2 ranging between 88-97%. Education provided regarding energy conservation, pacing, and pursed lip breathing techniques.   Patient performed transfers with x1 person assist with hand held assist; She was able to stand-pivot transfer to [facility] then to recliner to remain [facility]. Patient engaged in ADLs with fair tolerance; Increased assistance and time needed to complete all tasks as compared from reported baseline. Patient would benefit from continued skilled OT to progress towards goals and improve overall independence. Current Level of Function Impacting Discharge (ADLs/self-care): Patient required CGA to min A for functional mobility. Patient required supervision/setup to min A for UB ADLs and mod to max A for LB ADLs. Functional Outcome Measure: The patient scored Total: 25/100 on the Barthel Index outcome measure. Patient will benefit from skilled therapy intervention to address the above noted impairments. PLAN :  Recommendations and Planned Interventions: self care training, functional mobility training, therapeutic exercise, balance training, therapeutic activities, endurance activities, patient education, home safety training, and family training/education    Frequency/Duration: Patient will be followed by occupational therapy 5 times a week to address goals. Recommendation for discharge: (in order for the patient to meet his/her long term goals)  To be determined: pending medical stability and progress with therapy     This discharge recommendation:  Has been made in collaboration with the attending provider and/or case management    IF patient discharges home will need the following DME: none       SUBJECTIVE:   Patient agreeable to OT evaluation.       OBJECTIVE DATA SUMMARY:   HISTORY:   Past Medical History:   Diagnosis Date    Adverse effect of anesthesia     PHLEGM IN THROAT POST OP & NEEDED X2 BREATHING TREATMENTS    Anxiety     COPD (chronic obstructive pulmonary disease) ([facility] 2022    emphysema    Depression     GERD (gastroesophageal reflux disease)     Hernia, femoral     since childhood    Hypertension     Joint pain     Nausea & vomiting     TMJ arthritis      Past Surgical History:   Procedure Laterality Date    HX CERVICAL FUSION  2012    C5-C6    HX CHOLECYSTECTOMY  2020    HX COLONOSCOPY      HX ENDOSCOPY      HX HYSTERECTOMY  2019    HX TONSILLECTOMY      AS A CHILD    HX WISDOM TEETH EXTRACTION      TEENAGER    IR INSERT TUNL CVC W PORT OVER 5 YEARS  4/29/2022       Expanded or extensive additional review of patient history:     Home Situation  Home Environment: Private residence  # Steps to Enter: 5  Rails to Enter: Yes  Hand Rails : Bilateral  One/Two Story Residence: One story  Living Alone: No  Support Systems: Other Family Member(s)  Patient Expects to be Discharged to[de-identified] Home  Current DME Used/Available at Home: None  Tub or Shower Type: Tub/Shower combination    Hand dominance: Right    EXAMINATION OF PERFORMANCE DEFICITS:  Cognitive/Behavioral Status:  Neurologic State: Alert  Orientation Level: Oriented X4  Cognition: Follows commands  Perception: Appears intact  Perseveration: No perseveration noted  Safety/Judgement: Awareness of environment    Skin: Intact in the uppers    Edema: None noted in the uppers    Hearing: Auditory  Auditory Impairment: None    Vision/Perceptual:    Tracking: Able to track stimulus in all quadrants w/o difficulty    Diplopia: No    Acuity: Within Defined Limits       Range of Motion:  AROM: Generally decreased, functional in the uppers     Strength:  Strength: Generally decreased, functional    Coordination:  Fine Motor Skills-Upper: Left Intact; Right Intact    Gross Motor Skills-Upper: Left Intact; Right Intact    Tone & Sensation:  Tone: Normal  Normal: normal    Balance:  Sitting: Impaired; Without support  Sitting - Static: Good (unsupported)  Sitting - Dynamic: Fair (occasional)  Standing: Impaired; With support  Standing - Static: Good  Standing - Dynamic : Fair;Constant support    Functional Mobility and Transfers for ADLs:  Bed Mobility:  Rolling: Stand-by assistance; Adaptive equipment  Supine to Sit: Contact guard assistance;Assist x2  Scooting: Contact guard assistance    Transfers:  Sit to Stand: Minimum assistance;Assist x1  Stand to Sit: Minimum assistance;Assist x1  Bed to Chair: Minimum assistance;Assist x1  Toilet Transfer : Minimum assistance    ADL Assessment:  Feeding: Supervision;Setup    Oral Facial Hygiene/Grooming: Supervision;Setup    Bathing: Maximum assistance     Upper Body Dressing: Minimum assistance    Lower Body Dressing: Maximum assistance    Toileting: Moderate assistance     Cognitive Retraining  Safety/Judgement: Awareness of environment    Functional Measure:    Barthel Index:  Bathin  Bladder: 5  Bowels: 10  Groomin  Dressin  Feedin  Mobility: 0  Stairs: 0  Toilet Use: 0  Transfer (Bed to Chair and Back): 5  Total: 25/100      The Barthel ADL Index: Guidelines  1. The index should be used as a record of what a patient does, not as a record of what a patient could do. 2. The main aim is to establish degree of independence from any help, physical or verbal, however minor and for whatever reason. 3. The need for supervision renders the patient not independent. 4. A patient's performance should be established using the best available evidence. Asking the patient, friends/relatives and nurses are the usual sources, but direct observation and common sense are also important. However direct testing is not needed. 5. Usually the patient's performance over the preceding 24-48 hours is important, but occasionally longer periods will be relevant. 6. Middle categories imply that the patient supplies over 50 per cent of the effort. 7. Use of aids to be independent is allowed. Score Interpretation (from [address] [place] [address])    Independent   60-79 Minimally independent   40-59 Partially dependent   20-39 Very dependent   <20 Totally dependent     -Rylie Powers., Barthel, D.W. (1965). Functional evaluation: the Barthel Index.  [address] [facility] (14)2.  -FAIZA Navarro, [address] (1997). The Barthel activities of daily living index: self-reporting versus actual performance in the old (> or = 75 years). Journal [address] 45(7), [facility], [facility] Hospital[facility]ahram., Tasneem Lenz., Amey Lesch. (1999). Measuring the change in disability after inpatient rehabilitation; comparison of the responsiveness of the Barthel Index and Functional [place] Measure. Journal of Neurology, Neurosurgery, and Psychiatry, 66(4), 449-112. DANITZA Casillas, OKSANA Bellamy, & Christopher Campos M.A. (2004) Assessment of post-stroke quality of life in cost-effectiveness studies: The usefulness of the Barthel Index and the EuroQoL-5D. Quality of Life Research, 15, 336-88     Occupational Therapy Evaluation Charge Determination   History Examination Decision-Making   LOW Complexity : Brief history review  LOW Complexity : 1-3 performance deficits relating to physical, cognitive , or psychosocial skils that result in activity limitations and / or participation restrictions  LOW Complexity : No comorbidities that affect functional and no verbal or physical assistance needed to complete eval tasks       Based on the above components, the patient evaluation is determined to be of the following complexity level: LOW     Activity Tolerance:   Good    After treatment patient left in no apparent distress:    Supine in bed, Call bell within reach, and Bed / chair alarm activated    COMMUNICATION/EDUCATION:   The patients plan of care was discussed with: Physical therapist, Registered nurse, and patient . Home safety education was provided and the patient/caregiver indicated understanding., Patient/family have participated as able in goal setting and plan of care. , and Patient/family agree to work toward stated goals and plan of care. This patients plan of care is appropriate for delegation to [facility].     Thank you for this referral.  Miguelito Jimenes, OTR/L  Time Calculation: 35 mins

## 2022-10-24 NOTE — PROGRESS NOTES
Care Management follow up    Patient admitted for acute hypoxic respiratory failure, COVID+. 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 Tobacco dependence. Past Medical History:   Diagnosis Date    Adverse effect of anesthesia     PHLEGM IN THROAT POST OP & NEEDED X2 BREATHING TREATMENTS    Anxiety     COPD (chronic obstructive pulmonary disease) ([facility] [address] 2022    emphysema    Depression     GERD (gastroesophageal reflux disease)     Hernia, femoral     since childhood    Hypertension     Joint pain     Nausea & vomiting     TMJ arthritis      RUR 25 (Score %) high   Is This a Readmission NO  Is this a Bundle NO    Current status  Patient discussed during interdisciplinary rounds. Patient continues to require medical management including ongoing assessment and monitoring. Patient continues on high flow oxygen at 50L/100%, IV solumedrol, and TPN. Worked with PT today with desat noted on 50L. Continues in droplet plus isolation. Transition of Care Plan  Monitor patient status and response to treatment. Patient continues to require medical management. CM needs: unable to determine at this time  Lives with son and independent with ADLs prior to admission. [employer] [facility] in the past.  CM to monitor progress and recommendations.     Bette Castorena, RN, MSN/Care manager

## 2022-10-24 NOTE — PROGRESS NOTES
[facility]     Name: Phylicia Gomez MRN: [id number]   : 1955 Hospital: [address]   Date: 10/24/2022        Impression Plan   Acute hypoxic respiratory failure  COVID 19 PNA with questionable superimposed bacterial PNA  Lymphoma s/p recent R-CHOP  Pancytopenia  Hx of tobacco abuse               Wean O2 to keep sats above 90%; on HF 50L 100%  Continue high dose IV Solu-Medrol  Combivent and Dulera  Completed a 7 day course of Vanc/Cefepime  Barcitinib was contraindicated since pt is immunocompromised   Nivestym per hematology - stopped due to increasing WBC  Fu repeat CXR  OOB into chair  Ppx enox  Consider TPN for nutrtion  Plan discussed with nursing  Goals of care discussion held by Dr. Bryon Mckinley on 10/21- continuing current level of care for now    CC time: 32 minutes           Radiology  (personally reviewed) CT chest reviewed: bilateral peripheral infiltrates, no PE    10/17 chest CTA: Heterogeneous bilateral airspace opacities worsened since 2022 consistent with diffuse, severe COVID 19 pneumonia. . Trace bilateral pleural effusions. No evidence of PE. Subjective     Cc: shortness of breath    77 yo with PMHx of lymphoma presenting with increasing SOB for the last 3 days. Was admitted last week for R-CHOP chemotherapy. Her son was COVID 19 positive last week. Pt is COVID 19 positive in ER. She has had the first series of mRNA vaccine but none of the boosters. Currently on 4 liters of O2 and dyspnic. WBC 13, neutrophil predominant. Bilateral infiltrates on CT chest.     Interval history:  Afebrile  Sats 90% on 50L and 100%  BP elevated  WBC wnl  Plts wnl  Creat stable  LFTs - elevated and increasing    CRP < 0.29  D-dimer 1.50  10/10 blood cultures negative x 5 days  Cdiff neg    ROS:  States her breathing is \"rough\". HF is giving her a HA. Denies CP. Denies fever or chills. Feeling tired and weak.     Past Medical History:   Diagnosis Date Adverse effect of anesthesia     PHLEGM IN THROAT POST OP & NEEDED X2 BREATHING TREATMENTS    Anxiety     COPD (chronic obstructive pulmonary disease) ([facility] [address] 2022    emphysema    Depression     GERD (gastroesophageal reflux disease)     Hernia, femoral     since childhood    Hypertension     Joint pain     Nausea & vomiting     TMJ arthritis       Past Surgical History:   Procedure Laterality Date    HX CERVICAL FUSION  2012    C5-C6    HX CHOLECYSTECTOMY  2020    HX COLONOSCOPY      HX ENDOSCOPY      HX HYSTERECTOMY  2019    HX TONSILLECTOMY      AS A CHILD    HX WISDOM TEETH EXTRACTION      TEENAGER    IR INSERT TUNL CVC W PORT OVER 5 YEARS  4/29/2022      Prior to Admission medications    Medication Sig Start Date End Date Taking? Authorizing Provider   doxycycline (VIBRA-TABS) 100 mg tablet Take 1 Tablet by mouth every twelve (12) hours. 10/7/22   Maryan Menjivar MD   albuterol (PROVENTIL HFA, VENTOLIN HFA, PROAIR HFA) 90 mcg/actuation inhaler Take 2 Puffs by inhalation every six (6) hours as needed for Wheezing or Shortness of Breath for up to 20 days. 10/7/22 10/27/22  Maryan Menjivar MD   ondansetron (ZOFRAN ODT) 8 mg disintegrating tablet Take 1 Tablet by mouth every eight (8) hours as needed for Nausea or Vomiting. 9/2/22   Maryan Menjivar MD   prochlorperazine (COMPAZINE) 10 mg tablet Take 1 Tablet by mouth every six (6) hours as needed for Nausea (do not take if groggy; take if nausea despite zofran). 9/2/22   Maryan Menjivar MD   trimethoprim-sulfamethoxazole (BACTRIM DS, SEPTRA DS) 160-800 mg per tablet Take 1 Tablet by mouth every Monday, Wednesday, Friday.  Indications: prophylaxis treatment of cancer related infections 8/24/22   Schoeneweis, Ann, NP   FLUoxetine (PROzac) 40 mg capsule TAKE 1 CAPSULE BY MOUTH EVERY MORNING 10/11/21   Gary Pulliam MD   amLODIPine-benazepril (LOTREL) 5-20 mg per capsule TAKE 1 CAPSULE BY MOUTH EVERY MORNING 10/11/21   Gary Pulliam MD     Current Facility-Administered Medications   Medication Dose Route Frequency    TPN ADULT - PERIPHERAL AA 4.25% D10% W/ ELECTROLYTES AND CA   IntraVENous CONTINUOUS    ipratropium-albuterol (COMBIVENT RESPIMAT) 20 mcg-100 mcg inhalation spray  1 Puff Inhalation Q6HWA RT    FLUoxetine (PROzac) capsule 60 mg  60 mg Oral DAILY    ALPRAZolam (XANAX) tablet 0.25 mg  0.25 mg Oral BID    pantoprazole (PROTONIX) tablet 40 mg  40 mg Oral ACB    enoxaparin (LOVENOX) injection 40 mg  40 mg SubCUTAneous Q24H    methylPREDNISolone (PF) (SOLU-MEDROL) injection 60 mg  60 mg IntraVENous Q8H    L.acidophilus-paracasei-S.thermophil-bifidobacter (RISAQUAD) 8 billion cell capsule  1 Capsule Oral DAILY    lisinopriL (PRINIVIL, ZESTRIL) tablet 20 mg  20 mg Oral DAILY    fluconazole (DIFLUCAN) tablet 100 mg  100 mg Oral DAILY    sodium chloride (NS) flush 5-40 mL  5-40 mL IntraVENous Q8H    amLODIPine (NORVASC) tablet 5 mg  5 mg Oral DAILY    trimethoprim-sulfamethoxazole (BACTRIM DS, SEPTRA DS) 160-800 mg per tablet 1 Tablet  1 Tablet Oral Q MON, WED & FRI    mometasone-formoterol (DULERA) 200mcg-5mcg/puff  2 Puff Inhalation BID RT    nicotine (NICODERM CQ) 21 mg/24 hr patch 1 Patch  1 Patch TransDERmal DAILY     Allergies   Allergen Reactions    Oxycodone Nausea Only      Social History     Tobacco Use    Smoking status: Some Days     Packs/day: 0.25     Years: 40.00     Pack years: 10.00     Types: Cigarettes    Smokeless tobacco: Never   Substance Use Topics    Alcohol use: Not Currently     Alcohol/week: 0.0 standard drinks      Family History   Problem Relation Age of Onset    Heart Disease Mother 46    Heart Surgery Mother         HEART TRANSPLANT    Elevated Lipids Mother     Hypertension Mother     COPD Father     Emphysema Father     Sudden Death Brother 46    Other Maternal Grandmother         BRAIN TUMOR    No Known Problems Son     No Known Problems Daughter           Laboratory: I have personally reviewed the flowsheet and labs. Recent Labs     10/24/22  0004 10/23/22  0024 10/22/22  0540   WBC 9.6 7.8 6.7   HGB 8.3* 7.9* 7.9*   HCT 24.9* 24.3* 23.6*    144* 136*       Recent Labs     10/24/22  0004 10/23/22  0024 10/22/22  0540   * 137 139   K 4.4 4.1 4.0    104 107   CO2 28 28 25   * 146* 122*   BUN 31* 28* 26*   CREA 1.11* 1.18* 1.15*   CA 8.1* 8.0* 8.3*   MG 2.1 1.9  --    PHOS 3.0 3.5  --    ALB 2.2* 2.3* 2.2*   * 343* 218*         Objective:   Visit Vitals  BP (!) 159/85   Pulse 80   Temp 98.3 °F (36.8 °C)   Resp 23   Ht 5' 2.99\" (1.6 m)   Wt 83.2 kg (183 lb 8 oz)   SpO2 90%   Breastfeeding No   BMI 32.51 kg/m²       Intake/Output Summary (Last 24 hours) at 10/24/2022 2774  Last data filed at 10/24/2022 0000  Gross per 24 hour   Intake 667.8 ml   Output 600 ml   Net 67.8 ml       EXAM:   GENERAL: awake, alert, tired and ill appearing, HEENT:  anicteric, EOMI, no alar flaring or epistaxis, oral mucosa moist without cyanosis, NECK:  no jugular vein distention, no retractions, no thyromegaly or masses, LUNGS: diminished, scattered rales HEART:  Regular rate and rhythm with no MGR; no edema is present, ABDOMEN:  soft with no tenderness, bowel sounds present, EXTREMITIES:  warm with no cyanosis, SKIN:  no jaundice or ecchymosis, and NEUROLOGIC:  alert and oriented, grossly non-focal    ROBIN Beyer  [facility]

## 2022-10-25 NOTE — PROGRESS NOTES
Problem: Falls - Risk of  Goal: *Absence of Falls  Description: Document [place] Fall Risk and appropriate interventions in the flowsheet. Outcome: Progressing Towards Goal  Note: Fall Risk Interventions:  Mobility Interventions: Bed/chair exit alarm         Medication Interventions: Bed/chair exit alarm    Elimination Interventions: Bed/chair exit alarm, Call light in reach    History of Falls Interventions: Bed/chair exit alarm         Problem: Patient Education: Go to Patient Education Activity  Goal: Patient/Family Education  Outcome: Progressing Towards Goal     Problem: Risk for Spread of Infection  Goal: Prevent transmission of infectious organism to others  Description: Prevent the transmission of infectious organisms to other patients, staff members, and visitors. Outcome: Progressing Towards Goal     Problem: Patient Education:  Go to Education Activity  Goal: Patient/Family Education  Outcome: Progressing Towards Goal     Problem: Pain  Goal: *Control of Pain  Outcome: Progressing Towards Goal  Goal: *PALLIATIVE CARE:  Alleviation of Pain  Outcome: Progressing Towards Goal     Problem: Patient Education: Go to Patient Education Activity  Goal: Patient/Family Education  Outcome: Progressing Towards Goal     Problem: Airway Clearance - Ineffective  Goal: Achieve or maintain patent airway  Outcome: Progressing Towards Goal     Problem: Gas Exchange - Impaired  Goal: Absence of hypoxia  Outcome: Progressing Towards Goal  Goal: Promote optimal lung function  Outcome: Progressing Towards Goal     Problem: Breathing Pattern - Ineffective  Goal: Ability to achieve and maintain a regular respiratory rate  Outcome: Progressing Towards Goal     Problem:  Body Temperature -  Risk of, Imbalanced  Goal: Ability to maintain a body temperature within defined limits  Outcome: Progressing Towards Goal  Goal: Will regain or maintain usual level of consciousness  Outcome: Progressing Towards Goal  Goal: Complications related to the disease process, condition or treatment will be avoided or minimized  Outcome: Progressing Towards Goal     Problem: Isolation Precautions - Risk of Spread of Infection  Goal: Prevent transmission of infectious organism to others  Outcome: Progressing Towards Goal     Problem: Nutrition Deficits  Goal: Optimize nutrtional status  Outcome: Progressing Towards Goal     Problem: Risk for Fluid Volume Deficit  Goal: Maintain normal heart rhythm  Outcome: Progressing Towards Goal  Goal: Maintain absence of muscle cramping  Outcome: Progressing Towards Goal  Goal: Maintain normal serum potassium, sodium, calcium, phosphorus, and pH  Outcome: Progressing Towards Goal

## 2022-10-25 NOTE — PROGRESS NOTES
Nutrition Assessment     Type and Reason for Visit: Reassess    Nutrition Recommendations/Plan:   Comfort - PO as tolerated and desired  D/C PN   Provide High Calorie/High Protein ONS TID provides: 1050 kcal, 132 g carbs, 60 g Protein   Provide Ensure Clear BID provides: 480 kcal, 104 g carbs, 16 g protein. Nutrition Assessment:     RD attended & discussed patient during interdisciplinary rounds on unit, palliative participated and provided update to team. Further family meetings planned today. No need to reorder PN at this time. Provide PO as tolerated. No recent weights obtained per records. Pt remains +COVID. RD did not enter room at this time as meeting with other providers. Intake is poor as expected. Reports of accepting an unclear amount of ONS - they are ordered 5 servings per day. Malnutrition Assessment:  Malnutrition Status: Moderate malnutrition    Context:  Acute illness     Findings of the 6 clinical characteristics of malnutrition:   Energy Intake:  50% or less of est energy requirements for 5 or more days  Weight Loss:  Unable to assess     Body Fat Loss:  Unable to assess,     Muscle Mass Loss:  Unable to assess,    Fluid Accumulation:  Mild, Extremities   Strength:  Not performed     Estimated Daily Nutrient Needs:  Energy (kcal):  7255-0939 (25-30 kcal)  Protein (g):   (1.2-1.5 g/kg)       Fluid (ml/day):  6267-8800    Nutrition Related Findings:     Last Bowel Movement Date: 10/25/22  Stool Appearance: Loose  Abdominal Assessment: Intact  Appetite: Poor  Bowel Sounds: Active     Edema:LLE: 1+ (10/25/2022  8:40 AM)  RLE: 1+ (10/25/2022  8:40 AM)      Nutr.  Labs:  Lab Results   Component Value Date/Time    GFR est AA >60 10/03/2022 06:44 AM    GFR est non-AA >60 10/03/2022 06:44 AM    Creatinine 1.05 (H) 10/25/2022 06:28 AM    BUN 35 (H) 10/25/2022 06:28 AM    Sodium 131 (L) 10/25/2022 06:28 AM    Potassium 4.7 10/25/2022 06:28 AM    Chloride 97 10/25/2022 06:28 AM    CO2 28 10/25/2022 06:28 AM     Lab Results   Component Value Date/Time    Glucose 146 (H) 10/25/2022 06:28 AM    Glucose (POC) 98 05/04/2022 01:37 PM     No results found for: HBA1C, HKY2ZWMZ, ICO3FLVR, IWW8MSLJ    Current Nutrition Therapies:  ADULT DIET Regular  ADULT ORAL NUTRITION SUPPLEMENT Breakfast, Lunch, Dinner, AM Snack, PM Snack, HS Snack; Standard High Calorie/High Protein    Documented Meal intake:  Patient Vitals for the past 168 hrs:   % Diet Eaten   10/24/22 1351 1 - 25%   10/22/22 1215 0%   10/22/22 0930 0%   10/21/22 1200 0%   10/21/22 0900 0%   10/20/22 1800 0%   10/20/22 1200 0%   10/20/22 0800 0%     Documentation of supplement intake:  No data found.     Anthropometric Measures:  Height:  5' 3\" (160 cm)  Current Body Wt:  78.7 kg (173 lb 8 oz)  BMI: 30.7    Last 3 Recorded Weights in this Encounter    10/22/22 0400 10/23/22 1406 10/24/22 1037   Weight: 78.1 kg (172 lb 3.6 oz) 83.2 kg (183 lb 8 oz) 83 kg (183 lb)       Nutrition Diagnosis:   Increased nutrient needs related to catabolic illness as evidenced by  (cancer, COVID-19)  Inadequate protein intake related to inadequate protein-energy intake, impaired respiratory function, catabolic illness as evidenced by  (high O2 needs, COVID-19)    Nutrition Interventions:   Food and/or Nutrient Delivery: Discontinue parenteral nutrition  Nutrition Education/Counseling: No recommendations at this time  Coordination of Nutrition Care: Continue to monitor while inpatient, Interdisciplinary rounds  Plan of Care discussed with: IDR team    Goals:  Previous Goal Met: No progress toward goal(s)  Goals: other (specify)  Specify Other Goals: Comfort    Nutrition Monitoring and Evaluation:   Behavioral-Environmental Outcomes: None identified  Food/Nutrient Intake Outcomes: Diet advancement/tolerance  Physical Signs/Symptoms Outcomes: None identified    Discharge Planning:    No discharge needs at this time    Cely Edge MS, RD  Contact via [address] [place] [address] or office [phone]

## 2022-10-25 NOTE — PROGRESS NOTES
Care Management follow up     Patient admitted for acute hypoxic respiratory failure, COVID+. 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 Tobacco dependence. Hx - COPD, depression, GERD, HTN. RUR 24 (Score %) high            Is This a Readmission NO  Is this a Bundle NO     Current status  Patient discussed during interdisciplinary rounds. Patient continues to require medical management including ongoing assessment and monitoring. Patient continues on high flow oxygen at 60L/100%, IV solumedrol, and TPN. Met with Palliative care, patient likely will transition to comfort care today. Await family. Transition of Care Plan  Monitor patient status and response to treatment. Patient continues to require medical management. CM needs: unable to determine at this time  Lives with son and independent with ADLs prior to admission. Transition to comfort care today. CM to monitor progress and recommendations.     Candida Minor RN, MSN/Care manager  [phone]

## 2022-10-25 NOTE — PROGRESS NOTES
0730- Bedside and Verbal shift change report given to DEANNA Lopez (oncoming nurse) by Monica Richardson RN (offgoing nurse). Report included the following information SBAR, Kardex, ED Summary, OR Summary, Procedure Summary, Intake/Output, MAR, Accordion, and Recent Results. This patient was assisted with Intentional Toileting every 2 hours during this shift. Documentation of ambulation and output reflected on Flowsheet.

## 2022-10-25 NOTE — PROGRESS NOTES
Central line Type: Right chest rubin cath  Central Line Insert Date: 10/17/22  Reason Central Line Placed: TPN, limited access  Central Line Dressing Date: 10/25/22  Biopatch in place? Yes No: yes  Tubing labeled and appropriate? Yes No: yes  Alcohol caps on all open ports? Yes No: yes  Last CHG bath (time&date): 10/25/22, 9318  Reviewed with provider and central line must stay in for the following reasons: prolonged hospital stay      21   Patient refused PO bactrim, stated she \"just can't take it by mouth. \" Offered to cut it in half but patient declined, denies nausea. Notified Family Practice. 1200  Patient's o2 went down into 70's, entered her room and she was attempting to get out of bed to use the bedside commode and pulled off her 02, 45L at 100%. Strongly advised patient she needed to put her o2 back on, but she was adamant about getting on bedside commode. Safely got her to [facility] and helped patient put o2 back on. RRT assisted in getting patient back in bed. O2 in 80's, placed non-rebreather on patient, RRT spoke with respiratory who advised to increase o2 to 60L. Notified Family Practice.

## 2022-10-25 NOTE — PROGRESS NOTES
Music Therapy Assessment  [address] [place] [address] [phone]     1955  78 y.o.  female    Patient Telephone Number: [phone] (home)   [employer] Affiliation: No preference   Language: English   Patient Active Problem List    Diagnosis Date Noted    Pneumonia due to COVID-19 virus 10/21/2022    Acute hypoxemic respiratory failure ([address] 10/21/2022    Lymphoma ([address] 10/10/2022    COVID 10/10/2022    COVID-19 10/07/2022    Mild malnutrition ([address] 09/13/2022    Acute respiratory failure with hypoxia ([address] 09/13/2022    Generalized pain 08/29/2022    Thrush 08/25/2022    Hypokalemia 08/25/2022    Anxiety about health 08/08/2022    Physical debility 07/19/2022    Bilious vomiting with nausea 07/19/2022    Poor appetite 07/19/2022    Other fatigue 06/20/2022    Port-A-Cath in place 06/14/2022    Chemotherapy induced nausea and vomiting 06/13/2022    Diarrhea of presumed infectious origin 06/13/2022    Neuropathy due to chemotherapeutic drug ([address] 05/31/2022    Chronic renal disease, stage III 05/17/2022    Episode of recurrent major depressive disorder ([address] 05/17/2022    Generalized edema 05/16/2022    Other secondary hypertension 05/13/2022    Diffuse large B cell lymphoma ([address] 05/11/2022    Cancer related pain 05/11/2022    Insomnia due to medical condition 05/11/2022    Metastatic cancer to bone ([address] 05/09/2022    Encounter for antineoplastic chemotherapy 04/27/2022    Encounter for screening for other viral diseases 04/27/2022    Renal lesion 04/27/2022    Diffuse large B-cell lymphoma of lymph nodes of neck ([address] 04/26/2022    High risk medication use 04/26/2022    Tobacco dependence 03/21/2022        Date: 10/25/2022            Total Time (in minutes): 5          SFM 3 PROG CARE TELE 1    Mental Status:   [  ] Alert [  ] [place] [  ]  Confused  [  ] Minimally responsive  [x] Sleeping-per pt's daughter    Communication Status: [  ] Impaired Speech [  ] Nonverbal -N/A    Physical Status: [  ] Oxygen in use  [  ] Hard of Hearing [  ] Vision Impaired  [  ] Ambulatory  [  ] Ambulatory with assistance [  ] Non-ambulatory -N/A    Music Preferences, Background: N/A: Please see Session Observations below. Clinical Problem to be addressed: Alleviate anxiety. Goal(s) met in session: N/A: Please see Session Observations below. Physical/Pain management (Scale of 1-10):    Pre-session rating ___________    Post-session rating __________  [  ] Increased relaxation   [  ] Affected breathing patterns  [  ] Decreased muscle tension   [  ] Decreased agitation  [  ] Affected heart rate    [  ] Increased alertness     Emotional/Psychological:  [  ] Increased self-expression   [  ] Decreased aggressive behavior   [  ] Decreased feelings of stress  [  ] Discussed healthy coping skills     [  ] Improved mood    [  ] Decreased withdrawn behavior     Social:  [  ] Decreased feelings of isolation/loneliness [  ] Positive social interaction   [  ] Provided support and/or comfort for family/friends    Spiritual:  [  ] Spiritual support    [  ] Expressed peace  [  ] Expressed chen    [  ] Discussed beliefs    Techniques Utilized (Check all that apply): N/A: Please see Session Observations below. [  ] Procedural support MT [  ] Music for relaxation [  ] Patient preferred music  [  ] Sumi analysis  [  ] Song choice  [  ] Music for validation  [  ] Entrainment  [  ] Movement to music [  ] Guided visualization  [  ] Jamin Hurst  [  ] Patient instrument playing [  ] Keith Pascal writing  [  ] Lorraine Rae along   [  ] Altagracia Bauer  [  ] Sensory stimulation  [  ] Active Listening  [  ] Music for spiritual support [  ] Making of CDs as gifts    Session Observations: Follow up visit; This music therapist (MT) called the patient's (pt's) room telephone and her daughter answered. MT introduced self and offered a virtual music therapy session for the pt. Pt's daughter said this wasn't a good time because the pt was sleeping.  She thanked MT for the offer.     BOAZ Carlos (Music Therapist-Board Certified)  Spiritual Care Department  Referral-based service

## 2022-10-25 NOTE — PROGRESS NOTES
Palliative Medicine      Code Status: DNR    Advance Care Planning:  Advance Care Planning 10/25/2022   Patient's Healthcare Decision Maker is: Legal Next of Kin   Confirm Advance Directive None   Patient Would Like to Complete Advance Directive Unable   Does the patient have other document types Not on file      Patient / Family Encounter Documentation    Participants (names):  Dtr Cecilio Ellis, son Bright Enciso, Palliative Medicine (Dr. Merlin Borrego)    Narrative:  Met with pt's dtr and son outside of room. Family had come to the hospital upon learning that pt had expressed desire to transition to comfort measures, including weaning off hi-flow O2. Son shared that he has known pt has been \"tired\" for some time, had been contemplating stopping chemo even prior to hospitalization. Dtr stated pt was not as open with her re: wishes; dtr speculated that pt had wished to protect her, shared that she--like pt--struggles with anxiety. Psychosocial Issues Identified/ Resilience Factors: Anticipatory grief, dtr expressed feelings of \"shock,\" shared that she had made a conscious decision this morning to have a positive day, had been hopeful that pt coming out of ICU meant pt was improving, family is now having to shift mindsets and prepare themselves for the possibility that pt may not survive the day. No spiritual concerns identified. Caregiver [place]: N/a, pt was independent prior to admission, is not expected to survive hospitalization   Does the caregiver feel confident administering medication? N/a  Does the caregiver need any help connecting with community resources? Not at this time  Does the caregiver feel confident assisting with activities of daily living? N/a    Goals of Care / Plan:  Transition to comfort measures on this date, including weaning off hi-flow O2. Son and dtr are present, along with pt's dear friend. [de-identified] year old grandson has been in to visit; a granddtr is en route.   Palliative team will continue to follow for support. Discussed with Oncology NP. Thank you for including Palliative Medicine in the care of Ms. Lela Sagastume.      Charan Blount LCSW, [facility]-  [phone])

## 2022-10-25 NOTE — ACP (ADVANCE CARE PLANNING)
Advance Care Planning     Advance Care Planning (ACP) Physician/NP/PA Conversation      Date of Conversation: 10/10/2022  Conducted with: Patient with Decision Making Capacity    Healthcare Decision Maker:     Primary Decision Maker: Arnoldo Huang - [phone]  Click here to complete [address] including selection of the Healthcare Decision Maker Relationship (ie \"Primary\")    Care Preferences:  Patient has clearly expressed her wish not to continue acute restorative efforts. She wants to shift focus to that of comfort, she feels she is suffering greatly, she continues to experience pain all over and it is very hard for her to breathe. She feels it is her time and she is ready to die. Called daughter Lanette Adams to relay her mother's sentiments. She will be coming in to the hospital, will provide further support and education after arrival.     Additional topics discussed: benefit/burden of treatment options    Conversation Outcomes / Follow-Up Plan:   Patient transitioning to comfort care today. Length of Voluntary ACP Conversation in minutes:  25 minutes    Felix Singer MD           Addendum 15:00:  Follow up meeting at noon with patient's son and daughter. Explained process of transitioning to comfort care, what this entails and provided support in the process of accepting their mother's decisions. Additional time spent of 40minutes.

## 2022-10-25 NOTE — PROGRESS NOTES
Palliative Medicine Consult  Lemuel: [phone] ([phone])    Patient Name: Rashid Ca  YOB: 1955    Date of Initial Consult: 10/19/2022  Reason for Consult: Care Decisions  Requesting Provider: Dr. Celia Cordero   Primary Care Physician: Mortimer Arrow, MD     SUMMARY:   Rashid Ca is a 78 y.o. with metastatic diffuse large B-cell lymphoma recently undergoing da-R-EPOCH chemotherapy and filgrastim injections, COPD, MDD, anxiety, tobacco use who was admitted on 10/10/2022 from home with a diagnosis of COVID-19 infection. Patient has declined significantly in the last 24 hours and is now requiring HFNC with evidence of hypoxia with activity/exertion. She is receiving high dose steroids and antibiotics. CTA performed on 10/17 demonstrated worsening of diffuse effects of COVID-19 pneumonia compared to CTA on 10/11. Current medical issues leading to Palliative Medicine involvement include: Care Decisions. Social:  Patient has two children. She lives with her son. Daughter Carmita Subramanian assists her with medical appts. Patient does not have an AMD.     PALLIATIVE DIAGNOSES:   Encounter for Palliative Care  Goals of care   Physical Debility/Generalized weakness  Generalized pain  Chronic anxiety  Acute hypoxic respiratory failure due to covid-19 viral pneumonia  DLBCL- s/p 5 cycles da-REPOCH with curative intent     PLAN:   Overnight Ms. Craig Goes removed her O2 while getting to the commode prompting an RRT. She was also declining her Bactrim. She has been expressing to multiple providers that she wants to focus on her comfort, she feels she is ready. Assessed her personally with Dr. Pedro Shirley. She shared the same to the two of us quite directly. She is exhausted and does not want to be in any pain, she doesn't want to suffer. Right now she rates her anxiety as a 7/10 and her general body aches are an 8/10.   I clarified wether or not she would opt for continued oxygen support if I improved her pain and anxiety management, but she shared she does not want to prolong her suffering and time. She is ready. Pain- general discomfort and labored breathing  Discontinue oral morphine 15 mg  Schedule morphine 4 mg IV q4h and utilize q15min prn  Anxiety  Discontinue SL lorazepam  Schedule lorazepam 1 mg IV q4h and utilize q15 min prn  Stop PPN. Continue current level of oxygen support pending family arrival.  Spoke with daughter Jes Baca to let inform her of her mother's sentiments and decision. I explained the process of escalating her anxiety and pain medicines to reduce her work of breathing before titrating down oxygen flow. Jes Baca appropriately tearful, will call her brother and will come to the hospital to support. Thank you for the opportunity to participate in the care of Jessica Shah  Communicated plan of care with pulmonary, oncology teams, Family Medicine team and bedside RN Jessica.        GOALS OF CARE / TREATMENT PREFERENCES:     GOALS OF CARE:  Patient/Health Care Proxy Stated Goals: Comfort    TREATMENT PREFERENCES:   Code Status: DNR    Patient and family's personal goals include: daughter does not want her mom to suffer    Important upcoming milestones or family events: n/a    The patient identifies the following as important for living well: patient cannot state/participate d/t medical condition      Advance Care Planning:  [x] [facility] Interdisciplinary Team has updated the ACP Navigator with [address] and Patient Capacity      Primary Decision Maker: Helder Peñaloza - Daughter - [phone]    Advance Care Planning 10/22/2022   Patient's Healthcare Decision Maker is: -   Confirm Advance Directive Yes, on file   Patient Would Like to Complete Advance Directive -   Does the patient have other document types -       Medical Interventions: Comfort measures       Other:    As far as possible, the palliative care team has discussed with patient / health care proxy about goals of care / treatment preferences for patient. HISTORY:     History obtained from: chart, patient, daughter, friend    CHIEF COMPLAINT: fatigue    HPI/SUBJECTIVE:    The patient is:   [x] Verbal and participatory though limited by fatigue   [] Non-participatory due to:     10/19: Patient is awake but appears very fatigue. She says she is feeling better than yesterday but does say she is still having some labored breathing. She reports generalized pain that feels like aching/soreness. 10/20: She has been able to take a few bites of pudding today. She says the morphine and ativan are helping and requests for ativan to be increased to help her anxiety. 10/21: Patient is awake and sitting in bedside chair. She states she is feeling a little better today. 10/24- I saw her just after working with therapy. She was up in a chair, oxygenating well on 50L / 430% FiO2 but certainly labored, able to speak only short sentences. She is quite fatigued. Reports general discomfort \"all over. \"  Alert and coherent. Now on PPM.    10/25- pt alert, very fatigued, continues to be labored with her breathing. Reports ongoing pain all over. Does not think the oral medicines are helpful.       Clinical Pain Assessment (nonverbal scale for severity on nonverbal patients):   Clinical Pain Assessment  Severity: 7  Location: \"all over\"  Character: general body aches  Duration: weeks  Effect: limits ability to function  Factors: pain medications helps  Frequency: persistent     Activity (Movement): Lying quietly, normal position    Duration: for how long has pt been experiencing pain (e.g., 2 days, 1 month, years)  Frequency: how often pain is an issue (e.g., several times per day, once every few days, constant)     FUNCTIONAL ASSESSMENT:     Palliative Performance Scale (PPS):  PPS: 20       PSYCHOSOCIAL/SPIRITUAL SCREENING:     Palliative IDT has assessed this patient for cultural preferences / practices and a referral made as appropriate to needs (Cultural Services, Patient Advocacy, Ethics, etc.)    Any spiritual / Advent concerns:  [] Yes /  [x] No   If \"Yes\" to discuss with pastoral care during IDT     Does caregiver feel burdened by caring for their loved one:   [] Yes /  [x] No /  [] No Caregiver Present/Available [] No Caregiver [] Pt [facility] at [facility]  If \"Yes\" to discuss with social work during IDT    Anticipatory grief assessment:   [x] Normal  / [] Maladaptive     If \"Maladaptive\" to discuss with social work during IDT    ESAS Anxiety: Anxiety: 8    ESAS Depression:          REVIEW OF SYSTEMS:     Positive and pertinent negative findings in ROS are noted above in HPI. The following systems were [x] reviewed / [] unable to be reviewed as noted in HPI  Other findings are noted below. Systems: constitutional, ears/nose/mouth/throat, respiratory, gastrointestinal, genitourinary, musculoskeletal, integumentary, neurologic, psychiatric, endocrine. Positive findings noted below. Modified ESAS Completed by: provider   Fatigue: 8 Drowsiness: 2     Pain: 7   Anxiety: 8 Nausea: 0   Anorexia: 10 Dyspnea: 10           Stool Occurrence(s): 1        PHYSICAL EXAM:     From RN flowsheet:  Wt Readings from Last 3 Encounters:   10/24/22 183 lb (83 kg)   10/07/22 173 lb 8 oz (78.7 kg)   09/22/22 168 lb 9.6 oz (76.5 kg)     Blood pressure (!) 148/84, pulse 90, temperature 98.3 °F (36.8 °C), resp. rate 20, height 5' 3\" (1.6 m), weight 183 lb (83 kg), SpO2 99 %, not currently breastfeeding. Pain Scale 1: Numeric (0 - 10)  Pain Intensity 1: 0  Pain Onset 1: chronic  Pain Location 1: Generalized  Pain Orientation 1: Left, Right  Pain Description 1: Aching  Pain Intervention(s) 1: Rest, Repositioned  Last bowel movement, if known:     General: awake, alert, appears frail and very fatigued.     Eyes: lids normal, no drainage  Nose: nares normal, no drainage  Mouth: mmm, no drainage  Pulm: rate 20s-30s, respirations appear labored, she is able to speak in short sentences  Neuro: alert, responds to simple questions, passed \"random letter A\" test with ease  Psych: flat affect, no hallucinations, not agitated nor restless     HISTORY:     Principal Problem:    Acute hypoxemic respiratory failure ([address] (10/21/2022)    Active Problems:    Diarrhea of presumed infectious origin (6/13/2022)      Acute respiratory failure with hypoxia ([address] (9/13/2022)      Lymphoma ([address] (10/10/2022)      COVID (10/10/2022)      Pneumonia due to COVID-19 virus (10/21/2022)    Past Medical History:   Diagnosis Date    Adverse effect of anesthesia     PHLEGM IN THROAT POST OP & NEEDED X2 BREATHING TREATMENTS    Anxiety     COPD (chronic obstructive pulmonary disease) ([address] 2022    emphysema    Depression     GERD (gastroesophageal reflux disease)     Hernia, femoral     since childhood    Hypertension     Joint pain     Nausea & vomiting     TMJ arthritis       Past Surgical History:   Procedure Laterality Date    HX CERVICAL FUSION  2012    C5-C6    HX CHOLECYSTECTOMY  2020    HX COLONOSCOPY      HX ENDOSCOPY      HX HYSTERECTOMY  2019    HX TONSILLECTOMY      AS A CHILD    HX WISDOM TEETH EXTRACTION      TEENAGER    IR INSERT TUNL CVC W PORT OVER 5 YEARS  4/29/2022      Family History   Problem Relation Age of Onset    Heart Disease Mother 46    Heart Surgery Mother         HEART TRANSPLANT    Elevated Lipids Mother     Hypertension Mother     COPD Father     Emphysema Father     Sudden Death Brother 46    Other Maternal Grandmother         BRAIN TUMOR    No Known Problems Son     No Known Problems Daughter       History reviewed, no pertinent family history.   Social History     Tobacco Use    Smoking status: Some Days     Packs/day: 0.25     Years: 40.00     Pack years: 10.00     Types: Cigarettes    Smokeless tobacco: Never   Substance Use Topics    Alcohol use: Not Currently     Alcohol/week: 0.0 standard drinks     Allergies   Allergen Reactions    Oxycodone Nausea Only      Current Facility-Administered Medications   Medication Dose Route Frequency    LORazepam (ATIVAN) 2 mg/mL injection 1 mg  1 mg IntraVENous Q4H    morphine injection 4 mg  4 mg IntraVENous Q4H    LORazepam (ATIVAN) 2 mg/mL injection 1 mg  1 mg IntraVENous Q15MIN PRN    morphine injection 4 mg  4 mg IntraVENous Q15MIN PRN    heparin (porcine) pf 500 Units  500 Units InterCATHeter PRN    TPN ADULT - PERIPHERAL AA 4.25% D10% W/ ELECTROLYTES AND CA   IntraVENous CONTINUOUS    ipratropium-albuterol (COMBIVENT RESPIMAT) 20 mcg-100 mcg inhalation spray  1 Puff Inhalation Q6HWA RT    FLUoxetine (PROzac) capsule 60 mg  60 mg Oral DAILY    pantoprazole (PROTONIX) tablet 40 mg  40 mg Oral ACB    enoxaparin (LOVENOX) injection 40 mg  40 mg SubCUTAneous Q24H    methylPREDNISolone (PF) (SOLU-MEDROL) injection 60 mg  60 mg IntraVENous Q8H    lidocaine-prilocaine (EMLA) 2.5-2.5 % cream   Topical PRN    loperamide (IMODIUM) capsule 2 mg  2 mg Oral Q4H PRN    L.acidophilus-paracasei-S.thermophil-bifidobacter (RISAQUAD) 8 billion cell capsule  1 Capsule Oral DAILY    sodium chloride (OCEAN) 0.65 % nasal squeeze bottle 2 Spray  2 Spray Both Nostrils Q2H PRN    lisinopriL (PRINIVIL, ZESTRIL) tablet 20 mg  20 mg Oral DAILY    magic mouthwash (FIRST-MOUTHWASH BLM) oral suspension 5 mL  5 mL Oral Q4H PRN    fluconazole (DIFLUCAN) tablet 100 mg  100 mg Oral DAILY    sodium chloride (NS) flush 5-40 mL  5-40 mL IntraVENous Q8H    sodium chloride (NS) flush 5-40 mL  5-40 mL IntraVENous PRN    acetaminophen (TYLENOL) tablet 650 mg  650 mg Oral Q6H PRN    Or    acetaminophen (TYLENOL) suppository 650 mg  650 mg Rectal Q6H PRN    ondansetron (ZOFRAN) injection 4 mg  4 mg IntraVENous Q6H PRN    ondansetron (ZOFRAN ODT) tablet 8 mg  8 mg Oral Q8H PRN    prochlorperazine (COMPAZINE) tablet 10 mg  10 mg Oral Q6H PRN    trimethoprim-sulfamethoxazole (BACTRIM DS, SEPTRA DS) 160-800 mg per tablet 1 Tablet  1 Tablet Oral Q MON, WED & FRI    mometasone-formoterol (DULERA) 200mcg-5mcg/puff  2 Puff Inhalation BID RT          LAB AND IMAGING FINDINGS:     Lab Results   Component Value Date/Time    WBC 12.5 (H) 10/25/2022 06:28 AM    HGB 8.8 (L) 10/25/2022 06:28 AM    PLATELET 491 44/36/4725 06:28 AM     Lab Results   Component Value Date/Time    Sodium 131 (L) 10/25/2022 06:28 AM    Potassium 4.7 10/25/2022 06:28 AM    Chloride 97 10/25/2022 06:28 AM    CO2 28 10/25/2022 06:28 AM    BUN 35 (H) 10/25/2022 06:28 AM    Creatinine 1.05 (H) 10/25/2022 06:28 AM    Calcium 8.6 10/25/2022 06:28 AM    Magnesium 2.2 10/25/2022 06:28 AM    Phosphorus 3.8 10/25/2022 06:28 AM      Lab Results   Component Value Date/Time    Alk. phosphatase 132 (H) 10/25/2022 06:28 AM    Protein, total 4.9 (L) 10/25/2022 06:28 AM    Albumin 2.4 (L) 10/25/2022 06:28 AM    Globulin 2.5 10/25/2022 06:28 AM     No results found for: INR, PTMR, PTP, PT1, PT2, APTT, INREXT, INREXT   Lab Results   Component Value Date/Time    Ferritin 2,380 (H) 10/24/2022 12:04 AM      Lab Results   Component Value Date/Time    pH 7.48 (H) 10/21/2022 03:26 AM    PCO2 30 (L) 10/21/2022 03:26 AM    PO2 49 (LL) 10/21/2022 03:26 AM     No components found for: GLPOC   No results found for: CPK, CKMB             Total time:  Counseling / coordination time, spent as noted above:   > 50% counseling / coordination?:     Prolonged service was provided for  []30 min   []75 min in face to face time in the presence of the patient, spent as noted above. Time Start:   Time End:   Note: this can only be billed with 07802 (initial) or 93777 (follow up). If multiple start / stop times, list each separately.

## 2022-10-25 NOTE — PROGRESS NOTES
[facility]     Name: Phylicia Gomez MRN: [id number]   : 1955 Hospital: [address]   Date: 10/25/2022        Impression Plan   Acute hypoxic respiratory failure  COVID 19 PNA with questionable superimposed bacterial PNA  Lymphoma s/p recent R-CHOP  Pancytopenia  Hx of tobacco abuse               Wean O2 to keep sats above 90%; on HF 50L 100%  Continue high dose IV Solu-Medrol  Combivent and Dulera  Completed a 7 day course of Vanc/Cefepime  Barcitinib was contraindicated since pt is immunocompromised   Nivestym per hematology - stopped due to increasing WBC  Ppx enox  TPN for nutrtion  Goals of care discussion held by Dr. Bryon Mckinley on 10/2. Readdressed goals of care this morning at patient's request.  She was clear about wanting to stop current level of care and move towards comfort care. Discussed with FP team and sent message to Palliative Care team.             Radiology  (personally reviewed) CT chest reviewed: bilateral peripheral infiltrates, no PE    10/17 chest CTA: Heterogeneous bilateral airspace opacities worsened since 2022 consistent with diffuse, severe COVID 19 pneumonia. . Trace bilateral pleural effusions. No evidence of PE. Subjective     Cc: shortness of breath    77 yo with PMHx of lymphoma presenting with increasing SOB for the last 3 days. Was admitted last week for R-CHOP chemotherapy. Her son was COVID 19 positive last week. Pt is COVID 19 positive in ER. She has had the first series of mRNA vaccine but none of the boosters. Currently on 4 liters of O2 and dyspnic. WBC 13, neutrophil predominant. Bilateral infiltrates on CT chest.     Interval history:  Afebrile  Sats 88-96% on 60L and 100%  BP stable  WBC 12.5  Plts wnl  Creat stable  LFTs - elevated and increasing   - increasing  CRP < 0.38  D-dimer 1.85 - increasing  10/10 blood cultures negative x 5 days  Cdiff neg    CXR (10/24/2022) images reviewed.   Bilateral pneumonia consistent with Covid pneumonia has mildly increased at the left lung base. ROS:  Had a bad night. States, \"I'm done. \"  Asking about Palliative Care. Wants to stop currently level of care and focus on comfort care. Discussed what that would look like and what she and family can expect. States, \"I just don't want to be in pain. \"    Past Medical History:   Diagnosis Date    Adverse effect of anesthesia     PHLEGM IN THROAT POST OP & NEEDED X2 BREATHING TREATMENTS    Anxiety     COPD (chronic obstructive pulmonary disease) ([address] 2022    emphysema    Depression     GERD (gastroesophageal reflux disease)     Hernia, femoral     since childhood    Hypertension     Joint pain     Nausea & vomiting     TMJ arthritis       Past Surgical History:   Procedure Laterality Date    HX CERVICAL FUSION  2012    C5-C6    HX CHOLECYSTECTOMY  2020    HX COLONOSCOPY      HX ENDOSCOPY      HX HYSTERECTOMY  2019    HX TONSILLECTOMY      AS A CHILD    HX WISDOM TEETH EXTRACTION      TEENAGER    IR INSERT TUNL CVC W PORT OVER 5 YEARS  4/29/2022      Prior to Admission medications    Medication Sig Start Date End Date Taking? Authorizing Provider   doxycycline (VIBRA-TABS) 100 mg tablet Take 1 Tablet by mouth every twelve (12) hours. 10/7/22   Chao Kessler MD   albuterol (PROVENTIL HFA, VENTOLIN HFA, PROAIR HFA) 90 mcg/actuation inhaler Take 2 Puffs by inhalation every six (6) hours as needed for Wheezing or Shortness of Breath for up to 20 days. 10/7/22 10/27/22  Chao Kessler MD   ondansetron (ZOFRAN ODT) 8 mg disintegrating tablet Take 1 Tablet by mouth every eight (8) hours as needed for Nausea or Vomiting. 9/2/22   Chao Kessler MD   prochlorperazine (COMPAZINE) 10 mg tablet Take 1 Tablet by mouth every six (6) hours as needed for Nausea (do not take if groggy; take if nausea despite zofran).  9/2/22   Chao Kesslre MD   trimethoprim-sulfamethoxazole (BACTRIM DS, SEPTRA DS) 160-800 mg per tablet Take 1 Tablet by mouth every Monday, Wednesday, Friday.  Indications: prophylaxis treatment of cancer related infections 8/24/22   Schoeneweis, Cindra Guys, NP   FLUoxetine (PROzac) 40 mg capsule TAKE 1 CAPSULE BY MOUTH EVERY MORNING 10/11/21   Galina Ignacio MD   amLODIPine-benazepril (LOTREL) 5-20 mg per capsule TAKE 1 CAPSULE BY MOUTH EVERY MORNING 10/11/21   Galina Ignacio MD     Current Facility-Administered Medications   Medication Dose Route Frequency    TPN ADULT - PERIPHERAL AA 4.25% D10% W/ ELECTROLYTES AND CA   IntraVENous CONTINUOUS    ipratropium-albuterol (COMBIVENT RESPIMAT) 20 mcg-100 mcg inhalation spray  1 Puff Inhalation Q6HWA RT    FLUoxetine (PROzac) capsule 60 mg  60 mg Oral DAILY    pantoprazole (PROTONIX) tablet 40 mg  40 mg Oral ACB    enoxaparin (LOVENOX) injection 40 mg  40 mg SubCUTAneous Q24H    methylPREDNISolone (PF) (SOLU-MEDROL) injection 60 mg  60 mg IntraVENous Q8H    L.acidophilus-paracasei-S.thermophil-bifidobacter (RISAQUAD) 8 billion cell capsule  1 Capsule Oral DAILY    lisinopriL (PRINIVIL, ZESTRIL) tablet 20 mg  20 mg Oral DAILY    fluconazole (DIFLUCAN) tablet 100 mg  100 mg Oral DAILY    sodium chloride (NS) flush 5-40 mL  5-40 mL IntraVENous Q8H    amLODIPine (NORVASC) tablet 5 mg  5 mg Oral DAILY    trimethoprim-sulfamethoxazole (BACTRIM DS, SEPTRA DS) 160-800 mg per tablet 1 Tablet  1 Tablet Oral Q MON, WED & FRI    mometasone-formoterol (DULERA) 200mcg-5mcg/puff  2 Puff Inhalation BID RT    nicotine (NICODERM CQ) 21 mg/24 hr patch 1 Patch  1 Patch TransDERmal DAILY     Allergies   Allergen Reactions    Oxycodone Nausea Only      Social History     Tobacco Use    Smoking status: Some Days     Packs/day: 0.25     Years: 40.00     Pack years: 10.00     Types: Cigarettes    Smokeless tobacco: Never   Substance Use Topics    Alcohol use: Not Currently     Alcohol/week: 0.0 standard drinks      Family History   Problem Relation Age of Onset    Heart Disease Mother 46    Heart Surgery Mother HEART TRANSPLANT    Elevated Lipids Mother     Hypertension Mother     COPD Father     Emphysema Father     Sudden Death Brother 46    Other Maternal Grandmother         BRAIN TUMOR    No Known Problems Son     No Known Problems Daughter           Laboratory: I have personally reviewed the flowsheet and labs.      Recent Labs     10/25/22  0628 10/24/22  0004 10/23/22  0024   WBC 12.5* 9.6 7.8   HGB 8.8* 8.3* 7.9*   HCT 25.8* 24.9* 24.3*    161 144*       Recent Labs     10/25/22  0628 10/24/22  0004 10/23/22  0024   * 134* 137   K 4.7 4.4 4.1   CL 97 101 104   CO2 28 28 28   * 161* 146*   BUN 35* 31* 28*   CREA 1.05* 1.11* 1.18*   CA 8.6 8.1* 8.0*   MG 2.2 2.1 1.9   PHOS 3.8 3.0 3.5   ALB 2.4* 2.2* 2.3*   * 429* 343*         Objective:   Visit Vitals  /74 (BP 1 Location: Right upper arm, BP Patient Position: At rest)   Pulse 80   Temp 98.2 °F (36.8 °C)   Resp 20   Ht 5' 3\" (1.6 m)   Wt 83 kg (183 lb)   SpO2 96%   Breastfeeding No   BMI 32.42 kg/m²       Intake/Output Summary (Last 24 hours) at 10/25/2022 0804  Last data filed at 10/24/2022 1340  Gross per 24 hour   Intake --   Output 450 ml   Net -450 ml       EXAM:   GENERAL: awake, alert, tired and ill appearing, HEENT:  anicteric, EOMI, no alar flaring or epistaxis, oral mucosa moist without cyanosis, NECK:  no jugular vein distention, no retractions, no thyromegaly or masses, LUNGS: diminished, scattered rales HEART:  Regular rate and rhythm with no MGR; no edema is present, ABDOMEN:  soft with no tenderness, bowel sounds present, EXTREMITIES:  warm with no cyanosis, SKIN:  no jaundice or ecchymosis, and NEUROLOGIC:  alert and oriented, grossly non-focal    Alexander Cue, PA  [facility]

## 2022-10-25 NOTE — PROGRESS NOTES
Occupational Therapy  Chart reviewed for tx, noted patient desire for comfort measures only at this time. Will sign off.    Karen Leal OTR/L

## 2022-10-25 NOTE — ACP (ADVANCE CARE PLANNING)
Advance Care Planning     Advance Care Planning (ACP) Physician/NP/PA Conversation      Date of Conversation: 10/10/2022  Conducted with: Patient with Decision Making Capacity    Healthcare Decision Maker:     Primary Decision Maker: Nellie Carias Daughter - [phone]  Click here to complete [address] including selection of the Healthcare Decision Maker Relationship (ie \"Primary\")    Care Preferences:    Ventilation: \"If you were unable to breathe on your own and your chance of recovery was unlikely, what would be your preference about the use of a ventilator (breathing machine) if it was available to you? \"   The patient is unsure. I later reviewed with her daughter Marlon Garcia by phone who shared that pt has previously told her she would not accept life support. She worries her mother did not completely understand the team's conversations on previous days. I plan to revisit this again with patient tomorrow, ideally with daughter Marlon Garcia by phone for support. Additional topics discussed: benefit/burden of treatment options    Conversation Outcomes / Follow-Up Plan:   ACP in process - information provided, considering goals and options  Reviewed DNR/DNI and patient     No change in code status today.       Length of Voluntary ACP Conversation in minutes:  20 minutes    Bear Jason MD

## 2022-10-25 NOTE — PROGRESS NOTES
RRT evaluation:    MEWS 2  Sepsis Score 3  Deterioration Index 48    Pt Spo2 in the 70's while using the bedside commode. Pt placed back in bed and High flow O2 titrated from 45L/min to 60L/min 100% per respiratory. Labored breathing and SOB noted by pt. Pt O2 recovering to 89%. Family practice notified and to bedside for evaluation of pt.      Scout Zheng RN

## 2022-10-25 NOTE — PROGRESS NOTES
Music Therapy Assessment  [address] [place] [address] [id number]     1955  78 y.o.  female    Patient Telephone Number: [phone] (home)   Jainism Affiliation: No preference   Language: English   Patient Active Problem List    Diagnosis Date Noted    Pneumonia due to COVID-19 virus 10/21/2022    Acute hypoxemic respiratory failure ([address] 10/21/2022    Lymphoma ([address] 10/10/2022    COVID 10/10/2022    COVID-19 10/07/2022    Mild malnutrition ([address] 09/13/2022    Acute respiratory failure with hypoxia ([address] 09/13/2022    Generalized pain 08/29/2022    Thrush 08/25/2022    Hypokalemia 08/25/2022    Anxiety about health 08/08/2022    Physical debility 07/19/2022    Bilious vomiting with nausea 07/19/2022    Poor appetite 07/19/2022    Other fatigue 06/20/2022    Port-A-Cath in place 06/14/2022    Chemotherapy induced nausea and vomiting 06/13/2022    Diarrhea of presumed infectious origin 06/13/2022    Neuropathy due to chemotherapeutic drug ([address] 05/31/2022    Chronic renal disease, stage III 05/17/2022    Episode of recurrent major depressive disorder ([address] 05/17/2022    Generalized edema 05/16/2022    Other secondary hypertension 05/13/2022    Diffuse large B cell lymphoma ([address] 05/11/2022    Cancer related pain 05/11/2022    Insomnia due to medical condition 05/11/2022    Metastatic cancer to bone ([address] 05/09/2022    Encounter for antineoplastic chemotherapy 04/27/2022    Encounter for screening for other viral diseases 04/27/2022    Renal lesion 04/27/2022    Diffuse large B-cell lymphoma of lymph nodes of neck ([address] 04/26/2022    High risk medication use 04/26/2022    Tobacco dependence 03/21/2022        Date: 10/25/2022            Total Time (in minutes): 10          SFM 3 PROG CARE TELE 1    Mental Status:   [  ] Alert [  ] Carolyn [place] [  ]  Confused  [  ] Minimally responsive  [  ] Sleeping -N/A    Communication Status: [  ] Impaired Speech [  ] Nonverbal -N/A    Physical Status:   [  ] Oxygen in use  [  ] Hard of Hearing [  ] Vision Impaired  [  ] Ambulatory  [  ] Ambulatory with assistance [  ] Non-ambulatory -N/A    Music Preferences, Background: N/A: Please see Session Observations below. Clinical Problem to be addressed: Referrer states long length of stay and anxiety. Goal(s) met in session: N/A: Please see Session Observations below. Physical/Pain management (Scale of 1-10):    Pre-session rating ___________    Post-session rating __________  [  ] Increased relaxation   [  ] Affected breathing patterns  [  ] Decreased muscle tension   [  ] Decreased agitation  [  ] Affected heart rate    [  ] Increased alertness     Emotional/Psychological:  [  ] Increased self-expression   [  ] Decreased aggressive behavior   [  ] Decreased feelings of stress  [  ] Discussed healthy coping skills     [  ] Improved mood    [  ] Decreased withdrawn behavior     Social:  [  ] Decreased feelings of isolation/loneliness [  ] Positive social interaction   [  ] Provided support and/or comfort for family/friends    Spiritual:  [  ] Spiritual support    [  ] Expressed peace  [  ] Expressed chen    [  ] Discussed beliefs    Techniques Utilized (Check all that apply): N/A: Please see Session Observations below. [  ] Procedural support MT [  ] Music for relaxation [  ] Patient preferred music  [  ] Sumi analysis  [  ] Song choice  [  ] Music for validation  [  ] Entrainment  [  ] Movement to music [  ] Guided visualization  [  ] Yolanda Caras  [  ] Patient instrument playing [  ] Chrissie List writing  [  ] Terrilee Gone along   [  ] [place] [employer]  [  ] Sensory stimulation  [  ] Active Listening  [  ] Music for spiritual support [  ] Making of CDs as gifts    Session Observations:  Referrals from Radha Krishnamurthy RN and Lynn Chan RN. Initially a Daily music therapy consult was placed, but this music therapist is only at this hospital once a week on Tuesdays.  Due to this schedule the music therapy team is unable to provide daily visits. The music therapy team will offer music therapy to the pt. If pt accepts, the music therapy team will provide an initial assessment session and then follow up sessions as often as possible. This music therapist (MT) asked pt's RN if she would be willing to assist with getting an iPad into the pt's room for a virtual music therapy session via Zoom. Pt's RN was agreeable and said this wasn't a good time because the pt's loved ones just arrived to visit and were emotional upon seeing the pt. She recommended giving the family some time before offering a virtual music therapy session. MT expressed understanding and will follow later in the day as able.     SUSI JimenezBC (Music Therapist-Board Certified)  Spiritual Care Department  Referral-based service

## 2022-10-25 NOTE — PROGRESS NOTES
[address]   Office [phone]  Fax [phone]          Subjective / Objective     24 Hour Events: Patient desat to 70's overnight when she took off her HFNC to go to the bathroom. RT was called. Oxygen increased from 45 to 60L HFNC. Took approx 15 mins for sats to return to 90's. Subjective: Awake and alert. Reports that event above was quite scary. Today she is expressing that she is more anxious and interested in more comfort care. She is tired and consistently in pain. Her main priorities are not being in pain at this point. She continues to not want Bipap nor intubation. She does not feel her sl lorazepam is helping. Temp (24hrs), Av.9 °F (36.6 °C), Min:97.3 °F (36.3 °C), Max:98.2 °F (36.8 °C)     Physical Exam  Constitutional:       Appearance: She is ill-appearing. Comments: Appears fatigued   HENT:      Head: Normocephalic and atraumatic. Right Ear: External ear normal.      Left Ear: External ear normal.   Cardiovascular:      Rate and Rhythm: Normal rate and regular rhythm. Pulmonary:      Comments: Fine crackles throughout. Increased work of breathing. Mild tachypnea. 60L high flow saturations in the low 90s  Abdominal:      General: There is no distension. Palpations: There is no mass. Musculoskeletal:         General: No swelling. Cervical back: Normal range of motion. Skin:     General: Skin is warm and dry. Neurological:      Mental Status: She is alert. Mental status is at baseline.       Respiratory: O2 Flow Rate (L/min): 60 l/min O2 Device: Heated, Hi flow nasal cannula     I/O:  Date 10/24/22 0700 - 10/25/22 0659 10/25/22 0700 - 10/26/22 0659   Shift 7073-7082 4950-7264 24 Hour Total 8185-8420 1685-0582 24 Hour Total   INTAKE   Shift Total(mL/kg)         OUTPUT   Urine(mL/kg/hr) 450(0.5)  450(0.2)        Urine Voided 450  450      Shift Total(mL/kg) 450(5.4)  450(5.4)      NET -450  -450      Weight (kg) 83 83 83 83 83 83         Inpatient Medications  Current Facility-Administered Medications   Medication Dose Route Frequency    heparin (porcine) pf 500 Units  500 Units InterCATHeter PRN    TPN ADULT - PERIPHERAL AA 4.25% D10% W/ ELECTROLYTES AND CA   IntraVENous CONTINUOUS    LORazepam (INTENSOL) 2 mg/mL oral concentrate 0.5 mg  0.5 mg SubLINGual Q3H PRN    ipratropium-albuterol (COMBIVENT RESPIMAT) 20 mcg-100 mcg inhalation spray  1 Puff Inhalation Q6HWA RT    FLUoxetine (PROzac) capsule 60 mg  60 mg Oral DAILY    morphine injection 2 mg  2 mg IntraVENous Q3H PRN    morphine IR (MS IR) tablet 15 mg  15 mg Oral Q8H PRN    pantoprazole (PROTONIX) tablet 40 mg  40 mg Oral ACB    enoxaparin (LOVENOX) injection 40 mg  40 mg SubCUTAneous Q24H    methylPREDNISolone (PF) (SOLU-MEDROL) injection 60 mg  60 mg IntraVENous Q8H    lidocaine-prilocaine (EMLA) 2.5-2.5 % cream   Topical PRN    loperamide (IMODIUM) capsule 2 mg  2 mg Oral Q4H PRN    L.acidophilus-paracasei-S.thermophil-bifidobacter (RISAQUAD) 8 billion cell capsule  1 Capsule Oral DAILY    sodium chloride (OCEAN) 0.65 % nasal squeeze bottle 2 Spray  2 Spray Both Nostrils Q2H PRN    lisinopriL (PRINIVIL, ZESTRIL) tablet 20 mg  20 mg Oral DAILY    magic mouthwash (FIRST-MOUTHWASH BLM) oral suspension 5 mL  5 mL Oral Q4H PRN    fluconazole (DIFLUCAN) tablet 100 mg  100 mg Oral DAILY    sodium chloride (NS) flush 5-40 mL  5-40 mL IntraVENous Q8H    sodium chloride (NS) flush 5-40 mL  5-40 mL IntraVENous PRN    acetaminophen (TYLENOL) tablet 650 mg  650 mg Oral Q6H PRN    Or    acetaminophen (TYLENOL) suppository 650 mg  650 mg Rectal Q6H PRN    ondansetron (ZOFRAN) injection 4 mg  4 mg IntraVENous Q6H PRN    amLODIPine (NORVASC) tablet 5 mg  5 mg Oral DAILY    ondansetron (ZOFRAN ODT) tablet 8 mg  8 mg Oral Q8H PRN    prochlorperazine (COMPAZINE) tablet 10 mg  10 mg Oral Q6H PRN    trimethoprim-sulfamethoxazole (BACTRIM DS, SEPTRA DS) 160-800 mg per tablet 1 Tablet  1 Tablet Oral Q MON, WED & FRI    mometasone-formoterol (DULERA) 200mcg-5mcg/puff  2 Puff Inhalation BID RT    nicotine (NICODERM CQ) 21 mg/24 hr patch 1 Patch  1 Patch TransDERmal DAILY     Allergies  Allergies   Allergen Reactions    Oxycodone Nausea Only     CBC:  Recent Labs     10/25/22  0628 10/24/22  0004 10/23/22  0024   WBC 12.5* 9.6 7.8   HGB 8.8* 8.3* 7.9*   HCT 25.8* 24.9* 24.3*    161 580*       Metabolic Panel:  Recent Labs     10/25/22  0628 10/24/22  0004 10/23/22  0024   * 134* 137   K 4.7 4.4 4.1   CL 97 101 104   CO2 28 28 28   BUN 35* 31* 28*   CREA 1.05* 1.11* 1.18*   * 161* 146*   CA 8.6 8.1* 8.0*   MG 2.2 2.1 1.9   PHOS 3.8 3.0 3.5   ALB 2.4* 2.2* 2.3*   * 429* 343*              Assessment and Plan     Stephani Cardoso is a 78 y.o. female with PMH of diffuse large B-cell lymphoma w/ bone mets, COPD, tobacco dependence, insomnia, MDD, and anxiety who was admitted for filgrastim injections and COVID-19 infection. Patient has made no significant clinical improvement over the course of the past 5 days and continues to require HFNC, now at 60L while on high dose steroids. Patient is highly focused on pain and anxiety control this morning, apparently seeking comfort care. AHRF in setting of COVID-19: On 60L HFNC. CXR 10/17 increasing bilateral pulmonary infiltrates with repeat on 10/24 showing mild worsening. CTA 10/17 no PE but severe COVID PNA and trace b/l pleural effusions. S/p vanc and cefepime (10/14-10/21). - Pulm following; Solumedrol 60mg q8hrs. Wean O2 as tolerated for sat > 90%  - Palliative on consult; IV morphine 2mg q3h prn. Lorazepam 0.5mg SL q3prn. - Patient requesting more comfort care as of this morning, palliative on consult and planning to meet with patient and daughter later this morning for further discussion.  - Do not intubate. No Bipap    Malnutrition per nutrition. Continues to not eat well.   - PPN at 63mL/hr peripherally    COPD not on home O2.  - Combivent respimat 20mcg-100mcg q4hrs  - Dulera 200mcg-5mcg 2 puffs BID     DLBCL with pancytopenia Follows with Dr. Cheryl Chavez. Completed C5 chemo on 10/7  - s/p 1 wk daily Filgrastim injections  - Zofran and compazine q6-8 hrs prn nausea  - heme/onc consulted, appreciate recs     Thrush/Mouth Pain - resolved   - Continue Diflucan 100 mg daily  - Magic Mouthwash as needed before meals for discomfort     Pancytopenia Likely 2/2 chemotherapy. Improved  - Continuing AC with lovenox 40mg daily  - Hold any AC with plt <50  - Transfuse if plt below 10  - Daily CBC  - Oncology consulted, appreciate rec's      Diarrhea  - Imodium 2mg BID  - C. Diff negative on 10/12     Elevated Cr Likely 2/2 poor PO intake. Stable  - Encouraged PO fluid consumption  - Monitor daily BMP     Hypertension POA BP was 92/66.   - Continue home Amplodipine-benazepril 5-20 daily      Hypokalemia POA K 2.9. Improved  - Replete PRN  - Follow on daily BMP     MDD/RENETTA Chronic, stable. - continue Prozac 60mg daily     Tobacco dependence About 40 pack years. Currently smokes about 5 cigarettes per day. - Nicotine patch 21 daily    FEN/GI - Regular diet. With supplementation. Activity - Ambulate as tolerated  DVT prophylaxis - Lovenox  GI prophylaxis - Not indicated at this time  Disposition - Plan to d/c to TBD. Code Status - DNR.  No Bipap    I appreciate the opportunity to participate in the care of this patient,  Víctor Bundy DO  Family Medicine Resident       For Billing    Chief Complaint   Patient presents with    Medication Problem       Hospital Problems  Date Reviewed: 9/23/2022            Codes Class Noted POA    Pneumonia due to COVID-19 virus ICD-10-CM: U07.1, J12.82  ICD-9-CM: 480.8, 079.89  10/21/2022 Unknown        * (Principal) Acute hypoxemic respiratory failure ([facility] ICD-10-CM: J96.01  ICD-9-CM: 518.81  10/21/2022 Unknown        Lymphoma ([facility] ICD-10-CM: C85.90  ICD-9-CM: 202.80  10/10/2022 Unknown        COVID ICD-10-CM: U07.1  ICD-9-CM: 079.89  10/10/2022 Unknown        Acute respiratory failure with hypoxia [facility]) ICD-10-CM: J96.01  ICD-9-CM: 518.81  9/13/2022 Unknown        Diarrhea of presumed infectious origin ICD-10-CM: R19.7  ICD-9-CM: 009.3  6/13/2022 Yes

## 2022-10-25 NOTE — PROGRESS NOTES
Cancer [place] at [address], [address] 100[address] [facility]  Nadia [place]ness: [phone]  F: [phone] Patient ID  Name: Bridgette Keyes  YOB: 1955  MRN: [id number]  Referring Provider:   No referring provider defined for this encounter. 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 \" I am ready\"     Objective:      Visit Vitals  /72 (BP 1 Location: Right arm, BP Patient Position: At rest)   Pulse 94   Temp 97.4 °F (36.3 °C)   Resp 22   Ht 5' 3\" (1.6 m)   Wt 183 lb (83 kg)   SpO2 96%   Breastfeeding No   BMI 32.42 kg/m²     ECOG: 3-4  General: ill appearing, no acute distress,  Respiratory: normal respiratory effort: O2 on high flow  CV: slight LE peripheral edema  Skin: no rashes; no ecchymoses; no petechiae  Psych: alert, oriented, flat mood/affect       Results:        Lab Results   Component Value Date/Time    WBC 12.5 (H) 10/25/2022 06:28 AM    HGB 8.8 (L) 10/25/2022 06:28 AM    HCT 25.8 (L) 10/25/2022 06:28 AM    PLATELET 510 05/79/1007 06:28 AM    MCV 90.2 10/25/2022 06:28 AM     Lab Results   Component Value Date/Time    Sodium 131 (L) 10/25/2022 06:28 AM    Potassium 4.7 10/25/2022 06:28 AM    Chloride 97 10/25/2022 06:28 AM    CO2 28 10/25/2022 06:28 AM    Anion gap 6 10/25/2022 06:28 AM    Glucose 146 (H) 10/25/2022 06:28 AM    BUN 35 (H) 10/25/2022 06:28 AM    Creatinine 1.05 (H) 10/25/2022 06:28 AM    BUN/Creatinine ratio 33 (H) 10/25/2022 06:28 AM    GFR est AA >60 10/03/2022 06:44 AM    GFR est non-AA >60 10/03/2022 06:44 AM    Calcium 8.6 10/25/2022 06:28 AM    Bilirubin, total 0.4 10/25/2022 06:28 AM    Alk.  phosphatase 132 (H) 10/25/2022 06:28 AM    Protein, total 4.9 (L) 10/25/2022 06:28 AM    Albumin 2.4 (L) 10/25/2022 06:28 AM    Globulin 2.5 10/25/2022 06:28 AM A-G Ratio 1.0 (L) 10/25/2022 06:28 AM    ALT (SGPT) 577 (H) 10/25/2022 06:28 AM    AST (SGOT) 161 (H) 10/25/2022 06:28 AM       10/17/22 CTA  Chest  IMPRESSION  Diffuse, severe COVID 19 pneumonia. No pulmonary embolism. 10/24/22 XR CHEST  IMPRESSION  1. Bilateral pneumonia consistent with Covid pneumonia has mildly increased at the left lung base. Assessment and Recommendations:      COVID-19 infection  Tested positive on 10/6/22.   10/14 pulmonary: abx changed to vanc and cefepime for superimposed bacterial PNA  10/17 chest CTA showed diffuse severe COVID PNA. Pulmonary following. Patient continues on high flow O2  -- Continue high dose steroids, antibiotics, supportive care    2. Diffuse large B-cell lymphoma of lymph nodes of neck ([facility] [address]  Management is with curative intent. Completed C5 da-REPOCH on 10/7/22. Was due for C6 on 10/24, but holding treatment given hypoxic resp failure. Good response to treatment noted on scans, it is quite possible she may be cured without completing her final cycle. Pt received filgrastim-aafi (NIVESTYM) 420 mcg daily x 6 doses; last dose on 10/16. 3. Thrombocytopenia, Anemia  Secondary to chemo effect. Continues to improve. 4. Thrush: Improved on diflucan daily    5. Nutrition: Very poor oral intake. Eating only small amts. --  TPN in use. 6. Diarrhea:  Resolved   10/12 C-diff: negative. Now improved and Imodium stopped. 7. Goals of care: With her cancer under control and hopefully now in remission, her current active issue is her respiratory failure secondary to Ramonsurjit Brea. Pt has decided to pursue comfort measures.    -palliative team following

## 2022-10-26 NOTE — PROGRESS NOTES
RRT Evaluation    RRT reviewed chart due to DI of 79 related to an SpO2 of 57%. Called [facility] charge and spoke with Marlys HUERTA who states that patient is comfort care. No further RRT interventions.     Di Burrell RN, [facility]

## 2022-10-26 NOTE — PROGRESS NOTES
paged for death on [facility]. Spoke with pt's nurse. Family present, son, daughter, friend. Introduced self and chaplaincy. Family shared they were not expecting their mom to die to quickly. Provided ministry of presence, pastoral support and grief interventions. Collaborated with staff. Family chose [facility] on [employer].     [address], [address], [facility]  Staff   Paging service: [phone] (BASILIO)

## 2022-10-26 NOTE — PROGRESS NOTES
Called to examine patient who has . No response to verbal and tactile stimuli. No respiratory effort. Absent heart sounds and pulses. Pupils fixed and dilated. Patient pronounced dead at 6:35 AM hours.     Aaron Oliveira DO  Family Medicine Resident

## 2022-10-26 NOTE — PROGRESS NOTES
[facility].     Shon Cali, RN, MSN/Care manager  [phone]

## 2022-10-26 NOTE — DEATH NOTE
[address] [facility] [address] [place] [address]   Office [phone]  Fax [phone]       Off-Service Note     Name: Stephani Cardoso MRN: [id number]  Sex: Female   YOB: 1955  Age: 78 y.o. PCP: Manjinder Hennessy MD     Date of admission: 10/10/2022  Date of discharge/transfer: 10/26/2022    Attending physician at admission: Gómez Solorzano MD  Attending physician at discharge/transfer: Joan Clarke MD  Resident physician at discharge/transfer: Meghan Parks DO     Consultants during hospitalization  IP CONSULT TO ONCOLOGY  IP CONSULT  [address] TO PULMONOLOGY     Admission diagnoses   COVID [U07.1]  Lymphoma ([address] [C85.90]  Acute respiratory failure with hypoxia ([facility] [address] [J96.01]    History of Present Illness  As per admitting provider, Dr. Fabiano Baez:   Dolores Hernandez is a 78 y.o. female with PMH of diffuse large B-cell lymphoma w/ bone mets, COPD, tobacco dependence, insomnia, MDD, anxiety, and drug-induced who presents to the ER complaining of:    A one-day h/o increased work of breathing and wheezing after being diagnosed with COVID-19 four days ago. The patient is currently getting scheduled injections of filgrastim for her B cell lymphoma at the hospital. She recently was discharged from the hospital after completing a course of chemotherapy infusions. She is being admitted due to concern for her infection. She has been using albuterol every six hours since yesterday. She has had some cough but it is non-productive. She also started doxycycline yesterday, and currently has taken only one dose. She otherwise does not take any medications for her COPD, and is on no steroids. She endorses significant fatigue and has had some nausea but this is c/w prior chemo infusions. She otherwise has no symptoms and denies fever, chills, diarrhea, light-headedness, congestion, sore throat, or loss of taste or smell.  [facility]  Stephani Cardoso is a 78 y.o. female with PMH of diffuse large B-cell lymphoma w/ bone mets, COPD, tobacco dependence, insomnia, MDD, and anxiety who was admitted for Acute Hypoxic Respiratory Failure in the setting of COVID-19 infection. Pulmonology, Oncology, and Palliative care specialists were all consulted. Shalonda Smith required oxygen supplementation that increased throughout hospitalization up to 60L HFNC. High dose steroids were continued throughout hospitalization. Shalonda Smith received a complete course of IV antibiotics to treat pneumonia. She also received filgrastim injections for bone marrow stimulation considering her pancytopenia which improved. Her home medications were continued. Her clinical status progressively worsened due to her severe COVID pneumonia. Unfortunately, after a 16-day hospital course and over 3 weeks of symptoms, comfort care was initiated and Ms. Francesca Serna passed on 10/26 at [phone]. Labs  Recent Labs     10/25/22  0628 10/24/22  0004   WBC 12.5* 9.6   HGB 8.8* 8.3*   HCT 25.8* 24.9*    161     Recent Labs     10/25/22  0628 10/24/22  0004   * 134*   K 4.7 4.4   CL 97 101   CO2 28 28   BUN 35* 31*   CREA 1.05* 1.11*   * 161*   CA 8.6 8.1*   MG 2.2 2.1   PHOS 3.8 3.0     Recent Labs     10/25/22  0628 10/24/22  0004   * 429*   * 120*   TBILI 0.4 0.3   TP 4.9* 4.6*   ALB 2.4* 2.2*   GLOB 2.5 2.4     Recent Labs     10/25/22  0628 10/24/22  0004   FERR 2,591* 2,380*       Micro:  Lab Results   Component Value Date/Time    Culture result: NO GROWTH 5 DAYS 10/10/2022 02:39 PM    Culture result: MODERATE NORMAL RESPIRATORY MELVIN 10/06/2022 06:21 PM     Imaging:  XR CHEST PA LAT    Result Date: 10/6/2022  EXAM:  XR CHEST PA LAT INDICATION:   congestion COMPARISON: 9/30/2022 chest CT and 9/12/2002 radiographs. FINDINGS: PA and lateral radiographs of the chest were obtained. Nodular densities measuring 1.2 cm in the peripheral and medial right lung base, not definitively seen on prior studies.  No evidence of significant pulmonary edema. No pleural effusion or pneumothorax. Heart, elizabeth, mediastinum are within normal limits. Right jugular access Central venous port projects over the upper cavoatrial junction. No acute osseous abnormalities. No findings to suggest significant pulmonary edema. At least 2 nodular densities in the right lower lobe, likely new since prior studies. Consider chest CT for further evaluation to exclude mass lesions. CT CHEST ABD PELV W CONT    Result Date: 9/30/2022  EXAM: CT CHEST ABD PELV W CONT INDICATION: Lymphoma, follow-up COMPARISON: CT June 28, 2022 IV CONTRAST: 100 mL of Isovue-370. ORAL CONTRAST: Given TECHNIQUE: Following the uneventful intravenous administration of contrast, thin axial images were obtained through the chest, abdomen and pelvis. Coronal and sagittal reformats were generated. CT dose reduction was achieved through use of a standardized protocol tailored for this examination and automatic exposure control for dose modulation. FINDINGS: CHEST WALL: There is a right chest portacatheter. In the posterior third of the lateral right breast, a 9 mm mass is not significantly changed. THYROID: No nodule. MEDIASTINUM: No mass or lymphadenopathy. ELIZABETH: No mass or lymphadenopathy. THORACIC AORTA: No dissection or aneurysm. MAIN PULMONARY ARTERY: Normal in caliber. TRACHEA/BRONCHI: Patent. ESOPHAGUS: No wall thickening or dilatation. HEART: Normal in size. PLEURA: No effusion or pneumothorax. LUNGS: No nodule, mass, or airspace disease. LIVER: No mass. BILIARY TREE: Status post cholecystectomy. CBD is not dilated. SPLEEN: within normal limits. PANCREAS: No mass or ductal dilatation. ADRENALS: Unremarkable. KIDNEYS: No mass, calculus, or hydronephrosis. STOMACH: Unremarkable. SMALL BOWEL: No dilatation or wall thickening. There is a right lower quadrant wid[place] abdominal wall hernia containing loops of small bowel, similar to prior study. COLON: No dilatation or wall thickening. APPENDIX: Not identified PERITONEUM: No ascites or pneumoperitoneum. RETROPERITONEUM: No lymphadenopathy or aortic aneurysm. REPRODUCTIVE ORGANS: Status post hysterectomy URINARY BLADDER: No mass or calculus. BONES: Chronic L1 compression fracture, with increased loss of height ABDOMINAL WALL: No mass or hernia. ADDITIONAL COMMENTS: N/A     1. No evidence of metastatic lymphadenopathy or other acute process within the chest, abdomen, or pelvis. 2. Chronic L1 compression fracture, with increased loss of height in comparison to CT June 28, 2022. 3. Chronic [place] right lower quadrant abdominal wall hernia containing loops of small bowel. No bowel obstruction or perforation. CTA CHEST W OR W WO CONT    Result Date: 10/17/2022  EXAM:  CTA CHEST W OR W WO CONT INDICATION:  Significantly increased oxygen requirement, active cancer, Covid COMPARISON: October 11, 1661 TECHNIQUE: Helical thin section chest CT following uneventful intravenous administration of nonionic contrast according to departmental PE protocol. Coronal and sagittal reformats were performed. 3D/MIP post processing was performed. CT dose reduction was achieved through use of a standardized protocol tailored for this examination and automatic exposure control for dose modulation. FINDINGS: This is a good quality study for the evaluation of pulmonary embolism to the first subsegmental arterial level. There is no pulmonary embolism to this level. Normal caliber thoracic aorta. Mild cardiomegaly. No pericardial effusion. No lymphadenopathy by imaging size criteria. Heterogeneous bilateral airspace opacities worsened since October 11, 2022 consistent with COVID 19 pneumonia. Trace bilateral pleural effusions. Central airways are unremarkable. Limited images of the upper abdomen are within normal limits. The bony structures are age-appropriate     Diffuse, severe COVID 19 pneumonia. No pulmonary embolism.      CTA CHEST W OR W WO CONT    Result Date: 10/11/2022  Clinical history: elevated d-dimer INDICATION:   elevated d-dimer COMPARISON: 9/12/2022 CONTRAST: 100 ml Isovue 370 TECHNIQUE: CT of the chest with  IV contrast , Isovue-370 is performed. Axial images from the thoracic inlet to the level of the upper abdomen are obtained. Manual post-processing of the images and coronal reformatting is also performed. CT dose reduction was achieved through use of a standardized protocol tailored for this examination and automatic exposure control for dose modulation. Multiplanar reformatted imaging was performed. Sagittal and coronal reformatting. 3-D Postprocessing of imaging was performed. 3-D MIP reconstructed images were obtained in the coronal plane. FINDINGS: There is no pulmonary embolism. There is no aortic aneurysm or dissection. Hepatic steatosis and cholecystectomy. Colonic wall thickening at the transverse and descending colon. Left basilar atelectasis/pneumonia. Scattered groundglass opacities throughout the pulmonary parenchyma. Mild diffuse bronchial wall thickening. Cardiomegaly. There is no pleural or pericardial effusion. There is no mediastinal, axillary or hilar lymphadenopathy. The aorta is normal in course and caliber. The proximal pulmonary arteries are without evidence of filling defects. No lytic or blastic lesions are identified. Chronic L1 compression fracture. The remainder of the upper abdomen visualized is unremarkable. There is no pulmonary embolism. There is no aortic aneurysm or dissection. Diffuse scattered groundglass and nodular opacities with more consolidative change at the lung bases greater on the left. Imaging findings are most consistent with a multifocal pneumonia. Follow-up chest CT in 4-6 weeks to evaluate for resolution. Transverse and descending colonic wall thickening suggestive of a mild to moderate colitis. Hepatic steatosis. Cardiomegaly. Incidental findings are as described above.      XR CHEST PORT    Result Date: 10/24/2022  EXAM:  XR CHEST PORT INDICATION: Covid 19 pneumonia COMPARISON: 10/17/2022 TECHNIQUE: Upright portable chest AP view at 0609 FINDINGS: The Port-A-Cath overlies the cavoatrial junction. Heart size is normal. There is bilateral infiltrates noted. Infiltrates are noted in the periphery of the left lung including the upper mid and lower lung zone with greatest involvement and lower lung zone. Right lung involvement is in the mid and lower lung zone. Overall the degree of consolidation is similar to the previous examination. There has been mild worsening at the left lung base just above the left lateral costophrenic angle. 1. Bilateral pneumonia consistent with Covid pneumonia has mildly increased at the left lung base. XR CHEST PORT    Result Date: 10/17/2022  Indication: Increasing oxygen requirement Comparison to 10/14/2022. Portable exam obtained at 12:58 AM demonstrates increasing bilateral interstitial infiltrates, greatest in the lower lobes bilaterally. Increasing bilateral pulmonary infiltrates. XR CHEST PORT    Result Date: 10/14/2022  INDICATION: eval for infiltrates EXAMINATION:  AP CHEST, PORTABLE COMPARISON: October 10, 2022 FINDINGS: Single AP portable view of the chest demonstrates unchanged right IJ Port-A-Cath. The cardiomediastinal silhouette is unchanged. Increased interstitial and airspace opacities and small left pleural effusion. No pneumothorax. Interval increase in mild scattered interstitial and airspace opacities and small left pleural effusion. XR CHEST PORT    Result Date: 10/10/2022  INDICATION: . covid, fatigue Additional history: COMPARISON: Previous chest xray, 10/10/2022. LIMITATIONS: Portable technique. Katiele Martin FINDINGS: Single frontal view of the chest. . Lines/tubes/surgical: A port in the right chest has a catheter which projects to terminate in the superior cava atrial junction. Heart/mediastinum: Calcifications in the aortic arch.  Lungs/pleura: Interstitial prominence which is accentuated by technique. No visualized pleural effusion or pneumothorax. Additional Comments: Partially imaged cervical fixation. .    1. Slight interstitial prominence which is accentuated by technique. Findings may represent vascular congestion and/or atypical infectious process. ECHO ADULT FOLLOW-UP OR LIMITED    Result Date: 10/24/2022    Left Ventricle: Normal left ventricular systolic function with a visually estimated EF of 55 - 60%. Not well visualized. Left ventricle size is normal. Mildly increased wall thickness. Unable to assess wall motion. Normal diastolic function. Right Ventricle: Not well visualized. Right ventricle size is normal. Normal wall thickness.      Chronic diagnoses   Problem List as of 10/26/2022 Date Reviewed: 9/23/2022            Codes Class Noted - Resolved    Pneumonia due to COVID-19 virus ICD-10-CM: U07.1, J12.82  ICD-9-CM: 480.8, 079.89  10/21/2022 - Present        * (Principal) Acute hypoxemic respiratory failure ([facility] ICD-10-CM: J96.01  ICD-9-CM: 518.81  10/21/2022 - Present        Lymphoma ([facility] ICD-10-CM: C85.90  ICD-9-CM: 202.80  10/10/2022 - Present        COVID ICD-10-CM: U07.1  ICD-9-CM: 079.89  10/10/2022 - Present        COVID-19 ICD-10-CM: U07.1  ICD-9-CM: 079.89  10/7/2022 - Present        Mild malnutrition ([facility] ICD-10-CM: E44.1  ICD-9-CM: 263.1  9/13/2022 - Present        Acute respiratory failure with hypoxia ([facility]) ICD-10-CM: J96.01  ICD-9-CM: 518.81  9/13/2022 - Present        Generalized pain ICD-10-CM: R52  ICD-9-CM: 780.96  8/29/2022 - Present        Thrush ICD-10-CM: B37.0  ICD-9-CM: 112.0  8/25/2022 - Present        Hypokalemia ICD-10-CM: E87.6  ICD-9-CM: 276.8  8/25/2022 - Present        Anxiety about health ICD-10-CM: F41.8  ICD-9-CM: 300.09  8/8/2022 - Present        Physical debility ICD-10-CM: R53.81  ICD-9-CM: 799.3  7/19/2022 - Present        Bilious vomiting with nausea ICD-10-CM: R11.14  ICD-9-CM: 787.04 7/19/2022 - Present        Poor appetite ICD-10-CM: R63.0  ICD-9-CM: 783.0  7/19/2022 - Present        Other fatigue ICD-10-CM: R53.83  ICD-9-CM: 780.79  6/20/2022 - Present        Port-A-Cath in place ICD-10-CM: Z95.828  ICD-9-CM: V45.89  6/14/2022 - Present        Chemotherapy induced nausea and vomiting ICD-10-CM: R11.2, T45.1X5A  ICD-9-CM: 787.01, E933.1  6/13/2022 - Present        Diarrhea of presumed infectious origin ICD-10-CM: R19.7  ICD-9-CM: 009.3  6/13/2022 - Present        Neuropathy due to chemotherapeutic drug ([facility] ICD-10-CM: G62.0, T45.1X5A  ICD-9-CM: 357.6, E933.1  5/31/2022 - Present        Chronic renal disease, stage III ICD-10-CM: N18.30  ICD-9-CM: 585.3  5/17/2022 - Present        Episode of recurrent major depressive disorder ([facility] ICD-10-CM: F33.9  ICD-9-CM: 296.30  5/17/2022 - Present        Generalized edema ICD-10-CM: R60.1  ICD-9-CM: 782.3  5/16/2022 - Present        Other secondary hypertension ICD-10-CM: I15.8  ICD-9-CM: 405.99  5/13/2022 - Present        Diffuse large B cell lymphoma ([facility] ICD-10-CM: C83.30  ICD-9-CM: 202.80  5/11/2022 - Present        Cancer related pain ICD-10-CM: G89.3  ICD-9-CM: 338.3  5/11/2022 - Present        Insomnia due to medical condition ICD-10-CM: G47.01  ICD-9-CM: 327.01  5/11/2022 - Present        Metastatic cancer to bone [facility]) ICD-10-CM: C79.51  ICD-9-CM: 198.5  5/9/2022 - Present        Encounter for antineoplastic chemotherapy ICD-10-CM: Z51.11  ICD-9-CM: V58.11  4/27/2022 - Present        Encounter for screening for other viral diseases ICD-10-CM: Z11.59  ICD-9-CM: V73.89  4/27/2022 - Present        Renal lesion ICD-10-CM: N28.9  ICD-9-CM: 593.9  4/27/2022 - Present        Diffuse large B-cell lymphoma of lymph nodes of neck ([facility] [address] ICD-10-CM: C83.31  ICD-9-CM: 202.81  4/26/2022 - Present        High risk medication use ICD-10-CM: Z79.899  ICD-9-CM: V58.69  4/26/2022 - Present        Tobacco dependence ICD-10-CM: F17.200  ICD-9-CM: 305.1  3/21/2022 - Present        RESOLVED: Productive cough ICD-10-CM: R05.8  ICD-9-CM: 786.2  2022 - 2022        RESOLVED: Lymphadenopathy of left cervical region ICD-10-CM: R59.0  ICD-9-CM: 785.6  3/21/2022 - 2022         Disposition:     Nadia Mohan DO  Family Medicine Resident       For Billing    Chief Complaint   Patient presents with    Medication Problem       Hospital Problems  Date Reviewed: 2022            Codes Class Noted POA    Pneumonia due to COVID-19 virus ICD-10-CM: U07.1, J12.82  ICD-9-CM: 480.8, 079.89  10/21/2022 Unknown        * (Principal) Acute hypoxemic respiratory failure ([facility] ICD-10-CM: J96.01  ICD-9-CM: 518.81  10/21/2022 Unknown        Lymphoma ([facility] ICD-10-CM: C85.90  ICD-9-CM: 202.80  10/10/2022 Unknown        COVID ICD-10-CM: U07.1  ICD-9-CM: 079.89  10/10/2022 Unknown        Acute respiratory failure with hypoxia [facility]) ICD-10-CM: J96.01  ICD-9-CM: 518.81  2022 Unknown        Diarrhea of presumed infectious origin ICD-10-CM: R19.7  ICD-9-CM: 009.3  2022 Yes

## 2022-10-26 NOTE — PROGRESS NOTES
1900 - Bedside and verbal shift change report given to Madison Duane, RN (oncoming nurse) by William Mckinley RN (offgoing nurse). Report included the following information: SBAR, Kardex, Intake/Output, MAR, Recent Results, and Med Rec Status. 80 - The patient had an episode of vomiting. MD made aware and a one time dose of IV Zofran 4 mg was permitted early and administered per [facility]      6910 - Notified MD of change in patient condition. Heart rate decreased into the 30's from previous tachycardia in the 110's. Family was made aware to come to bedside. MD and pastoral care to bedside. 0700 - Report given to William Mckinley RN concerning patient's progression. Family currently at bedside.

## 2022-10-31 ENCOUNTER — TELEPHONE (OUTPATIENT)
Dept: FAMILY MEDICINE CLINIC | Age: 67
End: 2022-10-31

## 2022-10-31 NOTE — TELEPHONE ENCOUNTER
Carlton Valdez Re,    Just letting you know [facility] will be sending you patient death certificate to sign.

## 2022-11-08 ENCOUNTER — TELEPHONE (OUTPATIENT)
Dept: FAMILY MEDICINE CLINIC | Age: 67
End: 2022-11-08

## 2022-11-08 NOTE — TELEPHONE ENCOUNTER
[facility] called in regards to needing the death certificate signed electronically for Johny Dates. If need be the  home can be reached at [phone]. Thanks!

## 2022-11-09 ENCOUNTER — TELEPHONE (OUTPATIENT)
Dept: FAMILY MEDICINE CLINIC | Age: 67
End: 2022-11-09

## 2022-11-09 NOTE — TELEPHONE ENCOUNTER
[facility] called stating that they have been trying to get someone to sign the death certificate electronically for Vi Taylor. The family is waiting so they can continue with the arrangements. The person to be in contact with is John Holly and he can be reached at [phone]. Thanks!

## (undated) DEVICE — CURVED, SMALL JAW, OPEN SEALER/DIVIDER: Brand: LIGASURE

## (undated) DEVICE — MINOR BASIN -SMH: Brand: MEDLINE INDUSTRIES, INC.

## (undated) DEVICE — GLOVE ORANGE PI 7 1/2   MSG9075

## (undated) DEVICE — TRAY PREP DRY W/ PREM GLV 2 APPL 6 SPNG 2 UNDPD 1 OVERWRAP

## (undated) DEVICE — DRAPE,UTILTY,TAPE,15X26, 4EA/PK: Brand: MEDLINE

## (undated) DEVICE — SUTURE MCRYL SZ 4-0 L27IN ABSRB UD L19MM PS-2 1/2 CIR PRIM Y426H

## (undated) DEVICE — ENT-SMH: Brand: MEDLINE INDUSTRIES, INC.

## (undated) DEVICE — DERMABOND SKIN ADH 0.7ML -- DERMABOND ADVANCED 12/BX

## (undated) DEVICE — PREP SKN CHLRAPRP APL 26ML STR --

## (undated) DEVICE — SUTURE VCRL SZ 3-0 L27IN ABSRB UD L26MM SH 1/2 CIR J416H

## (undated) DEVICE — REM POLYHESIVE ADULT PATIENT RETURN ELECTRODE: Brand: VALLEYLAB

## (undated) DEVICE — GLOVE SURG SZ 8 L12IN FNGR THK79MIL GRN LTX FREE